# Patient Record
Sex: MALE | Race: WHITE | NOT HISPANIC OR LATINO | URBAN - METROPOLITAN AREA
[De-identification: names, ages, dates, MRNs, and addresses within clinical notes are randomized per-mention and may not be internally consistent; named-entity substitution may affect disease eponyms.]

---

## 2017-05-16 ENCOUNTER — INPATIENT (INPATIENT)
Facility: HOSPITAL | Age: 62
LOS: 2 days | Discharge: HOME | End: 2017-05-19
Attending: INTERNAL MEDICINE | Admitting: INTERNAL MEDICINE
Payer: COMMERCIAL

## 2017-05-16 VITALS
TEMPERATURE: 98 F | HEART RATE: 73 BPM | RESPIRATION RATE: 18 BRPM | OXYGEN SATURATION: 100 % | SYSTOLIC BLOOD PRESSURE: 165 MMHG | DIASTOLIC BLOOD PRESSURE: 87 MMHG

## 2017-05-16 DIAGNOSIS — I10 ESSENTIAL (PRIMARY) HYPERTENSION: ICD-10-CM

## 2017-05-16 DIAGNOSIS — S42.309A UNSPECIFIED FRACTURE OF SHAFT OF HUMERUS, UNSPECIFIED ARM, INITIAL ENCOUNTER FOR CLOSED FRACTURE: Chronic | ICD-10-CM

## 2017-05-16 DIAGNOSIS — S82.899A OTHER FRACTURE OF UNSPECIFIED LOWER LEG, INITIAL ENCOUNTER FOR CLOSED FRACTURE: Chronic | ICD-10-CM

## 2017-05-16 DIAGNOSIS — L98.499 NON-PRESSURE CHRONIC ULCER OF SKIN OF OTHER SITES WITH UNSPECIFIED SEVERITY: ICD-10-CM

## 2017-05-16 DIAGNOSIS — E11.59 TYPE 2 DIABETES MELLITUS WITH OTHER CIRCULATORY COMPLICATIONS: ICD-10-CM

## 2017-05-16 LAB
ALBUMIN SERPL ELPH-MCNC: 3.3 G/DL — SIGNIFICANT CHANGE UP (ref 3.3–5)
ALP SERPL-CCNC: 72 U/L — SIGNIFICANT CHANGE UP (ref 40–120)
ALT FLD-CCNC: 12 U/L — SIGNIFICANT CHANGE UP (ref 4–41)
AST SERPL-CCNC: 15 U/L — SIGNIFICANT CHANGE UP (ref 4–40)
BASE EXCESS BLDV CALC-SCNC: -2.8 MMOL/L — SIGNIFICANT CHANGE UP
BASOPHILS # BLD AUTO: 0.04 K/UL — SIGNIFICANT CHANGE UP (ref 0–0.2)
BASOPHILS NFR BLD AUTO: 0.4 % — SIGNIFICANT CHANGE UP (ref 0–2)
BILIRUB SERPL-MCNC: 0.2 MG/DL — SIGNIFICANT CHANGE UP (ref 0.2–1.2)
BLOOD GAS VENOUS - CREATININE: 2.38 MG/DL — HIGH (ref 0.5–1.3)
BUN SERPL-MCNC: 47 MG/DL — HIGH (ref 7–23)
CALCIUM SERPL-MCNC: 9 MG/DL — SIGNIFICANT CHANGE UP (ref 8.4–10.5)
CHLORIDE BLDV-SCNC: 111 MMOL/L — HIGH (ref 96–108)
CHLORIDE SERPL-SCNC: 105 MMOL/L — SIGNIFICANT CHANGE UP (ref 98–107)
CO2 SERPL-SCNC: 21 MMOL/L — LOW (ref 22–31)
CREAT SERPL-MCNC: 2.33 MG/DL — HIGH (ref 0.5–1.3)
CRP SERPL-MCNC: 2 MG/L — SIGNIFICANT CHANGE UP (ref 0.3–5)
EOSINOPHIL # BLD AUTO: 0.19 K/UL — SIGNIFICANT CHANGE UP (ref 0–0.5)
EOSINOPHIL NFR BLD AUTO: 2.1 % — SIGNIFICANT CHANGE UP (ref 0–6)
ERYTHROCYTE [SEDIMENTATION RATE] IN BLOOD: 82 MM/HR — HIGH (ref 1–15)
GAS PNL BLDV: 138 MMOL/L — SIGNIFICANT CHANGE UP (ref 136–146)
GLUCOSE BLDV-MCNC: 133 — HIGH (ref 70–99)
GLUCOSE SERPL-MCNC: 133 MG/DL — HIGH (ref 70–99)
HCO3 BLDV-SCNC: 21 MMOL/L — SIGNIFICANT CHANGE UP (ref 20–27)
HCT VFR BLD CALC: 35.4 % — LOW (ref 39–50)
HCT VFR BLDV CALC: 34.9 % — LOW (ref 39–51)
HGB BLD-MCNC: 11.1 G/DL — LOW (ref 13–17)
HGB BLDV-MCNC: 11.3 G/DL — LOW (ref 13–17)
IMM GRANULOCYTES NFR BLD AUTO: 0.1 % — SIGNIFICANT CHANGE UP (ref 0–1.5)
LACTATE BLDV-MCNC: 1.3 MMOL/L — SIGNIFICANT CHANGE UP (ref 0.5–2)
LACTATE SERPL-SCNC: 0.8 MMOL/L — SIGNIFICANT CHANGE UP (ref 0.5–2)
LYMPHOCYTES # BLD AUTO: 2.14 K/UL — SIGNIFICANT CHANGE UP (ref 1–3.3)
LYMPHOCYTES # BLD AUTO: 23.6 % — SIGNIFICANT CHANGE UP (ref 13–44)
MCHC RBC-ENTMCNC: 28.8 PG — SIGNIFICANT CHANGE UP (ref 27–34)
MCHC RBC-ENTMCNC: 31.4 % — LOW (ref 32–36)
MCV RBC AUTO: 91.9 FL — SIGNIFICANT CHANGE UP (ref 80–100)
MONOCYTES # BLD AUTO: 0.53 K/UL — SIGNIFICANT CHANGE UP (ref 0–0.9)
MONOCYTES NFR BLD AUTO: 5.8 % — SIGNIFICANT CHANGE UP (ref 2–14)
NEUTROPHILS # BLD AUTO: 6.16 K/UL — SIGNIFICANT CHANGE UP (ref 1.8–7.4)
NEUTROPHILS NFR BLD AUTO: 68 % — SIGNIFICANT CHANGE UP (ref 43–77)
PCO2 BLDV: 51 MMHG — SIGNIFICANT CHANGE UP (ref 41–51)
PH BLDV: 7.28 PH — LOW (ref 7.32–7.43)
PLATELET # BLD AUTO: 244 K/UL — SIGNIFICANT CHANGE UP (ref 150–400)
PMV BLD: 11.5 FL — SIGNIFICANT CHANGE UP (ref 7–13)
PO2 BLDV: 46 MMHG — HIGH (ref 35–40)
POTASSIUM BLDV-SCNC: 4.8 MMOL/L — HIGH (ref 3.4–4.5)
POTASSIUM SERPL-MCNC: 4.9 MMOL/L — SIGNIFICANT CHANGE UP (ref 3.5–5.3)
POTASSIUM SERPL-SCNC: 4.9 MMOL/L — SIGNIFICANT CHANGE UP (ref 3.5–5.3)
PROT SERPL-MCNC: 7 G/DL — SIGNIFICANT CHANGE UP (ref 6–8.3)
RBC # BLD: 3.85 M/UL — LOW (ref 4.2–5.8)
RBC # FLD: 14 % — SIGNIFICANT CHANGE UP (ref 10.3–14.5)
SAO2 % BLDV: 76.2 % — SIGNIFICANT CHANGE UP (ref 60–85)
SODIUM SERPL-SCNC: 140 MMOL/L — SIGNIFICANT CHANGE UP (ref 135–145)
WBC # BLD: 9.07 K/UL — SIGNIFICANT CHANGE UP (ref 3.8–10.5)
WBC # FLD AUTO: 9.07 K/UL — SIGNIFICANT CHANGE UP (ref 3.8–10.5)

## 2017-05-16 PROCEDURE — 73630 X-RAY EXAM OF FOOT: CPT | Mod: 26,RT

## 2017-05-16 PROCEDURE — 71010: CPT | Mod: 26

## 2017-05-16 RX ORDER — ASPIRIN/CALCIUM CARB/MAGNESIUM 324 MG
81 TABLET ORAL DAILY
Qty: 0 | Refills: 0 | Status: DISCONTINUED | OUTPATIENT
Start: 2017-05-16 | End: 2017-05-19

## 2017-05-16 RX ORDER — DEXTROSE 50 % IN WATER 50 %
1 SYRINGE (ML) INTRAVENOUS ONCE
Qty: 0 | Refills: 0 | Status: DISCONTINUED | OUTPATIENT
Start: 2017-05-16 | End: 2017-05-19

## 2017-05-16 RX ORDER — ENOXAPARIN SODIUM 100 MG/ML
30 INJECTION SUBCUTANEOUS EVERY 24 HOURS
Qty: 0 | Refills: 0 | Status: DISCONTINUED | OUTPATIENT
Start: 2017-05-16 | End: 2017-05-19

## 2017-05-16 RX ORDER — VANCOMYCIN HCL 1 G
1000 VIAL (EA) INTRAVENOUS ONCE
Qty: 0 | Refills: 0 | Status: COMPLETED | OUTPATIENT
Start: 2017-05-16 | End: 2017-05-16

## 2017-05-16 RX ORDER — LACTOBACILLUS ACIDOPHILUS 100MM CELL
1 CAPSULE ORAL
Qty: 0 | Refills: 0 | Status: DISCONTINUED | OUTPATIENT
Start: 2017-05-16 | End: 2017-05-19

## 2017-05-16 RX ORDER — DEXTROSE 50 % IN WATER 50 %
12.5 SYRINGE (ML) INTRAVENOUS ONCE
Qty: 0 | Refills: 0 | Status: DISCONTINUED | OUTPATIENT
Start: 2017-05-16 | End: 2017-05-19

## 2017-05-16 RX ORDER — PIPERACILLIN AND TAZOBACTAM 4; .5 G/20ML; G/20ML
3.38 INJECTION, POWDER, LYOPHILIZED, FOR SOLUTION INTRAVENOUS EVERY 12 HOURS
Qty: 0 | Refills: 0 | Status: DISCONTINUED | OUTPATIENT
Start: 2017-05-16 | End: 2017-05-19

## 2017-05-16 RX ORDER — GLUCAGON INJECTION, SOLUTION 0.5 MG/.1ML
1 INJECTION, SOLUTION SUBCUTANEOUS ONCE
Qty: 0 | Refills: 0 | Status: DISCONTINUED | OUTPATIENT
Start: 2017-05-16 | End: 2017-05-19

## 2017-05-16 RX ORDER — MUPIROCIN 20 MG/G
1 OINTMENT TOPICAL DAILY
Qty: 0 | Refills: 0 | Status: DISCONTINUED | OUTPATIENT
Start: 2017-05-16 | End: 2017-05-19

## 2017-05-16 RX ORDER — ONDANSETRON 8 MG/1
4 TABLET, FILM COATED ORAL EVERY 6 HOURS
Qty: 0 | Refills: 0 | Status: DISCONTINUED | OUTPATIENT
Start: 2017-05-16 | End: 2017-05-19

## 2017-05-16 RX ORDER — DOCUSATE SODIUM 100 MG
100 CAPSULE ORAL THREE TIMES A DAY
Qty: 0 | Refills: 0 | Status: DISCONTINUED | OUTPATIENT
Start: 2017-05-16 | End: 2017-05-19

## 2017-05-16 RX ORDER — SODIUM CHLORIDE 9 MG/ML
1000 INJECTION INTRAMUSCULAR; INTRAVENOUS; SUBCUTANEOUS ONCE
Qty: 0 | Refills: 0 | Status: COMPLETED | OUTPATIENT
Start: 2017-05-16 | End: 2017-05-16

## 2017-05-16 RX ORDER — SODIUM CHLORIDE 9 MG/ML
1000 INJECTION, SOLUTION INTRAVENOUS
Qty: 0 | Refills: 0 | Status: DISCONTINUED | OUTPATIENT
Start: 2017-05-16 | End: 2017-05-19

## 2017-05-16 RX ORDER — HYDRALAZINE HCL 50 MG
25 TABLET ORAL THREE TIMES A DAY
Qty: 0 | Refills: 0 | Status: DISCONTINUED | OUTPATIENT
Start: 2017-05-16 | End: 2017-05-18

## 2017-05-16 RX ORDER — INSULIN LISPRO 100/ML
VIAL (ML) SUBCUTANEOUS
Qty: 0 | Refills: 0 | Status: DISCONTINUED | OUTPATIENT
Start: 2017-05-16 | End: 2017-05-19

## 2017-05-16 RX ORDER — PANTOPRAZOLE SODIUM 20 MG/1
40 TABLET, DELAYED RELEASE ORAL
Qty: 0 | Refills: 0 | Status: DISCONTINUED | OUTPATIENT
Start: 2017-05-16 | End: 2017-05-19

## 2017-05-16 RX ORDER — AMLODIPINE BESYLATE 2.5 MG/1
10 TABLET ORAL DAILY
Qty: 0 | Refills: 0 | Status: DISCONTINUED | OUTPATIENT
Start: 2017-05-16 | End: 2017-05-19

## 2017-05-16 RX ORDER — METOPROLOL TARTRATE 50 MG
50 TABLET ORAL
Qty: 0 | Refills: 0 | Status: DISCONTINUED | OUTPATIENT
Start: 2017-05-16 | End: 2017-05-19

## 2017-05-16 RX ORDER — ATORVASTATIN CALCIUM 80 MG/1
40 TABLET, FILM COATED ORAL AT BEDTIME
Qty: 0 | Refills: 0 | Status: DISCONTINUED | OUTPATIENT
Start: 2017-05-16 | End: 2017-05-19

## 2017-05-16 RX ORDER — ACETAMINOPHEN 500 MG
650 TABLET ORAL EVERY 6 HOURS
Qty: 0 | Refills: 0 | Status: DISCONTINUED | OUTPATIENT
Start: 2017-05-16 | End: 2017-05-19

## 2017-05-16 RX ORDER — PIPERACILLIN AND TAZOBACTAM 4; .5 G/20ML; G/20ML
3.38 INJECTION, POWDER, LYOPHILIZED, FOR SOLUTION INTRAVENOUS ONCE
Qty: 0 | Refills: 0 | Status: COMPLETED | OUTPATIENT
Start: 2017-05-16 | End: 2017-05-16

## 2017-05-16 RX ORDER — SODIUM CHLORIDE 9 MG/ML
1000 INJECTION INTRAMUSCULAR; INTRAVENOUS; SUBCUTANEOUS
Qty: 0 | Refills: 0 | Status: DISCONTINUED | OUTPATIENT
Start: 2017-05-16 | End: 2017-05-17

## 2017-05-16 RX ADMIN — SODIUM CHLORIDE 75 MILLILITER(S): 9 INJECTION INTRAMUSCULAR; INTRAVENOUS; SUBCUTANEOUS at 20:17

## 2017-05-16 RX ADMIN — Medication 100 MILLIGRAM(S): at 22:00

## 2017-05-16 RX ADMIN — SODIUM CHLORIDE 1000 MILLILITER(S): 9 INJECTION INTRAMUSCULAR; INTRAVENOUS; SUBCUTANEOUS at 15:43

## 2017-05-16 RX ADMIN — Medication 25 MILLIGRAM(S): at 22:00

## 2017-05-16 RX ADMIN — PIPERACILLIN AND TAZOBACTAM 200 GRAM(S): 4; .5 INJECTION, POWDER, LYOPHILIZED, FOR SOLUTION INTRAVENOUS at 15:43

## 2017-05-16 RX ADMIN — ATORVASTATIN CALCIUM 40 MILLIGRAM(S): 80 TABLET, FILM COATED ORAL at 22:00

## 2017-05-16 RX ADMIN — Medication 250 MILLIGRAM(S): at 15:43

## 2017-05-16 NOTE — H&P ADULT. - HISTORY OF PRESENT ILLNESS
60 y/o M with PHM of DM, HTN, HLD,CKD stage II-III, GERD, Mild anemia of chronic disease, poor compliance with follow up care prsented to ED with Cc of right foot ulcer with infection.  Pt reports increasing swelling of right foot ulcer, with occasional blackish drainage for past few weeks duration. Pt denies fevers, chills, dizziness, nausea, vomiting. Pt admitted for similar last year. Patient denies any chest pain, shortness of breath, palpitations, dizziness or syncope, he denies any fever, chills, sore throat, cough or other constitutional symptoms He stats that he had injury to his right foot followed by open wound for past few weeks duration and has been doing dressing at home on his own but foot wound continued to get worse hence PCP advised to come to ED for possible r/o Osteomyelitis. He denies any abdominal pain, nasuea, vomiting, diarrhea or blood per rectum. He denies any depression, suicidal or homicidal ideations. He denies any headache, focal limb weakness or seizure like activity. He denies any recent travel or sick contact. He denies any vision or ear complaints at present.

## 2017-05-16 NOTE — ED PROVIDER NOTE - ATTENDING CONTRIBUTION TO CARE
OSKAR LEBLANC, ATTENDING NOTE:  62 yo M with past medical history of DM, Hypertension presents with RIGHT foot ulcer. Pt sent by PMD, Dr. Laci Gama, for infected foot ulcer. Pt reports swelling of right foot ulcer, with intermittent blackish drainage. Pt denies F/C, N/V.   OSKAR LEBLANC, ATTENDING NOTE:  Patient is awake and alert and in no acute distress.  Normocephalic/atraumatic.  Auricles are normal.  Neck supple.  Lungs CTAB, no wheeze, no rhonchi,  no rales.  Heart is regular rate and rhythm.  Abdomen is soft, not distended +BS.  Back is nontender, no CVAT.  Moving all 4 extremities.  RIGHT anteromedial planter surface ulcer with minimal d/c.  Neurologically grossly intact.  Affect is appropriate.  DR. LEBLANC, ATTENDING MD-  I performed a face to face bedside interview with patient regarding history of present illness, review of symptoms and past medical history. I completed an independent physical exam.  I have discussed patient's plan of care with Dr. Joyce.   I agree with note as stated above, having amended the EMR as needed to reflect my findings.  I have discussed the assessment and plan of care.  This includes during the time I functioned as the attending physician for this patient.

## 2017-05-16 NOTE — H&P ADULT. - ATTENDING COMMENTS
61 year male    Right foot diabetic infected ulcer R/o Osteomyelitis  HTn  DM type II with Diabetic neuropathy  DM type II with diabetic Nephropathy CKD stage III  HLD  GERD  Anemia of chronic disease  Poor compliance with medications and follow up.    Patient seen and examined at bedside. History, physical exam, Laboratory data and imaging studies reviewed with patient and medical team.      ROS: Right foot ulcer with purulent discharge, Foot pain,   IZ=840/80mmhg Pulse=80/min RR=16/min Temp: 98.4  RS: Clear, No rales, No crepitaions, no wheezing  CVS: S1 S2 Nl, KENNA II/VI at LPSB  CNS: AAOX2-3, no FND. Motor 5/5 all 4 extremities. Sensory intact. Cranial nerves II to XII grossly intact. Able to comprehend and answers all questions appropriately  PA: Soft, NT, BS+nt, No organomegaly, No palpable or pulsatile mass, no CVA angle tenderness  Ext: PP+nt, Bilateral pedal edema++nt, RIght foot dorsal aspect foot ulcer with foul smelling and purulent discharge at base with 2x4 cm in size open wound with mucopurulent discharge. Bilateral diminished foot sensation++nt, No cyanosis, No clubbing  HEENT: SPIKE, EOMI, Neck supple, no JVP, No LN, No carotid Bruits,     Labs: Reviewed  Imaging studies: Reviewed  EKG: Reviewed    Plan of care:  Admit to medicine  Xray foot  ESR, CRP  Possible MRI of right foot aftter podiatry consult  IV antibiotics with Zosyn and vancomycin pending cultures and ID evaluation  ID consult with Dr. Spivey  Podiatry consult  Wound care consult  Optimal BP/DM control with insulin  Hold all oral antidiabetic agent  Insulin sliding scale for now  Avoid nephrotoxic agent  Nephrology evaluation with Dr. Pham  Optimal medical treatment  DVT/GI prophylaxis  Avoid excess weight bearing on foot.     Laci Gama MD  5/16/17 7 PM

## 2017-05-16 NOTE — ED PROVIDER NOTE - MEDICAL DECISION MAKING DETAILS
infected ulcer, iv abx, podiatry c/s, labs, cultures, ivf, xray, admit to medicine for further w/u and management

## 2017-05-16 NOTE — ED ADULT NURSE NOTE - OBJECTIVE STATEMENT
1500--- Report received from VINOD Polo. Patient is alert and oriented x 4, denies pain at this time . Medications administered as ordered.  1630-- Podiatrist at bedside, dressing change done by podiatrist. Denies pain at this time. To be transferred to [facility] 3.

## 2017-05-16 NOTE — ED PROVIDER NOTE - OBJECTIVE STATEMENT
60 y/o M with PHM of DM, HTN p/w right foot ulcer. Pt sent by PMD, Dr. Laci Gama, for infected foot ulcer. Pt reports increasing swelling of right foot ulcer, with occasional blackish drainage. Pt denies fevers, chills, dizziness, nausea, vomiting. 62 y/o M with PHM of DM, HTN p/w right foot ulcer. Pt sent by PMD, Dr. Laci Gama, for infected foot ulcer. Pt reports increasing swelling of right foot ulcer, with occasional blackish drainage. Pt denies fevers, chills, dizziness, nausea, vomiting. Pt admitted for similar last year.

## 2017-05-17 DIAGNOSIS — M86.9 OSTEOMYELITIS, UNSPECIFIED: ICD-10-CM

## 2017-05-17 LAB
ALBUMIN SERPL ELPH-MCNC: 2.9 G/DL — LOW (ref 3.3–5)
ALP SERPL-CCNC: 69 U/L — SIGNIFICANT CHANGE UP (ref 40–120)
ALT FLD-CCNC: 8 U/L — SIGNIFICANT CHANGE UP (ref 4–41)
APPEARANCE UR: CLEAR — SIGNIFICANT CHANGE UP
APTT BLD: 33.3 SEC — SIGNIFICANT CHANGE UP (ref 27.5–37.4)
AST SERPL-CCNC: 12 U/L — SIGNIFICANT CHANGE UP (ref 4–40)
BASOPHILS # BLD AUTO: 0.03 K/UL — SIGNIFICANT CHANGE UP (ref 0–0.2)
BASOPHILS NFR BLD AUTO: 0.4 % — SIGNIFICANT CHANGE UP (ref 0–2)
BILIRUB SERPL-MCNC: 0.2 MG/DL — SIGNIFICANT CHANGE UP (ref 0.2–1.2)
BILIRUB UR-MCNC: NEGATIVE — SIGNIFICANT CHANGE UP
BLOOD UR QL VISUAL: NEGATIVE — SIGNIFICANT CHANGE UP
BUN SERPL-MCNC: 37 MG/DL — HIGH (ref 7–23)
CALCIUM SERPL-MCNC: 8.9 MG/DL — SIGNIFICANT CHANGE UP (ref 8.4–10.5)
CHLORIDE SERPL-SCNC: 115 MMOL/L — HIGH (ref 98–107)
CHOLEST SERPL-MCNC: 141 MG/DL — SIGNIFICANT CHANGE UP (ref 120–199)
CO2 SERPL-SCNC: 18 MMOL/L — LOW (ref 22–31)
COLOR SPEC: SIGNIFICANT CHANGE UP
CREAT ?TM UR-MCNC: 52.6 MG/DL — SIGNIFICANT CHANGE UP
CREAT SERPL-MCNC: 2.08 MG/DL — HIGH (ref 0.5–1.3)
CRP SERPL-MCNC: 2.1 MG/L — SIGNIFICANT CHANGE UP (ref 0.3–5)
EOSINOPHIL # BLD AUTO: 0.2 K/UL — SIGNIFICANT CHANGE UP (ref 0–0.5)
EOSINOPHIL NFR BLD AUTO: 2.6 % — SIGNIFICANT CHANGE UP (ref 0–6)
EOSINOPHIL NFR URNS MANUAL: SIGNIFICANT CHANGE UP (ref 0–0)
GLUCOSE SERPL-MCNC: 89 MG/DL — SIGNIFICANT CHANGE UP (ref 70–99)
GLUCOSE UR-MCNC: 100 — SIGNIFICANT CHANGE UP
HBA1C BLD-MCNC: 5.8 % — HIGH (ref 4–5.6)
HCT VFR BLD CALC: 34.3 % — LOW (ref 39–50)
HDLC SERPL-MCNC: 44 MG/DL — SIGNIFICANT CHANGE UP (ref 35–55)
HGB BLD-MCNC: 10.7 G/DL — LOW (ref 13–17)
IMM GRANULOCYTES NFR BLD AUTO: 0.1 % — SIGNIFICANT CHANGE UP (ref 0–1.5)
INR BLD: 0.97 — SIGNIFICANT CHANGE UP (ref 0.88–1.17)
KETONES UR-MCNC: NEGATIVE — SIGNIFICANT CHANGE UP
LEUKOCYTE ESTERASE UR-ACNC: NEGATIVE — SIGNIFICANT CHANGE UP
LIPID PNL WITH DIRECT LDL SERPL: 76 MG/DL — SIGNIFICANT CHANGE UP
LYMPHOCYTES # BLD AUTO: 2.82 K/UL — SIGNIFICANT CHANGE UP (ref 1–3.3)
LYMPHOCYTES # BLD AUTO: 37.2 % — SIGNIFICANT CHANGE UP (ref 13–44)
MCHC RBC-ENTMCNC: 28.2 PG — SIGNIFICANT CHANGE UP (ref 27–34)
MCHC RBC-ENTMCNC: 31.2 % — LOW (ref 32–36)
MCV RBC AUTO: 90.5 FL — SIGNIFICANT CHANGE UP (ref 80–100)
MONOCYTES # BLD AUTO: 0.49 K/UL — SIGNIFICANT CHANGE UP (ref 0–0.9)
MONOCYTES NFR BLD AUTO: 6.5 % — SIGNIFICANT CHANGE UP (ref 2–14)
MUCOUS THREADS # UR AUTO: SIGNIFICANT CHANGE UP
NEUTROPHILS # BLD AUTO: 4.04 K/UL — SIGNIFICANT CHANGE UP (ref 1.8–7.4)
NEUTROPHILS NFR BLD AUTO: 53.2 % — SIGNIFICANT CHANGE UP (ref 43–77)
NITRITE UR-MCNC: NEGATIVE — SIGNIFICANT CHANGE UP
PH UR: 6.5 — SIGNIFICANT CHANGE UP (ref 4.6–8)
PLATELET # BLD AUTO: 251 K/UL — SIGNIFICANT CHANGE UP (ref 150–400)
PMV BLD: 11.1 FL — SIGNIFICANT CHANGE UP (ref 7–13)
POTASSIUM SERPL-MCNC: 4.7 MMOL/L — SIGNIFICANT CHANGE UP (ref 3.5–5.3)
POTASSIUM SERPL-SCNC: 4.7 MMOL/L — SIGNIFICANT CHANGE UP (ref 3.5–5.3)
PROT SERPL-MCNC: 6.4 G/DL — SIGNIFICANT CHANGE UP (ref 6–8.3)
PROT UR-MCNC: 431 MG/DL — SIGNIFICANT CHANGE UP
PROT UR-MCNC: 500 — HIGH
PROTHROM AB SERPL-ACNC: 10.9 SEC — SIGNIFICANT CHANGE UP (ref 9.8–13.1)
RBC # BLD: 3.79 M/UL — LOW (ref 4.2–5.8)
RBC # FLD: 13.9 % — SIGNIFICANT CHANGE UP (ref 10.3–14.5)
RBC CASTS # UR COMP ASSIST: SIGNIFICANT CHANGE UP (ref 0–?)
SODIUM SERPL-SCNC: 148 MMOL/L — HIGH (ref 135–145)
SODIUM UR-SCNC: 99 MEQ/L — SIGNIFICANT CHANGE UP
SP GR SPEC: 1.01 — SIGNIFICANT CHANGE UP (ref 1–1.03)
SPECIMEN SOURCE: SIGNIFICANT CHANGE UP
SPECIMEN SOURCE: SIGNIFICANT CHANGE UP
SQUAMOUS # UR AUTO: SIGNIFICANT CHANGE UP
TRIGL SERPL-MCNC: 138 MG/DL — SIGNIFICANT CHANGE UP (ref 10–149)
UROBILINOGEN FLD QL: NORMAL E.U. — SIGNIFICANT CHANGE UP (ref 0.1–0.2)
WBC # BLD: 7.59 K/UL — SIGNIFICANT CHANGE UP (ref 3.8–10.5)
WBC # FLD AUTO: 7.59 K/UL — SIGNIFICANT CHANGE UP (ref 3.8–10.5)

## 2017-05-17 PROCEDURE — 76775 US EXAM ABDO BACK WALL LIM: CPT | Mod: 26

## 2017-05-17 PROCEDURE — 93970 EXTREMITY STUDY: CPT | Mod: 26

## 2017-05-17 RX ORDER — SODIUM BICARBONATE 1 MEQ/ML
650 SYRINGE (ML) INTRAVENOUS THREE TIMES A DAY
Qty: 0 | Refills: 0 | Status: COMPLETED | OUTPATIENT
Start: 2017-05-17 | End: 2017-05-19

## 2017-05-17 RX ADMIN — Medication 81 MILLIGRAM(S): at 14:52

## 2017-05-17 RX ADMIN — Medication 50 MILLIGRAM(S): at 17:40

## 2017-05-17 RX ADMIN — Medication 650 MILLIGRAM(S): at 21:57

## 2017-05-17 RX ADMIN — AMLODIPINE BESYLATE 10 MILLIGRAM(S): 2.5 TABLET ORAL at 07:25

## 2017-05-17 RX ADMIN — PIPERACILLIN AND TAZOBACTAM 25 GRAM(S): 4; .5 INJECTION, POWDER, LYOPHILIZED, FOR SOLUTION INTRAVENOUS at 17:40

## 2017-05-17 RX ADMIN — Medication 25 MILLIGRAM(S): at 14:51

## 2017-05-17 RX ADMIN — Medication 1 TABLET(S): at 09:00

## 2017-05-17 RX ADMIN — PIPERACILLIN AND TAZOBACTAM 25 GRAM(S): 4; .5 INJECTION, POWDER, LYOPHILIZED, FOR SOLUTION INTRAVENOUS at 06:01

## 2017-05-17 RX ADMIN — Medication 100 MILLIGRAM(S): at 21:57

## 2017-05-17 RX ADMIN — Medication 25 MILLIGRAM(S): at 21:57

## 2017-05-17 RX ADMIN — SODIUM CHLORIDE 75 MILLILITER(S): 9 INJECTION INTRAMUSCULAR; INTRAVENOUS; SUBCUTANEOUS at 17:39

## 2017-05-17 RX ADMIN — Medication 50 MILLIGRAM(S): at 06:00

## 2017-05-17 RX ADMIN — MUPIROCIN 1 APPLICATION(S): 20 OINTMENT TOPICAL at 13:58

## 2017-05-17 RX ADMIN — Medication 100 MILLIGRAM(S): at 14:52

## 2017-05-17 RX ADMIN — ENOXAPARIN SODIUM 30 MILLIGRAM(S): 100 INJECTION SUBCUTANEOUS at 17:39

## 2017-05-17 RX ADMIN — Medication 1 TABLET(S): at 17:40

## 2017-05-17 RX ADMIN — PANTOPRAZOLE SODIUM 40 MILLIGRAM(S): 20 TABLET, DELAYED RELEASE ORAL at 06:00

## 2017-05-17 RX ADMIN — Medication 100 MILLIGRAM(S): at 07:25

## 2017-05-17 RX ADMIN — Medication 25 MILLIGRAM(S): at 07:25

## 2017-05-17 RX ADMIN — ATORVASTATIN CALCIUM 40 MILLIGRAM(S): 80 TABLET, FILM COATED ORAL at 21:57

## 2017-05-17 NOTE — DIETITIAN INITIAL EVALUATION ADULT. - OTHER INFO
Nutrition consult ordered for > Aic . 62 y/o male admitted with the DX of skin ulcer, DM and HTN, Pt reports that he has been managing his own diet and was able to loose 35 #  last year, gained 12 # back this year due to limited physical activity related with foot ulcer. LABS reviewed, Nutrition education provided in detail about [facility]. Current diet remain appropriate, RD remain available.

## 2017-05-17 NOTE — CONSULT NOTE ADULT - PROBLEM SELECTOR RECOMMENDATION 2
1. Local wound care and podiatry f/u.  Podiatry consult reviewed.  No bone exposure on physical exam.

## 2017-05-17 NOTE — PROGRESS NOTE ADULT - SUBJECTIVE AND OBJECTIVE BOX
Patient is a 61y old  Male who presents with a chief complaint of Right foot ulcer (16 May 2017 23:24)       INTERVAL HPI/OVERNIGHT EVENTS:  Patient seen and evaluated at bedside.  Pt is resting comfortable in NAD. Denies N/V/F/C.      Allergies    No Known Allergies    Intolerances        Vital Signs Last 24 Hrs  T(C): 36.7, Max: 37 (05-16 @ 21:26)  T(F): 98, Max: 98.6 (05-16 @ 21:26)  HR: 78 (65 - 78)  BP: 152/70 (141/67 - 157/82)  BP(mean): --  RR: 17 (16 - 18)  SpO2: 99% (95% - 100%)    LABS:                        10.7   7.59  )-----------( 251      ( 17 May 2017 05:15 )             34.3     05-17    148<H>  |  115<H>  |  37<H>  ----------------------------<  89  4.7   |  18<L>  |  2.08<H>    Ca    8.9      17 May 2017 05:15    TPro  6.4  /  Alb  2.9<L>  /  TBili  0.2  /  DBili  x   /  AST  12  /  ALT  8   /  AlkPhos  69  05-17    PT/INR - ( 17 May 2017 05:15 )   PT: 10.9 SEC;   INR: 0.97          PTT - ( 17 May 2017 05:15 )  PTT:33.3 SEC    CAPILLARY BLOOD GLUCOSE  140 (17 May 2017 12:00)  103 (17 May 2017 07:43)      Lower Extremity Physical Exam:  Vascular: DP/PT 2/4 B/L, CFT <3sec x 10, Temp gradient normal B/L  Neurology: Epicritic sensation absent B/L  Musculoskeletal/Ortho: right foto rockerbottom charcot deformity w/ ulceration  Wound #1:  Location: right foot plantar medial wound  Size: 2.0x2.0 cm  Etiology: diabetic ulceration  Depth: 0.1  Wound bed: granular   Drainage: scant  Odor: none  Periwound: callused    RADIOLOGY & ADDITIONAL TESTS:  EXAM:  RAD FOOT MIN 3 VIEWS RIGHT        PROCEDURE DATE:  May 16 2017         INTERPRETATION:  CLINICAL INDICATION: right foot ulceration    EXAM:  Frontal, oblique, and lateral right foot from 5/16/2017 at 1426. Compared   to prior study from 8/13/2016.    IMPRESSION:  Intact partially visualized distal fibular fracture fixation hardware   with underlying anatomic alignment maintained. Chronic posttraumatic   bridging heterotopic ossification across distal tibiofibular syndesmotic   space again noted.     Stable chronic midfoot deformity/Lisfranc derangement likely related to   Charcot/neuropathic arthropathy. Normally aligned hindfoot and forefoot   osseous structures and articulations. No acute appearing fractures.    Generalized soft tissue swelling. No tracking gas collections.     Proximal plantar aspect of the cuboid bone appears focally osteopenic   with thinned and indistinct cortices on the lateral view raising   suspicion for osteomyelitis in this region in the proper clinical   context. No additional suspected areas of osteomyelitis.    Plantar calcaneal enthesophyte again noted.    No additional focal osseous lesions.                  PERLA MUÑOZ M.D., ATTENDING RADIOLOGIST  This document has been electronically signed. May 16 2017  3:13PM

## 2017-05-17 NOTE — CONSULT NOTE ADULT - ASSESSMENT
Acute on CKD stage 3 baseline 1.3-1.4  ABEL: prerenal vs ATN, Renal US neg for obstruction, improving  Hypernatremia  Acidosis: sec to hyperchloremia   ? osteomyelitis: pending MRI  DM  HTN: acceptable     Plan  check urine cr, eos, na, UA  Check PTH, phos  Monitor BMP  Sodium bicarb 650 tid x 2 days  Encourage Increase PO free water  F/u ID/Pod Acute on CKD stage 3 baseline 1.3-1.4  ABEL: prerenal vs ATN, Renal US neg for obstruction, improving  Hypernatremia  Acidosis: sec to hyperchloremia   ? osteomyelitis: pending MRI  DM  HTN: acceptable     Plan  check urine cr, eos, na, UA  Check PTH, phos  Monitor BMP  Sodium bicarb 650 tid x 2 days  D/C IVF for now  Encourage Increase PO free water  F/u ID/Pod Acute on CKD stage 3 baseline 1.3-1.4  ABEL: prerenal vs ATN, Renal US neg for obstruction, improving  Hypernatremia: likely sec to dehydration  Acidosis: sec to hyperchloremia   ? osteomyelitis: pending MRI  DM  HTN: acceptable     Plan  check urine cr, eos, na, UA  Check PTH, phos  Monitor BMP  Sodium bicarb 650 tid x 2 days  D/C IVF for now  Encourage Increase PO free water  F/u ID/Pod

## 2017-05-17 NOTE — PROGRESS NOTE ADULT - ASSESSMENT
61 year male    Right foot open wound diabetic foot ulcer with possible osteomyelitis  HTn  Dm type II wtih CKD stage II-III  HLd  GERD  Anemia of chronic disease  Abnormal SPEP    Plan of care:  Continue IV fluids  IV antibiotics with Zosyn, vancomycin pending cultures and ID evalaution  ID consult Dr. Spivey  Podiatry consult  Monitor BUN/Creatinine level  Avoid nephrotoxic agents  Anemia work up  Immunofixation  Wound care consult  Possibel MRI of foot.  Optimal medical treatement  DVT/GI prophylaxis    Laci Gama MD  5/17/17 1:45 PM

## 2017-05-17 NOTE — PHYSICAL THERAPY INITIAL EVALUATION ADULT - DIAGNOSIS, PT EVAL
Infected Diabetic (R) Foot Ulcer; distal fibular fracture fixation hardware with underlying anatomic alignment maintained. Chronic posttraumatic bridging heterotopic ossification across distal tibiofibular syndesmotic space; Stable chronic midfoot deformity/Lisfranc derangement likely related to Charcot/neuropathic arthropathy. Generalized soft tissue swelling. Proximal plantar aspect of the cuboid bone appears focally osteopenic with thinned and indistinct cortices on the lateral view raising suspicion for osteomyelitis in this region in the proper clinical context. Plantar calcaneal enthesophyte

## 2017-05-17 NOTE — DIETITIAN INITIAL EVALUATION ADULT. - NS AS NUTRI INTERV ED CONTENT
Purpose of the nutrition education/Nutrition relationship to health/disease/Survival information/Priority modifications/Recommended modifications

## 2017-05-17 NOTE — CONSULT NOTE ADULT - ASSESSMENT
61 YOM h/o DM, HTN, h/o rt ankle fracture (h/o hardware?), and chronic rt foot plantar ulcer with increased discharge, at times purulent.  Possible OM on Ft xray.

## 2017-05-17 NOTE — CONSULT NOTE ADULT - PROBLEM SELECTOR RECOMMENDATION 9
1. Foot xray shows possible OM.  Recommend MRI vs. indium scan for further evaluation  2. Currently on broad spectrum abx.  If negative for OM, limit abx to short course of 7 days.  3. Cover for Staph, strep, and gram negative organisms including pseudomonas given h/o DM  4. Monitor WBC and temp cure.  If febrile >100.4, check bcx x 2.  5. Trend inflammatory markers.

## 2017-05-17 NOTE — CONSULT NOTE ADULT - SUBJECTIVE AND OBJECTIVE BOX
Patient is a 61y old  Male who presents with a chief complaint of Right foot ulcer (16 May 2017 23:24)      HPI:  62 y/o M with PHM of DM, HTN, HLD,CKD stage II-III, GERD, Mild anemia of chronic disease, poor compliance with follow up care prsented to ED with Cc of right foot ulcer with infection.  Pt reports increasing swelling of right foot ulcer, with occasional blackish drainage for past few weeks duration. Pt denies fevers, chills, dizziness, nausea, vomiting. Pt has h/o ankle fracture with metal hardware since last year, and chronic ulcer since then. Patient denies any chest pain, shortness of breath, palpitations, dizziness or syncope, he denies any fever, chills, sore throat, cough or other constitutional symptoms He stats that he had injury to his right foot followed by open wound for past few weeks duration and has been doing dressing at home on his own but foot wound continued to get worse hence PCP advised to come to ED for possible r/o Osteomyelitis. He denies any abdominal pain, nasuea, vomiting, diarrhea or blood per rectum. He denies any depression, suicidal or homicidal ideations. He denies any headache, focal limb weakness or seizure like activity. He denies any recent travel or sick contact. He denies any vision or ear complaints at present. No recent antibiotics or recent trauma to foot. (16 May 2017 18:47)      REVIEW OF SYSTEMS:    CONSTITUTIONAL: No fever, weight loss, or fatigue  EYES: No eye pain, visual disturbances, or discharge  ENMT:  No difficulty hearing, tinnitus, vertigo; No sinus or throat pain  NECK: No pain or stiffness  BREASTS: No pain, masses, or nipple discharge  RESPIRATORY: No cough, wheezing, chills or hemoptysis; No shortness of breath  CARDIOVASCULAR: No chest pain, palpitations, dizziness, or leg swelling  GASTROINTESTINAL: No abdominal or epigastric pain. No nausea, vomiting, or hematemesis; No diarrhea or constipation. No melena or hematochezia.  GENITOURINARY: No dysuria, frequency, hematuria, or incontinence  NEUROLOGICAL: No headaches, memory loss, loss of strength, numbness, or tremors  SKIN: No itching, burning, rashes, or lesions.     LYMPH NODES: No enlarged glands  ENDOCRINE: No heat or cold intolerance; No hair loss  MUSCULOSKELETAL: Right ankle and foot chronic edema; No muscle, back, or extremity pain  PSYCHIATRIC: No depression, anxiety, mood swings, or difficulty sleeping  HEME/LYMPH: No easy bruising, or bleeding gums  ALLERY AND IMMUNOLOGIC: No hives or eczema      PAST MEDICAL & SURGICAL HISTORY:  HTN (hypertension)  DM (diabetes mellitus)  Arm fracture  Ankle fracture: ORIF        Allergies    No Known Allergies        FAMILY HISTORY:  No pertinent family history in first degree relatives      SOCIAL HISTORY:    HIV status: unknown   PPD:  n/a  STD:  Denies  Travel hx: Denies  Occupation:  Works as [employer]  Pets: None    MEDICATIONS  (STANDING):  mupirocin 2% Ointment 1Application(s) Topical daily  enoxaparin Injectable 30milliGRAM(s) SubCutaneous every 24 hours  sodium chloride 0.9%. 1000milliLiter(s) IV Continuous <Continuous>  docusate sodium 100milliGRAM(s) Oral three times a day  insulin lispro (HumaLOG) corrective regimen sliding scale  SubCutaneous three times a day before meals  dextrose 5%. 1000milliLiter(s) IV Continuous <Continuous>  dextrose 50% Injectable 12.5Gram(s) IV Push once  piperacillin/tazobactam IVPB. 3.375Gram(s) IV Intermittent every 12 hours  aspirin enteric coated 81milliGRAM(s) Oral daily  atorvastatin 40milliGRAM(s) Oral at bedtime  metoprolol 50milliGRAM(s) Oral two times a day  amLODIPine   Tablet 10milliGRAM(s) Oral daily  pantoprazole    Tablet 40milliGRAM(s) Oral before breakfast  lactobacillus acidophilus 1Tablet(s) Oral two times a day with meals  hydrALAZINE 25milliGRAM(s) Oral three times a day    MEDICATIONS  (PRN):  acetaminophen   Tablet 650milliGRAM(s) Oral every 6 hours PRN For Temp greater than 38 C (100.4 F)  acetaminophen   Tablet. 650milliGRAM(s) Oral every 6 hours PRN Mild Pain (1 - 3)  ondansetron Injectable 4milliGRAM(s) IV Push every 6 hours PRN Nausea  dextrose Gel 1Dose(s) Oral once PRN Blood Glucose LESS THAN 70 milliGRAM(s)/deciliter  glucagon  Injectable 1milliGRAM(s) IntraMuscular once PRN Glucose LESS THAN 70 milligrams/deciliter      Vital Signs Last 24 Hrs  T(C): 36.7, Max: 37 (05-16 @ 21:26)  T(F): 98.1, Max: 98.6 (05-16 @ 21:26)  HR: 77 (65 - 77)  BP: 141/67 (141/67 - 157/82)  BP(mean): --  RR: 16 (16 - 18)  SpO2: 95% (95% - 100%)    PHYSICAL EXAM:    GENERAL: NAD, well-groomed, well-developed  HEAD:  Atraumatic, Normocephalic  EYES: EOMI, PERRLA, conjunctiva and sclera clear  ENMT: No tonsillar erythema, exudates, or enlargement; Moist mucous membranes, Good dentition, No lesions  NECK: Supple, No JVD, Normal thyroid  NERVOUS SYSTEM:  Alert & Oriented X3, Good concentration; Motor Strength 5/5 B/L upper and lower extremities; DTRs 2+ intact and symmetric  CHEST/LUNG: Clear to percussion bilaterally; No rales, rhonchi, wheezing, or rubs  HEART: Regular rate and rhythm; No murmurs, rubs, or gallops  ABDOMEN: Soft, Nontender, Nondistended; Bowel sounds present  EXTREMITIES:  2+ Peripheral Pulses, No clubbing, cyanosis.  Rt ankle and foot edema.  No TTP  LYMPH: No lymphadenopathy noted  SKIN: Rt foot plantar ulcer with surrounding callous.  Serosanguinous discharge.  No devitalized tissue or tenderness to palpation out of proportion to exam    LABS:  CBC Full  -  ( 17 May 2017 05:15 )  WBC Count : 7.59 K/uL  Hemoglobin : 10.7 g/dL  Hematocrit : 34.3 %  Platelet Count - Automated : 251 K/uL  Mean Cell Volume : 90.5 fL  Mean Cell Hemoglobin : 28.2 pg  Mean Cell Hemoglobin Concentration : 31.2 %  Auto Neutrophil # : 4.04 K/uL  Auto Lymphocyte # : 2.82 K/uL  Auto Monocyte # : 0.49 K/uL  Auto Eosinophil # : 0.20 K/uL  Auto Basophil # : 0.03 K/uL  Auto Neutrophil % : 53.2 %  Auto Lymphocyte % : 37.2 %  Auto Monocyte % : 6.5 %  Auto Eosinophil % : 2.6 %  Auto Basophil % : 0.4 %      05-17    148<H>  |  115<H>  |  37<H>  ----------------------------<  89  4.7   |  18<L>  |  2.08<H>    Ca    8.9      17 May 2017 05:15    TPro  6.4  /  Alb  2.9<L>  /  TBili  0.2  /  DBili  x   /  AST  12  /  ALT  8   /  AlkPhos  69  05-17      LIVER FUNCTIONS - ( 17 May 2017 05:15 )  Alb: 2.9 g/dL / Pro: 6.4 g/dL / ALK PHOS: 69 u/L / ALT: 8 u/L / AST: 12 u/L / GGT: x           Sedimentation Rate, Erythrocyte (05.16.17 @ 13:34)    Sedimentation Rate, Erythrocyte: 82 mm/hr      MICROBIOLOGY:    Blood Cultures:  u/a      Urine Culture:  u/a    RADIOLOGY:    5/16/17 Rt foot xray  Generalized soft tissue swelling. No tracking gas collections.     Proximal plantar aspect of the cuboid bone appears focally osteopenic   with thinned and indistinct cortices on the lateral view raising   suspicion for osteomyelitis in this region in the proper clinical   context. No additional suspected areas of osteomyelitis.    5/16/17 Chest xray     Clear lungs.    5/17 B/L LE US  No evidence of bilateral lower extremity deep venous thrombosis.

## 2017-05-17 NOTE — PROGRESS NOTE ADULT - SUBJECTIVE AND OBJECTIVE BOX
Chief Complaint:  Patient feels better at present.Complaints of right leg pain 3-4/10    MEDICATIONS  (STANDING):  mupirocin 2% Ointment 1Application(s) Topical daily  enoxaparin Injectable 30milliGRAM(s) SubCutaneous every 24 hours  sodium chloride 0.9%. 1000milliLiter(s) IV Continuous <Continuous>  docusate sodium 100milliGRAM(s) Oral three times a day  insulin lispro (HumaLOG) corrective regimen sliding scale  SubCutaneous three times a day before meals  dextrose 5%. 1000milliLiter(s) IV Continuous <Continuous>  dextrose 50% Injectable 12.5Gram(s) IV Push once  piperacillin/tazobactam IVPB. 3.375Gram(s) IV Intermittent every 12 hours  aspirin enteric coated 81milliGRAM(s) Oral daily  atorvastatin 40milliGRAM(s) Oral at bedtime  metoprolol 50milliGRAM(s) Oral two times a day  amLODIPine   Tablet 10milliGRAM(s) Oral daily  pantoprazole    Tablet 40milliGRAM(s) Oral before breakfast  lactobacillus acidophilus 1Tablet(s) Oral two times a day with meals  hydrALAZINE 25milliGRAM(s) Oral three times a day    MEDICATIONS  (PRN):  acetaminophen   Tablet 650milliGRAM(s) Oral every 6 hours PRN For Temp greater than 38 C (100.4 F)  acetaminophen   Tablet. 650milliGRAM(s) Oral every 6 hours PRN Mild Pain (1 - 3)  ondansetron Injectable 4milliGRAM(s) IV Push every 6 hours PRN Nausea  dextrose Gel 1Dose(s) Oral once PRN Blood Glucose LESS THAN 70 milliGRAM(s)/deciliter  glucagon  Injectable 1milliGRAM(s) IntraMuscular once PRN Glucose LESS THAN 70 milligrams/deciliter      Vital Signs Last 24 Hrs  T(C): 36.7, Max: 37 (05-16 @ 21:26)  HR: 77 (65 - 77)  BP: 141/67 (141/67 - 157/82)  RR: 16 (16 - 18)  SpO2: 95% (95% - 100%)  Wt(kg): --    CAPILLARY BLOOD GLUCOSE  140 (17 May 2017 12:00)  103 (17 May 2017 07:43)      I&O's Summary    I & Os for current day (as of 17 May 2017 13:40)  =============================================  IN: 0 ml / OUT: 850 ml / NET: -850 ml      PHYSICAL EXAM:  GENERAL: NAD, well-developed, well nourished, alert, awake, no acute distress at present  HEAD:  Atraumatic, Normocephalic,   EYES: EOMI, PERRLA, conjunctiva and sclera clear  NECK: Supple, No JVD, no palpable thyromegaly, no lymph adenopahy  CHEST/LUNG: Clear to auscultation bilaterally; No wheeze, no rales, no crepitations  HEART: Regular rate and rhythm;  Cathy II/VI at LPSB, No rubs, or gallops  ABDOMEN: Soft, Nontender, Nondistended; Bowel sounds present, no guarding, no rigidity, no rebound tenderness  EXTREMITIES:  Right foot dorsal aspect open wound ulcer 2x4 cm noted with mild purulent discharge. Bilaeral pedal edema+nt, Bilateral peripheral pulses+nt, No cyanosis, no clubbing.  Neurology: AAOx3, no focal neurological deficit. Motor 5/5 all 4 extremities. sensory intact. cranial nerves II to XII grossly intact. Able to comprehend and answers all questions appropriately.  SKIN: No rashes or lesions    LABS:                                                   05-17-17 @ 05:15    148<H>  |  115<H>  |  37<H>  ----------------------------<  89  4.7   |  18<L>  |  2.08<H>                                                 05-16-17 @ 13:34    140  |  105  |  47<H>  ----------------------------<  133<H>  4.9   |  21<L>  |  2.33<H>    Ca    8.9      17 May 2017 05:15  Ca    9.0      16 May 2017 13:34    TPro  6.4  /  Alb  2.9<L>  /  TBili  0.2  /  DBili  x   /  AST  12  /  ALT  8   /  AlkPhos  69  05-17  TPro  7.0  /  Alb  3.3  /  TBili  0.2  /  DBili  x   /  AST  15  /  ALT  12  /  AlkPhos  72  05-16      CBC Full  -  ( 17 May 2017 05:15 )  WBC Count : 7.59 K/uL  Hemoglobin : 10.7 g/dL  Hematocrit : 34.3 %  Platelet Count - Automated : 251 K/uL  Mean Cell Volume : 90.5 fL      CBC Full  -  ( 16 May 2017 13:34 )  WBC Count : 9.07 K/uL  Hemoglobin : 11.1 g/dL  Hematocrit : 35.4 %  Platelet Count - Automated : 244 K/uL  Mean Cell Volume : 91.9 fL        PT/INR - ( 17 May 2017 05:15 )   PT: 10.9 SEC;   INR: 0.97          PTT - ( 17 May 2017 05:15 )  PTT:33.3 SEC          RADIOLOGY & ADDITIONAL TESTS:  EXAM:  US KIDNEY(S)        PROCEDURE DATE:  May 17 2017         INTERPRETATION:  CLINICAL INFORMATION: Hypertension, type 2 diabetes,   hyperlipidemia and stage III chronic renal disease.    COMPARISON: None available.    TECHNIQUE: Sonography of the kidneys and bladder.     FINDINGS:    Right kidney:  13.5 cm. No renal mass, hydronephrosis or shadowing   calculus. 13 mm simple cyst is noted laterally.    Left kidney:  14.0 cm. No renal mass, hydronephrosis or shadowing   calculus. 2.3 cm lowerpole simple cyst is noted.    Urinary bladder: Within normal limits.    IMPRESSION:     Normal renal ultrasound.                      CORINA CHE M.D., ATTENDING RADIOLOGIST  This document has been electronically signed. May 17 2017 10:33AM        EXAM:  US DPLX LWR EXT VEINS COMPL BI        PROCEDURE DATE:  May 17 2017         INTERPRETATION:  CLINICAL INFORMATION: Infected right foot ulcer. Left   lower extremity pain. Assess for DVT.    COMPARISON: Bilateral lower extremity Doppler 8/15/2016.    TECHNIQUE: Duplex sonography of the bilateral lower extremities with   color and spectral Doppler, with and without compression.      FINDINGS:    There is normal compressibility of the bilateral common femoral, femoral   and popliteal veins. No calf vein thrombosis is detected.    Doppler examination shows normal spontaneous and phasic flow.    IMPRESSION:     No evidence of bilateral lower extremity deep venous thrombosis.                  CORINA CHE M.D., ATTENDING RADIOLOGIST  This document has been electronically signed. May 17 2017 10:32AM

## 2017-05-17 NOTE — CONSULT NOTE ADULT - SUBJECTIVE AND OBJECTIVE BOX
HPI:  60 y/o M with PHM of DM, HTN, HLD,CKD stage II-III, GERD, Mild anemia of chronic disease, poor compliance with follow up care prsented to ED with Cc of right foot ulcer with infection.  Pt reports increasing swelling of right foot ulcer, with occasional blackish drainage for past few weeks duration. Pt denies fevers, chills, dizziness, nausea, vomiting.He stats that he had injury to his right foot followed by open wound for past few weeks duration and has been doing dressing at home on his own but foot wound continued to get worse hence PCP advised to come to ED for possible r/o Osteomyelitis. patient denied taking any antibiotic/NSAIDs at home.     Allergies:  No Known Allergies    PAST MEDICAL & SURGICAL HISTORY:  HTN (hypertension)  DM (diabetes mellitus)  No pertinent past medical history  Arm fracture  Ankle fracture: ORIF  No significant past surgical history      Home Medications Reviewed    Hospital Medications:   MEDICATIONS  (STANDING):  mupirocin 2% Ointment 1Application(s) Topical daily  enoxaparin Injectable 30milliGRAM(s) SubCutaneous every 24 hours  sodium chloride 0.9%. 1000milliLiter(s) IV Continuous <Continuous>  docusate sodium 100milliGRAM(s) Oral three times a day  insulin lispro (HumaLOG) corrective regimen sliding scale  SubCutaneous three times a day before meals  dextrose 5%. 1000milliLiter(s) IV Continuous <Continuous>  dextrose 50% Injectable 12.5Gram(s) IV Push once  piperacillin/tazobactam IVPB. 3.375Gram(s) IV Intermittent every 12 hours  aspirin enteric coated 81milliGRAM(s) Oral daily  atorvastatin 40milliGRAM(s) Oral at bedtime  metoprolol 50milliGRAM(s) Oral two times a day  amLODIPine   Tablet 10milliGRAM(s) Oral daily  pantoprazole    Tablet 40milliGRAM(s) Oral before breakfast  lactobacillus acidophilus 1Tablet(s) Oral two times a day with meals  hydrALAZINE 25milliGRAM(s) Oral three times a day      SOCIAL HISTORY:  Denies ETOh, Smoking,     FAMILY HISTORY:  No pertinent family history in first degree relatives      REVIEW OF SYSTEMS:  CONSTITUTIONAL: No weakness, fevers or chills  EYES/ENT: No visual changes;  No vertigo or throat pain   NECK: No pain or stiffness  RESPIRATORY: No cough, wheezing, hemoptysis; No shortness of breath  CARDIOVASCULAR: No chest pain or palpitations.  GASTROINTESTINAL: No abdominal or epigastric pain. No nausea, vomiting, or hematemesis; No diarrhea or constipation. No melena or hematochezia.  GENITOURINARY: No dysuria, frequency, foamy urine, urinary urgency, incontinence or hematuria  NEUROLOGICAL: No numbness or weakness  SKIN: No itching, burning, rashes, or lesions   VASCULAR: No bilateral lower extremity edema.   All other review of systems is negative unless indicated above.    VITALS:  T(F): 98, Max: 98.6 (05-16 @ 21:26)  HR: 78  BP: 152/70  RR: 17  SpO2: 99%  Wt(kg): --  I & Os for 24h ending 05-17 @ 07:00  =============================================  IN: 0 ml / OUT: 850 ml / NET: -850 ml    I & Os for current day (as of 05-17 @ 17:25)  =============================================  IN: 525 ml / OUT: 900 ml / NET: -375 ml        PHYSICAL EXAM:  Constitutional: NAD  HEENT: anicteric sclera, oropharynx clear, MMM  Neck: No JVD  Respiratory: CTAB, no wheezes, rales or rhonchi  Cardiovascular: S1, S2, RRR  Gastrointestinal: BS+, soft, NT/ND  Extremities: 1+ nonpitting edema, right foot dressing D/C/I  Neurological: A/O x 3, no focal deficits  Psychiatric: Normal mood, normal affect  : No CVA tenderness. No sanchez.   Skin: No rashes      LABS:  05-17    148<H>  |  115<H>  |  37<H>  ----------------------------<  89  4.7   |  18<L>  |  2.08<H>    Ca    8.9      17 May 2017 05:15    TPro  6.4  /  Alb  2.9<L>  /  TBili  0.2  /  DBili      /  AST  12  /  ALT  8   /  AlkPhos  69  05-17    Creatinine Trend: 2.08 <--, 2.33 <--                        10.7   7.59  )-----------( 251      ( 17 May 2017 05:15 )             34.3     Urine Studies:        RADIOLOGY & ADDITIONAL STUDIES:  Normal renal ultrasound. HPI:  60 y/o M with PHM of DM, HTN, HLD,CKD stage II-III, GERD, Mild anemia of chronic disease, poor compliance with follow up care prsented to ED with Cc of right foot ulcer with infection.  Pt reports increasing swelling of right foot ulcer, with occasional blackish drainage for past few weeks duration. Pt denies fevers, chills, dizziness, nausea, vomiting.He stats that he had injury to his right foot followed by open wound for past few weeks duration and has been doing dressing at home on his own but foot wound continued to get worse hence PCP advised to come to ED for possible r/o Osteomyelitis. patient denied taking any antibiotic/NSAIDs at home.     Allergies:  No Known Allergies    PAST MEDICAL & SURGICAL HISTORY:  HTN (hypertension)  DM (diabetes mellitus)  No pertinent past medical history  Arm fracture  Ankle fracture: ORIF  No significant past surgical history      Home Medications Reviewed    Hospital Medications:   MEDICATIONS  (STANDING):  mupirocin 2% Ointment 1Application(s) Topical daily  enoxaparin Injectable 30milliGRAM(s) SubCutaneous every 24 hours  sodium chloride 0.9%. 1000milliLiter(s) IV Continuous <Continuous>  docusate sodium 100milliGRAM(s) Oral three times a day  insulin lispro (HumaLOG) corrective regimen sliding scale  SubCutaneous three times a day before meals  dextrose 5%. 1000milliLiter(s) IV Continuous <Continuous>  dextrose 50% Injectable 12.5Gram(s) IV Push once  piperacillin/tazobactam IVPB. 3.375Gram(s) IV Intermittent every 12 hours  aspirin enteric coated 81milliGRAM(s) Oral daily  atorvastatin 40milliGRAM(s) Oral at bedtime  metoprolol 50milliGRAM(s) Oral two times a day  amLODIPine   Tablet 10milliGRAM(s) Oral daily  pantoprazole    Tablet 40milliGRAM(s) Oral before breakfast  lactobacillus acidophilus 1Tablet(s) Oral two times a day with meals  hydrALAZINE 25milliGRAM(s) Oral three times a day      SOCIAL HISTORY:  Denies ETOh, Smoking,     FAMILY HISTORY:  No pertinent family history in first degree relatives      REVIEW OF SYSTEMS:  CONSTITUTIONAL: No weakness, fevers or chills  EYES/ENT: No visual changes;  No vertigo or throat pain   NECK: No pain or stiffness  RESPIRATORY: No cough, wheezing, hemoptysis; No shortness of breath  CARDIOVASCULAR: No chest pain or palpitations.  GASTROINTESTINAL: No abdominal or epigastric pain. No nausea, vomiting, or hematemesis; No diarrhea or constipation. No melena or hematochezia.  GENITOURINARY: No dysuria, frequency, foamy urine, urinary urgency, incontinence or hematuria  NEUROLOGICAL: No numbness or weakness  SKIN: No itching, burning, rashes, or lesions   VASCULAR: No claudication  All other review of systems is negative unless indicated above.    VITALS:  T(F): 98, Max: 98.6 (05-16 @ 21:26)  HR: 78  BP: 152/70  RR: 17  SpO2: 99%  Wt(kg): --  I & Os for 24h ending 05-17 @ 07:00  =============================================  IN: 0 ml / OUT: 850 ml / NET: -850 ml    I & Os for current day (as of 05-17 @ 17:25)  =============================================  IN: 525 ml / OUT: 900 ml / NET: -375 ml        PHYSICAL EXAM:  Constitutional: NAD  HEENT: anicteric sclera, oropharynx clear, MMM  Neck: No JVD  Respiratory: CTAB, no wheezes, rales or rhonchi  Cardiovascular: S1, S2, RRR  Gastrointestinal: BS+, soft, NT/ND  Extremities: 1+ nonpitting edema, right foot dressing D/C/I  Neurological: A/O x 3, no focal deficits  Psychiatric: Normal mood, normal affect  : No CVA tenderness. No sanchez.   Skin: No rashes      LABS:  05-17    148<H>  |  115<H>  |  37<H>  ----------------------------<  89  4.7   |  18<L>  |  2.08<H>    Ca    8.9      17 May 2017 05:15    TPro  6.4  /  Alb  2.9<L>  /  TBili  0.2  /  DBili      /  AST  12  /  ALT  8   /  AlkPhos  69  05-17    Creatinine Trend: 2.08 <--, 2.33 <--                        10.7   7.59  )-----------( 251      ( 17 May 2017 05:15 )             34.3     Urine Studies:        RADIOLOGY & ADDITIONAL STUDIES:  Normal renal ultrasound.

## 2017-05-17 NOTE — PROGRESS NOTE ADULT - PROBLEM SELECTOR PLAN 1
- pt seen and evaluated  - rec collagenase dressing changes daily  - no acute signs of infection  - no communication w/ bone  - no pod surgical intervention at this time  - on discharge rec total contact cast  - cont local wound care  - pod stable for discharge

## 2017-05-18 LAB
BUN SERPL-MCNC: 30 MG/DL — HIGH (ref 7–23)
CALCIUM SERPL-MCNC: 9 MG/DL — SIGNIFICANT CHANGE UP (ref 8.4–10.5)
CHLORIDE SERPL-SCNC: 109 MMOL/L — HIGH (ref 98–107)
CO2 SERPL-SCNC: 20 MMOL/L — LOW (ref 22–31)
CREAT SERPL-MCNC: 2 MG/DL — HIGH (ref 0.5–1.3)
GLUCOSE SERPL-MCNC: 108 MG/DL — HIGH (ref 70–99)
HCT VFR BLD CALC: 32.9 % — LOW (ref 39–50)
HGB BLD-MCNC: 10.4 G/DL — LOW (ref 13–17)
MAGNESIUM SERPL-MCNC: 1.7 MG/DL — SIGNIFICANT CHANGE UP (ref 1.6–2.6)
MCHC RBC-ENTMCNC: 28.4 PG — SIGNIFICANT CHANGE UP (ref 27–34)
MCHC RBC-ENTMCNC: 31.6 % — LOW (ref 32–36)
MCV RBC AUTO: 89.9 FL — SIGNIFICANT CHANGE UP (ref 80–100)
PHOSPHATE SERPL-MCNC: 3.1 MG/DL — SIGNIFICANT CHANGE UP (ref 2.5–4.5)
PLATELET # BLD AUTO: 247 K/UL — SIGNIFICANT CHANGE UP (ref 150–400)
PMV BLD: 11 FL — SIGNIFICANT CHANGE UP (ref 7–13)
POTASSIUM SERPL-MCNC: 4 MMOL/L — SIGNIFICANT CHANGE UP (ref 3.5–5.3)
POTASSIUM SERPL-SCNC: 4 MMOL/L — SIGNIFICANT CHANGE UP (ref 3.5–5.3)
PTH-INTACT SERPL-MCNC: 89.86 PG/ML — HIGH (ref 15–65)
RBC # BLD: 3.66 M/UL — LOW (ref 4.2–5.8)
RBC # FLD: 13.8 % — SIGNIFICANT CHANGE UP (ref 10.3–14.5)
SODIUM SERPL-SCNC: 141 MMOL/L — SIGNIFICANT CHANGE UP (ref 135–145)
SPECIMEN SOURCE: SIGNIFICANT CHANGE UP
VANCOMYCIN FLD-MCNC: 5.1 UG/ML — SIGNIFICANT CHANGE UP
WBC # BLD: 7.33 K/UL — SIGNIFICANT CHANGE UP (ref 3.8–10.5)
WBC # FLD AUTO: 7.33 K/UL — SIGNIFICANT CHANGE UP (ref 3.8–10.5)

## 2017-05-18 PROCEDURE — 86334 IMMUNOFIX E-PHORESIS SERUM: CPT | Mod: 26

## 2017-05-18 RX ORDER — HYDRALAZINE HCL 50 MG
50 TABLET ORAL
Qty: 0 | Refills: 0 | Status: DISCONTINUED | OUTPATIENT
Start: 2017-05-18 | End: 2017-05-19

## 2017-05-18 RX ORDER — VANCOMYCIN HCL 1 G
1000 VIAL (EA) INTRAVENOUS EVERY 24 HOURS
Qty: 0 | Refills: 0 | Status: DISCONTINUED | OUTPATIENT
Start: 2017-05-18 | End: 2017-05-19

## 2017-05-18 RX ORDER — CALCITRIOL 0.5 UG/1
0.25 CAPSULE ORAL DAILY
Qty: 0 | Refills: 0 | Status: DISCONTINUED | OUTPATIENT
Start: 2017-05-18 | End: 2017-05-19

## 2017-05-18 RX ADMIN — Medication 650 MILLIGRAM(S): at 12:50

## 2017-05-18 RX ADMIN — Medication 25 MILLIGRAM(S): at 05:19

## 2017-05-18 RX ADMIN — Medication 50 MILLIGRAM(S): at 18:36

## 2017-05-18 RX ADMIN — PANTOPRAZOLE SODIUM 40 MILLIGRAM(S): 20 TABLET, DELAYED RELEASE ORAL at 05:19

## 2017-05-18 RX ADMIN — Medication 1 TABLET(S): at 08:19

## 2017-05-18 RX ADMIN — PIPERACILLIN AND TAZOBACTAM 25 GRAM(S): 4; .5 INJECTION, POWDER, LYOPHILIZED, FOR SOLUTION INTRAVENOUS at 05:19

## 2017-05-18 RX ADMIN — PIPERACILLIN AND TAZOBACTAM 25 GRAM(S): 4; .5 INJECTION, POWDER, LYOPHILIZED, FOR SOLUTION INTRAVENOUS at 18:36

## 2017-05-18 RX ADMIN — Medication 650 MILLIGRAM(S): at 21:14

## 2017-05-18 RX ADMIN — Medication 250 MILLIGRAM(S): at 13:41

## 2017-05-18 RX ADMIN — Medication 1 TABLET(S): at 18:37

## 2017-05-18 RX ADMIN — ATORVASTATIN CALCIUM 40 MILLIGRAM(S): 80 TABLET, FILM COATED ORAL at 21:14

## 2017-05-18 RX ADMIN — Medication 1: at 12:49

## 2017-05-18 RX ADMIN — Medication 100 MILLIGRAM(S): at 21:14

## 2017-05-18 RX ADMIN — AMLODIPINE BESYLATE 10 MILLIGRAM(S): 2.5 TABLET ORAL at 05:19

## 2017-05-18 RX ADMIN — MUPIROCIN 1 APPLICATION(S): 20 OINTMENT TOPICAL at 12:50

## 2017-05-18 RX ADMIN — ENOXAPARIN SODIUM 30 MILLIGRAM(S): 100 INJECTION SUBCUTANEOUS at 18:36

## 2017-05-18 RX ADMIN — Medication 50 MILLIGRAM(S): at 18:37

## 2017-05-18 RX ADMIN — Medication 81 MILLIGRAM(S): at 12:49

## 2017-05-18 RX ADMIN — Medication 50 MILLIGRAM(S): at 05:19

## 2017-05-18 RX ADMIN — Medication 650 MILLIGRAM(S): at 05:19

## 2017-05-18 RX ADMIN — Medication 100 MILLIGRAM(S): at 12:50

## 2017-05-18 RX ADMIN — Medication 100 MILLIGRAM(S): at 05:19

## 2017-05-18 NOTE — PROGRESS NOTE ADULT - SUBJECTIVE AND OBJECTIVE BOX
Chief Complaint:  Patient feels better at present. No new complaints at present overnight.    MEDICATIONS  (STANDING):  mupirocin 2% Ointment 1Application(s) Topical daily  enoxaparin Injectable 30milliGRAM(s) SubCutaneous every 24 hours  docusate sodium 100milliGRAM(s) Oral three times a day  insulin lispro (HumaLOG) corrective regimen sliding scale  SubCutaneous three times a day before meals  dextrose 5%. 1000milliLiter(s) IV Continuous <Continuous>  dextrose 50% Injectable 12.5Gram(s) IV Push once  piperacillin/tazobactam IVPB. 3.375Gram(s) IV Intermittent every 12 hours  aspirin enteric coated 81milliGRAM(s) Oral daily  atorvastatin 40milliGRAM(s) Oral at bedtime  metoprolol 50milliGRAM(s) Oral two times a day  amLODIPine   Tablet 10milliGRAM(s) Oral daily  pantoprazole    Tablet 40milliGRAM(s) Oral before breakfast  lactobacillus acidophilus 1Tablet(s) Oral two times a day with meals  sodium bicarbonate 650milliGRAM(s) Oral three times a day  hydrALAZINE 50milliGRAM(s) Oral two times a day    MEDICATIONS  (PRN):  acetaminophen   Tablet 650milliGRAM(s) Oral every 6 hours PRN For Temp greater than 38 C (100.4 F)  acetaminophen   Tablet. 650milliGRAM(s) Oral every 6 hours PRN Mild Pain (1 - 3)  ondansetron Injectable 4milliGRAM(s) IV Push every 6 hours PRN Nausea  dextrose Gel 1Dose(s) Oral once PRN Blood Glucose LESS THAN 70 milliGRAM(s)/deciliter  glucagon  Injectable 1milliGRAM(s) IntraMuscular once PRN Glucose LESS THAN 70 milligrams/deciliter      Vital Signs Last 24 Hrs  T(C): 36.7, Max: 37.3 (-17 @ 21:03)  HR: 71 (71 - 85)  BP: 141/61 (141/61 - 158/71)  RR: 16 (16 - 17)  SpO2: 97% (97% - 100%)  Wt(kg): --    CAPILLARY BLOOD GLUCOSE  108 (18 May 2017 07:13)  102 (17 May 2017 21:57)  122 (17 May 2017 17:06)  140 (17 May 2017 12:00)      I&O's Summary    I & Os for current day (as of 18 May 2017 11:00)  =============================================  IN: 1625 ml / OUT: 1400 ml / NET: 225 ml      PHYSICAL EXAM:  GENERAL: NAD, well-developed, well nourished, alert, awake, no acute distress at present  HEAD:  Atraumatic, Normocephalic,   EYES: EOMI, PERRLA, conjunctiva and sclera clear  NECK: Supple, No JVD, no palpable thyromegaly, no lymph adenopahy  CHEST/LUNG: Clear to auscultation bilaterally; No wheeze, no rales, no crepitations  HEART: Regular rate and rhythm; No murmurs, rubs, or gallops  ABDOMEN: Soft, Nontender, Nondistended; Bowel sounds present, no guarding, no rigidity, no rebound tenderness  EXTREMITIES:  2+ Peripheral Pulses, No clubbing, cyanosis, or edema, no calf tenderness  Neurology: AAOx3, no focal neurological deficit. Motor 5/5 all 4 extremities. sensory intact. cranial nerves II to XII grossly intact. Able to comprehend and answers all questions appropriately.  SKIN: No rashes or lesions    LABS:                                                   17 @ 05:50    141  |  109<H>  |  30<H>  ----------------------------<  108<H>  4.0   |  20<L>  |  2.00<H>                                                 17 @ 05:15    148<H>  |  115<H>  |  37<H>  ----------------------------<  89  4.7   |  18<L>  |  2.08<H>                                                 17 @ 13:34    140  |  105  |  47<H>  ----------------------------<  133<H>  4.9   |  21<L>  |  2.33<H>    Ca    9.0      18 May 2017 05:50  Ca    8.9      17 May 2017 05:15  Ca    9.0      16 May 2017 13:34  Phos  3.1       Mg     1.7         TPro  6.4  /  Alb  2.9<L>  /  TBili  0.2  /  DBili  x   /  AST  12  /  ALT  8   /  AlkPhos  69    TPro  7.0  /  Alb  3.3  /  TBili  0.2  /  DBili  x   /  AST  15  /  ALT  12  /  AlkPhos  72        CBC Full  -  ( 18 May 2017 05:50 )  WBC Count : 7.33 K/uL  Hemoglobin : 10.4 g/dL  Hematocrit : 32.9 %  Platelet Count - Automated : 247 K/uL  Mean Cell Volume : 89.9 fL      CBC Full  -  ( 17 May 2017 05:15 )  WBC Count : 7.59 K/uL  Hemoglobin : 10.7 g/dL  Hematocrit : 34.3 %  Platelet Count - Automated : 251 K/uL  Mean Cell Volume : 90.5 fL      CBC Full  -  ( 16 May 2017 13:34 )  WBC Count : 9.07 K/uL  Hemoglobin : 11.1 g/dL  Hematocrit : 35.4 %  Platelet Count - Automated : 244 K/uL  Mean Cell Volume : 91.9 fL        PT/INR - ( 17 May 2017 05:15 )   PT: 10.9 SEC;   INR: 0.97          PTT - ( 17 May 2017 05:15 )  PTT:33.3 SEC      Urinalysis Basic - ( 17 May 2017 20:53 )    Color: PLYEL / Appearance: CLEAR / S.012 / pH: 6.5  Gluc: 100 / Ketone: NEGATIVE  / Bili: NEGATIVE / Urobili: NORMAL E.U.   Blood: NEGATIVE / Protein: 500 / Nitrite: NEGATIVE   Leuk Esterase: NEGATIVE / RBC: 0-2 / WBC x   Sq Epi: OCC / Non Sq Epi: x / Bacteria: x        RADIOLOGY & ADDITIONAL TESTS: Chief Complaint:  Patient feels better at present. No new complaints at present overnight. No pain at present. Able to ambulate to bathroom    MEDICATIONS  (STANDING):  mupirocin 2% Ointment 1Application(s) Topical daily  enoxaparin Injectable 30milliGRAM(s) SubCutaneous every 24 hours  docusate sodium 100milliGRAM(s) Oral three times a day  insulin lispro (HumaLOG) corrective regimen sliding scale  SubCutaneous three times a day before meals  dextrose 5%. 1000milliLiter(s) IV Continuous <Continuous>  dextrose 50% Injectable 12.5Gram(s) IV Push once  piperacillin/tazobactam IVPB. 3.375Gram(s) IV Intermittent every 12 hours  aspirin enteric coated 81milliGRAM(s) Oral daily  atorvastatin 40milliGRAM(s) Oral at bedtime  metoprolol 50milliGRAM(s) Oral two times a day  amLODIPine   Tablet 10milliGRAM(s) Oral daily  pantoprazole    Tablet 40milliGRAM(s) Oral before breakfast  lactobacillus acidophilus 1Tablet(s) Oral two times a day with meals  sodium bicarbonate 650milliGRAM(s) Oral three times a day  hydrALAZINE 50milliGRAM(s) Oral two times a day    MEDICATIONS  (PRN):  acetaminophen   Tablet 650milliGRAM(s) Oral every 6 hours PRN For Temp greater than 38 C (100.4 F)  acetaminophen   Tablet. 650milliGRAM(s) Oral every 6 hours PRN Mild Pain (1 - 3)  ondansetron Injectable 4milliGRAM(s) IV Push every 6 hours PRN Nausea  dextrose Gel 1Dose(s) Oral once PRN Blood Glucose LESS THAN 70 milliGRAM(s)/deciliter  glucagon  Injectable 1milliGRAM(s) IntraMuscular once PRN Glucose LESS THAN 70 milligrams/deciliter      Vital Signs Last 24 Hrs  T(C): 36.7, Max: 37.3 (-17 @ 21:03)  HR: 71 (71 - 85)  BP: 141/61 (141/61 - 158/71)  RR: 16 (16 - 17)  SpO2: 97% (97% - 100%)  Wt(kg): --    CAPILLARY BLOOD GLUCOSE  108 (18 May 2017 07:13)  102 (17 May 2017 21:57)  122 (17 May 2017 17:06)  140 (17 May 2017 12:00)      I&O's Summary    I & Os for current day (as of 18 May 2017 11:00)  =============================================  IN: 1625 ml / OUT: 1400 ml / NET: 225 ml      PHYSICAL EXAM:  GENERAL: NAD, well-developed, well nourished, alert, awake, no acute distress at present  HEAD:  Atraumatic, Normocephalic,   EYES: EOMI, PERRLA, conjunctiva and sclera clear, Mild conjuctival pallor+nt  NECK: Supple, No JVD, no palpable thyromegaly, no lymph adenopahy  CHEST/LUNG: Clear to auscultation bilaterally; No wheeze, no rales, no crepitations  HEART: Regular rate and rhythm; KENNA II/VI at LPSB, no rubs, or gallops  ABDOMEN: Soft, Nontender, Nondistended; Bowel sounds present, no guarding, no rigidity, no rebound tenderness  EXTREMITIES:  Right foot ulcer with dressing present. Bilateral peripheral pulses+nt, Bilateral leg edema with varicosities noted  Neurology: AAOx 3, no focal neurological deficit. Motor 5/5 all 4 extremities. sensory intact. cranial nerves II to XII grossly intact. Able to comprehend and answers all questions appropriately.  SKIN: No rashes or lesions    LABS:                                                   17 @ 05:50    141  |  109<H>  |  30<H>  ----------------------------<  108<H>  4.0   |  20<L>  |  2.00<H>                                                 17 @ 05:15    148<H>  |  115<H>  |  37<H>  ----------------------------<  89  4.7   |  18<L>  |  2.08<H>                                                 17 @ 13:34    140  |  105  |  47<H>  ----------------------------<  133<H>  4.9   |  21<L>  |  2.33<H>    Ca    9.0      18 May 2017 05:50  Ca    8.9      17 May 2017 05:15  Ca    9.0      16 May 2017 13:34  Phos  3.1       Mg     1.7         TPro  6.4  /  Alb  2.9<L>  /  TBili  0.2  /  DBili  x   /  AST  12  /  ALT  8   /  AlkPhos  69    TPro  7.0  /  Alb  3.3  /  TBili  0.2  /  DBili  x   /  AST  15  /  ALT  12  /  AlkPhos  72        CBC Full  -  ( 18 May 2017 05:50 )  WBC Count : 7.33 K/uL  Hemoglobin : 10.4 g/dL  Hematocrit : 32.9 %  Platelet Count - Automated : 247 K/uL  Mean Cell Volume : 89.9 fL      CBC Full  -  ( 17 May 2017 05:15 )  WBC Count : 7.59 K/uL  Hemoglobin : 10.7 g/dL  Hematocrit : 34.3 %  Platelet Count - Automated : 251 K/uL  Mean Cell Volume : 90.5 fL      CBC Full  -  ( 16 May 2017 13:34 )  WBC Count : 9.07 K/uL  Hemoglobin : 11.1 g/dL  Hematocrit : 35.4 %  Platelet Count - Automated : 244 K/uL  Mean Cell Volume : 91.9 fL        PT/INR - ( 17 May 2017 05:15 )   PT: 10.9 SEC;   INR: 0.97          PTT - ( 17 May 2017 05:15 )  PTT:33.3 SEC      Urinalysis Basic - ( 17 May 2017 20:53 )    Color: PLYEL / Appearance: CLEAR / S.012 / pH: 6.5  Gluc: 100 / Ketone: NEGATIVE  / Bili: NEGATIVE / Urobili: NORMAL E.U.   Blood: NEGATIVE / Protein: 500 / Nitrite: NEGATIVE   Leuk Esterase: NEGATIVE / RBC: 0-2 / WBC x   Sq Epi: OCC / Non Sq Epi: x / Bacteria: x        RADIOLOGY & ADDITIONAL TESTS:  Reviewed

## 2017-05-18 NOTE — PROGRESS NOTE ADULT - SUBJECTIVE AND OBJECTIVE BOX
Patient seen and examined at bedside. No chest pain/sob    VITALS:  T(F): 98, Max: 99.2 ( @ 21:03)  HR: 71  BP: 141/61  RR: 16  SpO2: 97%  Wt(kg): --    I & Os for current day (as of  @ 12:01)  =============================================  IN: 1625 ml / OUT: 1400 ml / NET: 225 ml        PHYSICAL EXAM:  Constitutional: NAD  Neck: No JVD  Respiratory: CTAB, no wheezes, rales or rhonchi  Cardiovascular: S1, S2, RRR  Gastrointestinal: BS+, soft, NT/ND  Extremities: No peripheral edema, Left foot dressing D/C/I    Hospital Medications:   MEDICATIONS  (STANDING):  mupirocin 2% Ointment 1Application(s) Topical daily  enoxaparin Injectable 30milliGRAM(s) SubCutaneous every 24 hours  docusate sodium 100milliGRAM(s) Oral three times a day  insulin lispro (HumaLOG) corrective regimen sliding scale  SubCutaneous three times a day before meals  dextrose 5%. 1000milliLiter(s) IV Continuous <Continuous>  dextrose 50% Injectable 12.5Gram(s) IV Push once  piperacillin/tazobactam IVPB. 3.375Gram(s) IV Intermittent every 12 hours  aspirin enteric coated 81milliGRAM(s) Oral daily  atorvastatin 40milliGRAM(s) Oral at bedtime  metoprolol 50milliGRAM(s) Oral two times a day  amLODIPine   Tablet 10milliGRAM(s) Oral daily  pantoprazole    Tablet 40milliGRAM(s) Oral before breakfast  lactobacillus acidophilus 1Tablet(s) Oral two times a day with meals  sodium bicarbonate 650milliGRAM(s) Oral three times a day  hydrALAZINE 50milliGRAM(s) Oral two times a day  vancomycin  IVPB 1000milliGRAM(s) IV Intermittent every 24 hours      LABS:      141  |  109<H>  |  30<H>  ----------------------------<  108<H>  4.0   |  20<L>  |  2.00<H>    Ca    9.0      18 May 2017 05:50  Phos  3.1       Mg     1.7         TPro  6.4  /  Alb  2.9<L>  /  TBili  0.2  /  DBili      /  AST  12  /  ALT  8   /  AlkPhos  69      Creatinine Trend: 2.00 <--, 2.08 <--, 2.33 <--  Phosphorus Level, Serum: 3.1 mg/dL ( @ 05:50)    Patient seen and examined at bedside. No chest pain/sob    VITALS:  T(F): 98, Max: 99.2 ( @ 21:03)  HR: 71  BP: 141/61  RR: 16  SpO2: 97%  Wt(kg): --    I & Os for current day (as of  @ 12:01)  =============================================  IN: 1625 ml / OUT: 1400 ml / NET: 225 ml        PHYSICAL EXAM:  Constitutional: NAD  Neck: No JVD  Respiratory: CTAB, no wheezes, rales or rhonchi  Cardiovascular: S1, S2, RRR  Gastrointestinal: BS+, soft, NT/ND  Extremities: No peripheral edema    Hospital Medications:   MEDICATIONS  (STANDING):  mupirocin 2% Ointment 1Application(s) Topical daily  enoxaparin Injectable 30milliGRAM(s) SubCutaneous every 24 hours  docusate sodium 100milliGRAM(s) Oral three times a day  insulin lispro (HumaLOG) corrective regimen sliding scale  SubCutaneous three times a day before meals  dextrose 5%. 1000milliLiter(s) IV Continuous <Continuous>  dextrose 50% Injectable 12.5Gram(s) IV Push once  piperacillin/tazobactam IVPB. 3.375Gram(s) IV Intermittent every 12 hours  aspirin enteric coated 81milliGRAM(s) Oral daily  atorvastatin 40milliGRAM(s) Oral at bedtime  metoprolol 50milliGRAM(s) Oral two times a day  amLODIPine   Tablet 10milliGRAM(s) Oral daily  pantoprazole    Tablet 40milliGRAM(s) Oral before breakfast  lactobacillus acidophilus 1Tablet(s) Oral two times a day with meals  sodium bicarbonate 650milliGRAM(s) Oral three times a day  hydrALAZINE 50milliGRAM(s) Oral two times a day  vancomycin  IVPB 1000milliGRAM(s) IV Intermittent every 24 hours      LABS:      141  |  109<H>  |  30<H>  ----------------------------<  108<H>  4.0   |  20<L>  |  2.00<H>    Ca    9.0      18 May 2017 05:50  Phos  3.1       Mg     1.7         TPro  6.4  /  Alb  2.9<L>  /  TBili  0.2  /  DBili      /  AST  12  /  ALT  8   /  AlkPhos  69      Creatinine Trend: 2.00 <--, 2.08 <--, 2.33 <--  Phosphorus Level, Serum: 3.1 mg/dL ( @ 05:50)                          10.4   7.33  )-----------( 247      ( 18 May 2017 05:50 )             32.9     Urine Studies:  Urinalysis Basic - ( 17 May 2017 20:53 )    Color: PLYEL / Appearance: CLEAR / S.012 / pH: 6.5  Gluc: 100 / Ketone: NEGATIVE  / Bili: NEGATIVE / Urobili: NORMAL E.U.   Blood: NEGATIVE / Protein: 500 / Nitrite: NEGATIVE   Leuk Esterase: NEGATIVE / RBC: 0-2 / WBC    Sq Epi: OCC / Non Sq Epi:  / Bacteria:       Creatinine, Random Urine: 52.60 mg/dL ( @ 20:53)  Sodium, Random Urine: 99 meq/L ( @ 20:53)  Protein, Random Urine: 431.0 mg/dL ( @ 20:53)      RADIOLOGY & ADDITIONAL STUDIES:                            10.4   7.33  )-----------( 247      ( 18 May 2017 05:50 )             32.9     Urine Studies:  Urinalysis Basic - ( 17 May 2017 20:53 )    Color: PLYEL / Appearance: CLEAR / S.012 / pH: 6.5  Gluc: 100 / Ketone: NEGATIVE  / Bili: NEGATIVE / Urobili: NORMAL E.U.   Blood: NEGATIVE / Protein: 500 / Nitrite: NEGATIVE   Leuk Esterase: NEGATIVE / RBC: 0-2 / WBC    Sq Epi: OCC / Non Sq Epi:  / Bacteria:       Creatinine, Random Urine: 52.60 mg/dL ( @ 20:53)  Sodium, Random Urine: 99 meq/L ( @ 20:53)  Protein, Random Urine: 431.0 mg/dL ( @ 20:53)      RADIOLOGY & ADDITIONAL STUDIES: Patient seen and examined at bedside. No chest pain/sob    VITALS:  T(F): 98, Max: 99.2 (05-17 @ 21:03)  HR: 71  BP: 141/61  RR: 16  SpO2: 97%  Wt(kg): --    I & Os for current day (as of 05-18 @ 12:01)  =============================================  IN: 1625 ml / OUT: 1400 ml / NET: 225 ml        PHYSICAL EXAM:  Constitutional: NAD  Neck: No JVD  Respiratory: CTAB, no wheezes, rales or rhonchi  Cardiovascular: S1, S2, RRR  Gastrointestinal: BS+, soft, NT/ND  Extremities: No peripheral edema, Left foot dressing D/C/I    Hospital Medications:   MEDICATIONS  (STANDING):  mupirocin 2% Ointment 1Application(s) Topical daily  enoxaparin Injectable 30milliGRAM(s) SubCutaneous every 24 hours  docusate sodium 100milliGRAM(s) Oral three times a day  insulin lispro (HumaLOG) corrective regimen sliding scale  SubCutaneous three times a day before meals  dextrose 5%. 1000milliLiter(s) IV Continuous <Continuous>  dextrose 50% Injectable 12.5Gram(s) IV Push once  piperacillin/tazobactam IVPB. 3.375Gram(s) IV Intermittent every 12 hours  aspirin enteric coated 81milliGRAM(s) Oral daily  atorvastatin 40milliGRAM(s) Oral at bedtime  metoprolol 50milliGRAM(s) Oral two times a day  amLODIPine   Tablet 10milliGRAM(s) Oral daily  pantoprazole    Tablet 40milliGRAM(s) Oral before breakfast  lactobacillus acidophilus 1Tablet(s) Oral two times a day with meals  sodium bicarbonate 650milliGRAM(s) Oral three times a day  hydrALAZINE 50milliGRAM(s) Oral two times a day  vancomycin  IVPB 1000milliGRAM(s) IV Intermittent every 24 hours      LABS:  05-18    141  |  109<H>  |  30<H>  ----------------------------<  108<H>  4.0   |  20<L>  |  2.00<H>    Ca    9.0      18 May 2017 05:50  Phos  3.1     05-18  Mg     1.7     05-18    TPro  6.4  /  Alb  2.9<L>  /  TBili  0.2  /  DBili      /  AST  12  /  ALT  8   /  AlkPhos  69  05-17    Creatinine Trend: 2.00 <--, 2.08 <--, 2.33 <--  Phosphorus Level, Serum: 3.1 mg/dL (05-18 @ 05:50)

## 2017-05-18 NOTE — PROGRESS NOTE ADULT - PROBLEM SELECTOR PLAN 1
- pt seen and evaluated  - rec collagenase dressing changes daily  - no acute signs of infection  - no communication w/ bone  - no pod surgical intervention at this time  - will contact orthotist to provide CAM boot w/ insert for offloading.  - cont local wound care  - f/u indium scan

## 2017-05-18 NOTE — PROGRESS NOTE ADULT - SUBJECTIVE AND OBJECTIVE BOX
Patient is a 61y old  Male who presents with a chief complaint of Right foot ulcer (16 May 2017 23:24)      HPI:  62 y/o M with PHM of DM, HTN, HLD,CKD stage II-III, GERD, Mild anemia of chronic disease, poor compliance with follow up care prsented to ED with Cc of right foot ulcer with infection.  Pt reports increasing swelling of right foot ulcer, with occasional blackish drainage for past few weeks duration. Pt denies fevers, chills, dizziness, nausea, vomiting. Pt has h/o ankle fracture with metal hardware since last year, and chronic ulcer since then. Patient denies any chest pain, shortness of breath, palpitations, dizziness or syncope, he denies any fever, chills, sore throat, cough or other constitutional symptoms He stats that he had injury to his right foot followed by open wound for past few weeks duration and has been doing dressing at home on his own but foot wound continued to get worse hence PCP advised to come to ED for possible r/o Osteomyelitis. He denies any abdominal pain, nasuea, vomiting, diarrhea or blood per rectum. He denies any depression, suicidal or homicidal ideations. He denies any headache, focal limb weakness or seizure like activity. He denies any recent travel or sick contact. He denies any vision or ear complaints at present. No recent antibiotics or recent trauma to foot. (16 May 2017 18:47)      REVIEW OF SYSTEMS:    CONSTITUTIONAL: No fever, weight loss, or fatigue  EYES: No eye pain, visual disturbances, or discharge  ENMT:  No difficulty hearing, tinnitus, vertigo; No sinus or throat pain  NECK: No pain or stiffness  BREASTS: No pain, masses, or nipple discharge  RESPIRATORY: No cough, wheezing, chills or hemoptysis; No shortness of breath  CARDIOVASCULAR: No chest pain, palpitations, dizziness, or leg swelling  GASTROINTESTINAL: No abdominal or epigastric pain. No nausea, vomiting, or hematemesis; No diarrhea or constipation. No melena or hematochezia.  GENITOURINARY: No dysuria, frequency, hematuria, or incontinence  NEUROLOGICAL: No headaches, memory loss, loss of strength, numbness, or tremors  SKIN: No itching, burning, rashes, or lesions.     LYMPH NODES: No enlarged glands  ENDOCRINE: No heat or cold intolerance; No hair loss  MUSCULOSKELETAL: Right ankle and foot chronic edema; No muscle, back, or extremity pain  PSYCHIATRIC: No depression, anxiety, mood swings, or difficulty sleeping  HEME/LYMPH: No easy bruising, or bleeding gums  ALLERY AND IMMUNOLOGIC: No hives or eczema      PAST MEDICAL & SURGICAL HISTORY:  HTN (hypertension)  DM (diabetes mellitus)  Arm fracture  Ankle fracture: ORIF        Allergies    No Known Allergies        FAMILY HISTORY:  No pertinent family history in first degree relatives      SOCIAL HISTORY:    HIV status: unknown   PPD:  n/a  STD:  Denies  Travel hx: Denies  Occupation:  Works as [employer]  Pets: None    MEDICATIONS  (STANDING):  mupirocin 2% Ointment 1Application(s) Topical daily  enoxaparin Injectable 30milliGRAM(s) SubCutaneous every 24 hours  sodium chloride 0.9%. 1000milliLiter(s) IV Continuous <Continuous>  docusate sodium 100milliGRAM(s) Oral three times a day  insulin lispro (HumaLOG) corrective regimen sliding scale  SubCutaneous three times a day before meals  dextrose 5%. 1000milliLiter(s) IV Continuous <Continuous>  dextrose 50% Injectable 12.5Gram(s) IV Push once  piperacillin/tazobactam IVPB. 3.375Gram(s) IV Intermittent every 12 hours  aspirin enteric coated 81milliGRAM(s) Oral daily  atorvastatin 40milliGRAM(s) Oral at bedtime  metoprolol 50milliGRAM(s) Oral two times a day  amLODIPine   Tablet 10milliGRAM(s) Oral daily  pantoprazole    Tablet 40milliGRAM(s) Oral before breakfast  lactobacillus acidophilus 1Tablet(s) Oral two times a day with meals  hydrALAZINE 25milliGRAM(s) Oral three times a day    MEDICATIONS  (PRN):  acetaminophen   Tablet 650milliGRAM(s) Oral every 6 hours PRN For Temp greater than 38 C (100.4 F)  acetaminophen   Tablet. 650milliGRAM(s) Oral every 6 hours PRN Mild Pain (1 - 3)  ondansetron Injectable 4milliGRAM(s) IV Push every 6 hours PRN Nausea  dextrose Gel 1Dose(s) Oral once PRN Blood Glucose LESS THAN 70 milliGRAM(s)/deciliter  glucagon  Injectable 1milliGRAM(s) IntraMuscular once PRN Glucose LESS THAN 70 milligrams/deciliter      Vital Signs Last 24 Hrs  T(C): 36.7, Max: 37 (05-16 @ 21:26)  T(F): 98.1, Max: 98.6 (05-16 @ 21:26)  HR: 77 (65 - 77)  BP: 141/67 (141/67 - 157/82)  BP(mean): --  RR: 16 (16 - 18)  SpO2: 95% (95% - 100%)    PHYSICAL EXAM:    GENERAL: NAD, well-groomed, well-developed  HEAD:  Atraumatic, Normocephalic  EYES: EOMI, PERRLA, conjunctiva and sclera clear  ENMT: No tonsillar erythema, exudates, or enlargement; Moist mucous membranes, Good dentition, No lesions  NECK: Supple, No JVD, Normal thyroid  NERVOUS SYSTEM:  Alert & Oriented X3, Good concentration; Motor Strength 5/5 B/L upper and lower extremities; DTRs 2+ intact and symmetric  CHEST/LUNG: Clear to percussion bilaterally; No rales, rhonchi, wheezing, or rubs  HEART: Regular rate and rhythm; No murmurs, rubs, or gallops  ABDOMEN: Soft, Nontender, Nondistended; Bowel sounds present  EXTREMITIES:  2+ Peripheral Pulses, No clubbing, cyanosis.  Rt ankle and foot edema.  No TTP  LYMPH: No lymphadenopathy noted  SKIN: Rt foot plantar ulcer with surrounding callous.  Serosanguinous discharge.  No devitalized tissue or tenderness to palpation out of proportion to exam    LABS:  CBC Full  -  ( 17 May 2017 05:15 )  WBC Count : 7.59 K/uL  Hemoglobin : 10.7 g/dL  Hematocrit : 34.3 %  Platelet Count - Automated : 251 K/uL  Mean Cell Volume : 90.5 fL  Mean Cell Hemoglobin : 28.2 pg  Mean Cell Hemoglobin Concentration : 31.2 %  Auto Neutrophil # : 4.04 K/uL  Auto Lymphocyte # : 2.82 K/uL  Auto Monocyte # : 0.49 K/uL  Auto Eosinophil # : 0.20 K/uL  Auto Basophil # : 0.03 K/uL  Auto Neutrophil % : 53.2 %  Auto Lymphocyte % : 37.2 %  Auto Monocyte % : 6.5 %  Auto Eosinophil % : 2.6 %  Auto Basophil % : 0.4 %      05-17    148<H>  |  115<H>  |  37<H>  ----------------------------<  89  4.7   |  18<L>  |  2.08<H>    Ca    8.9      17 May 2017 05:15    TPro  6.4  /  Alb  2.9<L>  /  TBili  0.2  /  DBili  x   /  AST  12  /  ALT  8   /  AlkPhos  69  05-17      LIVER FUNCTIONS - ( 17 May 2017 05:15 )  Alb: 2.9 g/dL / Pro: 6.4 g/dL / ALK PHOS: 69 u/L / ALT: 8 u/L / AST: 12 u/L / GGT: x           Sedimentation Rate, Erythrocyte (05.16.17 @ 13:34)    Sedimentation Rate, Erythrocyte: 82 mm/hr      MICROBIOLOGY:    Blood Cultures:  u/a      Urine Culture:  u/a    RADIOLOGY:    5/16/17 Rt foot xray  Generalized soft tissue swelling. No tracking gas collections.     Proximal plantar aspect of the cuboid bone appears focally osteopenic   with thinned and indistinct cortices on the lateral view raising   suspicion for osteomyelitis in this region in the proper clinical   context. No additional suspected areas of osteomyelitis.    5/16/17 Chest xray     Clear lungs.    5/17 B/L LE US  No evidence of bilateral lower extremity deep venous thrombosis. S:  Denies Fever, chills, rigors, foot pain, abd pain, diarhea, dysuria.  No acute o/n events.      REVIEW OF SYSTEMS:    CONSTITUTIONAL: No fever, weight loss, or fatigue  EYES: No eye pain, visual disturbances, or discharge  ENMT:  No difficulty hearing, tinnitus, vertigo; No sinus or throat pain  NECK: No pain or stiffness  BREASTS: No pain, masses, or nipple discharge  RESPIRATORY: No cough, wheezing, chills or hemoptysis; No shortness of breath  CARDIOVASCULAR: No chest pain, palpitations, dizziness, or leg swelling  GASTROINTESTINAL: No abdominal or epigastric pain. No nausea, vomiting, or hematemesis; No diarrhea or constipation. No melena or hematochezia.  GENITOURINARY: No dysuria, frequency, hematuria, or incontinence  NEUROLOGICAL: No headaches, memory loss, loss of strength, numbness, or tremors  SKIN: No itching, burning, rashes, or lesions.     LYMPH NODES: No enlarged glands  ENDOCRINE: No heat or cold intolerance; No hair loss  MUSCULOSKELETAL: Right ankle and foot chronic edema; No muscle, back, or extremity pain  PSYCHIATRIC: No depression, anxiety, mood swings, or difficulty sleeping  HEME/LYMPH: No easy bruising, or bleeding gums  ALLERY AND IMMUNOLOGIC: No hives or eczema      PAST MEDICAL & SURGICAL HISTORY:  HTN (hypertension)  DM (diabetes mellitus)  Arm fracture  Ankle fracture: ORIF        Allergies    No Known Allergies        FAMILY HISTORY:  No pertinent family history in first degree relatives      SOCIAL HISTORY:    no smoking, etoh, ivdu    HIV status: unknown   PPD:  n/a  STD:  Denies  Travel hx: Denies  Occupation:  Works as   Pets: None    MEDICATIONS  (STANDING):  mupirocin 2% Ointment 1Application(s) Topical daily  enoxaparin Injectable 30milliGRAM(s) SubCutaneous every 24 hours  sodium chloride 0.9%. 1000milliLiter(s) IV Continuous <Continuous>  docusate sodium 100milliGRAM(s) Oral three times a day  insulin lispro (HumaLOG) corrective regimen sliding scale  SubCutaneous three times a day before meals  dextrose 5%. 1000milliLiter(s) IV Continuous <Continuous>  dextrose 50% Injectable 12.5Gram(s) IV Push once  piperacillin/tazobactam IVPB. 3.375Gram(s) IV Intermittent every 12 hours  aspirin enteric coated 81milliGRAM(s) Oral daily  atorvastatin 40milliGRAM(s) Oral at bedtime  metoprolol 50milliGRAM(s) Oral two times a day  amLODIPine   Tablet 10milliGRAM(s) Oral daily  pantoprazole    Tablet 40milliGRAM(s) Oral before breakfast  lactobacillus acidophilus 1Tablet(s) Oral two times a day with meals  hydrALAZINE 25milliGRAM(s) Oral three times a day    MEDICATIONS  (PRN):  acetaminophen   Tablet 650milliGRAM(s) Oral every 6 hours PRN For Temp greater than 38 C (100.4 F)  acetaminophen   Tablet. 650milliGRAM(s) Oral every 6 hours PRN Mild Pain (1 - 3)  ondansetron Injectable 4milliGRAM(s) IV Push every 6 hours PRN Nausea  dextrose Gel 1Dose(s) Oral once PRN Blood Glucose LESS THAN 70 milliGRAM(s)/deciliter  glucagon  Injectable 1milliGRAM(s) IntraMuscular once PRN Glucose LESS THAN 70 milligrams/deciliter      Vital Signs Last 24 Hrs  T(C): 36.7, Max: 37 (05-16 @ 21:26)  T(F): 98.1, Max: 98.6 (05-16 @ 21:26)  HR: 77 (65 - 77)  BP: 141/67 (141/67 - 157/82)  BP(mean): --  RR: 16 (16 - 18)  SpO2: 95% (95% - 100%)    PHYSICAL EXAM:    GENERAL: NAD, well-groomed, well-developed  HEAD:  Atraumatic, Normocephalic  EYES: EOMI, PERRLA, conjunctiva and sclera clear  ENMT: No tonsillar erythema, exudates, or enlargement; Moist mucous membranes, Good dentition, No lesions  NECK: Supple, No JVD, Normal thyroid  NERVOUS SYSTEM:  Alert & Oriented X3, Good concentration; Motor Strength 5/5 B/L upper and lower extremities; DTRs 2+ intact and symmetric  CHEST/LUNG: Clear to percussion bilaterally; No rales, rhonchi, wheezing, or rubs  HEART: Regular rate and rhythm; No murmurs, rubs, or gallops  ABDOMEN: Soft, Nontender, Nondistended; Bowel sounds present  EXTREMITIES:  2+ Peripheral Pulses, No clubbing, cyanosis.  Rt ankle and foot edema.  No TTP  LYMPH: No lymphadenopathy noted  SKIN: Rt foot plantar ulcer with surrounding callous.  Decreased discharge.  No devitalized tissue or tenderness to palpation out of proportion to exam    LABS:  CBC Full  -  ( 17 May 2017 05:15 )  WBC Count : 7.59 K/uL  Hemoglobin : 10.7 g/dL  Hematocrit : 34.3 %  Platelet Count - Automated : 251 K/uL  Mean Cell Volume : 90.5 fL  Mean Cell Hemoglobin : 28.2 pg  Mean Cell Hemoglobin Concentration : 31.2 %  Auto Neutrophil # : 4.04 K/uL  Auto Lymphocyte # : 2.82 K/uL  Auto Monocyte # : 0.49 K/uL  Auto Eosinophil # : 0.20 K/uL  Auto Basophil # : 0.03 K/uL  Auto Neutrophil % : 53.2 %  Auto Lymphocyte % : 37.2 %  Auto Monocyte % : 6.5 %  Auto Eosinophil % : 2.6 %  Auto Basophil % : 0.4 %      05-17    148<H>  |  115<H>  |  37<H>  ----------------------------<  89  4.7   |  18<L>  |  2.08<H>    Ca    8.9      17 May 2017 05:15    TPro  6.4  /  Alb  2.9<L>  /  TBili  0.2  /  DBili  x   /  AST  12  /  ALT  8   /  AlkPhos  69  05-17      LIVER FUNCTIONS - ( 17 May 2017 05:15 )  Alb: 2.9 g/dL / Pro: 6.4 g/dL / ALK PHOS: 69 u/L / ALT: 8 u/L / AST: 12 u/L / GGT: x           Sedimentation Rate, Erythrocyte (05.16.17 @ 13:34)    Sedimentation Rate, Erythrocyte: 82 mm/hr      MICROBIOLOGY:      Culture - Wound with Gram Stain (05.16.17 @ 18:04)    Culture - Wound with Gram Stain:   MODERATE  GNRID^Gram Neg Lenyn To Be Identified  DENICE^Corynebacterium species    Specimen Source: OTHER    Culture - Blood (05.16.17 @ 14:30)    Culture - Blood:   NO ORGANISMS ISOLATED  NO ORGANISMS ISOLATED AT 48 HRS.    Specimen Source: BLOOD VENOUS      Culture - Blood (08.13.16 @ 23:32)    Culture - Blood:   NO ORGANISMS ISOLATED    Specimen Source: BLOOD PERIPHERAL        RADIOLOGY:    5/16/17 Rt foot xray  Generalized soft tissue swelling. No tracking gas collections.     Proximal plantar aspect of the cuboid bone appears focally osteopenic   with thinned and indistinct cortices on the lateral view raising   suspicion for osteomyelitis in this region in the proper clinical   context. No additional suspected areas of osteomyelitis.    5/16/17 Chest xray     Clear lungs.    5/17 B/L LE US  No evidence of bilateral lower extremity deep venous thrombosis.    Culture - Blood (08.13.16 @ 23:32)    Culture - Blood:   NO ORGANISMS ISOLATED    Specimen Source: BLOOD PERIPHERAL

## 2017-05-18 NOTE — PROGRESS NOTE ADULT - SUBJECTIVE AND OBJECTIVE BOX
Patient is a 61y old  Male who presents with a chief complaint of Right foot ulcer (16 May 2017 23:24)       INTERVAL HPI/OVERNIGHT EVENTS:  Patient seen and evaluated at bedside.  Pt is resting comfortable in NAD. Denies N/V/F/C.  Pain rated at X/10    Allergies    No Known Allergies    Intolerances        Vital Signs Last 24 Hrs  T(C): 36.5, Max: 37.3 ( @ 21:03)  T(F): 97.7, Max: 99.2 ( @ 21:03)  HR: 61 (61 - 85)  BP: 142/69 (141/61 - 158/71)  BP(mean): --  RR: 17 (16 - 17)  SpO2: 98% (97% - 100%)    LABS:                        10.4   7.33  )-----------( 247      ( 18 May 2017 05:50 )             32.9     -18    141  |  109<H>  |  30<H>  ----------------------------<  108<H>  4.0   |  20<L>  |  2.00<H>    Ca    9.0      18 May 2017 05:50  Phos  3.1       Mg     1.7         TPro  6.4  /  Alb  2.9<L>  /  TBili  0.2  /  DBili  x   /  AST  12  /  ALT  8   /  AlkPhos  69  -17    PT/INR - ( 17 May 2017 05:15 )   PT: 10.9 SEC;   INR: 0.97          PTT - ( 17 May 2017 05:15 )  PTT:33.3 SEC  Urinalysis Basic - ( 17 May 2017 20:53 )    Color: PLYEL / Appearance: CLEAR / S.012 / pH: 6.5  Gluc: 100 / Ketone: NEGATIVE  / Bili: NEGATIVE / Urobili: NORMAL E.U.   Blood: NEGATIVE / Protein: 500 / Nitrite: NEGATIVE   Leuk Esterase: NEGATIVE / RBC: 0-2 / WBC x   Sq Epi: OCC / Non Sq Epi: x / Bacteria: x      CAPILLARY BLOOD GLUCOSE  168 (18 May 2017 11:53)  108 (18 May 2017 07:13)  102 (17 May 2017 21:57)  122 (17 May 2017 17:06)      Lower Extremity Physical Exam:  Vascular: DP/PT 2/4 B/L, CFT <3sec x 10, Temp gradient normal B/L  Neurology: Epicritic sensation absent B/L  Musculoskeletal/Ortho: right foto rockerbottom charcot deformity w/ ulceration  Wound #1:  Location: right foot plantar medial wound  Size: 2.0x2.0 cm  Etiology: diabetic ulceration  Depth: 0.1  Wound bed: granular   Drainage: scant  Odor: none  Periwound: callused    RADIOLOGY & ADDITIONAL TESTS:  Indium scan pending

## 2017-05-18 NOTE — PROGRESS NOTE ADULT - ASSESSMENT
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  Monitor BUN/Creatinine  Renal/ID follow up  Immunofixation serum and Urine.  Wound care consult  Avoid excess weight bearing.  Continue current treatment.  Podiatry follow up.

## 2017-05-18 NOTE — PROGRESS NOTE ADULT - ATTENDING COMMENTS
Pt. seen with the residents. Charcot foot is non acute. Wagners grade 2 ulcer no exposed bone or tendon. Indium scan pending. Gm stain gm - rods and corneybacterium pending. Off load and antibiotics currently. No foot sx planned at this time. agree with resident findings and treatment plan reviewed.

## 2017-05-18 NOTE — PROGRESS NOTE ADULT - ASSESSMENT
Acute on CKD stage 3 baseline 1.3-1.4  ABEL: FEna >1% ATN,  improving  Hypernatremia: improving  Proteinuria: 8.19g/day, pending vasculitis w/u   Acidosis: sec to hyperchloremia: improving  ? osteomyelitis: pending MRI  DM  HTN: acceptable   sec HPT: likely sec CKD    Plan  Monitor BMP  Encourage Increase PO free water  Start calcitrol 0.25mcg QD  Vasculitis w/u for proteinuria ( check hep b, hep c, hiv, rpr, c3, c4, bhumika, anca, spep, sif, immuno light chain)  F/u ID/Pod Acute on CKD stage 3 baseline 1.3-1.4  ABEL: FEna >1% likely sec to ATN,  improving  Hypernatremia: improving  Proteinuria: 8.19g/day, etiology? possible sec to diabetes, pending vasculitis w/u   Acidosis: sec to hyperchloremia: improving  ? osteomyelitis: pending MRI  DM  HTN: acceptable   sec HPT: likely sec CKD    Plan  Monitor BMP  Encourage Increase PO free water  Start calcitrol 0.25mcg QD  Vasculitis w/u for proteinuria ( check hep b, hep c, hiv, rpr, c3, c4, bhumika, anca, spep, sif, immuno light chain)  F/u ID/Pod

## 2017-05-18 NOTE — PROGRESS NOTE ADULT - PROBLEM SELECTOR PLAN 1
1. Follow up MRI/indium scan to evaluate for underlying OM  2. Continue vanco/zosyn.  Start standin dose vanco 1gm iv q24 1. Follow up MRI/indium scan to evaluate for underlying OM  2. Continue vanco/zosyn.  Start standing dose vanco 1gm iv q24.  F/u vanco trough prior to 3rd dose.  Monitor renal function/Creatinine (pt with h/o of CKD)

## 2017-05-19 ENCOUNTER — TRANSCRIPTION ENCOUNTER (OUTPATIENT)
Age: 62
End: 2017-05-19

## 2017-05-19 VITALS
HEART RATE: 67 BPM | SYSTOLIC BLOOD PRESSURE: 142 MMHG | RESPIRATION RATE: 17 BRPM | OXYGEN SATURATION: 98 % | TEMPERATURE: 98 F | DIASTOLIC BLOOD PRESSURE: 74 MMHG

## 2017-05-19 DIAGNOSIS — T36.95XA ADVERSE EFFECT OF UNSPECIFIED SYSTEMIC ANTIBIOTIC, INITIAL ENCOUNTER: ICD-10-CM

## 2017-05-19 LAB
-  AMIKACIN: SIGNIFICANT CHANGE UP
-  AMPICILLIN/SULBACTAM: SIGNIFICANT CHANGE UP
-  AMPICILLIN: SIGNIFICANT CHANGE UP
-  AZTREONAM: SIGNIFICANT CHANGE UP
-  CEFAZOLIN: SIGNIFICANT CHANGE UP
-  CEFEPIME: SIGNIFICANT CHANGE UP
-  CEFOXITIN: SIGNIFICANT CHANGE UP
-  CEFTAZIDIME: SIGNIFICANT CHANGE UP
-  CEFTRIAXONE: SIGNIFICANT CHANGE UP
-  CIPROFLOXACIN: SIGNIFICANT CHANGE UP
-  ERTAPENEM: SIGNIFICANT CHANGE UP
-  GENTAMICIN: SIGNIFICANT CHANGE UP
-  IMIPENEM: SIGNIFICANT CHANGE UP
-  LEVOFLOXACIN: SIGNIFICANT CHANGE UP
-  MEROPENEM: SIGNIFICANT CHANGE UP
-  PIPERACILLIN/TAZOBACTAM: SIGNIFICANT CHANGE UP
-  TIGECYCLINE: SIGNIFICANT CHANGE UP
-  TOBRAMYCIN: SIGNIFICANT CHANGE UP
-  TRIMETHOPRIM/SULFAMETHOXAZOLE: SIGNIFICANT CHANGE UP
ALBUMIN SERPL ELPH-MCNC: 2.8 G/DL — LOW (ref 3.3–5)
ALP SERPL-CCNC: 58 U/L — SIGNIFICANT CHANGE UP (ref 40–120)
ALT FLD-CCNC: 9 U/L — SIGNIFICANT CHANGE UP (ref 4–41)
AST SERPL-CCNC: 10 U/L — SIGNIFICANT CHANGE UP (ref 4–40)
BACTERIA WND CULT: SIGNIFICANT CHANGE UP
BASOPHILS # BLD AUTO: 0.03 K/UL — SIGNIFICANT CHANGE UP (ref 0–0.2)
BASOPHILS NFR BLD AUTO: 0.4 % — SIGNIFICANT CHANGE UP (ref 0–2)
BILIRUB SERPL-MCNC: 0.3 MG/DL — SIGNIFICANT CHANGE UP (ref 0.2–1.2)
BUN SERPL-MCNC: 24 MG/DL — HIGH (ref 7–23)
C3 SERPL-MCNC: 110.3 MG/DL — SIGNIFICANT CHANGE UP (ref 90–180)
C4 SERPL-MCNC: 31.6 MG/DL — SIGNIFICANT CHANGE UP (ref 10–40)
CALCIUM SERPL-MCNC: 8.6 MG/DL — SIGNIFICANT CHANGE UP (ref 8.4–10.5)
CHLORIDE SERPL-SCNC: 107 MMOL/L — SIGNIFICANT CHANGE UP (ref 98–107)
CO2 SERPL-SCNC: 23 MMOL/L — SIGNIFICANT CHANGE UP (ref 22–31)
CREAT SERPL-MCNC: 1.96 MG/DL — HIGH (ref 0.5–1.3)
EOSINOPHIL # BLD AUTO: 0.23 K/UL — SIGNIFICANT CHANGE UP (ref 0–0.5)
EOSINOPHIL NFR BLD AUTO: 3.1 % — SIGNIFICANT CHANGE UP (ref 0–6)
FERRITIN SERPL-MCNC: 95.37 NG/ML — SIGNIFICANT CHANGE UP (ref 30–400)
GLUCOSE SERPL-MCNC: 87 MG/DL — SIGNIFICANT CHANGE UP (ref 70–99)
HBV SURFACE AG SER-ACNC: NEGATIVE — SIGNIFICANT CHANGE UP
HCT VFR BLD CALC: 31.1 % — LOW (ref 39–50)
HCV AB S/CO SERPL IA: 0.1 S/CO — SIGNIFICANT CHANGE UP
HCV AB SERPL-IMP: SIGNIFICANT CHANGE UP
HGB BLD-MCNC: 10.1 G/DL — LOW (ref 13–17)
HIV1 AG SER QL: SIGNIFICANT CHANGE UP
HIV1+2 AB SPEC QL: SIGNIFICANT CHANGE UP
IMM GRANULOCYTES NFR BLD AUTO: 0.1 % — SIGNIFICANT CHANGE UP (ref 0–1.5)
IRON SATN MFR SERPL: 218 UG/DL — SIGNIFICANT CHANGE UP (ref 155–535)
IRON SATN MFR SERPL: 37 UG/DL — LOW (ref 45–165)
KAPPA FREE LIGHT CHAINS, SERUM: 6.17 MG/DL — HIGH (ref 0.33–1.94)
LAMBDA FREE LIGHT CHAINS, SERUM: 3.28 MG/DL — HIGH (ref 0.57–2.63)
LYMPHOCYTES # BLD AUTO: 2.72 K/UL — SIGNIFICANT CHANGE UP (ref 1–3.3)
LYMPHOCYTES # BLD AUTO: 36.2 % — SIGNIFICANT CHANGE UP (ref 13–44)
MCHC RBC-ENTMCNC: 29.1 PG — SIGNIFICANT CHANGE UP (ref 27–34)
MCHC RBC-ENTMCNC: 32.5 % — SIGNIFICANT CHANGE UP (ref 32–36)
MCV RBC AUTO: 89.6 FL — SIGNIFICANT CHANGE UP (ref 80–100)
METHOD TYPE: SIGNIFICANT CHANGE UP
MONOCYTES # BLD AUTO: 0.52 K/UL — SIGNIFICANT CHANGE UP (ref 0–0.9)
MONOCYTES NFR BLD AUTO: 6.9 % — SIGNIFICANT CHANGE UP (ref 2–14)
NEUTROPHILS # BLD AUTO: 4.01 K/UL — SIGNIFICANT CHANGE UP (ref 1.8–7.4)
NEUTROPHILS NFR BLD AUTO: 53.3 % — SIGNIFICANT CHANGE UP (ref 43–77)
ORGANISM # SPEC MICROSCOPIC CNT: SIGNIFICANT CHANGE UP
PLATELET # BLD AUTO: 217 K/UL — SIGNIFICANT CHANGE UP (ref 150–400)
PMV BLD: 11.1 FL — SIGNIFICANT CHANGE UP (ref 7–13)
POTASSIUM SERPL-MCNC: 3.9 MMOL/L — SIGNIFICANT CHANGE UP (ref 3.5–5.3)
POTASSIUM SERPL-SCNC: 3.9 MMOL/L — SIGNIFICANT CHANGE UP (ref 3.5–5.3)
PROT SERPL-MCNC: 5.8 G/DL — LOW (ref 6–8.3)
PROT SERPL-MCNC: 6.3 G/DL — SIGNIFICANT CHANGE UP (ref 6–8.3)
RBC # BLD: 3.47 M/UL — LOW (ref 4.2–5.8)
RBC # FLD: 13.7 % — SIGNIFICANT CHANGE UP (ref 10.3–14.5)
SODIUM SERPL-SCNC: 141 MMOL/L — SIGNIFICANT CHANGE UP (ref 135–145)
T PALLIDUM AB TITR SER: NEGATIVE — SIGNIFICANT CHANGE UP
UIBC SERPL-MCNC: 181 UG/DL — SIGNIFICANT CHANGE UP (ref 110–370)
WBC # BLD: 7.52 K/UL — SIGNIFICANT CHANGE UP (ref 3.8–10.5)
WBC # FLD AUTO: 7.52 K/UL — SIGNIFICANT CHANGE UP (ref 3.8–10.5)

## 2017-05-19 PROCEDURE — 78806: CPT | Mod: 26

## 2017-05-19 PROCEDURE — 84165 PROTEIN E-PHORESIS SERUM: CPT | Mod: 26

## 2017-05-19 RX ORDER — HYDRALAZINE HCL 50 MG
1 TABLET ORAL
Qty: 60 | Refills: 0 | OUTPATIENT
Start: 2017-05-19 | End: 2017-06-18

## 2017-05-19 RX ORDER — MOXIFLOXACIN HYDROCHLORIDE TABLETS, 400 MG 400 MG/1
1 TABLET, FILM COATED ORAL
Qty: 14 | Refills: 0 | OUTPATIENT
Start: 2017-05-19 | End: 2017-05-26

## 2017-05-19 RX ORDER — METFORMIN HYDROCHLORIDE 850 MG/1
1 TABLET ORAL
Qty: 60 | Refills: 0 | OUTPATIENT
Start: 2017-05-19 | End: 2017-06-18

## 2017-05-19 RX ORDER — CALCIUM CARBONATE 500(1250)
1 TABLET ORAL ONCE
Qty: 0 | Refills: 0 | Status: COMPLETED | OUTPATIENT
Start: 2017-05-19 | End: 2017-05-19

## 2017-05-19 RX ORDER — ACETAMINOPHEN 500 MG
2 TABLET ORAL
Qty: 0 | Refills: 0 | COMMUNITY
Start: 2017-05-19

## 2017-05-19 RX ORDER — CALCITRIOL 0.5 UG/1
1 CAPSULE ORAL
Qty: 30 | Refills: 0 | OUTPATIENT
Start: 2017-05-19 | End: 2017-06-18

## 2017-05-19 RX ADMIN — Medication 50 MILLIGRAM(S): at 05:32

## 2017-05-19 RX ADMIN — AMLODIPINE BESYLATE 10 MILLIGRAM(S): 2.5 TABLET ORAL at 05:30

## 2017-05-19 RX ADMIN — Medication 250 MILLIGRAM(S): at 14:07

## 2017-05-19 RX ADMIN — Medication 1 TABLET(S): at 17:46

## 2017-05-19 RX ADMIN — PANTOPRAZOLE SODIUM 40 MILLIGRAM(S): 20 TABLET, DELAYED RELEASE ORAL at 05:31

## 2017-05-19 RX ADMIN — PIPERACILLIN AND TAZOBACTAM 25 GRAM(S): 4; .5 INJECTION, POWDER, LYOPHILIZED, FOR SOLUTION INTRAVENOUS at 05:31

## 2017-05-19 RX ADMIN — Medication 100 MILLIGRAM(S): at 05:31

## 2017-05-19 RX ADMIN — Medication 650 MILLIGRAM(S): at 05:31

## 2017-05-19 RX ADMIN — Medication 1 TABLET(S): at 02:00

## 2017-05-19 RX ADMIN — MUPIROCIN 1 APPLICATION(S): 20 OINTMENT TOPICAL at 17:47

## 2017-05-19 RX ADMIN — Medication 650 MILLIGRAM(S): at 03:00

## 2017-05-19 RX ADMIN — Medication 1 TABLET(S): at 08:27

## 2017-05-19 RX ADMIN — Medication 50 MILLIGRAM(S): at 17:46

## 2017-05-19 RX ADMIN — Medication 650 MILLIGRAM(S): at 02:00

## 2017-05-19 RX ADMIN — Medication 650 MILLIGRAM(S): at 17:46

## 2017-05-19 RX ADMIN — Medication 50 MILLIGRAM(S): at 05:30

## 2017-05-19 RX ADMIN — CALCITRIOL 0.25 MICROGRAM(S): 0.5 CAPSULE ORAL at 17:46

## 2017-05-19 NOTE — DISCHARGE NOTE ADULT - HOSPITAL COURSE
60 y/o M with PHM of DM, HTN, HLD,CKD stage II-III, GERD, Mild anemia of chronic disease, poor compliance with follow up care prsented to ED with Cc of right foot ulcer with infection.  Pt reports increasing swelling of right foot ulcer, with occasional blackish drainage for past few weeks duration.  PCP advised to come to ED for possible r/o Osteomyelitis    Rt Foot Ulcer.   - Pod consulted- no Sx interventions, no acute signs of infection  -  collagenase dressing changes daily  - no communication w/ bone  - on discharge rec total contact cast  - cont local wound care  - ID Dr Spivey consulted -started Zosyn BID  - Mupirocin oint  - BL LE US- No DVT  - Indium Scan - NM scan  -Bcx negative    ABEL  - Renal Dr Quach consulted  - recs yash monitor off IVF  - Renal US - Normal  Hold ARBS    Essential hypertension.   - Amlodipine, Hydralazine  - Lipitor    Type 2 diabetes mellitus with other circulatory complication  - HgA1C levell -5.8%

## 2017-05-19 NOTE — PROGRESS NOTE ADULT - ASSESSMENT
61 YOM h/o DM, HTN, h/o rt ankle fracture s/p repair and hardware placement, chronic rt foot plantar ulcer with increased discharge, at times purulent.  Possible OM on Ft xray.

## 2017-05-19 NOTE — DISCHARGE NOTE ADULT - PROVIDER TOKENS
TOKEN:'[id number]',FREE:[LAST:[sharif],FIRST:[Chucho],PHONE:[phone]],FAX:[(   )    -],ADDRESS:[Podiatry]]

## 2017-05-19 NOTE — DISCHARGE NOTE ADULT - CARE PROVIDER_API CALL
Fabien Quach (JEFFRY), Internal Medicine; Nephrology  [address] [place] [address]  Phone: [phone]  Fax: [phone]    Chucho olguin  Podiatry  Phone: [phone]  Fax: (       -

## 2017-05-19 NOTE — DISCHARGE NOTE ADULT - CARE PLAN
Principal Discharge DX:	Ulcer  Goal:	No signs/symptoms of infection  Instructions for follow-up, activity and diet:	Continue with antibiotics as prescribed for 7 days.  Follow up with your PMD within one week of discharge.  Follow up with podiatry within one week of discharge.  Please call to make your appointment.  Secondary Diagnosis:	Essential hypertension  Instructions for follow-up, activity and diet:	Please stop taking losartan at this time due to your kidney function tests.  Continue to take your other BP meds as well as the new medication, hydralazine. Follow up with your PMD within one week of discharge for BP check and for further management.  Secondary Diagnosis:	Type 2 diabetes mellitus with other circulatory complication, without long-term current use of insulin  Instructions for follow-up, activity and diet:	Follow up with your PMD within one week of discharge.  Secondary Diagnosis:	ABEL (acute kidney injury)  Instructions for follow-up, activity and diet:	Follow up with Renal.  Please call to make your appointment.

## 2017-05-19 NOTE — PROGRESS NOTE ADULT - ASSESSMENT
Acute on CKD stage 3 baseline 1.3-1.4  ABEL: FEna >1% likely sec to ATN,  improving  Hypernatremia: improved  Proteinuria: 8.19g/day, etiology? possible sec to diabetes, pending vasculitis w/u   Acidosis: sec to hyperchloremia: improving  ? osteomyelitis: f/u ID  DM  HTN: acceptable   sec HPT: likely sec CKD: on calcitrol     Plan  Monitor BMP  Pending Vasculitis w/u for proteinuria ( hep c, rpr, bhumika, anca, spep, sif, immuno light chain)  F/u ID/Pod

## 2017-05-19 NOTE — DISCHARGE NOTE ADULT - ADDITIONAL INSTRUCTIONS
Podiatry discharge instructions:  Follow up: Please follow up with Dr. Plunkett/ Danny within 1 week of discharge. Please call [phone] to make an appointment at the wound care center.   Wound: Please apply bactroban to right plantar foot wound and cover with 4x4 gauze, abd pad and kerlix. To be performed daily  Weight bear: Please weight bear as tolerated in surgical shoe to right foot.  Antibiotics: Please take full course of antibiotics as prescribed. Follow up with your PMD within one week of discharge.    Please stop taking losartan and begin hydralazine along with your other medications.  Follow up with Nephrology for kidney monitoring.  Please call to make your appointment.  Podiatry discharge instructions:  Follow up: Please follow up with Dr. Plunkett/ Danny within 1 week of discharge. Please call [phone] to make an appointment at the wound care center.   Wound: Please apply bactroban to right plantar foot wound and cover with 4x4 gauze, abd pad and kerlix. To be performed daily  Weight bear: Please weight bear as tolerated in surgical shoe to right foot.  Antibiotics: Please take full course of antibiotics as prescribed.

## 2017-05-19 NOTE — PROGRESS NOTE ADULT - SUBJECTIVE AND OBJECTIVE BOX
Patient seen and examined at bedside. No chest pain/sob    VITALS:  T(F): 98.5, Max: 98.5 (05-19 @ 05:27)  HR: 68  BP: 142/57  RR: 16  SpO2: 95%  Wt(kg): --        PHYSICAL EXAM:  Constitutional: NAD  Neck: No JVD  Respiratory: CTAB, no wheezes, rales or rhonchi  Cardiovascular: S1, S2, RRR  Gastrointestinal: BS+, soft, NT/ND  Extremities: 1+ non pitting peripheral edema    Hospital Medications:   MEDICATIONS  (STANDING):  mupirocin 2% Ointment 1Application(s) Topical daily  enoxaparin Injectable 30milliGRAM(s) SubCutaneous every 24 hours  docusate sodium 100milliGRAM(s) Oral three times a day  insulin lispro (HumaLOG) corrective regimen sliding scale  SubCutaneous three times a day before meals  dextrose 5%. 1000milliLiter(s) IV Continuous <Continuous>  dextrose 50% Injectable 12.5Gram(s) IV Push once  piperacillin/tazobactam IVPB. 3.375Gram(s) IV Intermittent every 12 hours  aspirin enteric coated 81milliGRAM(s) Oral daily  atorvastatin 40milliGRAM(s) Oral at bedtime  metoprolol 50milliGRAM(s) Oral two times a day  amLODIPine   Tablet 10milliGRAM(s) Oral daily  pantoprazole    Tablet 40milliGRAM(s) Oral before breakfast  lactobacillus acidophilus 1Tablet(s) Oral two times a day with meals  sodium bicarbonate 650milliGRAM(s) Oral three times a day  hydrALAZINE 50milliGRAM(s) Oral two times a day  vancomycin  IVPB 1000milliGRAM(s) IV Intermittent every 24 hours  calcitriol   Capsule 0.25MICROGram(s) Oral daily      LABS:  05-19    141  |  107  |  24<H>  ----------------------------<  87  3.9   |  23  |  1.96<H>    Ca    8.6      19 May 2017 06:00  Phos  3.1     05-18  Mg     1.7     05-18    TPro  5.8<L>  /  Alb  2.8<L>  /  TBili  0.3  /  DBili      /  AST  10  /  ALT  9   /  AlkPhos  58  05-19    Creatinine Trend: 1.96 <--, 2.00 <--, 2.08 <--, 2.33 <--  Albumin, Serum: 2.8 g/dL (05-19 @ 06:00)  Iron Total, Serum: 37 ug/dL (05-19 @ 06:00)  Ferritin, Serum: 95.37 ng/mL (05-19 @ 06:00)                            10.1   7.52  )-----------( 217      ( 19 May 2017 06:00 )             31.1     Urine Studies:  Creatinine, Random Urine: 52.60 mg/dL (05-17 @ 20:53)  Sodium, Random Urine: 99 meq/L (05-17 @ 20:53)  Protein, Random Urine: 431.0 mg/dL (05-17 @ 20:53)      Urinalysis - [05-17-17 @ 20:53]      Color PLYEL / Appearance CLEAR / SG 1.012 / pH 6.5      Gluc 100 / Ketone NEGATIVE  / Bili NEGATIVE / Urobili NORMAL       Blood NEGATIVE / Protein 500 / Leuk Est NEGATIVE / Nitrite NEGATIVE      RBC 0-2 / WBC  / Hyaline  / Gran  / Sq Epi OCC / Non Sq Epi  / Bacteria       Iron 37, TIBC 218, %sat --      [05-19-17 @ 06:00]  Ferritin 95.37      [05-19-17 @ 06:00]  PTH 89.86 (Ca --)      [05-18-17 @ 05:50]   --  HbA1c 5.8      [05-17-17 @ 05:15]  Lipid: chol 141, , HDL 44, LDL 76      [05-17-17 @ 05:15]    HBsAg NEGATIVE      [05-19-17 @ 06:00]  HIV neg    C3 Complement 110.3      [05-19-17 @ 06:00]  C4 Complement 31.6      [05-19-17 @ 06:00]    RADIOLOGY & ADDITIONAL STUDIES:

## 2017-05-19 NOTE — DISCHARGE NOTE ADULT - PATIENT PORTAL LINK FT
“You can access the Follow[employer] Patient Portal, offered by [facility], by registering with the following website: [email or web address]”

## 2017-05-19 NOTE — PROGRESS NOTE ADULT - PROBLEM SELECTOR PLAN 1
1. Indium scan completed, report reviewed.  No evidence for OM or hardware infection  2. No role for long term IV antibiotics.  Wound healing well.  Will downgrade to oral cipro 500 mg PO bid x 7 days to cover enterobacter and Strep.  Corynebacterium likely a skin commensal.

## 2017-05-19 NOTE — DISCHARGE NOTE ADULT - MEDICATION SUMMARY - MEDICATIONS TO STOP TAKING
I will STOP taking the medications listed below when I get home from the hospital:    losartan 25 mg oral tablet  -- 1 tab(s) by mouth once a day    amoxicillin-clavulanate 875 mg-125 mg oral tablet  -- 1 tab(s) by mouth 2 times a day    doxycycline monohydrate 100 mg oral capsule  -- 1 cap(s) by mouth every 12 hours

## 2017-05-19 NOTE — PROGRESS NOTE ADULT - PROBLEM SELECTOR PLAN 3
1.  Patient educated regarded adverse effects involving ciprofloxacin including, but not limited to tendinitis, tendon rupture, abdominal aortic aneurysm, cardiac arrythmias.  2.  Discussed risk of antibiotic associated diarhea with virtually all antibiotics and possible complication of clostridium dificile colitis.  Encouraged probiotics.

## 2017-05-19 NOTE — DISCHARGE NOTE ADULT - SECONDARY DIAGNOSIS.
Essential hypertension Type 2 diabetes mellitus with other circulatory complication, without long-term current use of insulin ABEL (acute kidney injury)

## 2017-05-19 NOTE — DISCHARGE NOTE ADULT - PLAN OF CARE
No signs/symptoms of infection Continue with antibiotics as prescribed for 7 days.  Follow up with your PMD within one week of discharge.  Follow up with podiatry within one week of discharge.  Please call to make your appointment. Please stop taking losartan at this time due to your kidney function tests.  Continue to take your other BP meds as well as the new medication, hydralazine. Follow up with your PMD within one week of discharge for BP check and for further management. Follow up with your PMD within one week of discharge. Follow up with Renal.  Please call to make your appointment.

## 2017-05-19 NOTE — PROGRESS NOTE ADULT - SUBJECTIVE AND OBJECTIVE BOX
Infectious Diseases progress note:  DARIEL MI is a 61yMale patient with history of:    Non-pressure chronic ulcer of skin of other sites    Osteomyelitis of ankle or foot  Type 2 diabetes mellitus with other circulatory complication, without long-term current use of insulin  Essential hypertension  Ulcer      ROS:  CONSTITUTIONAL:  Negative fever or chills, feels well, good appetite  EYES:  Negative  blurry vision or double vision  CARDIOVASCULAR:  Negative for chest pain or palpitations  RESPIRATORY:  Negative for cough, wheezing, or SOB   GASTROINTESTINAL:  Negative for nausea, vomiting, diarrhea, constipation, or abdominal pain  EXTREMITIES:  No cyanosis/clubbing/edema  GENITOURINARY:  Negative frequency, urgency or dysuria  NEUROLOGIC:  No headache, confusion, dizziness, lightheadedness    Allergies    No Known Allergies    Intolerances        ANTIBIOTICS/RELEVANT:  antimicrobials  piperacillin/tazobactam IVPB. 3.375Gram(s) IV Intermittent every 12 hours  vancomycin  IVPB 1000milliGRAM(s) IV Intermittent every 24 hours    immunologic:    OTHER:  mupirocin 2% Ointment 1Application(s) Topical daily  enoxaparin Injectable 30milliGRAM(s) SubCutaneous every 24 hours  acetaminophen   Tablet 650milliGRAM(s) Oral every 6 hours PRN  acetaminophen   Tablet. 650milliGRAM(s) Oral every 6 hours PRN  ondansetron Injectable 4milliGRAM(s) IV Push every 6 hours PRN  docusate sodium 100milliGRAM(s) Oral three times a day  insulin lispro (HumaLOG) corrective regimen sliding scale  SubCutaneous three times a day before meals  dextrose 5%. 1000milliLiter(s) IV Continuous <Continuous>  dextrose Gel 1Dose(s) Oral once PRN  dextrose 50% Injectable 12.5Gram(s) IV Push once  glucagon  Injectable 1milliGRAM(s) IntraMuscular once PRN  aspirin enteric coated 81milliGRAM(s) Oral daily  atorvastatin 40milliGRAM(s) Oral at bedtime  metoprolol 50milliGRAM(s) Oral two times a day  amLODIPine   Tablet 10milliGRAM(s) Oral daily  pantoprazole    Tablet 40milliGRAM(s) Oral before breakfast  lactobacillus acidophilus 1Tablet(s) Oral two times a day with meals  sodium bicarbonate 650milliGRAM(s) Oral three times a day  hydrALAZINE 50milliGRAM(s) Oral two times a day  calcitriol   Capsule 0.25MICROGram(s) Oral daily      Objective:  Vital Signs Last 24 Hrs  T(C): 36.9, Max: 36.9 ( @ 05:27)  T(F): 98.5, Max: 98.5 ( @ 05:27)  HR: 68 (61 - 68)  BP: 142/57 (138/55 - 143/68)  BP(mean): --  RR: 16 (16 - 18)  SpO2: 95% (95% - 98%)    PHYSICAL EXAM:  Constitutional:Well-developed, well nourishe  Eyes:SPIKE, EOMI  Ear/Nose/Throat: no oral lesion, no sinus tenderness on percussion	  Neck:no JVD, no lymphadenopathy, supple  Respiratory: CTA lobito  Cardiovascular: S1S2 RRR, no murmurs  Gastrointestinal:soft, (+) BS, no HSM  Extremities:no e/e/c        LABS:                        10.1   7.52  )-----------( 217      ( 19 May 2017 06:00 )             31.1     -    141  |  107  |  24<H>  ----------------------------<  87  3.9   |  23  |  1.96<H>    Ca    8.6      19 May 2017 06:00  Phos  3.1     05-18  Mg     1.7     -18    TPro  5.8<L>  /  Alb  2.8<L>  /  TBili  0.3  /  DBili  x   /  AST  10  /  ALT  9   /  AlkPhos  58  -19      Urinalysis Basic - ( 17 May 2017 20:53 )    Color: PLYEL / Appearance: CLEAR / S.012 / pH: 6.5  Gluc: 100 / Ketone: NEGATIVE  / Bili: NEGATIVE / Urobili: NORMAL E.U.   Blood: NEGATIVE / Protein: 500 / Nitrite: NEGATIVE   Leuk Esterase: NEGATIVE / RBC: 0-2 / WBC x   Sq Epi: OCC / Non Sq Epi: x / Bacteria: x          MICROBIOLOGY:        RADIOLOGY & ADDITIONAL STUDIES:    ASSESSMENT:    PLAN: Infectious Diseases progress note:    S:  Feels well, no new complaints.  Foot ulcer without drainage, pain or swelling.  S/p indium scan.      ROS:  CONSTITUTIONAL:  Negative fever or chills, feels well, good appetite  EYES:  Negative  blurry vision or double vision  CARDIOVASCULAR:  Negative for chest pain or palpitations  RESPIRATORY:  Negative for cough, wheezing, or SOB   GASTROINTESTINAL:  Negative for nausea, vomiting, diarrhea, constipation, or abdominal pain  EXTREMITIES:  No cyanosis/clubbing/edema  GENITOURINARY:  Negative frequency, urgency or dysuria  NEUROLOGIC:  No headache, confusion, dizziness, lightheadedness      Allergies    No Known Allergies    Intolerances        ANTIBIOTICS/RELEVANT:  antimicrobials  piperacillin/tazobactam IVPB. 3.375Gram(s) IV Intermittent every 12 hours  vancomycin  IVPB 1000milliGRAM(s) IV Intermittent every 24 hours    immunologic:    OTHER:  mupirocin 2% Ointment 1Application(s) Topical daily  enoxaparin Injectable 30milliGRAM(s) SubCutaneous every 24 hours  acetaminophen   Tablet 650milliGRAM(s) Oral every 6 hours PRN  ondansetron Injectable 4milliGRAM(s) IV Push every 6 hours PRN  docusate sodium 100milliGRAM(s) Oral three times a day  insulin lispro (HumaLOG) corrective regimen sliding scale  SubCutaneous three times a day before meals  dextrose 5%. 1000milliLiter(s) IV Continuous <Continuous>  dextrose Gel 1Dose(s) Oral once PRN  dextrose 50% Injectable 12.5Gram(s) IV Push once  glucagon  Injectable 1milliGRAM(s) IntraMuscular once PRN  aspirin enteric coated 81milliGRAM(s) Oral daily  atorvastatin 40milliGRAM(s) Oral at bedtime  metoprolol 50milliGRAM(s) Oral two times a day  amLODIPine   Tablet 10milliGRAM(s) Oral daily  pantoprazole    Tablet 40milliGRAM(s) Oral before breakfast  lactobacillus acidophilus 1Tablet(s) Oral two times a day with meals  sodium bicarbonate 650milliGRAM(s) Oral three times a day  hydrALAZINE 50milliGRAM(s) Oral two times a day  calcitriol   Capsule 0.25MICROGram(s) Oral daily      Objective:  Vital Signs Last 24 Hrs  T(C): 36.9, Max: 36.9 (0519 @ 05:27)  T(F): 98.5, Max: 98.5 (-19 @ 05:27)  HR: 68 (61 - 68)  BP: 142/57 (138/55 - 143/68)  BP(mean): --  RR: 16 (16 - 18)  SpO2: 95% (95% - 98%)    PHYSICAL EXAM:  Constitutional:Well-developed, well nourished  Eyes:SPIKE, EOMI  Ear/Nose/Throat: no oral lesion, no sinus tenderness on percussion	  Neck:no JVD, no lymphadenopathy, supple  Respiratory: CTA lobito  Cardiovascular: S1S2 RRR, no murmurs  Gastrointestinal:soft, (+) BS, no HSM  Extremities:no e/e/c  SKIN:  Rt foot plantar charcot joint and callous with central ulcer, dry, superficial, no sinus tracts      LABS:                        10.1   7.52  )-----------( 217      ( 19 May 2017 06:00 )             31.1     -    141  |  107  |  24<H>  ----------------------------<  87  3.9   |  23  |  1.96<H>    Ca    8.6      19 May 2017 06:00  Phos  3.1     05-18  Mg     1.7     -18    TPro  5.8<L>  /  Alb  2.8<L>  /  TBili  0.3  /  DBili  x   /  AST  10  /  ALT  9   /  AlkPhos  58  -19      Urinalysis Basic - ( 17 May 2017 20:53 )    Color: PLYEL / Appearance: CLEAR / S.012 / pH: 6.5  Gluc: 100 / Ketone: NEGATIVE  / Bili: NEGATIVE / Urobili: NORMAL E.U.   Blood: NEGATIVE / Protein: 500 / Nitrite: NEGATIVE   Leuk Esterase: NEGATIVE / RBC: 0-2 / WBC x   Sq Epi: OCC / Non Sq Epi: x / Bacteria: x          MICROBIOLOGY:    Culture - Wound with Gram Stain (17 @ 18:04)    -  Amikacin: S <=16 ULISSES    -  Tobramycin: S <=4 ULISSES    -  Aztreonam: S <=4 ULISSES    -  Cefoxitin: R >16 ULISSES    -  Meropenem: S <=1 ULISSES    -  Piperacillin/Tazobactam: S <=16 ULISSES    -  Cefazolin: R >16 ULISSES    -  Ceftazidime: S <=1 ULISSES    -  Ciprofloxacin: S <=1 ULISSES    -  Ertapenem: S <=1 ULISSES    -  Gentamicin: S <=4 ULISSES    -  Imipenem: S <=1 ULISSES    -  Levofloxacin: S <=2 ULISSES    Culture - Wound with Gram Stain:       MODERATE  Routine susceptibility testing not performed as  Streptococcus Grp A/B is susceptible to drug(s) of choice -  Penicillin and Ampicillin    -  Ampicillin: R    -  Ampicillin/Sulbactam: R <=8/4 ULISSES    -  Cefepime: S <=4 ULISSES    -  Ceftriaxone: S <=1 ULISSES    -  Tigecycline: S <=2 ULISSES    -  Trimethoprim/Sulfamethoxazole: S <=2/38 ULISSES    Specimen Source: OTHER    Organism Identification: Enterobacter cloacae  Corynebacterium species  Streptococcus agalactiae Gp B    Organism: Streptococcus agalactiae Gp B    Organism: Corynebacterium species    Organism: Enterobacter cloacae    Method Type: MICROSCAN NEG URINE COMBO 61            RADIOLOGY & ADDITIONAL STUDIES:    EXAM:  NM INFLAMMATORY LOC WBC WB      EXAM:  NM MULTI DAY PROCEDURE        PROCEDURE DATE:  May 18 2017       INTERPRETATION:  RADIOPHARMACEUTICAL: 12.6 millicuries Tc-99m labeled   autologous leukocytes, IV.    CLINICAL INFORMATION: 61 year old male with right foot ulcer and distal   fibular orthopedic hardware; referred to evaluate for osteomyelitis.    TECHNIQUE: Radiolabeled autologous leukocytes were injected on 2017.   Approximately 20 hours later on 2017 anterior and posteriorwhole   body images and static images of the distal lower extremities and feet   were obtained.    COMPARISON: No previous labeled leukocyte studies were available for   comparison.    FINDINGS: There is physiologic distribution of labeled leukocytes  throughout the imaged structures. There are no foci of abnormal labeled   leukocyte accumulation to suggest the presence of osteomyelitis involving   the right foot or infected orthopedic hardware.    IMPRESSION: NormalTc-99m labeled leukocyte scan. No radionuclide evidence   of osteomyelitis or infected orthopedic hardware.

## 2017-05-19 NOTE — PROGRESS NOTE ADULT - ASSESSMENT
61 year male    Right foot diabetic ulcer with infection r/o Osteomyelitis  HTn  Dm type II with CKD stage III  HLD  Anemia of chronic disease    Plan of care:  Continue IV antibiotics with Zosyn and vancomycin for now  Monitor Vanco level  Indium scan today to assess for Osteomyelitis  Monitor BUN/Creatinine  Anemia work up  Renal follow up.  Avoid excessive weight bearing on right foot  Wound care follow up  Podiatry/ID follow up.

## 2017-05-19 NOTE — DISCHARGE NOTE ADULT - MEDICATION SUMMARY - MEDICATIONS TO TAKE
I will START or STAY ON the medications listed below when I get home from the hospital:    aspirin 81 mg oral delayed release tablet  -- 1 tab(s) by mouth once a day  -- Indication: For Prophylaxis    acetaminophen 325 mg oral tablet  -- 2 tab(s) by mouth every 6 hours, As needed, Pain  -- Indication: For Pain    metFORMIN 1000 mg oral tablet  -- 1 tab(s) by mouth 2 times a day  -- Check with your doctor before becoming pregnant.  Do not drink alcoholic beverages when taking this medication.  It is very important that you take or use this exactly as directed.  Do not skip doses or discontinue unless directed by your doctor.  Obtain medical advice before taking any non-prescription drugs as some may affect the action of this medication.  Take with food or milk.    -- Indication: For DM (diabetes mellitus)    metFORMIN 500 mg oral tablet  -- 1 tab(s) by mouth 2 times a day   -- Check with your doctor before becoming pregnant.  Do not drink alcoholic beverages when taking this medication.  It is very important that you take or use this exactly as directed.  Do not skip doses or discontinue unless directed by your doctor.  Obtain medical advice before taking any non-prescription drugs as some may affect the action of this medication.  Take with food or milk.    -- Indication: For DM (diabetes mellitus)    atorvastatin 40 mg oral tablet  -- 1 tab(s) by mouth once a day (at bedtime)  -- Indication: For Cholesterol    metoprolol tartrate 50 mg oral tablet  -- 1 tab(s) by mouth 2 times a day  -- Indication: For Blood Pressure    amLODIPine 10 mg oral tablet  -- 1 tab(s) by mouth once a day  -- Indication: For Blood Pressure    mupirocin 2% topical ointment  -- 1 application on skin once a day  -- Indication: For Skin    Acidophilus Probiotic Blend oral capsule  -- 1 cap(s) by mouth once a day  -- Indication: For Prophylaxis    pantoprazole 40 mg oral delayed release tablet  -- 1 tab(s) by mouth once a day (before a meal)  -- Indication: For Gerd    Cipro 500 mg oral tablet  -- 1 tab(s) by mouth every 12 hours  -- Avoid prolonged or excessive exposure to direct and/or artificial sunlight while taking this medication.  Check with your doctor before becoming pregnant.  Do not take dairy products, antacids, or iron preparations within one hour of this medication.  Finish all this medication unless otherwise directed by prescriber.  Medication should be taken with plenty of water.    -- Indication: For Foot Ulcer    hydrALAZINE 50 mg oral tablet  -- 1 tab(s) by mouth 2 times a day  -- Indication: For Blood Pressure    calcitriol 0.25 mcg oral capsule  -- 1 cap(s) by mouth once a day  -- Indication: For Calcium disorder

## 2017-05-19 NOTE — PROGRESS NOTE ADULT - SUBJECTIVE AND OBJECTIVE BOX
Chief Complaint:  Patient feels better at present. No new complaints at present overnight.    MEDICATIONS  (STANDING):  mupirocin 2% Ointment 1Application(s) Topical daily  enoxaparin Injectable 30milliGRAM(s) SubCutaneous every 24 hours  docusate sodium 100milliGRAM(s) Oral three times a day  insulin lispro (HumaLOG) corrective regimen sliding scale  SubCutaneous three times a day before meals  dextrose 5%. 1000milliLiter(s) IV Continuous <Continuous>  dextrose 50% Injectable 12.5Gram(s) IV Push once  piperacillin/tazobactam IVPB. 3.375Gram(s) IV Intermittent every 12 hours  aspirin enteric coated 81milliGRAM(s) Oral daily  atorvastatin 40milliGRAM(s) Oral at bedtime  metoprolol 50milliGRAM(s) Oral two times a day  amLODIPine   Tablet 10milliGRAM(s) Oral daily  pantoprazole    Tablet 40milliGRAM(s) Oral before breakfast  lactobacillus acidophilus 1Tablet(s) Oral two times a day with meals  sodium bicarbonate 650milliGRAM(s) Oral three times a day  hydrALAZINE 50milliGRAM(s) Oral two times a day  vancomycin  IVPB 1000milliGRAM(s) IV Intermittent every 24 hours  calcitriol   Capsule 0.25MICROGram(s) Oral daily    MEDICATIONS  (PRN):  acetaminophen   Tablet 650milliGRAM(s) Oral every 6 hours PRN For Temp greater than 38 C (100.4 F)  acetaminophen   Tablet. 650milliGRAM(s) Oral every 6 hours PRN Mild Pain (1 - 3)  ondansetron Injectable 4milliGRAM(s) IV Push every 6 hours PRN Nausea  dextrose Gel 1Dose(s) Oral once PRN Blood Glucose LESS THAN 70 milliGRAM(s)/deciliter  glucagon  Injectable 1milliGRAM(s) IntraMuscular once PRN Glucose LESS THAN 70 milligrams/deciliter      Vital Signs Last 24 Hrs  T(C): 36.9, Max: 36.9 (05-19 @ 05:27)  HR: 68 (61 - 68)  BP: 142/57 (138/55 - 143/68)  RR: 16 (16 - 18)  SpO2: 95% (95% - 98%)  Wt(kg): --    CAPILLARY BLOOD GLUCOSE  114 (19 May 2017 08:00)  149 (18 May 2017 21:35)  95 (18 May 2017 16:51)  168 (18 May 2017 11:53)      I&O's Summary      PHYSICAL EXAM:  GENERAL: NAD, well-developed, well nourished, alert, awake, no acute distress at present  HEAD:  Atraumatic, Normocephalic,   EYES: EOMI, PERRLA, conjunctiva and sclera clear  NECK: Supple, No JVD, no palpable thyromegaly, no lymph adenopahy  CHEST/LUNG: Clear to auscultation bilaterally; No wheeze, no rales, no crepitations  HEART: Regular rate and rhythm; No murmurs, rubs, or gallops  ABDOMEN: Soft, Nontender, Nondistended; Bowel sounds present, no guarding, no rigidity, no rebound tenderness  EXTREMITIES:  2+ Peripheral Pulses, No clubbing, cyanosis, or edema, no calf tenderness  Neurology: AAOx3, no focal neurological deficit. Motor 5/5 all 4 extremities. sensory intact. cranial nerves II to XII grossly intact. Able to comprehend and answers all questions appropriately.  SKIN: No rashes or lesions    LABS:                                                   17 @ 06:00    141  |  107  |  24<H>  ----------------------------<  87  3.9   |  23  |  1.96<H>                                                 17 @ 05:50    141  |  109<H>  |  30<H>  ----------------------------<  108<H>  4.0   |  20<L>  |  2.00<H>                                                 17 @ 05:15    148<H>  |  115<H>  |  37<H>  ----------------------------<  89  4.7   |  18<L>  |  2.08<H>                                                 17 @ 13:34    140  |  105  |  47<H>  ----------------------------<  133<H>  4.9   |  21<L>  |  2.33<H>    Ca    8.6      19 May 2017 06:00  Ca    9.0      18 May 2017 05:50  Ca    8.9      17 May 2017 05:15  Ca    9.0      16 May 2017 13:34  Phos  3.1       Mg     1.7         TPro  5.8<L>  /  Alb  2.8<L>  /  TBili  0.3  /  DBili  x   /  AST  10  /  ALT  9   /  AlkPhos  58    TPro  6.4  /  Alb  2.9<L>  /  TBili  0.2  /  DBili  x   /  AST  12  /  ALT  8   /  AlkPhos  69    TPro  7.0  /  Alb  3.3  /  TBili  0.2  /  DBili  x   /  AST  15  /  ALT  12  /  AlkPhos  72  16      CBC Full  -  ( 19 May 2017 06:00 )  WBC Count : 7.52 K/uL  Hemoglobin : 10.1 g/dL  Hematocrit : 31.1 %  Platelet Count - Automated : 217 K/uL  Mean Cell Volume : 89.6 fL      CBC Full  -  ( 18 May 2017 05:50 )  WBC Count : 7.33 K/uL  Hemoglobin : 10.4 g/dL  Hematocrit : 32.9 %  Platelet Count - Automated : 247 K/uL  Mean Cell Volume : 89.9 fL      CBC Full  -  ( 17 May 2017 05:15 )  WBC Count : 7.59 K/uL  Hemoglobin : 10.7 g/dL  Hematocrit : 34.3 %  Platelet Count - Automated : 251 K/uL  Mean Cell Volume : 90.5 fL      CBC Full  -  ( 16 May 2017 13:34 )  WBC Count : 9.07 K/uL  Hemoglobin : 11.1 g/dL  Hematocrit : 35.4 %  Platelet Count - Automated : 244 K/uL  Mean Cell Volume : 91.9 fL              Urinalysis Basic - ( 17 May 2017 20:53 )    Color: PLYEL / Appearance: CLEAR / S.012 / pH: 6.5  Gluc: 100 / Ketone: NEGATIVE  / Bili: NEGATIVE / Urobili: NORMAL E.U.   Blood: NEGATIVE / Protein: 500 / Nitrite: NEGATIVE   Leuk Esterase: NEGATIVE / RBC: 0-2 / WBC x   Sq Epi: OCC / Non Sq Epi: x / Bacteria: x        RADIOLOGY & ADDITIONAL TESTS: Chief Complaint:  Patient feels better at present.No pain at present. Able to ambulate without assistance to bathroom.    MEDICATIONS  (STANDING):  mupirocin 2% Ointment 1Application(s) Topical daily  enoxaparin Injectable 30milliGRAM(s) SubCutaneous every 24 hours  docusate sodium 100milliGRAM(s) Oral three times a day  insulin lispro (HumaLOG) corrective regimen sliding scale  SubCutaneous three times a day before meals  dextrose 5%. 1000milliLiter(s) IV Continuous <Continuous>  dextrose 50% Injectable 12.5Gram(s) IV Push once  piperacillin/tazobactam IVPB. 3.375Gram(s) IV Intermittent every 12 hours  aspirin enteric coated 81milliGRAM(s) Oral daily  atorvastatin 40milliGRAM(s) Oral at bedtime  metoprolol 50milliGRAM(s) Oral two times a day  amLODIPine   Tablet 10milliGRAM(s) Oral daily  pantoprazole    Tablet 40milliGRAM(s) Oral before breakfast  lactobacillus acidophilus 1Tablet(s) Oral two times a day with meals  sodium bicarbonate 650milliGRAM(s) Oral three times a day  hydrALAZINE 50milliGRAM(s) Oral two times a day  vancomycin  IVPB 1000milliGRAM(s) IV Intermittent every 24 hours  calcitriol   Capsule 0.25MICROGram(s) Oral daily    MEDICATIONS  (PRN):  acetaminophen   Tablet 650milliGRAM(s) Oral every 6 hours PRN For Temp greater than 38 C (100.4 F)  acetaminophen   Tablet. 650milliGRAM(s) Oral every 6 hours PRN Mild Pain (1 - 3)  ondansetron Injectable 4milliGRAM(s) IV Push every 6 hours PRN Nausea  dextrose Gel 1Dose(s) Oral once PRN Blood Glucose LESS THAN 70 milliGRAM(s)/deciliter  glucagon  Injectable 1milliGRAM(s) IntraMuscular once PRN Glucose LESS THAN 70 milligrams/deciliter      Vital Signs Last 24 Hrs  T(C): 36.9, Max: 36.9 (05-19 @ 05:27)  HR: 68 (61 - 68)  BP: 142/57 (138/55 - 143/68)  RR: 16 (16 - 18)  SpO2: 95% (95% - 98%)  Wt(kg): --    CAPILLARY BLOOD GLUCOSE  114 (19 May 2017 08:00)  149 (18 May 2017 21:35)  95 (18 May 2017 16:51)  168 (18 May 2017 11:53)      I&O's Summary      PHYSICAL EXAM:  GENERAL: NAD, well-developed, well nourished, alert, awake, no acute distress at present  HEAD:  Atraumatic, Normocephalic,   EYES: EOMI, PERRLA, conjunctiva and sclera clear  NECK: Supple, No JVD, no palpable thyromegaly, no lymph adenopahy  CHEST/LUNG: Clear to auscultation bilaterally; No wheeze, no rales, no crepitations  HEART: Regular rate and rhythm; KENNA II/VI at LPSB, no rubs, or gallops  ABDOMEN: Soft, Nontender, Nondistended; Bowel sounds present, no guarding, no rigidity, no rebound tenderness  EXTREMITIES:  Right foot ulcer ++nt, No active discharge or bleeding. Bilateral pedal edema+nt, No cyanosis, No clubbing. Bilateral peripheral pulses+nt.  Neurology: AAOx3, no focal neurological deficit. Motor 5/5 all 4 extremities. sensory intact. cranial nerves II to XII grossly intact. Able to comprehend and answers all questions appropriately.  SKIN: No rashes or lesions    LABS:                                                   17 @ 06:00    141  |  107  |  24<H>  ----------------------------<  87  3.9   |  23  |  1.96<H>                                                 17 @ 05:50    141  |  109<H>  |  30<H>  ----------------------------<  108<H>  4.0   |  20<L>  |  2.00<H>                                                 17 @ 05:15    148<H>  |  115<H>  |  37<H>  ----------------------------<  89  4.7   |  18<L>  |  2.08<H>                                                 17 @ 13:34    140  |  105  |  47<H>  ----------------------------<  133<H>  4.9   |  21<L>  |  2.33<H>    Ca    8.6      19 May 2017 06:00  Ca    9.0      18 May 2017 05:50  Ca    8.9      17 May 2017 05:15  Ca    9.0      16 May 2017 13:34  Phos  3.1       Mg     1.7         TPro  5.8<L>  /  Alb  2.8<L>  /  TBili  0.3  /  DBili  x   /  AST  10  /  ALT  9   /  AlkPhos  58    TPro  6.4  /  Alb  2.9<L>  /  TBili  0.2  /  DBili  x   /  AST  12  /  ALT  8   /  AlkPhos  69  -17  TPro  7.0  /  Alb  3.3  /  TBili  0.2  /  DBili  x   /  AST  15  /  ALT  12  /  AlkPhos  72  -16      CBC Full  -  ( 19 May 2017 06:00 )  WBC Count : 7.52 K/uL  Hemoglobin : 10.1 g/dL  Hematocrit : 31.1 %  Platelet Count - Automated : 217 K/uL  Mean Cell Volume : 89.6 fL      CBC Full  -  ( 18 May 2017 05:50 )  WBC Count : 7.33 K/uL  Hemoglobin : 10.4 g/dL  Hematocrit : 32.9 %  Platelet Count - Automated : 247 K/uL  Mean Cell Volume : 89.9 fL      CBC Full  -  ( 17 May 2017 05:15 )  WBC Count : 7.59 K/uL  Hemoglobin : 10.7 g/dL  Hematocrit : 34.3 %  Platelet Count - Automated : 251 K/uL  Mean Cell Volume : 90.5 fL      CBC Full  -  ( 16 May 2017 13:34 )  WBC Count : 9.07 K/uL  Hemoglobin : 11.1 g/dL  Hematocrit : 35.4 %  Platelet Count - Automated : 244 K/uL  Mean Cell Volume : 91.9 fL              Urinalysis Basic - ( 17 May 2017 20:53 )    Color: PLYEL / Appearance: CLEAR / S.012 / pH: 6.5  Gluc: 100 / Ketone: NEGATIVE  / Bili: NEGATIVE / Urobili: NORMAL E.U.   Blood: NEGATIVE / Protein: 500 / Nitrite: NEGATIVE   Leuk Esterase: NEGATIVE / RBC: 0-2 / WBC x   Sq Epi: OCC / Non Sq Epi: x / Bacteria: x        RADIOLOGY & ADDITIONAL TESTS:

## 2017-05-21 LAB
ANA TITR SER: NEGATIVE — SIGNIFICANT CHANGE UP
BACTERIA BLD CULT: SIGNIFICANT CHANGE UP
BACTERIA BLD CULT: SIGNIFICANT CHANGE UP
C-ANCA SER-ACNC: NEGATIVE — SIGNIFICANT CHANGE UP
P-ANCA SER-ACNC: NEGATIVE — SIGNIFICANT CHANGE UP

## 2017-05-22 LAB
GAS PNL BLDMV: SIGNIFICANT CHANGE UP
KAPPA/LAMBDA FREE LIGHT CHAIN RATIO, SERUM: SIGNIFICANT CHANGE UP

## 2017-05-23 LAB — GAS PNL BLDMV: SIGNIFICANT CHANGE UP

## 2018-09-07 ENCOUNTER — INPATIENT (INPATIENT)
Facility: HOSPITAL | Age: 63
LOS: 10 days | Discharge: HOME HEALTH | End: 2018-09-18
Attending: INTERNAL MEDICINE | Admitting: INTERNAL MEDICINE
Payer: COMMERCIAL

## 2018-09-07 VITALS
DIASTOLIC BLOOD PRESSURE: 65 MMHG | OXYGEN SATURATION: 97 % | SYSTOLIC BLOOD PRESSURE: 142 MMHG | RESPIRATION RATE: 16 BRPM | TEMPERATURE: 99 F | HEART RATE: 80 BPM

## 2018-09-07 DIAGNOSIS — M86.9 OSTEOMYELITIS, UNSPECIFIED: ICD-10-CM

## 2018-09-07 DIAGNOSIS — S82.899A OTHER FRACTURE OF UNSPECIFIED LOWER LEG, INITIAL ENCOUNTER FOR CLOSED FRACTURE: Chronic | ICD-10-CM

## 2018-09-07 DIAGNOSIS — S42.309A UNSPECIFIED FRACTURE OF SHAFT OF HUMERUS, UNSPECIFIED ARM, INITIAL ENCOUNTER FOR CLOSED FRACTURE: Chronic | ICD-10-CM

## 2018-09-07 PROBLEM — I10 ESSENTIAL (PRIMARY) HYPERTENSION: Chronic | Status: ACTIVE | Noted: 2017-05-16

## 2018-09-07 PROBLEM — E11.9 TYPE 2 DIABETES MELLITUS WITHOUT COMPLICATIONS: Chronic | Status: ACTIVE | Noted: 2017-05-16

## 2018-09-07 LAB
ALBUMIN SERPL ELPH-MCNC: 2.3 G/DL — LOW (ref 3.3–5)
ALP SERPL-CCNC: 91 U/L — SIGNIFICANT CHANGE UP (ref 40–120)
ALT FLD-CCNC: 14 U/L — SIGNIFICANT CHANGE UP (ref 4–41)
AST SERPL-CCNC: 6 U/L — SIGNIFICANT CHANGE UP (ref 4–40)
BASE EXCESS BLDV CALC-SCNC: -2.5 MMOL/L — SIGNIFICANT CHANGE UP
BASOPHILS # BLD AUTO: 0.03 K/UL — SIGNIFICANT CHANGE UP (ref 0–0.2)
BASOPHILS NFR BLD AUTO: 0.3 % — SIGNIFICANT CHANGE UP (ref 0–2)
BILIRUB SERPL-MCNC: 0.2 MG/DL — SIGNIFICANT CHANGE UP (ref 0.2–1.2)
BLOOD GAS VENOUS - CREATININE: SIGNIFICANT CHANGE UP MG/DL (ref 0.5–1.3)
BUN SERPL-MCNC: 57 MG/DL — HIGH (ref 7–23)
CALCIUM SERPL-MCNC: 10.1 MG/DL — SIGNIFICANT CHANGE UP (ref 8.4–10.5)
CHLORIDE BLDV-SCNC: 105 MMOL/L — SIGNIFICANT CHANGE UP (ref 96–108)
CHLORIDE SERPL-SCNC: 100 MMOL/L — SIGNIFICANT CHANGE UP (ref 98–107)
CO2 SERPL-SCNC: 17 MMOL/L — LOW (ref 22–31)
CREAT SERPL-MCNC: 2.89 MG/DL — HIGH (ref 0.5–1.3)
EOSINOPHIL # BLD AUTO: 0.08 K/UL — SIGNIFICANT CHANGE UP (ref 0–0.5)
EOSINOPHIL NFR BLD AUTO: 0.7 % — SIGNIFICANT CHANGE UP (ref 0–6)
ERYTHROCYTE [SEDIMENTATION RATE] IN BLOOD: 116 MM/HR — HIGH (ref 1–15)
GAS PNL BLDV: 137 MMOL/L — SIGNIFICANT CHANGE UP (ref 136–146)
GLUCOSE BLDV-MCNC: 255 — HIGH (ref 70–99)
GLUCOSE SERPL-MCNC: 245 MG/DL — HIGH (ref 70–99)
HCO3 BLDV-SCNC: 22 MMOL/L — SIGNIFICANT CHANGE UP (ref 20–27)
HCT VFR BLD CALC: 29.4 % — LOW (ref 39–50)
HCT VFR BLDV CALC: 34.1 % — LOW (ref 39–51)
HGB BLD-MCNC: 9.3 G/DL — LOW (ref 13–17)
HGB BLDV-MCNC: 11 G/DL — LOW (ref 13–17)
IMM GRANULOCYTES # BLD AUTO: 0.06 # — SIGNIFICANT CHANGE UP
IMM GRANULOCYTES NFR BLD AUTO: 0.5 % — SIGNIFICANT CHANGE UP (ref 0–1.5)
LACTATE BLDV-MCNC: 1.1 MMOL/L — SIGNIFICANT CHANGE UP (ref 0.5–2)
LYMPHOCYTES # BLD AUTO: 1.93 K/UL — SIGNIFICANT CHANGE UP (ref 1–3.3)
LYMPHOCYTES # BLD AUTO: 16.9 % — SIGNIFICANT CHANGE UP (ref 13–44)
MCHC RBC-ENTMCNC: 28.6 PG — SIGNIFICANT CHANGE UP (ref 27–34)
MCHC RBC-ENTMCNC: 31.6 % — LOW (ref 32–36)
MCV RBC AUTO: 90.5 FL — SIGNIFICANT CHANGE UP (ref 80–100)
MONOCYTES # BLD AUTO: 0.92 K/UL — HIGH (ref 0–0.9)
MONOCYTES NFR BLD AUTO: 8.1 % — SIGNIFICANT CHANGE UP (ref 2–14)
NEUTROPHILS # BLD AUTO: 8.4 K/UL — HIGH (ref 1.8–7.4)
NEUTROPHILS NFR BLD AUTO: 73.5 % — SIGNIFICANT CHANGE UP (ref 43–77)
NRBC # FLD: 0 — SIGNIFICANT CHANGE UP
NT-PROBNP SERPL-SCNC: 2166 PG/ML — SIGNIFICANT CHANGE UP
PCO2 BLDV: 39 MMHG — LOW (ref 41–51)
PH BLDV: 7.37 PH — SIGNIFICANT CHANGE UP (ref 7.32–7.43)
PLATELET # BLD AUTO: 473 K/UL — HIGH (ref 150–400)
PMV BLD: 10.2 FL — SIGNIFICANT CHANGE UP (ref 7–13)
PO2 BLDV: 50 MMHG — HIGH (ref 35–40)
POTASSIUM BLDV-SCNC: 4.3 MMOL/L — SIGNIFICANT CHANGE UP (ref 3.4–4.5)
POTASSIUM SERPL-MCNC: 4.4 MMOL/L — SIGNIFICANT CHANGE UP (ref 3.5–5.3)
POTASSIUM SERPL-SCNC: 4.4 MMOL/L — SIGNIFICANT CHANGE UP (ref 3.5–5.3)
PROT SERPL-MCNC: 7.2 G/DL — SIGNIFICANT CHANGE UP (ref 6–8.3)
RBC # BLD: 3.25 M/UL — LOW (ref 4.2–5.8)
RBC # FLD: 12.8 % — SIGNIFICANT CHANGE UP (ref 10.3–14.5)
SAO2 % BLDV: 87.5 % — HIGH (ref 60–85)
SODIUM SERPL-SCNC: 133 MMOL/L — LOW (ref 135–145)
TROPONIN T, HIGH SENSITIVITY: 27 NG/L — SIGNIFICANT CHANGE UP (ref ?–14)
WBC # BLD: 11.42 K/UL — HIGH (ref 3.8–10.5)
WBC # FLD AUTO: 11.42 K/UL — HIGH (ref 3.8–10.5)

## 2018-09-07 PROCEDURE — 99223 1ST HOSP IP/OBS HIGH 75: CPT

## 2018-09-07 PROCEDURE — 71045 X-RAY EXAM CHEST 1 VIEW: CPT | Mod: 26

## 2018-09-07 PROCEDURE — 93971 EXTREMITY STUDY: CPT | Mod: 26,LT

## 2018-09-07 PROCEDURE — 73630 X-RAY EXAM OF FOOT: CPT | Mod: 26,RT

## 2018-09-07 RX ORDER — INFLUENZA VIRUS VACCINE 15; 15; 15; 15 UG/.5ML; UG/.5ML; UG/.5ML; UG/.5ML
0.5 SUSPENSION INTRAMUSCULAR ONCE
Qty: 0 | Refills: 0 | Status: COMPLETED | OUTPATIENT
Start: 2018-09-07 | End: 2018-09-07

## 2018-09-07 RX ORDER — SODIUM CHLORIDE 9 MG/ML
1000 INJECTION INTRAMUSCULAR; INTRAVENOUS; SUBCUTANEOUS ONCE
Qty: 0 | Refills: 0 | Status: COMPLETED | OUTPATIENT
Start: 2018-09-07 | End: 2018-09-07

## 2018-09-07 RX ORDER — HEPARIN SODIUM 5000 [USP'U]/ML
5000 INJECTION INTRAVENOUS; SUBCUTANEOUS EVERY 8 HOURS
Qty: 0 | Refills: 0 | Status: DISCONTINUED | OUTPATIENT
Start: 2018-09-07 | End: 2018-09-18

## 2018-09-07 RX ORDER — PIPERACILLIN AND TAZOBACTAM 4; .5 G/20ML; G/20ML
3.38 INJECTION, POWDER, LYOPHILIZED, FOR SOLUTION INTRAVENOUS EVERY 8 HOURS
Qty: 0 | Refills: 0 | Status: DISCONTINUED | OUTPATIENT
Start: 2018-09-07 | End: 2018-09-10

## 2018-09-07 RX ORDER — INSULIN LISPRO 100/ML
VIAL (ML) SUBCUTANEOUS AT BEDTIME
Qty: 0 | Refills: 0 | Status: DISCONTINUED | OUTPATIENT
Start: 2018-09-07 | End: 2018-09-18

## 2018-09-07 RX ORDER — PIPERACILLIN AND TAZOBACTAM 4; .5 G/20ML; G/20ML
3.38 INJECTION, POWDER, LYOPHILIZED, FOR SOLUTION INTRAVENOUS ONCE
Qty: 0 | Refills: 0 | Status: DISCONTINUED | OUTPATIENT
Start: 2018-09-07 | End: 2018-09-07

## 2018-09-07 RX ORDER — INSULIN LISPRO 100/ML
VIAL (ML) SUBCUTANEOUS
Qty: 0 | Refills: 0 | Status: DISCONTINUED | OUTPATIENT
Start: 2018-09-07 | End: 2018-09-18

## 2018-09-07 RX ORDER — PIPERACILLIN AND TAZOBACTAM 4; .5 G/20ML; G/20ML
2.25 INJECTION, POWDER, LYOPHILIZED, FOR SOLUTION INTRAVENOUS ONCE
Qty: 0 | Refills: 0 | Status: DISCONTINUED | OUTPATIENT
Start: 2018-09-07 | End: 2018-09-07

## 2018-09-07 RX ORDER — PIPERACILLIN AND TAZOBACTAM 4; .5 G/20ML; G/20ML
3.38 INJECTION, POWDER, LYOPHILIZED, FOR SOLUTION INTRAVENOUS ONCE
Qty: 0 | Refills: 0 | Status: COMPLETED | OUTPATIENT
Start: 2018-09-07 | End: 2018-09-07

## 2018-09-07 RX ORDER — SODIUM HYPOCHLORITE 0.125 %
1 SOLUTION, NON-ORAL MISCELLANEOUS ONCE
Qty: 0 | Refills: 0 | Status: DISCONTINUED | OUTPATIENT
Start: 2018-09-07 | End: 2018-09-18

## 2018-09-07 RX ORDER — VANCOMYCIN HCL 1 G
1000 VIAL (EA) INTRAVENOUS ONCE
Qty: 0 | Refills: 0 | Status: COMPLETED | OUTPATIENT
Start: 2018-09-07 | End: 2018-09-07

## 2018-09-07 RX ADMIN — SODIUM CHLORIDE 3000 MILLILITER(S): 9 INJECTION INTRAMUSCULAR; INTRAVENOUS; SUBCUTANEOUS at 17:50

## 2018-09-07 RX ADMIN — Medication 250 MILLIGRAM(S): at 19:57

## 2018-09-07 RX ADMIN — PIPERACILLIN AND TAZOBACTAM 200 GRAM(S): 4; .5 INJECTION, POWDER, LYOPHILIZED, FOR SOLUTION INTRAVENOUS at 19:32

## 2018-09-07 RX ADMIN — PIPERACILLIN AND TAZOBACTAM 3.38 GRAM(S): 4; .5 INJECTION, POWDER, LYOPHILIZED, FOR SOLUTION INTRAVENOUS at 19:57

## 2018-09-07 NOTE — ED ADULT TRIAGE NOTE - CHIEF COMPLAINT QUOTE
Pt. c/o right LE edema and pain x few weeks. States he has diabetic ulcers draining on right foot. Also c/o intermittent chest discomfort and sob recently. Denies injury/recent travel

## 2018-09-07 NOTE — CONSULT NOTE ADULT - SUBJECTIVE AND OBJECTIVE BOX
Podiatry pager #: [phone]    Patient is a 63y old  Male who presents with a chief complaint of RIGHT FOOT WOUND    HPI:     64 y/o M with hx of HTN, DM, diabetic foot ulcer of R foot for "years" presents with worsening pain to R foot ulcer. He states that pain is worse when he walks and he also noticed some foul smelling drainage from the wound. He states that he has a podiatrist, Dr Laci Gama, who he used to see for his ulcer but hasn't seen him in over a year.  He Also reports that RLE swelling for the one year which he believes he had an ultrasound for that was WNL.    	Contrary to triage, he denies chest pain and shortness of breath.  he states that he has had intermittent L shoulder pain with movement of the arm that lasts a few seconds since 1 year ago since he fell onto his shoulder.  He denies hx of DVT , PE, recent travel, and hormone use      62yo male presents with ulcer on the bottom of his right foot. Pt has a podiatrist but has not seen him in over a year (Dr. Delgado).  no fever/chills.  has drainage from the wound.  also with increasing LE edema    PAST MEDICAL & SURGICAL HISTORY:  HTN (hypertension)  DM (diabetes mellitus)  Arm fracture  Ankle fracture: ORIF      MEDICATIONS  (STANDING):  piperacillin/tazobactam IVPB. 2.25 Gram(s) IV Intermittent once  vancomycin  IVPB 1000 milliGRAM(s) IV Intermittent once    MEDICATIONS  (PRN):      Allergies    No Known Allergies    Intolerances        VITALS:    Vital Signs Last 24 Hrs  T(C): 36.7 (07 Sep 2018 16:33), Max: 37.2 (07 Sep 2018 14:34)  T(F): 98.1 (07 Sep 2018 16:33), Max: 98.9 (07 Sep 2018 14:34)  HR: 74 (07 Sep 2018 16:33) (74 - 80)  BP: 161/81 (07 Sep 2018 16:33) (142/65 - 161/81)  BP(mean): --  RR: 16 (07 Sep 2018 16:33) (16 - 16)  SpO2: 99% (07 Sep 2018 16:33) (97% - 99%)    LABS:                          9.3    11.42 )-----------( 473      ( 07 Sep 2018 16:35 )             29.4       09-07    133<L>  |  100  |  57<H>  ----------------------------<  245<H>  4.4   |  17<L>  |  2.89<H>    Ca    10.1      07 Sep 2018 16:48    TPro  7.2  /  Alb  2.3<L>  /  TBili  0.2  /  DBili  x   /  AST  6   /  ALT  14  /  AlkPhos  91  09-07      CAPILLARY BLOOD GLUCOSE      POCT Blood Glucose.: 224 mg/dL (07 Sep 2018 14:41)          LOWER EXTREMITY PHYSICAL EXAM:    Vasular: DP/PT 0/4, B/L, CFT < 5 seconds B/L, Temperature gradient WARMER ON THE RIGHT.   Neuro: Epicritic sensation diminisehd_ to the level of _digits, B/L.  Musculoskeletal/Ortho: charcot foot to the right   Skin: edema to the right LE, 3+  Wound #1: right foot planar medial wound   Size: 3x2.8cm  Depth: to bone  Wound bed: fibrogranular  Drainage: serous   Odor: mal odor   Periwound: hyperkaratotic  Etiology: Charcot     RADIOLOGY & ADDITIONAL STUDIES:  < from: Xray Foot AP + Lateral + Oblique, Right (09.07.18 @ 17:17) >  ******PRELIMINARY REPORT******    ******PRELIMINARY REPORT******            EXAM:  RAD FOOT MIN 3 VIEWS RIGHT        PROCEDURE DATE:  Sep  7 2018     ******PRELIMINARY REPORT******    ******PRELIMINARY REPORT******            INTERPRETATION:  soft tissue defect in the plantar aspect of the right   foot consistent with history of ulcer.  no fistula tract or subcutaneous   gas.  heterotropic ossification in the tibiofibular syndesmotic space   limits evaluation for osteomyelitis in this area.  further evalution can   be obtained with MRI if clinically warranted            ******PRELIMINARY REPORT******    ******PRELIMINARY REPORT******          WAYNE REHMAN M.D., RADIOLOGY RESIDENT    < end of copied text >

## 2018-09-07 NOTE — ED PROVIDER NOTE - MEDICAL DECISION MAKING DETAILS
1.  foot ulcer.  has some necrotic tissue, but seems like healthy tissue at base.  will consult podiatry for bedside debridement and further recommendations.  Will get xray to evaluate for osteomyelitis.  will get us to eval for DVT.  2. CP/SOB.  pt has been having this for months, since january.  will get CXR, EKG, troponin.

## 2018-09-07 NOTE — ED PROVIDER NOTE - OBJECTIVE STATEMENT
64 y/o M with hx of HTN, DM, diabetic foot ulcer of R foot for "years" presents with worsening pain to R foot ulcer. He states that pain is worse when he walks and he also noticed some foul smelling drainage from the wound. He states that he has a podiatrist, Dr Laci Gama, who he used to see for his ulcer but hasn't seen him in over a year.  He Also reports that RLE swelling for the one year which he believes he had an ultrasound for that was WNL.      Contrary to triage, he denies chest pain and shortness of breath.  he states that he has had intermittent L shoulder pain with movement of the arm that lasts a few seconds since 1 year ago since he fell onto his shoulder.  He denies hx of DVT , PE, recent travel, and hormone use 64 y/o M with hx of HTN, DM, diabetic foot ulcer of R foot for "years" presents with worsening pain to R foot ulcer. He states that pain is worse when he walks and he also noticed some foul smelling drainage from the wound. He states that he has a podiatrist, Dr Laci Gama, who he used to see for his ulcer but hasn't seen him in over a year.  He Also reports that RLE swelling for the one year which he believes he had an ultrasound for that was WNL.    )  Contrary to triage, he denies chest pain and shortness of breath.  he states that he has had intermittent L shoulder pain with movement of the arm that lasts a few seconds since 1 year ago since he fell onto his shoulder.  He denies hx of DVT , PE, recent travel, and hormone use    Attendinyo male presents with ulcer on the bottom of his right foot. Pt has a podiatrist but has not seen him in over a year (Dr. Delgado).  no fever/chills.  has drainage from the wound.  also with increasing LE edema

## 2018-09-07 NOTE — H&P ADULT - PROBLEM SELECTOR PLAN 4
Patient w/ elevated Cr, unknown recent baseline, may reflect worsening of his renal function vs acute rise 2/2 infection, s/p 1 L IVNS in the ED, will repeat BMP in AM, f/u I/Os, bladder scan, outpt records re: recent Cr

## 2018-09-07 NOTE — H&P ADULT - NSHPLABSRESULTS_GEN_ALL_CORE
09-07    133<L>  |  100  |  57<H>  ----------------------------<  245<H>  4.4   |  17<L>  |  2.89<H>    Ca    10.1      07 Sep 2018 16:48    TPro  7.2  /  Alb  2.3<L>  /  TBili  0.2  /  DBili  x   /  AST  6   /  ALT  14  /  AlkPhos  91  09-07                            9.3    11.42 )-----------( 473      ( 07 Sep 2018 16:35 )             29.4             LIVER FUNCTIONS - ( 07 Sep 2018 16:48 )  Alb: 2.3 g/dL / Pro: 7.2 g/dL / ALK PHOS: 91 u/L / ALT: 14 u/L / AST: 6 u/L / GGT: x                     16:30 - VBG - pH: 7.37  | pCO2: 39    | pO2: 50    | Lactate: 1.1

## 2018-09-07 NOTE — ED PROVIDER NOTE - PROGRESS NOTE DETAILS
podiatry resident at bedside debriding wound . States that bound tracks to bone so he needs further imaging.  due to prior place in ankle, he is not a candidate for MRI and therefore will need a bone scan.  vanco/zosyn ordered Case discussed with Dr Laci Gama who would like patient admitted to Dr JESUS Duvall. Call placed to office of Dr Duvall

## 2018-09-07 NOTE — ED PROVIDER NOTE - SKIN, MLM
Skin normal color for race. 3.5 cm ulcer to plantar aspect of R foot which is foul smelling with + purulent drainage. + granulation tissue in center  with ? small area of echcar. sensation intact

## 2018-09-07 NOTE — H&P ADULT - NSHPPHYSICALEXAM_GEN_ALL_CORE
T(C): 36.7 (09-07-18 @ 22:20), Max: 37.2 (09-07-18 @ 14:34)  HR: 77 (09-07-18 @ 22:20) (73 - 80)  BP: 167/78 (09-07-18 @ 22:20) (142/65 - 167/78)  RR: 18 (09-07-18 @ 22:20) (16 - 18)  SpO2: 100% (09-07-18 @ 22:20) (97% - 100%)    Constitutional: NAD, well-developed, well-nourished  Ears, Nose, Mouth, and Throat: normal external ears and nose, normal hearing, moist oral mucosa  Eyes: normal conjunctiva, EOMI, PERRL  Neck: supple, no JVD  Respiratory: Clear to auscultation bilaterally. No wheezes, rales or rhonchi. Normal respiratory effort  Cardiovascular: RRR, no M/R/G, no edema, 2+ Peripheral Pulses  Gastrointestinal: soft, nontender, nondistended, +BS, no hernia  Skin: warm, dry, no rash  Neurologic: sensation grossly intact, CN grossly intact, non-focal exam  Musculoskeletal: +clubbing, no cyanosis, no joint swelling, R foot covered in gauze  Psychiatric: AOX3, normal mood, affect

## 2018-09-07 NOTE — ED ADULT NURSE NOTE - CHPI ED NUR SYMPTOMS NEG
no nausea/no decreased eating/drinking/no vomiting/no tingling/no chills/no dizziness/no pain/no weakness

## 2018-09-07 NOTE — H&P ADULT - NSHPREVIEWOFSYSTEMS_GEN_ALL_CORE
Constitutional Symptoms: No weakness, fevers, chills, weight loss  Eyes: No visual changes, headache, eye pain, double vision  Ears, Nose, Mouth, Throat: No runny nose, sinus pain, ear pain, tinnitus, sore throat, dysphagia, odynophagia  Cardiovascular: No chest pain, palpitations, edema  Respiratory: No cough, wheezing, hemoptysis, shortness of breath  Gastrointestinal: No abdominal pain, dysphagia, anorexia, nausea/vomiting, diarrhea/constipation, hematemesis, BRBPR, melaena  Genitourinary: No dysuria, frequency, hematuria  Musculoskeletal: No joint pain, joint swelling, decreased ROM  Skin: +R foot ulcer w/ drainage, no pruritus  Neurologic:  No seizures, headache, paraesthesiae, numbness, limb weakness  Psychiatric: No depression, anxiety, difficulty concentrating, anhedonia, lack of energy    Positives and pertinent negatives noted and all other systems negative.

## 2018-09-07 NOTE — H&P ADULT - PROBLEM SELECTOR PLAN 1
C/w IV Vanco/Zosyn, f/u WBC Bone scan to r/o OM (intolerant to MRI due to ankle plate), f/u cultures. f/u ANDRÉS/PVR to assess circulation

## 2018-09-07 NOTE — ED ADULT NURSE NOTE - OBJECTIVE STATEMENT
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 Pt brought to rm 2 with history of CKD (cyst on one kidney), HTN, DM, on diuretics. Pt states he has leg swelling and right foot ulcer for 1-2 years, and used to receive home health care. Pt states home health care ended about 6 months ago, however pt never followed up with primary regarding ulcers. Pt has podiatrist but states he has not seen podiatrist for over 1 year. Pt states he only address foot swelling with primary last week, and states he was prescribed water pill. Pt states he started feeling weak, frail, felt faint, had decreased bowel movements and urination and -(last BM was today), and wanted to come to ED to "make sure he did not have infection from foot ulcer". Pt also reports having intermittent chest pain that radiates to his left shoulder for about a year.   Pt currently denies chills, SOB, difficulty breathing, chest pain, N&V, chills, diaphoresis, cough, urinary frequency or urgency, numbness and tingling, or pain at this time.    Pt is rectally afebrile at this time.Pt has foul smelling diabetic ulcer on right foot ~3x3cm, and 0.5 cm deep. Ulcer has clear yelllow drainage. Pt states he does not remember when he last changed the dressing on his wound. Pt has bilateral lower leg +3 pitting edema. Right leg swelling > Left leg. Pulses are present in all extremities. Respirations are even and unlabored. Lung sounds are clear bilaterally.     22 G IV placed in R forearm. labs drawn and sent as ordered.

## 2018-09-07 NOTE — CONSULT NOTE ADULT - ASSESSMENT
70 M with right foot non-healing ulcer  -Pt was seen and evaluated  -wound is malodorous, probes to bone   -HBP, afebrile, WBC 11, , CRP 11  -high suspicion for OM clinically   -rec. admission for IV abx   -ordered WBC Bone scan to r/o OM (intolerant to MRI due to ankle plate)  -wound was debrided and cleaned with saline  -wound culture taken   -will dress the wound with dakin and DSD  -d/w attending 70 M with right foot non-healing ulcer  -Pt was seen and evaluated  -wound is malodorous, probes to bone   -HBP, afebrile, WBC 11, , CRP 11  -high suspicion for OM clinically   -rec. admission for IV abx   -ordered WBC Bone scan to r/o OM (intolerant to MRI due to ankle plate)  -wound was debrided and cleaned with saline  -wound culture taken   -ordered ANDRÉS/PVR to assess circulation   -will dress the wound with dakin and DSD  -d/w attending

## 2018-09-07 NOTE — ED ADULT NURSE NOTE - NSIMPLEMENTINTERV_GEN_ALL_ED
Implemented All Fall Risk Interventions:  [place] to call system. Call bell, personal items and telephone within reach. Instruct patient to call for assistance. Room bathroom lighting operational. Non-slip footwear when patient is off stretcher. Physically safe environment: no spills, clutter or unnecessary equipment. Stretcher in lowest position, wheels locked, appropriate side rails in place. Provide visual cue, wrist band, yellow gown, etc. Monitor gait and stability. Monitor for mental status changes and reorient to person, place, and time. Review medications for side effects contributing to fall risk. Reinforce activity limits and safety measures with patient and family.

## 2018-09-07 NOTE — H&P ADULT - HISTORY OF PRESENT ILLNESS
Patient is a 62 y/o M PMH HTN, HLD, CKD3, DM2 w/ R foot ulcer >3 years p/w worsening pain to R foot ulcer. Patient last hospitalized about >1 year ago for R foot ulcer. Reports that he has not had any follow up with podiatry since that time. Initially had wound services coming to his house, however, has not had anything done in the past year. Reports washing his foot with saline or water then covering with a gauze. Is not able to see his foot well because it's difficult for him to bend down. Came in today because pain became too severe. Reports pain is worse when he walks and has also noticed some foul smelling drainage from the wound.    Patient seen in the ED by podiatry. Reported that wound was malodorous, probed to bone w/ a high suspicion for OM clinically. S/p debridement by podiatry in the ED and wound culture was taken.      No family history pertinent to patient’s current condition/encounter

## 2018-09-08 DIAGNOSIS — E11.621 TYPE 2 DIABETES MELLITUS WITH FOOT ULCER: ICD-10-CM

## 2018-09-08 DIAGNOSIS — I10 ESSENTIAL (PRIMARY) HYPERTENSION: ICD-10-CM

## 2018-09-08 DIAGNOSIS — E11.9 TYPE 2 DIABETES MELLITUS WITHOUT COMPLICATIONS: ICD-10-CM

## 2018-09-08 DIAGNOSIS — N18.3 CHRONIC KIDNEY DISEASE, STAGE 3 (MODERATE): ICD-10-CM

## 2018-09-08 LAB
ALBUMIN SERPL ELPH-MCNC: 2 G/DL — LOW (ref 3.3–5)
ALP SERPL-CCNC: 70 U/L — SIGNIFICANT CHANGE UP (ref 40–120)
ALT FLD-CCNC: 14 U/L — SIGNIFICANT CHANGE UP (ref 4–41)
AST SERPL-CCNC: 12 U/L — SIGNIFICANT CHANGE UP (ref 4–40)
BASOPHILS # BLD AUTO: 0.03 K/UL — SIGNIFICANT CHANGE UP (ref 0–0.2)
BASOPHILS NFR BLD AUTO: 0.3 % — SIGNIFICANT CHANGE UP (ref 0–2)
BILIRUB SERPL-MCNC: < 0.2 MG/DL — LOW (ref 0.2–1.2)
BUN SERPL-MCNC: 48 MG/DL — HIGH (ref 7–23)
CALCIUM SERPL-MCNC: 9.4 MG/DL — SIGNIFICANT CHANGE UP (ref 8.4–10.5)
CHLORIDE SERPL-SCNC: 101 MMOL/L — SIGNIFICANT CHANGE UP (ref 98–107)
CO2 SERPL-SCNC: 20 MMOL/L — LOW (ref 22–31)
CREAT SERPL-MCNC: 2.86 MG/DL — HIGH (ref 0.5–1.3)
EOSINOPHIL # BLD AUTO: 0.17 K/UL — SIGNIFICANT CHANGE UP (ref 0–0.5)
EOSINOPHIL NFR BLD AUTO: 1.7 % — SIGNIFICANT CHANGE UP (ref 0–6)
GLUCOSE SERPL-MCNC: 143 MG/DL — HIGH (ref 70–99)
GRAM STN SPEC: SIGNIFICANT CHANGE UP
HBA1C BLD-MCNC: 6.5 % — HIGH (ref 4–5.6)
HCT VFR BLD CALC: 26.2 % — LOW (ref 39–50)
HGB BLD-MCNC: 8.3 G/DL — LOW (ref 13–17)
IMM GRANULOCYTES # BLD AUTO: 0.07 # — SIGNIFICANT CHANGE UP
IMM GRANULOCYTES NFR BLD AUTO: 0.7 % — SIGNIFICANT CHANGE UP (ref 0–1.5)
LYMPHOCYTES # BLD AUTO: 1.62 K/UL — SIGNIFICANT CHANGE UP (ref 1–3.3)
LYMPHOCYTES # BLD AUTO: 16.6 % — SIGNIFICANT CHANGE UP (ref 13–44)
MAGNESIUM SERPL-MCNC: 1.6 MG/DL — SIGNIFICANT CHANGE UP (ref 1.6–2.6)
MCHC RBC-ENTMCNC: 28.7 PG — SIGNIFICANT CHANGE UP (ref 27–34)
MCHC RBC-ENTMCNC: 31.7 % — LOW (ref 32–36)
MCV RBC AUTO: 90.7 FL — SIGNIFICANT CHANGE UP (ref 80–100)
MONOCYTES # BLD AUTO: 0.8 K/UL — SIGNIFICANT CHANGE UP (ref 0–0.9)
MONOCYTES NFR BLD AUTO: 8.2 % — SIGNIFICANT CHANGE UP (ref 2–14)
NEUTROPHILS # BLD AUTO: 7.07 K/UL — SIGNIFICANT CHANGE UP (ref 1.8–7.4)
NEUTROPHILS NFR BLD AUTO: 72.5 % — SIGNIFICANT CHANGE UP (ref 43–77)
NRBC # FLD: 0 — SIGNIFICANT CHANGE UP
PHOSPHATE SERPL-MCNC: 3.8 MG/DL — SIGNIFICANT CHANGE UP (ref 2.5–4.5)
PLATELET # BLD AUTO: 490 K/UL — HIGH (ref 150–400)
PMV BLD: 10.6 FL — SIGNIFICANT CHANGE UP (ref 7–13)
POTASSIUM SERPL-MCNC: 3.6 MMOL/L — SIGNIFICANT CHANGE UP (ref 3.5–5.3)
POTASSIUM SERPL-SCNC: 3.6 MMOL/L — SIGNIFICANT CHANGE UP (ref 3.5–5.3)
PROT SERPL-MCNC: 6.4 G/DL — SIGNIFICANT CHANGE UP (ref 6–8.3)
RBC # BLD: 2.89 M/UL — LOW (ref 4.2–5.8)
RBC # FLD: 13 % — SIGNIFICANT CHANGE UP (ref 10.3–14.5)
SODIUM SERPL-SCNC: 138 MMOL/L — SIGNIFICANT CHANGE UP (ref 135–145)
SPECIMEN SOURCE: SIGNIFICANT CHANGE UP
VANCOMYCIN FLD-MCNC: 9.7 UG/ML — SIGNIFICANT CHANGE UP
WBC # BLD: 9.76 K/UL — SIGNIFICANT CHANGE UP (ref 3.8–10.5)
WBC # FLD AUTO: 9.76 K/UL — SIGNIFICANT CHANGE UP (ref 3.8–10.5)

## 2018-09-08 RX ORDER — ASPIRIN/CALCIUM CARB/MAGNESIUM 324 MG
81 TABLET ORAL DAILY
Qty: 0 | Refills: 0 | Status: DISCONTINUED | OUTPATIENT
Start: 2018-09-08 | End: 2018-09-18

## 2018-09-08 RX ORDER — TRAMADOL HYDROCHLORIDE 50 MG/1
25 TABLET ORAL ONCE
Qty: 0 | Refills: 0 | Status: DISCONTINUED | OUTPATIENT
Start: 2018-09-08 | End: 2018-09-08

## 2018-09-08 RX ORDER — SODIUM BICARBONATE 1 MEQ/ML
650 SYRINGE (ML) INTRAVENOUS THREE TIMES A DAY
Qty: 0 | Refills: 0 | Status: DISCONTINUED | OUTPATIENT
Start: 2018-09-08 | End: 2018-09-18

## 2018-09-08 RX ORDER — CALCITRIOL 0.5 UG/1
0.25 CAPSULE ORAL DAILY
Qty: 0 | Refills: 0 | Status: DISCONTINUED | OUTPATIENT
Start: 2018-09-08 | End: 2018-09-12

## 2018-09-08 RX ORDER — HYDRALAZINE HCL 50 MG
100 TABLET ORAL THREE TIMES A DAY
Qty: 0 | Refills: 0 | Status: DISCONTINUED | OUTPATIENT
Start: 2018-09-08 | End: 2018-09-18

## 2018-09-08 RX ORDER — VANCOMYCIN HCL 1 G
1000 VIAL (EA) INTRAVENOUS ONCE
Qty: 0 | Refills: 0 | Status: COMPLETED | OUTPATIENT
Start: 2018-09-08 | End: 2018-09-08

## 2018-09-08 RX ORDER — AMLODIPINE BESYLATE 2.5 MG/1
10 TABLET ORAL DAILY
Qty: 0 | Refills: 0 | Status: DISCONTINUED | OUTPATIENT
Start: 2018-09-08 | End: 2018-09-18

## 2018-09-08 RX ORDER — ATORVASTATIN CALCIUM 80 MG/1
40 TABLET, FILM COATED ORAL AT BEDTIME
Qty: 0 | Refills: 0 | Status: DISCONTINUED | OUTPATIENT
Start: 2018-09-08 | End: 2018-09-18

## 2018-09-08 RX ORDER — METOPROLOL TARTRATE 50 MG
100 TABLET ORAL DAILY
Qty: 0 | Refills: 0 | Status: DISCONTINUED | OUTPATIENT
Start: 2018-09-08 | End: 2018-09-18

## 2018-09-08 RX ORDER — ACETAMINOPHEN 500 MG
650 TABLET ORAL EVERY 6 HOURS
Qty: 0 | Refills: 0 | Status: DISCONTINUED | OUTPATIENT
Start: 2018-09-08 | End: 2018-09-18

## 2018-09-08 RX ORDER — FOLIC ACID 0.8 MG
1 TABLET ORAL DAILY
Qty: 0 | Refills: 0 | Status: DISCONTINUED | OUTPATIENT
Start: 2018-09-08 | End: 2018-09-18

## 2018-09-08 RX ADMIN — Medication 100 MILLIGRAM(S): at 21:51

## 2018-09-08 RX ADMIN — HEPARIN SODIUM 5000 UNIT(S): 5000 INJECTION INTRAVENOUS; SUBCUTANEOUS at 06:33

## 2018-09-08 RX ADMIN — Medication 2: at 13:31

## 2018-09-08 RX ADMIN — PIPERACILLIN AND TAZOBACTAM 25 GRAM(S): 4; .5 INJECTION, POWDER, LYOPHILIZED, FOR SOLUTION INTRAVENOUS at 13:20

## 2018-09-08 RX ADMIN — HEPARIN SODIUM 5000 UNIT(S): 5000 INJECTION INTRAVENOUS; SUBCUTANEOUS at 13:20

## 2018-09-08 RX ADMIN — AMLODIPINE BESYLATE 10 MILLIGRAM(S): 2.5 TABLET ORAL at 06:26

## 2018-09-08 RX ADMIN — Medication 100 MILLIGRAM(S): at 06:26

## 2018-09-08 RX ADMIN — Medication 650 MILLIGRAM(S): at 14:06

## 2018-09-08 RX ADMIN — Medication 650 MILLIGRAM(S): at 03:21

## 2018-09-08 RX ADMIN — PIPERACILLIN AND TAZOBACTAM 25 GRAM(S): 4; .5 INJECTION, POWDER, LYOPHILIZED, FOR SOLUTION INTRAVENOUS at 03:10

## 2018-09-08 RX ADMIN — ATORVASTATIN CALCIUM 40 MILLIGRAM(S): 80 TABLET, FILM COATED ORAL at 21:51

## 2018-09-08 RX ADMIN — CALCITRIOL 0.25 MICROGRAM(S): 0.5 CAPSULE ORAL at 14:06

## 2018-09-08 RX ADMIN — Medication 100 MILLIGRAM(S): at 13:20

## 2018-09-08 RX ADMIN — Medication 250 MILLIGRAM(S): at 19:05

## 2018-09-08 RX ADMIN — TRAMADOL HYDROCHLORIDE 25 MILLIGRAM(S): 50 TABLET ORAL at 06:42

## 2018-09-08 RX ADMIN — Medication 81 MILLIGRAM(S): at 13:20

## 2018-09-08 RX ADMIN — HEPARIN SODIUM 5000 UNIT(S): 5000 INJECTION INTRAVENOUS; SUBCUTANEOUS at 21:51

## 2018-09-08 RX ADMIN — Medication 650 MILLIGRAM(S): at 04:20

## 2018-09-08 RX ADMIN — Medication 650 MILLIGRAM(S): at 21:51

## 2018-09-08 RX ADMIN — PIPERACILLIN AND TAZOBACTAM 25 GRAM(S): 4; .5 INJECTION, POWDER, LYOPHILIZED, FOR SOLUTION INTRAVENOUS at 21:40

## 2018-09-08 RX ADMIN — Medication 650 MILLIGRAM(S): at 06:26

## 2018-09-08 RX ADMIN — Medication 1 MILLIGRAM(S): at 13:20

## 2018-09-08 RX ADMIN — TRAMADOL HYDROCHLORIDE 25 MILLIGRAM(S): 50 TABLET ORAL at 07:42

## 2018-09-08 NOTE — CONSULT NOTE ADULT - ASSESSMENT
EKG - NSR     A/P     1) LE swelling - could be venous stasis likely, will get 2d echo to access LV     2) Right foot wound - podiatry and ID on case cont zosyn and vanco f/u nuclear scan     3) HTN - cont hydralazine toprol and amlodipine

## 2018-09-08 NOTE — CONSULT NOTE ADULT - SUBJECTIVE AND OBJECTIVE BOX
Keaton Duvall MD  Interventional Cardiology / Advance Heart Failure and Cardiac Transplant Specialist  [place] Office : [address] [place] [address] [place] [address]  Tel:   [place] Office : [address] [facility] [place] [address]  Tel: [phone]  Cell : [phone]    HISTORY OF PRESENT ILLNESS:  Patient is a 64 y/o M PMH HTN, HLD, CKD3, DM2 w/ R foot ulcer >3 years p/w worsening pain to R foot ulcer. Patient last hospitalized about >1 year ago for R foot ulcer. Reports that he has not had any follow up with podiatry since that time. Initially had wound services coming to his house, however, has not had anything done in the past year. Reports washing his foot with saline or water then covering with a gauze. Is not able to see his foot well because it's difficult for him to bend down. Came in today because pain became too severe. Reports pain is worse when he walks and has also noticed some foul smelling drainage from the wound.    Patient seen in the ED by podiatry. Reported that wound was malodorous, probed to bone w/ a high suspicion for OM clinically. S/p debridement by podiatry in the ED and wound culture was taken.    Pt denies any cardiac history in the past no cp or SOB on exertion, is non compliant with meds and follow up as per out pt PMD Dr Gama    PAST MEDICAL & SURGICAL HISTORY:  HTN (hypertension)  DM (diabetes mellitus)  Ankle fracture: ORIF    	    MEDICATIONS:  amLODIPine   Tablet 10 milliGRAM(s) Oral daily  aspirin enteric coated 81 milliGRAM(s) Oral daily  heparin  Injectable 5000 Unit(s) SubCutaneous every 8 hours  hydrALAZINE 100 milliGRAM(s) Oral three times a day  metoprolol succinate  milliGRAM(s) Oral daily    piperacillin/tazobactam IVPB. 3.375 Gram(s) IV Intermittent every 8 hours  vancomycin  IVPB 1000 milliGRAM(s) IV Intermittent once      acetaminophen   Tablet .. 650 milliGRAM(s) Oral every 6 hours PRN      atorvastatin 40 milliGRAM(s) Oral at bedtime  insulin lispro (HumaLOG) corrective regimen sliding scale   SubCutaneous three times a day before meals  insulin lispro (HumaLOG) corrective regimen sliding scale   SubCutaneous at bedtime    calcitriol   Capsule 0.25 MICROGram(s) Oral daily  Dakins Solution - 1/4 Strength 1 Application(s) Topical Once  folic acid 1 milliGRAM(s) Oral daily  influenza   Vaccine 0.5 milliLiter(s) IntraMuscular once  sodium bicarbonate 650 milliGRAM(s) Oral three times a day      FAMILY HISTORY:  No pertinent family history in first degree relatives        Allergies    No Known Allergies    Intolerances    	      PHYSICAL EXAM:  T(C): 36.6 (09-08-18 @ 13:18), Max: 37 (09-07-18 @ 21:35)  HR: 75 (09-08-18 @ 13:18) (73 - 78)  BP: 140/75 (09-08-18 @ 13:18) (140/75 - 167/78)  RR: 16 (09-08-18 @ 13:18) (16 - 18)  SpO2: 100% (09-08-18 @ 13:18) (98% - 100%)  Wt(kg): --  I&O's Summary      Appearance: Normal	  HEENT:   Normal oral mucosa, PERRL, EOMI	  Cardiovascular: Normal S1 S2, No JVD, No murmurs, No edema  Respiratory: Lungs clear to auscultation	  Gastrointestinal:  Soft, Non-tender, + BS	  Extremities: right leg 2+ edema, right foot wound covered, left leg varicose veins    LABS:	 	    CARDIAC MARKERS:                                  8.3    9.76  )-----------( 490      ( 08 Sep 2018 05:20 )             26.2     09-08    138  |  101  |  48<H>  ----------------------------<  143<H>  3.6   |  20<L>  |  2.86<H>    Ca    9.4      08 Sep 2018 05:20  Phos  3.8     09-08  Mg     1.6     09-08    TPro  6.4  /  Alb  2.0<L>  /  TBili  < 0.2<L>  /  DBili  x   /  AST  12  /  ALT  14  /  AlkPhos  70  09-08    proBNP: Serum Pro-Brain Natriuretic Peptide: 2166 pg/mL (09-07 @ 16:48)    Lipid Profile:   HgA1c: Hemoglobin A1C, Whole Blood: 6.5 % (09-08 @ 05:20)    TSH:     ASSESSMENT/PLAN:

## 2018-09-08 NOTE — CONSULT NOTE ADULT - ASSESSMENT
Patient is a 62 y/o M PMH HTN, HLD, CKD3, DM2 w/ R foot ulcer >3 years p/w worsening pain to R foot ulcer.  Wound was malodorous, probed to bone w/ a high suspicion for OM clinically. S/p debridement by podiatry in the ED and wound culture was taken.    Problem/Plan - 1:    ·	Rt foot plantar OM    - (+) probe to bone with malodorous drainage.  Pt with clinical OM and soft tissue infection    - Agree with broad spectrum abx with vanco (dosed by level) and zosyn.  Renally dose abx    - f/u wound cultures, although do not always correlate well with bone cultures (unless MSSA or MRSA).  Recommend bone cultures as well.      - f/u indium scan    - Podiatry f/u appreciated.    - Inflammatory markers elevated.  Cont to trend    - Blood cultures    Will follow,    Salima Spivey  [phone]

## 2018-09-08 NOTE — PROGRESS NOTE ADULT - SUBJECTIVE AND OBJECTIVE BOX
Podiatry pager #: [phone]    Patient is a 63y old  Male who presents with a chief complaint of R foot wound (07 Sep 2018 22:33)       INTERVAL HPI/OVERNIGHT EVENTS:  Patient seen and evaluated at bedside.  Pt is resting comfortable in NAD. Denies N/V/F/C.  Pt complains of right foot pain.     Allergies    No Known Allergies    Intolerances        Vital Signs Last 24 Hrs  T(C): 36.8 (08 Sep 2018 05:20), Max: 37.2 (07 Sep 2018 14:34)  T(F): 98.3 (08 Sep 2018 05:20), Max: 98.9 (07 Sep 2018 14:34)  HR: 78 (08 Sep 2018 05:20) (73 - 80)  BP: 145/97 (08 Sep 2018 05:20) (142/65 - 167/78)  BP(mean): --  RR: 18 (08 Sep 2018 05:20) (16 - 18)  SpO2: 98% (08 Sep 2018 05:20) (97% - 100%)    LABS:                        9.3    11.42 )-----------( 473      ( 07 Sep 2018 16:35 )             29.4     09-08    138  |  101  |  48<H>  ----------------------------<  143<H>  3.6   |  20<L>  |  2.86<H>    Ca    9.4      08 Sep 2018 05:20  Phos  3.8     09-08  Mg     1.6     09-08    TPro  6.4  /  Alb  2.0<L>  /  TBili  < 0.2<L>  /  DBili  x   /  AST  12  /  ALT  14  /  AlkPhos  70  09-08        CAPILLARY BLOOD GLUCOSE      POCT Blood Glucose.: 136 mg/dL (08 Sep 2018 09:08)  POCT Blood Glucose.: 224 mg/dL (07 Sep 2018 14:41)      Lower Extremity Physical Exam:  ?Vasular: DP/PT 0/4, B/L, CFT < 5 seconds B/L, Temperature gradient WARMER ON THE RIGHT.   Neuro: Epicritic sensation diminisehd_ to the level of _digits, B/L.  Musculoskeletal/Ortho: charcot foot to the right   Skin: edema to the right LE, 3+  Wound #1: right foot planar medial wound   Size: 3x2.8cm  Depth: to bone  Wound bed: fibrogranular  Drainage: serous   Odor: mal odor   Periwound: hyperkaratotic  Etiology: Charcot     RADIOLOGY & ADDITIONAL TESTS:  < from: Xray Foot AP + Lateral + Oblique, Right (09.07.18 @ 17:17) >    EXAM:  RAD FOOT MIN 3 VIEWS RIGHT        PROCEDURE DATE:  Sep  7 2018         INTERPRETATION:      CLINICAL INDICATION: Ulceration in the plantar surface of the right foot.    TECHNIQUE: 3 views of the right foot.    COMPARISON: Right foot x-ray 5/16/2017.    IMPRESSION:    Soft tissue defect in the plantar aspect of the right foot consistent   with history of ulcer. No fistula tract or subcutaneous gas.     Heterotopic ossification in the tibial fibular syndesmotic space limits   evaluation for osteomyelitis in this area.     Old fracture of the second metatarsal is unchanged. No acute fracture. No   dislocation.  Soft tissue swelling. Plantar calcaneal enthesophyte.   Partial visualization of orthopedic hardware at the distal fibula.    If continued suspicion for osteomyelitis, bone scan or MRI is needed.               GASTON DOWNEY M.D., RADIOLOGY RESIDENT  This document has been electronically signed.  MANNY ANDERSON M.D., ATTENDING RADIOLOGIST  This document has been electronically signed. Sep  8 2018  9:27AM              < end of copied text >

## 2018-09-08 NOTE — CONSULT NOTE ADULT - SUBJECTIVE AND OBJECTIVE BOX
Patient is a 63y old  Male who presents with a chief complaint of R foot wound (08 Sep 2018 10:21)      HPI:  Patient is a 64 y/o M PMH HTN, HLD, CKD3, DM2 w/ R foot ulcer >3 years p/w worsening pain to R foot ulcer. Patient last hospitalized about >1 year ago for R foot ulcer. Reports that he has not had any follow up with podiatry since that time. Initially had wound services coming to his house, however, has not had anything done in the past year. Reports washing his foot with saline or water then covering with a gauze. Is not able to see his foot well because it's difficult for him to bend down. Came in today because pain became too severe. Reports pain is worse when he walks and has also noticed some foul smelling drainage from the wound.    Patient seen in the ED by podiatry. Reported that wound was malodorous, probed to bone w/ a high suspicion for OM clinically. S/p debridement by podiatry in the ED and wound culture was taken.      No family history pertinent to patient’s current condition/encounter (07 Sep 2018 22:33)      REVIEW OF SYSTEMS:    CONSTITUTIONAL: No fever, weight loss, or fatigue  EYES: No eye pain, visual disturbances, or discharge  ENMT:  No sore throat  NECK: No pain or stiffness  RESPIRATORY: No cough, wheezing, chills or hemoptysis; No shortness of breath  CARDIOVASCULAR: No chest pain, palpitations, dizziness, or leg swelling  GASTROINTESTINAL: No abdominal or epigastric pain. No nausea, vomiting, or hematemesis; No diarrhea or constipation. No melena or hematochezia.  GENITOURINARY: No dysuria, frequency, hematuria, or incontinence  NEUROLOGICAL: No headaches, memory loss, loss of strength, numbness, or tremors  SKIN: No itching, burning, rashes, or lesions   LYMPH NODES: No enlarged glands  MUSCULOSKELETAL: No joint pain or swelling; No muscle, back, or extremity pain      PAST MEDICAL & SURGICAL HISTORY:  HTN (hypertension)  DM (diabetes mellitus)  Ankle fracture: ORIF      Allergies    No Known Allergies    Intolerances        FAMILY HISTORY:  No pertinent family history in first degree relatives      SOCIAL HISTORY:        MEDICATIONS  (STANDING):  amLODIPine   Tablet 10 milliGRAM(s) Oral daily  aspirin enteric coated 81 milliGRAM(s) Oral daily  atorvastatin 40 milliGRAM(s) Oral at bedtime  calcitriol   Capsule 0.25 MICROGram(s) Oral daily  Dakins Solution - 1/4 Strength 1 Application(s) Topical Once  folic acid 1 milliGRAM(s) Oral daily  heparin  Injectable 5000 Unit(s) SubCutaneous every 8 hours  hydrALAZINE 100 milliGRAM(s) Oral three times a day  influenza   Vaccine 0.5 milliLiter(s) IntraMuscular once  insulin lispro (HumaLOG) corrective regimen sliding scale   SubCutaneous three times a day before meals  insulin lispro (HumaLOG) corrective regimen sliding scale   SubCutaneous at bedtime  metoprolol succinate  milliGRAM(s) Oral daily  piperacillin/tazobactam IVPB. 3.375 Gram(s) IV Intermittent every 8 hours  sodium bicarbonate 650 milliGRAM(s) Oral three times a day    MEDICATIONS  (PRN):  acetaminophen   Tablet .. 650 milliGRAM(s) Oral every 6 hours PRN Moderate Pain (4 - 6), Severe Pain (7 - 10)      Vital Signs Last 24 Hrs  T(C): 36.6 (08 Sep 2018 13:18), Max: 37 (07 Sep 2018 21:35)  T(F): 97.8 (08 Sep 2018 13:18), Max: 98.6 (07 Sep 2018 21:35)  HR: 75 (08 Sep 2018 13:18) (73 - 78)  BP: 140/75 (08 Sep 2018 13:18) (140/75 - 167/78)  BP(mean): --  RR: 16 (08 Sep 2018 13:18) (16 - 18)  SpO2: 100% (08 Sep 2018 13:18) (98% - 100%)    PHYSICAL EXAM:    GENERAL: NAD, well-groomed  HEAD:  Atraumatic, Normocephalic  EYES: EOMI, PERRLA, conjunctiva and sclera clear  ENMT: No tonsillar erythema, exudates, or enlargement; Moist mucous membranes  NECK: Supple, No JVD  CHEST/LUNG: Clear to percussion bilaterally; No rales, rhonchi, wheezing, or rubs  HEART: Regular rate and rhythm; No murmurs, rubs, or gallops  ABDOMEN: Soft, Nontender, Nondistended; Bowel sounds present  EXTREMITIES:  2+ Peripheral Pulses, No clubbing, cyanosis, or edema  LYMPH: No lymphadenopathy noted  SKIN: No rashes or lesions    LABS:  CBC Full  -  ( 08 Sep 2018 05:20 )  WBC Count : 9.76 K/uL  Hemoglobin : 8.3 g/dL  Hematocrit : 26.2 %  Platelet Count - Automated : 490 K/uL  Mean Cell Volume : 90.7 fL  Mean Cell Hemoglobin : 28.7 pg  Mean Cell Hemoglobin Concentration : 31.7 %  Auto Neutrophil # : 7.07 K/uL  Auto Lymphocyte # : 1.62 K/uL  Auto Monocyte # : 0.80 K/uL  Auto Eosinophil # : 0.17 K/uL  Auto Basophil # : 0.03 K/uL  Auto Neutrophil % : 72.5 %  Auto Lymphocyte % : 16.6 %  Auto Monocyte % : 8.2 %  Auto Eosinophil % : 1.7 %  Auto Basophil % : 0.3 %      09-08    138  |  101  |  48<H>  ----------------------------<  143<H>  3.6   |  20<L>  |  2.86<H>    Ca    9.4      08 Sep 2018 05:20  Phos  3.8     09-08  Mg     1.6     09-08    TPro  6.4  /  Alb  2.0<L>  /  TBili  < 0.2<L>  /  DBili  x   /  AST  12  /  ALT  14  /  AlkPhos  70  09-08      LIVER FUNCTIONS - ( 08 Sep 2018 05:20 )  Alb: 2.0 g/dL / Pro: 6.4 g/dL / ALK PHOS: 70 u/L / ALT: 14 u/L / AST: 12 u/L / GGT: x                               MICROBIOLOGY:      Culture - Abscess with Gram Stain (09.07.18 @ 19:53)    Specimen Source: OTHER    Gram Stain Result:   GPCPR^Gram Pos Cocci in Pairs  QUANTITY OF BACTERIA SEEN: RARE (1+)  GNR^Gram Neg Rods  QUANTITY OF BACTERIA SEEN: RARE (1+)  WBC^White Blood Cells  QNTY CELLS IN GRAM STAIN: NO CELLS SEEN    Culture Results:   CULTURE IN PROGRESS, FURTHER REPORT TO FOLLOW.                  RADIOLOGY:      < from: US Duplex Venous Lower Ext Ltd, Right (09.07.18 @ 17:51) >  FINDINGS:    There is normal compressibility of the right common femoral, femoral and   popliteal veins. Right peroneal veins were not visualized. Posterior   tibial veins are patent.  Subcutaneous edema in the right lower extremity.  The contralateral common femoral vein is patent.    Doppler examination shows normal spontaneous and phasic flow.    IMPRESSION:     No evidence of deep venous thrombosis in the visualized right lower   extremity.    < end of copied text >          < from: Xray Chest 1 View AP/PA (09.07.18 @ 17:17) >    FINDINGS:     The lungs are clear.  Heart size cannot be accurately assessed on this projection.  Visualized osseous structures demonstrate no acute pathology.    IMPRESSION:     Clear lungs.    < end of copied text >          < from: Xray Foot AP + Lateral + Oblique, Right (09.07.18 @ 17:17) >    IMPRESSION:    Soft tissue defect in the plantar aspect of the right foot consistent   with history of ulcer. No fistula tract or subcutaneous gas.     Heterotopic ossification in the tibial fibular syndesmotic space limits   evaluation for osteomyelitis in this area.     Old fracture of the second metatarsal is unchanged. No acute fracture. No   dislocation.  Soft tissue swelling. Plantar calcaneal enthesophyte.   Partial visualization of orthopedic hardware at the distal fibula.    If continued suspicion for osteomyelitis, bone scan or MRI is needed.     < end of copied text > Patient is a 63y old  Male who presents with a chief complaint of R foot wound (08 Sep 2018 10:21)      HPI:  Patient is a 64 y/o M PMH HTN, HLD, CKD3, DM2 w/ R foot ulcer >3 years p/w worsening pain to R foot ulcer. Patient last hospitalized about >1 year ago for R foot ulcer. Reports that he has not had any follow up with podiatry since that time. Initially had wound services coming to his house, however, has not had anything done in the past year. Reports washing his foot with saline or water then covering with a gauze. Is not able to see his foot well because it's difficult for him to bend down. Came in today because pain became too severe. Reports pain is worse when he walks and has also noticed some foul smelling drainage from the wound.    Patient seen in the ED by podiatry. Reported that wound was malodorous, probed to bone w/ a high suspicion for OM clinically. S/p debridement by podiatry in the ED and wound culture was taken.      No family history pertinent to patient’s current condition/encounter (07 Sep 2018 22:33)      REVIEW OF SYSTEMS:    CONSTITUTIONAL: No fever, weight loss, or fatigue  EYES: No eye pain, visual disturbances, or discharge  ENMT:  No sore throat  NECK: No pain or stiffness  RESPIRATORY: No cough, wheezing, chills or hemoptysis; No shortness of breath  CARDIOVASCULAR: No chest pain, palpitations, dizziness, or leg swelling  GASTROINTESTINAL: No abdominal or epigastric pain. No nausea, vomiting, or hematemesis; No diarrhea or constipation. No melena or hematochezia.  GENITOURINARY: No dysuria, frequency, hematuria, or incontinence  NEUROLOGICAL: No headaches, memory loss, loss of strength, numbness, or tremors  SKIN: No itching, burning, rashes, or lesions   LYMPH NODES: No enlarged glands  MUSCULOSKELETAL: No joint pain or swelling; No muscle, back, or extremity pain      PAST MEDICAL & SURGICAL HISTORY:  HTN (hypertension)  DM (diabetes mellitus)  Ankle fracture: ORIF      Allergies    No Known Allergies    Intolerances        FAMILY HISTORY:  No pertinent family history in first degree relatives      SOCIAL HISTORY:    Former smoker, quit >20 years ago. No drugs/ETOH  Works at [employer] as     MEDICATIONS  (STANDING):  amLODIPine   Tablet 10 milliGRAM(s) Oral daily  aspirin enteric coated 81 milliGRAM(s) Oral daily  atorvastatin 40 milliGRAM(s) Oral at bedtime  calcitriol   Capsule 0.25 MICROGram(s) Oral daily  Dakins Solution - 1/4 Strength 1 Application(s) Topical Once  folic acid 1 milliGRAM(s) Oral daily  heparin  Injectable 5000 Unit(s) SubCutaneous every 8 hours  hydrALAZINE 100 milliGRAM(s) Oral three times a day  influenza   Vaccine 0.5 milliLiter(s) IntraMuscular once  insulin lispro (HumaLOG) corrective regimen sliding scale   SubCutaneous three times a day before meals  insulin lispro (HumaLOG) corrective regimen sliding scale   SubCutaneous at bedtime  metoprolol succinate  milliGRAM(s) Oral daily  piperacillin/tazobactam IVPB. 3.375 Gram(s) IV Intermittent every 8 hours  sodium bicarbonate 650 milliGRAM(s) Oral three times a day    MEDICATIONS  (PRN):  acetaminophen   Tablet .. 650 milliGRAM(s) Oral every 6 hours PRN Moderate Pain (4 - 6), Severe Pain (7 - 10)      Vital Signs Last 24 Hrs  T(C): 36.6 (08 Sep 2018 13:18), Max: 37 (07 Sep 2018 21:35)  T(F): 97.8 (08 Sep 2018 13:18), Max: 98.6 (07 Sep 2018 21:35)  HR: 75 (08 Sep 2018 13:18) (73 - 78)  BP: 140/75 (08 Sep 2018 13:18) (140/75 - 167/78)  BP(mean): --  RR: 16 (08 Sep 2018 13:18) (16 - 18)  SpO2: 100% (08 Sep 2018 13:18) (98% - 100%)    PHYSICAL EXAM:    GENERAL: NAD, well-groomed  HEAD:  Atraumatic, Normocephalic  EYES: EOMI, PERRLA, conjunctiva and sclera clear  ENMT: No tonsillar erythema, exudates, or enlargement; Moist mucous membranes  NECK: Supple, No JVD  CHEST/LUNG: Clear to percussion bilaterally; No rales, rhonchi, wheezing, or rubs  HEART: Regular rate and rhythm; No murmurs, rubs, or gallops  ABDOMEN: Soft, Nontender, Nondistended; Bowel sounds present  EXTREMITIES:  2+ Peripheral Pulses, No clubbing, cyanosis, or edema  LYMPH: No lymphadenopathy noted  SKIN: No rashes or lesions    LABS:  CBC Full  -  ( 08 Sep 2018 05:20 )  WBC Count : 9.76 K/uL  Hemoglobin : 8.3 g/dL  Hematocrit : 26.2 %  Platelet Count - Automated : 490 K/uL  Mean Cell Volume : 90.7 fL  Mean Cell Hemoglobin : 28.7 pg  Mean Cell Hemoglobin Concentration : 31.7 %  Auto Neutrophil # : 7.07 K/uL  Auto Lymphocyte # : 1.62 K/uL  Auto Monocyte # : 0.80 K/uL  Auto Eosinophil # : 0.17 K/uL  Auto Basophil # : 0.03 K/uL  Auto Neutrophil % : 72.5 %  Auto Lymphocyte % : 16.6 %  Auto Monocyte % : 8.2 %  Auto Eosinophil % : 1.7 %  Auto Basophil % : 0.3 %      09-08    138  |  101  |  48<H>  ----------------------------<  143<H>  3.6   |  20<L>  |  2.86<H>    Ca    9.4      08 Sep 2018 05:20  Phos  3.8     09-08  Mg     1.6     09-08    TPro  6.4  /  Alb  2.0<L>  /  TBili  < 0.2<L>  /  DBili  x   /  AST  12  /  ALT  14  /  AlkPhos  70  09-08      LIVER FUNCTIONS - ( 08 Sep 2018 05:20 )  Alb: 2.0 g/dL / Pro: 6.4 g/dL / ALK PHOS: 70 u/L / ALT: 14 u/L / AST: 12 u/L / GGT: x                               MICROBIOLOGY:      Culture - Abscess with Gram Stain (09.07.18 @ 19:53)    Specimen Source: OTHER    Gram Stain Result:   GPCPR^Gram Pos Cocci in Pairs  QUANTITY OF BACTERIA SEEN: RARE (1+)  GNR^Gram Neg Rods  QUANTITY OF BACTERIA SEEN: RARE (1+)  WBC^White Blood Cells  QNTY CELLS IN GRAM STAIN: NO CELLS SEEN    Culture Results:   CULTURE IN PROGRESS, FURTHER REPORT TO FOLLOW.                  RADIOLOGY:      < from: US Duplex Venous Lower Ext Ltd, Right (09.07.18 @ 17:51) >  FINDINGS:    There is normal compressibility of the right common femoral, femoral and   popliteal veins. Right peroneal veins were not visualized. Posterior   tibial veins are patent.  Subcutaneous edema in the right lower extremity.  The contralateral common femoral vein is patent.    Doppler examination shows normal spontaneous and phasic flow.    IMPRESSION:     No evidence of deep venous thrombosis in the visualized right lower   extremity.    < end of copied text >          < from: Xray Chest 1 View AP/PA (09.07.18 @ 17:17) >    FINDINGS:     The lungs are clear.  Heart size cannot be accurately assessed on this projection.  Visualized osseous structures demonstrate no acute pathology.    IMPRESSION:     Clear lungs.    < end of copied text >          < from: Xray Foot AP + Lateral + Oblique, Right (09.07.18 @ 17:17) >    IMPRESSION:    Soft tissue defect in the plantar aspect of the right foot consistent   with history of ulcer. No fistula tract or subcutaneous gas.     Heterotopic ossification in the tibial fibular syndesmotic space limits   evaluation for osteomyelitis in this area.     Old fracture of the second metatarsal is unchanged. No acute fracture. No   dislocation.  Soft tissue swelling. Plantar calcaneal enthesophyte.   Partial visualization of orthopedic hardware at the distal fibula.    If continued suspicion for osteomyelitis, bone scan or MRI is needed.     < end of copied text >

## 2018-09-08 NOTE — PROGRESS NOTE ADULT - SUBJECTIVE AND OBJECTIVE BOX
Patient is a 63y old  Male who presents with a chief complaint of R foot wound (08 Sep 2018 16:30)      INTERVAL HPI/OVERNIGHT EVENTS:  T(C): 36.6 (09-08-18 @ 13:18), Max: 37 (09-07-18 @ 21:35)  HR: 75 (09-08-18 @ 13:18) (73 - 78)  BP: 140/75 (09-08-18 @ 13:18) (140/75 - 167/78)  RR: 16 (09-08-18 @ 13:18) (16 - 18)  SpO2: 100% (09-08-18 @ 13:18) (98% - 100%)  Wt(kg): --  I&O's Summary      LABS:                        8.3    9.76  )-----------( 490      ( 08 Sep 2018 05:20 )             26.2     09-08    138  |  101  |  48<H>  ----------------------------<  143<H>  3.6   |  20<L>  |  2.86<H>    Ca    9.4      08 Sep 2018 05:20  Phos  3.8     09-08  Mg     1.6     09-08    TPro  6.4  /  Alb  2.0<L>  /  TBili  < 0.2<L>  /  DBili  x   /  AST  12  /  ALT  14  /  AlkPhos  70  09-08        CAPILLARY BLOOD GLUCOSE      POCT Blood Glucose.: 142 mg/dL (08 Sep 2018 18:10)  POCT Blood Glucose.: 155 mg/dL (08 Sep 2018 13:27)  POCT Blood Glucose.: 136 mg/dL (08 Sep 2018 09:08)            MEDICATIONS  (STANDING):  amLODIPine   Tablet 10 milliGRAM(s) Oral daily  aspirin enteric coated 81 milliGRAM(s) Oral daily  atorvastatin 40 milliGRAM(s) Oral at bedtime  calcitriol   Capsule 0.25 MICROGram(s) Oral daily  Dakins Solution - 1/4 Strength 1 Application(s) Topical Once  folic acid 1 milliGRAM(s) Oral daily  heparin  Injectable 5000 Unit(s) SubCutaneous every 8 hours  hydrALAZINE 100 milliGRAM(s) Oral three times a day  influenza   Vaccine 0.5 milliLiter(s) IntraMuscular once  insulin lispro (HumaLOG) corrective regimen sliding scale   SubCutaneous three times a day before meals  insulin lispro (HumaLOG) corrective regimen sliding scale   SubCutaneous at bedtime  metoprolol succinate  milliGRAM(s) Oral daily  piperacillin/tazobactam IVPB. 3.375 Gram(s) IV Intermittent every 8 hours  sodium bicarbonate 650 milliGRAM(s) Oral three times a day  vancomycin  IVPB 1000 milliGRAM(s) IV Intermittent once    MEDICATIONS  (PRN):  acetaminophen   Tablet .. 650 milliGRAM(s) Oral every 6 hours PRN Moderate Pain (4 - 6), Severe Pain (7 - 10)          PHYSICAL EXAM:  GENERAL: NAD, well-groomed, well-developed  HEAD:  Atraumatic, Normocephalic  CHEST/LUNG: Clear to percussion bilaterally; No rales, rhonchi, wheezing, or rubs  HEART: Regular rate and rhythm; No murmurs, rubs, or gallops  ABDOMEN: Soft, Nontender, Nondistended; Bowel sounds present  EXTREMITIES: EDEMA+  LYMPH: No lymphadenopathy noted  SKIN: No rashes or lesions    Care Discussed with Consultants/Other Providers [ ] YES  [ ] NO

## 2018-09-08 NOTE — PROGRESS NOTE ADULT - ASSESSMENT
64 y/o M PMH HTN, HLD, CKD3, DM2 w/ R foot ulcer >3 years p/w diabetic foot ulcer      Problem/Plan - 1:  ·  Problem: Diabetic foot ulcer with osteomyelitis.  Plan: C/w IV Vanco/Zosyn, f/u WBC Bone scan to r/o OM (intolerant to MRI due to ankle plate), f/u cultures. f/u ANDRÉS/PVR to assess circulation.     Problem/Plan - 2:  ·  Problem: Essential hypertension.  Plan: c/w home meds, hold losartan and lasix (elevated Cr).     Problem/Plan - 3:  ·  Problem: Diabetes mellitus type 2, noninsulin dependent.  Plan: FS AC/QHS w/ sliding scale, HgA1C, DM diet.     Problem/Plan - 4:  ·  Problem: CKD (chronic kidney disease), stage III.  Plan: Patient w/ elevated Cr, unknown recent baseline, may reflect worsening of his renal function vs acute rise 2/2 infection, s/p 1 L IVNS in the ED, will repeat BMP in AM, f/u I/Os, bladder scan, outpt records re: recent Cr.

## 2018-09-08 NOTE — PROGRESS NOTE ADULT - ASSESSMENT
70 M with right foot non-healing ulcer  -Pt was seen and evaluated  -wound is malodorous, probes to bone   - high suspicion for OM clinically PTB, and elevated inflammatory markers.   - Cont IV abx   -ordered WBC Bone scan to r/o OM (intolerant to MRI due to ankle plate)  - awaiting wound culture results and ANDRÉS/PVR  - podiatry will continue to follow   - seen w/ attending

## 2018-09-09 LAB
BASOPHILS # BLD AUTO: 0.04 K/UL — SIGNIFICANT CHANGE UP (ref 0–0.2)
BASOPHILS NFR BLD AUTO: 0.5 % — SIGNIFICANT CHANGE UP (ref 0–2)
BUN SERPL-MCNC: 48 MG/DL — HIGH (ref 7–23)
CALCIUM SERPL-MCNC: 9.2 MG/DL — SIGNIFICANT CHANGE UP (ref 8.4–10.5)
CHLORIDE SERPL-SCNC: 102 MMOL/L — SIGNIFICANT CHANGE UP (ref 98–107)
CO2 SERPL-SCNC: 21 MMOL/L — LOW (ref 22–31)
CREAT SERPL-MCNC: 2.9 MG/DL — HIGH (ref 0.5–1.3)
EOSINOPHIL # BLD AUTO: 0.25 K/UL — SIGNIFICANT CHANGE UP (ref 0–0.5)
EOSINOPHIL NFR BLD AUTO: 2.8 % — SIGNIFICANT CHANGE UP (ref 0–6)
GLUCOSE SERPL-MCNC: 120 MG/DL — HIGH (ref 70–99)
HCT VFR BLD CALC: 26.4 % — LOW (ref 39–50)
HGB BLD-MCNC: 8.6 G/DL — LOW (ref 13–17)
IMM GRANULOCYTES # BLD AUTO: 0.05 # — SIGNIFICANT CHANGE UP
IMM GRANULOCYTES NFR BLD AUTO: 0.6 % — SIGNIFICANT CHANGE UP (ref 0–1.5)
LYMPHOCYTES # BLD AUTO: 2.33 K/UL — SIGNIFICANT CHANGE UP (ref 1–3.3)
LYMPHOCYTES # BLD AUTO: 26.3 % — SIGNIFICANT CHANGE UP (ref 13–44)
MAGNESIUM SERPL-MCNC: 1.6 MG/DL — SIGNIFICANT CHANGE UP (ref 1.6–2.6)
MCHC RBC-ENTMCNC: 29.1 PG — SIGNIFICANT CHANGE UP (ref 27–34)
MCHC RBC-ENTMCNC: 32.6 % — SIGNIFICANT CHANGE UP (ref 32–36)
MCV RBC AUTO: 89.2 FL — SIGNIFICANT CHANGE UP (ref 80–100)
MONOCYTES # BLD AUTO: 0.62 K/UL — SIGNIFICANT CHANGE UP (ref 0–0.9)
MONOCYTES NFR BLD AUTO: 7 % — SIGNIFICANT CHANGE UP (ref 2–14)
NEUTROPHILS # BLD AUTO: 5.56 K/UL — SIGNIFICANT CHANGE UP (ref 1.8–7.4)
NEUTROPHILS NFR BLD AUTO: 62.8 % — SIGNIFICANT CHANGE UP (ref 43–77)
NRBC # FLD: 0 — SIGNIFICANT CHANGE UP
PHOSPHATE SERPL-MCNC: 5 MG/DL — HIGH (ref 2.5–4.5)
PLATELET # BLD AUTO: 503 K/UL — HIGH (ref 150–400)
PMV BLD: 10.2 FL — SIGNIFICANT CHANGE UP (ref 7–13)
POTASSIUM SERPL-MCNC: 3.5 MMOL/L — SIGNIFICANT CHANGE UP (ref 3.5–5.3)
POTASSIUM SERPL-SCNC: 3.5 MMOL/L — SIGNIFICANT CHANGE UP (ref 3.5–5.3)
RBC # BLD: 2.96 M/UL — LOW (ref 4.2–5.8)
RBC # FLD: 12.9 % — SIGNIFICANT CHANGE UP (ref 10.3–14.5)
SODIUM SERPL-SCNC: 139 MMOL/L — SIGNIFICANT CHANGE UP (ref 135–145)
VANCOMYCIN FLD-MCNC: 15 UG/ML — SIGNIFICANT CHANGE UP
WBC # BLD: 8.85 K/UL — SIGNIFICANT CHANGE UP (ref 3.8–10.5)
WBC # FLD AUTO: 8.85 K/UL — SIGNIFICANT CHANGE UP (ref 3.8–10.5)

## 2018-09-09 PROCEDURE — 78806: CPT | Mod: 26

## 2018-09-09 RX ORDER — SODIUM HYPOCHLORITE 0.125 %
1 SOLUTION, NON-ORAL MISCELLANEOUS DAILY
Qty: 0 | Refills: 0 | Status: DISCONTINUED | OUTPATIENT
Start: 2018-09-09 | End: 2018-09-18

## 2018-09-09 RX ORDER — VANCOMYCIN HCL 1 G
1000 VIAL (EA) INTRAVENOUS EVERY 24 HOURS
Qty: 0 | Refills: 0 | Status: DISCONTINUED | OUTPATIENT
Start: 2018-09-09 | End: 2018-09-10

## 2018-09-09 RX ADMIN — Medication 100 MILLIGRAM(S): at 05:28

## 2018-09-09 RX ADMIN — HEPARIN SODIUM 5000 UNIT(S): 5000 INJECTION INTRAVENOUS; SUBCUTANEOUS at 13:51

## 2018-09-09 RX ADMIN — Medication 2: at 18:15

## 2018-09-09 RX ADMIN — AMLODIPINE BESYLATE 10 MILLIGRAM(S): 2.5 TABLET ORAL at 05:28

## 2018-09-09 RX ADMIN — Medication 100 MILLIGRAM(S): at 13:50

## 2018-09-09 RX ADMIN — Medication 650 MILLIGRAM(S): at 21:24

## 2018-09-09 RX ADMIN — PIPERACILLIN AND TAZOBACTAM 25 GRAM(S): 4; .5 INJECTION, POWDER, LYOPHILIZED, FOR SOLUTION INTRAVENOUS at 13:49

## 2018-09-09 RX ADMIN — Medication 650 MILLIGRAM(S): at 13:51

## 2018-09-09 RX ADMIN — HEPARIN SODIUM 5000 UNIT(S): 5000 INJECTION INTRAVENOUS; SUBCUTANEOUS at 21:25

## 2018-09-09 RX ADMIN — ATORVASTATIN CALCIUM 40 MILLIGRAM(S): 80 TABLET, FILM COATED ORAL at 21:25

## 2018-09-09 RX ADMIN — Medication 1 APPLICATION(S): at 13:50

## 2018-09-09 RX ADMIN — Medication 100 MILLIGRAM(S): at 21:24

## 2018-09-09 RX ADMIN — CALCITRIOL 0.25 MICROGRAM(S): 0.5 CAPSULE ORAL at 13:50

## 2018-09-09 RX ADMIN — Medication 650 MILLIGRAM(S): at 05:28

## 2018-09-09 RX ADMIN — PIPERACILLIN AND TAZOBACTAM 25 GRAM(S): 4; .5 INJECTION, POWDER, LYOPHILIZED, FOR SOLUTION INTRAVENOUS at 05:27

## 2018-09-09 RX ADMIN — Medication 1 MILLIGRAM(S): at 13:50

## 2018-09-09 RX ADMIN — HEPARIN SODIUM 5000 UNIT(S): 5000 INJECTION INTRAVENOUS; SUBCUTANEOUS at 05:27

## 2018-09-09 RX ADMIN — Medication 250 MILLIGRAM(S): at 19:16

## 2018-09-09 RX ADMIN — PIPERACILLIN AND TAZOBACTAM 25 GRAM(S): 4; .5 INJECTION, POWDER, LYOPHILIZED, FOR SOLUTION INTRAVENOUS at 21:26

## 2018-09-09 RX ADMIN — Medication 81 MILLIGRAM(S): at 13:50

## 2018-09-09 RX ADMIN — Medication 100 MILLIGRAM(S): at 05:27

## 2018-09-09 NOTE — PROGRESS NOTE ADULT - ASSESSMENT
62 y/o M PMH HTN, HLD, CKD3, DM2 w/ R foot ulcer >3 years p/w diabetic foot ulcer      Problem/Plan - 1:  ·  Problem: Diabetic foot ulcer with osteomyelitis.  Plan: C/w IV Vanco/Zosyn,   id fu  check bone scan    Problem/Plan - 2:  ·  Problem: Essential hypertension.  Plan: c/w home meds     Problem/Plan - 3:  ·  Problem: Diabetes mellitus type 2, noninsulin dependent.  Plan: monitor fs  iss

## 2018-09-09 NOTE — PROGRESS NOTE ADULT - SUBJECTIVE AND OBJECTIVE BOX
Podiatry pager #: [phone]    Patient is a 63y old  Male who presents with a chief complaint of R foot wound (08 Sep 2018 18:34)       INTERVAL HPI/OVERNIGHT EVENTS:  Patient seen and evaluated at bedside.  Pt is resting comfortable in NAD. Denies N/V/F/C.     Allergies    No Known Allergies    Intolerances        Vital Signs Last 24 Hrs  T(C): 37.1 (09 Sep 2018 05:25), Max: 37.1 (09 Sep 2018 05:25)  T(F): 98.7 (09 Sep 2018 05:25), Max: 98.7 (09 Sep 2018 05:25)  HR: 72 (09 Sep 2018 05:25) (68 - 75)  BP: 140/70 (09 Sep 2018 05:25) (136/74 - 140/75)  BP(mean): --  RR: 18 (09 Sep 2018 05:25) (16 - 18)  SpO2: 97% (09 Sep 2018 05:25) (97% - 100%)    LABS:                        8.6    8.85  )-----------( 503      ( 09 Sep 2018 05:45 )             26.4     09-09    139  |  102  |  48<H>  ----------------------------<  120<H>  3.5   |  21<L>  |  2.90<H>    Ca    9.2      09 Sep 2018 05:45  Phos  5.0     09-09  Mg     1.6     09-09    TPro  6.4  /  Alb  2.0<L>  /  TBili  < 0.2<L>  /  DBili  x   /  AST  12  /  ALT  14  /  AlkPhos  70  09-08        CAPILLARY BLOOD GLUCOSE      POCT Blood Glucose.: 170 mg/dL (08 Sep 2018 22:26)  POCT Blood Glucose.: 142 mg/dL (08 Sep 2018 18:10)  POCT Blood Glucose.: 155 mg/dL (08 Sep 2018 13:27)      Lower Extremity Physical Exam:  Vasular: DP/PT 0/4, B/L, CFT < 5 seconds B/L, Temperature gradient WARMER ON THE RIGHT.   Neuro: Epicritic sensation diminisehd_ to the level of _digits, B/L.  Musculoskeletal/Ortho: charcot foot to the right   Skin: edema to the right LE, 3+  Wound #1: right foot planar medial wound   Size: 3x2.8cm  Depth: to bone  Wound bed: fibrogranular  Drainage: serous   Odor: mal odor   Periwound: hyperkaratotic  Etiology: Charcot

## 2018-09-09 NOTE — PROGRESS NOTE ADULT - SUBJECTIVE AND OBJECTIVE BOX
Keaton Duvall MD  Interventional Cardiology / Advance Heart Failure and Cardiac Transplant Specialist  [place] Office : [address] [place] [address]  Tel:   [place] Office : [address] [facility] [place] [address]  Tel: [phone]  Cell : [phone]    Subjective : Pt lying in bed comfortable, not in distress, denies any chest pain or SOB  	  MEDICATIONS:  amLODIPine   Tablet 10 milliGRAM(s) Oral daily  aspirin enteric coated 81 milliGRAM(s) Oral daily  heparin  Injectable 5000 Unit(s) SubCutaneous every 8 hours  hydrALAZINE 100 milliGRAM(s) Oral three times a day  metoprolol succinate  milliGRAM(s) Oral daily    piperacillin/tazobactam IVPB. 3.375 Gram(s) IV Intermittent every 8 hours  vancomycin  IVPB 1000 milliGRAM(s) IV Intermittent every 24 hours      acetaminophen   Tablet .. 650 milliGRAM(s) Oral every 6 hours PRN      atorvastatin 40 milliGRAM(s) Oral at bedtime  insulin lispro (HumaLOG) corrective regimen sliding scale   SubCutaneous three times a day before meals  insulin lispro (HumaLOG) corrective regimen sliding scale   SubCutaneous at bedtime    calcitriol   Capsule 0.25 MICROGram(s) Oral daily  Dakins Solution - 1/4 Strength 1 Application(s) Topical daily  Dakins Solution - 1/4 Strength 1 Application(s) Topical Once  folic acid 1 milliGRAM(s) Oral daily  influenza   Vaccine 0.5 milliLiter(s) IntraMuscular once  sodium bicarbonate 650 milliGRAM(s) Oral three times a day      PHYSICAL EXAM:  T(C): 37.1 (09-09-18 @ 05:25), Max: 37.1 (09-09-18 @ 05:25)  HR: 72 (09-09-18 @ 05:25) (68 - 75)  BP: 140/70 (09-09-18 @ 05:25) (136/74 - 140/75)  RR: 18 (09-09-18 @ 05:25) (16 - 18)  SpO2: 97% (09-09-18 @ 05:25) (97% - 100%)  Wt(kg): --  I&O's Summary        Appearance: Normal	  HEENT:   Normal oral mucosa, PERRL, EOMI	  Cardiovascular: Normal S1 S2, No JVD, No murmurs, No edema  Respiratory: Lungs clear to auscultation	  Gastrointestinal:  Soft, Non-tender, + BS	  Extremities: right foot wound is covered                                    8.6    8.85  )-----------( 503      ( 09 Sep 2018 05:45 )             26.4     09-09    139  |  102  |  48<H>  ----------------------------<  120<H>  3.5   |  21<L>  |  2.90<H>    Ca    9.2      09 Sep 2018 05:45  Phos  5.0     09-09  Mg     1.6     09-09    TPro  6.4  /  Alb  2.0<L>  /  TBili  < 0.2<L>  /  DBili  x   /  AST  12  /  ALT  14  /  AlkPhos  70  09-08    proBNP:   Lipid Profile:   HgA1c:   TSH:

## 2018-09-09 NOTE — PROGRESS NOTE ADULT - ASSESSMENT
· Assessment		  70 M with right foot non-healing ulcer to bone  - Pt was seen and evaluated  - wound is malodorous, probes to bone   - high suspicion for OM clinically PTB, and elevated inflammatory markers.   - Cont IV abx   - awaiting WBC bone scan  - awaiting final wound culture results and ANDRÉS/PVR  - podiatry will continue to follow   - seen w/ attending

## 2018-09-09 NOTE — PROGRESS NOTE ADULT - SUBJECTIVE AND OBJECTIVE BOX
Patient is a 63y old  Male who presents with a chief complaint of R foot wound (09 Sep 2018 11:50)      INTERVAL HPI/OVERNIGHT EVENTS:  T(C): 37.1 (09-09-18 @ 05:25), Max: 37.1 (09-09-18 @ 05:25)  HR: 72 (09-09-18 @ 05:25) (68 - 75)  BP: 140/70 (09-09-18 @ 05:25) (136/74 - 140/75)  RR: 18 (09-09-18 @ 05:25) (16 - 18)  SpO2: 97% (09-09-18 @ 05:25) (97% - 100%)  Wt(kg): --  I&O's Summary      LABS:                        8.6    8.85  )-----------( 503      ( 09 Sep 2018 05:45 )             26.4     09-09    139  |  102  |  48<H>  ----------------------------<  120<H>  3.5   |  21<L>  |  2.90<H>    Ca    9.2      09 Sep 2018 05:45  Phos  5.0     09-09  Mg     1.6     09-09    TPro  6.4  /  Alb  2.0<L>  /  TBili  < 0.2<L>  /  DBili  x   /  AST  12  /  ALT  14  /  AlkPhos  70  09-08        CAPILLARY BLOOD GLUCOSE      POCT Blood Glucose.: 149 mg/dL (09 Sep 2018 11:55)  POCT Blood Glucose.: 170 mg/dL (08 Sep 2018 22:26)  POCT Blood Glucose.: 142 mg/dL (08 Sep 2018 18:10)  POCT Blood Glucose.: 155 mg/dL (08 Sep 2018 13:27)            MEDICATIONS  (STANDING):  amLODIPine   Tablet 10 milliGRAM(s) Oral daily  aspirin enteric coated 81 milliGRAM(s) Oral daily  atorvastatin 40 milliGRAM(s) Oral at bedtime  calcitriol   Capsule 0.25 MICROGram(s) Oral daily  Dakins Solution - 1/4 Strength 1 Application(s) Topical daily  Dakins Solution - 1/4 Strength 1 Application(s) Topical Once  folic acid 1 milliGRAM(s) Oral daily  heparin  Injectable 5000 Unit(s) SubCutaneous every 8 hours  hydrALAZINE 100 milliGRAM(s) Oral three times a day  influenza   Vaccine 0.5 milliLiter(s) IntraMuscular once  insulin lispro (HumaLOG) corrective regimen sliding scale   SubCutaneous three times a day before meals  insulin lispro (HumaLOG) corrective regimen sliding scale   SubCutaneous at bedtime  metoprolol succinate  milliGRAM(s) Oral daily  piperacillin/tazobactam IVPB. 3.375 Gram(s) IV Intermittent every 8 hours  sodium bicarbonate 650 milliGRAM(s) Oral three times a day  vancomycin  IVPB 1000 milliGRAM(s) IV Intermittent every 24 hours    MEDICATIONS  (PRN):  acetaminophen   Tablet .. 650 milliGRAM(s) Oral every 6 hours PRN Moderate Pain (4 - 6), Severe Pain (7 - 10)          PHYSICAL EXAM:  GENERAL: NAD, well-groomed, well-developed  HEAD:  Atraumatic, Normocephalic  CHEST/LUNG: Clear to percussion bilaterally; No rales, rhonchi, wheezing, or rubs  HEART: Regular rate and rhythm; No murmurs, rubs, or gallops  ABDOMEN: Soft, Nontender, Nondistended; Bowel sounds present  EXTREMITIES:  2+ Peripheral Pulses, No clubbing, cyanosis, or edema  LYMPH: No lymphadenopathy noted  SKIN: No rashes or lesions    Care Discussed with Consultants/Other Providers [ ] YES  [ ] NO

## 2018-09-10 DIAGNOSIS — D64.9 ANEMIA, UNSPECIFIED: ICD-10-CM

## 2018-09-10 DIAGNOSIS — R80.9 PROTEINURIA, UNSPECIFIED: ICD-10-CM

## 2018-09-10 DIAGNOSIS — N17.9 ACUTE KIDNEY FAILURE, UNSPECIFIED: ICD-10-CM

## 2018-09-10 DIAGNOSIS — M86.9 OSTEOMYELITIS, UNSPECIFIED: ICD-10-CM

## 2018-09-10 DIAGNOSIS — N18.9 CHRONIC KIDNEY DISEASE, UNSPECIFIED: ICD-10-CM

## 2018-09-10 LAB
BASOPHILS # BLD AUTO: 0.04 K/UL — SIGNIFICANT CHANGE UP (ref 0–0.2)
BASOPHILS NFR BLD AUTO: 0.4 % — SIGNIFICANT CHANGE UP (ref 0–2)
BUN SERPL-MCNC: 45 MG/DL — HIGH (ref 7–23)
CALCIUM SERPL-MCNC: 9 MG/DL — SIGNIFICANT CHANGE UP (ref 8.4–10.5)
CHLORIDE SERPL-SCNC: 103 MMOL/L — SIGNIFICANT CHANGE UP (ref 98–107)
CO2 SERPL-SCNC: 22 MMOL/L — SIGNIFICANT CHANGE UP (ref 22–31)
CREAT SERPL-MCNC: 3.04 MG/DL — HIGH (ref 0.5–1.3)
EOSINOPHIL # BLD AUTO: 0.22 K/UL — SIGNIFICANT CHANGE UP (ref 0–0.5)
EOSINOPHIL NFR BLD AUTO: 2.2 % — SIGNIFICANT CHANGE UP (ref 0–6)
GLUCOSE SERPL-MCNC: 135 MG/DL — HIGH (ref 70–99)
HCT VFR BLD CALC: 28.7 % — LOW (ref 39–50)
HGB BLD-MCNC: 9.3 G/DL — LOW (ref 13–17)
IMM GRANULOCYTES # BLD AUTO: 0.05 # — SIGNIFICANT CHANGE UP
IMM GRANULOCYTES NFR BLD AUTO: 0.5 % — SIGNIFICANT CHANGE UP (ref 0–1.5)
LYMPHOCYTES # BLD AUTO: 2.35 K/UL — SIGNIFICANT CHANGE UP (ref 1–3.3)
LYMPHOCYTES # BLD AUTO: 23.3 % — SIGNIFICANT CHANGE UP (ref 13–44)
MAGNESIUM SERPL-MCNC: 1.7 MG/DL — SIGNIFICANT CHANGE UP (ref 1.6–2.6)
MCHC RBC-ENTMCNC: 29.2 PG — SIGNIFICANT CHANGE UP (ref 27–34)
MCHC RBC-ENTMCNC: 32.4 % — SIGNIFICANT CHANGE UP (ref 32–36)
MCV RBC AUTO: 90.3 FL — SIGNIFICANT CHANGE UP (ref 80–100)
MONOCYTES # BLD AUTO: 0.6 K/UL — SIGNIFICANT CHANGE UP (ref 0–0.9)
MONOCYTES NFR BLD AUTO: 6 % — SIGNIFICANT CHANGE UP (ref 2–14)
NEUTROPHILS # BLD AUTO: 6.81 K/UL — SIGNIFICANT CHANGE UP (ref 1.8–7.4)
NEUTROPHILS NFR BLD AUTO: 67.6 % — SIGNIFICANT CHANGE UP (ref 43–77)
NRBC # FLD: 0 — SIGNIFICANT CHANGE UP
PHOSPHATE SERPL-MCNC: 4.3 MG/DL — SIGNIFICANT CHANGE UP (ref 2.5–4.5)
PLATELET # BLD AUTO: 573 K/UL — HIGH (ref 150–400)
PMV BLD: 10.3 FL — SIGNIFICANT CHANGE UP (ref 7–13)
POTASSIUM SERPL-MCNC: 3.8 MMOL/L — SIGNIFICANT CHANGE UP (ref 3.5–5.3)
POTASSIUM SERPL-SCNC: 3.8 MMOL/L — SIGNIFICANT CHANGE UP (ref 3.5–5.3)
RBC # BLD: 3.18 M/UL — LOW (ref 4.2–5.8)
RBC # FLD: 12.8 % — SIGNIFICANT CHANGE UP (ref 10.3–14.5)
SODIUM SERPL-SCNC: 139 MMOL/L — SIGNIFICANT CHANGE UP (ref 135–145)
WBC # BLD: 10.07 K/UL — SIGNIFICANT CHANGE UP (ref 3.8–10.5)
WBC # FLD AUTO: 10.07 K/UL — SIGNIFICANT CHANGE UP (ref 3.8–10.5)

## 2018-09-10 PROCEDURE — 78102 BONE MARROW IMAGING LTD: CPT | Mod: 26

## 2018-09-10 RX ORDER — LIDOCAINE HCL 20 MG/ML
30 VIAL (ML) INJECTION ONCE
Qty: 0 | Refills: 0 | Status: DISCONTINUED | OUTPATIENT
Start: 2018-09-10 | End: 2018-09-18

## 2018-09-10 RX ADMIN — Medication 100 MILLIGRAM(S): at 13:59

## 2018-09-10 RX ADMIN — Medication 1 MILLIGRAM(S): at 13:57

## 2018-09-10 RX ADMIN — Medication 1 APPLICATION(S): at 14:00

## 2018-09-10 RX ADMIN — Medication 100 MILLIGRAM(S): at 05:10

## 2018-09-10 RX ADMIN — HEPARIN SODIUM 5000 UNIT(S): 5000 INJECTION INTRAVENOUS; SUBCUTANEOUS at 05:10

## 2018-09-10 RX ADMIN — HEPARIN SODIUM 5000 UNIT(S): 5000 INJECTION INTRAVENOUS; SUBCUTANEOUS at 21:38

## 2018-09-10 RX ADMIN — Medication 250 MILLIGRAM(S): at 18:33

## 2018-09-10 RX ADMIN — Medication 650 MILLIGRAM(S): at 21:38

## 2018-09-10 RX ADMIN — Medication 81 MILLIGRAM(S): at 13:57

## 2018-09-10 RX ADMIN — CALCITRIOL 0.25 MICROGRAM(S): 0.5 CAPSULE ORAL at 13:57

## 2018-09-10 RX ADMIN — AMLODIPINE BESYLATE 10 MILLIGRAM(S): 2.5 TABLET ORAL at 05:10

## 2018-09-10 RX ADMIN — PIPERACILLIN AND TAZOBACTAM 25 GRAM(S): 4; .5 INJECTION, POWDER, LYOPHILIZED, FOR SOLUTION INTRAVENOUS at 14:00

## 2018-09-10 RX ADMIN — Medication 2: at 13:57

## 2018-09-10 RX ADMIN — Medication 100 MILLIGRAM(S): at 21:38

## 2018-09-10 RX ADMIN — HEPARIN SODIUM 5000 UNIT(S): 5000 INJECTION INTRAVENOUS; SUBCUTANEOUS at 13:57

## 2018-09-10 RX ADMIN — ATORVASTATIN CALCIUM 40 MILLIGRAM(S): 80 TABLET, FILM COATED ORAL at 21:38

## 2018-09-10 RX ADMIN — Medication 650 MILLIGRAM(S): at 13:57

## 2018-09-10 RX ADMIN — PIPERACILLIN AND TAZOBACTAM 25 GRAM(S): 4; .5 INJECTION, POWDER, LYOPHILIZED, FOR SOLUTION INTRAVENOUS at 05:11

## 2018-09-10 RX ADMIN — Medication 650 MILLIGRAM(S): at 05:10

## 2018-09-10 NOTE — PROGRESS NOTE ADULT - ASSESSMENT
64 y/o M PMH HTN, HLD, CKD3, DM2 w/ R foot ulcer >3 years p/w diabetic foot ulcer      Problem/Plan - 1:  ·  Problem: Diabetic foot ulcer with osteomyelitis.  Plan: C/w IV Vanco/Zosyn,   id fu  check bone scan    Problem/Plan - 2:  ·  Problem: Essential hypertension.  Plan: c/w home meds     Problem/Plan - 3:  ·  Problem: Diabetes mellitus type 2, noninsulin dependent.  Plan: monitor fs  iss

## 2018-09-10 NOTE — PROGRESS NOTE ADULT - SUBJECTIVE AND OBJECTIVE BOX
Infectious Diseases progress note:    Subjective:  NAD, afebrile.  No foot pain.      ROS:  CONSTITUTIONAL:  No fever, chills, rigors  CARDIOVASCULAR:  No chest pain or palpitations  RESPIRATORY:   No SOB, cough, dyspnea on exertion.  No wheezing  GASTROINTESTINAL:  No abd pain, N/V, diarrhea/constipation  EXTREMITIES:  No swelling or joint pain  GENITOURINARY:  No burning on urination, increased frequency or urgency.  No flank pain  NEUROLOGIC:  No HA, visual disturbances  SKIN: No rashes    Allergies    No Known Allergies    Intolerances        ANTIBIOTICS/RELEVANT:  antimicrobials  piperacillin/tazobactam IVPB. 3.375 Gram(s) IV Intermittent every 8 hours  vancomycin  IVPB 1000 milliGRAM(s) IV Intermittent every 24 hours    immunologic:  influenza   Vaccine 0.5 milliLiter(s) IntraMuscular once    OTHER:  acetaminophen   Tablet .. 650 milliGRAM(s) Oral every 6 hours PRN  amLODIPine   Tablet 10 milliGRAM(s) Oral daily  aspirin enteric coated 81 milliGRAM(s) Oral daily  atorvastatin 40 milliGRAM(s) Oral at bedtime  calcitriol   Capsule 0.25 MICROGram(s) Oral daily  Dakins Solution - 1/4 Strength 1 Application(s) Topical daily  Dakins Solution - 1/4 Strength 1 Application(s) Topical Once  folic acid 1 milliGRAM(s) Oral daily  heparin  Injectable 5000 Unit(s) SubCutaneous every 8 hours  hydrALAZINE 100 milliGRAM(s) Oral three times a day  insulin lispro (HumaLOG) corrective regimen sliding scale   SubCutaneous three times a day before meals  insulin lispro (HumaLOG) corrective regimen sliding scale   SubCutaneous at bedtime  metoprolol succinate  milliGRAM(s) Oral daily  sodium bicarbonate 650 milliGRAM(s) Oral three times a day      Objective:  Vital Signs Last 24 Hrs  T(C): 36.7 (10 Sep 2018 13:08), Max: 36.8 (09 Sep 2018 21:43)  T(F): 98 (10 Sep 2018 13:08), Max: 98.2 (09 Sep 2018 21:43)  HR: 73 (10 Sep 2018 13:08) (66 - 73)  BP: 147/77 (10 Sep 2018 13:08) (147/77 - 153/78)  BP(mean): --  RR: 18 (10 Sep 2018 13:08) (18 - 18)  SpO2: 98% (10 Sep 2018 13:08) (98% - 98%)    PHYSICAL EXAM:  Constitutional:NAD  Eyes:SPIKE, EOMI  Ear/Nose/Throat: no thrush, mucositis.  Moist mucous membranes	  Neck:no JVD, no lymphadenopathy, supple  Respiratory: CTA lobito  Cardiovascular: S1S2 RRR, no murmurs  Gastrointestinal:soft, nontender,  nondistended (+) BS  Extremities:no e/e/c  Skin:  rt foot plantar ulcer stable, serosanguinous discharge.  No purulence or surrounding erythema        LABS:                        9.3    10.07 )-----------( 573      ( 10 Sep 2018 05:14 )             28.7     09-10    139  |  103  |  45<H>  ----------------------------<  135<H>  3.8   |  22  |  3.04<H>    Ca    9.0      10 Sep 2018 05:14  Phos  4.3     09-10  Mg     1.7     09-10              Vancomycin Level, Random:  ug/mL (09-09 @ 05:45)  Vancomycin Level, Random:  ug/mL (09-08 @ 05:20)                  MICROBIOLOGY:    Culture - Abscess with Gram Stain (09.07.18 @ 19:53)    Specimen Source: OTHER    Gram Stain Result:   GPCPR^Gram Pos Cocci in Pairs  QUANTITY OF BACTERIA SEEN: RARE (1+)  GNR^Gram Neg Rods  QUANTITY OF BACTERIA SEEN: RARE (1+)  WBC^White Blood Cells  QNTY CELLS IN GRAM STAIN: NO CELLS SEEN    Culture Results:   MANY  Routine susceptibility testing not performed as  Streptococcus Grp A/B is susceptible to drug(s) of choice -  Penicillin and Ampicillin  STRAG^Streptococcus agalactiae Gp B  DENICE^Corynebacterium species    Culture - Blood (09.07.18 @ 19:16)    Culture - Blood:   NO ORGANISMS ISOLATED  NO ORGANISMS ISOLATED AT 48 HRS.    Specimen Source: BLOOD VENOUS    Culture - Blood (09.07.18 @ 19:16)    Culture - Blood:   NO ORGANISMS ISOLATED  NO ORGANISMS ISOLATED AT 48 HRS.    Specimen Source: BLOOD PERIPHERAL          RADIOLOGY & ADDITIONAL STUDIES:    < from: NM Bone Marrow Imaging Limited (09.10.18 @ 10:09) >  FINDINGS:  There is diffusely increased Tc-99m sulfur colloid in the   right midfoot, which is congruent in distribution of labeled leukocytes.    IMPRESSION:  Tc-sulfur colloid marrow scan confirms finding on labeled   leukocyte scan 9/8/2018, right foot Charcot joint,    which is new compared to Tc-99m labeled leukocyte scan from 5/19/2017.    < end of copied text >        < from: NM Inflammatory Loc Wholebody, WBC (09.09.18 @ 11:19) >  IMPRESSION: Abnormal Indium-111 labeled leukocyte scan.    Findings suggestive of Charcot joint, right foot. Pattern is not typical   of osteomyelitis. Sulfur colloid marrow scan, to be performed on   9/10/2018, is recommended for confirmation.    < end of copied text >

## 2018-09-10 NOTE — PROGRESS NOTE ADULT - SUBJECTIVE AND OBJECTIVE BOX
Keaton Duvall MD  Interventional Cardiology  [place] Office : [address] [place] [address]  Tel:   [place] Office : [address] [facility] [place] [address]  Tel: [phone]  Cell : [phone]    Subjective : Pt lying in bed comfortable, not in distress, denies any chest pain or SOB  	  MEDICATIONS:  amLODIPine   Tablet 10 milliGRAM(s) Oral daily  aspirin enteric coated 81 milliGRAM(s) Oral daily  heparin  Injectable 5000 Unit(s) SubCutaneous every 8 hours  hydrALAZINE 100 milliGRAM(s) Oral three times a day  metoprolol succinate  milliGRAM(s) Oral daily        acetaminophen   Tablet .. 650 milliGRAM(s) Oral every 6 hours PRN      atorvastatin 40 milliGRAM(s) Oral at bedtime  insulin lispro (HumaLOG) corrective regimen sliding scale   SubCutaneous three times a day before meals  insulin lispro (HumaLOG) corrective regimen sliding scale   SubCutaneous at bedtime    calcitriol   Capsule 0.25 MICROGram(s) Oral daily  Dakins Solution - 1/4 Strength 1 Application(s) Topical daily  Dakins Solution - 1/4 Strength 1 Application(s) Topical Once  folic acid 1 milliGRAM(s) Oral daily  influenza   Vaccine 0.5 milliLiter(s) IntraMuscular once  sodium bicarbonate 650 milliGRAM(s) Oral three times a day      PHYSICAL EXAM:  T(C): 36.7 (09-10-18 @ 21:09), Max: 36.8 (09-10-18 @ 05:08)  HR: 77 (09-10-18 @ 21:09) (73 - 77)  BP: 152/79 (09-10-18 @ 21:09) (147/77 - 153/70)  RR: 18 (09-10-18 @ 21:09) (18 - 18)  SpO2: 97% (09-10-18 @ 21:09) (97% - 98%)  Wt(kg): --  I&O's Summary        Appearance: Normal	  HEENT:   Normal oral mucosa, PERRL, EOMI	  Cardiovascular: Normal S1 S2, No JVD, No murmurs, No edema  Respiratory: Lungs clear to auscultation	  Gastrointestinal:  Soft, Non-tender, + BS	  Extremities: right foot wound is covered                                9.3    10.07 )-----------( 573      ( 10 Sep 2018 05:14 )             28.7     09-10    139  |  103  |  45<H>  ----------------------------<  135<H>  3.8   |  22  |  3.04<H>    Ca    9.0      10 Sep 2018 05:14  Phos  4.3     09-10  Mg     1.7     09-10      proBNP:   Lipid Profile:   HgA1c:   TSH:

## 2018-09-10 NOTE — PROGRESS NOTE ADULT - SUBJECTIVE AND OBJECTIVE BOX
Patient is a 63y old  Male who presents with a chief complaint of R foot wound (10 Sep 2018 19:38)       INTERVAL HPI/OVERNIGHT EVENTS:  Patient seen and evaluated at bedside.  Pt is resting comfortable in NAD. Denies N/V/F/C.      Allergies    No Known Allergies    Intolerances        Vital Signs Last 24 Hrs  T(C): 36.7 (10 Sep 2018 13:08), Max: 36.8 (09 Sep 2018 21:43)  T(F): 98 (10 Sep 2018 13:08), Max: 98.2 (09 Sep 2018 21:43)  HR: 73 (10 Sep 2018 13:08) (66 - 73)  BP: 147/77 (10 Sep 2018 13:08) (147/77 - 153/78)  BP(mean): --  RR: 18 (10 Sep 2018 13:08) (18 - 18)  SpO2: 98% (10 Sep 2018 13:08) (98% - 98%)    LABS:                        9.3    10.07 )-----------( 573      ( 10 Sep 2018 05:14 )             28.7     09-10    139  |  103  |  45<H>  ----------------------------<  135<H>  3.8   |  22  |  3.04<H>    Ca    9.0      10 Sep 2018 05:14  Phos  4.3     09-10  Mg     1.7     09-10          CAPILLARY BLOOD GLUCOSE      POCT Blood Glucose.: 123 mg/dL (10 Sep 2018 18:01)  POCT Blood Glucose.: 160 mg/dL (10 Sep 2018 12:42)  POCT Blood Glucose.: 166 mg/dL (10 Sep 2018 08:35)  POCT Blood Glucose.: 186 mg/dL (09 Sep 2018 22:25)      Lower Extremity Physical Exam:  Vasular: DP/PT 0/4, B/L, CFT < 5 seconds B/L, Temperature gradient slightly warmer on right  Neuro: Epicritic sensation diminisehd_ to the level of _digits, B/L.  Musculoskeletal/Ortho: charcot foot to the right   Skin: edema to the right LE, 3+  Wound #1: right foot planar medial wound   Size: 3x2.8cm  Depth: to bone  Wound bed: fibrogranular  Drainage: serous   Odor: mal odor   Periwound: hyperkaratotic  Etiology: Balajit

## 2018-09-10 NOTE — CONSULT NOTE ADULT - SUBJECTIVE AND OBJECTIVE BOX
[facility]   MD DOMO WALLACE MD ANGELA WONG, PA    TEL:  OFFICE: [phone]  DR QUACH CELL: [phone]  DR. GRULLON CELL: [phone]  MELANIA CORONEL CELL: [phone]      HPI:  Patient is a 64 y/o M PMH HTN, HLD, CKD3, DM2 w/ R foot ulcer >3 years p/w worsening pain to R foot ulcer. Patient last hospitalized about >1 year ago for R foot ulcer. Reports that he has not had any follow up with podiatry since that time. Patient present with worsen pain of the right foot and + malodorous s/p debridement by podiatry in the ED. Patient follow up with Dr. Quach for CKD, last visit in april with scr 2.15 likely baseline. now with ABEL      Allergies:  No Known Allergies      PAST MEDICAL & SURGICAL HISTORY:  HTN (hypertension)  DM (diabetes mellitus)  Ankle fracture: ORIF      Home Medications Reviewed    Hospital Medications:   MEDICATIONS  (STANDING):  amLODIPine   Tablet 10 milliGRAM(s) Oral daily  aspirin enteric coated 81 milliGRAM(s) Oral daily  atorvastatin 40 milliGRAM(s) Oral at bedtime  calcitriol   Capsule 0.25 MICROGram(s) Oral daily  Dakins Solution - 1/4 Strength 1 Application(s) Topical daily  Dakins Solution - 1/4 Strength 1 Application(s) Topical Once  folic acid 1 milliGRAM(s) Oral daily  heparin  Injectable 5000 Unit(s) SubCutaneous every 8 hours  hydrALAZINE 100 milliGRAM(s) Oral three times a day  influenza   Vaccine 0.5 milliLiter(s) IntraMuscular once  insulin lispro (HumaLOG) corrective regimen sliding scale   SubCutaneous three times a day before meals  insulin lispro (HumaLOG) corrective regimen sliding scale   SubCutaneous at bedtime  metoprolol succinate  milliGRAM(s) Oral daily  piperacillin/tazobactam IVPB. 3.375 Gram(s) IV Intermittent every 8 hours  sodium bicarbonate 650 milliGRAM(s) Oral three times a day  vancomycin  IVPB 1000 milliGRAM(s) IV Intermittent every 24 hours      SOCIAL HISTORY:  Denies ETOh, Smoking,     FAMILY HISTORY:  No pertinent family history in first degree relatives      REVIEW OF SYSTEMS:  CONSTITUTIONAL: No weakness, fevers or chills  EYES/ENT: No visual changes;  No vertigo or throat pain   NECK: No pain or stiffness  RESPIRATORY: No cough, wheezing, hemoptysis; No shortness of breath  CARDIOVASCULAR: No chest pain or palpitations.  GASTROINTESTINAL: No abdominal or epigastric pain. No nausea, vomiting, or hematemesis; No diarrhea or constipation. No melena or hematochezia.  GENITOURINARY: No dysuria, frequency, foamy urine, urinary urgency, incontinence or hematuria  NEUROLOGICAL: No numbness or weakness  SKIN: right foot ulcer   VASCULAR: + bilateral lower extremity edema.   All other review of systems is negative unless indicated above.    VITALS:  T(F): 98 (09-10-18 @ 13:08), Max: 98.2 (09-09-18 @ 21:43)  HR: 73 (09-10-18 @ 13:08)  BP: 147/77 (09-10-18 @ 13:08)  RR: 18 (09-10-18 @ 13:08)  SpO2: 98% (09-10-18 @ 13:08)  Wt(kg): --        PHYSICAL EXAM:  Constitutional: NAD  HEENT: anicteric sclera, oropharynx clear, MMM  Neck: No JVD  Respiratory: CTAB, no wheezes, rales or rhonchi  Cardiovascular: S1, S2, RRR  Gastrointestinal: BS+, soft, NT/ND  Extremities: 2+ nonpitting peripheral edema right LE, + dressing d/c/i  Neurological: A/O x 3, no focal deficits  Psychiatric: Normal mood, normal affect  : No CVA tenderness. No sanchez.   Skin: No rashes      LABS:  09-10    139  |  103  |  45<H>  ----------------------------<  135<H>  3.8   |  22  |  3.04<H>    Ca    9.0      10 Sep 2018 05:14  Phos  4.3     09-10  Mg     1.7     09-10      Creatinine Trend: 3.04 <--, 2.90 <--, 2.86 <--, 2.89 <--                        9.3    10.07 )-----------( 573      ( 10 Sep 2018 05:14 )             28.7     Urine Studies:        RADIOLOGY & ADDITIONAL STUDIES:

## 2018-09-10 NOTE — PROGRESS NOTE ADULT - ASSESSMENT
Patient is a 64 y/o M PMH HTN, HLD, CKD3, DM2 w/ R foot ulcer >3 years p/w worsening pain to R foot ulcer.  Wound was malodorous, probed to bone w/ a high suspicion for OM clinically. S/p debridement by podiatry in the ED and wound culture was taken.    Problem/Plan - 1:    ·	Rt foot plantar OM    - (+) probe to bone with malodorous drainage.  Pt with clinical OM and soft tissue infection    - Agree with broad spectrum abx with vanco (dosed by level) and zosyn.  Renally dose abx    - f/u wound cultures, although do not always correlate well with bone cultures (unless MSSA or MRSA).  Recommend bone cultures as well if feasible.      - Indium scans s/o Charcot joint.  Pt also has OM by clinical criteria ( (+) PTB test)    - f/u Podiatry plan.      - Inflammatory markers elevated.  Cont to trend    - Blood cultures NGTD.      Will follow,    Salima Spivey  [phone]

## 2018-09-10 NOTE — CONSULT NOTE ADULT - PROBLEM SELECTOR RECOMMENDATION 6
has know proteinuria, check urine p/c  likely sec to DM/HTN  however will need to r/o vasculitis. Vasculitis w/u for proteinuria ( check hep b, hep c, hiv, rpr, c3, c4, bhumika, anca, spep, sif, immuno light chain) has know proteinuria, check urine p/c  likely sec to DM/HTN  vasculitis w/u done in may 2017 is neg

## 2018-09-10 NOTE — CONSULT NOTE ADULT - PROBLEM SELECTOR RECOMMENDATION 9
likely sec to ATN, renal function still worsening  currently on IV zoysn and vanco. monitor vanco level  check urine cr, na, UA, kidney/bladder US  monitor bmp  avoid nephrotoxic agents

## 2018-09-10 NOTE — PROGRESS NOTE ADULT - ASSESSMENT
70 M with right foot non-healing ulcer to bone  - Pt was seen and evaluated  - wound  probes to bone   - high suspicion for OM clinically PTB, and elevated inflammatory markers.   - Cont IV abx   - awaiting WBC bone scan results   - awaiting final wound culture results and ANDRÉS/PVR  - plan for bedside bone biopsy tomorrow  - please hold all antibiotics for the bone biopsy  - d/w attending 70 M with right foot non-healing ulcer to bone  - Pt was seen and evaluated  - wound  probes to bone   - high suspicion for OM clinically PTB, and elevated inflammatory markers.   - plan for bedside bone biopsy tomorrow  - please hold all antibiotics for the bone biopsy  - d/w attending

## 2018-09-10 NOTE — PROGRESS NOTE ADULT - SUBJECTIVE AND OBJECTIVE BOX
Patient is a 63y old  Male who presents with a chief complaint of R foot wound (10 Sep 2018 16:11)      INTERVAL HPI/OVERNIGHT EVENTS:  T(C): 36.7 (09-10-18 @ 13:08), Max: 36.8 (09-09-18 @ 21:43)  HR: 73 (09-10-18 @ 13:08) (66 - 73)  BP: 147/77 (09-10-18 @ 13:08) (147/77 - 153/78)  RR: 18 (09-10-18 @ 13:08) (18 - 18)  SpO2: 98% (09-10-18 @ 13:08) (98% - 98%)  Wt(kg): --  I&O's Summary      LABS:                        9.3    10.07 )-----------( 573      ( 10 Sep 2018 05:14 )             28.7     09-10    139  |  103  |  45<H>  ----------------------------<  135<H>  3.8   |  22  |  3.04<H>    Ca    9.0      10 Sep 2018 05:14  Phos  4.3     09-10  Mg     1.7     09-10          CAPILLARY BLOOD GLUCOSE      POCT Blood Glucose.: 123 mg/dL (10 Sep 2018 18:01)  POCT Blood Glucose.: 160 mg/dL (10 Sep 2018 12:42)  POCT Blood Glucose.: 166 mg/dL (10 Sep 2018 08:35)  POCT Blood Glucose.: 186 mg/dL (09 Sep 2018 22:25)            MEDICATIONS  (STANDING):  amLODIPine   Tablet 10 milliGRAM(s) Oral daily  aspirin enteric coated 81 milliGRAM(s) Oral daily  atorvastatin 40 milliGRAM(s) Oral at bedtime  calcitriol   Capsule 0.25 MICROGram(s) Oral daily  Dakins Solution - 1/4 Strength 1 Application(s) Topical daily  Dakins Solution - 1/4 Strength 1 Application(s) Topical Once  folic acid 1 milliGRAM(s) Oral daily  heparin  Injectable 5000 Unit(s) SubCutaneous every 8 hours  hydrALAZINE 100 milliGRAM(s) Oral three times a day  influenza   Vaccine 0.5 milliLiter(s) IntraMuscular once  insulin lispro (HumaLOG) corrective regimen sliding scale   SubCutaneous three times a day before meals  insulin lispro (HumaLOG) corrective regimen sliding scale   SubCutaneous at bedtime  metoprolol succinate  milliGRAM(s) Oral daily  piperacillin/tazobactam IVPB. 3.375 Gram(s) IV Intermittent every 8 hours  sodium bicarbonate 650 milliGRAM(s) Oral three times a day  vancomycin  IVPB 1000 milliGRAM(s) IV Intermittent every 24 hours    MEDICATIONS  (PRN):  acetaminophen   Tablet .. 650 milliGRAM(s) Oral every 6 hours PRN Moderate Pain (4 - 6), Severe Pain (7 - 10)          PHYSICAL EXAM:  GENERAL: NAD, well-groomed, well-developed  HEAD:  Atraumatic, Normocephalic  CHEST/LUNG: Clear to percussion bilaterally; No rales, rhonchi, wheezing, or rubs  HEART: Regular rate and rhythm; No murmurs, rubs, or gallops  ABDOMEN: Soft, Nontender, Nondistended; Bowel sounds present  EXTREMITIES:  2+ Peripheral Pulses, No clubbing, cyanosis, or edema  LYMPH: No lymphadenopathy noted  SKIN: No rashes or lesions    Care Discussed with Consultants/Other Providers [ ] YES  [ ] NO

## 2018-09-10 NOTE — PROGRESS NOTE ADULT - ASSESSMENT
EKG - NSR     A/P     1) LE wound and swelling - cPodiatry and ID onboard, Pending LE arterial duplex to r/o PAD,   will get 2d echo to access LV     2) Right foot wound - podiatry and ID on case cont zosyn and vanco f/u nuclear scan     3) HTN - cont hydralazine toprol and amlodipine

## 2018-09-10 NOTE — CONSULT NOTE ADULT - ASSESSMENT
62 y/o M PMH HTN, HLD, CKD3, DM2 w/ R foot ulcer >3 years p/w worsening pain to R foot ulcer. last visit in april with scr 2.15 likely baseline. now with ABEL

## 2018-09-11 ENCOUNTER — RESULT REVIEW (OUTPATIENT)
Age: 63
End: 2018-09-11

## 2018-09-11 DIAGNOSIS — N28.1 CYST OF KIDNEY, ACQUIRED: ICD-10-CM

## 2018-09-11 LAB
24R-OH-CALCIDIOL SERPL-MCNC: 11.8 NG/ML — LOW (ref 30–80)
APPEARANCE UR: CLEAR — SIGNIFICANT CHANGE UP
BACTERIA # UR AUTO: NEGATIVE — SIGNIFICANT CHANGE UP
BILIRUB UR-MCNC: NEGATIVE — SIGNIFICANT CHANGE UP
BLOOD UR QL VISUAL: NEGATIVE — SIGNIFICANT CHANGE UP
BUN SERPL-MCNC: 42 MG/DL — HIGH (ref 7–23)
CALCIUM SERPL-MCNC: 8.7 MG/DL — SIGNIFICANT CHANGE UP (ref 8.4–10.5)
CHLORIDE SERPL-SCNC: 104 MMOL/L — SIGNIFICANT CHANGE UP (ref 98–107)
CO2 SERPL-SCNC: 23 MMOL/L — SIGNIFICANT CHANGE UP (ref 22–31)
COLOR SPEC: SIGNIFICANT CHANGE UP
CREAT ?TM UR-MCNC: 73.8 MG/DL — SIGNIFICANT CHANGE UP
CREAT ?TM UR-MCNC: 75.1 MG/DL — SIGNIFICANT CHANGE UP
CREAT SERPL-MCNC: 2.95 MG/DL — HIGH (ref 0.5–1.3)
FERRITIN SERPL-MCNC: 355.3 NG/ML — SIGNIFICANT CHANGE UP (ref 30–400)
GLUCOSE SERPL-MCNC: 123 MG/DL — HIGH (ref 70–99)
GLUCOSE UR-MCNC: 100 — SIGNIFICANT CHANGE UP
GRAM STN SPEC: SIGNIFICANT CHANGE UP
HCT VFR BLD CALC: 26.5 % — LOW (ref 39–50)
HGB BLD-MCNC: 8.1 G/DL — LOW (ref 13–17)
HYALINE CASTS # UR AUTO: NEGATIVE — SIGNIFICANT CHANGE UP
IRON SATN MFR SERPL: 155 UG/DL — SIGNIFICANT CHANGE UP (ref 155–535)
IRON SATN MFR SERPL: 34 UG/DL — LOW (ref 45–165)
KETONES UR-MCNC: NEGATIVE — SIGNIFICANT CHANGE UP
LEUKOCYTE ESTERASE UR-ACNC: NEGATIVE — SIGNIFICANT CHANGE UP
MCHC RBC-ENTMCNC: 27.5 PG — SIGNIFICANT CHANGE UP (ref 27–34)
MCHC RBC-ENTMCNC: 30.6 % — LOW (ref 32–36)
MCV RBC AUTO: 89.8 FL — SIGNIFICANT CHANGE UP (ref 80–100)
NITRITE UR-MCNC: NEGATIVE — SIGNIFICANT CHANGE UP
NRBC # FLD: 0 — SIGNIFICANT CHANGE UP
PH UR: 6 — SIGNIFICANT CHANGE UP (ref 5–8)
PHOSPHATE SERPL-MCNC: 4.1 MG/DL — SIGNIFICANT CHANGE UP (ref 2.5–4.5)
PLATELET # BLD AUTO: 506 K/UL — HIGH (ref 150–400)
PMV BLD: 10.1 FL — SIGNIFICANT CHANGE UP (ref 7–13)
POTASSIUM SERPL-MCNC: 4.2 MMOL/L — SIGNIFICANT CHANGE UP (ref 3.5–5.3)
POTASSIUM SERPL-SCNC: 4.2 MMOL/L — SIGNIFICANT CHANGE UP (ref 3.5–5.3)
PROT UR-MCNC: 200 — HIGH
PROT UR-MCNC: 229.4 MG/DL — SIGNIFICANT CHANGE UP
PROT UR-MCNC: 229.7 MG/DL — SIGNIFICANT CHANGE UP
PTH-INTACT SERPL-MCNC: 42.84 PG/ML — SIGNIFICANT CHANGE UP (ref 15–65)
RBC # BLD: 2.95 M/UL — LOW (ref 4.2–5.8)
RBC # FLD: 13 % — SIGNIFICANT CHANGE UP (ref 10.3–14.5)
RBC CASTS # UR COMP ASSIST: SIGNIFICANT CHANGE UP (ref 0–?)
SODIUM SERPL-SCNC: 143 MMOL/L — SIGNIFICANT CHANGE UP (ref 135–145)
SODIUM UR-SCNC: 51 MMOL/L — SIGNIFICANT CHANGE UP
SODIUM UR-SCNC: 51 MMOL/L — SIGNIFICANT CHANGE UP
SP GR SPEC: 1.01 — SIGNIFICANT CHANGE UP (ref 1–1.04)
SPECIMEN SOURCE: SIGNIFICANT CHANGE UP
SQUAMOUS # UR AUTO: SIGNIFICANT CHANGE UP
UIBC SERPL-MCNC: 121.2 UG/DL — SIGNIFICANT CHANGE UP (ref 110–370)
UROBILINOGEN FLD QL: NORMAL — SIGNIFICANT CHANGE UP
VANCOMYCIN FLD-MCNC: 22.3 UG/ML — SIGNIFICANT CHANGE UP
WBC # BLD: 9.45 K/UL — SIGNIFICANT CHANGE UP (ref 3.8–10.5)
WBC # FLD AUTO: 9.45 K/UL — SIGNIFICANT CHANGE UP (ref 3.8–10.5)
WBC UR QL: SIGNIFICANT CHANGE UP (ref 0–?)

## 2018-09-11 PROCEDURE — 93923 UPR/LXTR ART STDY 3+ LVLS: CPT | Mod: 26

## 2018-09-11 PROCEDURE — 88311 DECALCIFY TISSUE: CPT | Mod: 26

## 2018-09-11 PROCEDURE — 88305 TISSUE EXAM BY PATHOLOGIST: CPT | Mod: 26

## 2018-09-11 PROCEDURE — 76770 US EXAM ABDO BACK WALL COMP: CPT | Mod: 26

## 2018-09-11 RX ORDER — VANCOMYCIN HCL 1 G
1000 VIAL (EA) INTRAVENOUS EVERY 24 HOURS
Qty: 0 | Refills: 0 | Status: DISCONTINUED | OUTPATIENT
Start: 2018-09-11 | End: 2018-09-15

## 2018-09-11 RX ORDER — ERGOCALCIFEROL 1.25 MG/1
50000 CAPSULE ORAL
Qty: 0 | Refills: 0 | Status: DISCONTINUED | OUTPATIENT
Start: 2018-09-11 | End: 2018-09-18

## 2018-09-11 RX ORDER — PIPERACILLIN AND TAZOBACTAM 4; .5 G/20ML; G/20ML
3.38 INJECTION, POWDER, LYOPHILIZED, FOR SOLUTION INTRAVENOUS EVERY 8 HOURS
Qty: 0 | Refills: 0 | Status: DISCONTINUED | OUTPATIENT
Start: 2018-09-11 | End: 2018-09-18

## 2018-09-11 RX ADMIN — HEPARIN SODIUM 5000 UNIT(S): 5000 INJECTION INTRAVENOUS; SUBCUTANEOUS at 13:30

## 2018-09-11 RX ADMIN — Medication 100 MILLIGRAM(S): at 05:43

## 2018-09-11 RX ADMIN — Medication 650 MILLIGRAM(S): at 13:30

## 2018-09-11 RX ADMIN — Medication 650 MILLIGRAM(S): at 05:43

## 2018-09-11 RX ADMIN — Medication 250 MILLIGRAM(S): at 19:37

## 2018-09-11 RX ADMIN — CALCITRIOL 0.25 MICROGRAM(S): 0.5 CAPSULE ORAL at 14:36

## 2018-09-11 RX ADMIN — Medication 1 APPLICATION(S): at 12:52

## 2018-09-11 RX ADMIN — ATORVASTATIN CALCIUM 40 MILLIGRAM(S): 80 TABLET, FILM COATED ORAL at 22:22

## 2018-09-11 RX ADMIN — AMLODIPINE BESYLATE 10 MILLIGRAM(S): 2.5 TABLET ORAL at 05:43

## 2018-09-11 RX ADMIN — Medication 650 MILLIGRAM(S): at 22:12

## 2018-09-11 RX ADMIN — Medication 1 MILLIGRAM(S): at 13:30

## 2018-09-11 RX ADMIN — HEPARIN SODIUM 5000 UNIT(S): 5000 INJECTION INTRAVENOUS; SUBCUTANEOUS at 05:43

## 2018-09-11 RX ADMIN — Medication 100 MILLIGRAM(S): at 13:30

## 2018-09-11 RX ADMIN — Medication 81 MILLIGRAM(S): at 13:30

## 2018-09-11 RX ADMIN — PIPERACILLIN AND TAZOBACTAM 25 GRAM(S): 4; .5 INJECTION, POWDER, LYOPHILIZED, FOR SOLUTION INTRAVENOUS at 22:12

## 2018-09-11 RX ADMIN — Medication 100 MILLIGRAM(S): at 22:12

## 2018-09-11 RX ADMIN — Medication 2: at 12:51

## 2018-09-11 NOTE — PROGRESS NOTE ADULT - ASSESSMENT
62 y/o M PMH HTN, HLD, CKD3, DM2 w/ R foot ulcer >3 years p/w diabetic foot ulcer      Problem/Plan - 1:  ·  Problem: Diabetic foot ulcer with osteomyelitis.  Plan: C/w IV Vanco/Zosyn,   id fu  podiatry fu    Problem/Plan - 2:  ·  Problem: Essential hypertension.  Plan: c/w home meds     Problem/Plan - 3:  ·  Problem: Diabetes mellitus type 2, noninsulin dependent.  Plan: monitor fs  iss

## 2018-09-11 NOTE — PROGRESS NOTE ADULT - PROBLEM SELECTOR PLAN 2
baseline likely 2-2.2  CKD likely sec to DM/HTN  monitor bmp. baseline likely 2-2.2  CKD likely sec to DM/HTN  monitor bmp.  avoid nephrotoxic agents.

## 2018-09-11 NOTE — PROGRESS NOTE ADULT - PROBLEM SELECTOR PLAN 1
likely sec to ATN, renal function slightly improving today  currently on IV zoysn and vanco.  vanco level 22 today  check urine cr, na, UA, kidney/bladder US done showed b/l cyst no obstruction   monitor bmp  avoid nephrotoxic agents. Pt with ABEL likely sec to ATN,   renal function relatively stable since admission   currently on IV zoysn and vanco.  vanco level 22 today  check urine cr, na, UA,   Kidney/bladder US done showed b/l cyst no obstruction, mildly enlarged kidney size likely sec longstanding DM   monitor bmp, strict I/O  avoid nephrotoxic agents.

## 2018-09-11 NOTE — PROGRESS NOTE ADULT - ASSESSMENT
70 M with right foot non-healing ulcer to bone  - Pt was seen and evaluated  - wound  probes to bone   - high suspicion for OM clinically PTB, and elevated inflammatory markers.   - alcohol applied, 5cc lidocaine 1% plain local infiltrate injection, consent obtained for bedside right foot bone biopsy & specimen sent to both pathology & culture following sterile technique #15 blade percutaneous incision insertion jamshidi needle, flushed with saline & 3-0 nylon primary repair of incision site, dsd applied  - May restart antibiotics, will follow up on biopsy data & discuss w/ ID  - seen w/ attending

## 2018-09-11 NOTE — PROGRESS NOTE ADULT - SUBJECTIVE AND OBJECTIVE BOX
Patient is a 63y old  Male who presents with a chief complaint of R foot wound (11 Sep 2018 13:11)      INTERVAL HPI/OVERNIGHT EVENTS:  T(C): 36.8 (09-11-18 @ 13:25), Max: 36.8 (09-11-18 @ 05:55)  HR: 78 (09-11-18 @ 13:25) (75 - 78)  BP: 156/77 (09-11-18 @ 13:25) (149/72 - 156/77)  RR: 18 (09-11-18 @ 05:55) (18 - 18)  SpO2: 97% (09-11-18 @ 13:25) (95% - 97%)  Wt(kg): --  I&O's Summary      LABS:                        8.1    9.45  )-----------( 506      ( 11 Sep 2018 05:45 )             26.5     09-11    143  |  104  |  42<H>  ----------------------------<  123<H>  4.2   |  23  |  2.95<H>    Ca    8.7      11 Sep 2018 05:45  Phos  4.1     09-11  Mg     1.7     09-10          CAPILLARY BLOOD GLUCOSE      POCT Blood Glucose.: 152 mg/dL (11 Sep 2018 12:42)  POCT Blood Glucose.: 139 mg/dL (11 Sep 2018 08:34)  POCT Blood Glucose.: 201 mg/dL (10 Sep 2018 22:06)  POCT Blood Glucose.: 123 mg/dL (10 Sep 2018 18:01)            MEDICATIONS  (STANDING):  amLODIPine   Tablet 10 milliGRAM(s) Oral daily  aspirin enteric coated 81 milliGRAM(s) Oral daily  atorvastatin 40 milliGRAM(s) Oral at bedtime  calcitriol   Capsule 0.25 MICROGram(s) Oral daily  Dakins Solution - 1/4 Strength 1 Application(s) Topical daily  Dakins Solution - 1/4 Strength 1 Application(s) Topical Once  ergocalciferol 95606 Unit(s) Oral every week  folic acid 1 milliGRAM(s) Oral daily  heparin  Injectable 5000 Unit(s) SubCutaneous every 8 hours  hydrALAZINE 100 milliGRAM(s) Oral three times a day  influenza   Vaccine 0.5 milliLiter(s) IntraMuscular once  insulin lispro (HumaLOG) corrective regimen sliding scale   SubCutaneous three times a day before meals  insulin lispro (HumaLOG) corrective regimen sliding scale   SubCutaneous at bedtime  lidocaine 1% Injectable 30 milliLiter(s) Local Injection once  metoprolol succinate  milliGRAM(s) Oral daily  piperacillin/tazobactam IVPB. 3.375 Gram(s) IV Intermittent every 8 hours  sodium bicarbonate 650 milliGRAM(s) Oral three times a day  vancomycin  IVPB 1000 milliGRAM(s) IV Intermittent every 24 hours    MEDICATIONS  (PRN):  acetaminophen   Tablet .. 650 milliGRAM(s) Oral every 6 hours PRN Moderate Pain (4 - 6), Severe Pain (7 - 10)          PHYSICAL EXAM:  GENERAL: NAD, well-groomed, well-developed  HEAD:  Atraumatic, Normocephalic  CHEST/LUNG: Clear to percussion bilaterally; No rales, rhonchi, wheezing, or rubs  HEART: Regular rate and rhythm; No murmurs, rubs, or gallops  ABDOMEN: Soft, Nontender, Nondistended; Bowel sounds present  EXTREMITIES:  R foot dressing  LYMPH: No lymphadenopathy noted  SKIN: No rashes or lesions    Care Discussed with Consultants/Other Providers [ ] YES  [ ] NO

## 2018-09-11 NOTE — PROGRESS NOTE ADULT - ASSESSMENT
Patient is a 62 y/o M PMH HTN, HLD, CKD3, DM2 w/ R foot ulcer >3 years p/w worsening pain to R foot ulcer.  Wound was malodorous, probed to bone w/ a high suspicion for OM clinically. S/p debridement by podiatry in the ED and wound culture was taken.    Problem/Plan - 1:    ·	Rt foot plantar OM    - (+) probe to bone with malodorous drainage.  Pt with clinical OM and soft tissue infection    - Cont broad spectrum abx with vanco (dosed by level) and zosyn.  Renally dose abx.  No need to redose vanco today, will f/u random level in AM    - f/u wound cultures, although do not always correlate well with bone cultures (unless MSSA or MRSA).      - Podiatry f/u appreciated. s/p bedside bone biopsy.  f/u culture data and path    - Indium scans s/o Charcot joint.  Pt also has OM by clinical criteria ( (+) PTB test)    - Inflammatory markers elevated.  Cont to trend    - Blood cultures NGTD.      Will follow,    Salima Spivey  [phone]

## 2018-09-11 NOTE — PROGRESS NOTE ADULT - PROBLEM SELECTOR PLAN 8
b/l cyst likely simple, increased in size, can be managed outpatient pending PTH, vit D 25  phos level optimal   on calcitriol 0.25mcg daily  monitor ca, phos.

## 2018-09-11 NOTE — PROGRESS NOTE ADULT - SUBJECTIVE AND OBJECTIVE BOX
Patient is a 63y old  Male who presents with a chief complaint of R foot wound (11 Sep 2018 12:16)       INTERVAL HPI/OVERNIGHT EVENTS:  Patient seen and evaluated at bedside.  Pt is resting comfortable in NAD. Denies N/V/F/C.  Pain rated at X/10    Allergies    No Known Allergies    Intolerances        Vital Signs Last 24 Hrs  T(C): 36.8 (11 Sep 2018 05:55), Max: 36.8 (11 Sep 2018 05:55)  T(F): 98.3 (11 Sep 2018 05:55), Max: 98.3 (11 Sep 2018 05:55)  HR: 75 (11 Sep 2018 05:55) (73 - 77)  BP: 149/72 (11 Sep 2018 05:55) (147/77 - 152/79)  BP(mean): --  RR: 18 (11 Sep 2018 05:55) (18 - 18)  SpO2: 95% (11 Sep 2018 05:55) (95% - 98%)    LABS:                        8.1    9.45  )-----------( 506      ( 11 Sep 2018 05:45 )             26.5     09-11    143  |  104  |  42<H>  ----------------------------<  123<H>  4.2   |  23  |  2.95<H>    Ca    8.7      11 Sep 2018 05:45  Phos  4.1     09-11  Mg     1.7     09-10          CAPILLARY BLOOD GLUCOSE      POCT Blood Glucose.: 152 mg/dL (11 Sep 2018 12:42)  POCT Blood Glucose.: 139 mg/dL (11 Sep 2018 08:34)  POCT Blood Glucose.: 201 mg/dL (10 Sep 2018 22:06)  POCT Blood Glucose.: 123 mg/dL (10 Sep 2018 18:01)      Lower Extremity Physical Exam:  Vasular: DP/PT 0/4, B/L, CFT < 5 seconds B/L, Temperature gradient slightly warmer on right  Neuro: Epicritic sensation diminisehd_ to the level of _digits, B/L.  Musculoskeletal/Ortho: charcot foot to the right   Skin: edema to the right LE, 3+  Wound #1: right foot planar medial wound   Size: 3x2.8cm  Depth: to bone  Wound bed: fibrogranular  Drainage: serous   Odor: mal odor   Periwound: hyperkaratotic  Etiology: Charcot     RADIOLOGY & ADDITIONAL TESTS:

## 2018-09-11 NOTE — PROGRESS NOTE ADULT - PROBLEM SELECTOR PLAN 5
iron sat 22%, will start epogen 75617 tiw  monitor Hb. acceptable.  Monitor BP  Continue current anti-hypertensives

## 2018-09-11 NOTE — PROGRESS NOTE ADULT - ASSESSMENT
EKG - NSR     A/P     1) LE wound and swelling - Podiatry and ID onboard, Pending LE arterial duplex to r/o PAD,   will get 2d echo to access LV     2) Right foot wound - podiatry and ID on case cont zosyn and vanco f/u nuclear scan     3) HTN - cont hydralazine toprol and amlodipine    4) ABEL on CKD: Appreciate renal recs

## 2018-09-11 NOTE — PROGRESS NOTE ADULT - SUBJECTIVE AND OBJECTIVE BOX
[facility]   MD DOMO WALLACE MD ANGELA WONG, PA    TEL:  OFFICE: [phone]  DR LEE CELL: [phone]  DR. GRULLON CELL: [phone]  MELANIA CORONEL CELL: [phone]        Patient is a 63y old  Male who presents with a chief complaint of R foot wound (10 Sep 2018 19:38)      Patient seen and examined at bedside. No chest pain/sob    VITALS:  T(F): 98.3 (09-11-18 @ 05:55), Max: 98.3 (09-11-18 @ 05:55)  HR: 75 (09-11-18 @ 05:55)  BP: 149/72 (09-11-18 @ 05:55)  RR: 18 (09-11-18 @ 05:55)  SpO2: 95% (09-11-18 @ 05:55)  Wt(kg): --        PHYSICAL EXAM:  Constitutional: NAD  Neck: No JVD  Respiratory: CTAB, no wheezes, rales or rhonchi  Cardiovascular: S1, S2, RRR  Gastrointestinal: BS+, soft, NT/ND  Extremities: 2+ peripheral edema R>L non pitting    Hospital Medications:   MEDICATIONS  (STANDING):  amLODIPine   Tablet 10 milliGRAM(s) Oral daily  aspirin enteric coated 81 milliGRAM(s) Oral daily  atorvastatin 40 milliGRAM(s) Oral at bedtime  calcitriol   Capsule 0.25 MICROGram(s) Oral daily  Dakins Solution - 1/4 Strength 1 Application(s) Topical daily  Dakins Solution - 1/4 Strength 1 Application(s) Topical Once  folic acid 1 milliGRAM(s) Oral daily  heparin  Injectable 5000 Unit(s) SubCutaneous every 8 hours  hydrALAZINE 100 milliGRAM(s) Oral three times a day  influenza   Vaccine 0.5 milliLiter(s) IntraMuscular once  insulin lispro (HumaLOG) corrective regimen sliding scale   SubCutaneous three times a day before meals  insulin lispro (HumaLOG) corrective regimen sliding scale   SubCutaneous at bedtime  lidocaine 1% Injectable 30 milliLiter(s) Local Injection once  metoprolol succinate  milliGRAM(s) Oral daily  sodium bicarbonate 650 milliGRAM(s) Oral three times a day      LABS:  09-11    143  |  104  |  42<H>  ----------------------------<  123<H>  4.2   |  23  |  2.95<H>    Ca    8.7      11 Sep 2018 05:45  Phos  4.1     09-11  Mg     1.7     09-10      Creatinine Trend: 2.95 <--, 3.04 <--, 2.90 <--, 2.86 <--, 2.89 <--    Iron Total, Serum: 34 ug/dL (09-11 @ 05:45)  Ferritin, Serum: 355.3 ng/mL (09-11 @ 05:45)  Phosphorus Level, Serum: 4.1 mg/dL (09-11 @ 05:45)    calcium--  intact pth--  parathyroid hormone intact, serum42.84                            8.1    9.45  )-----------( 506      ( 11 Sep 2018 05:45 )             26.5     Urine Studies:      Iron 34, TIBC 155, %sat --      [09-11-18 @ 05:45]  Ferritin 355.3      [09-11-18 @ 05:45]  PTH 42.84 (Ca --)      [09-11-18 @ 05:45]   --  HbA1c 6.5      [09-08-18 @ 05:20]        RADIOLOGY & ADDITIONAL STUDIES: [facility]   MD DOMO WALLACE MD ANGELA WONG, PA    TEL:  OFFICE: [phone]  DR LEE CELL: [phone]  DR. GRULLON CELL: [phone]  MELANIA CORONEL CELL: [phone]        Patient is a 63y old  Male who presents with a chief complaint of R foot wound       Patient seen and examined at bedside. No chest pain/sob    VITALS:  T(F): 98.3 (09-11-18 @ 05:55), Max: 98.3 (09-11-18 @ 05:55)  HR: 75 (09-11-18 @ 05:55)  BP: 149/72 (09-11-18 @ 05:55)  RR: 18 (09-11-18 @ 05:55)  SpO2: 95% (09-11-18 @ 05:55)  Wt(kg): --        PHYSICAL EXAM:  Constitutional: NAD  Neck: No JVD  Respiratory: CTAB, no wheezes, rales or rhonchi  Cardiovascular: S1, S2, RRR  Gastrointestinal: BS+, soft, NT/ND  Extremities: 2+ peripheral edema R>L non pitting    Hospital Medications:   MEDICATIONS  (STANDING):  amLODIPine   Tablet 10 milliGRAM(s) Oral daily  aspirin enteric coated 81 milliGRAM(s) Oral daily  atorvastatin 40 milliGRAM(s) Oral at bedtime  calcitriol   Capsule 0.25 MICROGram(s) Oral daily  Dakins Solution - 1/4 Strength 1 Application(s) Topical daily  Dakins Solution - 1/4 Strength 1 Application(s) Topical Once  folic acid 1 milliGRAM(s) Oral daily  heparin  Injectable 5000 Unit(s) SubCutaneous every 8 hours  hydrALAZINE 100 milliGRAM(s) Oral three times a day  influenza   Vaccine 0.5 milliLiter(s) IntraMuscular once  insulin lispro (HumaLOG) corrective regimen sliding scale   SubCutaneous three times a day before meals  insulin lispro (HumaLOG) corrective regimen sliding scale   SubCutaneous at bedtime  lidocaine 1% Injectable 30 milliLiter(s) Local Injection once  metoprolol succinate  milliGRAM(s) Oral daily  sodium bicarbonate 650 milliGRAM(s) Oral three times a day      LABS:  09-11    143  |  104  |  42<H>  ----------------------------<  123<H>  4.2   |  23  |  2.95<H>    Ca    8.7      11 Sep 2018 05:45  Phos  4.1     09-11  Mg     1.7     09-10      Creatinine Trend: 2.95 <--, 3.04 <--, 2.90 <--, 2.86 <--, 2.89 <--    Iron Total, Serum: 34 ug/dL (09-11 @ 05:45)  Ferritin, Serum: 355.3 ng/mL (09-11 @ 05:45)  Phosphorus Level, Serum: 4.1 mg/dL (09-11 @ 05:45)    calcium--  intact pth--  parathyroid hormone intact, serum42.84                            8.1    9.45  )-----------( 506      ( 11 Sep 2018 05:45 )             26.5     Urine Studies:      Iron 34, TIBC 155, %sat --      [09-11-18 @ 05:45]  Ferritin 355.3      [09-11-18 @ 05:45]  PTH 42.84 (Ca --)      [09-11-18 @ 05:45]   --  HbA1c 6.5      [09-08-18 @ 05:20]        RADIOLOGY & ADDITIONAL STUDIES:

## 2018-09-11 NOTE — PROGRESS NOTE ADULT - SUBJECTIVE AND OBJECTIVE BOX
Keaton Duvall MD  Interventional Cardiology  [place] Office : [address] [place] [id number]  Tel:   [place] Office : [address] [facility] [place] [address]  Tel: [phone]  Cell : [phone]    Subjective : Pt lying in bed comfortable, not in distress, denies any chest pain or SOB  	  MEDICATIONS:  amLODIPine   Tablet 10 milliGRAM(s) Oral daily  aspirin enteric coated 81 milliGRAM(s) Oral daily  heparin  Injectable 5000 Unit(s) SubCutaneous every 8 hours  hydrALAZINE 100 milliGRAM(s) Oral three times a day  metoprolol succinate  milliGRAM(s) Oral daily        acetaminophen   Tablet .. 650 milliGRAM(s) Oral every 6 hours PRN      atorvastatin 40 milliGRAM(s) Oral at bedtime  insulin lispro (HumaLOG) corrective regimen sliding scale   SubCutaneous three times a day before meals  insulin lispro (HumaLOG) corrective regimen sliding scale   SubCutaneous at bedtime    calcitriol   Capsule 0.25 MICROGram(s) Oral daily  Dakins Solution - 1/4 Strength 1 Application(s) Topical daily  Dakins Solution - 1/4 Strength 1 Application(s) Topical Once  folic acid 1 milliGRAM(s) Oral daily  influenza   Vaccine 0.5 milliLiter(s) IntraMuscular once  sodium bicarbonate 650 milliGRAM(s) Oral three times a day      PHYSICAL EXAM:  T(C): 36.8 (09-11-18 @ 05:55), Max: 36.8 (09-11-18 @ 05:55)  HR: 75 (09-11-18 @ 05:55) (75 - 77)  BP: 149/72 (09-11-18 @ 05:55) (149/72 - 152/79)  RR: 18 (09-11-18 @ 05:55) (18 - 18)  SpO2: 95% (09-11-18 @ 05:55) (95% - 97%)  Wt(kg): --  I&O's Summary        Appearance: Normal	  HEENT:   Normal oral mucosa, PERRL, EOMI	  Cardiovascular: Normal S1 S2, No JVD, No murmurs, No edema  Respiratory: Lungs clear to auscultation	  Gastrointestinal:  Soft, Non-tender, + BS	  Extremities: RLE dressing intact                                     8.1    9.45  )-----------( 506      ( 11 Sep 2018 05:45 )             26.5     09-11    143  |  104  |  42<H>  ----------------------------<  123<H>  4.2   |  23  |  2.95<H>    Ca    8.7      11 Sep 2018 05:45  Phos  4.1     09-11  Mg     1.7     09-10      proBNP:   Lipid Profile:   HgA1c:   TSH:

## 2018-09-11 NOTE — PROGRESS NOTE ADULT - PROBLEM SELECTOR PLAN 6
check urine p/c  likely sec to DM/HTN  vasculitis w/u done in may 2017 is neg. iron sat 22%,   will start epogen 30911 tiw  monitor Hb.

## 2018-09-11 NOTE — PROGRESS NOTE ADULT - SUBJECTIVE AND OBJECTIVE BOX
Infectious Diseases progress note:    Subjective: s/p bone biopsy earlier today.  No new complaints.  Afebrile    ROS:  CONSTITUTIONAL:  No fever, chills, rigors  CARDIOVASCULAR:  No chest pain or palpitations  RESPIRATORY:   No SOB, cough, dyspnea on exertion.  No wheezing  GASTROINTESTINAL:  No abd pain, N/V, diarrhea/constipation  EXTREMITIES:  No swelling or joint pain  GENITOURINARY:  No burning on urination, increased frequency or urgency.  No flank pain  NEUROLOGIC:  No HA, visual disturbances  SKIN: No rashes    Allergies    No Known Allergies    Intolerances        ANTIBIOTICS/RELEVANT:  antimicrobials  piperacillin/tazobactam IVPB. 3.375 Gram(s) IV Intermittent every 8 hours  vancomycin  IVPB 1000 milliGRAM(s) IV Intermittent every 24 hours    immunologic:  influenza   Vaccine 0.5 milliLiter(s) IntraMuscular once    OTHER:  acetaminophen   Tablet .. 650 milliGRAM(s) Oral every 6 hours PRN  amLODIPine   Tablet 10 milliGRAM(s) Oral daily  aspirin enteric coated 81 milliGRAM(s) Oral daily  atorvastatin 40 milliGRAM(s) Oral at bedtime  calcitriol   Capsule 0.25 MICROGram(s) Oral daily  Dakins Solution - 1/4 Strength 1 Application(s) Topical daily  Dakins Solution - 1/4 Strength 1 Application(s) Topical Once  ergocalciferol 45070 Unit(s) Oral every week  folic acid 1 milliGRAM(s) Oral daily  heparin  Injectable 5000 Unit(s) SubCutaneous every 8 hours  hydrALAZINE 100 milliGRAM(s) Oral three times a day  insulin lispro (HumaLOG) corrective regimen sliding scale   SubCutaneous three times a day before meals  insulin lispro (HumaLOG) corrective regimen sliding scale   SubCutaneous at bedtime  lidocaine 1% Injectable 30 milliLiter(s) Local Injection once  metoprolol succinate  milliGRAM(s) Oral daily  sodium bicarbonate 650 milliGRAM(s) Oral three times a day      Objective:  Vital Signs Last 24 Hrs  T(C): 36.8 (11 Sep 2018 13:25), Max: 36.8 (11 Sep 2018 05:55)  T(F): 98.3 (11 Sep 2018 13:25), Max: 98.3 (11 Sep 2018 05:55)  HR: 78 (11 Sep 2018 13:25) (75 - 78)  BP: 156/77 (11 Sep 2018 13:25) (149/72 - 156/77)  BP(mean): --  RR: 18 (11 Sep 2018 05:55) (18 - 18)  SpO2: 97% (11 Sep 2018 13:25) (95% - 97%)    PHYSICAL EXAM:  Constitutional:NAD  Eyes:SPIKE, EOMI  Ear/Nose/Throat: no thrush, mucositis.  Moist mucous membranes	  Neck:no JVD, no lymphadenopathy, supple  Respiratory: CTA lobito  Cardiovascular: S1S2 RRR, no murmurs  Gastrointestinal:soft, nontender,  nondistended (+) BS  Extremities:no e/e/c  Skin:  rt ft dsg c/d/i        LABS:                        8.1    9.45  )-----------( 506      ( 11 Sep 2018 05:45 )             26.5     09-11    143  |  104  |  42<H>  ----------------------------<  123<H>  4.2   |  23  |  2.95<H>    Ca    8.7      11 Sep 2018 05:45  Phos  4.1     09-11  Mg     1.7     09-10              Vancomycin Level, Random:  ug/mL (09-11 @ 05:45)  Vancomycin Level, Random:  ug/mL (09-09 @ 05:45)  Vancomycin Level, Random:  ug/mL (09-08 @ 05:20)                  MICROBIOLOGY:    Culture - Abscess with Gram Stain (09.07.18 @ 19:53)    Specimen Source: OTHER    Gram Stain Result:   GPCPR^Gram Pos Cocci in Pairs  QUANTITY OF BACTERIA SEEN: RARE (1+)  GNR^Gram Neg Rods  QUANTITY OF BACTERIA SEEN: RARE (1+)  WBC^White Blood Cells  QNTY CELLS IN GRAM STAIN: NO CELLS SEEN    Culture Results:   MANY  Routine susceptibility testing not performed as  Streptococcus Grp A/B is susceptible to drug(s) of choice -  Penicillin and Ampicillin  STRAG^Streptococcus agalactiae Gp B  DENICE^Corynebacterium species    Culture - Blood (09.07.18 @ 19:16)    Culture - Blood:   NO ORGANISMS ISOLATED  NO ORGANISMS ISOLATED AT 72 HRS.    Specimen Source: BLOOD VENOUS    Culture - Blood (09.07.18 @ 19:16)    Culture - Blood:   NO ORGANISMS ISOLATED  NO ORGANISMS ISOLATED AT 72 HRS.    Specimen Source: BLOOD PERIPHERAL          RADIOLOGY & ADDITIONAL STUDIES:    < from: US Kidney and Bladder (09.11.18 @ 08:55) >  FINDINGS:    Right kidney:  12.7 cm. No renal mass, hydronephrosis or shadowing   calculi. Redemonstration of a right lateral cyst measuring up to 1.3 cm,   previously 0.9 cm.    Left kidney:  13.8 cm. No renal mass, hydronephrosis or shadowing   calculi. Redemonstration of lower pole renal cyst measuring up to 2.2 cm,   slightly increased from the prior exam.    Urinary bladder: Underdistended, limiting evaluation.    IMPRESSION:     No hydronephrosis.    < end of copied text >

## 2018-09-11 NOTE — PROGRESS NOTE ADULT - PROBLEM SELECTOR PLAN 7
pending PTH, vit D 25  phos level optimal   on calcitriol 0.25mcg daily  monitor ca, phos. b/l cyst likely simple, increased in size, can be managed outpatient

## 2018-09-12 LAB
ALBUMIN SERPL ELPH-MCNC: 2.5 G/DL — LOW (ref 3.3–5)
ALP SERPL-CCNC: 64 U/L — SIGNIFICANT CHANGE UP (ref 40–120)
ALT FLD-CCNC: 11 U/L — SIGNIFICANT CHANGE UP (ref 4–41)
AST SERPL-CCNC: 10 U/L — SIGNIFICANT CHANGE UP (ref 4–40)
BACTERIA BLD CULT: SIGNIFICANT CHANGE UP
BACTERIA BLD CULT: SIGNIFICANT CHANGE UP
BILIRUB SERPL-MCNC: < 0.2 MG/DL — LOW (ref 0.2–1.2)
BUN SERPL-MCNC: 36 MG/DL — HIGH (ref 7–23)
CALCIUM SERPL-MCNC: 8.9 MG/DL — SIGNIFICANT CHANGE UP (ref 8.4–10.5)
CHLORIDE SERPL-SCNC: 103 MMOL/L — SIGNIFICANT CHANGE UP (ref 98–107)
CO2 SERPL-SCNC: 22 MMOL/L — SIGNIFICANT CHANGE UP (ref 22–31)
CREAT SERPL-MCNC: 2.73 MG/DL — HIGH (ref 0.5–1.3)
CULTURE RESULTS: SIGNIFICANT CHANGE UP
GLUCOSE SERPL-MCNC: 128 MG/DL — HIGH (ref 70–99)
HCT VFR BLD CALC: 26.7 % — LOW (ref 39–50)
HGB BLD-MCNC: 8.4 G/DL — LOW (ref 13–17)
MCHC RBC-ENTMCNC: 27.9 PG — SIGNIFICANT CHANGE UP (ref 27–34)
MCHC RBC-ENTMCNC: 31.5 % — LOW (ref 32–36)
MCV RBC AUTO: 88.7 FL — SIGNIFICANT CHANGE UP (ref 80–100)
NRBC # FLD: 0 — SIGNIFICANT CHANGE UP
PLATELET # BLD AUTO: 508 K/UL — HIGH (ref 150–400)
PMV BLD: 10 FL — SIGNIFICANT CHANGE UP (ref 7–13)
POTASSIUM SERPL-MCNC: 3.6 MMOL/L — SIGNIFICANT CHANGE UP (ref 3.5–5.3)
POTASSIUM SERPL-SCNC: 3.6 MMOL/L — SIGNIFICANT CHANGE UP (ref 3.5–5.3)
PROT SERPL-MCNC: 6.5 G/DL — SIGNIFICANT CHANGE UP (ref 6–8.3)
RBC # BLD: 3.01 M/UL — LOW (ref 4.2–5.8)
RBC # FLD: 13.1 % — SIGNIFICANT CHANGE UP (ref 10.3–14.5)
SODIUM SERPL-SCNC: 140 MMOL/L — SIGNIFICANT CHANGE UP (ref 135–145)
VANCOMYCIN FLD-MCNC: 22.9 UG/ML — SIGNIFICANT CHANGE UP
WBC # BLD: 9.65 K/UL — SIGNIFICANT CHANGE UP (ref 3.8–10.5)
WBC # FLD AUTO: 9.65 K/UL — SIGNIFICANT CHANGE UP (ref 3.8–10.5)

## 2018-09-12 RX ORDER — CALCIUM CARBONATE 500(1250)
2 TABLET ORAL ONCE
Qty: 0 | Refills: 0 | Status: COMPLETED | OUTPATIENT
Start: 2018-09-12 | End: 2018-09-12

## 2018-09-12 RX ADMIN — Medication 2 TABLET(S): at 23:54

## 2018-09-12 RX ADMIN — Medication 100 MILLIGRAM(S): at 15:54

## 2018-09-12 RX ADMIN — ATORVASTATIN CALCIUM 40 MILLIGRAM(S): 80 TABLET, FILM COATED ORAL at 23:15

## 2018-09-12 RX ADMIN — ERGOCALCIFEROL 50000 UNIT(S): 1.25 CAPSULE ORAL at 13:37

## 2018-09-12 RX ADMIN — Medication 2: at 18:44

## 2018-09-12 RX ADMIN — Medication 650 MILLIGRAM(S): at 13:38

## 2018-09-12 RX ADMIN — Medication 650 MILLIGRAM(S): at 06:27

## 2018-09-12 RX ADMIN — PIPERACILLIN AND TAZOBACTAM 25 GRAM(S): 4; .5 INJECTION, POWDER, LYOPHILIZED, FOR SOLUTION INTRAVENOUS at 15:54

## 2018-09-12 RX ADMIN — Medication 100 MILLIGRAM(S): at 06:27

## 2018-09-12 RX ADMIN — HEPARIN SODIUM 5000 UNIT(S): 5000 INJECTION INTRAVENOUS; SUBCUTANEOUS at 23:15

## 2018-09-12 RX ADMIN — Medication 650 MILLIGRAM(S): at 23:15

## 2018-09-12 RX ADMIN — HEPARIN SODIUM 5000 UNIT(S): 5000 INJECTION INTRAVENOUS; SUBCUTANEOUS at 06:27

## 2018-09-12 RX ADMIN — Medication 250 MILLIGRAM(S): at 18:55

## 2018-09-12 RX ADMIN — AMLODIPINE BESYLATE 10 MILLIGRAM(S): 2.5 TABLET ORAL at 06:27

## 2018-09-12 RX ADMIN — Medication 81 MILLIGRAM(S): at 13:36

## 2018-09-12 RX ADMIN — Medication 1 APPLICATION(S): at 13:35

## 2018-09-12 RX ADMIN — HEPARIN SODIUM 5000 UNIT(S): 5000 INJECTION INTRAVENOUS; SUBCUTANEOUS at 15:40

## 2018-09-12 RX ADMIN — PIPERACILLIN AND TAZOBACTAM 25 GRAM(S): 4; .5 INJECTION, POWDER, LYOPHILIZED, FOR SOLUTION INTRAVENOUS at 23:15

## 2018-09-12 RX ADMIN — PIPERACILLIN AND TAZOBACTAM 25 GRAM(S): 4; .5 INJECTION, POWDER, LYOPHILIZED, FOR SOLUTION INTRAVENOUS at 06:26

## 2018-09-12 RX ADMIN — Medication 1 MILLIGRAM(S): at 13:35

## 2018-09-12 RX ADMIN — Medication 100 MILLIGRAM(S): at 23:15

## 2018-09-12 NOTE — PROGRESS NOTE ADULT - ASSESSMENT
Patient is a 64 y/o M PMH HTN, HLD, CKD3, DM2 w/ R foot ulcer >3 years p/w worsening pain to R foot ulcer.  Wound was malodorous, probed to bone w/ a high suspicion for OM clinically. S/p debridement by podiatry in the ED and wound culture was taken.    Problem/Plan - 1:    ·	Rt foot plantar OM    - (+) probe to bone with malodorous drainage.  Pt with clinical OM and soft tissue infection    - Cont broad spectrum abx with vanco (dosed by level) and zosyn.  Renally dose abx.  Vanco level high.  No need to redose today.  Repeat random in AM.    - f/u wound cultures, although do not always correlate well with bone cultures (unless MSSA or MRSA).   Thus far growing Arthrobacter, Actinomyces, Coryne, and Strep    - Podiatry f/u appreciated. s/p bedside bone biopsy.  f/u culture data and path    - Indium scans s/o Charcot joint.  Pt also has OM by clinical criteria ( (+) PTB test)    - Inflammatory markers elevated.  Cont to trend    - Blood cultures NGTD.  Will plan for PICC line.  d/w pt at bedside.    Will follow,    Salima Spivey  [phone]

## 2018-09-12 NOTE — PROGRESS NOTE ADULT - PROBLEM SELECTOR PLAN 3
check urine p/c  likely sec to DM/HTN  vasculitis w/u done in may 2017 is neg. urine p/c 3.1g/day  likely sec to DM/HTN  vasculitis w/u done in may 2017 is neg.

## 2018-09-12 NOTE — PROGRESS NOTE ADULT - SUBJECTIVE AND OBJECTIVE BOX
[facility]   MD DOMO WALLACE MD ANGELA WONG, PA    TEL:  OFFICE: [phone]  DR LEE CELL: [phone]  DR. GRULLON CELL: [phone]  MELANIA CORONEL CELL: [phone]        Patient is a 63y old  Male who presents with a chief complaint of R foot wound (12 Sep 2018 10:33)      Patient seen and examined at bedside. No chest pain/sob    VITALS:  T(F): 98.9 (09-12-18 @ 06:21), Max: 98.9 (09-12-18 @ 06:21)  HR: 78 (09-12-18 @ 06:21)  BP: 154/67 (09-12-18 @ 06:21)  RR: 18 (09-12-18 @ 06:21)  SpO2: 94% (09-12-18 @ 06:21)  Wt(kg): --        PHYSICAL EXAM:  Constitutional: NAD  Neck: No JVD  Respiratory: CTAB, no wheezes, rales or rhonchi  Cardiovascular: S1, S2, RRR  Gastrointestinal: BS+, soft, NT/ND  Extremities: 2+ peripheral edema R>L    Hospital Medications:   MEDICATIONS  (STANDING):  amLODIPine   Tablet 10 milliGRAM(s) Oral daily  aspirin enteric coated 81 milliGRAM(s) Oral daily  atorvastatin 40 milliGRAM(s) Oral at bedtime  calcitriol   Capsule 0.25 MICROGram(s) Oral daily  Dakins Solution - 1/4 Strength 1 Application(s) Topical daily  Dakins Solution - 1/4 Strength 1 Application(s) Topical Once  ergocalciferol 72799 Unit(s) Oral every week  folic acid 1 milliGRAM(s) Oral daily  heparin  Injectable 5000 Unit(s) SubCutaneous every 8 hours  hydrALAZINE 100 milliGRAM(s) Oral three times a day  influenza   Vaccine 0.5 milliLiter(s) IntraMuscular once  insulin lispro (HumaLOG) corrective regimen sliding scale   SubCutaneous three times a day before meals  insulin lispro (HumaLOG) corrective regimen sliding scale   SubCutaneous at bedtime  lidocaine 1% Injectable 30 milliLiter(s) Local Injection once  metoprolol succinate  milliGRAM(s) Oral daily  piperacillin/tazobactam IVPB. 3.375 Gram(s) IV Intermittent every 8 hours  sodium bicarbonate 650 milliGRAM(s) Oral three times a day  vancomycin  IVPB 1000 milliGRAM(s) IV Intermittent every 24 hours      LABS:  09-12    140  |  103  |  36<H>  ----------------------------<  128<H>  3.6   |  22  |  2.73<H>    Ca    8.9      12 Sep 2018 06:00  Phos  4.1     09-11    TPro  6.5  /  Alb  2.5<L>  /  TBili  < 0.2<L>  /  DBili      /  AST  10  /  ALT  11  /  AlkPhos  64  09-12    Creatinine Trend: 2.73 <--, 2.95 <--, 3.04 <--, 2.90 <--, 2.86 <--, 2.89 <--    Albumin, Serum: 2.5 g/dL (09-12 @ 06:00)                              8.4    9.65  )-----------( 508      ( 12 Sep 2018 06:00 )             26.7     Urine Studies:  Urinalysis - [09-11-18 @ 18:40]      Color LIGHT YELLOW / Appearance CLEAR / SG 1.011 / pH 6.0      Gluc 100 / Ketone NEGATIVE  / Bili NEGATIVE / Urobili NORMAL       Blood NEGATIVE / Protein 200 / Leuk Est NEGATIVE / Nitrite NEGATIVE      RBC 3-5 / WBC 0-2 / Hyaline NEGATIVE / Gran  / Sq Epi OCC / Non Sq Epi  / Bacteria NEGATIVE    Urine Creatinine 73.80      [09-11-18 @ 18:40]  Urine Protein 229.7      [09-11-18 @ 18:40]  Urine Sodium 51      [09-11-18 @ 18:40]    Iron 34, TIBC 155, %sat --      [09-11-18 @ 05:45]  Ferritin 355.3      [09-11-18 @ 05:45]  PTH 42.84 (Ca --)      [09-11-18 @ 05:45]   --  Vitamin D (25OH) 11.8      [09-11-18 @ 05:45]  HbA1c 6.5      [09-08-18 @ 05:20]        RADIOLOGY & ADDITIONAL STUDIES:

## 2018-09-12 NOTE — PROGRESS NOTE ADULT - SUBJECTIVE AND OBJECTIVE BOX
Keaton Duvall MD  Interventional Cardiology  [place] Office : [address] [place] [address] [facility] [address]  Tel:   [place] Office : [address] [facility] [place] [address]  Tel: [phone]  Cell : [phone]    Subjective : Pt lying in bed comfortable, not in distress, denies any chest pain or SOB  	  MEDICATIONS:  amLODIPine   Tablet 10 milliGRAM(s) Oral daily  aspirin enteric coated 81 milliGRAM(s) Oral daily  heparin  Injectable 5000 Unit(s) SubCutaneous every 8 hours  hydrALAZINE 100 milliGRAM(s) Oral three times a day  metoprolol succinate  milliGRAM(s) Oral daily    piperacillin/tazobactam IVPB. 3.375 Gram(s) IV Intermittent every 8 hours  Appearance: Normal	  HEENT:   Normal oral mucosa, PERRL, EOMI	  Cardiovascular: Normal S1 S2, No JVD, No murmurs, No edema  Respiratory: Lungs clear to auscultation	  Gastrointestinal:  Soft, Non-tender, + BS	  Extremities: RLE dressing intact vancomycin  IVPB 1000 milliGRAM(s) IV Intermittent every 24 hours      acetaminophen   Tablet .. 650 milliGRAM(s) Oral every 6 hours PRN      atorvastatin 40 milliGRAM(s) Oral at bedtime  insulin lispro (HumaLOG) corrective regimen sliding scale   SubCutaneous three times a day before meals  insulin lispro (HumaLOG) corrective regimen sliding scale   SubCutaneous at bedtime    Dakins Solution - 1/4 Strength 1 Application(s) Topical daily  Dakins Solution - 1/4 Strength 1 Application(s) Topical Once  ergocalciferol 57319 Unit(s) Oral every week  folic acid 1 milliGRAM(s) Oral daily  influenza   Vaccine 0.5 milliLiter(s) IntraMuscular once  sodium bicarbonate 650 milliGRAM(s) Oral three times a day      PHYSICAL EXAM:  T(C): 37.1 (09-12-18 @ 13:11), Max: 37.2 (09-12-18 @ 06:21)  HR: 77 (09-12-18 @ 13:11) (72 - 78)  BP: 154/74 (09-12-18 @ 13:11) (148/73 - 154/74)  RR: 18 (09-12-18 @ 06:21) (18 - 18)  SpO2: 97% (09-12-18 @ 13:11) (94% - 97%)  Wt(kg): --  I&O's Summary                                                  8.4    9.65  )-----------( 508      ( 12 Sep 2018 06:00 )             26.7     09-12    140  |  103  |  36<H>  ----------------------------<  128<H>  3.6   |  22  |  2.73<H>    Ca    8.9      12 Sep 2018 06:00  Phos  4.1     09-11    TPro  6.5  /  Alb  2.5<L>  /  TBili  < 0.2<L>  /  DBili  x   /  AST  10  /  ALT  11  /  AlkPhos  64  09-12    proBNP:   Lipid Profile:   HgA1c:   TSH:                                             8.4    9.65  )-----------( 508      ( 12 Sep 2018 06:00 )             26.7     09-12    140  |  103  |  36<H>  ----------------------------<  128<H>  3.6   |  22  |  2.73<H>    Ca    8.9      12 Sep 2018 06:00  Phos  4.1     09-11    TPro  6.5  /  Alb  2.5<L>  /  TBili  < 0.2<L>  /  DBili  x   /  AST  10  /  ALT  11  /  AlkPhos  64  09-12    proBNP:   Lipid Profile:   HgA1c:   TSH: Keaton Duvall MD  Interventional Cardiology  [place] Office : [address] [place] [address] [facility] [address]  Tel:   [place] Office : [address] [facility] [place] [address]  Tel: [phone]  Cell : [phone]    Subjective : Pt lying in bed comfortable, not in distress, denies any chest pain or SOB  	  MEDICATIONS:  amLODIPine   Tablet 10 milliGRAM(s) Oral daily  aspirin enteric coated 81 milliGRAM(s) Oral daily  heparin  Injectable 5000 Unit(s) SubCutaneous every 8 hours  hydrALAZINE 100 milliGRAM(s) Oral three times a day  metoprolol succinate  milliGRAM(s) Oral daily    piperacillin/tazobactam IVPB. 3.375 Gram(s) IV Intermittent every 8 hours  Appearance: Normal	  HEENT:   Normal oral mucosa, PERRL, EOMI	  Cardiovascular: Normal S1 S2, No JVD, No murmurs, No edema  Respiratory: Lungs clear to auscultation	  Gastrointestinal:  Soft, Non-tender, + BS	  Extremities: RLE dressing intact vancomycin  IVPB 1000 milliGRAM(s) IV Intermittent every 24 hours      acetaminophen   Tablet .. 650 milliGRAM(s) Oral every 6 hours PRN      atorvastatin 40 milliGRAM(s) Oral at bedtime  insulin lispro (HumaLOG) corrective regimen sliding scale   SubCutaneous three times a day before meals  insulin lispro (HumaLOG) corrective regimen sliding scale   SubCutaneous at bedtime    Dakins Solution - 1/4 Strength 1 Application(s) Topical daily  Dakins Solution - 1/4 Strength 1 Application(s) Topical Once  ergocalciferol 90129 Unit(s) Oral every week  folic acid 1 milliGRAM(s) Oral daily  influenza   Vaccine 0.5 milliLiter(s) IntraMuscular once  sodium bicarbonate 650 milliGRAM(s) Oral three times a day      PHYSICAL EXAM:  T(C): 37.1 (09-12-18 @ 13:11), Max: 37.2 (09-12-18 @ 06:21)  HR: 77 (09-12-18 @ 13:11) (72 - 78)  BP: 154/74 (09-12-18 @ 13:11) (148/73 - 154/74)  RR: 18 (09-12-18 @ 06:21) (18 - 18)  SpO2: 97% (09-12-18 @ 13:11) (94% - 97%)  Wt(kg): --  I&O's Summary            Appearance: Normal	  HEENT:   Normal oral mucosa, PERRL, EOMI	  Cardiovascular: Normal S1 S2, No JVD, No murmurs, No edema  Respiratory: Lungs clear to auscultation	  Gastrointestinal:  Soft, Non-tender, + BS	  Extremities: RLE dressing intact                                           8.4    9.65  )-----------( 508      ( 12 Sep 2018 06:00 )             26.7     09-12    140  |  103  |  36<H>  ----------------------------<  128<H>  3.6   |  22  |  2.73<H>    Ca    8.9      12 Sep 2018 06:00  Phos  4.1     09-11    TPro  6.5  /  Alb  2.5<L>  /  TBili  < 0.2<L>  /  DBili  x   /  AST  10  /  ALT  11  /  AlkPhos  64  09-12    proBNP:   Lipid Profile:   HgA1c:   TSH:                                             8.4    9.65  )-----------( 508      ( 12 Sep 2018 06:00 )             26.7     09-12    140  |  103  |  36<H>  ----------------------------<  128<H>  3.6   |  22  |  2.73<H>    Ca    8.9      12 Sep 2018 06:00  Phos  4.1     09-11    TPro  6.5  /  Alb  2.5<L>  /  TBili  < 0.2<L>  /  DBili  x   /  AST  10  /  ALT  11  /  AlkPhos  64  09-12    proBNP:   Lipid Profile:   HgA1c:   TSH:

## 2018-09-12 NOTE — PROGRESS NOTE ADULT - PROBLEM SELECTOR PLAN 7
b/l cyst likely simple, increased in size, can be managed outpatient b/l cyst likely simple, increased in size,   can be managed outpatient- urology

## 2018-09-12 NOTE — PROGRESS NOTE ADULT - PROBLEM SELECTOR PLAN 8
PTH optimal  vit D low  phos level optimal   will d/c calcitriol and on vit d 50092 weekly   monitor ca, phos.

## 2018-09-12 NOTE — PROGRESS NOTE ADULT - ASSESSMENT
EKG - NSR     A/P     1) LE wound and swelling - Podiatry and ID onboard, No evidence of Obstructive PAD , pending 2d echo to access LV     2) Right foot wound - podiatry and ID on case cont zosyn and vanco     3) HTN - cont hydralazine toprol and amlodipine    4) ABEL on CKD: Appreciate renal recs, Renal function improving

## 2018-09-12 NOTE — PROGRESS NOTE ADULT - PROBLEM SELECTOR PLAN 1
Pt with ABEL likely sec to ATN,   renal function improving   currently on IV zoysn and vanco.  vanco level 22 today  Kidney/bladder US done showed b/l cyst no obstruction, mildly enlarged kidney size likely sec longstanding DM   monitor bmp, strict I/O  avoid nephrotoxic agents. Pt with ABEL likely sec to ATN, FEna>1%  renal function improving   currently on IV zoysn and vanco.  vanco level 22 today  Kidney/bladder US done showed b/l cyst no obstruction, mildly enlarged kidney size likely sec longstanding DM   monitor bmp, strict I/O  avoid nephrotoxic agents.

## 2018-09-12 NOTE — PROGRESS NOTE ADULT - SUBJECTIVE AND OBJECTIVE BOX
Patient is a 63y old  Male who presents with a chief complaint of R foot wound (12 Sep 2018 14:31)      INTERVAL HPI/OVERNIGHT EVENTS:  T(C): 37.1 (18 @ 13:11), Max: 37.2 (18 @ 06:21)  HR: 77 (18 @ 13:11) (72 - 78)  BP: 154/74 (18 @ 13:11) (148/73 - 154/74)  RR: 18 (18 @ 06:21) (18 - 18)  SpO2: 97% (18 @ 13:11) (94% - 97%)  Wt(kg): --  I&O's Summary      LABS:                        8.4    9.65  )-----------( 508      ( 12 Sep 2018 06:00 )             26.7         140  |  103  |  36<H>  ----------------------------<  128<H>  3.6   |  22  |  2.73<H>    Ca    8.9      12 Sep 2018 06:00  Phos  4.1         TPro  6.5  /  Alb  2.5<L>  /  TBili  < 0.2<L>  /  DBili  x   /  AST  10  /  ALT  11  /  AlkPhos  64        Urinalysis Basic - ( 11 Sep 2018 18:40 )    Color: LIGHT YELLOW / Appearance: CLEAR / S.011 / pH: 6.0  Gluc: 100 / Ketone: NEGATIVE  / Bili: NEGATIVE / Urobili: NORMAL   Blood: NEGATIVE / Protein: 200 / Nitrite: NEGATIVE   Leuk Esterase: NEGATIVE / RBC: 3-5 / WBC 0-2   Sq Epi: OCC / Non Sq Epi: x / Bacteria: NEGATIVE      CAPILLARY BLOOD GLUCOSE      POCT Blood Glucose.: 143 mg/dL (12 Sep 2018 12:52)  POCT Blood Glucose.: 136 mg/dL (12 Sep 2018 08:39)  POCT Blood Glucose.: 148 mg/dL (11 Sep 2018 22:23)  POCT Blood Glucose.: 130 mg/dL (11 Sep 2018 17:50)        Urinalysis Basic - ( 11 Sep 2018 18:40 )    Color: LIGHT YELLOW / Appearance: CLEAR / S.011 / pH: 6.0  Gluc: 100 / Ketone: NEGATIVE  / Bili: NEGATIVE / Urobili: NORMAL   Blood: NEGATIVE / Protein: 200 / Nitrite: NEGATIVE   Leuk Esterase: NEGATIVE / RBC: 3-5 / WBC 0-2   Sq Epi: OCC / Non Sq Epi: x / Bacteria: NEGATIVE        MEDICATIONS  (STANDING):  amLODIPine   Tablet 10 milliGRAM(s) Oral daily  aspirin enteric coated 81 milliGRAM(s) Oral daily  atorvastatin 40 milliGRAM(s) Oral at bedtime  Dakins Solution - 1/4 Strength 1 Application(s) Topical daily  Dakins Solution - 1/4 Strength 1 Application(s) Topical Once  ergocalciferol 62253 Unit(s) Oral every week  folic acid 1 milliGRAM(s) Oral daily  heparin  Injectable 5000 Unit(s) SubCutaneous every 8 hours  hydrALAZINE 100 milliGRAM(s) Oral three times a day  influenza   Vaccine 0.5 milliLiter(s) IntraMuscular once  insulin lispro (HumaLOG) corrective regimen sliding scale   SubCutaneous three times a day before meals  insulin lispro (HumaLOG) corrective regimen sliding scale   SubCutaneous at bedtime  lidocaine 1% Injectable 30 milliLiter(s) Local Injection once  metoprolol succinate  milliGRAM(s) Oral daily  piperacillin/tazobactam IVPB. 3.375 Gram(s) IV Intermittent every 8 hours  sodium bicarbonate 650 milliGRAM(s) Oral three times a day  vancomycin  IVPB 1000 milliGRAM(s) IV Intermittent every 24 hours    MEDICATIONS  (PRN):  acetaminophen   Tablet .. 650 milliGRAM(s) Oral every 6 hours PRN Moderate Pain (4 - 6), Severe Pain (7 - 10)          PHYSICAL EXAM:  GENERAL: NAD, well-groomed, well-developed  HEAD:  Atraumatic, Normocephalic  CHEST/LUNG: Clear to percussion bilaterally; No rales, rhonchi, wheezing, or rubs  HEART: Regular rate and rhythm; No murmurs, rubs, or gallops  ABDOMEN: Soft, Nontender, Nondistended; Bowel sounds present  EXTREMITIES: Right foot dressing  LYMPH: No lymphadenopathy noted  SKIN: No rashes or lesions    Care Discussed with Consultants/Other Providers [ ] YES  [ ] NO

## 2018-09-12 NOTE — CHART NOTE - NSCHARTNOTEFT_GEN_A_CORE
PRE-INTERVENTIONAL RADIOLOGY PROCEDURE NOTE    Patient Age:  63  Patient Gender: M  Procedure (including site / side if known): PICC  Diagnosis / Indication: osteomyelitis  Interventional Radiology Attending Physician:    Ordering Attending Physician:  Patric / Eloina  Pertinent medical history: HTN, HLD,CKD3, DM2  Pertinent labs:                         8.4    9.65  )-----------( 508      ( 12 Sep 2018 06:00 )             26.7     09-12    140  |  103  |  36<H>  ----------------------------<  128<H>  3.6   |  22  |  2.73<H>    Ca    8.9      12 Sep 2018 06:00  Phos  4.1     09-11    TPro  6.5  /  Alb  2.5<L>  /  TBili  < 0.2<L>  /  DBili  x   /  AST  10  /  ALT  11  /  AlkPhos  64  09-12    Patient  aware? Yes         Print Sign Skye Ashby NP  Contact #: [phone]

## 2018-09-12 NOTE — PROGRESS NOTE ADULT - SUBJECTIVE AND OBJECTIVE BOX
Infectious Diseases progress note:    Subjective: NAD, no fever.  No foot pain    ROS:  CONSTITUTIONAL:  No fever, chills, rigors  CARDIOVASCULAR:  No chest pain or palpitations  RESPIRATORY:   No SOB, cough, dyspnea on exertion.  No wheezing  GASTROINTESTINAL:  No abd pain, N/V, diarrhea/constipation  EXTREMITIES:  No swelling or joint pain  GENITOURINARY:  No burning on urination, increased frequency or urgency.  No flank pain  NEUROLOGIC:  No HA, visual disturbances  SKIN: No rashes    Allergies    No Known Allergies    Intolerances        ANTIBIOTICS/RELEVANT:  antimicrobials  piperacillin/tazobactam IVPB. 3.375 Gram(s) IV Intermittent every 8 hours  vancomycin  IVPB 1000 milliGRAM(s) IV Intermittent every 24 hours    immunologic:  influenza   Vaccine 0.5 milliLiter(s) IntraMuscular once    OTHER:  acetaminophen   Tablet .. 650 milliGRAM(s) Oral every 6 hours PRN  amLODIPine   Tablet 10 milliGRAM(s) Oral daily  aspirin enteric coated 81 milliGRAM(s) Oral daily  atorvastatin 40 milliGRAM(s) Oral at bedtime  Dakins Solution - 1/4 Strength 1 Application(s) Topical daily  Dakins Solution - 1/4 Strength 1 Application(s) Topical Once  ergocalciferol 32706 Unit(s) Oral every week  folic acid 1 milliGRAM(s) Oral daily  heparin  Injectable 5000 Unit(s) SubCutaneous every 8 hours  hydrALAZINE 100 milliGRAM(s) Oral three times a day  insulin lispro (HumaLOG) corrective regimen sliding scale   SubCutaneous three times a day before meals  insulin lispro (HumaLOG) corrective regimen sliding scale   SubCutaneous at bedtime  lidocaine 1% Injectable 30 milliLiter(s) Local Injection once  metoprolol succinate  milliGRAM(s) Oral daily  sodium bicarbonate 650 milliGRAM(s) Oral three times a day      Objective:  Vital Signs Last 24 Hrs  T(C): 37.1 (12 Sep 2018 13:11), Max: 37.2 (12 Sep 2018 06:21)  T(F): 98.7 (12 Sep 2018 13:11), Max: 98.9 (12 Sep 2018 06:21)  HR: 77 (12 Sep 2018 13:11) (72 - 78)  BP: 154/74 (12 Sep 2018 13:11) (148/73 - 154/74)  BP(mean): --  RR: 18 (12 Sep 2018 06:21) (18 - 18)  SpO2: 97% (12 Sep 2018 13:11) (94% - 97%)    PHYSICAL EXAM:  Constitutional:NAD  Eyes:SPIKE, EOMI  Ear/Nose/Throat: no thrush, mucositis.  Moist mucous membranes	  Neck:no JVD, no lymphadenopathy, supple  Respiratory: CTA lobito  Cardiovascular: S1S2 RRR, no murmurs  Gastrointestinal:soft, nontender,  nondistended (+) BS  Extremities:no e/e/c  Skin:  rt foot wound dsg c/d/i        LABS:                        8.4    9.65  )-----------( 508      ( 12 Sep 2018 06:00 )             26.7         140  |  103  |  36<H>  ----------------------------<  128<H>  3.6   |  22  |  2.73<H>    Ca    8.9      12 Sep 2018 06:00  Phos  4.1         TPro  6.5  /  Alb  2.5<L>  /  TBili  < 0.2<L>  /  DBili  x   /  AST  10  /  ALT  11  /  AlkPhos  64        Urinalysis Basic - ( 11 Sep 2018 18:40 )    Color: LIGHT YELLOW / Appearance: CLEAR / S.011 / pH: 6.0  Gluc: 100 / Ketone: NEGATIVE  / Bili: NEGATIVE / Urobili: NORMAL   Blood: NEGATIVE / Protein: 200 / Nitrite: NEGATIVE   Leuk Esterase: NEGATIVE / RBC: 3-5 / WBC 0-2   Sq Epi: OCC / Non Sq Epi: x / Bacteria: NEGATIVE          Vancomycin Level, Random:  ug/mL ( @ 06:00)  Vancomycin Level, Random:  ug/mL ( @ 05:45)  Vancomycin Level, Random:  ug/mL ( @ 05:45)  Vancomycin Level, Random:  ug/mL ( @ 05:20)                  MICROBIOLOGY:    Culture - Abscess with Gram Stain (18 @ 13:53)    Specimen Source: OTHER    Gram Stain Result:   NOS^No Organisms Seen  WBC^White Blood Cells  QNTY CELLS IN GRAM STAIN: NO CELLS SEEN    Culture Results:   NO GROWTH - PRELIMINARY RESULTS    Culture - Abscess with Gram Stain (18 @ 19:53)    Specimen Source: OTHER    Gram Stain Result:   GPCPR^Gram Pos Cocci in Pairs  QUANTITY OF BACTERIA SEEN: RARE (1+)  GNR^Gram Neg Rods  QUANTITY OF BACTERIA SEEN: RARE (1+)  WBC^White Blood Cells  QNTY CELLS IN GRAM STAIN: NO CELLS SEEN    Culture Results:   MANY  Routine susceptibility testing not performed as  Streptococcus Grp A/B is susceptible to drug(s) of choice -  Penicillin and Ampicillin  STRAG^Streptococcus agalactiae Gp B  DENICE^Corynebacterium species  ACMSP^Actinomyces species  ARTSP^Arthrobacter species    Culture - Blood (18 @ 19:16)    Culture - Blood:   NO ORGANISMS ISOLATED  NO ORGANISMS ISOLATED AT 96 HOURS    Specimen Source: BLOOD VENOUS    Culture - Blood (18 @ 19:16)    Culture - Blood:   NO ORGANISMS ISOLATED  NO ORGANISMS ISOLATED AT 96 HOURS    Specimen Source: BLOOD PERIPHERAL          RADIOLOGY & ADDITIONAL STUDIES:    < from:  Kidney and Bladder (18 @ 08:55) >  FINDINGS:    Right kidney:  12.7 cm. No renal mass, hydronephrosis or shadowing   calculi. Redemonstration of a right lateral cyst measuring up to 1.3 cm,   previously 0.9 cm.    Left kidney:  13.8 cm. No renal mass, hydronephrosis or shadowing   calculi. Redemonstration of lower pole renal cyst measuring up to 2.2 cm,   slightly increased from the prior exam.    Urinary bladder: Underdistended, limiting evaluation.    IMPRESSION:     No hydronephrosis.    < end of copied text >

## 2018-09-12 NOTE — PROGRESS NOTE ADULT - ASSESSMENT
70 M with right foot non-healing ulcer to bone  - Pt was seen and evaluated  - wound  probes to bone   - high suspicion for OM clinically PTB, and elevated inflammatory markers  - f/u bone biopsy culture, prelim NOS  - f/u bone path   - Continue IV abx per ID  - seen w/ attending

## 2018-09-12 NOTE — PROGRESS NOTE ADULT - SUBJECTIVE AND OBJECTIVE BOX
Patient is a 63y old  Male who presents with a chief complaint of R foot wound (11 Sep 2018 16:06)       INTERVAL HPI/OVERNIGHT EVENTS:  Patient seen and evaluated at bedside.  Pt is resting comfortable in NAD. Denies N/V/F/C.  Pain rated at X/10    Allergies    No Known Allergies    Intolerances        Vital Signs Last 24 Hrs  T(C): 37.2 (12 Sep 2018 06:21), Max: 37.2 (12 Sep 2018 06:21)  T(F): 98.9 (12 Sep 2018 06:21), Max: 98.9 (12 Sep 2018 06:21)  HR: 78 (12 Sep 2018 06:21) (72 - 78)  BP: 154/67 (12 Sep 2018 06:21) (148/73 - 156/77)  BP(mean): --  RR: 18 (12 Sep 2018 06:21) (18 - 18)  SpO2: 94% (12 Sep 2018 06:21) (94% - 97%)    LABS:                        8.4    9.65  )-----------( 508      ( 12 Sep 2018 06:00 )             26.7     0912    140  |  103  |  36<H>  ----------------------------<  128<H>  3.6   |  22  |  2.73<H>    Ca    8.9      12 Sep 2018 06:00  Phos  4.1     -    TPro  6.5  /  Alb  2.5<L>  /  TBili  < 0.2<L>  /  DBili  x   /  AST  10  /  ALT  11  /  AlkPhos  64  -12      Urinalysis Basic - ( 11 Sep 2018 18:40 )    Color: LIGHT YELLOW / Appearance: CLEAR / S.011 / pH: 6.0  Gluc: 100 / Ketone: NEGATIVE  / Bili: NEGATIVE / Urobili: NORMAL   Blood: NEGATIVE / Protein: 200 / Nitrite: NEGATIVE   Leuk Esterase: NEGATIVE / RBC: 3-5 / WBC 0-2   Sq Epi: OCC / Non Sq Epi: x / Bacteria: NEGATIVE      CAPILLARY BLOOD GLUCOSE      POCT Blood Glucose.: 136 mg/dL (12 Sep 2018 08:39)  POCT Blood Glucose.: 148 mg/dL (11 Sep 2018 22:23)  POCT Blood Glucose.: 130 mg/dL (11 Sep 2018 17:50)  POCT Blood Glucose.: 152 mg/dL (11 Sep 2018 12:42)      Lower Extremity Physical Exam:  Vasular: DP/PT 0/4, B/L, CFT < 5 seconds B/L, Temperature gradient slightly warmer on right  Neuro: Epicritic sensation diminisehd_ to the level of _digits, B/L.  Musculoskeletal/Ortho: charcot foot to the right   Skin: edema to the right LE, 3+  Wound #1: right foot planar medial wound   Size: 3x2.8cm  Depth: to bone  Wound bed: fibrogranular  Drainage: serous   Odor: mal odor   Periwound: hyperkaratotic  Etiology: Charcot     RADIOLOGY & ADDITIONAL TESTS:  Culture - Abscess with Gram Stain (18 @ 13:53)    Specimen Source: OTHER    Gram Stain Result:   NOS^No Organisms Seen  WBC^White Blood Cells  QNTY CELLS IN GRAM STAIN: NO CELLS SEEN    Culture Results:   NO GROWTH - PRELIMINARY RESULTS

## 2018-09-13 LAB
BUN SERPL-MCNC: 39 MG/DL — HIGH (ref 7–23)
CALCIUM SERPL-MCNC: 9 MG/DL — SIGNIFICANT CHANGE UP (ref 8.4–10.5)
CHLORIDE SERPL-SCNC: 104 MMOL/L — SIGNIFICANT CHANGE UP (ref 98–107)
CO2 SERPL-SCNC: 23 MMOL/L — SIGNIFICANT CHANGE UP (ref 22–31)
CREAT SERPL-MCNC: 2.66 MG/DL — HIGH (ref 0.5–1.3)
GLUCOSE SERPL-MCNC: 127 MG/DL — HIGH (ref 70–99)
HCT VFR BLD CALC: 27.1 % — LOW (ref 39–50)
HGB BLD-MCNC: 8.5 G/DL — LOW (ref 13–17)
MCHC RBC-ENTMCNC: 28.7 PG — SIGNIFICANT CHANGE UP (ref 27–34)
MCHC RBC-ENTMCNC: 31.4 % — LOW (ref 32–36)
MCV RBC AUTO: 91.6 FL — SIGNIFICANT CHANGE UP (ref 80–100)
NRBC # FLD: 0 — SIGNIFICANT CHANGE UP
PLATELET # BLD AUTO: 417 K/UL — HIGH (ref 150–400)
PMV BLD: 11 FL — SIGNIFICANT CHANGE UP (ref 7–13)
POTASSIUM SERPL-MCNC: 3.7 MMOL/L — SIGNIFICANT CHANGE UP (ref 3.5–5.3)
POTASSIUM SERPL-SCNC: 3.7 MMOL/L — SIGNIFICANT CHANGE UP (ref 3.5–5.3)
RBC # BLD: 2.96 M/UL — LOW (ref 4.2–5.8)
RBC # FLD: 13.2 % — SIGNIFICANT CHANGE UP (ref 10.3–14.5)
SODIUM SERPL-SCNC: 139 MMOL/L — SIGNIFICANT CHANGE UP (ref 135–145)
VANCOMYCIN FLD-MCNC: 22.4 UG/ML — SIGNIFICANT CHANGE UP
WBC # BLD: 10.88 K/UL — HIGH (ref 3.8–10.5)
WBC # FLD AUTO: 10.88 K/UL — HIGH (ref 3.8–10.5)

## 2018-09-13 PROCEDURE — 77001 FLUOROGUIDE FOR VEIN DEVICE: CPT | Mod: 26,GC

## 2018-09-13 PROCEDURE — 76937 US GUIDE VASCULAR ACCESS: CPT | Mod: 26

## 2018-09-13 PROCEDURE — 36569 INSJ PICC 5 YR+ W/O IMAGING: CPT

## 2018-09-13 RX ADMIN — HEPARIN SODIUM 5000 UNIT(S): 5000 INJECTION INTRAVENOUS; SUBCUTANEOUS at 13:42

## 2018-09-13 RX ADMIN — ATORVASTATIN CALCIUM 40 MILLIGRAM(S): 80 TABLET, FILM COATED ORAL at 22:41

## 2018-09-13 RX ADMIN — HEPARIN SODIUM 5000 UNIT(S): 5000 INJECTION INTRAVENOUS; SUBCUTANEOUS at 06:50

## 2018-09-13 RX ADMIN — Medication 650 MILLIGRAM(S): at 13:42

## 2018-09-13 RX ADMIN — Medication 2: at 13:42

## 2018-09-13 RX ADMIN — Medication 100 MILLIGRAM(S): at 06:50

## 2018-09-13 RX ADMIN — Medication 1 APPLICATION(S): at 13:42

## 2018-09-13 RX ADMIN — Medication 650 MILLIGRAM(S): at 22:41

## 2018-09-13 RX ADMIN — Medication 100 MILLIGRAM(S): at 13:42

## 2018-09-13 RX ADMIN — Medication 650 MILLIGRAM(S): at 06:50

## 2018-09-13 RX ADMIN — PIPERACILLIN AND TAZOBACTAM 25 GRAM(S): 4; .5 INJECTION, POWDER, LYOPHILIZED, FOR SOLUTION INTRAVENOUS at 06:50

## 2018-09-13 RX ADMIN — Medication 81 MILLIGRAM(S): at 13:42

## 2018-09-13 RX ADMIN — PIPERACILLIN AND TAZOBACTAM 25 GRAM(S): 4; .5 INJECTION, POWDER, LYOPHILIZED, FOR SOLUTION INTRAVENOUS at 22:41

## 2018-09-13 RX ADMIN — AMLODIPINE BESYLATE 10 MILLIGRAM(S): 2.5 TABLET ORAL at 06:49

## 2018-09-13 RX ADMIN — PIPERACILLIN AND TAZOBACTAM 25 GRAM(S): 4; .5 INJECTION, POWDER, LYOPHILIZED, FOR SOLUTION INTRAVENOUS at 13:50

## 2018-09-13 RX ADMIN — Medication 1 MILLIGRAM(S): at 13:42

## 2018-09-13 RX ADMIN — Medication 100 MILLIGRAM(S): at 22:41

## 2018-09-13 RX ADMIN — HEPARIN SODIUM 5000 UNIT(S): 5000 INJECTION INTRAVENOUS; SUBCUTANEOUS at 22:41

## 2018-09-13 NOTE — PROGRESS NOTE ADULT - ASSESSMENT
EKG - NSR     A/P     1) LE wound and swelling - Podiatry and ID onboard, No evidence of Obstructive PAD , pending 2d echo to access LV     2) Right foot wound/OM - podiatry and ID on case cont zosyn and vanco     3) HTN - cont hydralazine toprol and amlodipine    4) ABEL on CKD: Appreciate renal recs, Renal function improving

## 2018-09-13 NOTE — PROGRESS NOTE ADULT - SUBJECTIVE AND OBJECTIVE BOX
Patient is a 63y old  Male who presents with a chief complaint of R foot wound (13 Sep 2018 17:07)      INTERVAL HPI/OVERNIGHT EVENTS:  T(C): 36.9 (09-13-18 @ 13:00), Max: 37.2 (09-12-18 @ 20:54)  HR: 75 (09-13-18 @ 13:00) (75 - 81)  BP: 142/60 (09-13-18 @ 13:00) (142/60 - 153/75)  RR: 18 (09-13-18 @ 13:00) (18 - 18)  SpO2: 97% (09-13-18 @ 13:00) (93% - 97%)  Wt(kg): --  I&O's Summary      LABS:                        8.5    10.88 )-----------( 417      ( 13 Sep 2018 06:52 )             27.1     09-13    139  |  104  |  39<H>  ----------------------------<  127<H>  3.7   |  23  |  2.66<H>    Ca    9.0      13 Sep 2018 06:52    TPro  6.5  /  Alb  2.5<L>  /  TBili  < 0.2<L>  /  DBili  x   /  AST  10  /  ALT  11  /  AlkPhos  64  09-12        CAPILLARY BLOOD GLUCOSE      POCT Blood Glucose.: 139 mg/dL (13 Sep 2018 17:49)  POCT Blood Glucose.: 174 mg/dL (13 Sep 2018 12:48)  POCT Blood Glucose.: 144 mg/dL (13 Sep 2018 08:45)  POCT Blood Glucose.: 180 mg/dL (12 Sep 2018 22:27)            MEDICATIONS  (STANDING):  amLODIPine   Tablet 10 milliGRAM(s) Oral daily  aspirin enteric coated 81 milliGRAM(s) Oral daily  atorvastatin 40 milliGRAM(s) Oral at bedtime  Dakins Solution - 1/4 Strength 1 Application(s) Topical daily  Dakins Solution - 1/4 Strength 1 Application(s) Topical Once  ergocalciferol 85370 Unit(s) Oral every week  folic acid 1 milliGRAM(s) Oral daily  heparin  Injectable 5000 Unit(s) SubCutaneous every 8 hours  hydrALAZINE 100 milliGRAM(s) Oral three times a day  influenza   Vaccine 0.5 milliLiter(s) IntraMuscular once  insulin lispro (HumaLOG) corrective regimen sliding scale   SubCutaneous three times a day before meals  insulin lispro (HumaLOG) corrective regimen sliding scale   SubCutaneous at bedtime  lidocaine 1% Injectable 30 milliLiter(s) Local Injection once  metoprolol succinate  milliGRAM(s) Oral daily  piperacillin/tazobactam IVPB. 3.375 Gram(s) IV Intermittent every 8 hours  sodium bicarbonate 650 milliGRAM(s) Oral three times a day  vancomycin  IVPB 1000 milliGRAM(s) IV Intermittent every 24 hours    MEDICATIONS  (PRN):  acetaminophen   Tablet .. 650 milliGRAM(s) Oral every 6 hours PRN Moderate Pain (4 - 6), Severe Pain (7 - 10)          PHYSICAL EXAM:  GENERAL: NAD, well-groomed, well-developed  HEAD:  Atraumatic, Normocephalic  CHEST/LUNG: Clear to percussion bilaterally; No rales, rhonchi, wheezing, or rubs  HEART: Regular rate and rhythm; No murmurs, rubs, or gallops  ABDOMEN: Soft, Nontender, Nondistended; Bowel sounds present  EXTREMITIES:  2+ Peripheral Pulses, No clubbing, cyanosis, or edema  LYMPH: No lymphadenopathy noted  SKIN: No rashes or lesions    Care Discussed with Consultants/Other Providers [ ] YES  [ ] NO

## 2018-09-13 NOTE — PROGRESS NOTE ADULT - ASSESSMENT
64 y/o M PMH HTN, HLD, CKD3, DM2 admitted for right foot non-healing ulcer to bone 2/2 Charcot and ABEL

## 2018-09-13 NOTE — PROGRESS NOTE ADULT - SUBJECTIVE AND OBJECTIVE BOX
Infectious Diseases progress note:    Subjective: NAD, afebrile.  No acute o/n events    ROS:  CONSTITUTIONAL:  No fever, chills, rigors  CARDIOVASCULAR:  No chest pain or palpitations  RESPIRATORY:   No SOB, cough, dyspnea on exertion.  No wheezing  GASTROINTESTINAL:  No abd pain, N/V, diarrhea/constipation  EXTREMITIES:  No swelling or joint pain  GENITOURINARY:  No burning on urination, increased frequency or urgency.  No flank pain  NEUROLOGIC:  No HA, visual disturbances  SKIN: No rashes    Allergies    No Known Allergies    Intolerances        ANTIBIOTICS/RELEVANT:  antimicrobials  piperacillin/tazobactam IVPB. 3.375 Gram(s) IV Intermittent every 8 hours  vancomycin  IVPB 1000 milliGRAM(s) IV Intermittent every 24 hours    immunologic:  influenza   Vaccine 0.5 milliLiter(s) IntraMuscular once    OTHER:  acetaminophen   Tablet .. 650 milliGRAM(s) Oral every 6 hours PRN  amLODIPine   Tablet 10 milliGRAM(s) Oral daily  aspirin enteric coated 81 milliGRAM(s) Oral daily  atorvastatin 40 milliGRAM(s) Oral at bedtime  Dakins Solution - 1/4 Strength 1 Application(s) Topical daily  Dakins Solution - 1/4 Strength 1 Application(s) Topical Once  ergocalciferol 33027 Unit(s) Oral every week  folic acid 1 milliGRAM(s) Oral daily  heparin  Injectable 5000 Unit(s) SubCutaneous every 8 hours  hydrALAZINE 100 milliGRAM(s) Oral three times a day  insulin lispro (HumaLOG) corrective regimen sliding scale   SubCutaneous three times a day before meals  insulin lispro (HumaLOG) corrective regimen sliding scale   SubCutaneous at bedtime  lidocaine 1% Injectable 30 milliLiter(s) Local Injection once  metoprolol succinate  milliGRAM(s) Oral daily  sodium bicarbonate 650 milliGRAM(s) Oral three times a day      Objective:  Vital Signs Last 24 Hrs  T(C): 36.9 (13 Sep 2018 13:00), Max: 37.2 (12 Sep 2018 20:54)  T(F): 98.5 (13 Sep 2018 13:00), Max: 98.9 (12 Sep 2018 20:54)  HR: 75 (13 Sep 2018 13:00) (75 - 81)  BP: 142/60 (13 Sep 2018 13:00) (142/60 - 153/75)  BP(mean): --  RR: 18 (13 Sep 2018 13:00) (18 - 18)  SpO2: 97% (13 Sep 2018 13:00) (93% - 97%)    PHYSICAL EXAM:  Constitutional:NAD  Eyes:SPIKE, EOMI  Ear/Nose/Throat: no thrush, mucositis.  Moist mucous membranes	  Neck:no JVD, no lymphadenopathy, supple  Respiratory: CTA lobito  Cardiovascular: S1S2 RRR, no murmurs  Gastrointestinal:soft, nontender,  nondistended (+) BS  Extremities:no e/e/c  Skin:  rt plantar foot ulcer stable, dsg c/d/i        LABS:                        8.5    10.88 )-----------( 417      ( 13 Sep 2018 06:52 )             27.1         139  |  104  |  39<H>  ----------------------------<  127<H>  3.7   |  23  |  2.66<H>    Ca    9.0      13 Sep 2018 06:52    TPro  6.5  /  Alb  2.5<L>  /  TBili  < 0.2<L>  /  DBili  x   /  AST  10  /  ALT  11  /  AlkPhos  64        Urinalysis Basic - ( 11 Sep 2018 18:40 )    Color: LIGHT YELLOW / Appearance: CLEAR / S.011 / pH: 6.0  Gluc: 100 / Ketone: NEGATIVE  / Bili: NEGATIVE / Urobili: NORMAL   Blood: NEGATIVE / Protein: 200 / Nitrite: NEGATIVE   Leuk Esterase: NEGATIVE / RBC: 3-5 / WBC 0-2   Sq Epi: OCC / Non Sq Epi: x / Bacteria: NEGATIVE          Vancomycin Level, Random:  ug/mL ( @ 06:52)  Vancomycin Level, Random:  ug/mL ( @ 06:00)  Vancomycin Level, Random:  ug/mL ( @ 05:45)  Vancomycin Level, Random:  ug/mL ( @ 05:45)                  MICROBIOLOGY:    Culture - Abscess with Gram Stain (18 @ 13:53)    Specimen Source: OTHER    Gram Stain Result:   NOS^No Organisms Seen  WBC^White Blood Cells  QNTY CELLS IN GRAM STAIN: NO CELLS SEEN    Culture Results:   NO GROWTH - PRELIMINARY RESULTS  NO ORGANISMS ISOLATED AT 24 HOURS    Culture - Abscess with Gram Stain (18 @ 19:53)    Specimen Source: OTHER    Gram Stain Result:   GPCPR^Gram Pos Cocci in Pairs  QUANTITY OF BACTERIA SEEN: RARE (1+)  GNR^Gram Neg Rods  QUANTITY OF BACTERIA SEEN: RARE (1+)  WBC^White Blood Cells  QNTY CELLS IN GRAM STAIN: NO CELLS SEEN    Culture Results:   MANY  Routine susceptibility testing not performed as  Streptococcus Grp A/B is susceptible to drug(s) of choice -  Penicillin and Ampicillin  STRAG^Streptococcus agalactiae Gp B  DENICE^Corynebacterium species  ACMSP^Actinomyces species  ARTSP^Arthrobacter species          RADIOLOGY & ADDITIONAL STUDIES:

## 2018-09-13 NOTE — PROGRESS NOTE ADULT - ASSESSMENT
Patient is a 62 y/o M PMH HTN, HLD, CKD3, DM2 w/ R foot ulcer >3 years p/w worsening pain to R foot ulcer.  Wound was malodorous, probed to bone w/ a high suspicion for OM clinically. S/p debridement by podiatry in the ED and wound culture was taken.    Problem/Plan - 1:    ·	Rt foot plantar OM    - (+) probe to bone with malodorous drainage.  Pt with clinical OM and soft tissue infection    - Cont broad spectrum abx with vanco (dosed by level) and zosyn.  Renally dose abx.  Cont to hold vanco due to high level.  repeat random in am    - f/u wound cultures, although do not always correlate well with bone cultures (unless MSSA or MRSA).   Thus far growing Arthrobacter, Actinomyces, Coryne, and Strep    - Podiatry f/u appreciated. s/p bedside bone biopsy.  f/u culture data and path    - Indium scans s/o Charcot joint.  Pt also has OM by clinical criteria ( (+) PTB test)    - Inflammatory markers elevated.  Cont to trend    - Blood cultures NGTD.  Will plan for PICC line.  Final abx regimen to be determined pending all culture data    Will follow,    Salima Spivey  [phone]

## 2018-09-13 NOTE — PROGRESS NOTE ADULT - ASSESSMENT
64 y/o M PMH HTN, HLD, CKD3, DM2 w/ R foot ulcer >3 years p/w diabetic foot ulcer      Problem/Plan - 1:  ·  Problem: Diabetic foot ulcer with osteomyelitis.  Plan: C/w IV Vanco/Zosyn,   id fu  podiatry fu    Problem/Plan - 2:  ·  Problem: Essential hypertension.  Plan: c/w home meds     Problem/Plan - 3:  ·  Problem: Diabetes mellitus type 2, noninsulin dependent.  Plan: monitor fs  iss

## 2018-09-13 NOTE — PROGRESS NOTE ADULT - PROBLEM SELECTOR PLAN 1
Pt with ABEL likely ATN secondary to infection  FEna>1%  Renal function improving   Currently on IV zoysn and vanco.    Monitor Vanco levels   SCr in office in April was 2.15   Kidney/bladder US done showed b/l cyst no obstruction, mildly enlarged kidney size likely sec longstanding DM   Monitor bmp, strict I/O  Avoid nephrotoxic agents

## 2018-09-13 NOTE — PROGRESS NOTE ADULT - SUBJECTIVE AND OBJECTIVE BOX
Keaton Duvall MD  Interventional Cardiology  [place] Office : [phone] [place] [id number]  Tel:   [place] Office : [phone] [facility] [place] [address]  Tel: [phone]  Cell : [phone]    Subjective : Pt lying in bed comfortable, not in distress, denies any chest pain or SOB  	  MEDICATIONS:  amLODIPine   Tablet 10 milliGRAM(s) Oral daily  aspirin enteric coated 81 milliGRAM(s) Oral daily  heparin  Injectable 5000 Unit(s) SubCutaneous every 8 hours  hydrALAZINE 100 milliGRAM(s) Oral three times a day  metoprolol succinate  milliGRAM(s) Oral daily    piperacillin/tazobactam IVPB. 3.375 Gram(s) IV Intermittent every 8 hours  vancomycin  IVPB 1000 milliGRAM(s) IV Intermittent every 24 hours      acetaminophen   Tablet .. 650 milliGRAM(s) Oral every 6 hours PRN      atorvastatin 40 milliGRAM(s) Oral at bedtime  insulin lispro (HumaLOG) corrective regimen sliding scale   SubCutaneous three times a day before meals  insulin lispro (HumaLOG) corrective regimen sliding scale   SubCutaneous at bedtime    Dakins Solution - 1/4 Strength 1 Application(s) Topical daily  Dakins Solution - 1/4 Strength 1 Application(s) Topical Once  ergocalciferol 06344 Unit(s) Oral every week  folic acid 1 milliGRAM(s) Oral daily  influenza   Vaccine 0.5 milliLiter(s) IntraMuscular once  sodium bicarbonate 650 milliGRAM(s) Oral three times a day      PHYSICAL EXAM:  T(C): 36.6 (09-13-18 @ 06:48), Max: 37.2 (09-12-18 @ 20:54)  HR: 81 (09-13-18 @ 06:48) (77 - 81)  BP: 153/75 (09-13-18 @ 06:48) (151/83 - 154/74)  RR: 18 (09-13-18 @ 06:48) (18 - 18)  SpO2: 97% (09-13-18 @ 08:33) (93% - 97%)  Wt(kg): --  I&O's Summary                Appearance: Normal	  HEENT:   Normal oral mucosa, PERRL, EOMI	  Cardiovascular: Normal S1 S2, No JVD, No murmurs, No edema  Respiratory: Lungs clear to auscultation	  Gastrointestinal:  Soft, Non-tender, + BS	  Extremities: RLE dressing intact                               8.5    10.88 )-----------( 417      ( 13 Sep 2018 06:52 )             27.1     09-13    139  |  104  |  39<H>  ----------------------------<  127<H>  3.7   |  23  |  2.66<H>    Ca    9.0      13 Sep 2018 06:52    TPro  6.5  /  Alb  2.5<L>  /  TBili  < 0.2<L>  /  DBili  x   /  AST  10  /  ALT  11  /  AlkPhos  64  09-12    proBNP:   Lipid Profile:   HgA1c:   TSH:

## 2018-09-13 NOTE — PROGRESS NOTE ADULT - SUBJECTIVE AND OBJECTIVE BOX
Podiatry pager #: [phone]/ [phone]    Patient is a 63y old  Male who presents with a chief complaint of R foot wound (13 Sep 2018 11:38)       INTERVAL HPI/OVERNIGHT EVENTS:  Patient seen and evaluated at bedside.  Pt is resting comfortable in NAD. Denies N/V/F/C.      Allergies    No Known Allergies    Intolerances        Vital Signs Last 24 Hrs  T(C): 36.9 (13 Sep 2018 13:00), Max: 37.2 (12 Sep 2018 20:54)  T(F): 98.5 (13 Sep 2018 13:00), Max: 98.9 (12 Sep 2018 20:54)  HR: 75 (13 Sep 2018 13:00) (75 - 81)  BP: 142/60 (13 Sep 2018 13:00) (142/60 - 154/74)  BP(mean): --  RR: 18 (13 Sep 2018 06:48) (18 - 18)  SpO2: 97% (13 Sep 2018 13:00) (93% - 97%)    LABS:                        8.5    10.88 )-----------( 417      ( 13 Sep 2018 06:52 )             27.1         139  |  104  |  39<H>  ----------------------------<  127<H>  3.7   |  23  |  2.66<H>    Ca    9.0      13 Sep 2018 06:52    TPro  6.5  /  Alb  2.5<L>  /  TBili  < 0.2<L>  /  DBili  x   /  AST  10  /  ALT  11  /  AlkPhos  64  -12      Urinalysis Basic - ( 11 Sep 2018 18:40 )    Color: LIGHT YELLOW / Appearance: CLEAR / S.011 / pH: 6.0  Gluc: 100 / Ketone: NEGATIVE  / Bili: NEGATIVE / Urobili: NORMAL   Blood: NEGATIVE / Protein: 200 / Nitrite: NEGATIVE   Leuk Esterase: NEGATIVE / RBC: 3-5 / WBC 0-2   Sq Epi: OCC / Non Sq Epi: x / Bacteria: NEGATIVE      CAPILLARY BLOOD GLUCOSE      POCT Blood Glucose.: 174 mg/dL (13 Sep 2018 12:48)  POCT Blood Glucose.: 144 mg/dL (13 Sep 2018 08:45)  POCT Blood Glucose.: 180 mg/dL (12 Sep 2018 22:27)  POCT Blood Glucose.: 159 mg/dL (12 Sep 2018 17:45)      Lower Extremity Physical Exam:  Vasular: DP/PT 0/4, B/L, CFT < 5 seconds B/L, Temperature gradient slightly warmer on right  Neuro: Epicritic sensation diminisehd_ to the level of _digits, B/L.  Musculoskeletal/Ortho: charcot foot to the right   Skin: edema to the right LE, 3+  Wound #1: right foot planar medial wound   Size: 3x2.8cm  Depth: to subQ  Wound bed: fibrogranular  Drainage: serous   Odor: mal odor   Periwound: hyperkaratotic  Etiology: Charcot

## 2018-09-13 NOTE — PROGRESS NOTE ADULT - ASSESSMENT
· Assessment		  70 M with right foot non-healing ulcer to bone 2/2 Charcot   - Pt was seen and evaluated  - wound  probes to subQ today  - high suspicion for OM clinically PTB when first came in, and elevated inflammatory markers  - f/u bone biopsy culture  - f/u bone path   - PICC'd, Continue IV abx per ID  - seen w/ attending · Assessment		  70 M with right foot non-healing ulcer to bone 2/2 Charcot   - Pt was seen and evaluated  - wound  probes to subQ today  - high suspicion for OM clinically PTB when first came in, and elevated inflammatory markers  - f/u bone biopsy culture  - f/u bone path   - PICC'd, Continue IV abx per ID  - ordered CAM walker   - seen w/ attending

## 2018-09-13 NOTE — PROGRESS NOTE ADULT - PROBLEM SELECTOR PLAN 3
Pt with proteinuria,   Urine p/c 3.1g/day  likely sec to DM/HTN  Vasculitis w/u done in may 2017 is neg.

## 2018-09-14 ENCOUNTER — TRANSCRIPTION ENCOUNTER (OUTPATIENT)
Age: 63
End: 2018-09-14

## 2018-09-14 LAB
BUN SERPL-MCNC: 37 MG/DL — HIGH (ref 7–23)
CALCIUM SERPL-MCNC: 8.8 MG/DL — SIGNIFICANT CHANGE UP (ref 8.4–10.5)
CHLORIDE SERPL-SCNC: 104 MMOL/L — SIGNIFICANT CHANGE UP (ref 98–107)
CO2 SERPL-SCNC: 22 MMOL/L — SIGNIFICANT CHANGE UP (ref 22–31)
CREAT SERPL-MCNC: 2.81 MG/DL — HIGH (ref 0.5–1.3)
GLUCOSE SERPL-MCNC: 133 MG/DL — HIGH (ref 70–99)
HCT VFR BLD CALC: 27.1 % — LOW (ref 39–50)
HGB BLD-MCNC: 8.6 G/DL — LOW (ref 13–17)
MCHC RBC-ENTMCNC: 28.7 PG — SIGNIFICANT CHANGE UP (ref 27–34)
MCHC RBC-ENTMCNC: 31.7 % — LOW (ref 32–36)
MCV RBC AUTO: 90.3 FL — SIGNIFICANT CHANGE UP (ref 80–100)
NRBC # FLD: 0 — SIGNIFICANT CHANGE UP
PLATELET # BLD AUTO: 443 K/UL — HIGH (ref 150–400)
PMV BLD: 10.2 FL — SIGNIFICANT CHANGE UP (ref 7–13)
POTASSIUM SERPL-MCNC: 3.8 MMOL/L — SIGNIFICANT CHANGE UP (ref 3.5–5.3)
POTASSIUM SERPL-SCNC: 3.8 MMOL/L — SIGNIFICANT CHANGE UP (ref 3.5–5.3)
RBC # BLD: 3 M/UL — LOW (ref 4.2–5.8)
RBC # FLD: 13.2 % — SIGNIFICANT CHANGE UP (ref 10.3–14.5)
SODIUM SERPL-SCNC: 140 MMOL/L — SIGNIFICANT CHANGE UP (ref 135–145)
SURGICAL PATHOLOGY STUDY: SIGNIFICANT CHANGE UP
VANCOMYCIN FLD-MCNC: 14.4 UG/ML — SIGNIFICANT CHANGE UP
WBC # BLD: 11.52 K/UL — HIGH (ref 3.8–10.5)
WBC # FLD AUTO: 11.52 K/UL — HIGH (ref 3.8–10.5)

## 2018-09-14 PROCEDURE — 93306 TTE W/DOPPLER COMPLETE: CPT | Mod: 26

## 2018-09-14 RX ORDER — COLLAGENASE CLOSTRIDIUM HIST. 250 UNIT/G
1 OINTMENT (GRAM) TOPICAL DAILY
Qty: 0 | Refills: 0 | Status: DISCONTINUED | OUTPATIENT
Start: 2018-09-14 | End: 2018-09-18

## 2018-09-14 RX ADMIN — Medication 650 MILLIGRAM(S): at 07:01

## 2018-09-14 RX ADMIN — Medication 81 MILLIGRAM(S): at 12:22

## 2018-09-14 RX ADMIN — Medication 250 MILLIGRAM(S): at 19:51

## 2018-09-14 RX ADMIN — Medication 100 MILLIGRAM(S): at 07:01

## 2018-09-14 RX ADMIN — Medication 2: at 18:33

## 2018-09-14 RX ADMIN — HEPARIN SODIUM 5000 UNIT(S): 5000 INJECTION INTRAVENOUS; SUBCUTANEOUS at 14:31

## 2018-09-14 RX ADMIN — ATORVASTATIN CALCIUM 40 MILLIGRAM(S): 80 TABLET, FILM COATED ORAL at 22:02

## 2018-09-14 RX ADMIN — HEPARIN SODIUM 5000 UNIT(S): 5000 INJECTION INTRAVENOUS; SUBCUTANEOUS at 22:03

## 2018-09-14 RX ADMIN — Medication 2: at 09:16

## 2018-09-14 RX ADMIN — HEPARIN SODIUM 5000 UNIT(S): 5000 INJECTION INTRAVENOUS; SUBCUTANEOUS at 07:01

## 2018-09-14 RX ADMIN — PIPERACILLIN AND TAZOBACTAM 25 GRAM(S): 4; .5 INJECTION, POWDER, LYOPHILIZED, FOR SOLUTION INTRAVENOUS at 07:00

## 2018-09-14 RX ADMIN — PIPERACILLIN AND TAZOBACTAM 25 GRAM(S): 4; .5 INJECTION, POWDER, LYOPHILIZED, FOR SOLUTION INTRAVENOUS at 22:03

## 2018-09-14 RX ADMIN — PIPERACILLIN AND TAZOBACTAM 25 GRAM(S): 4; .5 INJECTION, POWDER, LYOPHILIZED, FOR SOLUTION INTRAVENOUS at 14:32

## 2018-09-14 RX ADMIN — Medication 650 MILLIGRAM(S): at 22:02

## 2018-09-14 RX ADMIN — Medication 650 MILLIGRAM(S): at 14:33

## 2018-09-14 RX ADMIN — Medication 1 APPLICATION(S): at 13:40

## 2018-09-14 RX ADMIN — Medication 2: at 13:38

## 2018-09-14 RX ADMIN — Medication 100 MILLIGRAM(S): at 14:31

## 2018-09-14 RX ADMIN — AMLODIPINE BESYLATE 10 MILLIGRAM(S): 2.5 TABLET ORAL at 07:01

## 2018-09-14 RX ADMIN — Medication 100 MILLIGRAM(S): at 22:03

## 2018-09-14 RX ADMIN — Medication 1 MILLIGRAM(S): at 12:22

## 2018-09-14 NOTE — PROGRESS NOTE ADULT - SUBJECTIVE AND OBJECTIVE BOX
Patient is a 63y old  Male who presents with a chief complaint of R foot wound (14 Sep 2018 14:43)      INTERVAL HPI/OVERNIGHT EVENTS:  T(C): 36.7 (09-14-18 @ 15:54), Max: 36.7 (09-14-18 @ 05:47)  HR: 81 (09-14-18 @ 15:54) (78 - 81)  BP: 151/88 (09-14-18 @ 15:54) (149/65 - 152/74)  RR: 18 (09-14-18 @ 15:54) (18 - 18)  SpO2: 98% (09-14-18 @ 15:54) (97% - 98%)  Wt(kg): --  I&O's Summary      LABS:                        8.6    11.52 )-----------( 443      ( 14 Sep 2018 07:00 )             27.1     09-14    140  |  104  |  37<H>  ----------------------------<  133<H>  3.8   |  22  |  2.81<H>    Ca    8.8      14 Sep 2018 07:00          CAPILLARY BLOOD GLUCOSE      POCT Blood Glucose.: 171 mg/dL (14 Sep 2018 13:37)  POCT Blood Glucose.: 154 mg/dL (14 Sep 2018 12:21)  POCT Blood Glucose.: 153 mg/dL (14 Sep 2018 08:33)  POCT Blood Glucose.: 155 mg/dL (13 Sep 2018 21:57)  POCT Blood Glucose.: 139 mg/dL (13 Sep 2018 17:49)            MEDICATIONS  (STANDING):  amLODIPine   Tablet 10 milliGRAM(s) Oral daily  aspirin enteric coated 81 milliGRAM(s) Oral daily  atorvastatin 40 milliGRAM(s) Oral at bedtime  collagenase Ointment 1 Application(s) Topical daily  Dakins Solution - 1/4 Strength 1 Application(s) Topical daily  Dakins Solution - 1/4 Strength 1 Application(s) Topical Once  ergocalciferol 52929 Unit(s) Oral every week  folic acid 1 milliGRAM(s) Oral daily  heparin  Injectable 5000 Unit(s) SubCutaneous every 8 hours  hydrALAZINE 100 milliGRAM(s) Oral three times a day  influenza   Vaccine 0.5 milliLiter(s) IntraMuscular once  insulin lispro (HumaLOG) corrective regimen sliding scale   SubCutaneous three times a day before meals  insulin lispro (HumaLOG) corrective regimen sliding scale   SubCutaneous at bedtime  lidocaine 1% Injectable 30 milliLiter(s) Local Injection once  metoprolol succinate  milliGRAM(s) Oral daily  piperacillin/tazobactam IVPB. 3.375 Gram(s) IV Intermittent every 8 hours  sodium bicarbonate 650 milliGRAM(s) Oral three times a day  vancomycin  IVPB 1000 milliGRAM(s) IV Intermittent every 24 hours    MEDICATIONS  (PRN):  acetaminophen   Tablet .. 650 milliGRAM(s) Oral every 6 hours PRN Moderate Pain (4 - 6), Severe Pain (7 - 10)          PHYSICAL EXAM:  GENERAL: NAD, well-groomed, well-developed  HEAD:  Atraumatic, Normocephalic  CHEST/LUNG: Clear to percussion bilaterally; No rales, rhonchi, wheezing, or rubs  HEART: Regular rate and rhythm; No murmurs, rubs, or gallops  ABDOMEN: Soft, Nontender, Nondistended; Bowel sounds present  EXTREMITIES:  2+ Peripheral Pulses, No clubbing, cyanosis, or edema  LYMPH: No lymphadenopathy noted  SKIN: No rashes or lesions    Care Discussed with Consultants/Other Providers [ ] YES  [ ] NO

## 2018-09-14 NOTE — PROGRESS NOTE ADULT - PROBLEM SELECTOR PLAN 1
Pt with ABEL likely ATN secondary to infection  FEna>1%  Renal function was improving, but worsened today. Etiology ?. No contrast or new meds. Vanco level is acceptable.  Will monitor at present, if worsens further will then investigate  Currently on IV zoysn and vanco    Monitor Vanco levels   SCr in office in April was 2.15   Kidney/bladder US done showed b/l cyst no obstruction, mildly enlarged kidney size likely sec longstanding DM   Monitor bmp, strict I/O  Avoid nephrotoxic agents

## 2018-09-14 NOTE — PROGRESS NOTE ADULT - ASSESSMENT
70 M with right foot non-healing ulcer to bone 2/2 Charcot   - Pt was seen and evaluated  - wound  probes to subQ, stable  - Dressed with dry sterile dressing   - santyl ordered   - bone biopsy: charcot, no osteomyelitis   - CAM walker received   - Stable for discharge from podiatry standpoint   - Rec Augmentin 875 bid x 7 days   - seen w/ attending

## 2018-09-14 NOTE — DISCHARGE NOTE ADULT - SECONDARY DIAGNOSIS.
HTN (hypertension) Diabetes mellitus type 2, noninsulin dependent Acute kidney injury superimposed on chronic kidney disease Vitamin D deficiency

## 2018-09-14 NOTE — PROGRESS NOTE ADULT - SUBJECTIVE AND OBJECTIVE BOX
Patient is a 63y old  Male who presents with a chief complaint of R foot wound (14 Sep 2018 12:06)       INTERVAL HPI/OVERNIGHT EVENTS:  Patient seen and evaluated at bedside.  Pt is resting comfortable in NAD. Denies N/V/F/C.      Allergies    No Known Allergies    Intolerances        Vital Signs Last 24 Hrs  T(C): 36.7 (14 Sep 2018 05:47), Max: 36.7 (14 Sep 2018 05:47)  T(F): 98 (14 Sep 2018 05:47), Max: 98 (14 Sep 2018 05:47)  HR: 78 (14 Sep 2018 05:47) (78 - 79)  BP: 152/74 (14 Sep 2018 05:47) (149/65 - 152/74)  BP(mean): --  RR: 18 (14 Sep 2018 05:47) (18 - 18)  SpO2: 98% (14 Sep 2018 05:47) (97% - 98%)    LABS:                        8.6    11.52 )-----------( 443      ( 14 Sep 2018 07:00 )             27.1     09-14    140  |  104  |  37<H>  ----------------------------<  133<H>  3.8   |  22  |  2.81<H>    Ca    8.8      14 Sep 2018 07:00          CAPILLARY BLOOD GLUCOSE      POCT Blood Glucose.: 171 mg/dL (14 Sep 2018 13:37)  POCT Blood Glucose.: 154 mg/dL (14 Sep 2018 12:21)  POCT Blood Glucose.: 153 mg/dL (14 Sep 2018 08:33)  POCT Blood Glucose.: 155 mg/dL (13 Sep 2018 21:57)  POCT Blood Glucose.: 139 mg/dL (13 Sep 2018 17:49)      Lower Extremity Physical Exam:  Vasular: DP/PT 0/4, B/L, CFT < 5 seconds B/L, Temperature gradient slightly warmer on right  Neuro: Epicritic sensation diminisehd_ to the level of _digits, B/L.  Musculoskeletal/Ortho: charcot foot to the right   Skin: edema to the right LE, 3+  Wound #1: right foot planar medial wound   Size: 3x2.8cm  Depth: to subQ  Wound bed: fibrogranular  Drainage: serosanguinous    Odor: none  Periwound: hyperkaratotic  Etiology: Charcot     RADIOLOGY & ADDITIONAL TESTS:

## 2018-09-14 NOTE — DISCHARGE NOTE ADULT - PLAN OF CARE
Take antibiotics as prescribed C/w with Zosyn for 6 weeks Your blood pressure will be controlled. Low salt diet  Activity as tolerated.  Take all medication as prescribed.  Follow up with your medical doctor for routine blood pressure monitoring at your next visit.  Notify your doctor if you have any of the following symptoms:   Dizziness, Lightheadedness, Blurry vision, Headache, Chest pain, Shortness of breath Goal for -180 C/w medications Held Lasix and losartan  Follow up with PCP when to restart medications C/w Vitamin D C/w with Zosyn for 6 weeks  You will need to follow up with Dr. Spivey (infectious disease) in 2-4 weeks. Call for an appointment Low salt diet  Activity as tolerated.  Take all medication as prescribed.  Follow up with your medical doctor for routine blood pressure monitoring at your next visit.  Notify your doctor if you have any of the following symptoms:   Dizziness, Lightheadedness, Blurry vision, Headache, Chest pain, Shortness of breath    Follow up with your PCP in 1 week. Some of your meds were discontinued. You will need to follow up to see when medications can be resumed. Held Lasix and losartan  Follow up with PCP when to restart medications  Follow up with Dr. Quach  in 2 weeks. Call for an appointment

## 2018-09-14 NOTE — PROGRESS NOTE ADULT - SUBJECTIVE AND OBJECTIVE BOX
Order received to provide CAM boot for R foot charcot with ulceration.  Boot fitted and adjusted to patient.  Plastazote padding added to assist with offloading  Pt instructed to don and doff  Follow up if needed    Reji castro CO  [facility] [phone]

## 2018-09-14 NOTE — DISCHARGE NOTE ADULT - MEDICATION SUMMARY - MEDICATIONS TO TAKE
I will START or STAY ON the medications listed below when I get home from the hospital:    aspirin 81 mg oral delayed release tablet  -- 1 tab(s) by mouth once a day  -- Indication: For need for PPX measures     sodium bicarbonate 650 mg oral tablet  -- 1 tab(s) by mouth 3 times a day  -- Indication: For CKD (chronic kidney disease), stage III    atorvastatin 40 mg oral tablet  -- 1 tab(s) by mouth once a day (at bedtime)  -- Indication: For HLD     metoprolol succinate 100 mg oral tablet, extended release  -- 1 tab(s) by mouth once a day  -- Indication: For HTN (hypertension)    amLODIPine 10 mg oral tablet  -- 1 tab(s) by mouth once a day  -- Indication: For HTN (hypertension)    collagenase 250 units/g topical ointment  -- 1 application on skin once a day  -- Indication: For Wound care    piperacillin-tazobactam 2 g-0.25 g/50 mL intravenous solution  -- 2.25 gram(s) intravenous every 8 hours  Stop after October 23 rd dose  -- Indication: For OM     hydrALAZINE 100 mg oral tablet  -- 1 tab(s) by mouth 3 times a day  -- Indication: For HTN (hypertension)    folic acid 1 mg oral tablet  -- 1 tab(s) by mouth once a day  -- Indication: For Supplement     Vitamin D2 50,000 intl units (1.25 mg) oral capsule  -- 1 cap(s) by mouth once a week  -- Indication: For Vitamin D deficiency

## 2018-09-14 NOTE — PROGRESS NOTE ADULT - ASSESSMENT
64 y/o M PMH HTN, HLD, CKD3, DM2 w/ R foot ulcer >3 years p/w diabetic foot ulcer      Problem/Plan - 1:  ·  Problem: Diabetic foot ulcer with osteomyelitis.  Plan: C/w antibiotics as per ID  id fu  podiatry fu    Problem/Plan - 2:  ·  Problem: Essential hypertension.  Plan: c/w home meds     Problem/Plan - 3:  ·  Problem: Diabetes mellitus type 2, noninsulin dependent.  Plan: monitor fs  iss

## 2018-09-14 NOTE — DISCHARGE NOTE ADULT - PATIENT PORTAL LINK FT
You can access the [facility] Patient Portal, offered by [facility], by registering with the following website: [email or web address]

## 2018-09-14 NOTE — PROGRESS NOTE ADULT - SUBJECTIVE AND OBJECTIVE BOX
Keaton Duvall MD  Interventional Cardiology  [place] Office : [address] [place] [address]  Tel:   [place] Office : [address] [facility] [place] [address]  Tel: [phone]  Cell : [phone]    Subjective : Pt lying in bed comfortable, not in distress, denies any chest pain or SOB  	  MEDICATIONS:  amLODIPine   Tablet 10 milliGRAM(s) Oral daily  aspirin enteric coated 81 milliGRAM(s) Oral daily  heparin  Injectable 5000 Unit(s) SubCutaneous every 8 hours  hydrALAZINE 100 milliGRAM(s) Oral three times a day  metoprolol succinate  milliGRAM(s) Oral daily    piperacillin/tazobactam IVPB. 3.375 Gram(s) IV Intermittent every 8 hours  vancomycin  IVPB 1000 milliGRAM(s) IV Intermittent every 24 hours      acetaminophen   Tablet .. 650 milliGRAM(s) Oral every 6 hours PRN      atorvastatin 40 milliGRAM(s) Oral at bedtime  insulin lispro (HumaLOG) corrective regimen sliding scale   SubCutaneous three times a day before meals  insulin lispro (HumaLOG) corrective regimen sliding scale   SubCutaneous at bedtime    Dakins Solution - 1/4 Strength 1 Application(s) Topical daily  Dakins Solution - 1/4 Strength 1 Application(s) Topical Once  ergocalciferol 90831 Unit(s) Oral every week  folic acid 1 milliGRAM(s) Oral daily  influenza   Vaccine 0.5 milliLiter(s) IntraMuscular once  sodium bicarbonate 650 milliGRAM(s) Oral three times a day      PHYSICAL EXAM:  T(C): 36.7 (09-14-18 @ 05:47), Max: 36.9 (09-13-18 @ 13:00)  HR: 78 (09-14-18 @ 05:47) (75 - 79)  BP: 152/74 (09-14-18 @ 05:47) (142/60 - 152/74)  RR: 18 (09-14-18 @ 05:47) (18 - 18)  SpO2: 98% (09-14-18 @ 05:47) (97% - 98%)  Wt(kg): --  I&O's Summary        Appearance: Normal	  HEENT:   Normal oral mucosa, PERRL, EOMI	  Cardiovascular: Normal S1 S2, No JVD, No murmurs, No edema  Respiratory: Lungs clear to auscultation	  Gastrointestinal:  Soft, Non-tender, + BS	  Extremities: RLE dressing intact                                     8.6    11.52 )-----------( 443      ( 14 Sep 2018 07:00 )             27.1     09-14    140  |  104  |  37<H>  ----------------------------<  133<H>  3.8   |  22  |  2.81<H>    Ca    8.8      14 Sep 2018 07:00      proBNP:   Lipid Profile:   HgA1c:   TSH:

## 2018-09-14 NOTE — DISCHARGE NOTE ADULT - CARE PLAN
Principal Discharge DX:	Diabetic foot ulcer with osteomyelitis  Goal:	Take antibiotics as prescribed  Assessment and plan of treatment:	C/w with Zosyn for 6 weeks  Secondary Diagnosis:	HTN (hypertension)  Goal:	Your blood pressure will be controlled.  Assessment and plan of treatment:	Low salt diet  Activity as tolerated.  Take all medication as prescribed.  Follow up with your medical doctor for routine blood pressure monitoring at your next visit.  Notify your doctor if you have any of the following symptoms:   Dizziness, Lightheadedness, Blurry vision, Headache, Chest pain, Shortness of breath  Secondary Diagnosis:	Diabetes mellitus type 2, noninsulin dependent  Goal:	Goal for -180  Assessment and plan of treatment:	C/w medications  Secondary Diagnosis:	Acute kidney injury superimposed on chronic kidney disease  Assessment and plan of treatment:	Held Lasix and losartan  Follow up with PCP when to restart medications  Secondary Diagnosis:	Vitamin D deficiency  Assessment and plan of treatment:	C/w Vitamin D Principal Discharge DX:	Diabetic foot ulcer with osteomyelitis  Goal:	Take antibiotics as prescribed  Assessment and plan of treatment:	C/w with Zosyn for 6 weeks  You will need to follow up with Dr. Spivey (infectious disease) in 2-4 weeks. Call for an appointment  Secondary Diagnosis:	HTN (hypertension)  Goal:	Your blood pressure will be controlled.  Assessment and plan of treatment:	Low salt diet  Activity as tolerated.  Take all medication as prescribed.  Follow up with your medical doctor for routine blood pressure monitoring at your next visit.  Notify your doctor if you have any of the following symptoms:   Dizziness, Lightheadedness, Blurry vision, Headache, Chest pain, Shortness of breath    Follow up with your PCP in 1 week. Some of your meds were discontinued. You will need to follow up to see when medications can be resumed.  Secondary Diagnosis:	Diabetes mellitus type 2, noninsulin dependent  Goal:	Goal for -180  Assessment and plan of treatment:	C/w medications  Secondary Diagnosis:	Acute kidney injury superimposed on chronic kidney disease  Assessment and plan of treatment:	Held Lasix and losartan  Follow up with PCP when to restart medications  Secondary Diagnosis:	Vitamin D deficiency  Assessment and plan of treatment:	C/w Vitamin D Principal Discharge DX:	Diabetic foot ulcer with osteomyelitis  Goal:	Take antibiotics as prescribed  Assessment and plan of treatment:	C/w with Zosyn for 6 weeks  You will need to follow up with Dr. Spivey (infectious disease) in 2-4 weeks. Call for an appointment  Secondary Diagnosis:	HTN (hypertension)  Goal:	Your blood pressure will be controlled.  Assessment and plan of treatment:	Low salt diet  Activity as tolerated.  Take all medication as prescribed.  Follow up with your medical doctor for routine blood pressure monitoring at your next visit.  Notify your doctor if you have any of the following symptoms:   Dizziness, Lightheadedness, Blurry vision, Headache, Chest pain, Shortness of breath    Follow up with your PCP in 1 week. Some of your meds were discontinued. You will need to follow up to see when medications can be resumed.  Secondary Diagnosis:	Diabetes mellitus type 2, noninsulin dependent  Goal:	Goal for -180  Assessment and plan of treatment:	C/w medications  Secondary Diagnosis:	Acute kidney injury superimposed on chronic kidney disease  Assessment and plan of treatment:	Held Lasix and losartan  Follow up with PCP when to restart medications  Follow up with Dr. Quach  in 2 weeks. Call for an appointment  Secondary Diagnosis:	Vitamin D deficiency  Assessment and plan of treatment:	C/w Vitamin D

## 2018-09-14 NOTE — DISCHARGE NOTE ADULT - HOSPITAL COURSE
64 y/o M PMH HTN, HLD, CKD3, DM2 w/ R foot ulcer >3 years p/w diabetic foot ulcer     Hospital Course     Diabetic foot ulcer with osteomyelitis.    -C/w IV Vanco/Zosyn---> C/w zosyn until Oct 23 rd  -Podiaty consulted  -WBC Bone scan to r/o OM (intolerant to MRI due to ankle plate)-no OM, Charcot foot  ANDRÉS/PVR--No significant Arterial disease  Bcx-neg  -Wound cx-Corynebacterium species, Strep.  Agalactiae group B  -9/11-s/p bedside right foot bone bx by podiatry, culture neg, Pathology--->     Essential hypertension.    c/w home meds, hold losartan and lasix (elevated Cr).     Diabetes mellitus type 2, noninsulin dependent.    FS AC/QHS w/ sliding scale  -HgA1C 6.5  -DM diet.     ABEL on CKD (chronic kidney disease), stage III.   Renal consulted-- likely ATN   Trended Cr.  -Renal US-neg    Vitamin D Insufficiency  -Vitamin D 50,000unit q weekly x 6 weeks        Dispo: Home

## 2018-09-14 NOTE — PROGRESS NOTE ADULT - SUBJECTIVE AND OBJECTIVE BOX
Dr. Quach  Office [phone]  Cell [phone]  Aide PELAEZ  Cell [phone]      Patient is a 63y old  Male who presents with a chief complaint of R foot wound (14 Sep 2018 15:59)      Patient seen and examined at bedside. No chest pain/sob    VITALS:  T(F): 98 (09-14-18 @ 15:54), Max: 98 (09-14-18 @ 05:47)  HR: 81 (09-14-18 @ 15:54)  BP: 151/88 (09-14-18 @ 15:54)  RR: 18 (09-14-18 @ 15:54)  SpO2: 98% (09-14-18 @ 15:54)  Wt(kg): --        PHYSICAL EXAM:  Constitutional: NAD  Neck: No JVD  Respiratory: CTAB, no wheezes, rales or rhonchi  Cardiovascular: S1, S2, RRR  Gastrointestinal: BS+, soft, NT/ND  Extremities: right LE peripheral edema, wound in the right foot +    Hospital Medications:   MEDICATIONS  (STANDING):  amLODIPine   Tablet 10 milliGRAM(s) Oral daily  aspirin enteric coated 81 milliGRAM(s) Oral daily  atorvastatin 40 milliGRAM(s) Oral at bedtime  collagenase Ointment 1 Application(s) Topical daily  Dakins Solution - 1/4 Strength 1 Application(s) Topical daily  Dakins Solution - 1/4 Strength 1 Application(s) Topical Once  ergocalciferol 70134 Unit(s) Oral every week  folic acid 1 milliGRAM(s) Oral daily  heparin  Injectable 5000 Unit(s) SubCutaneous every 8 hours  hydrALAZINE 100 milliGRAM(s) Oral three times a day  influenza   Vaccine 0.5 milliLiter(s) IntraMuscular once  insulin lispro (HumaLOG) corrective regimen sliding scale   SubCutaneous three times a day before meals  insulin lispro (HumaLOG) corrective regimen sliding scale   SubCutaneous at bedtime  lidocaine 1% Injectable 30 milliLiter(s) Local Injection once  metoprolol succinate  milliGRAM(s) Oral daily  piperacillin/tazobactam IVPB. 3.375 Gram(s) IV Intermittent every 8 hours  sodium bicarbonate 650 milliGRAM(s) Oral three times a day  vancomycin  IVPB 1000 milliGRAM(s) IV Intermittent every 24 hours      LABS:  09-14    140  |  104  |  37<H>  ----------------------------<  133<H>  3.8   |  22  |  2.81<H>    Ca    8.8      14 Sep 2018 07:00      Creatinine Trend: 2.81 <--, 2.66 <--, 2.73 <--, 2.95 <--, 3.04 <--, 2.90 <--, 2.86 <--                                8.6    11.52 )-----------( 443      ( 14 Sep 2018 07:00 )             27.1     Urine Studies:  Urinalysis - [09-11-18 @ 18:40]      Color LIGHT YELLOW / Appearance CLEAR / SG 1.011 / pH 6.0      Gluc 100 / Ketone NEGATIVE  / Bili NEGATIVE / Urobili NORMAL       Blood NEGATIVE / Protein 200 / Leuk Est NEGATIVE / Nitrite NEGATIVE      RBC 3-5 / WBC 0-2 / Hyaline NEGATIVE / Gran  / Sq Epi OCC / Non Sq Epi  / Bacteria NEGATIVE    Urine Creatinine 73.80      [09-11-18 @ 18:40]  Urine Protein 229.7      [09-11-18 @ 18:40]  Urine Sodium 51      [09-11-18 @ 18:40]    Iron 34, TIBC 155, %sat --      [09-11-18 @ 05:45]  Ferritin 355.3      [09-11-18 @ 05:45]  PTH 42.84 (Ca --)      [09-11-18 @ 05:45]   --  Vitamin D (25OH) 11.8      [09-11-18 @ 05:45]  HbA1c 6.5      [09-08-18 @ 05:20]        RADIOLOGY & ADDITIONAL STUDIES:

## 2018-09-14 NOTE — DISCHARGE NOTE ADULT - CARE PROVIDER_API CALL
Salima Spivey), Infectious Disease; Internal Medicine  [address] [place] [address]  Phone: [phone]  Fax: [phone] Salima Spivey), Infectious Disease; Internal Medicine  [address] [place] [address]  Phone: [phone]  Fax: [phone]    Fabien Quach), Internal Medicine; Nephrology  [address]  2nd [place] [address]  Phone: [phone]  Fax: [phone]    Laci Gama), Internal Medicine  [address] [place] [address]  Phone: [phone]  Fax: [phone]

## 2018-09-14 NOTE — DISCHARGE NOTE ADULT - MEDICATION SUMMARY - MEDICATIONS TO STOP TAKING
I will STOP taking the medications listed below when I get home from the hospital:    losartan 25 mg oral tablet  -- 1 tab(s) by mouth once a day    Lasix 40 mg oral tablet  -- 1 tab(s) by mouth once a day

## 2018-09-14 NOTE — DISCHARGE NOTE ADULT - ADDITIONAL INSTRUCTIONS
WOUND CARE: apply santyl to wound. Dress with 4x4 gauze and kerlix. Change dressing daily.   WEIGHT BEAR: weight bear as tolerated in CAM boot   ANTIBIOTICS: augmentin 875 mg every 12 hours x 7 days   FOLLOW UP: within 1 week of discharge with Dr[facility] [address] [place] [address]. Call  to schedule appointment WOUND CARE: apply santyl to wound. Dress with 4x4 gauze and kerlix. Change dressing daily.   WEIGHT BEAR: weight bear as tolerated in CAM boot     FOLLOW UP: within 1 week of discharge with Dr. Pl[facility]ett at [facility]. [address] [place] [address]. Call  to schedule appointment WOUND CARE: apply santyl to wound. Dress with 4x4 gauze and kerlix. Change dressing daily.   WEIGHT BEAR: weight bear as tolerated in CAM boot     You will need weekly blood draw CBC, CMP, ESR. CRP   FOLLOW UP: within 1 week of discharge with Dr. Plunkett at [facility]. [address] [place] [address]. Call  to schedule appointment

## 2018-09-15 LAB
BUN SERPL-MCNC: 34 MG/DL — HIGH (ref 7–23)
CALCIUM SERPL-MCNC: 8.6 MG/DL — SIGNIFICANT CHANGE UP (ref 8.4–10.5)
CHLORIDE SERPL-SCNC: 102 MMOL/L — SIGNIFICANT CHANGE UP (ref 98–107)
CO2 SERPL-SCNC: 21 MMOL/L — LOW (ref 22–31)
CREAT SERPL-MCNC: 2.95 MG/DL — HIGH (ref 0.5–1.3)
GLUCOSE SERPL-MCNC: 156 MG/DL — HIGH (ref 70–99)
MAGNESIUM SERPL-MCNC: 1.7 MG/DL — SIGNIFICANT CHANGE UP (ref 1.6–2.6)
PHOSPHATE SERPL-MCNC: 3.5 MG/DL — SIGNIFICANT CHANGE UP (ref 2.5–4.5)
POTASSIUM SERPL-MCNC: 3.8 MMOL/L — SIGNIFICANT CHANGE UP (ref 3.5–5.3)
POTASSIUM SERPL-SCNC: 3.8 MMOL/L — SIGNIFICANT CHANGE UP (ref 3.5–5.3)
SODIUM SERPL-SCNC: 141 MMOL/L — SIGNIFICANT CHANGE UP (ref 135–145)

## 2018-09-15 RX ADMIN — Medication 100 MILLIGRAM(S): at 21:31

## 2018-09-15 RX ADMIN — Medication 650 MILLIGRAM(S): at 05:48

## 2018-09-15 RX ADMIN — ATORVASTATIN CALCIUM 40 MILLIGRAM(S): 80 TABLET, FILM COATED ORAL at 21:31

## 2018-09-15 RX ADMIN — HEPARIN SODIUM 5000 UNIT(S): 5000 INJECTION INTRAVENOUS; SUBCUTANEOUS at 21:31

## 2018-09-15 RX ADMIN — Medication 1 MILLIGRAM(S): at 12:07

## 2018-09-15 RX ADMIN — Medication 100 MILLIGRAM(S): at 05:48

## 2018-09-15 RX ADMIN — PIPERACILLIN AND TAZOBACTAM 25 GRAM(S): 4; .5 INJECTION, POWDER, LYOPHILIZED, FOR SOLUTION INTRAVENOUS at 21:31

## 2018-09-15 RX ADMIN — Medication 1 APPLICATION(S): at 12:09

## 2018-09-15 RX ADMIN — PIPERACILLIN AND TAZOBACTAM 25 GRAM(S): 4; .5 INJECTION, POWDER, LYOPHILIZED, FOR SOLUTION INTRAVENOUS at 05:48

## 2018-09-15 RX ADMIN — Medication 100 MILLIGRAM(S): at 13:03

## 2018-09-15 RX ADMIN — Medication 2: at 13:02

## 2018-09-15 RX ADMIN — Medication 81 MILLIGRAM(S): at 12:07

## 2018-09-15 RX ADMIN — HEPARIN SODIUM 5000 UNIT(S): 5000 INJECTION INTRAVENOUS; SUBCUTANEOUS at 13:02

## 2018-09-15 RX ADMIN — PIPERACILLIN AND TAZOBACTAM 25 GRAM(S): 4; .5 INJECTION, POWDER, LYOPHILIZED, FOR SOLUTION INTRAVENOUS at 14:51

## 2018-09-15 RX ADMIN — HEPARIN SODIUM 5000 UNIT(S): 5000 INJECTION INTRAVENOUS; SUBCUTANEOUS at 05:48

## 2018-09-15 RX ADMIN — Medication 650 MILLIGRAM(S): at 13:03

## 2018-09-15 RX ADMIN — Medication 650 MILLIGRAM(S): at 21:31

## 2018-09-15 RX ADMIN — Medication 1 APPLICATION(S): at 13:03

## 2018-09-15 RX ADMIN — AMLODIPINE BESYLATE 10 MILLIGRAM(S): 2.5 TABLET ORAL at 05:48

## 2018-09-15 NOTE — PROGRESS NOTE ADULT - ASSESSMENT
EKG - NSR     A/P     1) LE wound and swelling - Podiatry and ID onboard, No evidence of Obstructive PAD , 2d echo with normal BiV function     2) Right foot wound/OM - podiatry and ID on case     3) HTN - cont hydralazine toprol and amlodipine    4) ABEL on CKD:  renal onboard, now worsening

## 2018-09-15 NOTE — PROGRESS NOTE ADULT - ASSESSMENT
62 y/o M PMH HTN, HLD, CKD3, DM2 w/ R foot ulcer >3 years p/w diabetic foot ulcer      Problem/Plan - 1:  ·  Problem: Diabetic foot ulcer with osteomyelitis.  Plan: C/w antibiotics as per ID  id fu  podiatry fu    Problem/Plan - 2:  ·  Problem: Essential hypertension.  Plan: c/w home meds     Problem/Plan - 3:  ·  Problem: Diabetes mellitus type 2, noninsulin dependent.  Plan: monitor fs  iss

## 2018-09-15 NOTE — PROGRESS NOTE ADULT - SUBJECTIVE AND OBJECTIVE BOX
Keaton Duvall MD  Interventional Cardiology  [place] Office : [phone] [place] [id number]  Tel:   [place] Office : [address] [facility] [place] [address]  Tel: [phone]  Cell : [phone]    Subjective : Pt lying in bed comfortable, not in distress, denies any chest pain or SOB  	  MEDICATIONS:  amLODIPine   Tablet 10 milliGRAM(s) Oral daily  aspirin enteric coated 81 milliGRAM(s) Oral daily  heparin  Injectable 5000 Unit(s) SubCutaneous every 8 hours  hydrALAZINE 100 milliGRAM(s) Oral three times a day  metoprolol succinate  milliGRAM(s) Oral daily    piperacillin/tazobactam IVPB. 3.375 Gram(s) IV Intermittent every 8 hours  vancomycin  IVPB 1000 milliGRAM(s) IV Intermittent every 24 hours      acetaminophen   Tablet .. 650 milliGRAM(s) Oral every 6 hours PRN      atorvastatin 40 milliGRAM(s) Oral at bedtime  insulin lispro (HumaLOG) corrective regimen sliding scale   SubCutaneous three times a day before meals  insulin lispro (HumaLOG) corrective regimen sliding scale   SubCutaneous at bedtime    collagenase Ointment 1 Application(s) Topical daily  Dakins Solution - 1/4 Strength 1 Application(s) Topical daily  Dakins Solution - 1/4 Strength 1 Application(s) Topical Once  ergocalciferol 00403 Unit(s) Oral every week  folic acid 1 milliGRAM(s) Oral daily  influenza   Vaccine 0.5 milliLiter(s) IntraMuscular once  sodium bicarbonate 650 milliGRAM(s) Oral three times a day      PHYSICAL EXAM:  T(C): 37.1 (09-15-18 @ 13:00), Max: 37.1 (09-15-18 @ 13:00)  HR: 71 (09-15-18 @ 13:00) (71 - 81)  BP: 146/62 (09-15-18 @ 13:00) (135/59 - 151/88)  RR: 18 (09-15-18 @ 05:36) (18 - 18)  SpO2: 99% (09-15-18 @ 05:36) (96% - 99%)  Wt(kg): --  I&O's Summary        Appearance: Normal	  HEENT:   Normal oral mucosa, PERRL, EOMI	  Cardiovascular: Normal S1 S2, No JVD, No murmurs, No edema  Respiratory: Lungs clear to auscultation	  Gastrointestinal:  Soft, Non-tender, + BS	  Extremities: Normal range of motion, No clubbing, cyanosis or edema                                    8.6    11.52 )-----------( 443      ( 14 Sep 2018 07:00 )             27.1     09-15    141  |  102  |  34<H>  ----------------------------<  156<H>  3.8   |  21<L>  |  2.95<H>    Ca    8.6      15 Sep 2018 10:08  Phos  3.5     09-15  Mg     1.7     09-15      proBNP:   Lipid Profile:   HgA1c:   TSH:

## 2018-09-15 NOTE — PROGRESS NOTE ADULT - ASSESSMENT
1.	ABEL, improved. This occured in the setting of sepsis.  2.	CKD due to Diabetic Nephropathy.  3.	Foot infection.  4.	Anemia, multifactorial from CKD and sepsis.    PLAN:  1.	Continue HCO3.  2.	Follow up BMP.  3.	Fe supplementation.  4.	No RAAS blockade for now as he has ABEL.

## 2018-09-15 NOTE — PROGRESS NOTE ADULT - SUBJECTIVE AND OBJECTIVE BOX
Patient is a 63y old  Male who presents with a chief complaint of R foot wound (15 Sep 2018 14:05)      INTERVAL HPI/OVERNIGHT EVENTS:  T(C): 37.1 (09-15-18 @ 13:00), Max: 37.1 (09-15-18 @ 13:00)  HR: 71 (09-15-18 @ 13:00) (71 - 81)  BP: 146/62 (09-15-18 @ 13:00) (135/59 - 151/88)  RR: 18 (09-15-18 @ 05:36) (18 - 18)  SpO2: 99% (09-15-18 @ 05:36) (96% - 99%)  Wt(kg): --  I&O's Summary      LABS:                        8.6    11.52 )-----------( 443      ( 14 Sep 2018 07:00 )             27.1     09-15    141  |  102  |  34<H>  ----------------------------<  156<H>  3.8   |  21<L>  |  2.95<H>    Ca    8.6      15 Sep 2018 10:08  Phos  3.5     09-15  Mg     1.7     09-15          CAPILLARY BLOOD GLUCOSE      POCT Blood Glucose.: 169 mg/dL (15 Sep 2018 12:29)  POCT Blood Glucose.: 143 mg/dL (15 Sep 2018 08:36)  POCT Blood Glucose.: 142 mg/dL (14 Sep 2018 22:07)  POCT Blood Glucose.: 163 mg/dL (14 Sep 2018 18:02)            MEDICATIONS  (STANDING):  amLODIPine   Tablet 10 milliGRAM(s) Oral daily  aspirin enteric coated 81 milliGRAM(s) Oral daily  atorvastatin 40 milliGRAM(s) Oral at bedtime  collagenase Ointment 1 Application(s) Topical daily  Dakins Solution - 1/4 Strength 1 Application(s) Topical daily  Dakins Solution - 1/4 Strength 1 Application(s) Topical Once  ergocalciferol 70909 Unit(s) Oral every week  folic acid 1 milliGRAM(s) Oral daily  heparin  Injectable 5000 Unit(s) SubCutaneous every 8 hours  hydrALAZINE 100 milliGRAM(s) Oral three times a day  influenza   Vaccine 0.5 milliLiter(s) IntraMuscular once  insulin lispro (HumaLOG) corrective regimen sliding scale   SubCutaneous three times a day before meals  insulin lispro (HumaLOG) corrective regimen sliding scale   SubCutaneous at bedtime  lidocaine 1% Injectable 30 milliLiter(s) Local Injection once  metoprolol succinate  milliGRAM(s) Oral daily  piperacillin/tazobactam IVPB. 3.375 Gram(s) IV Intermittent every 8 hours  sodium bicarbonate 650 milliGRAM(s) Oral three times a day  vancomycin  IVPB 1000 milliGRAM(s) IV Intermittent every 24 hours    MEDICATIONS  (PRN):  acetaminophen   Tablet .. 650 milliGRAM(s) Oral every 6 hours PRN Moderate Pain (4 - 6), Severe Pain (7 - 10)          PHYSICAL EXAM:  GENERAL: NAD, well-groomed, well-developed  HEAD:  Atraumatic, Normocephalic  CHEST/LUNG: Clear to percussion bilaterally; No rales, rhonchi, wheezing, or rubs  HEART: Regular rate and rhythm; No murmurs, rubs, or gallops  ABDOMEN: Soft, Nontender, Nondistended; Bowel sounds present  EXTREMITIES:  R foot dressing  LYMPH: No lymphadenopathy noted  SKIN: No rashes or lesions    Care Discussed with Consultants/Other Providers [+ ] YES  [ ] NO

## 2018-09-15 NOTE — PROGRESS NOTE ADULT - SUBJECTIVE AND OBJECTIVE BOX
DARIEL MI  HPI:  This is a patient who has chacot's foot, followed for ABEL on CKD. The CKD is likely due to Diabetic Nephropathy.    HEALTH ISSUES - PROBLEM Dx:  Osteomyelitis: Osteomyelitis  ABEL (acute kidney injury): ABLE (acute kidney injury)  CKD (chronic kidney disease), stage III: CKD (chronic kidney disease), stage III  Diabetes mellitus type 2, noninsulin dependent: Diabetes mellitus type 2, noninsulin dependent  Essential hypertension: Essential hypertension  Diabetic foot ulcer with osteomyelitis: Diabetic foot ulcer with osteomyelitis        MEDICATIONS  (STANDING):  amLODIPine   Tablet 10 milliGRAM(s) Oral daily  aspirin enteric coated 81 milliGRAM(s) Oral daily  atorvastatin 40 milliGRAM(s) Oral at bedtime  collagenase Ointment 1 Application(s) Topical daily  Dakins Solution - 1/4 Strength 1 Application(s) Topical daily  Dakins Solution - 1/4 Strength 1 Application(s) Topical Once  ergocalciferol 51950 Unit(s) Oral every week  folic acid 1 milliGRAM(s) Oral daily  heparin  Injectable 5000 Unit(s) SubCutaneous every 8 hours  hydrALAZINE 100 milliGRAM(s) Oral three times a day  influenza   Vaccine 0.5 milliLiter(s) IntraMuscular once  insulin lispro (HumaLOG) corrective regimen sliding scale   SubCutaneous three times a day before meals  insulin lispro (HumaLOG) corrective regimen sliding scale   SubCutaneous at bedtime  lidocaine 1% Injectable 30 milliLiter(s) Local Injection once  metoprolol succinate  milliGRAM(s) Oral daily  piperacillin/tazobactam IVPB. 3.375 Gram(s) IV Intermittent every 8 hours  sodium bicarbonate 650 milliGRAM(s) Oral three times a day  vancomycin  IVPB 1000 milliGRAM(s) IV Intermittent every 24 hours    MEDICATIONS  (PRN):  acetaminophen   Tablet .. 650 milliGRAM(s) Oral every 6 hours PRN Moderate Pain (4 - 6), Severe Pain (7 - 10)    Vital Signs Last 24 Hrs  T(C): 37.1 (15 Sep 2018 13:00), Max: 37.1 (15 Sep 2018 13:00)  T(F): 98.7 (15 Sep 2018 13:00), Max: 98.7 (15 Sep 2018 13:00)  HR: 71 (15 Sep 2018 13:00) (71 - 81)  BP: 146/62 (15 Sep 2018 13:00) (135/59 - 151/88)  BP(mean): --  RR: 18 (15 Sep 2018 05:36) (18 - 18)  SpO2: 99% (15 Sep 2018 05:36) (96% - 99%)  Daily     Daily     PHYSICAL EXAM:  Constitutional:  He appears comfortable and not distressed. Not diaphoretic.    Neck:  The thyroid is normal. Trachea is midline.     Respiratory: The lungs are clear to auscultation. No dullness and expansion is normal.    Cardiovascular: S1 and S2 are normal. No mummurs, rubs or gallops are present.    Gastrointestinal: The abdomen is soft. No tenderness is present. No masses are present. Bowel sounds are normal.    Genitourinary: The bladder is not distended. No CVA tenderness is present.    Extremities: No edema is noted.     Neurological: Cognition is normal. Reduced sensation to feet.                            8.6    11.52 )-----------( 443      ( 14 Sep 2018 07:00 )             27.1     09-15    141  |  102  |  34<H>  ----------------------------<  156<H>  3.8   |  21<L>  |  2.95<H>    Ca    8.6      15 Sep 2018 10:08  Phos  3.5     09-15  Mg     1.7     09-15

## 2018-09-15 NOTE — PROGRESS NOTE ADULT - ASSESSMENT
62 y/o M PMH HTN, HLD, CKD3, DM2 w/ R foot ulcer >3 years p/w worsening pain to R planter  foot ulcer. Patient last hospitalized about >1 year ago for R foot ulcer. Reports that he has not had any follow up with podiatry since that time. Initially had wound services coming to his house, however, has not had anything done in the past year. Reports washing his foot with saline or water then covering with a gauze. Is not able to see his foot well because it's difficult for him to bend down. Came in today because pain became too severe. Reports pain is worse when he walks and has also noticed some foul smelling drainage from the wound.Patient seen in the ED by podiatry. Reported that wound was malodorous, probed to bone w/ a high suspicion for OM and deep soft tissue infectiojn clinically. S/p debridement by podiatry in the ED .  Initial Cultures  on adm send after debridement  grew polymicrobial cristy but not MRSA , deep cultures with biopsy done thereafter with  bone cultures and  pathology with no growth although pt was already on anbx and   as with elevated inflammatory markers and the indium scan along with the high clinical suspicion of osteo in the presence of long standing charcoat foot  where osteomylitic path can be missed on the pathology , decision for being treated with 4 to 6 weeks of iv anbx , currently on zosyn , toleraating well with no untoward side effects, s/p picc line placement LUE.    Diabetic Foot Ulcer With Osteo   suggest to  provide supportive care  ensure glycemic control  monitor weekly ESR/CRP  levels  cont zosyn for now in  renal dose,no further  vanco with ckd  monitor closely for   any untoward side  effects of zosyn , hiram  rash , diarhoea ,worsening  renal failure etc  arrangements in progress for  the cont of iv zosyn for  add 5 weeks  local wound care with pod  follow up      Rocío Paredes MD  [phone] 64 y/o M PMH HTN, HLD, CKD3, DM2 w/ R foot ulcer >3 years p/w worsening pain to R planter  foot ulcer. Patient last hospitalized about >1 year ago for R foot ulcer. Reports that he has not had any follow up with podiatry since that time. Initially had wound services coming to his house, however, has not had anything done in the past year. Reports washing his foot with saline or water then covering with a gauze. Is not able to see his foot well because it's difficult for him to bend down. Came in today because pain became too severe. Reports pain is worse when he walks and has also noticed some foul smelling drainage from the wound.Patient seen in the ED by podiatry. Reported that wound was malodorous, probed to bone w/ a high suspicion for OM and deep soft tissue infectiojn clinically. S/p debridement by podiatry in the ED .  Initial Cultures  on adm send after debridement  grew polymicrobial cristy but not MRSA , deep cultures with biopsy done thereafter with  bone cultures and  pathology with no growth although pt was already on anbx and   as with elevated inflammatory markers and the indium scan along with the high clinical suspicion of osteo in the presence of long standing charcoat foot  where osteomylitic path can be missed on the pathology , decision for being treated with 4 to 6 weeks of iv anbx , currently on zosyn , toleraating well with no untoward side effects, s/p picc line placement LUE.   Problem # 1  Diabetic Foot Ulcer With Osteo   suggest to  provide supportive care  ensure glycemic control  monitor weekly ESR/CRP  levels  cont zosyn in the current   doses , doubt the need for  vanco and can be dc in  the face of prettey sig ckd   monitor closely for   any untoward side  effects of zosyn , hiram  rash , diarhoea ,worsening  renal failure etc  arrangements in progress for  the cont of iv zosyn for  add 5 weeks  local wound care with pod  follow up      Rocío Paredes MD  [phone]

## 2018-09-16 LAB
BUN SERPL-MCNC: 30 MG/DL — HIGH (ref 7–23)
CALCIUM SERPL-MCNC: 8.3 MG/DL — LOW (ref 8.4–10.5)
CHLORIDE SERPL-SCNC: 101 MMOL/L — SIGNIFICANT CHANGE UP (ref 98–107)
CO2 SERPL-SCNC: 24 MMOL/L — SIGNIFICANT CHANGE UP (ref 22–31)
CREAT SERPL-MCNC: 2.87 MG/DL — HIGH (ref 0.5–1.3)
CULTURE RESULTS: SIGNIFICANT CHANGE UP
GLUCOSE SERPL-MCNC: 139 MG/DL — HIGH (ref 70–99)
HCT VFR BLD CALC: 27.5 % — LOW (ref 39–50)
HGB BLD-MCNC: 8.5 G/DL — LOW (ref 13–17)
MCHC RBC-ENTMCNC: 27.6 PG — SIGNIFICANT CHANGE UP (ref 27–34)
MCHC RBC-ENTMCNC: 30.9 % — LOW (ref 32–36)
MCV RBC AUTO: 89.3 FL — SIGNIFICANT CHANGE UP (ref 80–100)
NRBC # FLD: 0 — SIGNIFICANT CHANGE UP
PLATELET # BLD AUTO: 347 K/UL — SIGNIFICANT CHANGE UP (ref 150–400)
PMV BLD: 10.2 FL — SIGNIFICANT CHANGE UP (ref 7–13)
POTASSIUM SERPL-MCNC: 3.7 MMOL/L — SIGNIFICANT CHANGE UP (ref 3.5–5.3)
POTASSIUM SERPL-SCNC: 3.7 MMOL/L — SIGNIFICANT CHANGE UP (ref 3.5–5.3)
RBC # BLD: 3.08 M/UL — LOW (ref 4.2–5.8)
RBC # FLD: 13.2 % — SIGNIFICANT CHANGE UP (ref 10.3–14.5)
SODIUM SERPL-SCNC: 140 MMOL/L — SIGNIFICANT CHANGE UP (ref 135–145)
WBC # BLD: 9.43 K/UL — SIGNIFICANT CHANGE UP (ref 3.8–10.5)
WBC # FLD AUTO: 9.43 K/UL — SIGNIFICANT CHANGE UP (ref 3.8–10.5)

## 2018-09-16 RX ADMIN — PIPERACILLIN AND TAZOBACTAM 25 GRAM(S): 4; .5 INJECTION, POWDER, LYOPHILIZED, FOR SOLUTION INTRAVENOUS at 21:46

## 2018-09-16 RX ADMIN — HEPARIN SODIUM 5000 UNIT(S): 5000 INJECTION INTRAVENOUS; SUBCUTANEOUS at 05:47

## 2018-09-16 RX ADMIN — Medication 100 MILLIGRAM(S): at 14:20

## 2018-09-16 RX ADMIN — HEPARIN SODIUM 5000 UNIT(S): 5000 INJECTION INTRAVENOUS; SUBCUTANEOUS at 21:46

## 2018-09-16 RX ADMIN — Medication 81 MILLIGRAM(S): at 14:20

## 2018-09-16 RX ADMIN — Medication 100 MILLIGRAM(S): at 05:48

## 2018-09-16 RX ADMIN — Medication 1 APPLICATION(S): at 14:20

## 2018-09-16 RX ADMIN — Medication 1 MILLIGRAM(S): at 14:20

## 2018-09-16 RX ADMIN — Medication 650 MILLIGRAM(S): at 05:47

## 2018-09-16 RX ADMIN — PIPERACILLIN AND TAZOBACTAM 25 GRAM(S): 4; .5 INJECTION, POWDER, LYOPHILIZED, FOR SOLUTION INTRAVENOUS at 14:20

## 2018-09-16 RX ADMIN — AMLODIPINE BESYLATE 10 MILLIGRAM(S): 2.5 TABLET ORAL at 05:47

## 2018-09-16 RX ADMIN — HEPARIN SODIUM 5000 UNIT(S): 5000 INJECTION INTRAVENOUS; SUBCUTANEOUS at 14:20

## 2018-09-16 RX ADMIN — Medication 100 MILLIGRAM(S): at 05:47

## 2018-09-16 RX ADMIN — Medication 1 DROP(S): at 01:51

## 2018-09-16 RX ADMIN — PIPERACILLIN AND TAZOBACTAM 25 GRAM(S): 4; .5 INJECTION, POWDER, LYOPHILIZED, FOR SOLUTION INTRAVENOUS at 05:47

## 2018-09-16 RX ADMIN — Medication 650 MILLIGRAM(S): at 14:20

## 2018-09-16 RX ADMIN — ATORVASTATIN CALCIUM 40 MILLIGRAM(S): 80 TABLET, FILM COATED ORAL at 21:46

## 2018-09-16 RX ADMIN — Medication 650 MILLIGRAM(S): at 21:46

## 2018-09-16 RX ADMIN — Medication 100 MILLIGRAM(S): at 21:46

## 2018-09-16 NOTE — PROGRESS NOTE ADULT - SUBJECTIVE AND OBJECTIVE BOX
DARIEL MI  HPI:  This is a patient who is treated for Osteomyelitis. He has ABEL on CKD. No neqw complaints.    HEALTH ISSUES - PROBLEM Dx:  Osteomyelitis: Osteomyelitis  ABEL (acute kidney injury): ABEL (acute kidney injury)  CKD (chronic kidney disease), stage III: CKD (chronic kidney disease), stage III  Diabetes mellitus type 2, noninsulin dependent: Diabetes mellitus type 2, noninsulin dependent  Essential hypertension: Essential hypertension  Diabetic foot ulcer with osteomyelitis: Diabetic foot ulcer with osteomyelitis        MEDICATIONS  (STANDING):  amLODIPine   Tablet 10 milliGRAM(s) Oral daily  aspirin enteric coated 81 milliGRAM(s) Oral daily  atorvastatin 40 milliGRAM(s) Oral at bedtime  collagenase Ointment 1 Application(s) Topical daily  Dakins Solution - 1/4 Strength 1 Application(s) Topical daily  Dakins Solution - 1/4 Strength 1 Application(s) Topical Once  ergocalciferol 27284 Unit(s) Oral every week  folic acid 1 milliGRAM(s) Oral daily  heparin  Injectable 5000 Unit(s) SubCutaneous every 8 hours  hydrALAZINE 100 milliGRAM(s) Oral three times a day  influenza   Vaccine 0.5 milliLiter(s) IntraMuscular once  insulin lispro (HumaLOG) corrective regimen sliding scale   SubCutaneous three times a day before meals  insulin lispro (HumaLOG) corrective regimen sliding scale   SubCutaneous at bedtime  lidocaine 1% Injectable 30 milliLiter(s) Local Injection once  metoprolol succinate  milliGRAM(s) Oral daily  piperacillin/tazobactam IVPB. 3.375 Gram(s) IV Intermittent every 8 hours  sodium bicarbonate 650 milliGRAM(s) Oral three times a day    MEDICATIONS  (PRN):  acetaminophen   Tablet .. 650 milliGRAM(s) Oral every 6 hours PRN Moderate Pain (4 - 6), Severe Pain (7 - 10)  artificial tears (preservative free) Ophthalmic Solution 1 Drop(s) Both EYES every 6 hours PRN Dry Eyes    Vital Signs Last 24 Hrs  T(C): 37.1 (16 Sep 2018 06:10), Max: 37.1 (15 Sep 2018 13:00)  T(F): 98.8 (16 Sep 2018 06:10), Max: 98.8 (16 Sep 2018 06:10)  HR: 77 (16 Sep 2018 06:10) (71 - 77)  BP: 138/66 (16 Sep 2018 06:10) (135/59 - 146/62)  BP(mean): --  RR: 18 (16 Sep 2018 06:10) (18 - 18)  SpO2: 99% (16 Sep 2018 06:10) (97% - 99%)  Daily     Daily     PHYSICAL EXAM:  Constitutional: He  appears comfortable and not distressed. Not diaphoretic.    Neck:  The thyroid is normal. Trachea is midline.     Respiratory: The lungs are clear to auscultation. No dullness and expansion is normal.    Cardiovascular: S1 and S2 are normal. No mummurs, rubs or gallops are present.    Gastrointestinal: The abdomen is soft. No tenderness is present. No masses are present. Bowel sounds are normal.    Genitourinary: The bladder is not distended. No CVA tenderness is present.    Extremities: No edema is noted. No deformities are present.    Neurological: Cognition is normal.     Psychiatric: Mood is appropriate. No hallucinations or flight of ideas are noted.          09-15    141  |  102  |  34<H>  ----------------------------<  156<H>  3.8   |  21<L>  |  2.95<H>    Ca    8.6      15 Sep 2018 10:08  Phos  3.5     09-15  Mg     1.7     09-15

## 2018-09-16 NOTE — PROGRESS NOTE ADULT - SUBJECTIVE AND OBJECTIVE BOX
Infectious Diseases progress note:    Subjective:  Coverage appreciated.  No acute o/n events.      ROS:  CONSTITUTIONAL:  No fever, chills, rigors  CARDIOVASCULAR:  No chest pain or palpitations  RESPIRATORY:   No SOB, cough, dyspnea on exertion.  No wheezing  GASTROINTESTINAL:  No abd pain, N/V, diarrhea/constipation  EXTREMITIES:  No swelling or joint pain  GENITOURINARY:  No burning on urination, increased frequency or urgency.  No flank pain  NEUROLOGIC:  No HA, visual disturbances  SKIN: No rashes    Allergies    No Known Allergies    Intolerances        ANTIBIOTICS/RELEVANT:  antimicrobials  piperacillin/tazobactam IVPB. 3.375 Gram(s) IV Intermittent every 8 hours    immunologic:  influenza   Vaccine 0.5 milliLiter(s) IntraMuscular once    OTHER:  acetaminophen   Tablet .. 650 milliGRAM(s) Oral every 6 hours PRN  amLODIPine   Tablet 10 milliGRAM(s) Oral daily  artificial tears (preservative free) Ophthalmic Solution 1 Drop(s) Both EYES every 6 hours PRN  aspirin enteric coated 81 milliGRAM(s) Oral daily  atorvastatin 40 milliGRAM(s) Oral at bedtime  collagenase Ointment 1 Application(s) Topical daily  Dakins Solution - 1/4 Strength 1 Application(s) Topical daily  Dakins Solution - 1/4 Strength 1 Application(s) Topical Once  ergocalciferol 10213 Unit(s) Oral every week  folic acid 1 milliGRAM(s) Oral daily  heparin  Injectable 5000 Unit(s) SubCutaneous every 8 hours  hydrALAZINE 100 milliGRAM(s) Oral three times a day  insulin lispro (HumaLOG) corrective regimen sliding scale   SubCutaneous three times a day before meals  insulin lispro (HumaLOG) corrective regimen sliding scale   SubCutaneous at bedtime  lidocaine 1% Injectable 30 milliLiter(s) Local Injection once  metoprolol succinate  milliGRAM(s) Oral daily  sodium bicarbonate 650 milliGRAM(s) Oral three times a day      Objective:  Vital Signs Last 24 Hrs  T(C): 36.9 (16 Sep 2018 13:23), Max: 37.1 (16 Sep 2018 06:10)  T(F): 98.4 (16 Sep 2018 13:23), Max: 98.8 (16 Sep 2018 06:10)  HR: 84 (16 Sep 2018 13:23) (74 - 84)  BP: 149/83 (16 Sep 2018 13:23) (135/59 - 149/83)  BP(mean): --  RR: 18 (16 Sep 2018 06:10) (18 - 18)  SpO2: 98% (16 Sep 2018 13:23) (97% - 99%)    PHYSICAL EXAM:  Constitutional:NAD  Eyes:SPIKE, EOMI  Ear/Nose/Throat: no thrush, mucositis.  Moist mucous membranes	  Neck:no JVD, no lymphadenopathy, supple  Respiratory: CTA lobito  Cardiovascular: S1S2 RRR, no murmurs  Gastrointestinal:soft, nontender,  nondistended (+) BS  Extremities:no e/e/c picc line  Skin:  rt ft plantar ulcer stable.  Dsg c/d/i        LABS:                        8.5    9.43  )-----------( 347      ( 16 Sep 2018 11:25 )             27.5     09-16    140  |  101  |  30<H>  ----------------------------<  139<H>  3.7   |  24  |  2.87<H>    Ca    8.3<L>      16 Sep 2018 11:25  Phos  3.5     09-15  Mg     1.7     09-15              Vancomycin Level, Random:  ug/mL (09-14 @ 07:00)  Vancomycin Level, Random:  ug/mL (09-13 @ 06:52)  Vancomycin Level, Random:  ug/mL (09-12 @ 06:00)                  MICROBIOLOGY:    Culture - Abscess with Gram Stain (09.11.18 @ 13:53)    Specimen Source: OTHER    Gram Stain Result:   NOS^No Organisms Seen  WBC^White Blood Cells  QNTY CELLS IN GRAM STAIN: NO CELLS SEEN    Culture Results:   NO GROWTH - PRELIMINARY RESULTS  NO ORGANISMS ISOLATED AT 24 HOURS  NO ORGANISMS ISOLATED AT 48 HRS.  NO ORGANISMS ISOLATED AT 72 HRS.  NO GROWTH AFTER 5 DAYS INCUBATION    Culture - Abscess with Gram Stain (09.07.18 @ 19:53)    Specimen Source: OTHER    Gram Stain Result:   GPCPR^Gram Pos Cocci in Pairs  QUANTITY OF BACTERIA SEEN: RARE (1+)  GNR^Gram Neg Rods  QUANTITY OF BACTERIA SEEN: RARE (1+)  WBC^White Blood Cells  QNTY CELLS IN GRAM STAIN: NO CELLS SEEN    Culture Results:   MANY  Routine susceptibility testing not performed as  Streptococcus Grp A/B is susceptible to drug(s) of choice -  Penicillin and Ampicillin  STRAG^Streptococcus agalactiae Gp B  DENICE^Corynebacterium species  ACMSP^Actinomyces species  ARTSP^Arthrobacter species          RADIOLOGY & ADDITIONAL STUDIES:

## 2018-09-16 NOTE — PROGRESS NOTE ADULT - SUBJECTIVE AND OBJECTIVE BOX
Patient is a 63y old  Male who presents with a chief complaint of R foot wound (16 Sep 2018 10:11)      INTERVAL HPI/OVERNIGHT EVENTS:  T(C): 37.1 (09-16-18 @ 06:10), Max: 37.1 (09-15-18 @ 13:00)  HR: 77 (09-16-18 @ 06:10) (71 - 77)  BP: 138/66 (09-16-18 @ 06:10) (135/59 - 146/62)  RR: 18 (09-16-18 @ 06:10) (18 - 18)  SpO2: 99% (09-16-18 @ 06:10) (97% - 99%)  Wt(kg): --  I&O's Summary      LABS:                        8.5    9.43  )-----------( 347      ( 16 Sep 2018 11:25 )             27.5     09-16    140  |  101  |  30<H>  ----------------------------<  139<H>  3.7   |  24  |  2.87<H>    Ca    8.3<L>      16 Sep 2018 11:25  Phos  3.5     09-15  Mg     1.7     09-15          CAPILLARY BLOOD GLUCOSE      POCT Blood Glucose.: 123 mg/dL (16 Sep 2018 08:31)  POCT Blood Glucose.: 141 mg/dL (15 Sep 2018 22:21)  POCT Blood Glucose.: 131 mg/dL (15 Sep 2018 17:32)            MEDICATIONS  (STANDING):  amLODIPine   Tablet 10 milliGRAM(s) Oral daily  aspirin enteric coated 81 milliGRAM(s) Oral daily  atorvastatin 40 milliGRAM(s) Oral at bedtime  collagenase Ointment 1 Application(s) Topical daily  Dakins Solution - 1/4 Strength 1 Application(s) Topical daily  Dakins Solution - 1/4 Strength 1 Application(s) Topical Once  ergocalciferol 55396 Unit(s) Oral every week  folic acid 1 milliGRAM(s) Oral daily  heparin  Injectable 5000 Unit(s) SubCutaneous every 8 hours  hydrALAZINE 100 milliGRAM(s) Oral three times a day  influenza   Vaccine 0.5 milliLiter(s) IntraMuscular once  insulin lispro (HumaLOG) corrective regimen sliding scale   SubCutaneous three times a day before meals  insulin lispro (HumaLOG) corrective regimen sliding scale   SubCutaneous at bedtime  lidocaine 1% Injectable 30 milliLiter(s) Local Injection once  metoprolol succinate  milliGRAM(s) Oral daily  piperacillin/tazobactam IVPB. 3.375 Gram(s) IV Intermittent every 8 hours  sodium bicarbonate 650 milliGRAM(s) Oral three times a day    MEDICATIONS  (PRN):  acetaminophen   Tablet .. 650 milliGRAM(s) Oral every 6 hours PRN Moderate Pain (4 - 6), Severe Pain (7 - 10)  artificial tears (preservative free) Ophthalmic Solution 1 Drop(s) Both EYES every 6 hours PRN Dry Eyes          PHYSICAL EXAM:  GENERAL: NAD, well-groomed, well-developed  HEAD:  Atraumatic, Normocephalic  CHEST/LUNG: Clear to percussion bilaterally; No rales, rhonchi, wheezing, or rubs  HEART: Regular rate and rhythm; No murmurs, rubs, or gallops  ABDOMEN: Soft, Nontender, Nondistended; Bowel sounds present  EXTREMITIES:  foot dressing present  LYMPH: No lymphadenopathy noted  SKIN: No rashes or lesions    Care Discussed with Consultants/Other Providers [* ] YES  [ ] NO

## 2018-09-16 NOTE — PROGRESS NOTE ADULT - SUBJECTIVE AND OBJECTIVE BOX
Keaton Duvall MD  Interventional Cardiology  [place] Office : [address] [place] [address]  Tel:   [place] Office : [address] [facility] [place] [address]  Tel: [phone]  Cell : [phone]    Subjective : Pt lying in bed comfortable, not in distress, denies any chest pain or SOB  	  MEDICATIONS:  amLODIPine   Tablet 10 milliGRAM(s) Oral daily  aspirin enteric coated 81 milliGRAM(s) Oral daily  heparin  Injectable 5000 Unit(s) SubCutaneous every 8 hours  hydrALAZINE 100 milliGRAM(s) Oral three times a day  metoprolol succinate  milliGRAM(s) Oral daily    piperacillin/tazobactam IVPB. 3.375 Gram(s) IV Intermittent every 8 hours      acetaminophen   Tablet .. 650 milliGRAM(s) Oral every 6 hours PRN      atorvastatin 40 milliGRAM(s) Oral at bedtime  insulin lispro (HumaLOG) corrective regimen sliding scale   SubCutaneous three times a day before meals  insulin lispro (HumaLOG) corrective regimen sliding scale   SubCutaneous at bedtime    artificial tears (preservative free) Ophthalmic Solution 1 Drop(s) Both EYES every 6 hours PRN  collagenase Ointment 1 Application(s) Topical daily  Dakins Solution - 1/4 Strength 1 Application(s) Topical daily  Dakins Solution - 1/4 Strength 1 Application(s) Topical Once  ergocalciferol 08798 Unit(s) Oral every week  folic acid 1 milliGRAM(s) Oral daily  influenza   Vaccine 0.5 milliLiter(s) IntraMuscular once  sodium bicarbonate 650 milliGRAM(s) Oral three times a day      PHYSICAL EXAM:  T(C): 36.9 (09-16-18 @ 13:23), Max: 37.1 (09-16-18 @ 06:10)  HR: 84 (09-16-18 @ 13:23) (77 - 84)  BP: 149/83 (09-16-18 @ 13:23) (138/66 - 149/83)  RR: 18 (09-16-18 @ 06:10) (18 - 18)  SpO2: 98% (09-16-18 @ 13:23) (98% - 99%)  Wt(kg): --  I&O's Summary        Appearance: Normal	  HEENT:   Normal oral mucosa, PERRL, EOMI	  Cardiovascular: Normal S1 S2, No JVD, No murmurs, No edema  Respiratory: Lungs clear to auscultation	  Gastrointestinal:  Soft, Non-tender, + BS	  Extremities: No clubbing, cyanosis  edema+ right                                   8.5    9.43  )-----------( 347      ( 16 Sep 2018 11:25 )             27.5     09-16    140  |  101  |  30<H>  ----------------------------<  139<H>  3.7   |  24  |  2.87<H>    Ca    8.3<L>      16 Sep 2018 11:25  Phos  3.5     09-15  Mg     1.7     09-15      proBNP:   Lipid Profile:   HgA1c:   TSH:

## 2018-09-16 NOTE — PROGRESS NOTE ADULT - ASSESSMENT
Patient is a 64 y/o M PMH HTN, HLD, CKD3, DM2 w/ R foot ulcer >3 years p/w worsening pain to R foot ulcer.  Wound was malodorous, probed to bone w/ a high suspicion for OM clinically. S/p debridement by podiatry in the ED and wound culture was taken.    Problem/Plan - 1:    ·	Rt foot plantar OM    - (+) probe to bone with malodorous drainage.  Pt with clinical OM and soft tissue infection    - Cont zosyn, vanco d/c'd on 9/15.  Bone cultures without MRSA.  Renally dose abx.     - f/u wound cultures, although do not always correlate well with bone cultures (unless MSSA or MRSA).   Thus far growing Arthrobacter, Actinomyces, Coryne, and Strep    - s/p PICC line for treatment of OM.  Recommend 6 week course with zosyn    Will follow,    Salima Spivey  [phone]

## 2018-09-16 NOTE — PROGRESS NOTE ADULT - ASSESSMENT
1) LE wound and swelling - Podiatry and ID onboard, No evidence of Obstructive PAD , 2d echo with normal BiV function     2) Right foot wound/OM - podiatry and ID on case     3) HTN - cont hydralazine toprol and amlodipine    4) ABEL on CKD:  renal onboard, now worsening

## 2018-09-16 NOTE — PROGRESS NOTE ADULT - ASSESSMENT
1.	ABEL, improved. He remains Oliguric.  2.	CKD due toDiabetes.  3.	OM of foot, on Antibiotics.  4.	CAD.    PLAN:  1.	Continue Oral HCO3.  2.	No RAAS blockkade for now.  3.	Follow up BMP.  4.	Continue Vanco at current dose adjusting to levels.

## 2018-09-17 DIAGNOSIS — D64.9 ANEMIA, UNSPECIFIED: ICD-10-CM

## 2018-09-17 DIAGNOSIS — R80.9 PROTEINURIA, UNSPECIFIED: ICD-10-CM

## 2018-09-17 DIAGNOSIS — N18.9 CHRONIC KIDNEY DISEASE, UNSPECIFIED: ICD-10-CM

## 2018-09-17 DIAGNOSIS — N28.1 CYST OF KIDNEY, ACQUIRED: ICD-10-CM

## 2018-09-17 DIAGNOSIS — E87.2 ACIDOSIS: ICD-10-CM

## 2018-09-17 LAB
BUN SERPL-MCNC: 31 MG/DL — HIGH (ref 7–23)
CALCIUM SERPL-MCNC: 8.2 MG/DL — LOW (ref 8.4–10.5)
CHLORIDE SERPL-SCNC: 105 MMOL/L — SIGNIFICANT CHANGE UP (ref 98–107)
CO2 SERPL-SCNC: 23 MMOL/L — SIGNIFICANT CHANGE UP (ref 22–31)
CREAT SERPL-MCNC: 2.91 MG/DL — HIGH (ref 0.5–1.3)
GLUCOSE SERPL-MCNC: 106 MG/DL — HIGH (ref 70–99)
HCT VFR BLD CALC: 32.4 % — LOW (ref 39–50)
HGB BLD-MCNC: 10.2 G/DL — LOW (ref 13–17)
MCHC RBC-ENTMCNC: 27.7 PG — SIGNIFICANT CHANGE UP (ref 27–34)
MCHC RBC-ENTMCNC: 31.5 % — LOW (ref 32–36)
MCV RBC AUTO: 88 FL — SIGNIFICANT CHANGE UP (ref 80–100)
NRBC # FLD: 0 — SIGNIFICANT CHANGE UP
PLATELET # BLD AUTO: 260 K/UL — SIGNIFICANT CHANGE UP (ref 150–400)
PMV BLD: 10.3 FL — SIGNIFICANT CHANGE UP (ref 7–13)
POTASSIUM SERPL-MCNC: 3.7 MMOL/L — SIGNIFICANT CHANGE UP (ref 3.5–5.3)
POTASSIUM SERPL-SCNC: 3.7 MMOL/L — SIGNIFICANT CHANGE UP (ref 3.5–5.3)
RBC # BLD: 3.68 M/UL — LOW (ref 4.2–5.8)
RBC # FLD: 13.1 % — SIGNIFICANT CHANGE UP (ref 10.3–14.5)
SODIUM SERPL-SCNC: 141 MMOL/L — SIGNIFICANT CHANGE UP (ref 135–145)
WBC # BLD: 6.28 K/UL — SIGNIFICANT CHANGE UP (ref 3.8–10.5)
WBC # FLD AUTO: 6.28 K/UL — SIGNIFICANT CHANGE UP (ref 3.8–10.5)

## 2018-09-17 RX ADMIN — Medication 81 MILLIGRAM(S): at 13:05

## 2018-09-17 RX ADMIN — PIPERACILLIN AND TAZOBACTAM 25 GRAM(S): 4; .5 INJECTION, POWDER, LYOPHILIZED, FOR SOLUTION INTRAVENOUS at 13:06

## 2018-09-17 RX ADMIN — Medication 100 MILLIGRAM(S): at 21:56

## 2018-09-17 RX ADMIN — Medication 1 APPLICATION(S): at 13:07

## 2018-09-17 RX ADMIN — Medication 650 MILLIGRAM(S): at 06:12

## 2018-09-17 RX ADMIN — Medication 100 MILLIGRAM(S): at 14:00

## 2018-09-17 RX ADMIN — PIPERACILLIN AND TAZOBACTAM 25 GRAM(S): 4; .5 INJECTION, POWDER, LYOPHILIZED, FOR SOLUTION INTRAVENOUS at 06:12

## 2018-09-17 RX ADMIN — Medication 100 MILLIGRAM(S): at 06:11

## 2018-09-17 RX ADMIN — ATORVASTATIN CALCIUM 40 MILLIGRAM(S): 80 TABLET, FILM COATED ORAL at 21:56

## 2018-09-17 RX ADMIN — HEPARIN SODIUM 5000 UNIT(S): 5000 INJECTION INTRAVENOUS; SUBCUTANEOUS at 06:12

## 2018-09-17 RX ADMIN — AMLODIPINE BESYLATE 10 MILLIGRAM(S): 2.5 TABLET ORAL at 06:11

## 2018-09-17 RX ADMIN — Medication 650 MILLIGRAM(S): at 21:56

## 2018-09-17 RX ADMIN — PIPERACILLIN AND TAZOBACTAM 25 GRAM(S): 4; .5 INJECTION, POWDER, LYOPHILIZED, FOR SOLUTION INTRAVENOUS at 21:56

## 2018-09-17 RX ADMIN — HEPARIN SODIUM 5000 UNIT(S): 5000 INJECTION INTRAVENOUS; SUBCUTANEOUS at 13:05

## 2018-09-17 RX ADMIN — Medication 100 MILLIGRAM(S): at 06:12

## 2018-09-17 RX ADMIN — Medication 650 MILLIGRAM(S): at 13:05

## 2018-09-17 RX ADMIN — Medication 1 MILLIGRAM(S): at 13:05

## 2018-09-17 RX ADMIN — HEPARIN SODIUM 5000 UNIT(S): 5000 INJECTION INTRAVENOUS; SUBCUTANEOUS at 21:56

## 2018-09-17 NOTE — PROGRESS NOTE ADULT - ASSESSMENT
62 y/o M PMH HTN, HLD, CKD3, DM2 admitted for right foot non-healing ulcer to bone 2/2 Charcot and ABEL

## 2018-09-17 NOTE — PROGRESS NOTE ADULT - PROBLEM SELECTOR PLAN 1
Possible Acute on CKD vs progression of CKD  Renal function has been stable since admission likely new baseline.   Kidney/bladder US done showed b/l cyst no obstruction, mildly enlarged kidney size likely sec longstanding DM   Monitor bmp, strict I/O  Avoid nephrotoxic agents

## 2018-09-17 NOTE — PROGRESS NOTE ADULT - ASSESSMENT
64 y/o M PMH HTN, HLD, CKD3, DM2 w/ R foot ulcer >3 years p/w diabetic foot ulcer      Problem/Plan - 1:  ·  Problem: Diabetic foot ulcer with osteomyelitis.  Plan: C/w antibiotics as per ID  id fu  podiatry fu  6 weeks of antibiotics as per ID    Problem/Plan - 2:  ·  Problem: Essential hypertension.  Plan: c/w home meds     Problem/Plan - 3:  ·  Problem: Diabetes mellitus type 2, noninsulin dependent.  Plan: monitor fs  iss    DC planning

## 2018-09-17 NOTE — DIETITIAN INITIAL EVALUATION ADULT. - NS AS NUTRI INTERV MEALS SNACK
Fat - modified diet/Continue current diet order, which remains appropriate at this time./Carbohydrate - modified diet/Composition of meals/snacks/Mineral - modified diet

## 2018-09-17 NOTE — PROGRESS NOTE ADULT - ASSESSMENT
· Assessment		  1) LE wound and swelling - Podiatry and ID onboard, No evidence of Obstructive PAD , 2d echo with normal BiV function     2) Right foot wound/OM - podiatry and ID on case     3) HTN - cont hydralazine toprol and amlodipine    4) ABEL on CKD:  renal onboard,

## 2018-09-17 NOTE — DIETITIAN INITIAL EVALUATION ADULT. - FACTORS AFF FOOD INTAKE
with full dentures only tolerates fruit cup, reports unable to eat fresh fruits./Sabianism/ethnic/cultural/personal food preferences

## 2018-09-17 NOTE — PROGRESS NOTE ADULT - SUBJECTIVE AND OBJECTIVE BOX
[facility]   MD DOMO WALLACE MD ANGELA WONG, [place]    TEL:  OFFICE: [phone]  DR LEE CELL: [phone]  DR. GRULLON CELL: [phone]  MELANIA CORONEL CELL: [phone]        Patient is a 63y old  Male who presents with a chief complaint of R foot wound (16 Sep 2018 20:27)      Patient seen and examined at bedside. No chest pain/sob    VITALS:  T(F): 98.3 (09-17-18 @ 05:12), Max: 98.8 (09-16-18 @ 21:19)  HR: 82 (09-17-18 @ 05:12)  BP: 155/77 (09-17-18 @ 05:12)  RR: 18 (09-17-18 @ 05:12)  SpO2: 97% (09-17-18 @ 05:12)  Wt(kg): --        PHYSICAL EXAM:  Constitutional: NAD  Neck: No JVD  Respiratory: CTAB, no wheezes, rales or rhonchi  Cardiovascular: S1, S2, RRR  Gastrointestinal: BS+, soft, NT/ND  Extremities: B/L LE+ peripheral edema R>L     Hospital Medications:   MEDICATIONS  (STANDING):  amLODIPine   Tablet 10 milliGRAM(s) Oral daily  aspirin enteric coated 81 milliGRAM(s) Oral daily  atorvastatin 40 milliGRAM(s) Oral at bedtime  collagenase Ointment 1 Application(s) Topical daily  Dakins Solution - 1/4 Strength 1 Application(s) Topical daily  Dakins Solution - 1/4 Strength 1 Application(s) Topical Once  ergocalciferol 68570 Unit(s) Oral every week  folic acid 1 milliGRAM(s) Oral daily  heparin  Injectable 5000 Unit(s) SubCutaneous every 8 hours  hydrALAZINE 100 milliGRAM(s) Oral three times a day  influenza   Vaccine 0.5 milliLiter(s) IntraMuscular once  insulin lispro (HumaLOG) corrective regimen sliding scale   SubCutaneous three times a day before meals  insulin lispro (HumaLOG) corrective regimen sliding scale   SubCutaneous at bedtime  lidocaine 1% Injectable 30 milliLiter(s) Local Injection once  metoprolol succinate  milliGRAM(s) Oral daily  piperacillin/tazobactam IVPB. 3.375 Gram(s) IV Intermittent every 8 hours  sodium bicarbonate 650 milliGRAM(s) Oral three times a day      LABS:  09-17    141  |  105  |  31<H>  ----------------------------<  106<H>  3.7   |  23  |  2.91<H>    Ca    8.2<L>      17 Sep 2018 06:23      Creatinine Trend: 2.91 <--, 2.87 <--, 2.95 <--, 2.81 <--, 2.66 <--, 2.73 <--, 2.95 <--                                10.2   6.28  )-----------( 260      ( 17 Sep 2018 06:23 )             32.4     Urine Studies:  Urinalysis - [09-11-18 @ 18:40]      Color LIGHT YELLOW / Appearance CLEAR / SG 1.011 / pH 6.0      Gluc 100 / Ketone NEGATIVE  / Bili NEGATIVE / Urobili NORMAL       Blood NEGATIVE / Protein 200 / Leuk Est NEGATIVE / Nitrite NEGATIVE      RBC 3-5 / WBC 0-2 / Hyaline NEGATIVE / Gran  / Sq Epi OCC / Non Sq Epi  / Bacteria NEGATIVE    Urine Creatinine 73.80      [09-11-18 @ 18:40]  Urine Protein 229.7      [09-11-18 @ 18:40]  Urine Sodium 51      [09-11-18 @ 18:40]    Iron 34, TIBC 155, %sat --      [09-11-18 @ 05:45]  Ferritin 355.3      [09-11-18 @ 05:45]  PTH 42.84 (Ca --)      [09-11-18 @ 05:45]   --  Vitamin D (25OH) 11.8      [09-11-18 @ 05:45]  HbA1c 6.5      [09-08-18 @ 05:20]        RADIOLOGY & ADDITIONAL STUDIES:

## 2018-09-17 NOTE — PROGRESS NOTE ADULT - ASSESSMENT
Patient is a 62 y/o M PMH HTN, HLD, CKD3, DM2 w/ R foot ulcer >3 years p/w worsening pain to R foot ulcer.  Wound was malodorous, probed to bone w/ a high suspicion for OM clinically. S/p debridement by podiatry in the ED and wound culture was taken.    Problem/Plan - 1:    ·	Rt foot plantar OM    - (+) probe to bone with malodorous drainage.  Pt with clinical OM and soft tissue infection    - Cont zosyn.   Renally dose abx     - f/u wound cultures, although do not always correlate well with bone cultures (unless MSSA or MRSA).   Thus far growing Arthrobacter, Actinomyces, Coryne, and Strep    - s/p PICC line for treatment of OM.  Recommend 6 week course with zosyn, and transition to PO abx thereafter for continued treatment of actinomyces.  ID f/u in 2 - 4 weeks.    Will follow,    Salima Spivey  [phone]

## 2018-09-17 NOTE — DIETITIAN INITIAL EVALUATION ADULT. - PERTINENT LABORATORY DATA
09-17 Na141 mmol/L Glu 106 mg/dL<H> K+ 3.7 mmol/L Cr  2.91 mg/dL<H> BUN 31 mg/dL<H> 09-15 Phos 3.5 mg/dL 09-12 Alb 2.5 g/dL<L> 09-08 [id number] 6.5 %<H>

## 2018-09-17 NOTE — DIETITIAN INITIAL EVALUATION ADULT. - ORAL INTAKE PTA
fair/Reports skipping meals at time, doesn't monitor blood glucose at home. Reports eating primarily food purchased.

## 2018-09-17 NOTE — DIETITIAN INITIAL EVALUATION ADULT. - NS AS NUTRI DX KNOWLEDGE BELIEFS
Disordered eating pattern/Limited adherence to nutrition - related recommendations/Undesirable food choices

## 2018-09-17 NOTE — PROGRESS NOTE ADULT - ASSESSMENT
70 M with right foot non-healing ulcer to bone 2/2 Charcot   - Pt was seen and evaluated  - wound  probes to subQ, stable  - Dressed with santyl and saline guaze   - bone biopsy: charcot, no osteomyelitis   - Cont ambulation in CAM walker  - Stable for discharge from podiatry standpoint   - Rec Augmentin 875 bid x 7 days, no signs of osteomyelitis or deeper infection

## 2018-09-17 NOTE — PROGRESS NOTE ADULT - SUBJECTIVE AND OBJECTIVE BOX
Patient is a 63y old  Male who presents with a chief complaint of R foot wound (17 Sep 2018 13:36)      INTERVAL HPI/OVERNIGHT EVENTS:  T(C): 36.9 (09-17-18 @ 13:44), Max: 37.1 (09-16-18 @ 21:19)  HR: 80 (09-17-18 @ 13:44) (73 - 82)  BP: 142/77 (09-17-18 @ 13:44) (134/60 - 155/77)  RR: 18 (09-17-18 @ 13:44) (18 - 18)  SpO2: 96% (09-17-18 @ 13:44) (96% - 100%)  Wt(kg): --  I&O's Summary      LABS:                        10.2   6.28  )-----------( 260      ( 17 Sep 2018 06:23 )             32.4     09-17    141  |  105  |  31<H>  ----------------------------<  106<H>  3.7   |  23  |  2.91<H>    Ca    8.2<L>      17 Sep 2018 06:23          CAPILLARY BLOOD GLUCOSE      POCT Blood Glucose.: 129 mg/dL (17 Sep 2018 12:40)  POCT Blood Glucose.: 131 mg/dL (17 Sep 2018 08:46)  POCT Blood Glucose.: 116 mg/dL (16 Sep 2018 22:50)  POCT Blood Glucose.: 141 mg/dL (16 Sep 2018 17:57)            MEDICATIONS  (STANDING):  amLODIPine   Tablet 10 milliGRAM(s) Oral daily  aspirin enteric coated 81 milliGRAM(s) Oral daily  atorvastatin 40 milliGRAM(s) Oral at bedtime  collagenase Ointment 1 Application(s) Topical daily  Dakins Solution - 1/4 Strength 1 Application(s) Topical daily  Dakins Solution - 1/4 Strength 1 Application(s) Topical Once  ergocalciferol 96532 Unit(s) Oral every week  folic acid 1 milliGRAM(s) Oral daily  heparin  Injectable 5000 Unit(s) SubCutaneous every 8 hours  hydrALAZINE 100 milliGRAM(s) Oral three times a day  influenza   Vaccine 0.5 milliLiter(s) IntraMuscular once  insulin lispro (HumaLOG) corrective regimen sliding scale   SubCutaneous three times a day before meals  insulin lispro (HumaLOG) corrective regimen sliding scale   SubCutaneous at bedtime  lidocaine 1% Injectable 30 milliLiter(s) Local Injection once  metoprolol succinate  milliGRAM(s) Oral daily  piperacillin/tazobactam IVPB. 3.375 Gram(s) IV Intermittent every 8 hours  sodium bicarbonate 650 milliGRAM(s) Oral three times a day    MEDICATIONS  (PRN):  acetaminophen   Tablet .. 650 milliGRAM(s) Oral every 6 hours PRN Moderate Pain (4 - 6), Severe Pain (7 - 10)  artificial tears (preservative free) Ophthalmic Solution 1 Drop(s) Both EYES every 6 hours PRN Dry Eyes          PHYSICAL EXAM:  GENERAL: NAD, well-groomed, well-developed  HEAD:  Atraumatic, Normocephalic  CHEST/LUNG: Clear to percussion bilaterally; No rales, rhonchi, wheezing, or rubs  HEART: Regular rate and rhythm; No murmurs, rubs, or gallops  ABDOMEN: Soft, Nontender, Nondistended; Bowel sounds present  EXTREMITIES:  R foot wound  LYMPH: No lymphadenopathy noted  SKIN: No rashes or lesions    Care Discussed with Consultants/Other Providers [ ] YES  [ ] NO

## 2018-09-17 NOTE — PROGRESS NOTE ADULT - SUBJECTIVE AND OBJECTIVE BOX
Infectious Diseases progress note:    Subjective: No new complaints.  Afebrile.  No acute o/n events.    ROS:  CONSTITUTIONAL:  No fever, chills, rigors  CARDIOVASCULAR:  No chest pain or palpitations  RESPIRATORY:   No SOB, cough, dyspnea on exertion.  No wheezing  GASTROINTESTINAL:  No abd pain, N/V, diarrhea/constipation  EXTREMITIES:  No swelling or joint pain  GENITOURINARY:  No burning on urination, increased frequency or urgency.  No flank pain  NEUROLOGIC:  No HA, visual disturbances  SKIN: No rashes    Allergies    No Known Allergies    Intolerances        ANTIBIOTICS/RELEVANT:  antimicrobials  piperacillin/tazobactam IVPB. 3.375 Gram(s) IV Intermittent every 8 hours    immunologic:  influenza   Vaccine 0.5 milliLiter(s) IntraMuscular once    OTHER:  acetaminophen   Tablet .. 650 milliGRAM(s) Oral every 6 hours PRN  amLODIPine   Tablet 10 milliGRAM(s) Oral daily  artificial tears (preservative free) Ophthalmic Solution 1 Drop(s) Both EYES every 6 hours PRN  aspirin enteric coated 81 milliGRAM(s) Oral daily  atorvastatin 40 milliGRAM(s) Oral at bedtime  collagenase Ointment 1 Application(s) Topical daily  Dakins Solution - 1/4 Strength 1 Application(s) Topical daily  Dakins Solution - 1/4 Strength 1 Application(s) Topical Once  ergocalciferol 42352 Unit(s) Oral every week  folic acid 1 milliGRAM(s) Oral daily  heparin  Injectable 5000 Unit(s) SubCutaneous every 8 hours  hydrALAZINE 100 milliGRAM(s) Oral three times a day  insulin lispro (HumaLOG) corrective regimen sliding scale   SubCutaneous three times a day before meals  insulin lispro (HumaLOG) corrective regimen sliding scale   SubCutaneous at bedtime  lidocaine 1% Injectable 30 milliLiter(s) Local Injection once  metoprolol succinate  milliGRAM(s) Oral daily  sodium bicarbonate 650 milliGRAM(s) Oral three times a day      Objective:  Vital Signs Last 24 Hrs  T(C): 36.9 (17 Sep 2018 13:44), Max: 37.1 (16 Sep 2018 21:19)  T(F): 98.4 (17 Sep 2018 13:44), Max: 98.8 (16 Sep 2018 21:19)  HR: 80 (17 Sep 2018 13:44) (73 - 82)  BP: 142/77 (17 Sep 2018 13:44) (134/60 - 155/77)  BP(mean): --  RR: 18 (17 Sep 2018 13:44) (18 - 18)  SpO2: 96% (17 Sep 2018 13:44) (96% - 100%)    PHYSICAL EXAM:  Constitutional:NAD  Eyes:SPIKE, EOMI  Ear/Nose/Throat: no thrush, mucositis.  Moist mucous membranes	  Neck:no JVD, no lymphadenopathy, supple  Respiratory: CTA lobito  Cardiovascular: S1S2 RRR, no murmurs  Gastrointestinal:soft, nontender,  nondistended (+) BS  Extremities:no e/e/c  Skin:  rt ft dsg c/d/i        LABS:                        10.2   6.28  )-----------( 260      ( 17 Sep 2018 06:23 )             32.4     09-17    141  |  105  |  31<H>  ----------------------------<  106<H>  3.7   |  23  |  2.91<H>    Ca    8.2<L>      17 Sep 2018 06:23              Vancomycin Level, Random:  ug/mL (09-14 @ 07:00)  Vancomycin Level, Random:  ug/mL (09-13 @ 06:52)                  MICROBIOLOGY:      Culture - Abscess with Gram Stain (09.11.18 @ 13:53)    Specimen Source: OTHER    Gram Stain Result:   NOS^No Organisms Seen  WBC^White Blood Cells  QNTY CELLS IN GRAM STAIN: NO CELLS SEEN    Culture Results:   NO GROWTH - PRELIMINARY RESULTS  NO ORGANISMS ISOLATED AT 24 HOURS  NO ORGANISMS ISOLATED AT 48 HRS.  NO ORGANISMS ISOLATED AT 72 HRS.  NO GROWTH AFTER 5 DAYS INCUBATION        RADIOLOGY & ADDITIONAL STUDIES:

## 2018-09-17 NOTE — PROGRESS NOTE ADULT - PROBLEM SELECTOR PLAN 2
baseline likely 2-2.2, Scr been stable 2.8-2.9 since admission likely new baseline  CKD likely sec to DM/HTN  monitor bmp.  avoid nephrotoxic agents.

## 2018-09-17 NOTE — DIETITIAN INITIAL EVALUATION ADULT. - OTHER INFO
Initial Dietitian Evaluation 2/2 to extended length of stay. 64 y/o Male with medical history inclusive of HTN, HLD, CKD3, T2DM with R foot ulcer >3 years. Admitted w/ diabetic foot ulcer. Patient reports good appetite and po intake at this time. Patient denies any nausea/vomiting/diarrhea/constipation or difficulty chewing and swallowing. NKFA. RD provided the patient with extensive verbal and written DM diet education; including, carb counting, label reading, meal planning, pre-prandial and post-prandial finger stick goals, and HbA1c goal. Patient was also made aware of the physiological implications of poor glycemic control. Also, Heart Healthy diet education provided, patient receptive to information. Written materials provided. Patient weighed 182.9lbs (5/17/17) per previous RDN note. He currently weighs 225lbs (9/7/18) with +2 hand, +3 right foot, leg edema noted.

## 2018-09-17 NOTE — PROGRESS NOTE ADULT - SUBJECTIVE AND OBJECTIVE BOX
Keaton Duvall MD  Interventional Cardiology  [place] Office : [address] [place] [address] [place]. [address]  Tel:   [place] Office : [address] [facility] [place] [address]  Tel: [phone]  Cell : [phone]    Subjective : Pt lying in bed comfortable, not in distress, denies any chest pain or SOB  	  MEDICATIONS:  amLODIPine   Tablet 10 milliGRAM(s) Oral daily  aspirin enteric coated 81 milliGRAM(s) Oral daily  heparin  Injectable 5000 Unit(s) SubCutaneous every 8 hours  hydrALAZINE 100 milliGRAM(s) Oral three times a day  metoprolol succinate  milliGRAM(s) Oral daily    piperacillin/tazobactam IVPB. 3.375 Gram(s) IV Intermittent every 8 hours      acetaminophen   Tablet .. 650 milliGRAM(s) Oral every 6 hours PRN      atorvastatin 40 milliGRAM(s) Oral at bedtime  insulin lispro (HumaLOG) corrective regimen sliding scale   SubCutaneous three times a day before meals  insulin lispro (HumaLOG) corrective regimen sliding scale   SubCutaneous at bedtime    artificial tears (preservative free) Ophthalmic Solution 1 Drop(s) Both EYES every 6 hours PRN  collagenase Ointment 1 Application(s) Topical daily  Dakins Solution - 1/4 Strength 1 Application(s) Topical daily  Dakins Solution - 1/4 Strength 1 Application(s) Topical Once  ergocalciferol 06847 Unit(s) Oral every week  folic acid 1 milliGRAM(s) Oral daily  influenza   Vaccine 0.5 milliLiter(s) IntraMuscular once  sodium bicarbonate 650 milliGRAM(s) Oral three times a day      PHYSICAL EXAM:  T(C): 36.9 (09-17-18 @ 13:44), Max: 37.1 (09-16-18 @ 21:19)  HR: 80 (09-17-18 @ 13:44) (73 - 82)  BP: 142/77 (09-17-18 @ 13:44) (134/60 - 155/77)  RR: 18 (09-17-18 @ 13:44) (18 - 18)  SpO2: 96% (09-17-18 @ 13:44) (96% - 100%)  Wt(kg): --  I&O's Summary        Appearance: Normal	  HEENT:   Normal oral mucosa, PERRL, EOMI	  Cardiovascular: Normal S1 S2, No JVD, No murmurs, No edema  Respiratory: Lungs clear to auscultation	  Gastrointestinal:  Soft, Non-tender, + BS	  Extremities: No clubbing, cyanosis  edema+ right                                       10.2   6.28  )-----------( 260      ( 17 Sep 2018 06:23 )             32.4     09-17    141  |  105  |  31<H>  ----------------------------<  106<H>  3.7   |  23  |  2.91<H>    Ca    8.2<L>      17 Sep 2018 06:23      proBNP:   Lipid Profile:   HgA1c:   TSH:

## 2018-09-17 NOTE — DIETITIAN INITIAL EVALUATION ADULT. - ENERGY NEEDS
Current weight 225lbs HT 5'10" BMI = 32.3kg/m2  IBW: 160lbs (+/-10%) %IBW: 141%  +2 right hand, +3 right foot, leg edema noted. Wound diabetic ulcer noted.

## 2018-09-17 NOTE — PROGRESS NOTE ADULT - SUBJECTIVE AND OBJECTIVE BOX
Patient is a 63y old  Male who presents with a chief complaint of R foot wound (17 Sep 2018 20:55)       INTERVAL HPI/OVERNIGHT EVENTS:  Patient seen and evaluated at bedside.  Pt is resting comfortable in NAD. Denies N/V/F/C.  Pain rated at 0/10    Allergies    No Known Allergies    Intolerances        Vital Signs Last 24 Hrs  T(C): 36.9 (17 Sep 2018 21:24), Max: 36.9 (17 Sep 2018 13:44)  T(F): 98.5 (17 Sep 2018 21:24), Max: 98.5 (17 Sep 2018 21:24)  HR: 70 (17 Sep 2018 21:24) (70 - 82)  BP: 125/61 (17 Sep 2018 21:24) (125/61 - 155/77)  BP(mean): --  RR: 18 (17 Sep 2018 21:24) (18 - 18)  SpO2: 97% (17 Sep 2018 21:24) (96% - 97%)    LABS:                        10.2   6.28  )-----------( 260      ( 17 Sep 2018 06:23 )             32.4     09-17    141  |  105  |  31<H>  ----------------------------<  106<H>  3.7   |  23  |  2.91<H>    Ca    8.2<L>      17 Sep 2018 06:23          CAPILLARY BLOOD GLUCOSE      POCT Blood Glucose.: 150 mg/dL (17 Sep 2018 22:23)  POCT Blood Glucose.: 132 mg/dL (17 Sep 2018 17:53)  POCT Blood Glucose.: 129 mg/dL (17 Sep 2018 12:40)  POCT Blood Glucose.: 131 mg/dL (17 Sep 2018 08:46)      Lower Extremity Physical Exam:  Vascular: DP/PT 0/4, B/L, CFT < 5 seconds B/L, Temperature gradient slightly warmer on right  Neuro: Epicritic sensation diminished to the level of digits, B/L.  Musculoskeletal/Ortho: charcot deformity right foot   Skin: edema to the right LE, 3+  Wound #1: right foot planar medial wound   Size: 3x2.8cm  Depth: to subQ  Wound bed: fibrogranular  Drainage: serosanguinous    Odor: none  Periwound: hyperkaratotic  Etiology: Charcot

## 2018-09-18 VITALS
SYSTOLIC BLOOD PRESSURE: 146 MMHG | RESPIRATION RATE: 17 BRPM | TEMPERATURE: 98 F | HEART RATE: 70 BPM | OXYGEN SATURATION: 96 % | DIASTOLIC BLOOD PRESSURE: 76 MMHG

## 2018-09-18 RX ORDER — ATORVASTATIN CALCIUM 80 MG/1
1 TABLET, FILM COATED ORAL
Qty: 0 | Refills: 0 | COMMUNITY
Start: 2018-09-18

## 2018-09-18 RX ORDER — COLLAGENASE CLOSTRIDIUM HIST. 250 UNIT/G
1 OINTMENT (GRAM) TOPICAL
Qty: 1 | Refills: 1 | OUTPATIENT
Start: 2018-09-18 | End: 2018-11-16

## 2018-09-18 RX ORDER — METOPROLOL TARTRATE 50 MG
1 TABLET ORAL
Qty: 0 | Refills: 0 | COMMUNITY
Start: 2018-09-18

## 2018-09-18 RX ORDER — ASPIRIN/CALCIUM CARB/MAGNESIUM 324 MG
1 TABLET ORAL
Qty: 0 | Refills: 0 | COMMUNITY

## 2018-09-18 RX ORDER — AMLODIPINE BESYLATE 2.5 MG/1
1 TABLET ORAL
Qty: 0 | Refills: 0 | COMMUNITY

## 2018-09-18 RX ORDER — HYDRALAZINE HCL 50 MG
1 TABLET ORAL
Qty: 0 | Refills: 0 | COMMUNITY
Start: 2018-09-18

## 2018-09-18 RX ORDER — SODIUM BICARBONATE 1 MEQ/ML
2 SYRINGE (ML) INTRAVENOUS
Qty: 0 | Refills: 0 | COMMUNITY
Start: 2018-09-18 | End: 2018-10-17

## 2018-09-18 RX ORDER — HYDRALAZINE HCL 50 MG
1 TABLET ORAL
Qty: 0 | Refills: 0 | COMMUNITY

## 2018-09-18 RX ORDER — FUROSEMIDE 40 MG
1 TABLET ORAL
Qty: 0 | Refills: 0 | COMMUNITY

## 2018-09-18 RX ORDER — CALCITRIOL 0.5 UG/1
1 CAPSULE ORAL
Qty: 0 | Refills: 0 | COMMUNITY

## 2018-09-18 RX ORDER — METOPROLOL TARTRATE 50 MG
1 TABLET ORAL
Qty: 0 | Refills: 0 | COMMUNITY

## 2018-09-18 RX ORDER — FOLIC ACID 0.8 MG
1 TABLET ORAL
Qty: 0 | Refills: 0 | COMMUNITY

## 2018-09-18 RX ORDER — ASPIRIN/CALCIUM CARB/MAGNESIUM 324 MG
1 TABLET ORAL
Qty: 0 | Refills: 0 | COMMUNITY
Start: 2018-09-18

## 2018-09-18 RX ORDER — ATORVASTATIN CALCIUM 80 MG/1
1 TABLET, FILM COATED ORAL
Qty: 0 | Refills: 0 | COMMUNITY

## 2018-09-18 RX ORDER — AMLODIPINE BESYLATE 2.5 MG/1
1 TABLET ORAL
Qty: 0 | Refills: 0 | COMMUNITY
Start: 2018-09-18

## 2018-09-18 RX ORDER — LOSARTAN POTASSIUM 100 MG/1
1 TABLET, FILM COATED ORAL
Qty: 0 | Refills: 0 | COMMUNITY

## 2018-09-18 RX ORDER — GLYBURIDE 5 MG
1 TABLET ORAL
Qty: 0 | Refills: 0 | COMMUNITY

## 2018-09-18 RX ORDER — FOLIC ACID 0.8 MG
1 TABLET ORAL
Qty: 0 | Refills: 0 | DISCHARGE
Start: 2018-09-18

## 2018-09-18 RX ORDER — COLLAGENASE CLOSTRIDIUM HIST. 250 UNIT/G
1 OINTMENT (GRAM) TOPICAL
Qty: 1 | Refills: 0 | OUTPATIENT
Start: 2018-09-18 | End: 2018-10-17

## 2018-09-18 RX ORDER — SODIUM BICARBONATE 1 MEQ/ML
1 SYRINGE (ML) INTRAVENOUS
Qty: 90 | Refills: 0 | OUTPATIENT
Start: 2018-09-18 | End: 2018-10-17

## 2018-09-18 RX ADMIN — Medication 1 APPLICATION(S): at 11:00

## 2018-09-18 RX ADMIN — Medication 100 MILLIGRAM(S): at 06:20

## 2018-09-18 RX ADMIN — HEPARIN SODIUM 5000 UNIT(S): 5000 INJECTION INTRAVENOUS; SUBCUTANEOUS at 06:19

## 2018-09-18 RX ADMIN — Medication 1 MILLIGRAM(S): at 11:00

## 2018-09-18 RX ADMIN — PIPERACILLIN AND TAZOBACTAM 25 GRAM(S): 4; .5 INJECTION, POWDER, LYOPHILIZED, FOR SOLUTION INTRAVENOUS at 06:19

## 2018-09-18 RX ADMIN — Medication 650 MILLIGRAM(S): at 06:20

## 2018-09-18 RX ADMIN — AMLODIPINE BESYLATE 10 MILLIGRAM(S): 2.5 TABLET ORAL at 06:21

## 2018-09-18 RX ADMIN — Medication 81 MILLIGRAM(S): at 11:00

## 2018-09-18 RX ADMIN — Medication 100 MILLIGRAM(S): at 06:21

## 2018-09-18 NOTE — PROGRESS NOTE ADULT - PROVIDER SPECIALTY LIST ADULT
Cardiology
Hospitalist
Infectious Disease
Nephrology
Podiatry
Infectious Disease
Podiatry
Infectious Disease
Cardiology

## 2018-09-18 NOTE — PROGRESS NOTE ADULT - REASON FOR ADMISSION
R foot wound

## 2018-09-18 NOTE — PROGRESS NOTE ADULT - PROBLEM SELECTOR PLAN 9
f/u pod   on IV zosyn and vanco
f/u pod   on IV zosyn and vanco
f/u pod   on IV zosyn and vanco  pending cx.
f/u pod s/p PICC line   on IV zosyn and vanco
f/u pod s/p PICC line   on IV zosyn and vanco
f/u pod   on IV zosyn and vanco  pending cx.

## 2018-09-18 NOTE — PROGRESS NOTE ADULT - SUBJECTIVE AND OBJECTIVE BOX
Keaton Duvall MD  Interventional Cardiology / Advance Heart Failure and Cardiac Transplant Specialist  [place] Office : [phone] [place] [address]. [phone]  Tel:   [place] Office : [address] [facility] [place] [address]  Tel: [phone]  Cell : [phone]    Subjective : Pt lying in bed comfortable, not in distress, denies any chest pain or SOB  	  MEDICATIONS:  amLODIPine   Tablet 10 milliGRAM(s) Oral daily  aspirin enteric coated 81 milliGRAM(s) Oral daily  heparin  Injectable 5000 Unit(s) SubCutaneous every 8 hours  hydrALAZINE 100 milliGRAM(s) Oral three times a day  metoprolol succinate  milliGRAM(s) Oral daily    piperacillin/tazobactam IVPB. 3.375 Gram(s) IV Intermittent every 8 hours      acetaminophen   Tablet .. 650 milliGRAM(s) Oral every 6 hours PRN      atorvastatin 40 milliGRAM(s) Oral at bedtime  insulin lispro (HumaLOG) corrective regimen sliding scale   SubCutaneous three times a day before meals  insulin lispro (HumaLOG) corrective regimen sliding scale   SubCutaneous at bedtime    artificial tears (preservative free) Ophthalmic Solution 1 Drop(s) Both EYES every 6 hours PRN  collagenase Ointment 1 Application(s) Topical daily  Dakins Solution - 1/4 Strength 1 Application(s) Topical daily  Dakins Solution - 1/4 Strength 1 Application(s) Topical Once  ergocalciferol 18507 Unit(s) Oral every week  folic acid 1 milliGRAM(s) Oral daily  sodium bicarbonate 650 milliGRAM(s) Oral three times a day      PHYSICAL EXAM:  T(C): 36.9 (09-18-18 @ 05:59), Max: 36.9 (09-18-18 @ 05:59)  HR: 70 (09-18-18 @ 05:59) (70 - 70)  BP: 146/76 (09-18-18 @ 05:59) (146/76 - 146/76)  RR: 17 (09-18-18 @ 05:59) (17 - 17)  SpO2: 96% (09-18-18 @ 05:59) (96% - 96%)  Wt(kg): --  I&O's Summary        Appearance: Normal	  HEENT:   Normal oral mucosa, PERRL, EOMI	  Cardiovascular: Normal S1 S2, No JVD, No murmurs, No edema  Respiratory: Lungs clear to auscultation	  Gastrointestinal:  Soft, Non-tender, + BS	  Extremities: RLE dressed                                    10.2   6.28  )-----------( 260      ( 17 Sep 2018 06:23 )             32.4     09-17    141  |  105  |  31<H>  ----------------------------<  106<H>  3.7   |  23  |  2.91<H>    Ca    8.2<L>      17 Sep 2018 06:23      proBNP:   Lipid Profile:   HgA1c:   TSH:

## 2018-09-18 NOTE — PROGRESS NOTE ADULT - ASSESSMENT
62 y/o M PMH HTN, HLD, CKD3, DM2 w/ R foot ulcer >3 years p/w diabetic foot ulcer      Problem/Plan - 1:  ·  Problem: Diabetic foot ulcer with osteomyelitis.  Plan: c/w antibiotics as per ID  id fu  podiatry fu  6 weeks of antibiotics as per ID    Problem/Plan - 2:  ·  Problem: Essential hypertension.  Plan: c/w home meds     Problem/Plan - 3:  ·  Problem: Diabetes mellitus type 2, noninsulin dependent.  Plan: monitor fs  iss    DC planning

## 2018-09-18 NOTE — PROGRESS NOTE ADULT - SUBJECTIVE AND OBJECTIVE BOX
Patient is a 63y old  Male who presents with a chief complaint of R foot wound (17 Sep 2018 20:55)      INTERVAL HPI/OVERNIGHT EVENTS:  T(C): 36.9 (09-18-18 @ 05:59), Max: 36.9 (09-17-18 @ 13:44)  HR: 70 (09-18-18 @ 05:59) (70 - 80)  BP: 146/76 (09-18-18 @ 05:59) (125/61 - 146/76)  RR: 17 (09-18-18 @ 05:59) (17 - 18)  SpO2: 96% (09-18-18 @ 05:59) (96% - 97%)  Wt(kg): --  I&O's Summary      LABS:                        10.2   6.28  )-----------( 260      ( 17 Sep 2018 06:23 )             32.4     09-17    141  |  105  |  31<H>  ----------------------------<  106<H>  3.7   |  23  |  2.91<H>    Ca    8.2<L>      17 Sep 2018 06:23          CAPILLARY BLOOD GLUCOSE      POCT Blood Glucose.: 139 mg/dL (18 Sep 2018 08:26)  POCT Blood Glucose.: 150 mg/dL (17 Sep 2018 22:23)  POCT Blood Glucose.: 132 mg/dL (17 Sep 2018 17:53)  POCT Blood Glucose.: 129 mg/dL (17 Sep 2018 12:40)            MEDICATIONS  (STANDING):  amLODIPine   Tablet 10 milliGRAM(s) Oral daily  aspirin enteric coated 81 milliGRAM(s) Oral daily  atorvastatin 40 milliGRAM(s) Oral at bedtime  collagenase Ointment 1 Application(s) Topical daily  Dakins Solution - 1/4 Strength 1 Application(s) Topical daily  Dakins Solution - 1/4 Strength 1 Application(s) Topical Once  ergocalciferol 71464 Unit(s) Oral every week  folic acid 1 milliGRAM(s) Oral daily  heparin  Injectable 5000 Unit(s) SubCutaneous every 8 hours  hydrALAZINE 100 milliGRAM(s) Oral three times a day  influenza   Vaccine 0.5 milliLiter(s) IntraMuscular once  insulin lispro (HumaLOG) corrective regimen sliding scale   SubCutaneous three times a day before meals  insulin lispro (HumaLOG) corrective regimen sliding scale   SubCutaneous at bedtime  lidocaine 1% Injectable 30 milliLiter(s) Local Injection once  metoprolol succinate  milliGRAM(s) Oral daily  piperacillin/tazobactam IVPB. 3.375 Gram(s) IV Intermittent every 8 hours  sodium bicarbonate 650 milliGRAM(s) Oral three times a day    MEDICATIONS  (PRN):  acetaminophen   Tablet .. 650 milliGRAM(s) Oral every 6 hours PRN Moderate Pain (4 - 6), Severe Pain (7 - 10)  artificial tears (preservative free) Ophthalmic Solution 1 Drop(s) Both EYES every 6 hours PRN Dry Eyes          PHYSICAL EXAM:  GENERAL: NAD, well-groomed, well-developed  HEAD:  Atraumatic, Normocephalic  CHEST/LUNG: Clear to percussion bilaterally; No rales, rhonchi, wheezing, or rubs  HEART: Regular rate and rhythm; No murmurs, rubs, or gallops  ABDOMEN: Soft, Nontender, Nondistended; Bowel sounds present  EXTREMITIES:  2+ Peripheral Pulses, No clubbing, cyanosis, or edema  LYMPH: No lymphadenopathy noted  SKIN: No rashes or lesions    Care Discussed with Consultants/Other Providers [ ] YES  [ ] NO

## 2018-09-18 NOTE — PROGRESS NOTE ADULT - SUBJECTIVE AND OBJECTIVE BOX
[facility]   MD DOMO WALLACE MD ANGELA WONG, PA    TEL:  OFFICE: [phone]  DR LEE CELL: [phone]  DR. GRULLON CELL: [phone]  MELANIA CORONEL CELL: [phone]        Patient is a 63y old  Male who presents with a chief complaint of R foot wound (18 Sep 2018 11:56)      Patient seen and examined at bedside. No chest pain/sob    VITALS:  T(F): 98.5 (09-18-18 @ 05:59), Max: 98.5 (09-17-18 @ 21:24)  HR: 70 (09-18-18 @ 05:59)  BP: 146/76 (09-18-18 @ 05:59)  RR: 17 (09-18-18 @ 05:59)  SpO2: 96% (09-18-18 @ 05:59)  Wt(kg): --        PHYSICAL EXAM:  Constitutional: NAD  Neck: No JVD  Respiratory: CTAB, no wheezes, rales or rhonchi  Cardiovascular: S1, S2, RRR  Gastrointestinal: BS+, soft, NT/ND  Extremities: + peripheral edema R>L, +right foot dressing d/c/i    Hospital Medications:   MEDICATIONS  (STANDING):  amLODIPine   Tablet 10 milliGRAM(s) Oral daily  aspirin enteric coated 81 milliGRAM(s) Oral daily  atorvastatin 40 milliGRAM(s) Oral at bedtime  collagenase Ointment 1 Application(s) Topical daily  Dakins Solution - 1/4 Strength 1 Application(s) Topical daily  Dakins Solution - 1/4 Strength 1 Application(s) Topical Once  ergocalciferol 27713 Unit(s) Oral every week  folic acid 1 milliGRAM(s) Oral daily  heparin  Injectable 5000 Unit(s) SubCutaneous every 8 hours  hydrALAZINE 100 milliGRAM(s) Oral three times a day  insulin lispro (HumaLOG) corrective regimen sliding scale   SubCutaneous three times a day before meals  insulin lispro (HumaLOG) corrective regimen sliding scale   SubCutaneous at bedtime  lidocaine 1% Injectable 30 milliLiter(s) Local Injection once  metoprolol succinate  milliGRAM(s) Oral daily  piperacillin/tazobactam IVPB. 3.375 Gram(s) IV Intermittent every 8 hours  sodium bicarbonate 650 milliGRAM(s) Oral three times a day      LABS:  09-17    141  |  105  |  31<H>  ----------------------------<  106<H>  3.7   |  23  |  2.91<H>    Ca    8.2<L>      17 Sep 2018 06:23      Creatinine Trend: 2.91 <--, 2.87 <--, 2.95 <--, 2.81 <--, 2.66 <--, 2.73 <--                                10.2   6.28  )-----------( 260      ( 17 Sep 2018 06:23 )             32.4     Urine Studies:  Urinalysis - [09-11-18 @ 18:40]      Color LIGHT YELLOW / Appearance CLEAR / SG 1.011 / pH 6.0      Gluc 100 / Ketone NEGATIVE  / Bili NEGATIVE / Urobili NORMAL       Blood NEGATIVE / Protein 200 / Leuk Est NEGATIVE / Nitrite NEGATIVE      RBC 3-5 / WBC 0-2 / Hyaline NEGATIVE / Gran  / Sq Epi OCC / Non Sq Epi  / Bacteria NEGATIVE    Urine Creatinine 73.80      [09-11-18 @ 18:40]  Urine Protein 229.7      [09-11-18 @ 18:40]  Urine Sodium 51      [09-11-18 @ 18:40]    Iron 34, TIBC 155, %sat --      [09-11-18 @ 05:45]  Ferritin 355.3      [09-11-18 @ 05:45]  PTH 42.84 (Ca --)      [09-11-18 @ 05:45]   --  Vitamin D (25OH) 11.8      [09-11-18 @ 05:45]  HbA1c 6.5      [09-08-18 @ 05:20]        RADIOLOGY & ADDITIONAL STUDIES:

## 2018-09-18 NOTE — PROGRESS NOTE ADULT - ASSESSMENT
1) LE wound and swelling - Podiatry and ID onboard, No evidence of Obstructive PAD , 2d echo with normal BiV function     2) Right foot wound/OM - podiatry and ID on case     3) HTN - cont hydralazine toprol and amlodipine    4) ABEL on CKD:  renal onboard,

## 2018-10-11 ENCOUNTER — APPOINTMENT (OUTPATIENT)
Dept: WOUND CARE | Facility: CLINIC | Age: 63
End: 2018-10-11
Payer: COMMERCIAL

## 2018-10-11 PROCEDURE — 97597 DBRDMT OPN WND 1ST 20 CM/<: CPT

## 2018-10-11 RX ORDER — BECAPLERMIN 100 UG/G
0.01 GEL TOPICAL
Qty: 1 | Refills: 3 | Status: COMPLETED | COMMUNITY
Start: 2018-10-11 | End: 2018-10-11

## 2018-10-25 ENCOUNTER — APPOINTMENT (OUTPATIENT)
Dept: WOUND CARE | Facility: CLINIC | Age: 63
End: 2018-10-25
Payer: COMMERCIAL

## 2018-10-25 PROCEDURE — 11042 DBRDMT SUBQ TIS 1ST 20SQCM/<: CPT

## 2018-11-08 ENCOUNTER — APPOINTMENT (OUTPATIENT)
Dept: WOUND CARE | Facility: CLINIC | Age: 63
End: 2018-11-08
Payer: COMMERCIAL

## 2018-11-08 PROCEDURE — 11042 DBRDMT SUBQ TIS 1ST 20SQCM/<: CPT | Mod: 58

## 2018-11-08 PROCEDURE — 29445 APPL RIGID TOT CNTC LEG CAST: CPT | Mod: 59,RT

## 2018-11-15 ENCOUNTER — APPOINTMENT (OUTPATIENT)
Dept: WOUND CARE | Facility: CLINIC | Age: 63
End: 2018-11-15
Payer: COMMERCIAL

## 2018-11-15 PROCEDURE — 11042 DBRDMT SUBQ TIS 1ST 20SQCM/<: CPT

## 2018-11-26 ENCOUNTER — APPOINTMENT (OUTPATIENT)
Dept: WOUND CARE | Facility: CLINIC | Age: 63
End: 2018-11-26
Payer: COMMERCIAL

## 2018-11-26 PROCEDURE — 11042 DBRDMT SUBQ TIS 1ST 20SQCM/<: CPT

## 2018-12-03 ENCOUNTER — APPOINTMENT (OUTPATIENT)
Dept: WOUND CARE | Facility: CLINIC | Age: 63
End: 2018-12-03
Payer: COMMERCIAL

## 2018-12-03 PROCEDURE — 11042 DBRDMT SUBQ TIS 1ST 20SQCM/<: CPT

## 2018-12-10 ENCOUNTER — APPOINTMENT (OUTPATIENT)
Dept: WOUND CARE | Facility: CLINIC | Age: 63
End: 2018-12-10
Payer: COMMERCIAL

## 2018-12-10 PROCEDURE — 99213 OFFICE O/P EST LOW 20 MIN: CPT | Mod: 25

## 2018-12-10 PROCEDURE — 29445 APPL RIGID TOT CNTC LEG CAST: CPT | Mod: LT

## 2018-12-17 ENCOUNTER — APPOINTMENT (OUTPATIENT)
Dept: WOUND CARE | Facility: CLINIC | Age: 63
End: 2018-12-17
Payer: COMMERCIAL

## 2018-12-17 PROCEDURE — 99213 OFFICE O/P EST LOW 20 MIN: CPT

## 2019-01-07 ENCOUNTER — APPOINTMENT (OUTPATIENT)
Dept: WOUND CARE | Facility: CLINIC | Age: 64
End: 2019-01-07

## 2019-01-14 ENCOUNTER — APPOINTMENT (OUTPATIENT)
Dept: WOUND CARE | Facility: CLINIC | Age: 64
End: 2019-01-14
Payer: COMMERCIAL

## 2019-01-14 ENCOUNTER — INPATIENT (INPATIENT)
Facility: HOSPITAL | Age: 64
LOS: 9 days | Discharge: HOME | End: 2019-01-24
Attending: INTERNAL MEDICINE | Admitting: INTERNAL MEDICINE
Payer: COMMERCIAL

## 2019-01-14 VITALS
DIASTOLIC BLOOD PRESSURE: 66 MMHG | OXYGEN SATURATION: 100 % | RESPIRATION RATE: 18 BRPM | TEMPERATURE: 98 F | SYSTOLIC BLOOD PRESSURE: 147 MMHG | HEART RATE: 87 BPM

## 2019-01-14 VITALS — SYSTOLIC BLOOD PRESSURE: 168 MMHG | DIASTOLIC BLOOD PRESSURE: 74 MMHG | TEMPERATURE: 98.7 F | HEART RATE: 103 BPM

## 2019-01-14 DIAGNOSIS — S82.899A OTHER FRACTURE OF UNSPECIFIED LOWER LEG, INITIAL ENCOUNTER FOR CLOSED FRACTURE: Chronic | ICD-10-CM

## 2019-01-14 DIAGNOSIS — Z95.828 PRESENCE OF OTHER VASCULAR IMPLANTS AND GRAFTS: Chronic | ICD-10-CM

## 2019-01-14 DIAGNOSIS — I10 ESSENTIAL (PRIMARY) HYPERTENSION: ICD-10-CM

## 2019-01-14 DIAGNOSIS — M86.9 OSTEOMYELITIS, UNSPECIFIED: ICD-10-CM

## 2019-01-14 DIAGNOSIS — M86.671 OTHER CHRONIC OSTEOMYELITIS, RIGHT ANKLE AND FOOT: ICD-10-CM

## 2019-01-14 LAB
ANION GAP SERPL CALC-SCNC: 16 MEQ/L — HIGH (ref 7–14)
BUN SERPL-MCNC: 55 MG/DL — HIGH (ref 7–23)
CALCIUM SERPL-MCNC: 9 MG/DL — SIGNIFICANT CHANGE UP (ref 8.4–10.5)
CHLORIDE SERPL-SCNC: 106 MMOL/L — SIGNIFICANT CHANGE UP (ref 98–107)
CO2 SERPL-SCNC: 19 MMOL/L — LOW (ref 22–31)
CREAT SERPL-MCNC: 3.53 MG/DL — HIGH (ref 0.5–1.3)
CRP SERPL-MCNC: 120.4 MG/L — HIGH
ERYTHROCYTE [SEDIMENTATION RATE] IN BLOOD: 111 MM/HR — HIGH (ref 1–15)
GLUCOSE SERPL-MCNC: 75 MG/DL — SIGNIFICANT CHANGE UP (ref 70–99)
GRAM STN SPEC: SIGNIFICANT CHANGE UP
HCT VFR BLD CALC: 32.7 % — LOW (ref 39–50)
HGB BLD-MCNC: 9.9 G/DL — LOW (ref 13–17)
MCHC RBC-ENTMCNC: 27.7 PG — SIGNIFICANT CHANGE UP (ref 27–34)
MCHC RBC-ENTMCNC: 30.3 % — LOW (ref 32–36)
MCV RBC AUTO: 91.3 FL — SIGNIFICANT CHANGE UP (ref 80–100)
NRBC # FLD: 0 K/UL — LOW (ref 25–125)
PLATELET # BLD AUTO: 334 K/UL — SIGNIFICANT CHANGE UP (ref 150–400)
PMV BLD: 10.7 FL — SIGNIFICANT CHANGE UP (ref 7–13)
POTASSIUM SERPL-MCNC: 4.7 MMOL/L — SIGNIFICANT CHANGE UP (ref 3.5–5.3)
POTASSIUM SERPL-SCNC: 4.7 MMOL/L — SIGNIFICANT CHANGE UP (ref 3.5–5.3)
RBC # BLD: 3.58 M/UL — LOW (ref 4.2–5.8)
RBC # FLD: 13.5 % — SIGNIFICANT CHANGE UP (ref 10.3–14.5)
SODIUM SERPL-SCNC: 141 MMOL/L — SIGNIFICANT CHANGE UP (ref 135–145)
SPECIMEN SOURCE: SIGNIFICANT CHANGE UP
WBC # BLD: 8.67 K/UL — SIGNIFICANT CHANGE UP (ref 3.8–10.5)
WBC # FLD AUTO: 8.67 K/UL — SIGNIFICANT CHANGE UP (ref 3.8–10.5)

## 2019-01-14 PROCEDURE — 99213 OFFICE O/P EST LOW 20 MIN: CPT

## 2019-01-14 PROCEDURE — 73630 X-RAY EXAM OF FOOT: CPT | Mod: 26,RT

## 2019-01-14 PROCEDURE — 99223 1ST HOSP IP/OBS HIGH 75: CPT

## 2019-01-14 RX ORDER — SODIUM CHLORIDE 9 MG/ML
1000 INJECTION, SOLUTION INTRAVENOUS
Qty: 0 | Refills: 0 | Status: DISCONTINUED | OUTPATIENT
Start: 2019-01-14 | End: 2019-01-24

## 2019-01-14 RX ORDER — DOCUSATE SODIUM 100 MG
100 CAPSULE ORAL
Qty: 0 | Refills: 0 | Status: DISCONTINUED | OUTPATIENT
Start: 2019-01-14 | End: 2019-01-24

## 2019-01-14 RX ORDER — ACETAMINOPHEN 500 MG
650 TABLET ORAL EVERY 6 HOURS
Qty: 0 | Refills: 0 | Status: DISCONTINUED | OUTPATIENT
Start: 2019-01-14 | End: 2019-01-24

## 2019-01-14 RX ORDER — VANCOMYCIN HCL 1 G
1000 VIAL (EA) INTRAVENOUS ONCE
Qty: 0 | Refills: 0 | Status: COMPLETED | OUTPATIENT
Start: 2019-01-14 | End: 2019-01-14

## 2019-01-14 RX ORDER — ATORVASTATIN CALCIUM 80 MG/1
40 TABLET, FILM COATED ORAL AT BEDTIME
Qty: 0 | Refills: 0 | Status: DISCONTINUED | OUTPATIENT
Start: 2019-01-14 | End: 2019-01-24

## 2019-01-14 RX ORDER — PIPERACILLIN AND TAZOBACTAM 4; .5 G/20ML; G/20ML
2.25 INJECTION, POWDER, LYOPHILIZED, FOR SOLUTION INTRAVENOUS
Qty: 0 | Refills: 0 | COMMUNITY

## 2019-01-14 RX ORDER — LACTOBACILLUS ACIDOPHILUS 100MM CELL
1 CAPSULE ORAL DAILY
Qty: 0 | Refills: 0 | Status: DISCONTINUED | OUTPATIENT
Start: 2019-01-14 | End: 2019-01-24

## 2019-01-14 RX ORDER — OXYCODONE AND ACETAMINOPHEN 5; 325 MG/1; MG/1
1 TABLET ORAL EVERY 6 HOURS
Qty: 0 | Refills: 0 | Status: DISCONTINUED | OUTPATIENT
Start: 2019-01-14 | End: 2019-01-17

## 2019-01-14 RX ORDER — HYDRALAZINE HCL 50 MG
100 TABLET ORAL EVERY 8 HOURS
Qty: 0 | Refills: 0 | Status: DISCONTINUED | OUTPATIENT
Start: 2019-01-14 | End: 2019-01-24

## 2019-01-14 RX ORDER — HEPARIN SODIUM 5000 [USP'U]/ML
5000 INJECTION INTRAVENOUS; SUBCUTANEOUS EVERY 12 HOURS
Qty: 0 | Refills: 0 | Status: DISCONTINUED | OUTPATIENT
Start: 2019-01-14 | End: 2019-01-24

## 2019-01-14 RX ORDER — SODIUM BICARBONATE 1 MEQ/ML
650 SYRINGE (ML) INTRAVENOUS THREE TIMES A DAY
Qty: 0 | Refills: 0 | Status: DISCONTINUED | OUTPATIENT
Start: 2019-01-14 | End: 2019-01-24

## 2019-01-14 RX ORDER — PIPERACILLIN AND TAZOBACTAM 4; .5 G/20ML; G/20ML
3.38 INJECTION, POWDER, LYOPHILIZED, FOR SOLUTION INTRAVENOUS ONCE
Qty: 0 | Refills: 0 | Status: COMPLETED | OUTPATIENT
Start: 2019-01-14 | End: 2019-01-14

## 2019-01-14 RX ADMIN — PIPERACILLIN AND TAZOBACTAM 200 GRAM(S): 4; .5 INJECTION, POWDER, LYOPHILIZED, FOR SOLUTION INTRAVENOUS at 22:14

## 2019-01-14 RX ADMIN — Medication 100 MILLIGRAM(S): at 23:40

## 2019-01-14 RX ADMIN — Medication 250 MILLIGRAM(S): at 23:38

## 2019-01-14 RX ADMIN — ATORVASTATIN CALCIUM 40 MILLIGRAM(S): 80 TABLET, FILM COATED ORAL at 23:40

## 2019-01-14 RX ADMIN — Medication 650 MILLIGRAM(S): at 23:39

## 2019-01-14 RX ADMIN — SODIUM CHLORIDE 100 MILLILITER(S): 9 INJECTION, SOLUTION INTRAVENOUS at 23:51

## 2019-01-14 RX ADMIN — Medication 650 MILLIGRAM(S): at 23:44

## 2019-01-14 NOTE — H&P ADULT - HISTORY OF PRESENT ILLNESS
63 year old male, with past history significant for Type-II DM, HTN, and R-Foot Charcot deformity, presented to the ED, as sent by podiatrist, secondary to concern for osteomyelitis.  Seen and evaluated at bedside;    Vital signs upon ED presentation as follows: BP = 147/66, HR = 87, RR = 18, T = 36.9 C (98.5 F), O2 Sat = 100% on RA.  Diagnosed with Osteomyelitis.  Prescribed zosyn and vancomycin in the ED. 63 year old male, with past history significant for Type-II DM, HTN, and R-Foot Charcot deformity, Osteomyelitis, Vitamin D deficiency, Anemia, CKD-III, HLD and GERD, presented to the ED, as sent by podiatrist, secondary to concern for osteomyelitis.  Seen and evaluated at bedside; NAD.  Patient relates noticing increasing swelling of the right foot for the past ~ one week, to the extent of not being able to fit into the shoe.  Had erythema of the foot and brownish, foul smelling drainage from the ulcer site.  Complained of pain, inadequately relieved with Tylenol.  Reports mild subjective fever, chills and sweating.  No report of nausea, vomiting, headaches, dizziness, chest pain, shortness of breath, abdominal pain, diarrhea or dysuria.  States having mild constipation.    Vital signs upon ED presentation as follows: BP = 147/66, HR = 87, RR = 18, T = 36.9 C (98.5 F), O2 Sat = 100% on RA.  Diagnosed with Osteomyelitis.  Prescribed zosyn and vancomycin in the ED.

## 2019-01-14 NOTE — H&P ADULT - ASSESSMENT
63 year old male, with past history significant for Type-II DM, HTN, and R-Foot Charcot deformity, presented to the ED, as sent by podiatrist, secondary to concern for osteomyelitis.  Vital signs upon ED presentation as follows: BP = 147/66, HR = 87, RR = 18, T = 36.9 C (98.5 F), O2 Sat = 100% on RA.  Diagnosed with Osteomyelitis.  Admitted for further management of:

## 2019-01-14 NOTE — H&P ADULT - PROBLEM SELECTOR PLAN 1
- erythema, swelling, pain, brownish exudate  from plantar ulcer, subjective fevers, chills, sweating of right foot/ankle/leg; recurrent issue  - ESR = 111, CRP = 120.4  - treated w/ IV abx via PICC during the past few months  - wound culture w/ Gram stain = [facility] (f/u for additional results)  - started on vancomycin and zosyn; will continue for now  - ensure optimal hydration  - Tylenol PRN mild pain and percocet PRN moderate pain  - f/u cultures - erythema, swelling, pain, brownish exudate  from plantar ulcer, subjective fevers, chills, sweating of right foot/ankle/leg; recurrent issue  - ESR = 111, CRP = 120.4  - treated w/ IV abx via PICC during the past few months  - wound culture w/ Gram stain = [facility] (f/u for additional results)  - x-ray demonstrative of " osteopenic bones charcot arthropathy," (prelim)  - started on vancomycin and zosyn; will continue for now  - already seen by Podiatry team (appreciated)  - ensure optimal hydration  - Tylenol PRN mild pain and percocet PRN moderate pain  - f/u cultures

## 2019-01-14 NOTE — ASSESSMENT
[FreeTextEntry1] : 63M right plantar midfoot ulcer - healed.\par - Pt seen and evaluated\par - plantar midfoot reopened, down to bone with periwound cellulitis\par - significantly worsened since last visit \par - pt instructed to go to [facility] for admission for OM work up and IV antibiotics\par - will follow up at the hospital

## 2019-01-14 NOTE — ED ADULT TRIAGE NOTE - CHIEF COMPLAINT QUOTE
Pt sent from podiatry for infection to right foot. Pt states he was told the infection is down to the bone. Pt c/o redness, swelling, and pain to right foot, arrives in soft boot. Fs 114

## 2019-01-14 NOTE — H&P ADULT - PROBLEM SELECTOR PLAN 6
- patient only cites some of prior medications as being current, but is unsure  - f/u w/ [facility] pharmacist for med verification (e-mailed)

## 2019-01-14 NOTE — H&P ADULT - PROBLEM SELECTOR PLAN 2
- no issues presently  - patient only cites being on hydralazine, but past history notes metoprolol and amlodipine (will need [facility] pharmacist's assistance)  - prescribed hydralazine 100 mg Q8H  - follow with repeat measurements - moderately dry oral mucosa  - renal function suggests acute on CKD  - IVF LR 1 liter over 10 hours  - f/u for improvement

## 2019-01-14 NOTE — H&P ADULT - EXTREMITIES COMMENTS
Erythema and significant swelling of the right foot/ankle/leg with tenderness especially over the medial aspect.

## 2019-01-14 NOTE — ED PROVIDER NOTE - MEDICAL DECISION MAKING DETAILS
63yM with h/o HTN, diabetes and R foot Charcot deformity presents at the request of Podiatrist, Dr. Plunkett with concern on osteomyelitis. 63yM with h/o HTN, diabetes and R foot Charcot deformity presents at the request of Podiatrist, Dr. Plunkett with concern on osteomyelitis.  Plan: Labs, Xray foot, admit,

## 2019-01-14 NOTE — PHYSICAL EXAM
[0] : left 0 [de-identified] : No gross deformities [de-identified] : Absent epicritic sensation to b/l feet [de-identified] : down to bone with surrounding erythema [FreeTextEntry1] : planter midfoot [FreeTextEntry2] : 1.7 [FreeTextEntry3] : 1 [FreeTextEntry4] : 0.7 [de-identified] : serosanguenous  [de-identified] : KRYSTAL

## 2019-01-14 NOTE — ED ADULT NURSE NOTE - OBJECTIVE STATEMENT
Pt A+OX3, sent by podiatrist for an infection in his right foot that's in his bone.  Right foot with DSD.  Appears comfortable.  MD at bedside

## 2019-01-14 NOTE — ED PROVIDER NOTE - NS ED ROS FT
GENERAL: No fever or chills, EYES: no change in vision, HEENT: no trouble swallowing or speaking, CARDIAC: no chest pain, PULMONARY: no cough or SOB, GI: no abdominal pain, no nausea, no vomiting, no diarrhea or constipation, : No changes in urination, SKIN: no rashes, NEURO: R foot numbness  MSK: No joint pain ~Fito Argueta M.D. Resident

## 2019-01-14 NOTE — H&P ADULT - MUSCULOSKELETAL COMMENTS
pain, swelling of right foot, pain of right medial leg swelling of right foot/ankle/leg with mild warmth, tenderness especially at medial lower R-leg

## 2019-01-14 NOTE — H&P ADULT - PMH
Anemia    Chronic kidney disease, stage III (moderate)    Diabetic foot ulcer    DM (diabetes mellitus)    Dyslipidemia    GERD (gastroesophageal reflux disease)    HTN (hypertension)    Renal cyst Anemia    Chronic kidney disease, stage III (moderate)    Diabetic foot ulcer    DM (diabetes mellitus)    Dyslipidemia    GERD (gastroesophageal reflux disease)    HTN (hypertension)    Renal cyst    Vitamin D deficiency

## 2019-01-14 NOTE — HISTORY OF PRESENT ILLNESS
[FreeTextEntry1] : 63M DM w/ neuropathy right plantar midfoot ulcer to subQ. Pt was admitted in Sept '18 for right foot ulcer. Bone bx was performed and Cx negative and path negative for OM consistent with Charcot. Pt was PICC'd for 6 wks of Zosyn per ID. Pt presents with diabetic shoes. Pt states that the wound opened up since last visit. Denies any f/n/v/c/sob.

## 2019-01-14 NOTE — H&P ADULT - PROBLEM SELECTOR PLAN 5
- patient only cites some of prior medications as being current, but is unsure  - f/u w/ [facility] pharmacist for med verification (e-mailed) - reports inadequate relief; had small BM in the AM  - ensure optimal hydration  - started on colace 100 mg BID  - also added dulcolax 5 mg Q48H PRN  - f/u TSH level in the AM  - f/u AM lab-work

## 2019-01-14 NOTE — H&P ADULT - PROBLEM SELECTOR PLAN 3
- moderately dry oral mucosa  - renal function suggests acute on CKD  - IVF LR 1 liter over 10 hours  - f/u for improvement - BUN/Cr worsened, but likely due to acute on CKD; 55/3.53 (previously 31/2.91 in 09/2018)  - potassium = 4.7  - IVF hydration in progress  - f/u for improvement

## 2019-01-14 NOTE — ED PROVIDER NOTE - OBJECTIVE STATEMENT
63yM with h/o HTN, diabetes and R foot Charcot deformity presents at the request of Podiatrist, Dr. Plunkett with concern on osteomyelitis. Patient endorses worsening swelling, warmth and erythema 63yM with h/o HTN, diabetes and R foot Charcot deformity presents at the request of Podiatrist, Dr. Plunkett with concern on osteomyelitis. Patient endorses numbness, worsening swelling, warmth and erythema of one week duration but denies fever, chest pain, sob, n/v.

## 2019-01-14 NOTE — ED PROVIDER NOTE - ATTENDING CONTRIBUTION TO CARE
jose: PMH as noted including recent rx for infection of rt foot. Pt sent to ED by podiatry for admission/treatment for possible osteomyelitis. One week hx increasing redness/swelling to rt foot/RLE. ulcer as noted. Antibiotics started and pt admitted for further care.

## 2019-01-14 NOTE — H&P ADULT - NSHPLABSRESULTS_GEN_ALL_CORE
CAPILLARY BLOOD GLUCOSE      POCT Blood Glucose.: 114 mg/dL (14 Jan 2019 15:03) 9.9    8.67  )-----------( 334      ( 14 Jan 2019 22:10 )             32.7     14 Jan 2019 22:10    141    |  106    |  55     ----------------------------<  75     4.7     |  19     |  3.53     Ca    9.0        14 Jan 2019 22:10          CAPILLARY BLOOD GLUCOSE      POCT Blood Glucose.: 114 mg/dL (14 Jan 2019 15:03)

## 2019-01-14 NOTE — H&P ADULT - SKIN COMMENTS
erythema of the right foot, ulcer at plantar surface of right foot with packing in place, partially pared off R-great toenail

## 2019-01-14 NOTE — H&P ADULT - PROBLEM SELECTOR PLAN 4
- BUN/Cr worsened, but likely due to acute on CKD; 55/3.53 (previously 31/2.91 in 09/2018)  - potassium = 4.7  - IVF hydration in progress  - f/u for improvement - no issues presently  - patient only cites being on hydralazine, but past history notes metoprolol and amlodipine (will need [facility] pharmacist's assistance)  - prescribed hydralazine 100 mg Q8H  - follow with repeat measurements

## 2019-01-14 NOTE — ED PROVIDER NOTE - PHYSICAL EXAMINATION
Gen: AAOx3, non-toxic  Head: NCAT  HEENT: EOMI, oral mucosa moist, normal conjunctiva  Lung: CTAB, no respiratory distress  CV: RRR, no murmurs, rubs or gallops, pulses intact (radial/dp)  Abd: soft, NTND, no guarding, no CVA tenderness  MSK: RLE edema, warmth   Neuro: No focal sensory or motor deficits  Skin: Warm, well perfused, no rash  Psych: normal affect.   ~Fito Argueta M.D. Resident Gen: AAOx3, non-toxic  Head: NCAT  HEENT: EOMI, oral mucosa moist, normal conjunctiva  Lung: CTAB, no respiratory distress  CV: RRR, no murmurs, rubs or gallops, pulses intact (radial/dp)  Abd: soft, NTND, no guarding, no CVA tenderness  MSK: RLE edema, warmth, erythema of foot with ulcer on plantar surface, -drainage   Psych: normal affect.   ~Fito Argueta M.D. Resident

## 2019-01-14 NOTE — CONSULT NOTE ADULT - SUBJECTIVE AND OBJECTIVE BOX
Patient is a 63y old  Male who presents with a chief complaint of     HPI: Pt sent in 1/14 by Dr. Plunkett for cellulitis associated with likely underlying progressed osteomyelitis. Pt denies any N/V/F/SOB. Pt states he was seen today at the wound care center and told to go right to the hospital.      PAST MEDICAL & SURGICAL HISTORY:  HTN (hypertension)  DM (diabetes mellitus)  Ankle fracture: ORIF      MEDICATIONS  (STANDING):    MEDICATIONS  (PRN):      Allergies    No Known Allergies    Intolerances        VITALS:    Vital Signs Last 24 Hrs  T(C): 36.9 (14 Jan 2019 15:04), Max: 36.9 (14 Jan 2019 15:04)  T(F): 98.5 (14 Jan 2019 15:04), Max: 98.5 (14 Jan 2019 15:04)  HR: 87 (14 Jan 2019 15:04) (87 - 87)  BP: 147/66 (14 Jan 2019 15:04) (147/66 - 147/66)  BP(mean): --  RR: 18 (14 Jan 2019 15:04) (18 - 18)  SpO2: 100% (14 Jan 2019 15:04) (100% - 100%)    LABS:                CAPILLARY BLOOD GLUCOSE      POCT Blood Glucose.: 114 mg/dL (14 Jan 2019 15:03)          LOWER EXTREMITY PHYSICAL EXAM:    Vascular: DP/PT 0/4, B/L, CFT < 5 seconds B/L, Temperature gradient slightly warmer on right  Neuro: Epicritic sensation diminished to the level of digits, B/L.  Musculoskeletal/Ortho: charcot deformity right foot   Skin: edema to the right LE, erythema diffusely to plantar midfoot without extension past ankle joint  Wound #1:   Location: plantar midfoot  Size: 1.1cm x 1.3cm  Depth: 2.3cm  Wound bed: fibrogranular  Drainage: none  Odor: slight malodor  Periwound: macerated hyperkeratotic  Etiology: 2/2 ambulation, charcot deformity    RADIOLOGY & ADDITIONAL STUDIES:

## 2019-01-14 NOTE — H&P ADULT - FAMILY HISTORY
No pertinent family history in first degree relatives Father  Still living? No  Family history of stomach cancer, Age at diagnosis: Age Unknown  Family history of lung disease, Age at diagnosis: Age Unknown

## 2019-01-14 NOTE — H&P ADULT - NSHPSOCIALHISTORY_GEN_ALL_CORE
Single  Lives alone  No history of smoking  No history of alcohol abuse  No history of illegal drug use Single  Lives alone  Works with [employer]  History of smoking; ~ 1.5 packs on weekends from late 1970's to early 1990's  No history of alcohol abuse  No history of illegal drug use

## 2019-01-15 DIAGNOSIS — K59.01 SLOW TRANSIT CONSTIPATION: ICD-10-CM

## 2019-01-15 DIAGNOSIS — M86.9 OSTEOMYELITIS, UNSPECIFIED: ICD-10-CM

## 2019-01-15 DIAGNOSIS — E86.0 DEHYDRATION: ICD-10-CM

## 2019-01-15 DIAGNOSIS — D64.9 ANEMIA, UNSPECIFIED: ICD-10-CM

## 2019-01-15 DIAGNOSIS — Z79.899 OTHER LONG TERM (CURRENT) DRUG THERAPY: ICD-10-CM

## 2019-01-15 DIAGNOSIS — R80.9 PROTEINURIA, UNSPECIFIED: ICD-10-CM

## 2019-01-15 DIAGNOSIS — N18.3 CHRONIC KIDNEY DISEASE, STAGE 3 (MODERATE): ICD-10-CM

## 2019-01-15 DIAGNOSIS — N19 UNSPECIFIED KIDNEY FAILURE: ICD-10-CM

## 2019-01-15 LAB
ANION GAP SERPL CALC-SCNC: 12 MEQ/L — SIGNIFICANT CHANGE UP (ref 7–14)
BASOPHILS # BLD AUTO: 0.04 K/UL — SIGNIFICANT CHANGE UP (ref 0–0.2)
BASOPHILS NFR BLD AUTO: 0.5 % — SIGNIFICANT CHANGE UP (ref 0–2)
BUN SERPL-MCNC: 53 MG/DL — HIGH (ref 7–23)
CALCIUM SERPL-MCNC: 8.6 MG/DL — SIGNIFICANT CHANGE UP (ref 8.4–10.5)
CHLORIDE SERPL-SCNC: 107 MMOL/L — SIGNIFICANT CHANGE UP (ref 98–107)
CO2 SERPL-SCNC: 20 MMOL/L — LOW (ref 22–31)
CREAT SERPL-MCNC: 3.54 MG/DL — HIGH (ref 0.5–1.3)
EOSINOPHIL # BLD AUTO: 0.19 K/UL — SIGNIFICANT CHANGE UP (ref 0–0.5)
EOSINOPHIL NFR BLD AUTO: 2.4 % — SIGNIFICANT CHANGE UP (ref 0–6)
GLUCOSE SERPL-MCNC: 76 MG/DL — SIGNIFICANT CHANGE UP (ref 70–99)
HBA1C BLD-MCNC: 6 % — HIGH (ref 4–5.6)
HCT VFR BLD CALC: 28.8 % — LOW (ref 39–50)
HGB BLD-MCNC: 8.9 G/DL — LOW (ref 13–17)
IMM GRANULOCYTES NFR BLD AUTO: 0.1 % — SIGNIFICANT CHANGE UP (ref 0–1.5)
LYMPHOCYTES # BLD AUTO: 2.16 K/UL — SIGNIFICANT CHANGE UP (ref 1–3.3)
LYMPHOCYTES # BLD AUTO: 27.7 % — SIGNIFICANT CHANGE UP (ref 13–44)
MAGNESIUM SERPL-MCNC: 2 MG/DL — SIGNIFICANT CHANGE UP (ref 1.6–2.6)
MCHC RBC-ENTMCNC: 27.8 PG — SIGNIFICANT CHANGE UP (ref 27–34)
MCHC RBC-ENTMCNC: 30.9 % — LOW (ref 32–36)
MCV RBC AUTO: 90 FL — SIGNIFICANT CHANGE UP (ref 80–100)
MONOCYTES # BLD AUTO: 0.78 K/UL — SIGNIFICANT CHANGE UP (ref 0–0.9)
MONOCYTES NFR BLD AUTO: 10 % — SIGNIFICANT CHANGE UP (ref 2–14)
NEUTROPHILS # BLD AUTO: 4.62 K/UL — SIGNIFICANT CHANGE UP (ref 1.8–7.4)
NEUTROPHILS NFR BLD AUTO: 59.3 % — SIGNIFICANT CHANGE UP (ref 43–77)
NRBC # FLD: 0 K/UL — LOW (ref 25–125)
PHOSPHATE SERPL-MCNC: 3.9 MG/DL — SIGNIFICANT CHANGE UP (ref 2.5–4.5)
PLATELET # BLD AUTO: 303 K/UL — SIGNIFICANT CHANGE UP (ref 150–400)
PMV BLD: 10.9 FL — SIGNIFICANT CHANGE UP (ref 7–13)
POTASSIUM SERPL-MCNC: 4.1 MMOL/L — SIGNIFICANT CHANGE UP (ref 3.5–5.3)
POTASSIUM SERPL-SCNC: 4.1 MMOL/L — SIGNIFICANT CHANGE UP (ref 3.5–5.3)
RBC # BLD: 3.2 M/UL — LOW (ref 4.2–5.8)
RBC # FLD: 13.6 % — SIGNIFICANT CHANGE UP (ref 10.3–14.5)
SODIUM SERPL-SCNC: 139 MMOL/L — SIGNIFICANT CHANGE UP (ref 135–145)
TSH SERPL-MCNC: 1.74 UIU/ML — SIGNIFICANT CHANGE UP (ref 0.27–4.2)
WBC # BLD: 7.8 K/UL — SIGNIFICANT CHANGE UP (ref 3.8–10.5)
WBC # FLD AUTO: 7.8 K/UL — SIGNIFICANT CHANGE UP (ref 3.8–10.5)

## 2019-01-15 PROCEDURE — 93970 EXTREMITY STUDY: CPT | Mod: 26

## 2019-01-15 PROCEDURE — 93923 UPR/LXTR ART STDY 3+ LVLS: CPT | Mod: 26

## 2019-01-15 RX ORDER — VANCOMYCIN HCL 1 G
750 VIAL (EA) INTRAVENOUS EVERY 24 HOURS
Qty: 0 | Refills: 0 | Status: DISCONTINUED | OUTPATIENT
Start: 2019-01-15 | End: 2019-01-17

## 2019-01-15 RX ORDER — PIPERACILLIN AND TAZOBACTAM 4; .5 G/20ML; G/20ML
3.38 INJECTION, POWDER, LYOPHILIZED, FOR SOLUTION INTRAVENOUS EVERY 12 HOURS
Qty: 0 | Refills: 0 | Status: DISCONTINUED | OUTPATIENT
Start: 2019-01-15 | End: 2019-01-15

## 2019-01-15 RX ORDER — PIPERACILLIN AND TAZOBACTAM 4; .5 G/20ML; G/20ML
3.38 INJECTION, POWDER, LYOPHILIZED, FOR SOLUTION INTRAVENOUS EVERY 8 HOURS
Qty: 0 | Refills: 0 | Status: DISCONTINUED | OUTPATIENT
Start: 2019-01-15 | End: 2019-01-24

## 2019-01-15 RX ADMIN — Medication 650 MILLIGRAM(S): at 05:49

## 2019-01-15 RX ADMIN — Medication 1 TABLET(S): at 18:41

## 2019-01-15 RX ADMIN — PIPERACILLIN AND TAZOBACTAM 25 GRAM(S): 4; .5 INJECTION, POWDER, LYOPHILIZED, FOR SOLUTION INTRAVENOUS at 07:05

## 2019-01-15 RX ADMIN — Medication 650 MILLIGRAM(S): at 22:43

## 2019-01-15 RX ADMIN — Medication 100 MILLIGRAM(S): at 05:50

## 2019-01-15 RX ADMIN — HEPARIN SODIUM 5000 UNIT(S): 5000 INJECTION INTRAVENOUS; SUBCUTANEOUS at 05:49

## 2019-01-15 RX ADMIN — PIPERACILLIN AND TAZOBACTAM 25 GRAM(S): 4; .5 INJECTION, POWDER, LYOPHILIZED, FOR SOLUTION INTRAVENOUS at 18:41

## 2019-01-15 RX ADMIN — Medication 650 MILLIGRAM(S): at 18:41

## 2019-01-15 RX ADMIN — Medication 100 MILLIGRAM(S): at 05:49

## 2019-01-15 RX ADMIN — ATORVASTATIN CALCIUM 40 MILLIGRAM(S): 80 TABLET, FILM COATED ORAL at 22:43

## 2019-01-15 RX ADMIN — HEPARIN SODIUM 5000 UNIT(S): 5000 INJECTION INTRAVENOUS; SUBCUTANEOUS at 18:40

## 2019-01-15 RX ADMIN — Medication 100 MILLIGRAM(S): at 18:40

## 2019-01-15 NOTE — CONSULT NOTE ADULT - SUBJECTIVE AND OBJECTIVE BOX
Keaton Duvall MD  Interventional Cardiology / Advance Heart Failure and Cardiac Transplant Specialist  [place] Office : [phone] [place] [address] [place]. [address]  Tel:   [place] Office : [address] [facility] [place] [address]  Tel: [phone]  Cell : [phone]    HISTORY OF PRESENT ILLNESS:  63 year old male, with past history significant for Type-II DM, HTN, and R-Foot Charcot deformity, Osteomyelitis, Vitamin D deficiency, Anemia, CKD-III, HLD and GERD, presented to the ED, as sent by podiatrist, secondary to concern for osteomyelitis.  Seen and evaluated at bedside; NAD.  Patient relates noticing increasing swelling of the right foot for the past ~ one week, to the extent of not being able to fit into the shoe.  Had erythema of the foot and brownish, foul smelling drainage from the ulcer site.  Complained of pain, inadequately relieved with Tylenol.  Reports mild subjective fever, chills and sweating.  No report of nausea, vomiting, headaches, dizziness, chest pain, shortness of breath, abdominal pain, diarrhea or dysuria.  States having mild constipation.    PAST MEDICAL & SURGICAL HISTORY:  Vitamin D deficiency  GERD (gastroesophageal reflux disease)  Dyslipidemia  Diabetic foot ulcer  Chronic kidney disease, stage III (moderate)  Renal cyst  Anemia  HTN (hypertension)  DM (diabetes mellitus)  S/P PICC central line placement: ~ L-Basilic vein (09/13/2018)  Ankle fracture: ORIF    	    MEDICATIONS:  heparin  Injectable 5000 Unit(s) SubCutaneous every 12 hours  hydrALAZINE 100 milliGRAM(s) Oral every 8 hours    piperacillin/tazobactam IVPB. 3.375 Gram(s) IV Intermittent every 8 hours  vancomycin  IVPB 750 milliGRAM(s) IV Intermittent every 24 hours      acetaminophen   Tablet .. 650 milliGRAM(s) Oral every 6 hours PRN  oxyCODONE    5 mG/acetaminophen 325 mG 1 Tablet(s) Oral every 6 hours PRN    bisacodyl 5 milliGRAM(s) Oral every 48 hours PRN  docusate sodium 100 milliGRAM(s) Oral two times a day    atorvastatin 40 milliGRAM(s) Oral at bedtime    lactated ringers. 1000 milliLiter(s) IV Continuous <Continuous>  sodium bicarbonate 650 milliGRAM(s) Oral three times a day      FAMILY HISTORY:  Family history of lung disease (Father): father (asbestosis; worked in shipyard)  Family history of stomach cancer (Father): father        Allergies    No Known Allergies    Intolerances    	      PHYSICAL EXAM:  T(C): 37.1 (01-15-19 @ 10:50), Max: 37.1 (01-15-19 @ 01:15)  HR: 64 (01-15-19 @ 10:50) (60 - 85)  BP: 129/59 (01-15-19 @ 10:50) (125/60 - 139/65)  RR: 18 (01-15-19 @ 10:50) (16 - 18)  SpO2: 99% (01-15-19 @ 10:50) (98% - 100%)  Wt(kg): --  I&O's Summary      	  HEENT:   Normal oral mucosa, PERRL, EOMI	  Cardiovascular: Normal S1 S2, No JVD, No murmurs, No edema  Respiratory: Lungs clear to auscultation	  Gastrointestinal:  Soft, Non-tender, + BS	  Extremities: right foot wound covered    LABS:	 	    CARDIAC MARKERS:                                  8.9    7.80  )-----------( 303      ( 15 Tadeo 2019 05:45 )             28.8     01-15    139  |  107  |  53<H>  ----------------------------<  76  4.1   |  20<L>  |  3.54<H>    Ca    8.6      15 Tadeo 2019 05:45  Phos  3.9     01-15  Mg     2.0     01-15      proBNP:   Lipid Profile:   HgA1c: Hemoglobin A1C, Whole Blood: 6.0 % (01-15 @ 05:45)    TSH: Thyroid Stimulating Hormone, Serum: 1.74 uIU/mL (01-15 @ 05:45)      ASSESSMENT/PLAN:

## 2019-01-15 NOTE — PROGRESS NOTE ADULT - ASSESSMENT
64yo M w/ right foot charcot deformity cellulitis underlying likely OM  ·	Pt seen evaluated  ·	Xrays negative for OM; WBC scan ordered to assess involvement of OM differentiated from Charcot  ·	C/w IV abx rec Cefepime Vanco  ·	Rec ID c/s to follow case for outpt abx plan; awaiting C/S of culture taken   ·	Packed w/ iodoform, dsd applied  ·	Ordered ANDRÉS/PVR as well as VDplx   ·	d/w attending

## 2019-01-15 NOTE — PROGRESS NOTE ADULT - SUBJECTIVE AND OBJECTIVE BOX
Patient is a 63y old  Male who presents with a chief complaint of Osteomyelitis (14 Jan 2019 21:34)     INTERVAL HPI/OVERNIGHT EVENTS:  Patient seen and evaluated at bedside.  Pt is resting comfortable in NAD. Denies N/V/F/C.  Pain rated at 0/10    Allergies    No Known Allergies    Intolerances    Vital Signs Last 24 Hrs  T(C): 37.1 (15 Tadeo 2019 10:50), Max: 37.1 (15 Tadeo 2019 01:15)  T(F): 98.7 (15 Tadeo 2019 10:50), Max: 98.8 (15 Tadeo 2019 01:15)  HR: 64 (15 Tadeo 2019 10:50) (60 - 87)  BP: 129/59 (15 Tadeo 2019 10:50) (125/60 - 147/66)  BP(mean): --  RR: 18 (15 Tadeo 2019 10:50) (16 - 18)  SpO2: 99% (15 Tadeo 2019 10:50) (98% - 100%)    LABS:                        8.9    7.80  )-----------( 303      ( 15 Tadeo 2019 05:45 )             28.8     01-15    139  |  107  |  53<H>  ----------------------------<  76  4.1   |  20<L>  |  3.54<H>    Ca    8.6      15 Tadeo 2019 05:45  Phos  3.9     01-15  Mg     2.0     01-15    CAPILLARY BLOOD GLUCOSE    POCT Blood Glucose.: 87 mg/dL (15 Tadeo 2019 09:15)  POCT Blood Glucose.: 114 mg/dL (14 Jan 2019 15:03)    Lower Extremity Physical Exam:  Vascular: DP/PT 0/4, B/L, CFT < 5 seconds B/L, Temperature gradient slightly warmer on right  Neuro: Epicritic sensation diminished to the level of digits, B/L.  Musculoskeletal/Ortho: charcot deformity right foot   Skin: edema to the right LE, erythema diffusely to plantar midfoot without extension past ankle joint  Wound #1:   Location: plantar midfoot  Size: 1.1cm x 1.3cm  Depth: 2.3cm  Wound bed: fibrogranular  Drainage: none  Odor: slight malodor  Periwound: macerated hyperkeratotic  Etiology: 2/2 ambulation, charcot deformity    RADIOLOGY & ADDITIONAL TESTS:

## 2019-01-15 NOTE — CONSULT NOTE ADULT - SUBJECTIVE AND OBJECTIVE BOX
Reason for Admission: Osteomyelitis	  History of Present Illness: 	  63 year old male, with past history significant for Type-II DM, HTN, and R-Foot Charcot deformity, Osteomyelitis, Vitamin D deficiency, Anemia, CKD-III, HLD and GERD, presented to the ED, as sent by podiatrist, secondary to concern for osteomyelitis.  Seen and evaluated at bedside; NAD.  Patient relates noticing increasing swelling of the right foot for the past ~ one week, to the extent of not being able to fit into the shoe.  Had erythema of the foot and brownish, foul smelling drainage from the ulcer site.  Complained of pain, inadequately relieved with Tylenol.  Reports mild subjective fever, chills and sweating.  No report of nausea, vomiting, headaches, dizziness, chest pain, shortness of breath, abdominal pain, diarrhea or dysuria.  States having mild constipation.    Vital signs upon ED presentation as follows: BP = 147/66, HR = 87, RR = 18, T = 36.9 C (98.5 F), O2 Sat = 100% on RA.  Diagnosed with Osteomyelitis.  Prescribed zosyn and vancomycin in the ED.    Review of Systems:  · Negative General Symptoms	no malaise; no fatigue	  · General Symptoms	fever; chills; sweating	  · General Comments	mild subjective fevers, chills, sweating	  · Skin/Breast Comments	erythema, pain of the right foot with exudate from ulcer site	  · Negative Ophthalmologic Symptoms	no photophobia; no pain L; no pain R	  · Ophthalmologic Comments	wears eyeglasses	  · Negative ENMT Symptoms	no hearing difficulty; no ear pain; no vertigo; no sinus symptoms	  · Negative Respiratory and Thorax Symptoms	no wheezing; no dyspnea; no cough; no pleuritic chest pain	  · Negative Cardiovascular Symptoms	no chest pain; no palpitations; no paroxysmal nocturnal dyspnea	  · Negative Gastrointestinal Symptoms	no nausea; no vomiting; no diarrhea	  · Gastrointestinal Symptoms	constipation	  · Gastrointestinal Comments	mild constipation; last BM in the AM (very small)	  · Negative General Genitourinary Symptoms	no flank pain L; no flank pain R; no dysuria	  · Musculoskeletal Comments	pain, swelling of right foot, pain of right medial leg	  · Negative Neurological Symptoms	no syncope; no vertigo; no headache	  · Psychiatric Symptoms	poor sleep at times due to pain	  · Psychiatric Comments	occasional well managed sadness/depression	  · Lymphatic Symptoms	right foot swelling due to infection	  · Negative Endocrine Symptoms	no cold intolerance; no heat intolerance	  · Negative Allergic Reactions	no respiratory distress	  · Allergy Types	as noted	  · Immunological Symptoms	recurring infections; osteomyelitis of the right foot	      Allergies and Intolerances:        Allergies:  	No Known Allergies:     Home Medications:   * Patient Currently Takes Medications as of 18-Sep-2018 09:10 documented in Structured Notes  · 	collagenase 250 units/g topical ointment: 1 application topically once a day  · 	atorvastatin 40 mg oral tablet: 1 tab(s) orally once a day (at bedtime)  · 	aspirin 81 mg oral delayed release tablet: 1 tab(s) orally once a day  · 	metoprolol succinate 100 mg oral tablet, extended release: 1 tab(s) orally once a day  · 	amLODIPine 10 mg oral tablet: 1 tab(s) orally once a day  · 	sodium bicarbonate 650 mg oral tablet: 1 tab(s) orally 3 times a day  · 	folic acid 1 mg oral tablet: 1 tab(s) orally once a day  · 	hydrALAZINE 100 mg oral tablet: 1 tab(s) orally 3 times a day  · 	piperacillin-tazobactam 2 g-0.25 g/50 mL intravenous solution: 2.25 gram(s) intravenous every 8 hours  	Stop after October 23 rd dose  · 	Vitamin D2 50,000 intl units (1.25 mg) oral capsule: 1 cap(s) orally once a week    Patient History:   Past Medical History:  Anemia    Chronic kidney disease, stage III (moderate)    Diabetic foot ulcer    DM (diabetes mellitus)    Dyslipidemia    GERD (gastroesophageal reflux disease)    HTN (hypertension)    Renal cyst    Vitamin D deficiency.    Past Surgical History:  Ankle fracture  ORIF  S/P PICC central line placement  ~ L-Basilic vein (09/13/2018).    Family History:  Father  Still living? No  Family history of stomach cancer, Age at diagnosis: Age Unknown  Family history of lung disease, Age at diagnosis: Age Unknown.    Social History:  Social History (marital status, living situation, occupation, tobacco use, alcohol and drug use, and sexual history): Single  Lives alone  Works with [employer]  History of smoking; ~ 1.5 packs on weekends from late 1970's to early 1990's  No history of alcohol abuse No history of illegal drug use	    Tobacco Screening:  · Core Measure Site	No	    Risk Assessment:   Present on Admission:  Deep Venous Thrombosis	no	  Pulmonary Embolus	no	    Heart Failure:  Does this patient have a history of or has been diagnosed with heart failure? no.      Physical Exam:  · Constitutional	detailed exam	  · Constitutional Details	well-developed	  · Constitutional Comments	lying propped up in stretcher in NAD	  · Eyes	detailed exam	  · Eyes Details	PERRL; EOMI; conjunctiva clear	  · ENMT	detailed exam	  · ENMT Details	nose; mouth; pharynx	  · Nose	no discharge	  · Mouth	dry	  · Pharynx	no redness; no discharge	  · Tonsils	no redness; no swelling	  · Neck	detailed exam	  · Neck Details	supple; no JVD	  · Breasts	detailed exam	  · Breasts Details	normal shape	  · Back	detailed exam	  · Back Details	normal shape; ROM intact; strength intact; no tenderness appreciated	  · Respiratory	detailed exam	  · Respiratory Details	breath sounds equal; respirations non-labored; no rhonchi; no wheezes	  · Cardiovascular	detailed exam	  · Cardiovascular Details	regular rate and rhythm  no murmur	  · Cardiovascular Details	positive S1; positive S2	  · Gastrointestinal	detailed exam	  · GI Normal	soft; nontender; no distention; bowel sounds normal	  · Genitourinary	not examined	  · Rectal	not examined	  · Extremities	detailed exam	  · Extremities Details	pedal edema	  · Extremities Comments	Erythema and significant swelling of the right foot/ankle/leg with tenderness especially over the medial aspect.	  · Vascular	detailed exam	  · Radial Pulse	right normal; left normal	  · DP Pulse	unable to appreciate via palpation	  · PT Pulse	unable to appreciate via palpation	  · Neurological	detailed exam	  · Neurological Details	alert and oriented x 3	  · Motor	moves all extremities against gravity	  · Sensory	grossly intact to touch (including great toe)	  · Skin	detailed exam	  · Skin Comments	erythema of the right foot, ulcer at plantar surface of right foot with packing in place, partially pared off R-great toenail	  · Musculoskeletal	detailed exam	  · Musculoskeletal Comments	swelling of right foot/ankle/leg with mild warmth, tenderness especially at medial lower R-leg	  · Psychiatric	detailed exam	  · Psychiatric Details	normal affect; normal behavior	      Labs and Results:  Labs, Radiology, Cardiology, and Other Results: 9.9    8.67  )-----------( 334      ( 14 Jan 2019 22:10 )             32.7    14 Jan 2019 22:10   141    |  106    |  55     ----------------------------<  75     4.7     |  19     |  3.53    Ca    9.0        14 Jan 2019 22:10      CAPILLARY BLOOD GLUCOSE    POCT Blood Glucose.: 114 mg/dL (14 Jan 2019 15:03)	    Assessment and Plan:   Assessment:  · Assessment		  63 year old male, with past history significant for Type-II DM, HTN, and R-Foot Charcot deformity, presented to the ED, as sent by podiatrist, secondary to concern for osteomyelitis.  Vital signs upon ED presentation as follows: BP = 147/66, HR = 87, RR = 18, T = 36.9 C (98.5 F), O2 Sat = 100% on RA.  Diagnosed with Osteomyelitis.  Admitted for further management of:    Problem/Plan - 1:  ·  Problem: Other chronic osteomyelitis of right foot.  Plan: - podiatry / ID eval       Problem/Plan - 2:  ·  Problem: Dehydration.  Plan: - gentle hydration     Problem/Plan - 3:  ·  Problem: Chronic kidney disease, stage III (moderate).  Plan: - renal f/u for worsening cr -     Problem/Plan - 4:  ·  Problem: Essential hypertension.  Plan: - cont current htn med regimen     Problem/Plan - 5:  ·  Problem: Slow transit constipation. Plan: - reports inadequate relief; had small BM in the AM  - bowel regimen

## 2019-01-15 NOTE — CONSULT NOTE ADULT - PROBLEM SELECTOR RECOMMENDATION 9
Scr been stable 2.8-2.9 last admission. now scr stable ~3.5 possible progression of disease   CKD likely sec to DM/HTN  monitor bmp.  avoid nephrotoxic agents. Pt with CKD  Scr been stable 2.8-2.9 last admission.   now scr stable ~3.5 possible progression of disease   CKD likely sec to DM/HTN  monitor bmp.  avoid nephrotoxic agents.

## 2019-01-15 NOTE — CONSULT NOTE ADULT - SUBJECTIVE AND OBJECTIVE BOX
[facility]   MD DOMO WALLACE MD ANGELA WONG, PA    TEL:  OFFICE: [phone]  DR CARMICHAEL CELL: [phone]  DR. GRULLON CELL: [phone]  MELANIA CORONEL CELL: [phone]      HPI:  63 year old male, with past history significant for Type-II DM, HTN, and R-Foot Charcot deformity, Osteomyelitis, Vitamin D deficiency, Anemia, CKD-III, HLD and GERD, presented to the ED, as sent by podiatrist, secondary to concern for osteomyelitis. +increasing swelling of the right foot for the past ~ one week, to the extent of not being able to fit into the shoe.  Had erythema of the foot and brownish, foul smelling drainage from the ulcer site.  Complained of pain, inadequately relieved with Tylenol.  Reports mild subjective fever, chills and sweating.  No report of nausea, vomiting, headaches, dizziness, chest pain, shortness of breath, abdominal pain, diarrhea or dysuria.    patient follow up with Dr. carmichael for ckd management. renal function has been progressively worsen. last blood work done 11/17 scr 3.65 at that time.           Allergies:  No Known Allergies      PAST MEDICAL & SURGICAL HISTORY:  Vitamin D deficiency  GERD (gastroesophageal reflux disease)  Dyslipidemia  Diabetic foot ulcer  Chronic kidney disease, stage III (moderate)  Renal cyst  Anemia  HTN (hypertension)  DM (diabetes mellitus)  S/P PICC central line placement: ~ L-Basilic vein (09/13/2018)  Ankle fracture: ORIF      Home Medications Reviewed    Hospital Medications:   MEDICATIONS  (STANDING):  atorvastatin 40 milliGRAM(s) Oral at bedtime  docusate sodium 100 milliGRAM(s) Oral two times a day  heparin  Injectable 5000 Unit(s) SubCutaneous every 12 hours  hydrALAZINE 100 milliGRAM(s) Oral every 8 hours  lactated ringers. 1000 milliLiter(s) (100 mL/Hr) IV Continuous <Continuous>  lactobacillus acidophilus 1 Tablet(s) Oral daily  piperacillin/tazobactam IVPB. 3.375 Gram(s) IV Intermittent every 8 hours  sodium bicarbonate 650 milliGRAM(s) Oral three times a day  vancomycin  IVPB 750 milliGRAM(s) IV Intermittent every 24 hours      SOCIAL HISTORY:  Denies ETOh, Smoking,     FAMILY HISTORY:  Family history of lung disease (Father): father (asbestosis; worked in shipyard)  Family history of stomach cancer (Father): father      REVIEW OF SYSTEMS:  CONSTITUTIONAL: No weakness, fevers or chills  EYES/ENT: No visual changes;  No vertigo or throat pain   NECK: No pain or stiffness  RESPIRATORY: No cough, wheezing, hemoptysis; No shortness of breath  CARDIOVASCULAR: No chest pain or palpitations.  GASTROINTESTINAL: No abdominal or epigastric pain. No nausea, vomiting, or hematemesis; No diarrhea or constipation. No melena or hematochezia.  GENITOURINARY: No dysuria, frequency, foamy urine, urinary urgency, incontinence or hematuria  NEUROLOGICAL: No numbness or weakness  SKIN: see HPI  VASCULAR:  see HPI  All other review of systems is negative unless indicated above.    VITALS:  T(F): 98.7 (01-15-19 @ 10:50), Max: 98.8 (01-15-19 @ 01:15)  HR: 64 (01-15-19 @ 10:50)  BP: 129/59 (01-15-19 @ 10:50)  RR: 18 (01-15-19 @ 10:50)  SpO2: 99% (01-15-19 @ 10:50)  Wt(kg): --        PHYSICAL EXAM:  Constitutional: NAD  HEENT: anicteric sclera, oropharynx clear, MMM  Neck: No JVD  Respiratory: CTAB, no wheezes, rales or rhonchi  Cardiovascular: S1, S2, RRR  Gastrointestinal: BS+, soft, NT/ND  Extremities: 2+ right peripheral edema, right foot dressing d/c/i  Neurological: A/O x 3, no focal deficits  Psychiatric: Normal mood, normal affect  : No CVA tenderness. No sanchez.   Skin: No rashes    LABS:  01-15    139  |  107  |  53<H>  ----------------------------<  76  4.1   |  20<L>  |  3.54<H>    Ca    8.6      15 Tadeo 2019 05:45  Phos  3.9     01-15  Mg     2.0     01-15      Creatinine Trend: 3.54 <--, 3.53 <--                        8.9    7.80  )-----------( 303      ( 15 Tadeo 2019 05:45 )             28.8     Urine Studies:        RADIOLOGY & ADDITIONAL STUDIES: [facility]   MD DOMO WALLACE MD ANGELA WONG, PA    TEL:  OFFICE: [phone]  DR CARMICHAEL CELL: [phone]  DR. GRULLON CELL: [phone]  MELANIA CORONEL CELL: [phone]      HPI:  63 year old male, with past history significant for Type-II DM, HTN, and R-Foot Charcot deformity, Osteomyelitis, Vitamin D deficiency, Anemia, CKD-III, HLD and GERD, presented to the ED, as sent by podiatrist, secondary to concern for osteomyelitis. +increasing swelling of the right foot for the past ~ one week, to the extent of not being able to fit into the shoe.  Had erythema of the foot and brownish, foul smelling drainage from the ulcer site.  Complained of pain, inadequately relieved with Tylenol.  Reports mild subjective fever, chills and sweating.  No report of nausea, vomiting, headaches, dizziness, chest pain, shortness of breath, abdominal pain, diarrhea or dysuria.      patient follow up with Dr. carmichael for ckd management. renal function has been progressively worsen. last blood work done 11/17 scr 3.65 at that time.           Allergies:  No Known Allergies      PAST MEDICAL & SURGICAL HISTORY:  Vitamin D deficiency  GERD (gastroesophageal reflux disease)  Dyslipidemia  Diabetic foot ulcer  Chronic kidney disease, stage III (moderate)  Renal cyst  Anemia  HTN (hypertension)  DM (diabetes mellitus)  S/P PICC central line placement: ~ L-Basilic vein (09/13/2018)  Ankle fracture: ORIF      Home Medications Reviewed    Hospital Medications:   MEDICATIONS  (STANDING):  atorvastatin 40 milliGRAM(s) Oral at bedtime  docusate sodium 100 milliGRAM(s) Oral two times a day  heparin  Injectable 5000 Unit(s) SubCutaneous every 12 hours  hydrALAZINE 100 milliGRAM(s) Oral every 8 hours  lactated ringers. 1000 milliLiter(s) (100 mL/Hr) IV Continuous <Continuous>  lactobacillus acidophilus 1 Tablet(s) Oral daily  piperacillin/tazobactam IVPB. 3.375 Gram(s) IV Intermittent every 8 hours  sodium bicarbonate 650 milliGRAM(s) Oral three times a day  vancomycin  IVPB 750 milliGRAM(s) IV Intermittent every 24 hours      SOCIAL HISTORY:  Denies ETOh, Smoking,     FAMILY HISTORY:  Family history of lung disease (Father): father (asbestosis; worked in shipyard)  Family history of stomach cancer (Father): father      REVIEW OF SYSTEMS:  CONSTITUTIONAL: No weakness, fevers or chills  EYES/ENT: No visual changes;  No vertigo or throat pain   NECK: No pain or stiffness  RESPIRATORY: No cough, wheezing, hemoptysis; No shortness of breath  CARDIOVASCULAR: No chest pain or palpitations.  GASTROINTESTINAL: No abdominal or epigastric pain. No nausea, vomiting, or hematemesis; No diarrhea or constipation. No melena or hematochezia.  GENITOURINARY: No dysuria, frequency, foamy urine, urinary urgency, incontinence or hematuria  NEUROLOGICAL: No numbness or weakness  SKIN: see HPI  VASCULAR:  see HPI  All other review of systems is negative unless indicated above.    VITALS:  T(F): 98.7 (01-15-19 @ 10:50), Max: 98.8 (01-15-19 @ 01:15)  HR: 64 (01-15-19 @ 10:50)  BP: 129/59 (01-15-19 @ 10:50)  RR: 18 (01-15-19 @ 10:50)  SpO2: 99% (01-15-19 @ 10:50)  Wt(kg): --        PHYSICAL EXAM:  Constitutional: NAD  HEENT: anicteric sclera, oropharynx clear, MMM  Neck: No JVD  Respiratory: CTAB, no wheezes, rales or rhonchi  Cardiovascular: S1, S2, RRR  Gastrointestinal: BS+, soft, NT/ND  Extremities: 2+ right peripheral edema, right foot dressing d/c/i  Neurological: A/O x 3, no focal deficits  Psychiatric: Normal mood, normal affect  : No CVA tenderness. No sanchez.   Skin: No rashes    LABS:  01-15    139  |  107  |  53<H>  ----------------------------<  76  4.1   |  20<L>  |  3.54<H>    Ca    8.6      15 Tadeo 2019 05:45  Phos  3.9     01-15  Mg     2.0     01-15      Creatinine Trend: 3.54 <--, 3.53 <--                        8.9    7.80  )-----------( 303      ( 15 Tadeo 2019 05:45 )             28.8     Urine Studies:        RADIOLOGY & ADDITIONAL STUDIES:

## 2019-01-16 DIAGNOSIS — E78.5 HYPERLIPIDEMIA, UNSPECIFIED: ICD-10-CM

## 2019-01-16 LAB
SPECIMEN SOURCE: SIGNIFICANT CHANGE UP
SPECIMEN SOURCE: SIGNIFICANT CHANGE UP
VANCOMYCIN TROUGH SERPL-MCNC: 11 UG/ML — SIGNIFICANT CHANGE UP (ref 10–20)

## 2019-01-16 PROCEDURE — 78103 BONE MARROW IMAGING MULT: CPT | Mod: 26

## 2019-01-16 PROCEDURE — 78805: CPT | Mod: 26

## 2019-01-16 RX ADMIN — Medication 100 MILLIGRAM(S): at 05:37

## 2019-01-16 RX ADMIN — Medication 250 MILLIGRAM(S): at 00:43

## 2019-01-16 RX ADMIN — Medication 650 MILLIGRAM(S): at 05:37

## 2019-01-16 RX ADMIN — HEPARIN SODIUM 5000 UNIT(S): 5000 INJECTION INTRAVENOUS; SUBCUTANEOUS at 17:39

## 2019-01-16 RX ADMIN — HEPARIN SODIUM 5000 UNIT(S): 5000 INJECTION INTRAVENOUS; SUBCUTANEOUS at 05:37

## 2019-01-16 RX ADMIN — Medication 100 MILLIGRAM(S): at 16:24

## 2019-01-16 RX ADMIN — Medication 650 MILLIGRAM(S): at 23:32

## 2019-01-16 RX ADMIN — Medication 100 MILLIGRAM(S): at 02:52

## 2019-01-16 RX ADMIN — PIPERACILLIN AND TAZOBACTAM 25 GRAM(S): 4; .5 INJECTION, POWDER, LYOPHILIZED, FOR SOLUTION INTRAVENOUS at 02:52

## 2019-01-16 RX ADMIN — Medication 100 MILLIGRAM(S): at 17:39

## 2019-01-16 RX ADMIN — PIPERACILLIN AND TAZOBACTAM 25 GRAM(S): 4; .5 INJECTION, POWDER, LYOPHILIZED, FOR SOLUTION INTRAVENOUS at 16:23

## 2019-01-16 RX ADMIN — Medication 650 MILLIGRAM(S): at 21:14

## 2019-01-16 RX ADMIN — Medication 250 MILLIGRAM(S): at 23:32

## 2019-01-16 RX ADMIN — ATORVASTATIN CALCIUM 40 MILLIGRAM(S): 80 TABLET, FILM COATED ORAL at 21:14

## 2019-01-16 NOTE — PROGRESS NOTE ADULT - SUBJECTIVE AND OBJECTIVE BOX
SUBJECTIVE / OVERNIGHT EVENTS: pt denies chest pain, shortness of breath       MEDICATIONS  (STANDING):  atorvastatin 40 milliGRAM(s) Oral at bedtime  docusate sodium 100 milliGRAM(s) Oral two times a day  heparin  Injectable 5000 Unit(s) SubCutaneous every 12 hours  hydrALAZINE 100 milliGRAM(s) Oral every 8 hours  lactated ringers. 1000 milliLiter(s) (100 mL/Hr) IV Continuous <Continuous>  lactobacillus acidophilus 1 Tablet(s) Oral daily  piperacillin/tazobactam IVPB. 3.375 Gram(s) IV Intermittent every 8 hours  sodium bicarbonate 650 milliGRAM(s) Oral three times a day  vancomycin  IVPB 750 milliGRAM(s) IV Intermittent every 24 hours    MEDICATIONS  (PRN):  acetaminophen   Tablet .. 650 milliGRAM(s) Oral every 6 hours PRN Mild Pain (1 - 3)  bisacodyl 5 milliGRAM(s) Oral every 48 hours PRN Constipation  oxyCODONE    5 mG/acetaminophen 325 mG 1 Tablet(s) Oral every 6 hours PRN Moderate Pain (4 - 6)    Vital Signs Last 24 Hrs  T(C): 37.1 (16 Jan 2019 21:24), Max: 37.1 (16 Jan 2019 21:24)  T(F): 98.7 (16 Jan 2019 21:24), Max: 98.7 (16 Jan 2019 21:24)  HR: 86 (16 Jan 2019 21:24) (76 - 88)  BP: 161/82 (16 Jan 2019 21:24) (130/73 - 161/82)  BP(mean): --  RR: 18 (16 Jan 2019 21:24) (18 - 18)  SpO2: 97% (16 Jan 2019 21:24) (97% - 99%)    CAPILLARY BLOOD GLUCOSE        I&O's Summary      Constitutional: No fever, fatigue  Skin: No rash.  Eyes: No recent vision problems or eye pain.  ENT: No congestion, ear pain, or sore throat.  Cardiovascular: No chest pain or palpation.  Respiratory: No cough, shortness of breath, congestion, or wheezing.  Gastrointestinal: No abdominal pain, nausea, vomiting, or diarrhea.  Genitourinary: No dysuria.  Musculoskeletal: No joint swelling.  Neurologic: No headache.    PHYSICAL EXAM:  GENERAL: NAD  EYES: EOMI, PERRLA  NECK: Supple, No JVD  CHEST/LUNG: dec breath sounds at bases   HEART:  S1 , S2 +  ABDOMEN: sof,t bs+  EXTREMITIES:  rt foot wound dressing +  NEUROLOGY:alert awake       LABS:                        8.9    7.80  )-----------( 303      ( 15 Tadeo 2019 05:45 )             28.8     01-15    139  |  107  |  53<H>  ----------------------------<  76  4.1   |  20<L>  |  3.54<H>    Ca    8.6      15 Tadeo 2019 05:45  Phos  3.9     01-15  Mg     2.0     01-15                RADIOLOGY & ADDITIONAL TESTS:    Imaging Personally Reviewed:    Consultant(s) Notes Reviewed:      Care Discussed with Consultants/Other Providers:

## 2019-01-16 NOTE — PROGRESS NOTE ADULT - ASSESSMENT
1) Right LE wound and swelling - Podiatry  onboard, No evidence of Obstructive PAD , 2d echo with normal BiV function  on zosyn and vanco, f/u WBC scan    2) Right foot wound/OM - podiatry  on case f/u WBC scan    3) HTN - cont hydralazine    4) ABEL on CKD:  renal onboard,

## 2019-01-16 NOTE — PROGRESS NOTE ADULT - ASSESSMENT
63 year old male, with past history significant for Type-II DM, HTN, and R-Foot Charcot deformity, Osteomyelitis, Vitamin D deficiency, Anemia, CKD-III, HLD and GERD, presented to the ED, as sent by podiatrist, secondary to concern for osteomyelitis.   On zosyn and VAnco

## 2019-01-16 NOTE — PROGRESS NOTE ADULT - SUBJECTIVE AND OBJECTIVE BOX
Keaton Duvall MD  Interventional Cardiology / Advance Heart Failure and Cardiac Transplant Specialist  [place] Office : [address] [place] [address]  Tel:   [place] Office : [address] [facility] [address] [address]  Tel: [phone]  Cell : [phone]    Subjective : Pt lying in bed comfortable, not in distress, denies any chest pain or SOB  	  MEDICATIONS:  heparin  Injectable 5000 Unit(s) SubCutaneous every 12 hours  hydrALAZINE 100 milliGRAM(s) Oral every 8 hours    piperacillin/tazobactam IVPB. 3.375 Gram(s) IV Intermittent every 8 hours  vancomycin  IVPB 750 milliGRAM(s) IV Intermittent every 24 hours      acetaminophen   Tablet .. 650 milliGRAM(s) Oral every 6 hours PRN  oxyCODONE    5 mG/acetaminophen 325 mG 1 Tablet(s) Oral every 6 hours PRN    bisacodyl 5 milliGRAM(s) Oral every 48 hours PRN  docusate sodium 100 milliGRAM(s) Oral two times a day    atorvastatin 40 milliGRAM(s) Oral at bedtime    lactated ringers. 1000 milliLiter(s) IV Continuous <Continuous>  sodium bicarbonate 650 milliGRAM(s) Oral three times a day      PHYSICAL EXAM:  T(C): 37.1 (01-16-19 @ 21:24), Max: 37.1 (01-16-19 @ 21:24)  HR: 86 (01-16-19 @ 21:24) (76 - 88)  BP: 161/82 (01-16-19 @ 21:24) (130/73 - 161/82)  RR: 18 (01-16-19 @ 21:24) (18 - 18)  SpO2: 97% (01-16-19 @ 21:24) (97% - 99%)  Wt(kg): --  I&O's Summary    Height (cm): 177.8 (01-16 @ 02:23)  Weight (kg): 99.4 (01-16 @ 02:23)  BMI (kg/m2): 31.4 (01-16 @ 02:23)  BSA (m2): 2.17 (01-16 @ 02:23)    	  HEENT:   Normal oral mucosa, PERRL, EOMI	  Cardiovascular: Normal S1 S2, No JVD, No murmurs, No edema  Respiratory: Lungs clear to auscultation	  Gastrointestinal:  Soft, Non-tender, + BS	  Extremities: right foot wound covered                                    8.9    7.80  )-----------( 303      ( 15 Tadeo 2019 05:45 )             28.8     01-15    139  |  107  |  53<H>  ----------------------------<  76  4.1   |  20<L>  |  3.54<H>    Ca    8.6      15 Tadeo 2019 05:45  Phos  3.9     01-15  Mg     2.0     01-15      proBNP:   Lipid Profile:   HgA1c:   TSH:

## 2019-01-16 NOTE — PROGRESS NOTE ADULT - PROBLEM SELECTOR PLAN 1
Pt with CKD  Scr been stable 2.8-2.9 last admission.   now scr stable ~3.5 possible progression of disease   CKD likely sec to DM/HTN  monitor bmp.  avoid nephrotoxic agents.

## 2019-01-16 NOTE — PROGRESS NOTE ADULT - ASSESSMENT
62yo M w/ right foot midfoot plantar ulcer 2/2 charcot with possible OM  ·	Pt seen evaluated  ·	plantar wound to bone; appears stable  ·	ANDRÉS/PVR reviewed  ·	Packed w/ iodoform, dsd applied  ·	pod plan (p) WBC scan   ·	CW IV abx   ·	d/w attending

## 2019-01-16 NOTE — PROGRESS NOTE ADULT - SUBJECTIVE AND OBJECTIVE BOX
Patient is a 63y old  Male who presents with a chief complaint of Osteomyelitis (15 Tadeo 2019 13:43)       INTERVAL HPI/OVERNIGHT EVENTS:  Patient seen and evaluated at bedside.  Pt is resting comfortable in NAD. Denies N/V/F/C.      Allergies    No Known Allergies    Intolerances        Vital Signs Last 24 Hrs  T(C): 36.8 (16 Jan 2019 16:21), Max: 37 (16 Jan 2019 00:55)  T(F): 98.2 (16 Jan 2019 16:21), Max: 98.6 (16 Jan 2019 00:55)  HR: 76 (16 Jan 2019 16:21) (76 - 88)  BP: 144/72 (16 Jan 2019 16:21) (130/73 - 181/84)  BP(mean): --  RR: 18 (16 Jan 2019 16:21) (17 - 18)  SpO2: 98% (16 Jan 2019 16:21) (98% - 100%)    LABS:                        8.9    7.80  )-----------( 303      ( 15 Tadeo 2019 05:45 )             28.8     01-15    139  |  107  |  53<H>  ----------------------------<  76  4.1   |  20<L>  |  3.54<H>    Ca    8.6      15 Tadeo 2019 05:45  Phos  3.9     01-15  Mg     2.0     01-15          CAPILLARY BLOOD GLUCOSE      POCT Blood Glucose.: 134 mg/dL (15 Tadeo 2019 22:04)  POCT Blood Glucose.: 95 mg/dL (15 Tadeo 2019 18:22)      Lower Extremity Physical Exam:  Vascular: DP/PT 0/4, B/L, CFT < 5 seconds B/L, Temperature gradient slightly warmer on right  Neuro: Epicritic sensation diminished to the level of digits, B/L.  Musculoskeletal/Ortho: charcot deformity right foot   Skin: edema to the right LE, erythema resolved  Wound #1:   Location: plantar midfoot  Size: 1.1cm x 1.3cm  Depth: 2.3cm  Wound bed: fibrogranular  Drainage: none  Odor: none  Periwound: hyperkeratotic  Etiology: 2/2 ambulation, charcot deformity    RADIOLOGY & ADDITIONAL TESTS:    < from: VA Duplex Physiol Ext Low 3+ Level, BI. (01.15.19 @ 15:58) >    Patient name: DARIEL MI  Date of test: 1/15/2019  MR#: [id number]  [facility] #: [id number]    Location: [facility] Physician(s): Keaton MD  Interpreted by: Raciel French MD, [facility], [facility], [facility]  Tech: Richard Dill, [facility]  Type of Test: LE Arterial: ANDRÉS/Segm.  ------------------------------------------------------------------------  Procedure: Segmental Pressure/Waveform - complete  noninvasive physiologic studies of the bilateral lower  extremity arteries.  Indications: Atheroembolism of bilateral lower extremities  (I75.023)  ------------------------------------------------------------------------  PRESSURE (mm Hg):  ------------------------------------------------------------------------  RIGHT (BP):  Brachial: 125  High Thigh:  Low Thigh: 221  Calf: 171  Ankle-PT: 130  Ankle-DP: 153  Greate Toe: 133  ANDRÉS: 1.12  ------------------------------------------------------------------------  LEFT (BP):  Brachial: 137  High Thigh:  Low Thigh:  Calf:  Ankle-PT:  Ankle-DP:  Greate Toe: 147  ANDRÉS:  ------------------------------------------------------------------------  WAVEFORM:  ------------------------------------------------------------------------  RIGHT:  CFA:  Popliteal:  Ankle-PT:  Ankle-DP:  ------------------------------------------------------------------------  LEFT:  CFA:  Popliteal:  Ankle-PT:  Ankle-DP:  ------------------------------------------------------------------------  PSA/AVF:  ------------------------------------------------------------------------  RIGHT:  Pseudoaneurysm:  A-V fistula:  TBI: 0.97  ------------------------------------------------------------------------  LEFT:  Pseudoaneurysm:  A-V fistula:  TBI: 1.07  ------------------------------------------------------------------------  Right Findings: The ankle-brachial index (ANDRÉS) on the  right is normal (1.12).  The toe-brachial index (TBI) on the right is normal  (0.97).  The right toe pressure is 133 mmHg.  Pulse volume recording (PVR) waveforms appear triphasic  with normal amplitudes throughout the right lower  extremity.  There is no significant decrease in segmental  pressures between arterial segments.  Left Findings: The ankle-brachial index (ANDRÉS) on the left  was not accurately assessed because of non-compressible  (calcified)vessels.  The toe-brachial index (TBI) on the left is normal (1.07).   The left toe pressure is 147 mmHg.  Pulse volume recording (PVR) waveforms appear triphasic  with normal amplitudes throughout the left lower  extremity.  ------------------------------------------------------------------------  Summary/Impressions:  1.  Right ANDRÉS is normal (1.12).  Right TBI is normal  (0.97). No significant occlusive arterial disease in the  right lower extremity.  2. Left ANDRÉS was not accurately assessed because of  non-compressible (calcified) vessels. Left TBI is normal  (1.07). Waveforms appear normal throughout the left lower  extremity.  No significant occlusive arterial disease in  the left lower extremity.  ------------------------------------------------------------------------  Confirmed on  1/15/2019 - 7:50 PM by Raciel French MD, FAC,  EVE, VI  By signing this report, the attending physician certifies  that he or she has personally supervised and interpreted  the vascular study and has reviewed and or edited and  agrees with the written comments contained within the  report.    < end of copied text >

## 2019-01-16 NOTE — PROGRESS NOTE ADULT - SUBJECTIVE AND OBJECTIVE BOX
[facility]   MD DOMO WALLACE MD RUORU WONG, PA    TEL:  OFFICE: [phone]  DR LEE CELL: [phone]  PATRICIO CORONEL CELL: [phone]  DR. GRULLON CELL: [phone]    RENAL FOLLOW UP NOTE  --------------------------------------------------------------------------------  HPI:      Pt seen and examined at bedside. Sitting up eating   Denies SOB, chest pain     PAST HISTORY  --------------------------------------------------------------------------------  No significant changes to PMH, PSH, FHx, SHx, unless otherwise noted    ALLERGIES & MEDICATIONS  --------------------------------------------------------------------------------  Allergies    No Known Allergies    Intolerances      Standing Inpatient Medications  atorvastatin 40 milliGRAM(s) Oral at bedtime  docusate sodium 100 milliGRAM(s) Oral two times a day  heparin  Injectable 5000 Unit(s) SubCutaneous every 12 hours  hydrALAZINE 100 milliGRAM(s) Oral every 8 hours  lactated ringers. 1000 milliLiter(s) IV Continuous <Continuous>  lactobacillus acidophilus 1 Tablet(s) Oral daily  piperacillin/tazobactam IVPB. 3.375 Gram(s) IV Intermittent every 8 hours  sodium bicarbonate 650 milliGRAM(s) Oral three times a day  vancomycin  IVPB 750 milliGRAM(s) IV Intermittent every 24 hours    PRN Inpatient Medications  acetaminophen   Tablet .. 650 milliGRAM(s) Oral every 6 hours PRN  bisacodyl 5 milliGRAM(s) Oral every 48 hours PRN  oxyCODONE    5 mG/acetaminophen 325 mG 1 Tablet(s) Oral every 6 hours PRN      REVIEW OF SYSTEMS  --------------------------------------------------------------------------------  General: no fever  CVS: no chest pain  RESP: no sob, no cough  ABD: no abdominal pain  : no dysuria,  MSK: no edema     VITALS/PHYSICAL EXAM  --------------------------------------------------------------------------------  T(C): 36.8 (01-16-19 @ 16:21), Max: 37 (01-16-19 @ 00:55)  HR: 76 (01-16-19 @ 16:21) (76 - 88)  BP: 144/72 (01-16-19 @ 16:21) (130/73 - 181/84)  RR: 18 (01-16-19 @ 16:21) (17 - 18)  SpO2: 98% (01-16-19 @ 16:21) (98% - 100%)  Wt(kg): --  Height (cm): 177.8 (01-16-19 @ 02:23)  Weight (kg): 99.4 (01-16-19 @ 02:23)  BMI (kg/m2): 31.4 (01-16-19 @ 02:23)  BSA (m2): 2.17 (01-16-19 @ 02:23)      Physical Exam:  	Gen: NAD  	HEENT: MMM  	Pulm: CTA B/L  	CV: S1S2  	Abd: Soft, +BS  	Ext: No LE edema B/L                      Neuro: Awake   	Skin: Warm and Dry       LABS/STUDIES  --------------------------------------------------------------------------------              8.9    7.80  >-----------<  303      [01-15-19 @ 05:45]              28.8     139  |  107  |  53  ----------------------------<  76      [01-15-19 @ 05:45]  4.1   |  20  |  3.54        Ca     8.6     [01-15-19 @ 05:45]      Mg     2.0     [01-15-19 @ 05:45]      Phos  3.9     [01-15-19 @ 05:45]            Creatinine Trend:  SCr 3.54 [01-15 @ 05:45]  SCr 3.53 [01-14 @ 22:10]        Iron 34, TIBC 155, %sat --      [09-11-18 @ 05:45]  Ferritin 355.3      [09-11-18 @ 05:45]  PTH 42.84 (Ca --)      [09-11-18 @ 05:45]   --  Vitamin D (25OH) 11.8      [09-11-18 @ 05:45]  HbA1c 6.0      [01-15-19 @ 05:45]  TSH 1.74      [01-15-19 @ 05:45]

## 2019-01-17 LAB
-  AMIKACIN: SIGNIFICANT CHANGE UP
-  AZTREONAM: SIGNIFICANT CHANGE UP
-  CEFAZOLIN: SIGNIFICANT CHANGE UP
-  CEFEPIME: SIGNIFICANT CHANGE UP
-  CEFTAZIDIME: SIGNIFICANT CHANGE UP
-  CIPROFLOXACIN: SIGNIFICANT CHANGE UP
-  CIPROFLOXACIN: SIGNIFICANT CHANGE UP
-  CLINDAMYCIN: SIGNIFICANT CHANGE UP
-  ERYTHROMYCIN: SIGNIFICANT CHANGE UP
-  GENTAMICIN: SIGNIFICANT CHANGE UP
-  GENTAMICIN: SIGNIFICANT CHANGE UP
-  IMIPENEM: SIGNIFICANT CHANGE UP
-  LEVOFLOXACIN: SIGNIFICANT CHANGE UP
-  LEVOFLOXACIN: SIGNIFICANT CHANGE UP
-  MEROPENEM: SIGNIFICANT CHANGE UP
-  MOXIFLOXACIN(AEROBIC): SIGNIFICANT CHANGE UP
-  OXACILLIN: SIGNIFICANT CHANGE UP
-  PIPERACILLIN/TAZOBACTAM: SIGNIFICANT CHANGE UP
-  RIFAMPIN.: SIGNIFICANT CHANGE UP
-  TETRACYCLINE: SIGNIFICANT CHANGE UP
-  TOBRAMYCIN: SIGNIFICANT CHANGE UP
-  TRIMETHOPRIM/SULFAMETHOXAZOLE: SIGNIFICANT CHANGE UP
-  VANCOMYCIN: SIGNIFICANT CHANGE UP
ANION GAP SERPL CALC-SCNC: 14 MMO/L — SIGNIFICANT CHANGE UP (ref 7–14)
BUN SERPL-MCNC: 46 MG/DL — HIGH (ref 7–23)
CALCIUM SERPL-MCNC: 8.5 MG/DL — SIGNIFICANT CHANGE UP (ref 8.4–10.5)
CHLORIDE SERPL-SCNC: 106 MMOL/L — SIGNIFICANT CHANGE UP (ref 98–107)
CO2 SERPL-SCNC: 21 MMOL/L — LOW (ref 22–31)
CREAT SERPL-MCNC: 3.5 MG/DL — HIGH (ref 0.5–1.3)
CULTURE RESULTS: SIGNIFICANT CHANGE UP
FERRITIN SERPL-MCNC: 291.5 NG/ML — SIGNIFICANT CHANGE UP (ref 30–400)
FOLATE SERPL-MCNC: 19.3 NG/ML — SIGNIFICANT CHANGE UP (ref 4.7–20)
GLUCOSE SERPL-MCNC: 95 MG/DL — SIGNIFICANT CHANGE UP (ref 70–99)
GRAM STN SPEC: SIGNIFICANT CHANGE UP
HCT VFR BLD CALC: 30.9 % — LOW (ref 39–50)
HGB BLD-MCNC: 9.4 G/DL — LOW (ref 13–17)
IRON SATN MFR SERPL: 145 UG/DL — LOW (ref 155–535)
IRON SATN MFR SERPL: 25 UG/DL — LOW (ref 45–165)
MAGNESIUM SERPL-MCNC: 1.9 MG/DL — SIGNIFICANT CHANGE UP (ref 1.6–2.6)
MCHC RBC-ENTMCNC: 27.6 PG — SIGNIFICANT CHANGE UP (ref 27–34)
MCHC RBC-ENTMCNC: 30.4 % — LOW (ref 32–36)
MCV RBC AUTO: 90.9 FL — SIGNIFICANT CHANGE UP (ref 80–100)
METHOD TYPE: SIGNIFICANT CHANGE UP
METHOD TYPE: SIGNIFICANT CHANGE UP
NRBC # FLD: 0 K/UL — LOW (ref 25–125)
ORGANISM # SPEC MICROSCOPIC CNT: SIGNIFICANT CHANGE UP
PHOSPHATE SERPL-MCNC: 3.7 MG/DL — SIGNIFICANT CHANGE UP (ref 2.5–4.5)
PLATELET # BLD AUTO: 428 K/UL — HIGH (ref 150–400)
PMV BLD: 10.5 FL — SIGNIFICANT CHANGE UP (ref 7–13)
POTASSIUM SERPL-MCNC: 4.6 MMOL/L — SIGNIFICANT CHANGE UP (ref 3.5–5.3)
POTASSIUM SERPL-SCNC: 4.6 MMOL/L — SIGNIFICANT CHANGE UP (ref 3.5–5.3)
RBC # BLD: 3.4 M/UL — LOW (ref 4.2–5.8)
RBC # FLD: 13.5 % — SIGNIFICANT CHANGE UP (ref 10.3–14.5)
SODIUM SERPL-SCNC: 141 MMOL/L — SIGNIFICANT CHANGE UP (ref 135–145)
TRANSFERRIN SERPL-MCNC: 109 MG/DL — LOW (ref 200–360)
UIBC SERPL-MCNC: 119.5 UG/DL — SIGNIFICANT CHANGE UP (ref 110–370)
VIT B12 SERPL-MCNC: 245 PG/ML — SIGNIFICANT CHANGE UP (ref 200–900)
WBC # BLD: 8.05 K/UL — SIGNIFICANT CHANGE UP (ref 3.8–10.5)
WBC # FLD AUTO: 8.05 K/UL — SIGNIFICANT CHANGE UP (ref 3.8–10.5)

## 2019-01-17 RX ORDER — VANCOMYCIN HCL 1 G
1000 VIAL (EA) INTRAVENOUS EVERY 24 HOURS
Qty: 0 | Refills: 0 | Status: DISCONTINUED | OUTPATIENT
Start: 2019-01-17 | End: 2019-01-18

## 2019-01-17 RX ORDER — OXYCODONE AND ACETAMINOPHEN 5; 325 MG/1; MG/1
1 TABLET ORAL EVERY 6 HOURS
Qty: 0 | Refills: 0 | Status: DISCONTINUED | OUTPATIENT
Start: 2019-01-17 | End: 2019-01-20

## 2019-01-17 RX ADMIN — Medication 650 MILLIGRAM(S): at 05:30

## 2019-01-17 RX ADMIN — Medication 650 MILLIGRAM(S): at 00:32

## 2019-01-17 RX ADMIN — ATORVASTATIN CALCIUM 40 MILLIGRAM(S): 80 TABLET, FILM COATED ORAL at 23:08

## 2019-01-17 RX ADMIN — PIPERACILLIN AND TAZOBACTAM 25 GRAM(S): 4; .5 INJECTION, POWDER, LYOPHILIZED, FOR SOLUTION INTRAVENOUS at 17:10

## 2019-01-17 RX ADMIN — Medication 650 MILLIGRAM(S): at 23:08

## 2019-01-17 RX ADMIN — Medication 100 MILLIGRAM(S): at 17:11

## 2019-01-17 RX ADMIN — Medication 100 MILLIGRAM(S): at 09:00

## 2019-01-17 RX ADMIN — HEPARIN SODIUM 5000 UNIT(S): 5000 INJECTION INTRAVENOUS; SUBCUTANEOUS at 05:30

## 2019-01-17 RX ADMIN — Medication 100 MILLIGRAM(S): at 01:10

## 2019-01-17 RX ADMIN — PIPERACILLIN AND TAZOBACTAM 25 GRAM(S): 4; .5 INJECTION, POWDER, LYOPHILIZED, FOR SOLUTION INTRAVENOUS at 01:10

## 2019-01-17 RX ADMIN — Medication 650 MILLIGRAM(S): at 12:55

## 2019-01-17 RX ADMIN — PIPERACILLIN AND TAZOBACTAM 25 GRAM(S): 4; .5 INJECTION, POWDER, LYOPHILIZED, FOR SOLUTION INTRAVENOUS at 09:05

## 2019-01-17 RX ADMIN — Medication 1 TABLET(S): at 12:54

## 2019-01-17 RX ADMIN — Medication 100 MILLIGRAM(S): at 05:30

## 2019-01-17 RX ADMIN — Medication 250 MILLIGRAM(S): at 23:08

## 2019-01-17 RX ADMIN — Medication 650 MILLIGRAM(S): at 20:58

## 2019-01-17 RX ADMIN — Medication 650 MILLIGRAM(S): at 20:28

## 2019-01-17 RX ADMIN — HEPARIN SODIUM 5000 UNIT(S): 5000 INJECTION INTRAVENOUS; SUBCUTANEOUS at 17:11

## 2019-01-17 NOTE — PROGRESS NOTE ADULT - SUBJECTIVE AND OBJECTIVE BOX
Keaton Duvall MD  Interventional Cardiology  [place] Office : [phone] [place] [address]  Tel:   [place] Office : [address] [facility] [place] [address]  Tel: [phone]  Cell : [phone]    Subjective : Pt lying in bed comfortable, not in distress, denies any chest pain or SOB  	  MEDICATIONS:  heparin  Injectable 5000 Unit(s) SubCutaneous every 12 hours  hydrALAZINE 100 milliGRAM(s) Oral every 8 hours  piperacillin/tazobactam IVPB. 3.375 Gram(s) IV Intermittent every 8 hours  vancomycin  IVPB 1000 milliGRAM(s) IV Intermittent every 24 hours  acetaminophen   Tablet .. 650 milliGRAM(s) Oral every 6 hours PRN  oxyCODONE    5 mG/acetaminophen 325 mG 1 Tablet(s) Oral every 6 hours PRN  bisacodyl 5 milliGRAM(s) Oral every 48 hours PRN  docusate sodium 100 milliGRAM(s) Oral two times a day  atorvastatin 40 milliGRAM(s) Oral at bedtime  lactated ringers. 1000 milliLiter(s) IV Continuous <Continuous>  sodium bicarbonate 650 milliGRAM(s) Oral three times a day      PHYSICAL EXAM:  T(C): 36.7 (01-17-19 @ 17:09), Max: 36.7 (01-17-19 @ 17:09)  HR: 86 (01-17-19 @ 17:09) (73 - 86)  BP: 151/71 (01-17-19 @ 17:09) (139/74 - 159/69)  RR: 18 (01-17-19 @ 17:09) (18 - 18)  SpO2: 98% (01-17-19 @ 17:09) (96% - 98%)  Wt(kg): --  I&O's Summary      HEENT:   Normal oral mucosa, PERRL, EOMI	  Cardiovascular: Normal S1 S2, No JVD, No murmurs, No edema  Respiratory: Lungs clear to auscultation	  Gastrointestinal:  Soft, Non-tender, + BS	  Extremities: right foot wound covered                                  9.4    8.05  )-----------( 428      ( 17 Jan 2019 05:49 )             30.9     01-17    141  |  106  |  46<H>  ----------------------------<  95  4.6   |  21<L>  |  3.50<H>    Ca    8.5      17 Jan 2019 05:38  Phos  3.7     01-17  Mg     1.9     01-17      proBNP:   Lipid Profile:   HgA1c:   TSH:

## 2019-01-17 NOTE — PROGRESS NOTE ADULT - ASSESSMENT
64 y/o M w/ right foot midfoot plantar ulcer 2/2 charcot foot  ·	Pt seen and evaluated  ·	Plantar midfoot wound to bone; appears stable  ·	Wound culture growing PSA, STAU, and STRAG	  ·	ANDRÉS/PVR reviewed  ·	Awaiting WBC scan  ·	Plan for OR for tarsal exostectomy next week  ·	Please document medical/cardiac optimization for surgery w/ IV sedation and local anesthesia  ·	Continue IV abx  ·	Seen w/ attending

## 2019-01-17 NOTE — PROGRESS NOTE ADULT - SUBJECTIVE AND OBJECTIVE BOX
SUBJECTIVE / OVERNIGHT EVENTS: pt denies chest pain, shortness of breath     MEDICATIONS  (STANDING):  atorvastatin 40 milliGRAM(s) Oral at bedtime  docusate sodium 100 milliGRAM(s) Oral two times a day  heparin  Injectable 5000 Unit(s) SubCutaneous every 12 hours  hydrALAZINE 100 milliGRAM(s) Oral every 8 hours  lactated ringers. 1000 milliLiter(s) (100 mL/Hr) IV Continuous <Continuous>  lactobacillus acidophilus 1 Tablet(s) Oral daily  piperacillin/tazobactam IVPB. 3.375 Gram(s) IV Intermittent every 8 hours  sodium bicarbonate 650 milliGRAM(s) Oral three times a day  vancomycin  IVPB 1000 milliGRAM(s) IV Intermittent every 24 hours    MEDICATIONS  (PRN):  acetaminophen   Tablet .. 650 milliGRAM(s) Oral every 6 hours PRN Mild Pain (1 - 3)  bisacodyl 5 milliGRAM(s) Oral every 48 hours PRN Constipation  oxyCODONE    5 mG/acetaminophen 325 mG 1 Tablet(s) Oral every 6 hours PRN Moderate Pain (4 - 6)    Vital Signs Last 24 Hrs  T(C): 36.7 (17 Jan 2019 17:09), Max: 36.7 (17 Jan 2019 17:09)  T(F): 98 (17 Jan 2019 17:09), Max: 98 (17 Jan 2019 17:09)  HR: 86 (17 Jan 2019 17:09) (73 - 86)  BP: 151/71 (17 Jan 2019 17:09) (139/74 - 159/69)  BP(mean): --  RR: 18 (17 Jan 2019 17:09) (18 - 18)  SpO2: 98% (17 Jan 2019 17:09) (96% - 98%)    Constitutional: No fever, fatigue  Skin: No rash.  Eyes: No recent vision problems or eye pain.  ENT: No congestion, ear pain, or sore throat.  Cardiovascular: No chest pain or palpation.  Respiratory: No cough, shortness of breath, congestion, or wheezing.  Gastrointestinal: No abdominal pain, nausea, vomiting, or diarrhea.  Genitourinary: No dysuria.  Musculoskeletal: No joint swelling.  Neurologic: No headache.    PHYSICAL EXAM:  GENERAL: NAD  EYES: EOMI, PERRLA  NECK: Supple, No JVD  CHEST/LUNG: dec breath sounds at bases   HEART:  S1 , S2 +  ABDOMEN: sof,t bs+  EXTREMITIES:  rt foot wound dressing +  NEUROLOGY:alert awake       LABS:  01-17    141  |  106  |  46<H>  ----------------------------<  95  4.6   |  21<L>  |  3.50<H>    Ca    8.5      17 Jan 2019 05:38  Phos  3.7     01-17  Mg     1.9     01-17      Creatinine Trend: 3.50 <--, 3.54 <--, 3.53 <--                        9.4    8.05  )-----------( 428      ( 17 Jan 2019 05:49 )             30.9     Urine Studies:                RADIOLOGY & ADDITIONAL TESTS:    Imaging Personally Reviewed:    Consultant(s) Notes Reviewed:      Care Discussed with Consultants/Other Providers:

## 2019-01-17 NOTE — PROGRESS NOTE ADULT - SUBJECTIVE AND OBJECTIVE BOX
Podiatry pager #: [phone] ([place])/ [phone] ([facility])    Patient is a 63y old  Male who presents with a chief complaint of Osteomyelitis (16 Jan 2019 17:35)       INTERVAL HPI/OVERNIGHT EVENTS:  Patient seen and evaluated at bedside.  Pt is resting comfortable in NAD. Denies N/V/F/C.     Allergies    No Known Allergies    Intolerances        Vital Signs Last 24 Hrs  T(C): 36.6 (17 Jan 2019 05:28), Max: 37.1 (16 Jan 2019 21:24)  T(F): 97.8 (17 Jan 2019 05:28), Max: 98.7 (16 Jan 2019 21:24)  HR: 73 (17 Jan 2019 05:28) (73 - 86)  BP: 159/69 (17 Jan 2019 05:28) (139/74 - 161/82)  BP(mean): --  RR: 18 (17 Jan 2019 05:28) (18 - 18)  SpO2: 96% (17 Jan 2019 05:28) (96% - 98%)    LABS:                        9.4    8.05  )-----------( 428      ( 17 Jan 2019 05:49 )             30.9     01-17    141  |  106  |  46<H>  ----------------------------<  95  4.6   |  21<L>  |  3.50<H>    Ca    8.5      17 Jan 2019 05:38  Phos  3.7     01-17  Mg     1.9     01-17          CAPILLARY BLOOD GLUCOSE          Lower Extremity Physical Exam:  Vascular: DP/PT 0/4, B/L, CFT < 5 seconds B/L, Temperature gradient slightly warmer on right  Neuro: Epicritic sensation diminished to the level of digits, B/L.  Musculoskeletal/Ortho: charcot deformity right foot   Skin: edema to the right LE, erythema resolved  Wound #1:   Location: plantar midfoot  Size: 1.1cm x 1.3cm  Depth: 2.3cm  Wound bed: fibrogranular  Drainage: none  Odor: none  Periwound: hyperkeratotic  Etiology: 2/2 ambulation, charcot deformity      RADIOLOGY & ADDITIONAL TESTS:  < from: [facility] Duplex Physiol Ext Low 3+ Level, BI. (01.15.19 @ 15:58) >    Patient name: DARIEL MI  Date of test: 1/15/2019  MR#: [id number]  [id number]    Location: [facility] Physician(s): , Keaton Duvall MD  Interpreted by: Raciel French MD, [facility], [facility], [facility]  Tech: Richard Dill, T  Type of Test: LE Arterial: ANDRÉS/Segm.  ------------------------------------------------------------------------  Procedure: Segmental Pressure/Waveform - complete  noninvasive physiologic studies of the bilateral lower  extremity arteries.  Indications: Atheroembolism of bilateral lower extremities  (I75.023)  ------------------------------------------------------------------------  PRESSURE (mm Hg):  ------------------------------------------------------------------------  RIGHT (BP):  Brachial: 125  High Thigh:  Low Thigh: 221  Calf: 171  Ankle-PT: 130  Ankle-DP: 153  Greate Toe: 133  ANDRÉS: 1.12  ------------------------------------------------------------------------  LEFT (BP):  Brachial: 137  High Thigh:  Low Thigh:  Calf:  Ankle-PT:  Ankle-DP:  Greate Toe: 147  ANDRÉS:  ------------------------------------------------------------------------  WAVEFORM:  ------------------------------------------------------------------------  RIGHT:  CFA:  Popliteal:  Ankle-PT:  Ankle-DP:  ------------------------------------------------------------------------  LEFT:  CFA:  Popliteal:  Ankle-PT:  Ankle-DP:  ------------------------------------------------------------------------  PSA/AVF:  ------------------------------------------------------------------------  RIGHT:  Pseudoaneurysm:  A-V fistula:  TBI: 0.97  ------------------------------------------------------------------------  LEFT:  Pseudoaneurysm:  A-V fistula:  TBI: 1.07  ------------------------------------------------------------------------  Right Findings: The ankle-brachial index (ANDRÉS) on the  right is normal (1.12).  The toe-brachial index (TBI) on the right is normal  (0.97).  The right toe pressure is 133 mmHg.  Pulse volume recording (PVR) waveforms appear triphasic  with normal amplitudes throughout the right lower  extremity.  There is no significant decrease in segmental  pressures between arterial segments.  Left Findings: The ankle-brachial index (ANDRÉS) on the left  was not accurately assessed because of non-compressible  (calcified)vessels.  The toe-brachial index (TBI) on the left is normal (1.07).   The left toe pressure is 147 mmHg.  Pulse volume recording (PVR) waveforms appear triphasic  with normal amplitudes throughout the left lower  extremity.  ------------------------------------------------------------------------  Summary/Impressions:  1.  Right ANDRÉS is normal (1.12).  Right TBI is normal  (0.97). No significant occlusive arterial disease in the  right lower extremity.  2. Left ANDRÉS was not accurately assessed because of  non-compressible (calcified) vessels. Left TBI is normal  (1.07). Waveforms appear normal throughout the left lower  extremity.  No significant occlusive arterial disease in  the left lower extremity.  ------------------------------------------------------------------------  Confirmed on  1/15/2019 - 7:50 PM by Raciel French MD, FACC,  EVE, RPVI  By signing this report, the attending physician certifies  that he or she has personally supervised and interpreted  the vascular study and has reviewed and or edited and  agrees with the written comments contained within the  report.    < end of copied text >

## 2019-01-17 NOTE — PROGRESS NOTE ADULT - PROBLEM SELECTOR PLAN 1
Pt with CKD  Scr been stable 2.8-2.9 last admission.   now scr stable ~3.5 possible progression of disease   CKD likely sec to DM/HTN  monitor bmp.  avoid nephrotoxic agents. Pt with CKD  Scr been stable 2.8-2.9 last admission.   now scr stable ~3.5 likely natural progression of disease   CKD likely sec to DM/HTN  monitor bmp.  avoid nephrotoxic agents.

## 2019-01-17 NOTE — CONSULT NOTE ADULT - SUBJECTIVE AND OBJECTIVE BOX
Patient is a 63y old  Male who presents with a chief complaint of Osteomyelitis (16 Jan 2019 17:35)      HPI:  63 year old male, with past history significant for Type-II DM, HTN, and R-Foot Charcot deformity, Osteomyelitis, Vitamin D deficiency, Anemia, CKD-III, HLD and GERD, presented to the ED, as sent by podiatrist, secondary to concern for osteomyelitis.  Seen and evaluated at bedside; NAD.  Patient relates noticing increasing swelling of the right foot for the past ~ one week, to the extent of not being able to fit into the shoe.  Had erythema of the foot and brownish, foul smelling drainage from the ulcer site.  Complained of pain, inadequately relieved with Tylenol.  Reports mild subjective fever, chills and sweating.  No report of nausea, vomiting, headaches, dizziness, chest pain, shortness of breath, abdominal pain, diarrhea or dysuria.  States having mild constipation.    Vital signs upon ED presentation as follows: BP = 147/66, HR = 87, RR = 18, T = 36.9 C (98.5 F), O2 Sat = 100% on RA.  Diagnosed with Osteomyelitis.  Prescribed zosyn and vancomycin in the ED. (14 Jan 2019 21:34)      REVIEW OF SYSTEMS:    CONSTITUTIONAL: No fever, weight loss, or fatigue  EYES: No eye pain, visual disturbances, or discharge  ENMT:  No sore throat  NECK: No pain or stiffness  RESPIRATORY: No cough, wheezing, chills or hemoptysis; No shortness of breath  CARDIOVASCULAR: No chest pain, palpitations, dizziness, or leg swelling  GASTROINTESTINAL: No abdominal or epigastric pain. No nausea, vomiting, or hematemesis; No diarrhea or constipation. No melena or hematochezia.  GENITOURINARY: No dysuria, frequency, hematuria, or incontinence  NEUROLOGICAL: No headaches, memory loss, loss of strength, numbness, or tremors  SKIN: No itching, burning, rashes, or lesions   LYMPH NODES: No enlarged glands  MUSCULOSKELETAL: No joint pain or swelling; No muscle, back, or extremity pain      PAST MEDICAL & SURGICAL HISTORY:  Vitamin D deficiency  GERD (gastroesophageal reflux disease)  Dyslipidemia  Diabetic foot ulcer  Chronic kidney disease, stage III (moderate)  Renal cyst  Anemia  HTN (hypertension)  DM (diabetes mellitus)  S/P PICC central line placement: ~ L-Basilic vein (09/13/2018)  Ankle fracture: ORIF      Allergies    No Known Allergies    Intolerances        FAMILY HISTORY:  Family history of lung disease (Father): father (asbestosis; worked in [employer])  Family history of stomach cancer (Father): father      SOCIAL HISTORY:        MEDICATIONS  (STANDING):  atorvastatin 40 milliGRAM(s) Oral at bedtime  docusate sodium 100 milliGRAM(s) Oral two times a day  heparin  Injectable 5000 Unit(s) SubCutaneous every 12 hours  hydrALAZINE 100 milliGRAM(s) Oral every 8 hours  lactated ringers. 1000 milliLiter(s) (100 mL/Hr) IV Continuous <Continuous>  lactobacillus acidophilus 1 Tablet(s) Oral daily  piperacillin/tazobactam IVPB. 3.375 Gram(s) IV Intermittent every 8 hours  sodium bicarbonate 650 milliGRAM(s) Oral three times a day  vancomycin  IVPB 1000 milliGRAM(s) IV Intermittent every 24 hours    MEDICATIONS  (PRN):  acetaminophen   Tablet .. 650 milliGRAM(s) Oral every 6 hours PRN Mild Pain (1 - 3)  bisacodyl 5 milliGRAM(s) Oral every 48 hours PRN Constipation  oxyCODONE    5 mG/acetaminophen 325 mG 1 Tablet(s) Oral every 6 hours PRN Moderate Pain (4 - 6)      Vital Signs Last 24 Hrs  T(C): 36.6 (17 Jan 2019 05:28), Max: 37.1 (16 Jan 2019 21:24)  T(F): 97.8 (17 Jan 2019 05:28), Max: 98.7 (16 Jan 2019 21:24)  HR: 73 (17 Jan 2019 05:28) (73 - 86)  BP: 159/69 (17 Jan 2019 05:28) (139/74 - 161/82)  BP(mean): --  RR: 18 (17 Jan 2019 05:28) (18 - 18)  SpO2: 96% (17 Jan 2019 05:28) (96% - 98%)    PHYSICAL EXAM:    GENERAL: NAD, well-groomed  HEAD:  Atraumatic, Normocephalic  EYES: EOMI, PERRLA, conjunctiva and sclera clear  ENMT: No tonsillar erythema, exudates, or enlargement; Moist mucous membranes  NECK: Supple, No JVD  CHEST/LUNG: Clear to percussion bilaterally; No rales, rhonchi, wheezing, or rubs  HEART: Regular rate and rhythm; No murmurs, rubs, or gallops  ABDOMEN: Soft, Nontender, Nondistended; Bowel sounds present  EXTREMITIES:  2+ Peripheral Pulses, No clubbing, cyanosis, or edema  LYMPH: No lymphadenopathy noted  SKIN: No rashes or lesions    LABS:  CBC Full  -  ( 17 Jan 2019 05:49 )  WBC Count : 8.05 K/uL  Hemoglobin : 9.4 g/dL  Hematocrit : 30.9 %  Platelet Count - Automated : 428 K/uL  Mean Cell Volume : 90.9 fL  Mean Cell Hemoglobin : 27.6 pg  Mean Cell Hemoglobin Concentration : 30.4 %  Auto Neutrophil # : x  Auto Lymphocyte # : x  Auto Monocyte # : x  Auto Eosinophil # : x  Auto Basophil # : x  Auto Neutrophil % : x  Auto Lymphocyte % : x  Auto Monocyte % : x  Auto Eosinophil % : x  Auto Basophil % : x      01-17    141  |  106  |  46<H>  ----------------------------<  95  4.6   |  21<L>  |  3.50<H>    Ca    8.5      17 Jan 2019 05:38  Phos  3.7     01-17  Mg     1.9     01-17                MICROBIOLOGY:    Culture - Blood (01.15.19 @ 07:45)    Culture - Blood:   NO ORGANISMS ISOLATED  NO ORGANISMS ISOLATED AT 48 HRS.    Specimen Source: BLOOD VENOUS    Culture - Blood (01.15.19 @ 07:45)    Culture - Blood:   NO ORGANISMS ISOLATED  NO ORGANISMS ISOLATED AT 48 HRS.    Specimen Source: BLOOD PERIPHERAL    Culture - Abscess with Gram Stain (01.14.19 @ 18:54)    Specimen Source: OTHER    Gram Stain Result:   GPCPR^Gram Pos Cocci in Pairs  QUANTITY OF BACTERIA SEEN: MANY (4+)  GPCCH^Gram Pos Cocci in Chains  QUANTITY OF BACTERIA SEEN: MANY (4+)  WBC^White Blood Cells  QNTY CELLS IN GRAM STAIN: MANY (4+)    Culture Results:   Insufficient growth, culture re-incubated.    Culture Results:   PSA^Pseudomonas aeruginosa  STAU^Staphylococcus aureus  STRAG^Streptococcus agalactiae Gp B                    RADIOLOGY:      < from: NM Bone Marrow Imaging Multiple Areas (01.16.19 @ 13:57) >  FINDINGS:  There is congruent uptake of radiolabeled leukocytes and   Tc-99m sulfur colloid in the imaged osseous structures. There are no foci   of abnormal labeled leukocyte accumulation to suggest the presence of   osteomyelitis in either foot..    IMPRESSION:   Normal combined Indium-111 labeled leukocyte study and   marrow scan. No evidence of osteomyelitis. No evidence of infected   hardware distal right fibula.    < end of copied text >      < from: Xray Foot AP + Lateral + Oblique, Right (01.14.19 @ 20:15) >  FINDINGS:     Small focus of air is noted at the plantar aspect of the right foot,   adjacent to the tarsometatarsal joint space correlating with site of   ulceration. No cortical erosion or periosteal reaction. Charcot   arthropathy is redemonstrated, most notable at the first through fifth   metatarsophalangeal joint spaces.     Old fracture of the second mid diaphyseal metatarsal. No acute fracture.   No dislocation. Diffuse soft tissue edema, unchanged. Plantar calcaneal   enthesophyte plate and screws are noted at the distal fibula.    IMPRESSION:       1. Lisfranc joints with diabetic neuroosteoarthropathy.  2. Ulceration of right foot without radiographic evidence of advanced   osteomyelitis.    < end of copied text > Patient is a 63y old  Male who presents with a chief complaint of Osteomyelitis (16 Jan 2019 17:35)      HPI:  63 year old male, with past history significant for Type-II DM, HTN, and R-Foot Charcot deformity, Osteomyelitis, Vitamin D deficiency, Anemia, CKD-III, HLD and GERD, presented to the ED, as sent by podiatrist, secondary to concern for osteomyelitis.  Seen and evaluated at bedside; NAD.  Patient relates noticing increasing swelling of the right foot for the past ~ one week, to the extent of not being able to fit into the shoe.  Had erythema of the foot and brownish, foul smelling drainage from the ulcer site.  Complained of pain, inadequately relieved with Tylenol.  Reports mild subjective fever, chills and sweating.  No report of nausea, vomiting, headaches, dizziness, chest pain, shortness of breath, abdominal pain, diarrhea or dysuria.  States having mild constipation.    Patient was recently admitted in Sep 2018 for clinical OM of rt foot plantar ulcer, with (+) PTB.  Pt grew Arthrobacter, actinomyces, coryne and strep.  Pt completed 6 week course of IV zosyn.  Pt states he wore a boot over right foot, ulcer had temporararily closed up, however he noticed wound starting to open up again recently and noted some discharge and malodor.      Vital signs upon ED presentation as follows: BP = 147/66, HR = 87, RR = 18, T = 36.9 C (98.5 F), O2 Sat = 100% on RA.  Prescribed zosyn and vancomycin in the ED. (14 Jan 2019 21:34).    No leukocytosis.  , .  Wound cx (+) pseudomonas, MSSA, GBS.  Indium scan negative for OM.  Pt seen by Podiatry, and planned for tarsal exostectomy.     ID consult called for further abx managment.        REVIEW OF SYSTEMS:    CONSTITUTIONAL: No fever, weight loss, or fatigue  EYES: No eye pain, visual disturbances, or discharge  ENMT:  No sore throat  NECK: No pain or stiffness  RESPIRATORY: No cough, wheezing, chills or hemoptysis; No shortness of breath  CARDIOVASCULAR: No chest pain, palpitations, dizziness, or leg swelling  GASTROINTESTINAL: No abdominal or epigastric pain. No nausea, vomiting, or hematemesis; No diarrhea or constipation. No melena or hematochezia.  GENITOURINARY: No dysuria, frequency, hematuria, or incontinence  NEUROLOGICAL: No headaches, memory loss, loss of strength, numbness, or tremors  SKIN: No itching, burning, rashes, or lesions   LYMPH NODES: No enlarged glands  MUSCULOSKELETAL: No joint pain or swelling; No muscle, back, or extremity pain      PAST MEDICAL & SURGICAL HISTORY:  Vitamin D deficiency  GERD (gastroesophageal reflux disease)  Dyslipidemia  Diabetic foot ulcer  Chronic kidney disease, stage III (moderate)  Renal cyst  Anemia  HTN (hypertension)  DM (diabetes mellitus)  S/P PICC central line placement: ~ L-Basilic vein (09/13/2018)  Ankle fracture: ORIF      Allergies    No Known Allergies    Intolerances        FAMILY HISTORY:  Family history of lung disease (Father): father (asbestosis; worked in [employer])  Family history of stomach cancer (Father): father      SOCIAL HISTORY:    Single  Lives alone  Works with [employer]  History of smoking; ~ 1.5 packs on weekends from late 1970's to early 1990's  No history of alcohol abuse No history of illegal drug use	      MEDICATIONS  (STANDING):  atorvastatin 40 milliGRAM(s) Oral at bedtime  docusate sodium 100 milliGRAM(s) Oral two times a day  heparin  Injectable 5000 Unit(s) SubCutaneous every 12 hours  hydrALAZINE 100 milliGRAM(s) Oral every 8 hours  lactated ringers. 1000 milliLiter(s) (100 mL/Hr) IV Continuous <Continuous>  lactobacillus acidophilus 1 Tablet(s) Oral daily  piperacillin/tazobactam IVPB. 3.375 Gram(s) IV Intermittent every 8 hours  sodium bicarbonate 650 milliGRAM(s) Oral three times a day  vancomycin  IVPB 1000 milliGRAM(s) IV Intermittent every 24 hours    MEDICATIONS  (PRN):  acetaminophen   Tablet .. 650 milliGRAM(s) Oral every 6 hours PRN Mild Pain (1 - 3)  bisacodyl 5 milliGRAM(s) Oral every 48 hours PRN Constipation  oxyCODONE    5 mG/acetaminophen 325 mG 1 Tablet(s) Oral every 6 hours PRN Moderate Pain (4 - 6)      Vital Signs Last 24 Hrs  T(C): 36.6 (17 Jan 2019 05:28), Max: 37.1 (16 Jan 2019 21:24)  T(F): 97.8 (17 Jan 2019 05:28), Max: 98.7 (16 Jan 2019 21:24)  HR: 73 (17 Jan 2019 05:28) (73 - 86)  BP: 159/69 (17 Jan 2019 05:28) (139/74 - 161/82)  BP(mean): --  RR: 18 (17 Jan 2019 05:28) (18 - 18)  SpO2: 96% (17 Jan 2019 05:28) (96% - 98%)    PHYSICAL EXAM:    GENERAL: NAD, well-groomed  HEAD:  Atraumatic, Normocephalic  EYES: EOMI, PERRLA, conjunctiva and sclera clear  ENMT: No tonsillar erythema, exudates, or enlargement; Moist mucous membranes  NECK: Supple, No JVD  CHEST/LUNG: Clear to percussion bilaterally; No rales, rhonchi, wheezing, or rubs  HEART: Regular rate and rhythm; No murmurs, rubs, or gallops  ABDOMEN: Soft, Nontender, Nondistended; Bowel sounds present  EXTREMITIES:  2+ Peripheral Pulses, No clubbing, cyanosis, or edema  LYMPH: No lymphadenopathy noted  SKIN: Rt foot charcot joint, plantar foot ulcer with packing in place.      LABS:  CBC Full  -  ( 17 Jan 2019 05:49 )  WBC Count : 8.05 K/uL  Hemoglobin : 9.4 g/dL  Hematocrit : 30.9 %  Platelet Count - Automated : 428 K/uL  Mean Cell Volume : 90.9 fL  Mean Cell Hemoglobin : 27.6 pg  Mean Cell Hemoglobin Concentration : 30.4 %  Auto Neutrophil # : x  Auto Lymphocyte # : x  Auto Monocyte # : x  Auto Eosinophil # : x  Auto Basophil # : x  Auto Neutrophil % : x  Auto Lymphocyte % : x  Auto Monocyte % : x  Auto Eosinophil % : x  Auto Basophil % : x      01-17    141  |  106  |  46<H>  ----------------------------<  95  4.6   |  21<L>  |  3.50<H>    Ca    8.5      17 Jan 2019 05:38  Phos  3.7     01-17  Mg     1.9     01-17                MICROBIOLOGY:    Culture - Blood (01.15.19 @ 07:45)    Culture - Blood:   NO ORGANISMS ISOLATED  NO ORGANISMS ISOLATED AT 48 HRS.    Specimen Source: BLOOD VENOUS    Culture - Blood (01.15.19 @ 07:45)    Culture - Blood:   NO ORGANISMS ISOLATED  NO ORGANISMS ISOLATED AT 48 HRS.    Specimen Source: BLOOD PERIPHERAL    Culture - Abscess with Gram Stain (01.14.19 @ 18:54)    Specimen Source: OTHER    Gram Stain Result:   GPCPR^Gram Pos Cocci in Pairs  QUANTITY OF BACTERIA SEEN: MANY (4+)  GPCCH^Gram Pos Cocci in Chains  QUANTITY OF BACTERIA SEEN: MANY (4+)  WBC^White Blood Cells  QNTY CELLS IN GRAM STAIN: MANY (4+)    Culture Results:   Insufficient growth, culture re-incubated.    Culture Results:   PSA^Pseudomonas aeruginosa  STAU^Staphylococcus aureus  STRAG^Streptococcus agalactiae Gp B                    RADIOLOGY:      < from: NM Bone Marrow Imaging Multiple Areas (01.16.19 @ 13:57) >  FINDINGS:  There is congruent uptake of radiolabeled leukocytes and   Tc-99m sulfur colloid in the imaged osseous structures. There are no foci   of abnormal labeled leukocyte accumulation to suggest the presence of   osteomyelitis in either foot..    IMPRESSION:   Normal combined Indium-111 labeled leukocyte study and   marrow scan. No evidence of osteomyelitis. No evidence of infected   hardware distal right fibula.    < end of copied text >      < from: Xray Foot AP + Lateral + Oblique, Right (01.14.19 @ 20:15) >  FINDINGS:     Small focus of air is noted at the plantar aspect of the right foot,   adjacent to the tarsometatarsal joint space correlating with site of   ulceration. No cortical erosion or periosteal reaction. Charcot   arthropathy is redemonstrated, most notable at the first through fifth   metatarsophalangeal joint spaces.     Old fracture of the second mid diaphyseal metatarsal. No acute fracture.   No dislocation. Diffuse soft tissue edema, unchanged. Plantar calcaneal   enthesophyte plate and screws are noted at the distal fibula.    IMPRESSION:       1. Lisfranc joints with diabetic neuroosteoarthropathy.  2. Ulceration of right foot without radiographic evidence of advanced   osteomyelitis.    < end of copied text >

## 2019-01-17 NOTE — CONSULT NOTE ADULT - ASSESSMENT
1) Right LE wound and swelling - Podiatry  onboard, No evidence of Obstructive PAD , 2d echo with normal BiV function started on cefepime and vanco, f/u WBC scan    2) Right foot wound/OM - podiatry  on case f/u WBC scan    3) HTN - cont hydralazine    4) ABEL on CKD:  renal onboard,
62yo M w/ right foot charcot deformity cellulitis underlying likely OM  ·	Pt seen evaluated  ·	RF XR ordered  ·	ESR CRP ordered  ·	WBC scan ordered to assess involvement of OM differentiated from Charcot  ·	Start IV abx rec Cefepime Vanco  ·	Admit to medicine for OM workup & IV abx  ·	Culture taken  ·	Packed w/ iodoform, dsd applied  ·	d/w attending
63 year old male, with past history significant for Type-II DM, HTN, and R-Foot Charcot deformity, Osteomyelitis, Vitamin D deficiency, Anemia, CKD-III, HLD and GERD, presented to the ED, as sent by podiatrist, secondary to concern for osteomyelitis.
63 year old male, with past history significant for Type-II DM, HTN, and R-Foot Charcot deformity, Osteomyelitis, Vitamin D deficiency, Anemia, CKD-III, HLD and GERD, presented to the ED, as sent by podiatrist, secondary to concern for osteomyelitis.  Seen and evaluated at bedside; NAD.  Patient relates noticing increasing swelling of the right foot for the past ~ one week, to the extent of not being able to fit into the shoe.  Had erythema of the foot and brownish, foul smelling drainage from the ulcer site.  Complained of pain, inadequately relieved with Tylenol.  Reports mild subjective fever, chills and sweating.  No report of nausea, vomiting, headaches, dizziness, chest pain, shortness of breath, abdominal pain, diarrhea or dysuria.  States having mild constipation.    Patient was recently admitted in Sep 2018 for clinical OM of rt foot plantar ulcer, with (+) PTB.  Pt grew Arthrobacter, actinomyces, coryne and strep.  Pt completed 6 week course of IV zosyn.  Pt states he wore a boot over right foot, ulcer had temporararily closed up, however he noticed wound starting to open up again recently and noted some discharge and malodor.      Vital signs upon ED presentation as follows: BP = 147/66, HR = 87, RR = 18, T = 36.9 C (98.5 F), O2 Sat = 100% on RA.  Prescribed zosyn and vancomycin in the ED. (14 Jan 2019 21:34).    No leukocytosis.  , .  Wound cx (+) pseudomonas, MSSA, GBS.  Indium scan negative for OM.  Pt seen by Podiatry, and planned for tarsal exostectomy.     ID consult called for further abx managment.        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  Now with recurrent infection.  Planned for OR bone resection.  Cont vanco/zosyn for now until all cultures finalized.    - f/u intra-op cultures and bone pathology    - f/u blood cultures.    Will follow,    Salima Spivey  [phone]

## 2019-01-17 NOTE — PROGRESS NOTE ADULT - ASSESSMENT
1) Perioperative risk assessment: Echo with normal LV function, Mets 4 , Will get stress test.     2) Right foot wound/OM - podiatry  on case f/u WBC scan    3) HTN - cont hydralazine    4) ABEL on CKD:  renal onboard,

## 2019-01-17 NOTE — PROGRESS NOTE ADULT - SUBJECTIVE AND OBJECTIVE BOX
[facility]   MD DOMO WALLACE MD ANGELA WONG, PA    TEL:  OFFICE: [phone]  DR LEE CELL: [phone]  DR. GRULLON CELL: [phone]  MELANIA CORONEL CELL: [phone]        Patient is a 63y old  Male who presents with a chief complaint of Osteomyelitis (17 Jan 2019 11:05)      Patient seen and examined at bedside. No chest pain/sob    VITALS:  T(F): 97.8 (01-17-19 @ 05:28), Max: 98.7 (01-16-19 @ 21:24)  HR: 73 (01-17-19 @ 05:28)  BP: 159/69 (01-17-19 @ 05:28)  RR: 18 (01-17-19 @ 05:28)  SpO2: 96% (01-17-19 @ 05:28)  Wt(kg): --        PHYSICAL EXAM:  Constitutional: NAD  Neck: No JVD  Respiratory: CTAB, no wheezes, rales or rhonchi  Cardiovascular: S1, S2, RRR  Gastrointestinal: BS+, soft, NT/ND  Extremities: right foot + edema, + dressing d/c/i    Hospital Medications:   MEDICATIONS  (STANDING):  atorvastatin 40 milliGRAM(s) Oral at bedtime  docusate sodium 100 milliGRAM(s) Oral two times a day  heparin  Injectable 5000 Unit(s) SubCutaneous every 12 hours  hydrALAZINE 100 milliGRAM(s) Oral every 8 hours  lactated ringers. 1000 milliLiter(s) (100 mL/Hr) IV Continuous <Continuous>  lactobacillus acidophilus 1 Tablet(s) Oral daily  piperacillin/tazobactam IVPB. 3.375 Gram(s) IV Intermittent every 8 hours  sodium bicarbonate 650 milliGRAM(s) Oral three times a day  vancomycin  IVPB 1000 milliGRAM(s) IV Intermittent every 24 hours      LABS:  01-17    141  |  106  |  46<H>  ----------------------------<  95  4.6   |  21<L>  |  3.50<H>    Ca    8.5      17 Jan 2019 05:38  Phos  3.7     01-17  Mg     1.9     01-17      Creatinine Trend: 3.50 <--, 3.54 <--, 3.53 <--    Phosphorus Level, Serum: 3.7 mg/dL (01-17 @ 05:38)  Ferritin, Serum: 291.5 ng/mL (01-17 @ 05:38)  Iron Total, Serum: 25 ug/dL (01-17 @ 05:38)                              9.4    8.05  )-----------( 428      ( 17 Jan 2019 05:49 )             30.9     Urine Studies:      Iron 25, TIBC 145, %sat --      [01-17-19 @ 05:38]  Ferritin 291.5      [01-17-19 @ 05:38]  PTH 42.84 (Ca --)      [09-11-18 @ 05:45]   --  Vitamin D (25OH) 11.8      [09-11-18 @ 05:45]  HbA1c 6.0      [01-15-19 @ 05:45]  TSH 1.74      [01-15-19 @ 05:45]        RADIOLOGY & ADDITIONAL STUDIES:

## 2019-01-18 LAB
ANION GAP SERPL CALC-SCNC: 12 MMO/L — SIGNIFICANT CHANGE UP (ref 7–14)
BUN SERPL-MCNC: 42 MG/DL — HIGH (ref 7–23)
CALCIUM SERPL-MCNC: 8.5 MG/DL — SIGNIFICANT CHANGE UP (ref 8.4–10.5)
CHLORIDE SERPL-SCNC: 106 MMOL/L — SIGNIFICANT CHANGE UP (ref 98–107)
CO2 SERPL-SCNC: 21 MMOL/L — LOW (ref 22–31)
CREAT SERPL-MCNC: 3.19 MG/DL — HIGH (ref 0.5–1.3)
GLUCOSE SERPL-MCNC: 101 MG/DL — HIGH (ref 70–99)
HCT VFR BLD CALC: 29 % — LOW (ref 39–50)
HGB BLD-MCNC: 9 G/DL — LOW (ref 13–17)
MCHC RBC-ENTMCNC: 27.9 PG — SIGNIFICANT CHANGE UP (ref 27–34)
MCHC RBC-ENTMCNC: 31 % — LOW (ref 32–36)
MCV RBC AUTO: 89.8 FL — SIGNIFICANT CHANGE UP (ref 80–100)
NRBC # FLD: 0 K/UL — LOW (ref 25–125)
PLATELET # BLD AUTO: 372 K/UL — SIGNIFICANT CHANGE UP (ref 150–400)
PMV BLD: 10.2 FL — SIGNIFICANT CHANGE UP (ref 7–13)
POTASSIUM SERPL-MCNC: 4 MMOL/L — SIGNIFICANT CHANGE UP (ref 3.5–5.3)
POTASSIUM SERPL-SCNC: 4 MMOL/L — SIGNIFICANT CHANGE UP (ref 3.5–5.3)
RBC # BLD: 3.23 M/UL — LOW (ref 4.2–5.8)
RBC # FLD: 13.5 % — SIGNIFICANT CHANGE UP (ref 10.3–14.5)
SODIUM SERPL-SCNC: 139 MMOL/L — SIGNIFICANT CHANGE UP (ref 135–145)
WBC # BLD: 6.61 K/UL — SIGNIFICANT CHANGE UP (ref 3.8–10.5)
WBC # FLD AUTO: 6.61 K/UL — SIGNIFICANT CHANGE UP (ref 3.8–10.5)

## 2019-01-18 PROCEDURE — 78452 HT MUSCLE IMAGE SPECT MULT: CPT | Mod: 26

## 2019-01-18 PROCEDURE — 93016 CV STRESS TEST SUPVJ ONLY: CPT | Mod: GC

## 2019-01-18 PROCEDURE — 93018 CV STRESS TEST I&R ONLY: CPT | Mod: GC

## 2019-01-18 RX ORDER — FERROUS SULFATE 325(65) MG
325 TABLET ORAL DAILY
Qty: 0 | Refills: 0 | Status: DISCONTINUED | OUTPATIENT
Start: 2019-01-18 | End: 2019-01-24

## 2019-01-18 RX ADMIN — PIPERACILLIN AND TAZOBACTAM 25 GRAM(S): 4; .5 INJECTION, POWDER, LYOPHILIZED, FOR SOLUTION INTRAVENOUS at 01:12

## 2019-01-18 RX ADMIN — ATORVASTATIN CALCIUM 40 MILLIGRAM(S): 80 TABLET, FILM COATED ORAL at 21:10

## 2019-01-18 RX ADMIN — Medication 650 MILLIGRAM(S): at 14:33

## 2019-01-18 RX ADMIN — Medication 100 MILLIGRAM(S): at 01:12

## 2019-01-18 RX ADMIN — PIPERACILLIN AND TAZOBACTAM 25 GRAM(S): 4; .5 INJECTION, POWDER, LYOPHILIZED, FOR SOLUTION INTRAVENOUS at 11:47

## 2019-01-18 RX ADMIN — HEPARIN SODIUM 5000 UNIT(S): 5000 INJECTION INTRAVENOUS; SUBCUTANEOUS at 17:24

## 2019-01-18 RX ADMIN — Medication 650 MILLIGRAM(S): at 22:10

## 2019-01-18 RX ADMIN — Medication 650 MILLIGRAM(S): at 21:14

## 2019-01-18 RX ADMIN — Medication 325 MILLIGRAM(S): at 14:33

## 2019-01-18 RX ADMIN — Medication 100 MILLIGRAM(S): at 11:48

## 2019-01-18 RX ADMIN — Medication 650 MILLIGRAM(S): at 21:10

## 2019-01-18 RX ADMIN — Medication 1 TABLET(S): at 11:48

## 2019-01-18 RX ADMIN — Medication 650 MILLIGRAM(S): at 05:27

## 2019-01-18 RX ADMIN — HEPARIN SODIUM 5000 UNIT(S): 5000 INJECTION INTRAVENOUS; SUBCUTANEOUS at 05:27

## 2019-01-18 RX ADMIN — Medication 100 MILLIGRAM(S): at 19:38

## 2019-01-18 RX ADMIN — Medication 100 MILLIGRAM(S): at 17:24

## 2019-01-18 RX ADMIN — PIPERACILLIN AND TAZOBACTAM 25 GRAM(S): 4; .5 INJECTION, POWDER, LYOPHILIZED, FOR SOLUTION INTRAVENOUS at 19:38

## 2019-01-18 RX ADMIN — Medication 100 MILLIGRAM(S): at 05:27

## 2019-01-18 NOTE — PROGRESS NOTE ADULT - SUBJECTIVE AND OBJECTIVE BOX
[facility]   MD DOMO WALLACE MD ANGELA WONG, PA    TEL:  OFFICE: [phone]  DR LEE CELL: [phone]  DR. GRULLON CELL: [phone]  MELANIA CORONEL CELL: [phone]        Patient is a 63y old  Male who presents with a chief complaint of Osteomyelitis (18 Jan 2019 14:35)      Patient seen and examined at bedside. No chest pain/sob    VITALS:  T(F): 97.8 (01-18-19 @ 11:44), Max: 98.3 (01-18-19 @ 05:24)  HR: 84 (01-18-19 @ 11:44)  BP: 144/73 (01-18-19 @ 11:44)  RR: 18 (01-18-19 @ 11:44)  SpO2: 98% (01-18-19 @ 11:44)  Wt(kg): --        PHYSICAL EXAM:  Constitutional: NAD  Neck: No JVD  Respiratory: CTAB, no wheezes, rales or rhonchi  Cardiovascular: S1, S2, RRR  Gastrointestinal: BS+, soft, NT/ND  Extremities: right foot swelling + dressing d/c/i    Hospital Medications:   MEDICATIONS  (STANDING):  atorvastatin 40 milliGRAM(s) Oral at bedtime  docusate sodium 100 milliGRAM(s) Oral two times a day  ferrous    sulfate 325 milliGRAM(s) Oral daily  heparin  Injectable 5000 Unit(s) SubCutaneous every 12 hours  hydrALAZINE 100 milliGRAM(s) Oral every 8 hours  lactated ringers. 1000 milliLiter(s) (100 mL/Hr) IV Continuous <Continuous>  lactobacillus acidophilus 1 Tablet(s) Oral daily  piperacillin/tazobactam IVPB. 3.375 Gram(s) IV Intermittent every 8 hours  sodium bicarbonate 650 milliGRAM(s) Oral three times a day  vancomycin  IVPB 1000 milliGRAM(s) IV Intermittent every 24 hours      LABS:  01-18    139  |  106  |  42<H>  ----------------------------<  101<H>  4.0   |  21<L>  |  3.19<H>    Ca    8.5      18 Jan 2019 05:23  Phos  3.7     01-17  Mg     1.9     01-17      Creatinine Trend: 3.19 <--, 3.50 <--, 3.54 <--, 3.53 <--                                9.0    6.61  )-----------( 372      ( 18 Jan 2019 05:23 )             29.0     Urine Studies:      Iron 25, TIBC 145, %sat --      [01-17-19 @ 05:38]  Ferritin 291.5      [01-17-19 @ 05:38]  PTH 42.84 (Ca --)      [09-11-18 @ 05:45]   --  Vitamin D (25OH) 11.8      [09-11-18 @ 05:45]  HbA1c 6.0      [01-15-19 @ 05:45]  TSH 1.74      [01-15-19 @ 05:45]        RADIOLOGY & ADDITIONAL STUDIES:

## 2019-01-18 NOTE — PHYSICAL THERAPY INITIAL EVALUATION ADULT - PERTINENT HX OF CURRENT PROBLEM, REHAB EVAL
patient is 63 year old male admitted with history of right foot charcot deformity, presents with osteomyelitis.

## 2019-01-18 NOTE — PROGRESS NOTE ADULT - SUBJECTIVE AND OBJECTIVE BOX
SUBJECTIVE / OVERNIGHT EVENTS: pt denies chest pain, shortness of breath     MEDICATIONS  (STANDING):  atorvastatin 40 milliGRAM(s) Oral at bedtime  docusate sodium 100 milliGRAM(s) Oral two times a day  ferrous    sulfate 325 milliGRAM(s) Oral daily  heparin  Injectable 5000 Unit(s) SubCutaneous every 12 hours  hydrALAZINE 100 milliGRAM(s) Oral every 8 hours  lactated ringers. 1000 milliLiter(s) (100 mL/Hr) IV Continuous <Continuous>  lactobacillus acidophilus 1 Tablet(s) Oral daily  piperacillin/tazobactam IVPB. 3.375 Gram(s) IV Intermittent every 8 hours  sodium bicarbonate 650 milliGRAM(s) Oral three times a day    MEDICATIONS  (PRN):  acetaminophen   Tablet .. 650 milliGRAM(s) Oral every 6 hours PRN Mild Pain (1 - 3)  bisacodyl 5 milliGRAM(s) Oral every 48 hours PRN Constipation  oxyCODONE    5 mG/acetaminophen 325 mG 1 Tablet(s) Oral every 6 hours PRN Moderate Pain (4 - 6)    Vital Signs Last 24 Hrs  T(C): 36.8 (18 Jan 2019 21:40), Max: 36.8 (18 Jan 2019 05:24)  T(F): 98.3 (18 Jan 2019 21:40), Max: 98.3 (18 Jan 2019 05:24)  HR: 82 (18 Jan 2019 21:40) (74 - 84)  BP: 139/69 (18 Jan 2019 21:40) (139/69 - 155/70)  BP(mean): --  RR: 17 (18 Jan 2019 21:40) (17 - 18)  SpO2: 96% (18 Jan 2019 21:40) (96% - 98%)    Constitutional: No fever, fatigue  Skin: No rash.  Eyes: No recent vision problems or eye pain.  ENT: No congestion, ear pain, or sore throat.  Cardiovascular: No chest pain or palpation.  Respiratory: No cough, shortness of breath, congestion, or wheezing.  Gastrointestinal: No abdominal pain, nausea, vomiting, or diarrhea.  Genitourinary: No dysuria.  Musculoskeletal: No joint swelling.  Neurologic: No headache.    PHYSICAL EXAM:  GENERAL: NAD  EYES: EOMI, PERRLA  NECK: Supple, No JVD  CHEST/LUNG: dec breath sounds at bases   HEART:  S1 , S2 +  ABDOMEN: sof,t bs+  EXTREMITIES:  rt foot wound dressing +  NEUROLOGY:alert awake       LABS:  01-18    139  |  106  |  42<H>  ----------------------------<  101<H>  4.0   |  21<L>  |  3.19<H>    Ca    8.5      18 Jan 2019 05:23  Phos  3.7     01-17  Mg     1.9     01-17      Creatinine Trend: 3.19 <--, 3.50 <--, 3.54 <--, 3.53 <--                        9.0    6.61  )-----------( 372      ( 18 Jan 2019 05:23 )             29.0     Urine Studies:                      RADIOLOGY & ADDITIONAL TESTS:    Imaging Personally Reviewed:    Consultant(s) Notes Reviewed:      Care Discussed with Consultants/Other Providers:

## 2019-01-18 NOTE — PROGRESS NOTE ADULT - ASSESSMENT
63 year old male, with past history significant for Type-II DM, HTN, and R-Foot Charcot deformity, Osteomyelitis, Vitamin D deficiency, Anemia, CKD-III, HLD and GERD, presented to the ED, as sent by podiatrist, secondary to concern for osteomyelitis.  Seen and evaluated at bedside; NAD.  Patient relates noticing increasing swelling of the right foot for the past ~ one week, to the extent of not being able to fit into the shoe.  Had erythema of the foot and brownish, foul smelling drainage from the ulcer site.  Complained of pain, inadequately relieved with Tylenol.  Reports mild subjective fever, chills and sweating.  No report of nausea, vomiting, headaches, dizziness, chest pain, shortness of breath, abdominal pain, diarrhea or dysuria.  States having mild constipation.    Patient was recently admitted in Sep 2018 for clinical OM of rt foot plantar ulcer, with (+) PTB.  Pt grew Arthrobacter, actinomyces, coryne and strep.  Pt completed 6 week course of IV zosyn.  Pt states he wore a boot over right foot, ulcer had temporararily closed up, however he noticed wound starting to open up again recently and noted some discharge and malodor.      Vital signs upon ED presentation as follows: BP = 147/66, HR = 87, RR = 18, T = 36.9 C (98.5 F), O2 Sat = 100% on RA.  Prescribed zosyn and vancomycin in the ED. (14 Jan 2019 21:34).    No leukocytosis.  , .  Wound cx (+) pseudomonas, MSSA, GBS.  Indium scan negative for OM.  Pt seen by Podiatry, and planned for tarsal exostectomy.     ID consult called for further abx managment.        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  Now with recurrent infection.  Planned for OR bone resection (tarsal exostectomy).  Cont zosyn - wound cx with GBS, MSSA, pseudomonas.  Will d/c vanco    - f/u intra-op cultures and bone pathology    - f/u blood cultures - NGTD.    Will follow,    Salima Spivey  [phone]        (Dr. Rocío Paredes covering between 1/19- 1/21)

## 2019-01-18 NOTE — PROGRESS NOTE ADULT - SUBJECTIVE AND OBJECTIVE BOX
Keaton Duvall MD  Interventional Cardiology  [place] Office : [phone] [place] [id number]  Tel:   [place] Office : [address] [facility] [place] [address]  Tel: [phone]  Cell : [phone]    Subjective : Pt lying in bed comfortable, not in distress, denies any chest pain or SOB  	  MEDICATIONS:  heparin  Injectable 5000 Unit(s) SubCutaneous every 12 hours  hydrALAZINE 100 milliGRAM(s) Oral every 8 hours    piperacillin/tazobactam IVPB. 3.375 Gram(s) IV Intermittent every 8 hours  vancomycin  IVPB 1000 milliGRAM(s) IV Intermittent every 24 hours      acetaminophen   Tablet .. 650 milliGRAM(s) Oral every 6 hours PRN  oxyCODONE    5 mG/acetaminophen 325 mG 1 Tablet(s) Oral every 6 hours PRN    bisacodyl 5 milliGRAM(s) Oral every 48 hours PRN  docusate sodium 100 milliGRAM(s) Oral two times a day    atorvastatin 40 milliGRAM(s) Oral at bedtime    lactated ringers. 1000 milliLiter(s) IV Continuous <Continuous>  sodium bicarbonate 650 milliGRAM(s) Oral three times a day      PHYSICAL EXAM:  T(C): 36.6 (01-18-19 @ 11:44), Max: 36.8 (01-18-19 @ 05:24)  HR: 84 (01-18-19 @ 11:44) (74 - 86)  BP: 144/73 (01-18-19 @ 11:44) (144/73 - 155/73)  RR: 18 (01-18-19 @ 11:44) (18 - 18)  SpO2: 98% (01-18-19 @ 11:44) (97% - 98%)  Wt(kg): --  I&O's Summary      HEENT:   Normal oral mucosa, PERRL, EOMI	  Cardiovascular: Normal S1 S2, No JVD, No murmurs, No edema  Respiratory: Lungs clear to auscultation	  Gastrointestinal:  Soft, Non-tender, + BS	  Extremities: right foot wound covered                                    9.0    6.61  )-----------( 372      ( 18 Jan 2019 05:23 )             29.0     01-18    139  |  106  |  42<H>  ----------------------------<  101<H>  4.0   |  21<L>  |  3.19<H>    Ca    8.5      18 Jan 2019 05:23  Phos  3.7     01-17  Mg     1.9     01-17      proBNP:   Lipid Profile:   HgA1c:   TSH:

## 2019-01-18 NOTE — PROGRESS NOTE ADULT - ASSESSMENT
64 y/o M w/ right foot midfoot plantar ulcer 2/2 charcot foot  ·	Pt seen and evaluated  ·	Plantar midfoot wound to bone; appears stable	  ·	Plan for OR for tarsal exostectomy on Monday 1/21  ·	Please document medical/cardiac optimization for surgery w/ IV sedation and local anesthesia  ·	Continue IV abx  ·	discussed w/ attending

## 2019-01-18 NOTE — PROGRESS NOTE ADULT - SUBJECTIVE AND OBJECTIVE BOX
Infectious Diseases progress note:    Subjective:  NAD, comfortable.  No new somatic complaints, afebrile.      ROS:  CONSTITUTIONAL:  No fever, chills, rigors  CARDIOVASCULAR:  No chest pain or palpitations  RESPIRATORY:   No SOB, cough, dyspnea on exertion.  No wheezing  GASTROINTESTINAL:  No abd pain, N/V, diarrhea/constipation  EXTREMITIES:  No swelling or joint pain  GENITOURINARY:  No burning on urination, increased frequency or urgency.  No flank pain  NEUROLOGIC:  No HA, visual disturbances  SKIN: No rashes    Allergies    No Known Allergies    Intolerances        ANTIBIOTICS/RELEVANT:  antimicrobials  piperacillin/tazobactam IVPB. 3.375 Gram(s) IV Intermittent every 8 hours  vancomycin  IVPB 1000 milliGRAM(s) IV Intermittent every 24 hours    immunologic:    OTHER:  acetaminophen   Tablet .. 650 milliGRAM(s) Oral every 6 hours PRN  atorvastatin 40 milliGRAM(s) Oral at bedtime  bisacodyl 5 milliGRAM(s) Oral every 48 hours PRN  docusate sodium 100 milliGRAM(s) Oral two times a day  ferrous    sulfate 325 milliGRAM(s) Oral daily  heparin  Injectable 5000 Unit(s) SubCutaneous every 12 hours  hydrALAZINE 100 milliGRAM(s) Oral every 8 hours  lactated ringers. 1000 milliLiter(s) IV Continuous <Continuous>  lactobacillus acidophilus 1 Tablet(s) Oral daily  oxyCODONE    5 mG/acetaminophen 325 mG 1 Tablet(s) Oral every 6 hours PRN  sodium bicarbonate 650 milliGRAM(s) Oral three times a day      Objective:  Vital Signs Last 24 Hrs  T(C): 36.6 (18 Jan 2019 11:44), Max: 36.8 (18 Jan 2019 05:24)  T(F): 97.8 (18 Jan 2019 11:44), Max: 98.3 (18 Jan 2019 05:24)  HR: 84 (18 Jan 2019 11:44) (74 - 84)  BP: 144/73 (18 Jan 2019 11:44) (144/73 - 155/73)  BP(mean): --  RR: 18 (18 Jan 2019 11:44) (18 - 18)  SpO2: 98% (18 Jan 2019 11:44) (97% - 98%)    PHYSICAL EXAM:  Constitutional:NAD  Eyes:SPIKE, EOMI  Ear/Nose/Throat: no thrush, mucositis.  Moist mucous membranes	  Neck:no JVD, no lymphadenopathy, supple  Respiratory: CTA lobito  Cardiovascular: S1S2 RRR, no murmurs  Gastrointestinal:soft, nontender,  nondistended (+) BS  Extremities: rt foot plantar ulceration with packing in place, overlying dsg c/d/i, localized swelling present.   Skin:  no rashes        LABS:                        9.0    6.61  )-----------( 372      ( 18 Jan 2019 05:23 )             29.0     01-18    139  |  106  |  42<H>  ----------------------------<  101<H>  4.0   |  21<L>  |  3.19<H>    Ca    8.5      18 Jan 2019 05:23  Phos  3.7     01-17  Mg     1.9     01-17                  Vancomycin Level, Trough: 11.0 ug/mL (01-16 @ 21:03)              MICROBIOLOGY:    Culture - Blood (01.15.19 @ 07:45)    Culture - Blood:   NO ORGANISMS ISOLATED  NO ORGANISMS ISOLATED AT 72 HRS.    Specimen Source: BLOOD VENOUS    Culture - Blood (01.15.19 @ 07:45)    Culture - Blood:   NO ORGANISMS ISOLATED  NO ORGANISMS ISOLATED AT 72 HRS.    Specimen Source: BLOOD PERIPHERAL    Culture - Abscess with Gram Stain (01.14.19 @ 18:54)    -  Gentamicin: S 4 ULISSES    -  Gentamicin: S <=1 ULISSES    -  Imipenem: S <=1 ULISSES    -  Amikacin: S <=8 ULISSES    -  Aztreonam: S <=4 ULISSES    -  Cefazolin: S <=4 ULISSES    -  Cefepime: S <=2 ULISSES    -  Ceftazidime: S 4 ULISSES    -  Ciprofloxacin: S <=0.5 ULISSES    -  Ciprofloxacin: S <=1 ULISSES    -  Clindamycin: S 0.5 ULISSES    -  Meropenem: S <=1 ULISSES    -  Moxifloxacin(Aerobic): S <=0.5 ULISSES    -  Oxacillin: S <=0.25 ULISSES    -  Piperacillin/Tazobactam: S    -  Rifampin: S <=1 ULISSES    -  Tetra/Doxy: S <=1 ULISSES    -  Levofloxacin: S <=1 ULISSES    -  Levofloxacin: S <=0.5 ULISSES    -  Tobramycin: S <=2 ULISSES    -  Trimethoprim/Sulfamethoxazole: S <=0.5/9.5 ULISSES    -  Vancomycin: S 1 ULISSES    -  Erythromycin: S    Specimen Source: OTHER    Gram Stain Result:   GPCPR Gram Pos Cocci in Pairs  QUANTITY OF BACTERIA SEEN: MANY (4+)  GPCCH^Gram Pos Cocci in Chains  QUANTITY OF BACTERIA SEEN: MANY (4+)  WBC^White Blood Cells  QNTY CELLS IN GRAM STAIN: MANY (4+)    Culture Results:   PSA^Pseudomonas aeruginosa  STAU^Staphylococcus aureus  STRAG^Streptococcus agalactiae Gp B    Culture Results:   Sensitivities not routinely performed.                *******************************                * This is an appended result. *                *******************************  A prior result that was reported as final has been changed.    Organism Identification: Pseudomonas aeruginosa  Staphylococcus aureus  Streptococcus agalactiae Gp B    Organism: Staphylococcus aureus    Organism: Streptococcus agalactiae Gp B    Organism: Pseudomonas aeruginosa    Method Type: NEGATIVE ULISSES 43    Method Type: POSITIVE ULISSES 29          RADIOLOGY & ADDITIONAL STUDIES:

## 2019-01-18 NOTE — PROGRESS NOTE ADULT - ASSESSMENT
1) Perioperative risk assessment: Echo with normal LV function, Mets 4 , stress done , pending results     2) Right foot wound/OM - podiatry  on case f/u WBC scan    3) HTN - cont hydralazine    4) ABEL on CKD:  renal onboard,

## 2019-01-18 NOTE — PROGRESS NOTE ADULT - PROBLEM SELECTOR PLAN 1
Pt with CKD  Scr been stable 2.8-2.9 last admission.   now scr stable ~3.5 likely natural progression of disease   CKD likely sec to DM/HTN  monitor bmp.  avoid nephrotoxic agents.

## 2019-01-19 LAB
ANION GAP SERPL CALC-SCNC: 11 MMO/L — SIGNIFICANT CHANGE UP (ref 7–14)
BUN SERPL-MCNC: 39 MG/DL — HIGH (ref 7–23)
CALCIUM SERPL-MCNC: 8.9 MG/DL — SIGNIFICANT CHANGE UP (ref 8.4–10.5)
CHLORIDE SERPL-SCNC: 108 MMOL/L — HIGH (ref 98–107)
CO2 SERPL-SCNC: 22 MMOL/L — SIGNIFICANT CHANGE UP (ref 22–31)
CREAT SERPL-MCNC: 3.24 MG/DL — HIGH (ref 0.5–1.3)
GLUCOSE SERPL-MCNC: 99 MG/DL — SIGNIFICANT CHANGE UP (ref 70–99)
HCT VFR BLD CALC: 30.7 % — LOW (ref 39–50)
HGB BLD-MCNC: 9.4 G/DL — LOW (ref 13–17)
MCHC RBC-ENTMCNC: 27.4 PG — SIGNIFICANT CHANGE UP (ref 27–34)
MCHC RBC-ENTMCNC: 30.6 % — LOW (ref 32–36)
MCV RBC AUTO: 89.5 FL — SIGNIFICANT CHANGE UP (ref 80–100)
NRBC # FLD: 0 K/UL — LOW (ref 25–125)
PLATELET # BLD AUTO: 400 K/UL — SIGNIFICANT CHANGE UP (ref 150–400)
PMV BLD: 9.8 FL — SIGNIFICANT CHANGE UP (ref 7–13)
POTASSIUM SERPL-MCNC: 4.2 MMOL/L — SIGNIFICANT CHANGE UP (ref 3.5–5.3)
POTASSIUM SERPL-SCNC: 4.2 MMOL/L — SIGNIFICANT CHANGE UP (ref 3.5–5.3)
RBC # BLD: 3.43 M/UL — LOW (ref 4.2–5.8)
RBC # FLD: 13.4 % — SIGNIFICANT CHANGE UP (ref 10.3–14.5)
SODIUM SERPL-SCNC: 141 MMOL/L — SIGNIFICANT CHANGE UP (ref 135–145)
WBC # BLD: 6.69 K/UL — SIGNIFICANT CHANGE UP (ref 3.8–10.5)
WBC # FLD AUTO: 6.69 K/UL — SIGNIFICANT CHANGE UP (ref 3.8–10.5)

## 2019-01-19 RX ORDER — METOPROLOL TARTRATE 50 MG
25 TABLET ORAL DAILY
Qty: 0 | Refills: 0 | Status: DISCONTINUED | OUTPATIENT
Start: 2019-01-19 | End: 2019-01-24

## 2019-01-19 RX ADMIN — Medication 650 MILLIGRAM(S): at 21:01

## 2019-01-19 RX ADMIN — Medication 100 MILLIGRAM(S): at 21:00

## 2019-01-19 RX ADMIN — Medication 100 MILLIGRAM(S): at 05:06

## 2019-01-19 RX ADMIN — Medication 100 MILLIGRAM(S): at 17:09

## 2019-01-19 RX ADMIN — HEPARIN SODIUM 5000 UNIT(S): 5000 INJECTION INTRAVENOUS; SUBCUTANEOUS at 17:13

## 2019-01-19 RX ADMIN — Medication 650 MILLIGRAM(S): at 21:40

## 2019-01-19 RX ADMIN — HEPARIN SODIUM 5000 UNIT(S): 5000 INJECTION INTRAVENOUS; SUBCUTANEOUS at 05:07

## 2019-01-19 RX ADMIN — ATORVASTATIN CALCIUM 40 MILLIGRAM(S): 80 TABLET, FILM COATED ORAL at 21:01

## 2019-01-19 RX ADMIN — Medication 325 MILLIGRAM(S): at 12:34

## 2019-01-19 RX ADMIN — PIPERACILLIN AND TAZOBACTAM 25 GRAM(S): 4; .5 INJECTION, POWDER, LYOPHILIZED, FOR SOLUTION INTRAVENOUS at 12:32

## 2019-01-19 RX ADMIN — Medication 650 MILLIGRAM(S): at 12:35

## 2019-01-19 RX ADMIN — Medication 650 MILLIGRAM(S): at 21:10

## 2019-01-19 RX ADMIN — Medication 100 MILLIGRAM(S): at 12:33

## 2019-01-19 RX ADMIN — Medication 1 TABLET(S): at 12:34

## 2019-01-19 RX ADMIN — PIPERACILLIN AND TAZOBACTAM 25 GRAM(S): 4; .5 INJECTION, POWDER, LYOPHILIZED, FOR SOLUTION INTRAVENOUS at 21:00

## 2019-01-19 RX ADMIN — PIPERACILLIN AND TAZOBACTAM 25 GRAM(S): 4; .5 INJECTION, POWDER, LYOPHILIZED, FOR SOLUTION INTRAVENOUS at 05:05

## 2019-01-19 RX ADMIN — Medication 650 MILLIGRAM(S): at 05:05

## 2019-01-19 RX ADMIN — Medication 25 MILLIGRAM(S): at 17:09

## 2019-01-19 NOTE — PROGRESS NOTE ADULT - ASSESSMENT
63 year old male, with past history significant for Type-II DM, HTN, and R-Foot Charcot deformity, Osteomyelitis, Vitamin D deficiency, Anemia, CKD-III, HLD and GERD, presented to the ED, as sent by podiatrist, secondary to concern for osteomyelitis.  Seen and evaluated at bedside; NAD.  Patient relates noticing increasing swelling of the right foot for the past ~ one week, to the extent of not being able to fit into the shoe.  Had erythema of the foot and brownish, foul smelling drainage from the ulcer site.  Complained of pain, inadequately relieved with Tylenol.  Reports mild subjective fever, chills and sweating.  No report of nausea, vomiting, headaches, dizziness, chest pain, shortness of breath, abdominal pain, diarrhea or dysuria.  States having mild constipation.Vital signs upon ED presentation as follows: BP = 147/66, HR = 87, RR = 18, T = 36.9 C (98.5 F), O2 Sat = 100% on RA.  Diagnosed with Osteomyelitis.  Prescribed zosyn and vancomycin in the ED. (14 Jan 2019 21:34) 63 year old male, with past history significant for Type-II DM, HTN, and R-Foot Charcot deformity, Osteomyelitis, Vitamin D deficiency, Anemia, CKD-III, HLD and GERD, presented to the ED, as sent by podiatrist, secondary to concern for osteomyelitis.  Seen and evaluated at bedside; NAD.  Patient relates noticing increasing swelling of the right foot for the past ~ one week, to the extent of not being able to fit into the shoe.  Had erythema of the foot and brownish, foul smelling drainage from the ulcer site.  Complained of pain, inadequately relieved with Tylenol.  Reports mild subjective fever, chills and sweating.  No report of nausea, vomiting, headaches, dizziness, chest pain, shortness of breath, abdominal pain, diarrhea or dysuria.  States having mild constipation.Vital signs upon ED presentation as follows: BP = 147/66, HR = 87, RR = 18, T = 36.9 C (98.5 F), O2 Sat = 100% on RA.   Prescribed zosyn and vancomycin in the ED. Patient was recently admitted in Sep 2018 for clinical OM of rt foot plantar ulcer, with (+) PTB.  Pt grew Arthrobacter, actinomyces, coryne and strep.  Pt completed 6 week course of IV zosyn.  Ulcer partly closed but  noticed wound starting to open up again recently along with the discharge .Being Afebrile with No leukocytosis.  , .  Wound cx (+) pseudomonas, MSSA, GBS.  Indium scan negative for OM.  Pt seen by Podiatry who  planned to take the pt  for tarsal exostectomy.     suggest to  provide supportive care  ensure glycemic control  monitor renal function  follow up on the OR cultures  and pathology  cont anbx coverage with renal  dose zosyn  mode and duaration of anbx  after the OR per culture and path   resulrs

## 2019-01-19 NOTE — PROGRESS NOTE ADULT - SUBJECTIVE AND OBJECTIVE BOX
Keaton Duvall MD  Interventional Cardiology  [place] Office : [address] [place] [address]  Tel:   [place] Office : [address] [facility] [place] [address]  Tel: [phone]  Cell : [phone]    Subjective : Pt lying in bed comfortable, not in distress, denies any chest pain or SOB  	  MEDICATIONS:  heparin  Injectable 5000 Unit(s) SubCutaneous every 12 hours  hydrALAZINE 100 milliGRAM(s) Oral every 8 hours  metoprolol succinate ER 25 milliGRAM(s) Oral daily    piperacillin/tazobactam IVPB. 3.375 Gram(s) IV Intermittent every 8 hours      acetaminophen   Tablet .. 650 milliGRAM(s) Oral every 6 hours PRN  oxyCODONE    5 mG/acetaminophen 325 mG 1 Tablet(s) Oral every 6 hours PRN    bisacodyl 5 milliGRAM(s) Oral every 48 hours PRN  docusate sodium 100 milliGRAM(s) Oral two times a day    atorvastatin 40 milliGRAM(s) Oral at bedtime    ferrous    sulfate 325 milliGRAM(s) Oral daily  lactated ringers. 1000 milliLiter(s) IV Continuous <Continuous>  sodium bicarbonate 650 milliGRAM(s) Oral three times a day      PHYSICAL EXAM:  T(C): 36.4 (01-19-19 @ 12:31), Max: 36.8 (01-18-19 @ 21:40)  HR: 80 (01-19-19 @ 12:31) (80 - 82)  BP: 158/78 (01-19-19 @ 12:31) (139/69 - 159/86)  RR: 18 (01-19-19 @ 12:31) (17 - 18)  SpO2: 100% (01-19-19 @ 12:31) (96% - 100%)  Wt(kg): --  I&O's Summary            HEENT:   Normal oral mucosa, PERRL, EOMI	  Cardiovascular: Normal S1 S2, No JVD, No murmurs, No edema  Respiratory: Lungs clear to auscultation	  Gastrointestinal:  Soft, Non-tender, + BS	  Extremities: right foot wound covered                                9.4    6.69  )-----------( 400      ( 19 Jan 2019 05:00 )             30.7     01-19    141  |  108<H>  |  39<H>  ----------------------------<  99  4.2   |  22  |  3.24<H>    Ca    8.9      19 Jan 2019 05:00      proBNP:   Lipid Profile:   HgA1c:   TSH:

## 2019-01-19 NOTE — PROGRESS NOTE ADULT - ASSESSMENT
Right DFU with cellulitis, Charcot with tarsal exostosis.  - Wound flushed and packed.  - Pt. scheduled for tarsal exostectomy with wound excision and closure on Monday.  - Continue with antibiotics pending final on the culture. Right DFU with cellulitis, Charcot with tarsal exostosis.  - Wound flushed and packed.  - Pt. scheduled for tarsal exostectomy with wound excision and closure on Monday.  - Please document medical/cardiac optimization for surgery w/ IV sedation and local anesthesia  - Continue with antibiotics pending final on the culture.

## 2019-01-19 NOTE — PROGRESS NOTE ADULT - SUBJECTIVE AND OBJECTIVE BOX
SUBJECTIVE / OVERNIGHT EVENTS: pt denies chest pain, shortness of breath   MEDICATIONS  (STANDING):  atorvastatin 40 milliGRAM(s) Oral at bedtime  docusate sodium 100 milliGRAM(s) Oral two times a day  ferrous    sulfate 325 milliGRAM(s) Oral daily  heparin  Injectable 5000 Unit(s) SubCutaneous every 12 hours  hydrALAZINE 100 milliGRAM(s) Oral every 8 hours  lactated ringers. 1000 milliLiter(s) (100 mL/Hr) IV Continuous <Continuous>  lactobacillus acidophilus 1 Tablet(s) Oral daily  metoprolol succinate ER 25 milliGRAM(s) Oral daily  piperacillin/tazobactam IVPB. 3.375 Gram(s) IV Intermittent every 8 hours  sodium bicarbonate 650 milliGRAM(s) Oral three times a day    MEDICATIONS  (PRN):  acetaminophen   Tablet .. 650 milliGRAM(s) Oral every 6 hours PRN Mild Pain (1 - 3)  bisacodyl 5 milliGRAM(s) Oral every 48 hours PRN Constipation  oxyCODONE    5 mG/acetaminophen 325 mG 1 Tablet(s) Oral every 6 hours PRN Moderate Pain (4 - 6)    Vital Signs Last 24 Hrs  T(C): 36.7 (19 Jan 2019 20:57), Max: 36.7 (19 Jan 2019 20:57)  T(F): 98 (19 Jan 2019 20:57), Max: 98 (19 Jan 2019 20:57)  HR: 71 (19 Jan 2019 20:57) (71 - 81)  BP: 155/60 (19 Jan 2019 20:57) (155/60 - 159/86)  BP(mean): --  RR: 18 (19 Jan 2019 20:57) (17 - 18)  SpO2: 99% (19 Jan 2019 20:57) (98% - 100%)  Constitutional: No fever, fatigue  Skin: No rash.  Eyes: No recent vision problems or eye pain.  ENT: No congestion, ear pain, or sore throat.  Cardiovascular: No chest pain or palpation.  Respiratory: No cough, shortness of breath, congestion, or wheezing.  Gastrointestinal: No abdominal pain, nausea, vomiting, or diarrhea.  Genitourinary: No dysuria.  Musculoskeletal: No joint swelling.  Neurologic: No headache.    PHYSICAL EXAM:  GENERAL: NAD  EYES: EOMI, PERRLA  NECK: Supple, No JVD  CHEST/LUNG: dec breath sounds at bases   HEART:  S1 , S2 +  ABDOMEN: sof,t bs+  EXTREMITIES:  rt foot wound dressing +  NEUROLOGY:alert awake       LABS:  01-19    141  |  108<H>  |  39<H>  ----------------------------<  99  4.2   |  22  |  3.24<H>    Ca    8.9      19 Jan 2019 05:00      Creatinine Trend: 3.24 <--, 3.19 <--, 3.50 <--, 3.54 <--, 3.53 <--                        9.4    6.69  )-----------( 400      ( 19 Jan 2019 05:00 )             30.7     Urine Studies:                                RADIOLOGY & ADDITIONAL TESTS:    Imaging Personally Reviewed:    Consultant(s) Notes Reviewed:      Care Discussed with Consultants/Other Providers:

## 2019-01-19 NOTE — PROGRESS NOTE ADULT - SUBJECTIVE AND OBJECTIVE BOX
Patient is a 63y old  Male who presents with a chief complaint of Osteomyelitis (18 Jan 2019 17:33)       INTERVAL HPI/OVERNIGHT EVENTS:  Patient seen and evaluated at bedside.  Pt is resting comfortable in NAD. Denies N/V/F/C or pain.    Allergies    No Known Allergies    Intolerances        Vital Signs Last 24 Hrs  T(C): 36.5 (19 Jan 2019 05:04), Max: 36.8 (18 Jan 2019 21:40)  T(F): 97.7 (19 Jan 2019 05:04), Max: 98.3 (18 Jan 2019 21:40)  HR: 81 (19 Jan 2019 05:04) (81 - 84)  BP: 159/86 (19 Jan 2019 05:04) (139/69 - 159/86)  BP(mean): --  RR: 17 (19 Jan 2019 09:03) (17 - 18)  SpO2: 98% (19 Jan 2019 05:04) (96% - 98%)    LABS:                        9.4    6.69  )-----------( 400      ( 19 Jan 2019 05:00 )             30.7     01-19    141  |  108<H>  |  39<H>  ----------------------------<  99  4.2   |  22  |  3.24<H>    Ca    8.9      19 Jan 2019 05:00          CAPILLARY BLOOD GLUCOSE          Lower Extremity Physical Exam:  Right foot ulcer stable with no pus noted and no necrosis of the skin edges.  Cellulitis resolving and edema decreased.  Still probes.    RADIOLOGY & ADDITIONAL TESTS:  Culture - Abscess with Gram Stain (01.14.19 @ 18:54)    -  Gentamicin: S 4 ULISSES    -  Gentamicin: S <=1 ULISSES    -  Imipenem: S <=1 ULISSES    -  Levofloxacin: S <=1 ULISSES    -  Levofloxacin: S <=0.5 ULISSES    -  Meropenem: S <=1 ULISSES    -  Moxifloxacin(Aerobic): S <=0.5 ULISSES    -  Oxacillin: S <=0.25 ULISSES    -  Piperacillin/Tazobactam: S    -  Rifampin: S <=1 ULISSES    -  Tetra/Doxy: S <=1 ULISSES    -  Tobramycin: S <=2 ULISSES    -  Trimethoprim/Sulfamethoxazole: S <=0.5/9.5 ULISSES    -  Vancomycin: S 1 ULISSES    -  Amikacin: S <=8 ULISSES    -  Aztreonam: S <=4 ULISSES    -  Cefazolin: S <=4 ULISSES    -  Cefepime: S <=2 ULISSES    -  Ciprofloxacin: S <=0.5 ULISSES    -  Ciprofloxacin: S <=1 ULISSES    -  Clindamycin: S 0.5 ULISSES    -  Ceftazidime: S 4 ULISSES    -  Erythromycin: S    Specimen Source: OTHER    Gram Stain Result:   GPCPR Gram Pos Cocci in Pairs  QUANTITY OF BACTERIA SEEN: MANY (4+)  GPCCH^Gram Pos Cocci in Chains  QUANTITY OF BACTERIA SEEN: MANY (4+)  WBC^White Blood Cells  QNTY CELLS IN GRAM STAIN: MANY (4+)    Culture Results:   PSA^Pseudomonas aeruginosa  STAU^Staphylococcus aureus  STRAG^Streptococcus agalactiae Gp B    Culture Results:   Sensitivities not routinely performed.                *******************************                * This is an appended result. *                *******************************  A prior result that was reported as final has been changed.    Organism Identification: Pseudomonas aeruginosa  Staphylococcus aureus  Streptococcus agalactiae Gp B    Organism: Staphylococcus aureus    Organism: Streptococcus agalactiae Gp B    Organism: Pseudomonas aeruginosa    Method Type: NEGATIVE ULISSES 43    Method Type: POSITIVE ULISSES 29

## 2019-01-19 NOTE — PROGRESS NOTE ADULT - SUBJECTIVE AND OBJECTIVE BOX
Infectious Diseases progress note:    Subjective:  HPI:  63 year old male, with past history significant for Type-II DM, HTN, and R-Foot Charcot deformity, Osteomyelitis, Vitamin D deficiency, Anemia, CKD-III, HLD and GERD, presented to the ED, as sent by podiatrist, secondary to concern for osteomyelitis.  Seen and evaluated at bedside; NAD.  Patient relates noticing increasing swelling of the right foot for the past ~ one week, to the extent of not being able to fit into the shoe.  Had erythema of the foot and brownish, foul smelling drainage from the ulcer site.  Complained of pain, inadequately relieved with Tylenol.  Reports mild subjective fever, chills and sweating.  No report of nausea, vomiting, headaches, dizziness, chest pain, shortness of breath, abdominal pain, diarrhea or dysuria.  States having mild constipation.Vital signs upon ED presentation as follows: BP = 147/66, HR = 87, RR = 18, T = 36.9 C (98.5 F), O2 Sat = 100% on RA.  Diagnosed with Osteomyelitis.  Prescribed zosyn and vancomycin in the ED. (14 Jan 2019 21:34)      ROS:  CONSTITUTIONAL:  No fever, chills, rigors  CARDIOVASCULAR:  No chest pain or palpitations  RESPIRATORY:   No SOB, cough, dyspnea on exertion.  No wheezing  GASTROINTESTINAL:  No abd pain, N/V, diarrhea/constipation  EXTREMITIES:  No swelling or joint pain  GENITOURINARY:  No burning on urination, increased frequency or urgency.  No flank pain  NEUROLOGIC:  No HA, visual disturbances  SKIN: No rashes    Allergies    No Known Allergies    Intolerances        ANTIBIOTICS/RELEVANT:  antimicrobials  piperacillin/tazobactam IVPB. 3.375 Gram(s) IV Intermittent every 8 hours    immunologic:    OTHER:  acetaminophen   Tablet .. 650 milliGRAM(s) Oral every 6 hours PRN  atorvastatin 40 milliGRAM(s) Oral at bedtime  bisacodyl 5 milliGRAM(s) Oral every 48 hours PRN  docusate sodium 100 milliGRAM(s) Oral two times a day  ferrous    sulfate 325 milliGRAM(s) Oral daily  heparin  Injectable 5000 Unit(s) SubCutaneous every 12 hours  hydrALAZINE 100 milliGRAM(s) Oral every 8 hours  lactated ringers. 1000 milliLiter(s) IV Continuous <Continuous>  lactobacillus acidophilus 1 Tablet(s) Oral daily  metoprolol succinate ER 25 milliGRAM(s) Oral daily  oxyCODONE    5 mG/acetaminophen 325 mG 1 Tablet(s) Oral every 6 hours PRN  sodium bicarbonate 650 milliGRAM(s) Oral three times a day      Objective:  Vital Signs Last 24 Hrs  T(C): 36.7 (19 Jan 2019 20:57), Max: 36.7 (19 Jan 2019 20:57)  T(F): 98 (19 Jan 2019 20:57), Max: 98 (19 Jan 2019 20:57)  HR: 71 (19 Jan 2019 20:57) (71 - 81)  BP: 155/60 (19 Jan 2019 20:57) (155/60 - 159/86)  BP(mean): --  RR: 18 (19 Jan 2019 20:57) (17 - 18)  SpO2: 99% (19 Jan 2019 20:57) (98% - 100%)    PHYSICAL EXAM:  Constitutional:NAD  Eyes:SPIKE, EOMI  Ear/Nose/Throat: no thrush, mucositis.  Moist mucous membranes	  Neck:no JVD, no lymphadenopathy, supple  Respiratory: CTA lobito  Cardiovascular: S1S2 RRR, no murmurs  Gastrointestinal:soft, nontender,  nondistended (+) BS  Extremities:no e/e/c  Skin:  no rashes, open wounds or ulcerations        LABS:                        9.4    6.69  )-----------( 400      ( 19 Jan 2019 05:00 )             30.7     01-19    141  |  108<H>  |  39<H>  ----------------------------<  99  4.2   |  22  |  3.24<H>    Ca    8.9      19 Jan 2019 05:00    Vancomycin Level, Trough: 11.0 ug/mL (01-16 @ 21:03)      MICROBIOLOGY:    Culture - Blood:   NO ORGANISMS ISOLATED  NO ORGANISMS ISOLATED AT 96 HOURS (01.15.19 @ 07:45)    Culture - Abscess with Gram Stain (01.14.19 @ 18:54)    -  Gentamicin: S 4 ULISSES    -  Gentamicin: S <=1 ULISSES    -  Imipenem: S <=1 ULISSES    -  Amikacin: S <=8 ULISSES    -  Aztreonam: S <=4 ULISSES    -  Cefazolin: S <=4 ULISSES    -  Cefepime: S <=2 ULISSES    -  Ceftazidime: S 4 ULISSES    -  Ciprofloxacin: S <=0.5 ULISSES    -  Ciprofloxacin: S <=1 ULISSES    -  Clindamycin: S 0.5 ULISSES    -  Meropenem: S <=1 ULISSES    -  Moxifloxacin(Aerobic): S <=0.5 ULISSES    -  Oxacillin: S <=0.25 ULISSES    -  Piperacillin/Tazobactam: S    -  Rifampin: S <=1 ULISSES    -  Tetra/Doxy: S <=1 ULISSES    -  Levofloxacin: S <=1 ULISSES    -  Levofloxacin: S <=0.5 ULISSES    -  Tobramycin: S <=2 ULISSES    -  Trimethoprim/Sulfamethoxazole: S <=0.5/9.5 ULISSES    -  Vancomycin: S 1 ULISSES    -  Erythromycin: S    Specimen Source: OTHER    Gram Stain Result:   GPCPR Gram Pos Cocci in Pairs  QUANTITY OF BACTERIA SEEN: MANY (4+)  [facility]^Gram Pos Cocci in Chains  QUANTITY OF BACTERIA SEEN: MANY (4+)  WBC^White Blood Cells  QNTY CELLS IN GRAM STAIN: MANY (4+)    Culture Results:   PSA^Pseudomonas aeruginosa  STAU^Staphylococcus aureus  STRAG^Streptococcus agalactiae Gp B    Culture Results:   Sensitivities not routinely performed.                *******************************                * This is an appended result. *                *******************************  A prior result that was reported as final has been changed.    Organism Identification: Pseudomonas aeruginosa  Staphylococcus aureus  Streptococcus agalactiae Gp B    Organism: Staphylococcus aureus    Organism: Streptococcus agalactiae Gp B    Organism: Pseudomonas aeruginosa    Method Type: NEGATIVE ULISSES 43    Method Type: POSITIVE ULISSES 29      RADIOLOGY & ADDITIONAL STUDIES:  < from: NM Bone Marrow Imaging Multiple Areas (01.16.19 @ 13:57) >  EXAM:  NM BONE MARROW IMG MULT AREAS        PROCEDURE DATE:  Jan 16 2019       INTERPRETATION:  RADIOPHARMACEUTICAL: 512 microcuries In-111 labeled   autologous leukocytes, I.V.; 10.9 mCi Tc99m sulfur colloid, I.V.,    CLINICAL INFORMATION: 63 year old male with Charcot deformity right foot   presents with plantar ulcer; referred to evaluate for osteomyelitis.    TECHNIQUE:  The patient was injected with the above dose of labeled   autologous leukocytes on 1/15/2019. Approximately 24 hours later, on   1/16/2019, anterior and posterior whole body images and static images of   the feet were obtained. The patient then was injected with Tc-99m sulfur   colloid and the images were repeated in the same projections using dual   isotope acquisition mode.      COMPARISON: Labeled leukocyte imaging performed on 9/9/2018 was reviewed    < from: NM Bone Marrow Imaging Multiple Areas (01.16.19 @ 13:57) >  FINDINGS:  There is congruent uptake of radiolabeled leukocytes and   Tc-99m sulfur colloid in the imaged osseous structures. There are no foci   of abnormal labeled leukocyte accumulation to suggest the presence of   osteomyelitis in either foot..    IMPRESSION:   Normal combined Indium-111 labeled leukocyte study and   marrow scan. No evidence of osteomyelitis. No evidence of infected   hardware distal right fibula. Infectious Diseases progress note:    Subjective:  HPI:  63 year old male, with past history significant for Type-II DM, HTN, and R-Foot Charcot deformity, Osteomyelitis, Vitamin D deficiency, Anemia, CKD-III, HLD and GERD, presented to the ED, as sent by podiatrist, secondary to concern for osteomyelitis.  Seen and evaluated at bedside; NAD.  Patient relates noticing increasing swelling of the right foot for the past ~ one week, to the extent of not being able to fit into the shoe.  Had erythema of the foot and brownish, foul smelling drainage from the ulcer site.  Complained of pain, inadequately relieved with Tylenol.  Reports mild subjective fever, chills and sweating.  No report of nausea, vomiting, headaches, dizziness, chest pain, shortness of breath, abdominal pain, diarrhea or dysuria.  States having mild constipation.Vital signs upon ED presentation as follows: BP = 147/66, HR = 87, RR = 18, T = 36.9 C (98.5 F), O2 Sat = 100% on RA.  Diagnosed with Osteomyelitis.  Prescribed zosyn and vancomycin in the ED. Awaiting to go to OR for debridement of the wound and possibly the bone biopsy.      ROS:  CONSTITUTIONAL:  No fever, chills, rigors  CARDIOVASCULAR:  No chest pain or palpitations  RESPIRATORY:   No SOB, cough, dyspnea on exertion.  No wheezing  GASTROINTESTINAL:  No abd pain, N/V, diarrhea/constipation  EXTREMITIES:  No swelling or joint pain.Planter right foot ulcer with the packing in  GENITOURINARY:  No burning on urination, increased frequency or urgency.  No flank pain  NEUROLOGIC:  No HA, visual disturbances  SKIN: No rashes    Allergies    No Known Allergies    Intolerances        ANTIBIOTICS/RELEVANT:  antimicrobials  piperacillin/tazobactam IVPB. 3.375 Gram(s) IV Intermittent every 8 hours    immunologic:    OTHER:  acetaminophen   Tablet .. 650 milliGRAM(s) Oral every 6 hours PRN  atorvastatin 40 milliGRAM(s) Oral at bedtime  bisacodyl 5 milliGRAM(s) Oral every 48 hours PRN  docusate sodium 100 milliGRAM(s) Oral two times a day  ferrous    sulfate 325 milliGRAM(s) Oral daily  heparin  Injectable 5000 Unit(s) SubCutaneous every 12 hours  hydrALAZINE 100 milliGRAM(s) Oral every 8 hours  lactated ringers. 1000 milliLiter(s) IV Continuous <Continuous>  lactobacillus acidophilus 1 Tablet(s) Oral daily  metoprolol succinate ER 25 milliGRAM(s) Oral daily  oxyCODONE    5 mG/acetaminophen 325 mG 1 Tablet(s) Oral every 6 hours PRN  sodium bicarbonate 650 milliGRAM(s) Oral three times a day      Objective:  Vital Signs Last 24 Hrs  T(C): 36.7 (19 Jan 2019 20:57), Max: 36.7 (19 Jan 2019 20:57)  T(F): 98 (19 Jan 2019 20:57), Max: 98 (19 Jan 2019 20:57)  HR: 71 (19 Jan 2019 20:57) (71 - 81)  BP: 155/60 (19 Jan 2019 20:57) (155/60 - 159/86)  BP(mean): --  RR: 18 (19 Jan 2019 20:57) (17 - 18)  SpO2: 99% (19 Jan 2019 20:57) (98% - 100%)    PHYSICAL EXAM:  Constitutional:NAD  Eyes:SPIKE, EOMI  Ear/Nose/Throat: no thrush, mucositis.  Moist mucous membranes	  Neck:no JVD, no lymphadenopathy, supple  Respiratory: CTA lobito  Cardiovascular: S1S2 RRR, no murmurs  Gastrointestinal:soft, nontender,  nondistended (+) BS  Extremities:no e/e/c  Skin:  no rashes, open wounds or ulcerations        LABS:                        9.4    6.69  )-----------( 400      ( 19 Jan 2019 05:00 )             30.7     01-19    141  |  108<H>  |  39<H>  ----------------------------<  99  4.2   |  22  |  3.24<H>    Ca    8.9      19 Jan 2019 05:00    Vancomycin Level, Trough: 11.0 ug/mL (01-16 @ 21:03)      MICROBIOLOGY:    Culture - Blood:   NO ORGANISMS ISOLATED  NO ORGANISMS ISOLATED AT 96 HOURS (01.15.19 @ 07:45)    Culture - Abscess with Gram Stain (01.14.19 @ 18:54)    -  Gentamicin: S 4 ULISSES    -  Gentamicin: S <=1 ULISSES    -  Imipenem: S <=1 ULISSES    -  Amikacin: S <=8 ULISSES    -  Aztreonam: S <=4 ULISSES    -  Cefazolin: S <=4 ULISSES    -  Cefepime: S <=2 ULISSES    -  Ceftazidime: S 4 ULISSES    -  Ciprofloxacin: S <=0.5 ULISSES    -  Ciprofloxacin: S <=1 ULISSES    -  Clindamycin: S 0.5 ULISSES    -  Meropenem: S <=1 ULISSES    -  Moxifloxacin(Aerobic): S <=0.5 ULISSES    -  Oxacillin: S <=0.25 ULISSES    -  Piperacillin/Tazobactam: S    -  Rifampin: S <=1 ULISESS    -  Tetra/Doxy: S <=1 ULISSES    -  Levofloxacin: S <=1 ULISSES    -  Levofloxacin: S <=0.5 ULISSES    -  Tobramycin: S <=2 ULISSES    -  Trimethoprim/Sulfamethoxazole: S <=0.5/9.5 ULISSES    -  Vancomycin: S 1 ULISSES    -  Erythromycin: S    Specimen Source: OTHER    Gram Stain Result:   GPCPR Gram Pos Cocci in Pairs  QUANTITY OF BACTERIA SEEN: MANY (4+)  [facility]^Gram Pos Cocci in Chains  QUANTITY OF BACTERIA SEEN: MANY (4+)  WBC^White Blood Cells  QNTY CELLS IN GRAM STAIN: MANY (4+)    Culture Results:   PSA^Pseudomonas aeruginosa  STAU^Staphylococcus aureus  STRAG^Streptococcus agalactiae Gp B    Culture Results:   Sensitivities not routinely performed.                *******************************                * This is an appended result. *                *******************************  A prior result that was reported as final has been changed.    Organism Identification: Pseudomonas aeruginosa  Staphylococcus aureus  Streptococcus agalactiae Gp B    Organism: Staphylococcus aureus    Organism: Streptococcus agalactiae Gp B    Organism: Pseudomonas aeruginosa    Method Type: NEGATIVE ULISSES 43    Method Type: POSITIVE ULISSES 29      RADIOLOGY & ADDITIONAL STUDIES:  < from: NM Bone Marrow Imaging Multiple Areas (01.16.19 @ 13:57) >  EXAM:  NM BONE MARROW IMG MULT AREAS        PROCEDURE DATE:  Jan 16 2019       INTERPRETATION:  RADIOPHARMACEUTICAL: 512 microcuries In-111 labeled   autologous leukocytes, I.V.; 10.9 mCi Tc99m sulfur colloid, I.V.,    CLINICAL INFORMATION: 63 year old male with Charcot deformity right foot   presents with plantar ulcer; referred to evaluate for osteomyelitis.    TECHNIQUE:  The patient was injected with the above dose of labeled   autologous leukocytes on 1/15/2019. Approximately 24 hours later, on   1/16/2019, anterior and posterior whole body images and static images of   the feet were obtained. The patient then was injected with Tc-99m sulfur   colloid and the images were repeated in the same projections using dual   isotope acquisition mode.      COMPARISON: Labeled leukocyte imaging performed on 9/9/2018 was reviewed    < from: NM Bone Marrow Imaging Multiple Areas (01.16.19 @ 13:57) >  FINDINGS:  There is congruent uptake of radiolabeled leukocytes and   Tc-99m sulfur colloid in the imaged osseous structures. There are no foci   of abnormal labeled leukocyte accumulation to suggest the presence of   osteomyelitis in either foot..    IMPRESSION:   Normal combined Indium-111 labeled leukocyte study and   marrow scan. No evidence of osteomyelitis. No evidence of infected   hardware distal right fibula.

## 2019-01-20 LAB
ANION GAP SERPL CALC-SCNC: 13 MMO/L — SIGNIFICANT CHANGE UP (ref 7–14)
APTT BLD: 33.8 SEC — SIGNIFICANT CHANGE UP (ref 27.5–36.3)
BACTERIA BLD CULT: SIGNIFICANT CHANGE UP
BACTERIA BLD CULT: SIGNIFICANT CHANGE UP
BLD GP AB SCN SERPL QL: NEGATIVE — SIGNIFICANT CHANGE UP
BUN SERPL-MCNC: 40 MG/DL — HIGH (ref 7–23)
CALCIUM SERPL-MCNC: 8.8 MG/DL — SIGNIFICANT CHANGE UP (ref 8.4–10.5)
CHLORIDE SERPL-SCNC: 106 MMOL/L — SIGNIFICANT CHANGE UP (ref 98–107)
CO2 SERPL-SCNC: 22 MMOL/L — SIGNIFICANT CHANGE UP (ref 22–31)
CREAT SERPL-MCNC: 3.13 MG/DL — HIGH (ref 0.5–1.3)
GLUCOSE SERPL-MCNC: 96 MG/DL — SIGNIFICANT CHANGE UP (ref 70–99)
HCT VFR BLD CALC: 28.6 % — LOW (ref 39–50)
HGB BLD-MCNC: 9 G/DL — LOW (ref 13–17)
INR BLD: 1.06 — SIGNIFICANT CHANGE UP (ref 0.88–1.17)
MCHC RBC-ENTMCNC: 28.1 PG — SIGNIFICANT CHANGE UP (ref 27–34)
MCHC RBC-ENTMCNC: 31.5 % — LOW (ref 32–36)
MCV RBC AUTO: 89.4 FL — SIGNIFICANT CHANGE UP (ref 80–100)
NRBC # FLD: 0 K/UL — LOW (ref 25–125)
PLATELET # BLD AUTO: 383 K/UL — SIGNIFICANT CHANGE UP (ref 150–400)
PMV BLD: 10.2 FL — SIGNIFICANT CHANGE UP (ref 7–13)
POTASSIUM SERPL-MCNC: 3.9 MMOL/L — SIGNIFICANT CHANGE UP (ref 3.5–5.3)
POTASSIUM SERPL-SCNC: 3.9 MMOL/L — SIGNIFICANT CHANGE UP (ref 3.5–5.3)
PROTHROM AB SERPL-ACNC: 11.8 SEC — SIGNIFICANT CHANGE UP (ref 9.8–13.1)
RBC # BLD: 3.2 M/UL — LOW (ref 4.2–5.8)
RBC # FLD: 13.6 % — SIGNIFICANT CHANGE UP (ref 10.3–14.5)
RH IG SCN BLD-IMP: NEGATIVE — SIGNIFICANT CHANGE UP
SODIUM SERPL-SCNC: 141 MMOL/L — SIGNIFICANT CHANGE UP (ref 135–145)
WBC # BLD: 6.39 K/UL — SIGNIFICANT CHANGE UP (ref 3.8–10.5)
WBC # FLD AUTO: 6.39 K/UL — SIGNIFICANT CHANGE UP (ref 3.8–10.5)

## 2019-01-20 RX ORDER — SODIUM CHLORIDE 9 MG/ML
1000 INJECTION INTRAMUSCULAR; INTRAVENOUS; SUBCUTANEOUS
Qty: 0 | Refills: 0 | Status: DISCONTINUED | OUTPATIENT
Start: 2019-01-20 | End: 2019-01-21

## 2019-01-20 RX ADMIN — PIPERACILLIN AND TAZOBACTAM 25 GRAM(S): 4; .5 INJECTION, POWDER, LYOPHILIZED, FOR SOLUTION INTRAVENOUS at 12:09

## 2019-01-20 RX ADMIN — HEPARIN SODIUM 5000 UNIT(S): 5000 INJECTION INTRAVENOUS; SUBCUTANEOUS at 17:23

## 2019-01-20 RX ADMIN — Medication 100 MILLIGRAM(S): at 21:30

## 2019-01-20 RX ADMIN — HEPARIN SODIUM 5000 UNIT(S): 5000 INJECTION INTRAVENOUS; SUBCUTANEOUS at 05:11

## 2019-01-20 RX ADMIN — Medication 650 MILLIGRAM(S): at 12:09

## 2019-01-20 RX ADMIN — PIPERACILLIN AND TAZOBACTAM 25 GRAM(S): 4; .5 INJECTION, POWDER, LYOPHILIZED, FOR SOLUTION INTRAVENOUS at 05:09

## 2019-01-20 RX ADMIN — ATORVASTATIN CALCIUM 40 MILLIGRAM(S): 80 TABLET, FILM COATED ORAL at 21:30

## 2019-01-20 RX ADMIN — Medication 650 MILLIGRAM(S): at 05:11

## 2019-01-20 RX ADMIN — Medication 1 TABLET(S): at 12:09

## 2019-01-20 RX ADMIN — Medication 100 MILLIGRAM(S): at 05:10

## 2019-01-20 RX ADMIN — Medication 100 MILLIGRAM(S): at 12:10

## 2019-01-20 RX ADMIN — Medication 100 MILLIGRAM(S): at 17:24

## 2019-01-20 RX ADMIN — OXYCODONE AND ACETAMINOPHEN 1 TABLET(S): 5; 325 TABLET ORAL at 12:44

## 2019-01-20 RX ADMIN — Medication 100 MILLIGRAM(S): at 05:11

## 2019-01-20 RX ADMIN — PIPERACILLIN AND TAZOBACTAM 25 GRAM(S): 4; .5 INJECTION, POWDER, LYOPHILIZED, FOR SOLUTION INTRAVENOUS at 21:30

## 2019-01-20 RX ADMIN — Medication 25 MILLIGRAM(S): at 05:11

## 2019-01-20 RX ADMIN — Medication 650 MILLIGRAM(S): at 21:30

## 2019-01-20 RX ADMIN — Medication 325 MILLIGRAM(S): at 12:10

## 2019-01-20 RX ADMIN — OXYCODONE AND ACETAMINOPHEN 1 TABLET(S): 5; 325 TABLET ORAL at 12:14

## 2019-01-20 NOTE — PROGRESS NOTE ADULT - ASSESSMENT
1) Perioperative risk assessment: Echo with normal LV function, Mets 4 , stress done , pending results     2) Right foot wound/OM - podiatry  on case, planned for surgery pending stress results     3) HTN - cont hydralazine    4) ABEL on CKD:  renal onboard,

## 2019-01-20 NOTE — PROGRESS NOTE ADULT - ASSESSMENT
63 year old male, with past history significant for Type-II DM, HTN, and R-Foot Charcot deformity, Osteomyelitis, Vitamin D deficiency, Anemia, CKD-III, HLD and GERD, presented to the ED, as sent by podiatrist, secondary to concern for osteomyelitis.  Seen and evaluated at bedside; NAD.  Patient relates noticing increasing swelling of the right foot for the past ~ one week, to the extent of not being able to fit into the shoe.  Had erythema of the foot and brownish, foul smelling drainage from the ulcer site.  Complained of pain, inadequately relieved with Tylenol.  Reports mild subjective fever, chills and sweating.  No report of nausea, vomiting, headaches, dizziness, chest pain, shortness of breath, abdominal pain, diarrhea or dysuria.  States having mild constipation.Vital signs upon ED presentation as follows: BP = 147/66, HR = 87, RR = 18, T = 36.9 C (98.5 F), O2 Sat = 100% on RA.   Prescribed zosyn and vancomycin in the ED. Patient was recently admitted in Sep 2018 for clinical OM of rt foot plantar ulcer, with (+) PTB.  Pt grew Arthrobacter, actinomyces, coryne and strep.  Pt completed 6 week course of IV zosyn.  Ulcer partly closed but  noticed wound starting to open up again recently along with the discharge .Being Afebrile with No leukocytosis.  , .  Wound cx (+) pseudomonas, MSSA, GBS.  Indium scan negative for OM.  Pt seen by Podiatry who  planned to take the pt  for tarsal exostectomy tomorrow pending medical clearance ,stable for the OR from ID wise.    suggest to  provide supportive care  ensure glycemic control  monitor renal function  follow up on the OR cultures  and pathology  cont anbx coverage with renal  dose zosyn  mode and duaration of anbx  after the OR per culture and path   results from the OR spicemens that  is schedule for tomorrow ,stable id   wise for the procedure

## 2019-01-20 NOTE — PROGRESS NOTE ADULT - SUBJECTIVE AND OBJECTIVE BOX
SUBJECTIVE / OVERNIGHT EVENTS: pt denies chest pain, shortness of breath   MEDICATIONS  (STANDING):  atorvastatin 40 milliGRAM(s) Oral at bedtime  docusate sodium 100 milliGRAM(s) Oral two times a day  ferrous    sulfate 325 milliGRAM(s) Oral daily  heparin  Injectable 5000 Unit(s) SubCutaneous every 12 hours  hydrALAZINE 100 milliGRAM(s) Oral every 8 hours  lactated ringers. 1000 milliLiter(s) (100 mL/Hr) IV Continuous <Continuous>  lactobacillus acidophilus 1 Tablet(s) Oral daily  metoprolol succinate ER 25 milliGRAM(s) Oral daily  piperacillin/tazobactam IVPB. 3.375 Gram(s) IV Intermittent every 8 hours  sodium bicarbonate 650 milliGRAM(s) Oral three times a day  sodium chloride 0.9%. 1000 milliLiter(s) (30 mL/Hr) IV Continuous <Continuous>    MEDICATIONS  (PRN):  acetaminophen   Tablet .. 650 milliGRAM(s) Oral every 6 hours PRN Mild Pain (1 - 3)  bisacodyl 5 milliGRAM(s) Oral every 48 hours PRN Constipation  oxyCODONE    5 mG/acetaminophen 325 mG 1 Tablet(s) Oral every 6 hours PRN Moderate Pain (4 - 6)    Vital Signs Last 24 Hrs  T(C): 36.7 (20 Jan 2019 21:29), Max: 36.7 (20 Jan 2019 05:08)  T(F): 98.1 (20 Jan 2019 21:29), Max: 98.1 (20 Jan 2019 21:29)  HR: 72 (20 Jan 2019 21:29) (72 - 78)  BP: 148/60 (20 Jan 2019 21:29) (148/60 - 154/68)  BP(mean): --  RR: 18 (20 Jan 2019 21:29) (18 - 18)  SpO2: 95% (20 Jan 2019 21:29) (95% - 100%)    Constitutional: No fever, fatigue  Skin: No rash.  Eyes: No recent vision problems or eye pain.  ENT: No congestion, ear pain, or sore throat.  Cardiovascular: No chest pain or palpation.  Respiratory: No cough, shortness of breath, congestion, or wheezing.  Gastrointestinal: No abdominal pain, nausea, vomiting, or diarrhea.  Genitourinary: No dysuria.  Musculoskeletal: No joint swelling.  Neurologic: No headache.    PHYSICAL EXAM:  GENERAL: NAD  EYES: EOMI, PERRLA  NECK: Supple, No JVD  CHEST/LUNG: dec breath sounds at bases   HEART:  S1 , S2 +  ABDOMEN: sof,t bs+  EXTREMITIES:  rt foot wound dressing +  NEUROLOGY:alert awake       LABS:  01-20    141  |  106  |  40<H>  ----------------------------<  96  3.9   |  22  |  3.13<H>    Ca    8.8      20 Jan 2019 06:20      Creatinine Trend: 3.13 <--, 3.24 <--, 3.19 <--, 3.50 <--, 3.54 <--, 3.53 <--                        9.0    6.39  )-----------( 383      ( 20 Jan 2019 06:20 )             28.6     Urine Studies:              PT/INR - ( 20 Jan 2019 06:20 )   PT: 11.8 SEC;   INR: 1.06          PTT - ( 20 Jan 2019 06:20 )  PTT:33.8 SEC                        RADIOLOGY & ADDITIONAL TESTS:    Imaging Personally Reviewed:    Consultant(s) Notes Reviewed:      Care Discussed with Consultants/Other Providers:

## 2019-01-20 NOTE — PROGRESS NOTE ADULT - SUBJECTIVE AND OBJECTIVE BOX
DARIEL MI  63y  Patient is a 63y old  Male who presents with a chief complaint of diabetic foot ulcer/Osteomyelitis (20 Jan 2019 20:02)    HPI:  This is a patient with progressive CKD due to Diabetes. Admitted and treated for Osteomyelitis.    HEALTH ISSUES - PROBLEM Dx:  Dyslipidemia: Dyslipidemia  Osteomyelitis: Osteomyelitis  Proteinuria: Proteinuria  Anemia: Anemia  Renal failure: Renal failure  Slow transit constipation: Slow transit constipation  Medication management: Medication management  Chronic kidney disease, stage III (moderate): Chronic kidney disease, stage III (moderate)  Dehydration: Dehydration  Essential hypertension: Essential hypertension  Other chronic osteomyelitis of right foot: Other chronic osteomyelitis of right foot        MEDICATIONS  (STANDING):  atorvastatin 40 milliGRAM(s) Oral at bedtime  docusate sodium 100 milliGRAM(s) Oral two times a day  ferrous    sulfate 325 milliGRAM(s) Oral daily  heparin  Injectable 5000 Unit(s) SubCutaneous every 12 hours  hydrALAZINE 100 milliGRAM(s) Oral every 8 hours  lactated ringers. 1000 milliLiter(s) (100 mL/Hr) IV Continuous <Continuous>  lactobacillus acidophilus 1 Tablet(s) Oral daily  metoprolol succinate ER 25 milliGRAM(s) Oral daily  piperacillin/tazobactam IVPB. 3.375 Gram(s) IV Intermittent every 8 hours  sodium bicarbonate 650 milliGRAM(s) Oral three times a day  sodium chloride 0.9%. 1000 milliLiter(s) (30 mL/Hr) IV Continuous <Continuous>    MEDICATIONS  (PRN):  acetaminophen   Tablet .. 650 milliGRAM(s) Oral every 6 hours PRN Mild Pain (1 - 3)  bisacodyl 5 milliGRAM(s) Oral every 48 hours PRN Constipation  oxyCODONE    5 mG/acetaminophen 325 mG 1 Tablet(s) Oral every 6 hours PRN Moderate Pain (4 - 6)    Vital Signs Last 24 Hrs  T(C): 36.6 (20 Jan 2019 12:18), Max: 36.7 (20 Jan 2019 05:08)  T(F): 97.8 (20 Jan 2019 12:18), Max: 98 (20 Jan 2019 05:08)  HR: 72 (20 Jan 2019 21:29) (72 - 78)  BP: 148/60 (20 Jan 2019 21:29) (148/60 - 154/68)  BP(mean): --  RR: 18 (20 Jan 2019 21:29) (18 - 18)  SpO2: 95% (20 Jan 2019 21:29) (95% - 100%)  Daily     Daily     PHYSICAL EXAM:  Constitutional:  He appears comfortable and not distressed. Not diaphoretic.    Respiratory: The lungs are clear to auscultation. No dullness and expansion is normal.    Cardiovascular: S1 and S2 are normal. No mummurs, rubs or gallops are present.    Gastrointestinal: The abdomen is soft. No tenderness is present. No masses are present. Bowel sounds are normal.    Genitourinary: The bladder is not distended. No CVA tenderness is present.    Extremities: No edema is noted. Dressing to foot.    Neurological: Cognition is normal. Tone, power and sensation are normal.                             9.0    6.39  )-----------( 383      ( 20 Jan 2019 06:20 )             28.6     01-20    141  |  106  |  40<H>  ----------------------------<  96  3.9   |  22  |  3.13<H>    Ca    8.8      20 Jan 2019 06:20    Protein, Random Urine: 229.4: NO REFERENCE RANGES HAVE BEEN ESTABLISHED. mg/dL (09.11.18 @ 18:40)    Creatinine, Random Urine (09.11.18 @ 18:40)    Creatinine, Random Urine: 75.10: * * CHAPARRITA

## 2019-01-20 NOTE — PROGRESS NOTE ADULT - SUBJECTIVE AND OBJECTIVE BOX
Infectious Diseases progress note:      Subjective:  HPI:  63 year old male, with past history significant for Type-II DM, HTN, and R-Foot Charcot deformity, Osteomyelitis, Vitamin D deficiency, Anemia, CKD-III, HLD and GERD, presented to the ED, as sent by podiatrist, secondary to concern for osteomyelitis.  Seen and evaluated at bedside; NAD.  Patient relates noticing increasing swelling of the right foot for the past ~ one week, to the extent of not being able to fit into the shoe.  Had erythema of the foot and brownish, foul smelling drainage from the ulcer site.  Complained of pain, inadequately relieved with Tylenol.  Reports mild subjective fever, chills and sweating.  No report of nausea, vomiting, headaches, dizziness, chest pain, shortness of breath, abdominal pain, diarrhea or dysuria.  States having mild constipation.Vital signs upon ED presentation as follows: BP = 147/66, HR = 87, RR = 18, T = 36.9 C (98.5 F), O2 Sat = 100% on RA.  Diagnosed with Osteomyelitis.  Prescribed zosyn and vancomycin in the ED. Awaiting to go to OR for debridement of the wound and possibly the bone biopsy.Clinically stable , afebrile with no untoward side effect of zosyn , right foot wound being managed by pod.    ROS:  CONSTITUTIONAL:  No fever, chills, rigors  CARDIOVASCULAR:  No chest pain or palpitations  RESPIRATORY:   No SOB, cough, dyspnea on exertion.  No wheezing  GASTROINTESTINAL:  No abd pain, N/V, diarrhea/constipation  EXTREMITIES:  No swelling or joint pain  GENITOURINARY:  No burning on urination, increased frequency or urgency.  No flank pain  NEUROLOGIC:  No HA, visual disturbances  SKIN: No rashes    Allergies    No Known Allergies    Intolerances        ANTIBIOTICS/RELEVANT:  antimicrobials  piperacillin/tazobactam IVPB. 3.375 Gram(s) IV Intermittent every 8 hours    immunologic:    OTHER:  acetaminophen   Tablet .. 650 milliGRAM(s) Oral every 6 hours PRN  atorvastatin 40 milliGRAM(s) Oral at bedtime  bisacodyl 5 milliGRAM(s) Oral every 48 hours PRN  docusate sodium 100 milliGRAM(s) Oral two times a day  ferrous    sulfate 325 milliGRAM(s) Oral daily  heparin  Injectable 5000 Unit(s) SubCutaneous every 12 hours  hydrALAZINE 100 milliGRAM(s) Oral every 8 hours  lactated ringers. 1000 milliLiter(s) IV Continuous <Continuous>  lactobacillus acidophilus 1 Tablet(s) Oral daily  metoprolol succinate ER 25 milliGRAM(s) Oral daily  oxyCODONE    5 mG/acetaminophen 325 mG 1 Tablet(s) Oral every 6 hours PRN  sodium bicarbonate 650 milliGRAM(s) Oral three times a day  sodium chloride 0.9%. 1000 milliLiter(s) IV Continuous <Continuous>      Objective:  Vital Signs Last 24 Hrs  T(C): 36.6 (20 Jan 2019 12:18), Max: 36.7 (19 Jan 2019 20:57)  T(F): 97.8 (20 Jan 2019 12:18), Max: 98 (19 Jan 2019 20:57)  HR: 78 (20 Jan 2019 12:18) (71 - 78)  BP: 154/68 (20 Jan 2019 12:18) (149/56 - 155/60)  BP(mean): --  RR: 18 (20 Jan 2019 12:18) (18 - 18)  SpO2: 100% (20 Jan 2019 12:18) (96% - 100%)    PHYSICAL EXAM:  Constitutional:NAD  Eyes:SPIKE, EOMI  Ear/Nose/Throat: no thrush, mucositis.  Moist mucous membranes	  Neck:no JVD, no lymphadenopathy, supple  Respiratory: CTA lobito  Cardiovascular: S1S2 RRR, no murmurs  Gastrointestinal:soft, nontender,  nondistended (+) BS  Extremities:no e/e/c,Right foot ulcer stable with no pus noted and no necrosis of the skin edges.  Cellulitis resolving and edema decreased.  Still probes.  Skin:  no rashes, open wounds or ulcerations    LABS:                        9.0    6.39  )-----------( 383      ( 20 Jan 2019 06:20 )             28.6     01-20    141  |  106  |  40<H>  ----------------------------<  96  3.9   |  22  |  3.13<H>    Ca    8.8      20 Jan 2019 06:20      PT/INR - ( 20 Jan 2019 06:20 )   PT: 11.8 SEC;   INR: 1.06          PTT - ( 20 Jan 2019 06:20 )  PTT:33.8 SEC    Vancomycin Level, Trough: 11.0 ug/mL (01-16 @ 21:03)    MICROBIOLOGY:  Culture - Blood:   NO ORGANISMS ISOLATED (01.15.19 @ 07:45)    Culture - Abscess with Gram Stain (01.14.19 @ 18:54)    -  Trimethoprim/Sulfamethoxazole: S <=0.5/9.5 ULISSES    -  Vancomycin: S 1 ULISSES    -  Tobramycin: S <=2 ULISSES    -  Piperacillin/Tazobactam: S    -  Rifampin: S <=1 ULISSES    -  Tetra/Doxy: S <=1 ULISSES    -  Meropenem: S <=1 ULISSES    -  Moxifloxacin(Aerobic): S <=0.5 ULISSES    -  Oxacillin: S <=0.25 ULISSES    -  Levofloxacin: S <=1 ULISSES    -  Levofloxacin: S <=0.5 ULISSES    -  Imipenem: S <=1 ULISSES    -  Gentamicin: S 4 ULISSES    -  Gentamicin: S <=1 ULISSES    -  Erythromycin: S    -  Ciprofloxacin: S <=0.5 ULISSES    -  Ciprofloxacin: S <=1 ULISSES    -  Clindamycin: S 0.5 ULISSES    -  Ceftazidime: S 4 ULISSES    -  Cefepime: S <=2 ULISSES    -  Cefazolin: S <=4 ULISSES    -  Amikacin: S <=8 ULISSES    -  Aztreonam: S <=4 ULISSES    Specimen Source: OTHER    Gram Stain Result:   GPCPR Gram Pos Cocci in Pairs  QUANTITY OF BACTERIA SEEN: MANY (4+)  GPCCH^Gram Pos Cocci in Chains  QUANTITY OF BACTERIA SEEN: MANY (4+)  WBC^White Blood Cells  QNTY CELLS IN GRAM STAIN: MANY (4+)    Culture Results:   PSA^Pseudomonas aeruginosa  STAU^Staphylococcus aureus  STRAG^Streptococcus agalactiae Gp B    Culture Results:   Sensitivities not routinely performed.                *******************************                * This is an appended result. *                *******************************  A prior result that was reported as final has been changed.    Organism Identification: Pseudomonas aeruginosa  Staphylococcus aureus  Streptococcus agalactiae Gp B    Organism: Staphylococcus aureus    Organism: Streptococcus agalactiae Gp B    Organism: Pseudomonas aeruginosa    Method Type: NEGATIVE ULISSES 43    Method Type: POSITIVE ULISSES 29      RADIOLOGY & ADDITIONAL STUDIES:

## 2019-01-20 NOTE — PROGRESS NOTE ADULT - SUBJECTIVE AND OBJECTIVE BOX
Keaton Duvall MD  Interventional Cardiology  [place] Office : [address] [place] [address]  Tel:   [place] Office : [address] [facility] [place] [address]  Tel: [phone]  Cell : [phone]    Subjective : Pt lying in bed comfortable, not in distress, denies any chest pain or SOB  	  MEDICATIONS:  heparin  Injectable 5000 Unit(s) SubCutaneous every 12 hours  hydrALAZINE 100 milliGRAM(s) Oral every 8 hours  metoprolol succinate ER 25 milliGRAM(s) Oral daily    piperacillin/tazobactam IVPB. 3.375 Gram(s) IV Intermittent every 8 hours      acetaminophen   Tablet .. 650 milliGRAM(s) Oral every 6 hours PRN  oxyCODONE    5 mG/acetaminophen 325 mG 1 Tablet(s) Oral every 6 hours PRN    bisacodyl 5 milliGRAM(s) Oral every 48 hours PRN  docusate sodium 100 milliGRAM(s) Oral two times a day    atorvastatin 40 milliGRAM(s) Oral at bedtime    ferrous    sulfate 325 milliGRAM(s) Oral daily  lactated ringers. 1000 milliLiter(s) IV Continuous <Continuous>  sodium bicarbonate 650 milliGRAM(s) Oral three times a day  sodium chloride 0.9%. 1000 milliLiter(s) IV Continuous <Continuous>      PHYSICAL EXAM:  T(C): 36.6 (01-20-19 @ 12:18), Max: 36.7 (01-19-19 @ 20:57)  HR: 78 (01-20-19 @ 12:18) (71 - 78)  BP: 154/68 (01-20-19 @ 12:18) (149/56 - 155/60)  RR: 18 (01-20-19 @ 12:18) (18 - 18)  SpO2: 100% (01-20-19 @ 12:18) (96% - 100%)  Wt(kg): --  I&O's Summary          HEENT:   Normal oral mucosa, PERRL, EOMI	  Cardiovascular: Normal S1 S2, No JVD, No murmurs, No edema  Respiratory: Lungs clear to auscultation	  Gastrointestinal:  Soft, Non-tender, + BS	  Extremities: right foot wound covered                                  9.0    6.39  )-----------( 383      ( 20 Jan 2019 06:20 )             28.6     01-20    141  |  106  |  40<H>  ----------------------------<  96  3.9   |  22  |  3.13<H>    Ca    8.8      20 Jan 2019 06:20      proBNP:   Lipid Profile:   HgA1c:   TSH:

## 2019-01-20 NOTE — PROGRESS NOTE ADULT - ASSESSMENT
1.	CKD 4 with typical features of Diabetic Nephropathy, slowly progressive. Unlikely to benefit from RAAS blockade at this point.  2.	Diabetes.  3.	Osteomyelitis, on Antibiotics.  4.	Anemia of CKD.  5.	Metabolic Acidosis.  6.	Nephrotic range Proteinuria.    PLAN:  1.	Continue antibiotics.  2.	Follow up BMP.  3.	Continue HCO3 supplementation.

## 2019-01-20 NOTE — PROGRESS NOTE ADULT - ASSESSMENT
Right DFU with cellulitis, Charcot with tarsal exostosis.  - Wound flushed and packed.  - Pt. scheduled for tarsal exostectomy with wound excision and closure tomorrow Monday  - NPO after midnight  - awaiting cardiac optimization for surgery w/ IV sedation and local anesthesia  - Continue with antibiotics

## 2019-01-21 LAB
ANION GAP SERPL CALC-SCNC: 14 MMO/L — SIGNIFICANT CHANGE UP (ref 7–14)
APTT BLD: 32.9 SEC — SIGNIFICANT CHANGE UP (ref 27.5–36.3)
BLD GP AB SCN SERPL QL: NEGATIVE — SIGNIFICANT CHANGE UP
BUN SERPL-MCNC: 45 MG/DL — HIGH (ref 7–23)
CALCIUM SERPL-MCNC: 8.8 MG/DL — SIGNIFICANT CHANGE UP (ref 8.4–10.5)
CHLORIDE SERPL-SCNC: 106 MMOL/L — SIGNIFICANT CHANGE UP (ref 98–107)
CO2 SERPL-SCNC: 20 MMOL/L — LOW (ref 22–31)
CREAT SERPL-MCNC: 3.5 MG/DL — HIGH (ref 0.5–1.3)
GLUCOSE SERPL-MCNC: 96 MG/DL — SIGNIFICANT CHANGE UP (ref 70–99)
HCT VFR BLD CALC: 29.2 % — LOW (ref 39–50)
HGB BLD-MCNC: 9.1 G/DL — LOW (ref 13–17)
INR BLD: 0.97 — SIGNIFICANT CHANGE UP (ref 0.88–1.17)
MCHC RBC-ENTMCNC: 28.6 PG — SIGNIFICANT CHANGE UP (ref 27–34)
MCHC RBC-ENTMCNC: 31.2 % — LOW (ref 32–36)
MCV RBC AUTO: 91.8 FL — SIGNIFICANT CHANGE UP (ref 80–100)
NRBC # FLD: 0 K/UL — LOW (ref 25–125)
PLATELET # BLD AUTO: 395 K/UL — SIGNIFICANT CHANGE UP (ref 150–400)
PMV BLD: 10.2 FL — SIGNIFICANT CHANGE UP (ref 7–13)
POTASSIUM SERPL-MCNC: 4.3 MMOL/L — SIGNIFICANT CHANGE UP (ref 3.5–5.3)
POTASSIUM SERPL-SCNC: 4.3 MMOL/L — SIGNIFICANT CHANGE UP (ref 3.5–5.3)
PROTHROM AB SERPL-ACNC: 11.1 SEC — SIGNIFICANT CHANGE UP (ref 9.8–13.1)
RBC # BLD: 3.18 M/UL — LOW (ref 4.2–5.8)
RBC # FLD: 13.5 % — SIGNIFICANT CHANGE UP (ref 10.3–14.5)
RH IG SCN BLD-IMP: NEGATIVE — SIGNIFICANT CHANGE UP
SODIUM SERPL-SCNC: 140 MMOL/L — SIGNIFICANT CHANGE UP (ref 135–145)
WBC # BLD: 7.2 K/UL — SIGNIFICANT CHANGE UP (ref 3.8–10.5)
WBC # FLD AUTO: 7.2 K/UL — SIGNIFICANT CHANGE UP (ref 3.8–10.5)

## 2019-01-21 RX ORDER — SODIUM BICARBONATE 1 MEQ/ML
0.3 SYRINGE (ML) INTRAVENOUS
Qty: 150 | Refills: 0 | Status: COMPLETED | OUTPATIENT
Start: 2019-01-23 | End: 2019-01-23

## 2019-01-21 RX ORDER — SODIUM BICARBONATE 1 MEQ/ML
0.11 SYRINGE (ML) INTRAVENOUS
Qty: 150 | Refills: 0 | Status: COMPLETED | OUTPATIENT
Start: 2019-01-23 | End: 2019-01-23

## 2019-01-21 RX ORDER — CALCIUM CARBONATE 500(1250)
1 TABLET ORAL ONCE
Qty: 0 | Refills: 0 | Status: COMPLETED | OUTPATIENT
Start: 2019-01-21 | End: 2019-01-21

## 2019-01-21 RX ORDER — ASPIRIN/CALCIUM CARB/MAGNESIUM 324 MG
81 TABLET ORAL DAILY
Qty: 0 | Refills: 0 | Status: DISCONTINUED | OUTPATIENT
Start: 2019-01-21 | End: 2019-01-24

## 2019-01-21 RX ORDER — ACETYLCYSTEINE 200 MG/ML
1200 VIAL (ML) MISCELLANEOUS
Qty: 0 | Refills: 0 | Status: COMPLETED | OUTPATIENT
Start: 2019-01-22 | End: 2019-01-23

## 2019-01-21 RX ADMIN — Medication 100 MILLIGRAM(S): at 12:28

## 2019-01-21 RX ADMIN — Medication 100 MILLIGRAM(S): at 21:02

## 2019-01-21 RX ADMIN — PIPERACILLIN AND TAZOBACTAM 25 GRAM(S): 4; .5 INJECTION, POWDER, LYOPHILIZED, FOR SOLUTION INTRAVENOUS at 12:28

## 2019-01-21 RX ADMIN — Medication 325 MILLIGRAM(S): at 12:28

## 2019-01-21 RX ADMIN — HEPARIN SODIUM 5000 UNIT(S): 5000 INJECTION INTRAVENOUS; SUBCUTANEOUS at 18:09

## 2019-01-21 RX ADMIN — Medication 100 MILLIGRAM(S): at 05:02

## 2019-01-21 RX ADMIN — Medication 25 MILLIGRAM(S): at 05:02

## 2019-01-21 RX ADMIN — Medication 81 MILLIGRAM(S): at 12:28

## 2019-01-21 RX ADMIN — Medication 650 MILLIGRAM(S): at 05:02

## 2019-01-21 RX ADMIN — PIPERACILLIN AND TAZOBACTAM 25 GRAM(S): 4; .5 INJECTION, POWDER, LYOPHILIZED, FOR SOLUTION INTRAVENOUS at 21:02

## 2019-01-21 RX ADMIN — Medication 650 MILLIGRAM(S): at 21:02

## 2019-01-21 RX ADMIN — Medication 650 MILLIGRAM(S): at 12:27

## 2019-01-21 RX ADMIN — Medication 1 TABLET(S): at 12:28

## 2019-01-21 RX ADMIN — SODIUM CHLORIDE 30 MILLILITER(S): 9 INJECTION INTRAMUSCULAR; INTRAVENOUS; SUBCUTANEOUS at 00:07

## 2019-01-21 RX ADMIN — Medication 100 MILLIGRAM(S): at 18:09

## 2019-01-21 RX ADMIN — ATORVASTATIN CALCIUM 40 MILLIGRAM(S): 80 TABLET, FILM COATED ORAL at 21:02

## 2019-01-21 RX ADMIN — Medication 1 TABLET(S): at 01:40

## 2019-01-21 RX ADMIN — PIPERACILLIN AND TAZOBACTAM 25 GRAM(S): 4; .5 INJECTION, POWDER, LYOPHILIZED, FOR SOLUTION INTRAVENOUS at 05:03

## 2019-01-21 NOTE — PROGRESS NOTE ADULT - SUBJECTIVE AND OBJECTIVE BOX
Patient is a 63y old  Male who presents with a chief complaint of Osteomyelitis (21 Jan 2019 11:41)       INTERVAL HPI/OVERNIGHT EVENTS:  Patient seen and evaluated at bedside.  Pt is resting comfortable in NAD. Denies N/V/F/C ro pain.    Allergies    No Known Allergies    Intolerances        Vital Signs Last 24 Hrs  T(C): 36.1 (21 Jan 2019 12:23), Max: 36.7 (20 Jan 2019 21:29)  T(F): 97 (21 Jan 2019 12:23), Max: 98.1 (20 Jan 2019 21:29)  HR: 64 (21 Jan 2019 12:23) (64 - 94)  BP: 160/68 (21 Jan 2019 12:23) (148/60 - 162/75)  BP(mean): --  RR: 18 (21 Jan 2019 12:23) (18 - 18)  SpO2: 100% (21 Jan 2019 12:23) (95% - 100%)    LABS:                        9.1    7.20  )-----------( 395      ( 21 Jan 2019 05:19 )             29.2     01-21    140  |  106  |  45<H>  ----------------------------<  96  4.3   |  20<L>  |  3.50<H>    Ca    8.8      21 Jan 2019 05:17      PT/INR - ( 21 Jan 2019 05:17 )   PT: 11.1 SEC;   INR: 0.97          PTT - ( 21 Jan 2019 05:17 )  PTT:32.9 SEC    CAPILLARY BLOOD GLUCOSE          Lower Extremity Physical Exam:  Ulceration plantar tot he right LA stable with no necrosis of the skin edges and resolving cellulitis and edema.  There is mild serous drainage and no pus    RADIOLOGY & ADDITIONAL TESTS:

## 2019-01-21 NOTE — PROGRESS NOTE ADULT - SUBJECTIVE AND OBJECTIVE BOX
Keaton Duvall MD  Interventional Cardiology  [place] Office : [address] [place] [address]  Tel:   [place] Office : [address] [facility] [place] [address]  Tel: [phone]  Cell : [phone]    Subjective : Pt lying in bed comfortable, not in distress, denies any chest pain or SOB  	  MEDICATIONS:  aspirin enteric coated 81 milliGRAM(s) Oral daily  heparin  Injectable 5000 Unit(s) SubCutaneous every 12 hours  hydrALAZINE 100 milliGRAM(s) Oral every 8 hours  metoprolol succinate ER 25 milliGRAM(s) Oral daily  piperacillin/tazobactam IVPB. 3.375 Gram(s) IV Intermittent every 8 hours  acetaminophen   Tablet .. 650 milliGRAM(s) Oral every 6 hours PRN  oxyCODONE    5 mG/acetaminophen 325 mG 1 Tablet(s) Oral every 6 hours PRN  bisacodyl 5 milliGRAM(s) Oral every 48 hours PRN  docusate sodium 100 milliGRAM(s) Oral two times a day  atorvastatin 40 milliGRAM(s) Oral at bedtime  ferrous    sulfate 325 milliGRAM(s) Oral daily  lactated ringers. 1000 milliLiter(s) IV Continuous <Continuous>  sodium bicarbonate 650 milliGRAM(s) Oral three times a day      PHYSICAL EXAM:  T(C): 36.1 (01-21-19 @ 12:23), Max: 36.7 (01-20-19 @ 21:29)  HR: 64 (01-21-19 @ 12:23) (64 - 94)  BP: 160/68 (01-21-19 @ 12:23) (148/60 - 162/75)  RR: 18 (01-21-19 @ 12:23) (18 - 18)  SpO2: 100% (01-21-19 @ 12:23) (95% - 100%)  Wt(kg): --  I&O's Summary    Height (cm): 177.8 (01-21 @ 04:30)  Weight (kg): 99.4 (01-21 @ 04:30)  BMI (kg/m2): 31.4 (01-21 @ 04:30)  BSA (m2): 2.17 (01-21 @ 04:30)        HEENT:   Normal oral mucosa, PERRL, EOMI	  Cardiovascular: Normal S1 S2, No JVD, No murmurs, No edema  Respiratory: Lungs clear to auscultation	  Gastrointestinal:  Soft, Non-tender, + BS	  Extremities: right foot wound covered                                  9.1    7.20  )-----------( 395      ( 21 Jan 2019 05:19 )             29.2     01-21    140  |  106  |  45<H>  ----------------------------<  96  4.3   |  20<L>  |  3.50<H>    Ca    8.8      21 Jan 2019 05:17      proBNP:   Lipid Profile:   HgA1c:   TSH:

## 2019-01-21 NOTE — PROGRESS NOTE ADULT - ASSESSMENT
1) Perioperative risk assessment: Echo with normal LV function,  Stress with mild inf reversible ischemia, start asa daily , plan for C Wednesday      2) Right foot wound/OM - podiatry  on case, surgery postponed due to abnormal stress , plan for cath Wednesday     3) HTN - cont hydralazine    4) ABEL on CKD:  renal onboard, planned for cath on wednesday , High risk for TRACEY given baseline renal dysfunction,

## 2019-01-21 NOTE — PROGRESS NOTE ADULT - ASSESSMENT
Plan:   To OR today for RF tarsal exostectomy at 3 pm with Dr. Plunkett  Cardiac clearance pending stress test results.   Consent signed and in chart.  Procedure was explained to patient in detail. All alternatives, risks and complications were discussed. All questions answered.

## 2019-01-21 NOTE — PROGRESS NOTE ADULT - PROBLEM SELECTOR PLAN 1
Pt with CKD  Scr been stable 2.8-2.9 last admission.   now scr stable ~3.5 slightly fluctuating. +NST planning for angiogram on wed   CKD likely sec to DM/HTN  monitor bmp.  avoid nephrotoxic agents.  Patient high risk for contrast induced nephropathy, 26%, risk of dialysis 1.1%.   please give mucomyst 1200 bid for 4 doses start 1 day prior to test, last dose after test, sodium bicarb 150meq/L in sterile water at 200cc/hr x 1 hr 1hr prior to test then 75cc/hr x 6 hr after.

## 2019-01-21 NOTE — PROGRESS NOTE ADULT - SUBJECTIVE AND OBJECTIVE BOX
SUBJECTIVE / OVERNIGHT EVENTS: pt denies chest pain, shortness of breath     MEDICATIONS  (STANDING):  aspirin enteric coated 81 milliGRAM(s) Oral daily  atorvastatin 40 milliGRAM(s) Oral at bedtime  docusate sodium 100 milliGRAM(s) Oral two times a day  ferrous    sulfate 325 milliGRAM(s) Oral daily  heparin  Injectable 5000 Unit(s) SubCutaneous every 12 hours  hydrALAZINE 100 milliGRAM(s) Oral every 8 hours  lactated ringers. 1000 milliLiter(s) (100 mL/Hr) IV Continuous <Continuous>  lactobacillus acidophilus 1 Tablet(s) Oral daily  metoprolol succinate ER 25 milliGRAM(s) Oral daily  piperacillin/tazobactam IVPB. 3.375 Gram(s) IV Intermittent every 8 hours  sodium bicarbonate 650 milliGRAM(s) Oral three times a day    MEDICATIONS  (PRN):  acetaminophen   Tablet .. 650 milliGRAM(s) Oral every 6 hours PRN Mild Pain (1 - 3)  bisacodyl 5 milliGRAM(s) Oral every 48 hours PRN Constipation  oxyCODONE    5 mG/acetaminophen 325 mG 1 Tablet(s) Oral every 6 hours PRN Moderate Pain (4 - 6)    Vital Signs Last 24 Hrs  T(C): 36.1 (21 Jan 2019 12:23), Max: 36.7 (20 Jan 2019 21:29)  T(F): 97 (21 Jan 2019 12:23), Max: 98.1 (20 Jan 2019 21:29)  HR: 73 (21 Jan 2019 18:08) (64 - 94)  BP: 167/70 (21 Jan 2019 18:08) (148/60 - 167/70)  BP(mean): --  RR: 18 (21 Jan 2019 18:08) (18 - 18)  SpO2: 100% (21 Jan 2019 12:23) (95% - 100%)      Constitutional: No fever, fatigue  Skin: No rash.  Eyes: No recent vision problems or eye pain.  ENT: No congestion, ear pain, or sore throat.  Cardiovascular: No chest pain or palpation.  Respiratory: No cough, shortness of breath, congestion, or wheezing.  Gastrointestinal: No abdominal pain, nausea, vomiting, or diarrhea.  Genitourinary: No dysuria.  Musculoskeletal: No joint swelling.  Neurologic: No headache.    PHYSICAL EXAM:  GENERAL: NAD  EYES: EOMI, PERRLA  NECK: Supple, No JVD  CHEST/LUNG: dec breath sounds at bases   HEART:  S1 , S2 +  ABDOMEN: sof,t bs+  EXTREMITIES:  rt foot wound dressing +  NEUROLOGY:alert awake     LABS:  01-21    140  |  106  |  45<H>  ----------------------------<  96  4.3   |  20<L>  |  3.50<H>    Ca    8.8      21 Jan 2019 05:17      Creatinine Trend: 3.50 <--, 3.13 <--, 3.24 <--, 3.19 <--, 3.50 <--, 3.54 <--, 3.53 <--                        9.1    7.20  )-----------( 395      ( 21 Jan 2019 05:19 )             29.2     Urine Studies:              PT/INR - ( 21 Jan 2019 05:17 )   PT: 11.1 SEC;   INR: 0.97          PTT - ( 21 Jan 2019 05:17 )  PTT:32.9 SEC            PT/INR - ( 20 Jan 2019 06:20 )   PT: 11.8 SEC;   INR: 1.06          PTT - ( 20 Jan 2019 06:20 )  PTT:33.8 SEC                        RADIOLOGY & ADDITIONAL TESTS:    Imaging Personally Reviewed:    Consultant(s) Notes Reviewed:      Care Discussed with Consultants/Other Providers:

## 2019-01-21 NOTE — PROGRESS NOTE ADULT - ASSESSMENT
Right Charcot foot with DFU and resolving cellulitis   - continue with the Zosyn and local care with plain packing pending cardiology eval  - Cardiac cath for Wed  - Depending ion results will try to reschedule tarsal exostectomy and primary closure of the ulceratuon

## 2019-01-21 NOTE — PROGRESS NOTE ADULT - SUBJECTIVE AND OBJECTIVE BOX
Podiatry Pager #: [phone]    Patient is a 63y old  Male who presents with a chief complaint of diabetic foot ulcer/Osteomyelitis (20 Jan 2019 20:02)      INTERVAL HPI/OVERNIGHT EVENTS:   Pt is scheduled for RF tarsal exostectomy at 3 pm with Dr. Plunkett. Patient is aware of procedure and is NPO since midnight.    MEDICATIONS  (STANDING):  atorvastatin 40 milliGRAM(s) Oral at bedtime  docusate sodium 100 milliGRAM(s) Oral two times a day  ferrous    sulfate 325 milliGRAM(s) Oral daily  heparin  Injectable 5000 Unit(s) SubCutaneous every 12 hours  hydrALAZINE 100 milliGRAM(s) Oral every 8 hours  lactated ringers. 1000 milliLiter(s) (100 mL/Hr) IV Continuous <Continuous>  lactobacillus acidophilus 1 Tablet(s) Oral daily  metoprolol succinate ER 25 milliGRAM(s) Oral daily  piperacillin/tazobactam IVPB. 3.375 Gram(s) IV Intermittent every 8 hours  sodium bicarbonate 650 milliGRAM(s) Oral three times a day  sodium chloride 0.9%. 1000 milliLiter(s) (30 mL/Hr) IV Continuous <Continuous>    MEDICATIONS  (PRN):  acetaminophen   Tablet .. 650 milliGRAM(s) Oral every 6 hours PRN Mild Pain (1 - 3)  bisacodyl 5 milliGRAM(s) Oral every 48 hours PRN Constipation  oxyCODONE    5 mG/acetaminophen 325 mG 1 Tablet(s) Oral every 6 hours PRN Moderate Pain (4 - 6)      Allergies    No Known Allergies    Intolerances        Vital Signs Last 24 Hrs  T(C): 36.6 (21 Jan 2019 04:54), Max: 36.7 (20 Jan 2019 21:29)  T(F): 97.8 (21 Jan 2019 04:54), Max: 98.1 (20 Jan 2019 21:29)  HR: 66 (21 Jan 2019 06:41) (66 - 94)  BP: 159/70 (21 Jan 2019 06:41) (148/60 - 162/75)  BP(mean): --  RR: 18 (21 Jan 2019 04:54) (18 - 18)  SpO2: 97% (21 Jan 2019 04:54) (95% - 100%)    LABS:                        9.0    6.39  )-----------( 383      ( 20 Jan 2019 06:20 )             28.6     01-20    141  |  106  |  40<H>  ----------------------------<  96  3.9   |  22  |  3.13<H>    Ca    8.8      20 Jan 2019 06:20      PT/INR - ( 20 Jan 2019 06:20 )   PT: 11.8 SEC;   INR: 1.06          PTT - ( 20 Jan 2019 06:20 )  PTT:33.8 SEC    CAPILLARY BLOOD GLUCOSE

## 2019-01-21 NOTE — PROGRESS NOTE ADULT - SUBJECTIVE AND OBJECTIVE BOX
[facility] [place] [facility]   MD DOMO WALLACE MD ANGELA WONG, PA    TEL:  OFFICE: [phone]  DR LEE CELL: [phone]  DR. GRULLON CELL: [phone]  MELANIA CORONEL CELL: [phone]        Patient is a 63y old  Male who presents with a chief complaint of Osteomyelitis (21 Jan 2019 06:58)      Patient seen and examined at bedside. No chest pain/sob    VITALS:  T(F): 97.8 (01-21-19 @ 04:54), Max: 98.1 (01-20-19 @ 21:29)  HR: 66 (01-21-19 @ 06:41)  BP: 159/70 (01-21-19 @ 06:41)  RR: 18 (01-21-19 @ 04:54)  SpO2: 97% (01-21-19 @ 04:54)  Wt(kg): --    Height (cm): 177.8 (01-21 @ 04:30)  Weight (kg): 99.4 (01-21 @ 04:30)  BMI (kg/m2): 31.4 (01-21 @ 04:30)  BSA (m2): 2.17 (01-21 @ 04:30)    PHYSICAL EXAM:  Constitutional: NAD  Neck: No JVD  Respiratory: CTAB, no wheezes, rales or rhonchi  Cardiovascular: S1, S2, RRR  Gastrointestinal: BS+, soft, NT/ND  Extremities: right foot dressing d/c/i, 2+ right LE edema    [facility] Medications:   MEDICATIONS  (STANDING):  aspirin enteric coated 81 milliGRAM(s) Oral daily  atorvastatin 40 milliGRAM(s) Oral at bedtime  docusate sodium 100 milliGRAM(s) Oral two times a day  ferrous    sulfate 325 milliGRAM(s) Oral daily  heparin  Injectable 5000 Unit(s) SubCutaneous every 12 hours  hydrALAZINE 100 milliGRAM(s) Oral every 8 hours  lactated ringers. 1000 milliLiter(s) (100 mL/Hr) IV Continuous <Continuous>  lactobacillus acidophilus 1 Tablet(s) Oral daily  metoprolol succinate ER 25 milliGRAM(s) Oral daily  piperacillin/tazobactam IVPB. 3.375 Gram(s) IV Intermittent every 8 hours  sodium bicarbonate 650 milliGRAM(s) Oral three times a day  sodium chloride 0.9%. 1000 milliLiter(s) (30 mL/Hr) IV Continuous <Continuous>      LABS:  01-21    140  |  106  |  45<H>  ----------------------------<  96  4.3   |  20<L>  |  3.50<H>    Ca    8.8      21 Jan 2019 05:17      Creatinine Trend: 3.50 <--, 3.13 <--, 3.24 <--, 3.19 <--, 3.50 <--, 3.54 <--, 3.53 <--                                9.1    7.20  )-----------( 395      ( 21 Jan 2019 05:19 )             29.2     Urine Studies:      Iron 25, TIBC 145, %sat --      [01-17-19 @ 05:38]  Ferritin 291.5      [01-17-19 @ 05:38]  PTH 42.84 (Ca --)      [09-11-18 @ 05:45]   --  Vitamin D (25OH) 11.8      [09-11-18 @ 05:45]  HbA1c 6.0      [01-15-19 @ 05:45]  TSH 1.74      [01-15-19 @ 05:45]        RADIOLOGY & ADDITIONAL STUDIES:

## 2019-01-22 LAB
ANION GAP SERPL CALC-SCNC: 12 MMO/L — SIGNIFICANT CHANGE UP (ref 7–14)
BUN SERPL-MCNC: 42 MG/DL — HIGH (ref 7–23)
CALCIUM SERPL-MCNC: 8.8 MG/DL — SIGNIFICANT CHANGE UP (ref 8.4–10.5)
CHLORIDE SERPL-SCNC: 107 MMOL/L — SIGNIFICANT CHANGE UP (ref 98–107)
CO2 SERPL-SCNC: 21 MMOL/L — LOW (ref 22–31)
CREAT SERPL-MCNC: 3.26 MG/DL — HIGH (ref 0.5–1.3)
GLUCOSE SERPL-MCNC: 92 MG/DL — SIGNIFICANT CHANGE UP (ref 70–99)
HCT VFR BLD CALC: 29.1 % — LOW (ref 39–50)
HGB BLD-MCNC: 9 G/DL — LOW (ref 13–17)
MCHC RBC-ENTMCNC: 27.9 PG — SIGNIFICANT CHANGE UP (ref 27–34)
MCHC RBC-ENTMCNC: 30.9 % — LOW (ref 32–36)
MCV RBC AUTO: 90.1 FL — SIGNIFICANT CHANGE UP (ref 80–100)
NRBC # FLD: 0 K/UL — LOW (ref 25–125)
PLATELET # BLD AUTO: 401 K/UL — HIGH (ref 150–400)
PMV BLD: 10.3 FL — SIGNIFICANT CHANGE UP (ref 7–13)
POTASSIUM SERPL-MCNC: 4.1 MMOL/L — SIGNIFICANT CHANGE UP (ref 3.5–5.3)
POTASSIUM SERPL-SCNC: 4.1 MMOL/L — SIGNIFICANT CHANGE UP (ref 3.5–5.3)
RBC # BLD: 3.23 M/UL — LOW (ref 4.2–5.8)
RBC # FLD: 13.6 % — SIGNIFICANT CHANGE UP (ref 10.3–14.5)
SODIUM SERPL-SCNC: 140 MMOL/L — SIGNIFICANT CHANGE UP (ref 135–145)
WBC # BLD: 7.33 K/UL — SIGNIFICANT CHANGE UP (ref 3.8–10.5)
WBC # FLD AUTO: 7.33 K/UL — SIGNIFICANT CHANGE UP (ref 3.8–10.5)

## 2019-01-22 RX ADMIN — PIPERACILLIN AND TAZOBACTAM 25 GRAM(S): 4; .5 INJECTION, POWDER, LYOPHILIZED, FOR SOLUTION INTRAVENOUS at 05:10

## 2019-01-22 RX ADMIN — Medication 25 MILLIGRAM(S): at 05:07

## 2019-01-22 RX ADMIN — Medication 100 MILLIGRAM(S): at 17:22

## 2019-01-22 RX ADMIN — Medication 81 MILLIGRAM(S): at 12:33

## 2019-01-22 RX ADMIN — HEPARIN SODIUM 5000 UNIT(S): 5000 INJECTION INTRAVENOUS; SUBCUTANEOUS at 05:08

## 2019-01-22 RX ADMIN — Medication 100 MILLIGRAM(S): at 12:29

## 2019-01-22 RX ADMIN — Medication 650 MILLIGRAM(S): at 21:36

## 2019-01-22 RX ADMIN — Medication 650 MILLIGRAM(S): at 05:07

## 2019-01-22 RX ADMIN — Medication 1200 MILLIGRAM(S): at 18:08

## 2019-01-22 RX ADMIN — Medication 100 MILLIGRAM(S): at 05:07

## 2019-01-22 RX ADMIN — Medication 100 MILLIGRAM(S): at 21:37

## 2019-01-22 RX ADMIN — Medication 325 MILLIGRAM(S): at 12:29

## 2019-01-22 RX ADMIN — PIPERACILLIN AND TAZOBACTAM 25 GRAM(S): 4; .5 INJECTION, POWDER, LYOPHILIZED, FOR SOLUTION INTRAVENOUS at 21:37

## 2019-01-22 RX ADMIN — HEPARIN SODIUM 5000 UNIT(S): 5000 INJECTION INTRAVENOUS; SUBCUTANEOUS at 17:22

## 2019-01-22 RX ADMIN — Medication 1 TABLET(S): at 12:29

## 2019-01-22 RX ADMIN — Medication 1200 MILLIGRAM(S): at 05:08

## 2019-01-22 RX ADMIN — Medication 650 MILLIGRAM(S): at 12:28

## 2019-01-22 RX ADMIN — PIPERACILLIN AND TAZOBACTAM 25 GRAM(S): 4; .5 INJECTION, POWDER, LYOPHILIZED, FOR SOLUTION INTRAVENOUS at 12:28

## 2019-01-22 RX ADMIN — ATORVASTATIN CALCIUM 40 MILLIGRAM(S): 80 TABLET, FILM COATED ORAL at 21:37

## 2019-01-22 NOTE — PROGRESS NOTE ADULT - SUBJECTIVE AND OBJECTIVE BOX
Patient is a 63y old  Male who presents with a chief complaint of Osteomyelitis (22 Jan 2019 14:20)       INTERVAL HPI/OVERNIGHT EVENTS:  Patient seen and evaluated at bedside.  Pt is resting comfortable in NAD. Denies N/V/F/C.     Allergies    No Known Allergies    Intolerances        Vital Signs Last 24 Hrs  T(C): 36.9 (22 Jan 2019 12:25), Max: 36.9 (22 Jan 2019 12:25)  T(F): 98.4 (22 Jan 2019 12:25), Max: 98.4 (22 Jan 2019 12:25)  HR: 66 (22 Jan 2019 12:25) (65 - 73)  BP: 149/72 (22 Jan 2019 12:25) (149/72 - 167/76)  BP(mean): --  RR: 18 (22 Jan 2019 12:25) (18 - 18)  SpO2: 98% (22 Jan 2019 12:25) (98% - 98%)    LABS:                        9.0    7.33  )-----------( 401      ( 22 Jan 2019 05:30 )             29.1     01-22    140  |  107  |  42<H>  ----------------------------<  92  4.1   |  21<L>  |  3.26<H>    Ca    8.8      22 Jan 2019 05:30      PT/INR - ( 21 Jan 2019 05:17 )   PT: 11.1 SEC;   INR: 0.97          PTT - ( 21 Jan 2019 05:17 )  PTT:32.9 SEC    CAPILLARY BLOOD GLUCOSE          Lower Extremity Physical Exam:  Ulceration plantar tot he right LA stable with no necrosis of the skin edges and resolving cellulitis and edema.  There is mild serous drainage and no pus

## 2019-01-22 NOTE — DIETITIAN INITIAL EVALUATION ADULT. - ENERGY NEEDS
Ht: 70 in    Wt: 219.1 pounds BMI: 31.4  IBW: 166 pounds   Skin: 3+ R foot, ankle. Diabetic ulcer on R malleous

## 2019-01-22 NOTE — DIETITIAN INITIAL EVALUATION ADULT. - OTHER INFO
Pt seen for Length Of Stay initial assessment. Pt is a 64 y/o M presenting with osteomyelitis. Plans for cardiac cath tomorrow, and surgery 1/24. PMH includes DM2, CKD Stage III, and HTN. Pt dislikes foods in hospital; however, good PO intake. Receives some foods from family. No GI distress (nausea/vomiting/diarrhea/constipation.) +BM 1/23 loose (on colace and senna). No chewing or swallowing difficulties at this time. Pt with dentures.

## 2019-01-22 NOTE — PROGRESS NOTE ADULT - PROBLEM SELECTOR PLAN 1
Pt with CKD  Scr been stable 2.8-2.9 last admission.   now scr stable ~3.5 slightly fluctuating. +NST planning for angiogram on wed   CKD likely sec to DM/HTN  monitor bmp.  avoid nephrotoxic agents.  Patient high risk for contrast induced nephropathy, 26%, risk of dialysis 1.1%.   Mucomyst and bicarb gtt as ordered

## 2019-01-22 NOTE — PROGRESS NOTE ADULT - SUBJECTIVE AND OBJECTIVE BOX
SUBJECTIVE / OVERNIGHT EVENTS: pt denies chest pain, shortness of breath     MEDICATIONS  (STANDING):  acetylcysteine  Oral Solution 1200 milliGRAM(s) Oral two times a day  aspirin enteric coated 81 milliGRAM(s) Oral daily  atorvastatin 40 milliGRAM(s) Oral at bedtime  docusate sodium 100 milliGRAM(s) Oral two times a day  ferrous    sulfate 325 milliGRAM(s) Oral daily  heparin  Injectable 5000 Unit(s) SubCutaneous every 12 hours  hydrALAZINE 100 milliGRAM(s) Oral every 8 hours  lactated ringers. 1000 milliLiter(s) (100 mL/Hr) IV Continuous <Continuous>  lactobacillus acidophilus 1 Tablet(s) Oral daily  metoprolol succinate ER 25 milliGRAM(s) Oral daily  piperacillin/tazobactam IVPB. 3.375 Gram(s) IV Intermittent every 8 hours  sodium bicarbonate 650 milliGRAM(s) Oral three times a day    MEDICATIONS  (PRN):  acetaminophen   Tablet .. 650 milliGRAM(s) Oral every 6 hours PRN Mild Pain (1 - 3)  bisacodyl 5 milliGRAM(s) Oral every 48 hours PRN Constipation  oxyCODONE    5 mG/acetaminophen 325 mG 1 Tablet(s) Oral every 6 hours PRN Moderate Pain (4 - 6)    Vital Signs Last 24 Hrs  T(C): 37.1 (22 Jan 2019 20:56), Max: 37.1 (22 Jan 2019 20:56)  T(F): 98.7 (22 Jan 2019 20:56), Max: 98.7 (22 Jan 2019 20:56)  HR: 74 (22 Jan 2019 20:56) (65 - 74)  BP: 155/74 (22 Jan 2019 20:56) (149/72 - 167/76)  BP(mean): --  RR: 18 (22 Jan 2019 20:56) (18 - 18)  SpO2: 99% (22 Jan 2019 20:56) (98% - 99%)    Constitutional: No fever, fatigue  Skin: No rash.  Eyes: No recent vision problems or eye pain.  ENT: No congestion, ear pain, or sore throat.  Cardiovascular: No chest pain or palpation.  Respiratory: No cough, shortness of breath, congestion, or wheezing.  Gastrointestinal: No abdominal pain, nausea, vomiting, or diarrhea.  Genitourinary: No dysuria.  Musculoskeletal: No joint swelling.  Neurologic: No headache.    PHYSICAL EXAM:  GENERAL: NAD  EYES: EOMI, PERRLA  NECK: Supple, No JVD  CHEST/LUNG: dec breath sounds at bases   HEART:  S1 , S2 +  ABDOMEN: sof,t bs+  EXTREMITIES:  rt foot wound dressing +  NEUROLOGY:alert awake     LABS:  01-22    140  |  107  |  42<H>  ----------------------------<  92  4.1   |  21<L>  |  3.26<H>    Ca    8.8      22 Jan 2019 05:30      Creatinine Trend: 3.26 <--, 3.50 <--, 3.13 <--, 3.24 <--, 3.19 <--, 3.50 <--                        9.0    7.33  )-----------( 401      ( 22 Jan 2019 05:30 )             29.1     Urine Studies:              PT/INR - ( 21 Jan 2019 05:17 )   PT: 11.1 SEC;   INR: 0.97          PTT - ( 21 Jan 2019 05:17 )  PTT:32.9 SEC          PT/INR - ( 21 Jan 2019 05:17 )   PT: 11.1 SEC;   INR: 0.97          PTT - ( 21 Jan 2019 05:17 )  PTT:32.9 SEC            PT/INR - ( 20 Jan 2019 06:20 )   PT: 11.8 SEC;   INR: 1.06          PTT - ( 20 Jan 2019 06:20 )  PTT:33.8 SEC                        RADIOLOGY & ADDITIONAL TESTS:    Imaging Personally Reviewed:    Consultant(s) Notes Reviewed:      Care Discussed with Consultants/Other Providers:

## 2019-01-22 NOTE — DIETITIAN INITIAL EVALUATION ADULT. - PROBLEM SELECTOR PLAN 5
- reports inadequate relief; had small BM in the AM  - ensure optimal hydration  - started on colace 100 mg BID  - also added dulcolax 5 mg Q48H PRN  - f/u TSH level in the AM  - f/u AM lab-work

## 2019-01-22 NOTE — PROGRESS NOTE ADULT - ASSESSMENT
1) Perioperative risk assessment: Echo with normal LV function,  Stress with mild inf reversible ischemia, start asa daily , plan for LHC tomorrow      2) Right foot wound/OM - podiatry  on case, surgery postponed due to abnormal stress , plan for cath Wednesday     3) HTN - cont hydralazine    4) ABEL on CKD:  renal onboard, planned for cath on wednesday , High risk for TRACEY given baseline renal dysfunction,

## 2019-01-22 NOTE — DIETITIAN INITIAL EVALUATION ADULT. - PROBLEM SELECTOR PLAN 3
- BUN/Cr worsened, but likely due to acute on CKD; 55/3.53 (previously 31/2.91 in 09/2018)  - potassium = 4.7  - IVF hydration in progress  - f/u for improvement

## 2019-01-22 NOTE — PROGRESS NOTE ADULT - SUBJECTIVE AND OBJECTIVE BOX
Infectious Diseases progress note:    Subjective:  Coverage appreciated.  Afebrile, no acute o/n events.  Pt awaiting cardiac cath on Wed, prior to surgery    ROS:  CONSTITUTIONAL:  No fever, chills, rigors  CARDIOVASCULAR:  No chest pain or palpitations  RESPIRATORY:   No SOB, cough, dyspnea on exertion.  No wheezing  GASTROINTESTINAL:  No abd pain, N/V, diarrhea/constipation  EXTREMITIES:  No swelling or joint pain  GENITOURINARY:  No burning on urination, increased frequency or urgency.  No flank pain  NEUROLOGIC:  No HA, visual disturbances  SKIN: No rashes    Allergies    No Known Allergies    Intolerances        ANTIBIOTICS/RELEVANT:  antimicrobials  piperacillin/tazobactam IVPB. 3.375 Gram(s) IV Intermittent every 8 hours    immunologic:    OTHER:  acetaminophen   Tablet .. 650 milliGRAM(s) Oral every 6 hours PRN  acetylcysteine  Oral Solution 1200 milliGRAM(s) Oral two times a day  aspirin enteric coated 81 milliGRAM(s) Oral daily  atorvastatin 40 milliGRAM(s) Oral at bedtime  bisacodyl 5 milliGRAM(s) Oral every 48 hours PRN  docusate sodium 100 milliGRAM(s) Oral two times a day  ferrous    sulfate 325 milliGRAM(s) Oral daily  heparin  Injectable 5000 Unit(s) SubCutaneous every 12 hours  hydrALAZINE 100 milliGRAM(s) Oral every 8 hours  lactated ringers. 1000 milliLiter(s) IV Continuous <Continuous>  lactobacillus acidophilus 1 Tablet(s) Oral daily  metoprolol succinate ER 25 milliGRAM(s) Oral daily  oxyCODONE    5 mG/acetaminophen 325 mG 1 Tablet(s) Oral every 6 hours PRN  sodium bicarbonate 650 milliGRAM(s) Oral three times a day      Objective:  Vital Signs Last 24 Hrs  T(C): 36.9 (22 Jan 2019 12:25), Max: 36.9 (22 Jan 2019 12:25)  T(F): 98.4 (22 Jan 2019 12:25), Max: 98.4 (22 Jan 2019 12:25)  HR: 66 (22 Jan 2019 12:25) (65 - 73)  BP: 149/72 (22 Jan 2019 12:25) (149/72 - 167/76)  BP(mean): --  RR: 18 (22 Jan 2019 12:25) (18 - 18)  SpO2: 98% (22 Jan 2019 12:25) (98% - 98%)    PHYSICAL EXAM:  Constitutional:NAD  Eyes:SPIKE, EOMI  Ear/Nose/Throat: no thrush, mucositis.  Moist mucous membranes	  Neck:no JVD, no lymphadenopathy, supple  Respiratory: CTA lobito  Cardiovascular: S1S2 RRR, no murmurs  Gastrointestinal:soft, nontender,  nondistended (+) BS  Extremities:no e/e/c  Skin:  rt plantar ulcer dsg c/d/i        LABS:                        9.0    7.33  )-----------( 401      ( 22 Jan 2019 05:30 )             29.1     01-22    140  |  107  |  42<H>  ----------------------------<  92  4.1   |  21<L>  |  3.26<H>    Ca    8.8      22 Jan 2019 05:30      PT/INR - ( 21 Jan 2019 05:17 )   PT: 11.1 SEC;   INR: 0.97          PTT - ( 21 Jan 2019 05:17 )  PTT:32.9 SEC              MICROBIOLOGY:    Culture - Blood (01.15.19 @ 07:45)    Culture - Blood:   NO ORGANISMS ISOLATED    Specimen Source: BLOOD VENOUS    Culture - Blood (01.15.19 @ 07:45)    Culture - Blood:   NO ORGANISMS ISOLATED    Specimen Source: BLOOD PERIPHERAL    Culture - Abscess with Gram Stain (01.14.19 @ 18:54)    -  Trimethoprim/Sulfamethoxazole: S <=0.5/9.5 ULISSES    -  Vancomycin: S 1 ULISSES    -  Tobramycin: S <=2 ULISSES    -  Piperacillin/Tazobactam: S    -  Rifampin: S <=1 ULISSES    -  Tetra/Doxy: S <=1 ULISSES    -  Aztreonam: S <=4 ULISSES    -  Ceftazidime: S 4 ULISSES    -  Cefepime: S <=2 ULISSES    -  Cefazolin: S <=4 ULISSES    -  Amikacin: S <=8 ULISSES    -  Meropenem: S <=1 ULISSES    -  Moxifloxacin(Aerobic): S <=0.5 ULISSES    -  Oxacillin: S <=0.25 ULISSES    -  Levofloxacin: S <=1 ULISSES    -  Levofloxacin: S <=0.5 ULISSES    -  Imipenem: S <=1 ULISSES    -  Gentamicin: S 4 ULISSES    -  Gentamicin: S <=1 ULISSES    -  Erythromycin: S    -  Ciprofloxacin: S <=0.5 ULISSES    -  Ciprofloxacin: S <=1 ULISSES    -  Clindamycin: S 0.5 ULISSES    Specimen Source: OTHER    Gram Stain Result:   GPCPR Gram Pos Cocci in Pairs  QUANTITY OF BACTERIA SEEN: MANY (4+)  [facility]^Gram Pos Cocci in Chains  QUANTITY OF BACTERIA SEEN: MANY (4+)  WBC^White Blood Cells  QNTY CELLS IN GRAM STAIN: MANY (4+)    Culture Results:   PSA^Pseudomonas aeruginosa  STAU^Staphylococcus aureus  STRAG^Streptococcus agalactiae Gp B    Culture Results:   Sensitivities not routinely performed.                *******************************                * This is an appended result. *                *******************************  A prior result that was reported as final has been changed.    Organism Identification: Pseudomonas aeruginosa  Staphylococcus aureus  Streptococcus agalactiae Gp B    Organism: Staphylococcus aureus    Organism: Streptococcus agalactiae Gp B    Organism: Pseudomonas aeruginosa    Method Type: NEGATIVE ULISSES 43    Method Type: POSITIVE ULISSES 29        RADIOLOGY & ADDITIONAL STUDIES:    < from: NM Bone Marrow Imaging Multiple Areas (01.16.19 @ 13:57) >  FINDINGS:  There is congruent uptake of radiolabeled leukocytes and   Tc-99m sulfur colloid in the imaged osseous structures. There are no foci   of abnormal labeled leukocyte accumulation to suggest the presence of   osteomyelitis in either foot..    IMPRESSION:   Normal combined Indium-111 labeled leukocyte study and   marrow scan. No evidence of osteomyelitis. No evidence of infected   hardware distal right fibula.    < end of copied text >

## 2019-01-22 NOTE — PROGRESS NOTE ADULT - ASSESSMENT
Right Charcot foot with DFU and resolving cellulitis   - continue with the Zosyn and local care with plain packing  - Cardiac cath for Wed  - tarsal exostectomy and primary closure of the ulceratuon surgery rescheduled for Friday 1/25 at 3:30 pm  - seen w/ attending

## 2019-01-22 NOTE — DIETITIAN INITIAL EVALUATION ADULT. - PROBLEM SELECTOR PLAN 4
- no issues presently  - patient only cites being on hydralazine, but past history notes metoprolol and amlodipine (will need [facility] pharmacist's assistance)  - prescribed hydralazine 100 mg Q8H  - follow with repeat measurements

## 2019-01-22 NOTE — PROGRESS NOTE ADULT - ASSESSMENT
63 year old male, with past history significant for Type-II DM, HTN, and R-Foot Charcot deformity, Osteomyelitis, Vitamin D deficiency, Anemia, CKD-III, HLD and GERD, presented to the ED, as sent by podiatrist, secondary to concern for osteomyelitis.  Seen and evaluated at bedside; NAD.  Patient relates noticing increasing swelling of the right foot for the past ~ one week, to the extent of not being able to fit into the shoe.  Had erythema of the foot and brownish, foul smelling drainage from the ulcer site.  Complained of pain, inadequately relieved with Tylenol.  Reports mild subjective fever, chills and sweating.  No report of nausea, vomiting, headaches, dizziness, chest pain, shortness of breath, abdominal pain, diarrhea or dysuria.  States having mild constipation.    Patient was recently admitted in Sep 2018 for clinical OM of rt foot plantar ulcer, with (+) PTB.  Pt grew Arthrobacter, actinomyces, coryne and strep.  Pt completed 6 week course of IV zosyn.  Pt states he wore a boot over right foot, ulcer had temporararily closed up, however he noticed wound starting to open up again recently and noted some discharge and malodor.      Vital signs upon ED presentation as follows: BP = 147/66, HR = 87, RR = 18, T = 36.9 C (98.5 F), O2 Sat = 100% on RA.  Prescribed zosyn and vancomycin in the ED. (14 Jan 2019 21:34).    No leukocytosis.  , .  Wound cx (+) pseudomonas, MSSA, GBS.  Indium scan negative for OM.  Pt seen by Podiatry, and planned for tarsal exostectomy.     ID consult called for further abx managment.        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  Now with recurrent infection.  Planned for OR bone resection (tarsal exostectomy).  Cont zosyn - wound cx with GBS, MSSA, pseudomonas.      - f/u intra-op cultures and bone pathology    - f/u blood cultures - NGTD.    * Pt awaiting cardiac clearance, pending angiogram on Wednesday prior to surgery    Will follow,    Salima Spivey  [phone]

## 2019-01-22 NOTE — DIETITIAN INITIAL EVALUATION ADULT. - PROBLEM SELECTOR PLAN 2
- moderately dry oral mucosa  - renal function suggests acute on CKD  - IVF LR 1 liter over 10 hours  - f/u for improvement

## 2019-01-22 NOTE — DIETITIAN INITIAL EVALUATION ADULT. - PERTINENT LABORATORY DATA
01-22 Na 140 mmol/L Glu 92 mg/dL K+ 4.1 mmol/L Cr 3.26 mg/dL<H> BUN 42 mg/dL<H>  Hgb 9.0 g/dL<L> Hct 29.1 %<L>   Hemoglobin A1C, Whole Blood: 6.0 % (01-15-19 @ 05:45)

## 2019-01-22 NOTE — DIETITIAN INITIAL EVALUATION ADULT. - NS AS NUTRI INTERV MEALS SNACK
1. Recommend consistent carbohydrate, low sodium diet. 2. Monitor weights, labs, BM's, skin integrity, p.o. intake.

## 2019-01-22 NOTE — DIETITIAN INITIAL EVALUATION ADULT. - ADHERENCE
Good management of BG. HbA1c 6.0. Pt eats mostly at fast food restaurants and diners. Pt says he tries to choose healthier options and cut down on portions. reads nutrition labels for sodium./fair

## 2019-01-22 NOTE — DIETITIAN INITIAL EVALUATION ADULT. - PERTINENT MEDS FT
MEDICATIONS  (STANDING):  acetylcysteine  Oral Solution 1200 milliGRAM(s) Oral two times a day  aspirin enteric coated 81 milliGRAM(s) Oral daily  atorvastatin 40 milliGRAM(s) Oral at bedtime  docusate sodium 100 milliGRAM(s) Oral two times a day  ferrous    sulfate 325 milliGRAM(s) Oral daily  heparin  Injectable 5000 Unit(s) SubCutaneous every 12 hours  hydrALAZINE 100 milliGRAM(s) Oral every 8 hours  lactated ringers. 1000 milliLiter(s) (100 mL/Hr) IV Continuous <Continuous>  lactobacillus acidophilus 1 Tablet(s) Oral daily  metoprolol succinate ER 25 milliGRAM(s) Oral daily  piperacillin/tazobactam IVPB. 3.375 Gram(s) IV Intermittent every 8 hours  sodium bicarbonate 650 milliGRAM(s) Oral three times a day

## 2019-01-22 NOTE — DIETITIAN INITIAL EVALUATION ADULT. - NS AS NUTRI INTERV ED CONTENT
Discussed with pt strategies to limit sodium intake while eating out. Discussed controlling portion sizes, reading nutrition labels for sodium and total carbohydrates, and limiting concentrated sweets and salty snacks.

## 2019-01-22 NOTE — PROGRESS NOTE ADULT - SUBJECTIVE AND OBJECTIVE BOX
[facility] [place]   MD DOMO WALLACE MD ANGELA WONG, PA    TEL:  OFFICE: [phone]  DR LEE CELL: [phone]  DR. GRULLON CELL: [phone]  MELANIA CORONEL CELL: [phone]        Patient is a 63y old  Male who presents with a chief complaint of Osteomyelitis (21 Jan 2019 14:45)      Patient seen and examined at bedside. No chest pain/sob    VITALS:  T(F): 97.5 (01-22-19 @ 05:00), Max: 97.7 (01-21-19 @ 20:58)  HR: 65 (01-22-19 @ 05:00)  BP: 167/76 (01-22-19 @ 05:00)  RR: 18 (01-22-19 @ 05:00)  SpO2: 98% (01-22-19 @ 05:00)  Wt(kg): --        PHYSICAL EXAM:  Constitutional: NAD  Neck: No JVD  Respiratory: CTAB, no wheezes, rales or rhonchi  Cardiovascular: S1, S2, RRR  Gastrointestinal: BS+, soft, NT/ND  Extremities: right foot dressing d/c/i, 1-2+ edema    Hospital Medications:   MEDICATIONS  (STANDING):  acetylcysteine  Oral Solution 1200 milliGRAM(s) Oral two times a day  aspirin enteric coated 81 milliGRAM(s) Oral daily  atorvastatin 40 milliGRAM(s) Oral at bedtime  docusate sodium 100 milliGRAM(s) Oral two times a day  ferrous    sulfate 325 milliGRAM(s) Oral daily  heparin  Injectable 5000 Unit(s) SubCutaneous every 12 hours  hydrALAZINE 100 milliGRAM(s) Oral every 8 hours  lactated ringers. 1000 milliLiter(s) (100 mL/Hr) IV Continuous <Continuous>  lactobacillus acidophilus 1 Tablet(s) Oral daily  metoprolol succinate ER 25 milliGRAM(s) Oral daily  piperacillin/tazobactam IVPB. 3.375 Gram(s) IV Intermittent every 8 hours  sodium bicarbonate 650 milliGRAM(s) Oral three times a day      LABS:  01-22    140  |  107  |  42<H>  ----------------------------<  92  4.1   |  21<L>  |  3.26<H>    Ca    8.8      22 Jan 2019 05:30      Creatinine Trend: 3.26 <--, 3.50 <--, 3.13 <--, 3.24 <--, 3.19 <--, 3.50 <--                                9.0    7.33  )-----------( 401      ( 22 Jan 2019 05:30 )             29.1     Urine Studies:      Iron 25, TIBC 145, %sat --      [01-17-19 @ 05:38]  Ferritin 291.5      [01-17-19 @ 05:38]  PTH 42.84 (Ca --)      [09-11-18 @ 05:45]   --  Vitamin D (25OH) 11.8      [09-11-18 @ 05:45]  HbA1c 6.0      [01-15-19 @ 05:45]  TSH 1.74      [01-15-19 @ 05:45]        RADIOLOGY & ADDITIONAL STUDIES:

## 2019-01-22 NOTE — DIETITIAN INITIAL EVALUATION ADULT. - PROBLEM SELECTOR PLAN 1
- erythema, swelling, pain, brownish exudate  from plantar ulcer, subjective fevers, chills, sweating of right foot/ankle/leg; recurrent issue  - ESR = 111, CRP = 120.4  - treated w/ IV abx via PICC during the past few months  - wound culture w/ Gram stain = [facility] (f/u for additional results)  - x-ray demonstrative of " osteopenic bones charcot arthropathy," (prelim)  - started on vancomycin and zosyn; will continue for now  - already seen by Podiatry team (appreciated)  - ensure optimal hydration  - Tylenol PRN mild pain and percocet PRN moderate pain  - f/u cultures

## 2019-01-22 NOTE — PROGRESS NOTE ADULT - SUBJECTIVE AND OBJECTIVE BOX
Keaton Duvall MD  Interventional Cardiology  [place] Office : [phone] [place] [id number]  Tel:   [place] Office : [address] [facility] [place] [address]  Tel: [phone]  Cell : [phone]    Subjective : Pt lying in bed comfortable, not in distress, denies any chest pain or SOB  	  MEDICATIONS:  aspirin enteric coated 81 milliGRAM(s) Oral daily  heparin  Injectable 5000 Unit(s) SubCutaneous every 12 hours  hydrALAZINE 100 milliGRAM(s) Oral every 8 hours  metoprolol succinate ER 25 milliGRAM(s) Oral daily    piperacillin/tazobactam IVPB. 3.375 Gram(s) IV Intermittent every 8 hours      acetaminophen   Tablet .. 650 milliGRAM(s) Oral every 6 hours PRN  oxyCODONE    5 mG/acetaminophen 325 mG 1 Tablet(s) Oral every 6 hours PRN    bisacodyl 5 milliGRAM(s) Oral every 48 hours PRN  docusate sodium 100 milliGRAM(s) Oral two times a day    atorvastatin 40 milliGRAM(s) Oral at bedtime    ferrous    sulfate 325 milliGRAM(s) Oral daily  lactated ringers. 1000 milliLiter(s) IV Continuous <Continuous>  sodium bicarbonate 650 milliGRAM(s) Oral three times a day  sodium bicarbonate  Infusion 0.302 mEq/kG/Hr IV Continuous <Continuous>  sodium bicarbonate  Infusion 0.113 mEq/kG/Hr IV Continuous <Continuous>      PHYSICAL EXAM:  T(C): 37.1 (01-22-19 @ 20:56), Max: 37.1 (01-22-19 @ 20:56)  HR: 74 (01-22-19 @ 20:56) (65 - 74)  BP: 155/74 (01-22-19 @ 20:56) (149/72 - 167/76)  RR: 18 (01-22-19 @ 20:56) (18 - 18)  SpO2: 99% (01-22-19 @ 20:56) (98% - 99%)  Wt(kg): --  I&O's Summary        HEENT:   Normal oral mucosa, PERRL, EOMI	  Cardiovascular: Normal S1 S2, No JVD, No murmurs, No edema  Respiratory: Lungs clear to auscultation	  Gastrointestinal:  Soft, Non-tender, + BS	  Extremities: right foot wound covered                                  9.0    7.33  )-----------( 401      ( 22 Jan 2019 05:30 )             29.1     01-22    140  |  107  |  42<H>  ----------------------------<  92  4.1   |  21<L>  |  3.26<H>    Ca    8.8      22 Jan 2019 05:30      proBNP:   Lipid Profile:   HgA1c:   TSH:

## 2019-01-23 ENCOUNTER — TRANSCRIPTION ENCOUNTER (OUTPATIENT)
Age: 64
End: 2019-01-23

## 2019-01-23 LAB
ANION GAP SERPL CALC-SCNC: 14 MMO/L — SIGNIFICANT CHANGE UP (ref 7–14)
BUN SERPL-MCNC: 44 MG/DL — HIGH (ref 7–23)
CALCIUM SERPL-MCNC: 8.6 MG/DL — SIGNIFICANT CHANGE UP (ref 8.4–10.5)
CHLORIDE SERPL-SCNC: 104 MMOL/L — SIGNIFICANT CHANGE UP (ref 98–107)
CO2 SERPL-SCNC: 21 MMOL/L — LOW (ref 22–31)
CREAT SERPL-MCNC: 3.3 MG/DL — HIGH (ref 0.5–1.3)
GLUCOSE SERPL-MCNC: 102 MG/DL — HIGH (ref 70–99)
HCT VFR BLD CALC: 30 % — LOW (ref 39–50)
HGB BLD-MCNC: 9.1 G/DL — LOW (ref 13–17)
MAGNESIUM SERPL-MCNC: 1.7 MG/DL — SIGNIFICANT CHANGE UP (ref 1.6–2.6)
MCHC RBC-ENTMCNC: 27.6 PG — SIGNIFICANT CHANGE UP (ref 27–34)
MCHC RBC-ENTMCNC: 30.3 % — LOW (ref 32–36)
MCV RBC AUTO: 90.9 FL — SIGNIFICANT CHANGE UP (ref 80–100)
NRBC # FLD: 0 K/UL — LOW (ref 25–125)
PHOSPHATE SERPL-MCNC: 3.9 MG/DL — SIGNIFICANT CHANGE UP (ref 2.5–4.5)
PLATELET # BLD AUTO: 409 K/UL — HIGH (ref 150–400)
PMV BLD: 10.5 FL — SIGNIFICANT CHANGE UP (ref 7–13)
POTASSIUM SERPL-MCNC: 3.9 MMOL/L — SIGNIFICANT CHANGE UP (ref 3.5–5.3)
POTASSIUM SERPL-SCNC: 3.9 MMOL/L — SIGNIFICANT CHANGE UP (ref 3.5–5.3)
RBC # BLD: 3.3 M/UL — LOW (ref 4.2–5.8)
RBC # FLD: 13.7 % — SIGNIFICANT CHANGE UP (ref 10.3–14.5)
SODIUM SERPL-SCNC: 139 MMOL/L — SIGNIFICANT CHANGE UP (ref 135–145)
WBC # BLD: 7.41 K/UL — SIGNIFICANT CHANGE UP (ref 3.8–10.5)
WBC # FLD AUTO: 7.41 K/UL — SIGNIFICANT CHANGE UP (ref 3.8–10.5)

## 2019-01-23 PROCEDURE — 93010 ELECTROCARDIOGRAM REPORT: CPT

## 2019-01-23 RX ORDER — CLOPIDOGREL BISULFATE 75 MG/1
75 TABLET, FILM COATED ORAL DAILY
Qty: 0 | Refills: 0 | Status: DISCONTINUED | OUTPATIENT
Start: 2019-01-24 | End: 2019-01-24

## 2019-01-23 RX ORDER — ACETYLCYSTEINE 200 MG/ML
1200 VIAL (ML) MISCELLANEOUS ONCE
Qty: 0 | Refills: 0 | Status: COMPLETED | OUTPATIENT
Start: 2019-01-23 | End: 2019-01-23

## 2019-01-23 RX ORDER — HYDRALAZINE HCL 50 MG
10 TABLET ORAL ONCE
Qty: 0 | Refills: 0 | Status: DISCONTINUED | OUTPATIENT
Start: 2019-01-23 | End: 2019-01-24

## 2019-01-23 RX ADMIN — Medication 1 TABLET(S): at 19:28

## 2019-01-23 RX ADMIN — Medication 1200 MILLIGRAM(S): at 21:56

## 2019-01-23 RX ADMIN — PIPERACILLIN AND TAZOBACTAM 25 GRAM(S): 4; .5 INJECTION, POWDER, LYOPHILIZED, FOR SOLUTION INTRAVENOUS at 13:16

## 2019-01-23 RX ADMIN — Medication 100 MILLIGRAM(S): at 05:57

## 2019-01-23 RX ADMIN — Medication 81 MILLIGRAM(S): at 08:21

## 2019-01-23 RX ADMIN — Medication 100 MILLIGRAM(S): at 12:23

## 2019-01-23 RX ADMIN — Medication 650 MILLIGRAM(S): at 19:28

## 2019-01-23 RX ADMIN — ATORVASTATIN CALCIUM 40 MILLIGRAM(S): 80 TABLET, FILM COATED ORAL at 21:03

## 2019-01-23 RX ADMIN — Medication 100 MILLIGRAM(S): at 21:03

## 2019-01-23 RX ADMIN — PIPERACILLIN AND TAZOBACTAM 25 GRAM(S): 4; .5 INJECTION, POWDER, LYOPHILIZED, FOR SOLUTION INTRAVENOUS at 21:04

## 2019-01-23 RX ADMIN — Medication 325 MILLIGRAM(S): at 19:28

## 2019-01-23 RX ADMIN — Medication 100 MILLIGRAM(S): at 19:28

## 2019-01-23 RX ADMIN — Medication 650 MILLIGRAM(S): at 05:57

## 2019-01-23 RX ADMIN — Medication 75 MEQ/KG/HR: at 11:15

## 2019-01-23 RX ADMIN — Medication 25 MILLIGRAM(S): at 05:57

## 2019-01-23 RX ADMIN — Medication 200 MEQ/KG/HR: at 08:08

## 2019-01-23 RX ADMIN — Medication 650 MILLIGRAM(S): at 21:04

## 2019-01-23 RX ADMIN — HEPARIN SODIUM 5000 UNIT(S): 5000 INJECTION INTRAVENOUS; SUBCUTANEOUS at 19:28

## 2019-01-23 RX ADMIN — HEPARIN SODIUM 5000 UNIT(S): 5000 INJECTION INTRAVENOUS; SUBCUTANEOUS at 05:57

## 2019-01-23 RX ADMIN — Medication 1200 MILLIGRAM(S): at 17:30

## 2019-01-23 RX ADMIN — PIPERACILLIN AND TAZOBACTAM 25 GRAM(S): 4; .5 INJECTION, POWDER, LYOPHILIZED, FOR SOLUTION INTRAVENOUS at 05:58

## 2019-01-23 NOTE — DISCHARGE NOTE ADULT - CARE PLAN
Principal Discharge DX:	Osteomyelitis  Assessment and plan of treatment:	Please continue with antibiotics as recommended by ID. You were given IV antibiotics while inpatient, please continue with ciprofloxacin on discharge as prescribed. Please follow up with follow up with Dr. Plunkett at [facility] upon discharge (phone number below). Please follow up with podiatry as outpatient for re-scheduling your surgery.  Secondary Diagnosis:	Abnormal stress test  Assessment and plan of treatment:	You were found to have an abnormal stress test. You underwent a cardiac cath on 1/23. Please continue with aspirin and plavix for one month.  Secondary Diagnosis:	Chronic kidney disease, stage III (moderate)  Assessment and plan of treatment:	In order to prevent further disease progression, continue to follow recommendations made by your primary provider/nephrologist. Continue a diet that is low in sodium and avoid foods that are concentrated in potassium and phosphorus. Continue your medications/supplementations as directed and avoid over-the-counter drugs that are harmful to kidneys, such as, Non-Steroidal Anti-Inflammatory Drugs (NSAIDs). Follow-up as outpatient to monitor your kidney function, as well as, vitamin D, Calcium, potassium, and phosphorus levels.  Secondary Diagnosis:	DM (diabetes mellitus)  Assessment and plan of treatment:	Continue your medication regimen and a consistent carbohydrate diet (Meaning eating the same amount of carbohydrates at the same time each day). Monitor blood glucose levels throughout the day before meals and at bedtime. Record blood sugars and bring to outpatient providers appointment in order to be reviewed by your doctor for management modifications. Monitor for signs/symptoms of low blood glucose, such as, dizziness, altered mental status, or cool/clammy skin. In addition, monitor for signs/symptoms of high blood glucose, such as, feeling hot, dry, fatigued, or with increased thirst/urination. Make regular podiatry appointments in order to have feet checked for wounds and uncontrolled toe nail growth to prevent infections, as well as, appointments with an ophthalmologist to monitor your vision.  Secondary Diagnosis:	Essential hypertension  Assessment and plan of treatment:	Continue blood pressure medication regimen as directed. Monitor for any visual changes, headaches or dizziness.  Monitor blood pressure regularly.  Follow up with your PCP for further management for high blood pressure. Principal Discharge DX:	Osteomyelitis  Goal:	Complete your course of antibiotics.  Assessment and plan of treatment:	Please continue with antibiotics as recommended by ID. You were given IV antibiotics while inpatient, please continue with ciprofloxacin on discharge as prescribed. Please follow up with follow up with Dr. Plunkett at [facility] upon discharge (phone number below). Please follow up with podiatry as outpatient for re-scheduling your surgery.  Secondary Diagnosis:	Chronic kidney disease, stage III (moderate)  Goal:	Monitor kidney function closely. Follow up with your nephrologist.  Assessment and plan of treatment:	In order to prevent further disease progression, continue to follow recommendations made by your primary provider/nephrologist. Continue a diet that is low in sodium and avoid foods that are concentrated in potassium and phosphorus. Continue your medications/supplementations as directed and avoid over-the-counter drugs that are harmful to kidneys, such as, Non-Steroidal Anti-Inflammatory Drugs (NSAIDs). Follow-up as outpatient to monitor your kidney function, as well as, vitamin D, Calcium, potassium, and phosphorus levels.  Secondary Diagnosis:	DM (diabetes mellitus)  Goal:	Maintain adequate glycemic control. Monitor your sugars. Goal HgA1C < 7.0%. Low carb diet.  Assessment and plan of treatment:	Continue your medication regimen and a consistent carbohydrate diet (Meaning eating the same amount of carbohydrates at the same time each day). Monitor blood glucose levels throughout the day before meals and at bedtime. Record blood sugars and bring to outpatient providers appointment in order to be reviewed by your doctor for management modifications. Monitor for signs/symptoms of low blood glucose, such as, dizziness, altered mental status, or cool/clammy skin. In addition, monitor for signs/symptoms of high blood glucose, such as, feeling hot, dry, fatigued, or with increased thirst/urination. Make regular podiatry appointments in order to have feet checked for wounds and uncontrolled toe nail growth to prevent infections, as well as, appointments with an ophthalmologist to monitor your vision.  Secondary Diagnosis:	Essential hypertension  Goal:	Maintain adequate control of your blood pressure. Goal BP < 130/80. Continue low sodium diet.  Assessment and plan of treatment:	Continue blood pressure medication regimen as directed. Monitor for any visual changes, headaches or dizziness.  Monitor blood pressure regularly.  Follow up with your PCP for further management for high blood pressure.  Secondary Diagnosis:	CAD (coronary artery disease)  Goal:	Prevent progression of disease. Ensure compliance with medications.  Assessment and plan of treatment:	Follow up with cardiologist within one week of discharge. Call for appointment. Return to ED for any concerning symptoms. Continue medications as prescribed. Low salt, low fat, low cholesterol diet. No heavy lifting x one week. No strenuous activity x 3 weeks. Monitor site of procedure and notify your doctor for any redness, swelling, discharge. No driving x 24 hours. You may shower but no baths or swimming x one week. Principal Discharge DX:	Osteomyelitis  Goal:	Complete your course of antibiotics. Use the shoe given to you to help with ambulation.  Assessment and plan of treatment:	Please continue with antibiotics as recommended by ID. You were given IV antibiotics while inpatient, please continue with ciprofloxacin on discharge as prescribed. Please follow up with follow up with Dr. Plunkett [facility] upon discharge (phone number below). Please follow up with podiatry as outpatient for re-scheduling your surgery.  Secondary Diagnosis:	Chronic kidney disease, stage III (moderate)  Goal:	Monitor kidney function closely. Follow up with your nephrologist.  Assessment and plan of treatment:	In order to prevent further disease progression, continue to follow recommendations made by your primary provider/nephrologist. Continue a diet that is low in sodium and avoid foods that are concentrated in potassium and phosphorus. Continue your medications/supplementations as directed and avoid over-the-counter drugs that are harmful to kidneys, such as, Non-Steroidal Anti-Inflammatory Drugs (NSAIDs). Follow-up as outpatient to monitor your kidney function, as well as, vitamin D, Calcium, potassium, and phosphorus levels.  Secondary Diagnosis:	DM (diabetes mellitus)  Goal:	Maintain adequate glycemic control. Monitor your sugars. Goal HgA1C < 7.0%. Low carb diet.  Assessment and plan of treatment:	Continue your medication regimen and a consistent carbohydrate diet (Meaning eating the same amount of carbohydrates at the same time each day). Monitor blood glucose levels throughout the day before meals and at bedtime. Record blood sugars and bring to outpatient providers appointment in order to be reviewed by your doctor for management modifications. Monitor for signs/symptoms of low blood glucose, such as, dizziness, altered mental status, or cool/clammy skin. In addition, monitor for signs/symptoms of high blood glucose, such as, feeling hot, dry, fatigued, or with increased thirst/urination. Make regular podiatry appointments in order to have feet checked for wounds and uncontrolled toe nail growth to prevent infections, as well as, appointments with an ophthalmologist to monitor your vision.  Secondary Diagnosis:	Essential hypertension  Goal:	Maintain adequate control of your blood pressure. Goal BP < 130/80. Continue low sodium diet.  Assessment and plan of treatment:	Continue blood pressure medication regimen as directed. Monitor for any visual changes, headaches or dizziness.  Monitor blood pressure regularly.  Follow up with your PCP for further management for high blood pressure.  Secondary Diagnosis:	CAD (coronary artery disease)  Goal:	Prevent progression of disease. Ensure compliance with medications.  Assessment and plan of treatment:	Follow up with cardiologist within one week of discharge. Call for appointment. Return to ED for any concerning symptoms. Continue medications as prescribed. Low salt, low fat, low cholesterol diet. No heavy lifting x one week. No strenuous activity x 3 weeks. Monitor site of procedure and notify your doctor for any redness, swelling, discharge. No driving x 24 hours. You may shower but no baths or swimming x one week.

## 2019-01-23 NOTE — CHART NOTE - NSCHARTNOTEFT_GEN_A_CORE
63y Male s/p cardiac cath x today for right groin check. Pt currently denies any complaints.    Right groin site: Dressing in place c/d/i. No hematoma present. 2+ RLE edema (chronic). No discoloration of coldness of extremity. Pulses and sensation intact.     Will cont to monitor 63y Male s/p cardiac cath x today for right groin check. Pt currently denies any complaints.    Right groin site: Dressing in place c/d/i. No hematoma present. 2+ RLE edema (chronic). No discoloration of coldness of extremity. Pulses difficult to palpate (chronic). Sensation intact.     Will cont to monitor

## 2019-01-23 NOTE — CHART NOTE - NSCHARTNOTEFT_GEN_A_CORE
Spoke with medicine team. Pt had a stent placed and will need to be on ASA and plavix for 1 month.  Cards requesting postponing pod surg for at least 1 month  Surgery cancelled for Friday  Pt will follow up with Dr. Plunkett at [facility] upon discharge  Further recommendations in discharge paperwork

## 2019-01-23 NOTE — DISCHARGE NOTE ADULT - MEDICATION SUMMARY - MEDICATIONS TO TAKE
I will START or STAY ON the medications listed below when I get home from the hospital:    Rolling Walker  -- Indication: For Recommended by physical therapy    acetaminophen 325 mg oral tablet  -- 2 tab(s) by mouth every 6 hours as needed for pain or fever  -- Indication: For Pain    aspirin 81 mg oral delayed release tablet  -- 1 tab(s) by mouth once a day  -- Indication: For CAD (coronary artery disease)    sodium bicarbonate 650 mg oral tablet  -- 1 tab(s) by mouth 3 times a day  -- Indication: For CKD (chronic kidney disease)    glyBURIDE 1.25 mg oral tablet  -- 1 tab(s) by mouth once a day with breakfast   -- Indication: For DM (diabetes mellitus)    atorvastatin 40 mg oral tablet  -- 1 tab(s) by mouth once a day (at bedtime)  -- Indication: For HLD (hyperlipidemia)    clopidogrel 75 mg oral tablet  -- 1 tab(s) by mouth once a day  -- Indication: For CAD (coronary artery disease)    metoprolol succinate 50 mg oral tablet, extended release  -- 1 tab(s) by mouth once a day   -- Indication: For HTN (hypertension)    amLODIPine 10 mg oral tablet  -- 1 tab(s) by mouth once a day  -- Indication: For HTN (hypertension)    Lasix 80 mg oral tablet  -- 1 tab(s) by mouth once a day  -- Indication: For HTN (hypertension)    ferrous sulfate 325 mg (65 mg elemental iron) oral tablet  -- 1 tab(s) by mouth 2 times a day  -- Indication: For Anemia of chronic disease    docusate sodium 100 mg oral capsule  -- 1 cap(s) by mouth 2 times a day as needed for constipation  -- Indication: For Constipation    lactobacillus acidophilus oral capsule  -- 1 tab(s) by mouth once a day, over the counter  -- Indication: For Probiotic    Cipro 250 mg oral tablet  -- 1 tab(s) by mouth once a day   -- Avoid prolonged or excessive exposure to direct and/or artificial sunlight while taking this medication.  Check with your doctor before becoming pregnant.  Do not take dairy products, antacids, or iron preparations within one hour of this medication.  Finish all this medication unless otherwise directed by prescriber.  Medication should be taken with plenty of water.    -- Indication: For Osteomyelitis    Vitamin D2 50,000 intl units (1.25 mg) oral capsule  -- 1 cap(s) by mouth once a week  -- Indication: For Supplementation    folic acid 1 mg oral tablet  -- 1 tab(s) by mouth once a day  -- Indication: For Supplementation

## 2019-01-23 NOTE — DISCHARGE NOTE ADULT - CARE PROVIDER_API CALL
Mamadou Plunkett (DPROSHNI), Podiatric Medicine and Surgery  [address] [place] [address]  Phone: [phone]  Fax: [phone]    Keaton Duvall), Cardiovascular Disease; Internal Medicine; Nuclear Cardiology  [address] [place] [address]  Phone: [phone]  Fax: [phone]

## 2019-01-23 NOTE — PROGRESS NOTE ADULT - ASSESSMENT
NST:  < from: Nuclear Stress Test-Pharmacologic (01.18.19 @ 08:42) >  There is a small, mild defect in inferior wall that is  reversible consistent with ischemia.Review of raw data  shows: The study is of good technical quality.  There is a small, mild defect in inferior wall that is  reversible consistent with ischemia.    < end of copied text >      1) CAD: s/p LHC found to have 50% mid LAD, 50% prox RCA, 90% Distal RCA into PDA , s/p PCI with Bare metal stent, asa and plavix loaded , c/w DAPT x 1 month    2) Right foot wound/OM - podiatry  on case, s/p PCI needs to continue asa and Plavix x 1 month s/p BMS     3) HTN - cont hydralazine    4) ABEL on CKD:  renal onboard,  s/p C , monitor renal function , avoid nephrotoxic meds

## 2019-01-23 NOTE — DISCHARGE NOTE ADULT - CARE PROVIDERS DIRECT ADDRESSES
,[email or web address]

## 2019-01-23 NOTE — DISCHARGE NOTE ADULT - MEDICATION SUMMARY - MEDICATIONS TO STOP TAKING
I will STOP taking the medications listed below when I get home from the hospital:    piperacillin-tazobactam 2 g-0.25 g/50 mL intravenous solution  -- 2.25 gram(s) intravenous every 8 hours  Stop after October 23 rd dose

## 2019-01-23 NOTE — PROGRESS NOTE ADULT - SUBJECTIVE AND OBJECTIVE BOX
Infectious Diseases progress note:    Subjective: No acute o/n events.  Pt s/p cardiac cath and placement of drug eluting stent    ROS:  CONSTITUTIONAL:  No fever, chills, rigors  CARDIOVASCULAR:  No chest pain or palpitations  RESPIRATORY:   No SOB, cough, dyspnea on exertion.  No wheezing  GASTROINTESTINAL:  No abd pain, N/V, diarrhea/constipation  EXTREMITIES:  No swelling or joint pain  GENITOURINARY:  No burning on urination, increased frequency or urgency.  No flank pain  NEUROLOGIC:  No HA, visual disturbances  SKIN: No rashes    Allergies    No Known Allergies    Intolerances        ANTIBIOTICS/RELEVANT:  antimicrobials  piperacillin/tazobactam IVPB. 3.375 Gram(s) IV Intermittent every 8 hours    immunologic:    OTHER:  acetaminophen   Tablet .. 650 milliGRAM(s) Oral every 6 hours PRN  acetylcysteine  Oral Solution 1200 milliGRAM(s) Oral once  acetylcysteine  Oral Solution 1200 milliGRAM(s) Oral two times a day  aspirin enteric coated 81 milliGRAM(s) Oral daily  atorvastatin 40 milliGRAM(s) Oral at bedtime  bisacodyl 5 milliGRAM(s) Oral every 48 hours PRN  docusate sodium 100 milliGRAM(s) Oral two times a day  ferrous    sulfate 325 milliGRAM(s) Oral daily  heparin  Injectable 5000 Unit(s) SubCutaneous every 12 hours  hydrALAZINE 100 milliGRAM(s) Oral every 8 hours  hydrALAZINE Injectable 10 milliGRAM(s) IV Push once  lactated ringers. 1000 milliLiter(s) IV Continuous <Continuous>  lactobacillus acidophilus 1 Tablet(s) Oral daily  metoprolol succinate ER 25 milliGRAM(s) Oral daily  oxyCODONE    5 mG/acetaminophen 325 mG 1 Tablet(s) Oral every 6 hours PRN  sodium bicarbonate 650 milliGRAM(s) Oral three times a day      Objective:  Vital Signs Last 24 Hrs  T(C): 36.9 (23 Jan 2019 05:54), Max: 37.1 (22 Jan 2019 20:56)  T(F): 98.5 (23 Jan 2019 05:54), Max: 98.7 (22 Jan 2019 20:56)  HR: 62 (23 Jan 2019 05:54) (62 - 74)  BP: 152/80 (23 Jan 2019 05:54) (152/80 - 158/74)  BP(mean): --  RR: 18 (23 Jan 2019 05:54) (18 - 18)  SpO2: 97% (23 Jan 2019 05:54) (97% - 99%)    PHYSICAL EXAM:  Constitutional:NAD  Eyes:SPIKE, EOMI  Ear/Nose/Throat: no thrush, mucositis.  Moist mucous membranes	  Neck:no JVD, no lymphadenopathy, supple  Respiratory: CTA lobito  Cardiovascular: S1S2 RRR, no murmurs  Gastrointestinal:soft, nontender,  nondistended (+) BS  Extremities:no e/e/c  Skin:  rt plantar ulcer stable, no surrounding erythema        LABS:                        9.1    7.41  )-----------( 409      ( 23 Jan 2019 05:30 )             30.0     01-23    139  |  104  |  44<H>  ----------------------------<  102<H>  3.9   |  21<L>  |  3.30<H>    Ca    8.6      23 Jan 2019 05:30  Phos  3.9     01-23  Mg     1.7     01-23              MICROBIOLOGY:    Culture - Blood (01.15.19 @ 07:45)    Culture - Blood:   NO ORGANISMS ISOLATED    Specimen Source: BLOOD VENOUS    Culture - Blood (01.15.19 @ 07:45)    Culture - Blood:   NO ORGANISMS ISOLATED    Specimen Source: BLOOD PERIPHERAL    Culture - Abscess with Gram Stain (01.14.19 @ 18:54)    -  Tobramycin: S <=2 ULISSES    -  Trimethoprim/Sulfamethoxazole: S <=0.5/9.5 ULISSES    -  Vancomycin: S 1 ULISSES    -  Gentamicin: S 4 ULISSES    -  Gentamicin: S <=1 ULISSES    -  Imipenem: S <=1 ULISSES    -  Levofloxacin: S <=1 ULISSES    -  Levofloxacin: S <=0.5 ULISSES    -  Meropenem: S <=1 ULISSES    -  Moxifloxacin(Aerobic): S <=0.5 ULISSES    -  Oxacillin: S <=0.25 ULISSES    -  Piperacillin/Tazobactam: S    -  Rifampin: S <=1 ULISSES    -  Tetra/Doxy: S <=1 ULISSES    -  Ciprofloxacin: S <=0.5 ULISSES    -  Ciprofloxacin: S <=1 ULISSES    -  Clindamycin: S 0.5 ULISSES    -  Ceftazidime: S 4 ULISSES    -  Erythromycin: S    -  Amikacin: S <=8 ULISSES    -  Aztreonam: S <=4 ULISSES    -  Cefazolin: S <=4 ULISSES    -  Cefepime: S <=2 ULISSES    Specimen Source: OTHER    Gram Stain Result:   GPCPR Gram Pos Cocci in Pairs  QUANTITY OF BACTERIA SEEN: MANY (4+)  GPCCH^Gram Pos Cocci in Chains  QUANTITY OF BACTERIA SEEN: MANY (4+)  WBC^White Blood Cells  QNTY CELLS IN GRAM STAIN: MANY (4+)    Culture Results:   PSA^Pseudomonas aeruginosa  STAU^Staphylococcus aureus  STRAG^Streptococcus agalactiae Gp B    Culture Results:   Sensitivities not routinely performed.                *******************************                * This is an appended result. *                *******************************  A prior result that was reported as final has been changed.    Organism Identification: Pseudomonas aeruginosa  Staphylococcus aureus  Streptococcus agalactiae Gp B    Organism: Staphylococcus aureus    Organism: Streptococcus agalactiae Gp B    Organism: Pseudomonas aeruginosa    Method Type: NEGATIVE ULISSES 43    Method Type: POSITIVE ULISSES 29          RADIOLOGY & ADDITIONAL STUDIES:

## 2019-01-23 NOTE — PROGRESS NOTE ADULT - ASSESSMENT
63 year old male, with past history significant for Type-II DM, HTN, and R-Foot Charcot deformity, Osteomyelitis, Vitamin D deficiency, Anemia, CKD-III, HLD and GERD, presented to the ED, as sent by podiatrist, secondary to concern for osteomyelitis.  Seen and evaluated at bedside; NAD.  Patient relates noticing increasing swelling of the right foot for the past ~ one week, to the extent of not being able to fit into the shoe.  Had erythema of the foot and brownish, foul smelling drainage from the ulcer site.  Complained of pain, inadequately relieved with Tylenol.  Reports mild subjective fever, chills and sweating.  No report of nausea, vomiting, headaches, dizziness, chest pain, shortness of breath, abdominal pain, diarrhea or dysuria.  States having mild constipation.    Patient was recently admitted in Sep 2018 for clinical OM of rt foot plantar ulcer, with (+) PTB.  Pt grew Arthrobacter, actinomyces, coryne and strep.  Pt completed 6 week course of IV zosyn.  Pt states he wore a boot over right foot, ulcer had temporararily closed up, however he noticed wound starting to open up again recently and noted some discharge and malodor.      Vital signs upon ED presentation as follows: BP = 147/66, HR = 87, RR = 18, T = 36.9 C (98.5 F), O2 Sat = 100% on RA.  Prescribed zosyn and vancomycin in the ED. (14 Jan 2019 21:34).    No leukocytosis.  , .  Wound cx (+) pseudomonas, MSSA, GBS.  Indium scan negative for OM.  Pt seen by Podiatry, and planned for tarsal exostectomy.     ID consult called for further abx managment.        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  Now with recurrent infection.  Pt was planned for OR bone resection (tarsal exostectomy).  Now Pt s/p drug eluting stent.  f/u Podiatry plan, if surgery is delayed, can change to PO cipro 250mg Qdaily x 5 more days upon discharge.      - f/u with Podiatry regarding rescheduling OR date.      - blood cultures negative.  No OM on NM scan.  No need for long term abx.      Will follow,    Salima Spivey  [phone]

## 2019-01-23 NOTE — DISCHARGE NOTE ADULT - MEDICATION SUMMARY - MEDICATIONS TO CHANGE
I will SWITCH the dose or number of times a day I take the medications listed below when I get home from the hospital:    hydrALAZINE 100 mg oral tablet  -- 1 tab(s) by mouth 3 times a day    sodium bicarbonate 650 mg oral tablet  -- 1 tab(s) by mouth 3 times a day    metoprolol succinate 100 mg oral tablet, extended release  -- 1 tab(s) by mouth once a day    sodium bicarbonate 650 mg oral tablet  -- 2 tab(s) by mouth 2 times a day with meals    furosemide 40 mg oral tablet  -- 2 tab(s) (80mg) by mouth once a day in morning and 1 tablet (40mg) in the evening

## 2019-01-23 NOTE — DISCHARGE NOTE ADULT - HOSPITAL COURSE
63 M PMHx DM2, HTN, and RFoot Charcot deformity, presented to the ED, as sent by podiatrist, secondary to concern for osteomyelitis.  Vital signs upon ED presentation as follows: BP = 147/66, HR = 87, RR = 18, T = 36.9 C (98.5 F), O2 Sat = 100% on RA.  Diagnosed with Osteomyelitis.    HOSPITAL COURSE:   Other chronic osteomyelitis of right foot:  - erythema, swelling, pain, brownish exudate  from plantar ulcer, subjective fevers, chills, sweating of right foot/ankle/leg; recurrent issue  - ESR = 111, CRP = 120.4  - treated w/ IV abx via PICC during the past few months  - wound culture = pseudomonas, staph aureus, streph agalactiea   - WBC scan: No evidence of osteomyelitis. No evidence of infected hardware distal right fibula.  - ensure optimal hydration  - BCx neg @ 72h  - VA duplex b/l LE negative  - ID: [email or web address], can transition to PO cipro on DC     Cardiac clearance  -NM Stress test EF 55% There is a small, mild defect in inferior wall that is reversible consistent with ischemia.  - s/p cardiac cath 1/23 - mid/distal LAD 50%, pRCA 50%, RPDA 90% x1 BMS, RFA accessed    Chronic kidney disease, stage III (moderate):  - BUN/Cr worsened, but likely due to acute on CKD; 55/3.53 (previously 31/2.91 in 09/2018)  - contrast protective plan pre [facility] wed: give mucomyst 1200 bid for 4 doses start 1 day prior to test, last dose after test, sodium bicarb 150meq/L in sterile water at 200cc/hr x 1 hr 1hr prior to test then 75cc/hr x 6 hr after.     Essential hypertension  - prescribed hydralazine 100 mg Q8H    Anemia  started iron supplementation    Slow transit constipation   - reports inadequate relief; had small BM in the AM  - started on colace 100 mg BID  - also added dulcolax 5 mg Q48H PRN  - TSH level - normal    PT: rehab 62 y/o male with a PMHx of HTN, DM, CKD and right foot Charcot deformity presented to the ED from podiatrist office secondary to concern for osteomyelitis. Pt was admitted to the medical service. Pt was seen by ID and podiatry and started on IV Vanco and Zosyn. X-ray right foot showed, "Lisfranc joints with diabetic neuroosteoarthropathy. Ulceration of right foot without radiographic evidence of advanced osteomyelitis." Indium scan showed, "Normal combined Indium-111 labeled leukocyte study and marrow scan. No evidence of osteomyelitis. No evidence of infected hardware distal right fibula." Podiatry followed patient and had planned for a tarsal exostectomy. However, pt required cardiac clearance. A nuclear stress test revealed, "There is a small, mild defect in inferior wall that is reversible consistent with ischemia." Pt then underwent cardiac cath and had a stent placed to the RPDA, otherwise non-obstructive disease. Given pt needs to be on ASA and Plavix, tarsal exostectomy can be done outpatient with wound care. Pt to be scheduled on discharge. Pt was followed by renal during admission and renal function stable. Medications reviewed with Dr. Duvall. Renal recommends Lasix 80mg PO daily on discharged. Case discussed with Dr. Duvall on 1/24. Pt medically stable for discharge home. 64 y/o male with a PMHx of HTN, DM, CKD and right foot Charcot deformity presented to the ED from podiatrist office secondary to concern for osteomyelitis. Pt was admitted to the medical service. Pt was seen by ID and podiatry and started on IV Vanco and Zosyn. X-ray right foot showed, "Lisfranc joints with diabetic neuroosteoarthropathy. Ulceration of right foot without radiographic evidence of advanced osteomyelitis." Indium scan showed, "Normal combined Indium-111 labeled leukocyte study and marrow scan. No evidence of osteomyelitis. No evidence of infected hardware distal right fibula." Podiatry followed patient and had planned for a tarsal exostectomy. However, pt required cardiac clearance. A nuclear stress test revealed, "There is a small, mild defect in inferior wall that is reversible consistent with ischemia." Pt then underwent cardiac cath and had a stent placed to the RPDA, otherwise non-obstructive disease. Pt was transferred to telemetry for monitoring and had no events. Given pt needs to be on ASA and Plavix, tarsal exostectomy can be done outpatient with wound care. Pt to be scheduled on discharge. Pt was followed by renal during admission and renal function stable. Medications reviewed with Dr. Duvall. Renal recommends Lasix 80mg PO daily on discharged. Case discussed with Dr. Duvall on 1/24. Pt medically stable for discharge home.

## 2019-01-23 NOTE — DISCHARGE NOTE ADULT - PATIENT PORTAL LINK FT
You can access the [facility], offered by [facility], by registering with the following website: [email or web address]

## 2019-01-23 NOTE — DISCHARGE NOTE ADULT - PLAN OF CARE
Please continue with antibiotics as recommended by ID. You were given IV antibiotics while inpatient, please continue with ciprofloxacin on discharge as prescribed. Please follow up with follow up with [facility] (phone number below). Please follow up with podiatry as outpatient for re-scheduling your surgery. You were found to have an abnormal stress test. You underwent a cardiac cath on 1/23. Please continue with aspirin and plavix for one month. In order to prevent further disease progression, continue to follow recommendations made by your primary provider/nephrologist. Continue a diet that is low in sodium and avoid foods that are concentrated in potassium and phosphorus. Continue your medications/supplementations as directed and avoid over-the-counter drugs that are harmful to kidneys, such as, Non-Steroidal Anti-Inflammatory Drugs (NSAIDs). Follow-up as outpatient to monitor your kidney function, as well as, vitamin D, Calcium, potassium, and phosphorus levels. Continue your medication regimen and a consistent carbohydrate diet (Meaning eating the same amount of carbohydrates at the same time each day). Monitor blood glucose levels throughout the day before meals and at bedtime. Record blood sugars and bring to outpatient providers appointment in order to be reviewed by your doctor for management modifications. Monitor for signs/symptoms of low blood glucose, such as, dizziness, altered mental status, or cool/clammy skin. In addition, monitor for signs/symptoms of high blood glucose, such as, feeling hot, dry, fatigued, or with increased thirst/urination. Make regular podiatry appointments in order to have feet checked for wounds and uncontrolled toe nail growth to prevent infections, as well as, appointments with an ophthalmologist to monitor your vision. Continue blood pressure medication regimen as directed. Monitor for any visual changes, headaches or dizziness.  Monitor blood pressure regularly.  Follow up with your PCP for further management for high blood pressure. Complete your course of antibiotics. Monitor kidney function closely. Follow up with your nephrologist. Maintain adequate glycemic control. Monitor your sugars. Goal HgA1C < 7.0%. Low carb diet. Maintain adequate control of your blood pressure. Goal BP < 130/80. Continue low sodium diet. Prevent progression of disease. Ensure compliance with medications. Follow up with cardiologist within one week of discharge. Call for appointment. Return to ED for any concerning symptoms. Continue medications as prescribed. Low salt, low fat, low cholesterol diet. No heavy lifting x one week. No strenuous activity x 3 weeks. Monitor site of procedure and notify your doctor for any redness, swelling, discharge. No driving x 24 hours. You may shower but no baths or swimming x one week. Complete your course of antibiotics. Use the shoe given to you to help with ambulation.

## 2019-01-23 NOTE — DISCHARGE NOTE ADULT - COMMUNITY RESOURCES
[facility]  to deliver rolling walker to patient's bedside prior to discharge.

## 2019-01-23 NOTE — PROGRESS NOTE ADULT - PROBLEM SELECTOR PLAN 1
Pt with CKD  Scr been stable 2.8-2.9 last admission.   now scr stable ~3.5 slightly fluctuating. +NST s/p angiogram 1/23. mucomyst and bicarb given   CKD likely sec to DM/HTN  monitor bmp.  avoid nephrotoxic agents.

## 2019-01-23 NOTE — DISCHARGE NOTE ADULT - ADDITIONAL INSTRUCTIONS
Podiatry Discharge Instructions:  Follow up: Please follow up with Dr. Plunkett at [facility] ([address], [address]) within 1 week of discharge from the hospital, please call [phone] for appointment and discuss that you recently were seen in the hospital. Will reschedule surgery for a later date.  Wound Care: Please apply aquacel packing to wound on right foot daily and dress with 4x4 gauze, kerlix, and ACE.  Weight bearing: Please weight bear as tolerated in a surgical shoe.  Antibiotics: Please continue as instructed. Recommend oral antibiotics.

## 2019-01-23 NOTE — PROGRESS NOTE ADULT - SUBJECTIVE AND OBJECTIVE BOX
Keaton Duvall MD  Interventional Cardiology  [place] Office : [phone] [place] [id number]  Tel:   [place] Office : [address] [facility] [place] [address]  Tel: [phone]  Cell : [phone]    Subjective : Pt lying in bed comfortable, not in distress, denies any chest pain or SOB  	  MEDICATIONS:  aspirin enteric coated 81 milliGRAM(s) Oral daily  heparin  Injectable 5000 Unit(s) SubCutaneous every 12 hours  hydrALAZINE 100 milliGRAM(s) Oral every 8 hours  hydrALAZINE Injectable 10 milliGRAM(s) IV Push once  metoprolol succinate ER 25 milliGRAM(s) Oral daily    piperacillin/tazobactam IVPB. 3.375 Gram(s) IV Intermittent every 8 hours      acetaminophen   Tablet .. 650 milliGRAM(s) Oral every 6 hours PRN  oxyCODONE    5 mG/acetaminophen 325 mG 1 Tablet(s) Oral every 6 hours PRN    bisacodyl 5 milliGRAM(s) Oral every 48 hours PRN  docusate sodium 100 milliGRAM(s) Oral two times a day    atorvastatin 40 milliGRAM(s) Oral at bedtime    ferrous    sulfate 325 milliGRAM(s) Oral daily  lactated ringers. 1000 milliLiter(s) IV Continuous <Continuous>  sodium bicarbonate 650 milliGRAM(s) Oral three times a day      PHYSICAL EXAM:  T(C): 36.7 (01-23-19 @ 18:55), Max: 37.1 (01-22-19 @ 20:56)  HR: 78 (01-23-19 @ 18:55) (62 - 78)  BP: 161/87 (01-23-19 @ 18:55) (152/80 - 161/87)  RR: 17 (01-23-19 @ 18:55) (17 - 18)  SpO2: 98% (01-23-19 @ 18:55) (97% - 99%)  Wt(kg): --  I&O's Summary      HEENT:   Normal oral mucosa, PERRL, EOMI	  Cardiovascular: Normal S1 S2, No JVD, No murmurs, No edema  Respiratory: Lungs clear to auscultation	  Gastrointestinal:  Soft, Non-tender, + BS	  Extremities: right foot wound covered                                    9.1    7.41  )-----------( 409      ( 23 Jan 2019 05:30 )             30.0     01-23    139  |  104  |  44<H>  ----------------------------<  102<H>  3.9   |  21<L>  |  3.30<H>    Ca    8.6      23 Jan 2019 05:30  Phos  3.9     01-23  Mg     1.7     01-23      proBNP:   Lipid Profile:   HgA1c:   TSH:

## 2019-01-23 NOTE — PROGRESS NOTE ADULT - SUBJECTIVE AND OBJECTIVE BOX
[facility]   MD DOMO WALLACE MD ANGELA WONG, PA    TEL:  OFFICE: [phone]  DR LEE CELL: [phone]  DR. GRULLON CELL: [phone]  MELANIA CORONEL CELL: [phone]        Patient is a 63y old  Male who presents with a chief complaint of Osteomyelitis (23 Jan 2019 15:29)      Patient seen and examined at bedside. No chest pain/sob    VITALS:  T(F): 98.5 (01-23-19 @ 05:54), Max: 98.7 (01-22-19 @ 20:56)  HR: 62 (01-23-19 @ 05:54)  BP: 152/80 (01-23-19 @ 05:54)  RR: 18 (01-23-19 @ 05:54)  SpO2: 97% (01-23-19 @ 05:54)  Wt(kg): --        PHYSICAL EXAM:  Constitutional: NAD  Neck: No JVD  Respiratory: CTAB, no wheezes, rales or rhonchi  Cardiovascular: S1, S2, RRR  Gastrointestinal: BS+, soft, NT/ND  Extremities: 1-2+ peripheral edema R>L    Hospital Medications:   MEDICATIONS  (STANDING):  acetylcysteine  Oral Solution 1200 milliGRAM(s) Oral once  acetylcysteine  Oral Solution 1200 milliGRAM(s) Oral two times a day  aspirin enteric coated 81 milliGRAM(s) Oral daily  atorvastatin 40 milliGRAM(s) Oral at bedtime  docusate sodium 100 milliGRAM(s) Oral two times a day  ferrous    sulfate 325 milliGRAM(s) Oral daily  heparin  Injectable 5000 Unit(s) SubCutaneous every 12 hours  hydrALAZINE 100 milliGRAM(s) Oral every 8 hours  hydrALAZINE Injectable 10 milliGRAM(s) IV Push once  lactated ringers. 1000 milliLiter(s) (100 mL/Hr) IV Continuous <Continuous>  lactobacillus acidophilus 1 Tablet(s) Oral daily  metoprolol succinate ER 25 milliGRAM(s) Oral daily  piperacillin/tazobactam IVPB. 3.375 Gram(s) IV Intermittent every 8 hours  sodium bicarbonate 650 milliGRAM(s) Oral three times a day      LABS:  01-23    139  |  104  |  44<H>  ----------------------------<  102<H>  3.9   |  21<L>  |  3.30<H>    Ca    8.6      23 Jan 2019 05:30  Phos  3.9     01-23  Mg     1.7     01-23      Creatinine Trend: 3.30 <--, 3.26 <--, 3.50 <--, 3.13 <--, 3.24 <--, 3.19 <--, 3.50 <--    Phosphorus Level, Serum: 3.9 mg/dL (01-23 @ 05:30)                              9.1    7.41  )-----------( 409      ( 23 Jan 2019 05:30 )             30.0     Urine Studies:      Iron 25, TIBC 145, %sat --      [01-17-19 @ 05:38]  Ferritin 291.5      [01-17-19 @ 05:38]  PTH 42.84 (Ca --)      [09-11-18 @ 05:45]   --  Vitamin D (25OH) 11.8      [09-11-18 @ 05:45]  HbA1c 6.0      [01-15-19 @ 05:45]  TSH 1.74      [01-15-19 @ 05:45]        RADIOLOGY & ADDITIONAL STUDIES:

## 2019-01-23 NOTE — DISCHARGE NOTE ADULT - NS AS ACTIVITY OBS
Walking-Indoors allowed/No Heavy lifting/straining/Bathing allowed/As tolerated/Walking-Outdoors allowed/Showering allowed/Do not drive or operate machinery

## 2019-01-23 NOTE — PROGRESS NOTE ADULT - PROBLEM SELECTOR PLAN 4
uncontrolled  consider up titrate metorprol   monitor BP uncontrolled  consider up titrate metoprolol   monitor BP

## 2019-01-24 VITALS
TEMPERATURE: 98 F | OXYGEN SATURATION: 98 % | DIASTOLIC BLOOD PRESSURE: 81 MMHG | RESPIRATION RATE: 17 BRPM | SYSTOLIC BLOOD PRESSURE: 124 MMHG | HEART RATE: 71 BPM

## 2019-01-24 LAB
ANION GAP SERPL CALC-SCNC: 13 MMO/L — SIGNIFICANT CHANGE UP (ref 7–14)
BUN SERPL-MCNC: 39 MG/DL — HIGH (ref 7–23)
CALCIUM SERPL-MCNC: 8.5 MG/DL — SIGNIFICANT CHANGE UP (ref 8.4–10.5)
CHLORIDE SERPL-SCNC: 103 MMOL/L — SIGNIFICANT CHANGE UP (ref 98–107)
CO2 SERPL-SCNC: 23 MMOL/L — SIGNIFICANT CHANGE UP (ref 22–31)
CREAT SERPL-MCNC: 3.18 MG/DL — HIGH (ref 0.5–1.3)
GLUCOSE SERPL-MCNC: 91 MG/DL — SIGNIFICANT CHANGE UP (ref 70–99)
HCT VFR BLD CALC: 30.3 % — LOW (ref 39–50)
HGB BLD-MCNC: 9.6 G/DL — LOW (ref 13–17)
MAGNESIUM SERPL-MCNC: 1.7 MG/DL — SIGNIFICANT CHANGE UP (ref 1.6–2.6)
MCHC RBC-ENTMCNC: 28.2 PG — SIGNIFICANT CHANGE UP (ref 27–34)
MCHC RBC-ENTMCNC: 31.7 % — LOW (ref 32–36)
MCV RBC AUTO: 88.9 FL — SIGNIFICANT CHANGE UP (ref 80–100)
NRBC # FLD: 0 K/UL — LOW (ref 25–125)
PHOSPHATE SERPL-MCNC: 3.5 MG/DL — SIGNIFICANT CHANGE UP (ref 2.5–4.5)
PLATELET # BLD AUTO: 397 K/UL — SIGNIFICANT CHANGE UP (ref 150–400)
PMV BLD: 10.6 FL — SIGNIFICANT CHANGE UP (ref 7–13)
POTASSIUM SERPL-MCNC: 3.9 MMOL/L — SIGNIFICANT CHANGE UP (ref 3.5–5.3)
POTASSIUM SERPL-SCNC: 3.9 MMOL/L — SIGNIFICANT CHANGE UP (ref 3.5–5.3)
RBC # BLD: 3.41 M/UL — LOW (ref 4.2–5.8)
RBC # FLD: 13.9 % — SIGNIFICANT CHANGE UP (ref 10.3–14.5)
SODIUM SERPL-SCNC: 139 MMOL/L — SIGNIFICANT CHANGE UP (ref 135–145)
WBC # BLD: 6.95 K/UL — SIGNIFICANT CHANGE UP (ref 3.8–10.5)
WBC # FLD AUTO: 6.95 K/UL — SIGNIFICANT CHANGE UP (ref 3.8–10.5)

## 2019-01-24 RX ORDER — ACETAMINOPHEN 500 MG
2 TABLET ORAL
Qty: 0 | Refills: 0 | COMMUNITY
Start: 2019-01-24

## 2019-01-24 RX ORDER — AMLODIPINE BESYLATE 2.5 MG/1
10 TABLET ORAL DAILY
Qty: 0 | Refills: 0 | Status: DISCONTINUED | OUTPATIENT
Start: 2019-01-25 | End: 2019-01-24

## 2019-01-24 RX ORDER — FUROSEMIDE 40 MG
1 TABLET ORAL
Qty: 30 | Refills: 0 | OUTPATIENT
Start: 2019-01-24 | End: 2019-02-22

## 2019-01-24 RX ORDER — CIPROFLOXACIN LACTATE 400MG/40ML
1 VIAL (ML) INTRAVENOUS
Qty: 5 | Refills: 0 | OUTPATIENT
Start: 2019-01-24 | End: 2019-01-28

## 2019-01-24 RX ORDER — METOPROLOL TARTRATE 50 MG
1 TABLET ORAL
Qty: 30 | Refills: 0 | OUTPATIENT
Start: 2019-01-24 | End: 2019-02-22

## 2019-01-24 RX ORDER — CLOPIDOGREL BISULFATE 75 MG/1
1 TABLET, FILM COATED ORAL
Qty: 90 | Refills: 0 | OUTPATIENT
Start: 2019-01-24 | End: 2019-04-23

## 2019-01-24 RX ORDER — LACTOBACILLUS ACIDOPHILUS 100MM CELL
1 CAPSULE ORAL
Qty: 0 | Refills: 0 | DISCHARGE
Start: 2019-01-24

## 2019-01-24 RX ORDER — SODIUM BICARBONATE 1 MEQ/ML
1 SYRINGE (ML) INTRAVENOUS
Qty: 90 | Refills: 0 | OUTPATIENT
Start: 2019-01-24 | End: 2019-02-22

## 2019-01-24 RX ORDER — FUROSEMIDE 40 MG
1 TABLET ORAL
Qty: 0 | Refills: 0 | COMMUNITY

## 2019-01-24 RX ORDER — DOCUSATE SODIUM 100 MG
1 CAPSULE ORAL
Qty: 0 | Refills: 0 | COMMUNITY
Start: 2019-01-24

## 2019-01-24 RX ORDER — AMLODIPINE BESYLATE 2.5 MG/1
5 TABLET ORAL ONCE
Qty: 0 | Refills: 0 | Status: COMPLETED | OUTPATIENT
Start: 2019-01-24 | End: 2019-01-24

## 2019-01-24 RX ADMIN — Medication 325 MILLIGRAM(S): at 13:27

## 2019-01-24 RX ADMIN — HEPARIN SODIUM 5000 UNIT(S): 5000 INJECTION INTRAVENOUS; SUBCUTANEOUS at 05:51

## 2019-01-24 RX ADMIN — AMLODIPINE BESYLATE 5 MILLIGRAM(S): 2.5 TABLET ORAL at 09:36

## 2019-01-24 RX ADMIN — Medication 25 MILLIGRAM(S): at 05:51

## 2019-01-24 RX ADMIN — PIPERACILLIN AND TAZOBACTAM 25 GRAM(S): 4; .5 INJECTION, POWDER, LYOPHILIZED, FOR SOLUTION INTRAVENOUS at 13:27

## 2019-01-24 RX ADMIN — CLOPIDOGREL BISULFATE 75 MILLIGRAM(S): 75 TABLET, FILM COATED ORAL at 13:27

## 2019-01-24 RX ADMIN — Medication 650 MILLIGRAM(S): at 05:51

## 2019-01-24 RX ADMIN — Medication 100 MILLIGRAM(S): at 05:51

## 2019-01-24 RX ADMIN — Medication 650 MILLIGRAM(S): at 18:03

## 2019-01-24 RX ADMIN — Medication 650 MILLIGRAM(S): at 13:27

## 2019-01-24 RX ADMIN — Medication 650 MILLIGRAM(S): at 18:35

## 2019-01-24 RX ADMIN — PIPERACILLIN AND TAZOBACTAM 25 GRAM(S): 4; .5 INJECTION, POWDER, LYOPHILIZED, FOR SOLUTION INTRAVENOUS at 05:51

## 2019-01-24 RX ADMIN — Medication 100 MILLIGRAM(S): at 18:02

## 2019-01-24 RX ADMIN — Medication 81 MILLIGRAM(S): at 13:27

## 2019-01-24 RX ADMIN — Medication 1 TABLET(S): at 13:26

## 2019-01-24 RX ADMIN — HEPARIN SODIUM 5000 UNIT(S): 5000 INJECTION INTRAVENOUS; SUBCUTANEOUS at 18:02

## 2019-01-24 NOTE — PROGRESS NOTE ADULT - SUBJECTIVE AND OBJECTIVE BOX
Infectious Diseases progress note:    Subjective: No acute o/n events.  Afebrile.  No new somatic complaints.      ROS:  CONSTITUTIONAL:  No fever, chills, rigors  CARDIOVASCULAR:  No chest pain or palpitations  RESPIRATORY:   No SOB, cough, dyspnea on exertion.  No wheezing  GASTROINTESTINAL:  No abd pain, N/V, diarrhea/constipation  EXTREMITIES:  No swelling or joint pain  GENITOURINARY:  No burning on urination, increased frequency or urgency.  No flank pain  NEUROLOGIC:  No HA, visual disturbances  SKIN: No rashes    Allergies    No Known Allergies    Intolerances        ANTIBIOTICS/RELEVANT:  antimicrobials  piperacillin/tazobactam IVPB. 3.375 Gram(s) IV Intermittent every 8 hours    immunologic:    OTHER:  acetaminophen   Tablet .. 650 milliGRAM(s) Oral every 6 hours PRN  aspirin enteric coated 81 milliGRAM(s) Oral daily  atorvastatin 40 milliGRAM(s) Oral at bedtime  bisacodyl 5 milliGRAM(s) Oral every 48 hours PRN  clopidogrel Tablet 75 milliGRAM(s) Oral daily  docusate sodium 100 milliGRAM(s) Oral two times a day  ferrous    sulfate 325 milliGRAM(s) Oral daily  heparin  Injectable 5000 Unit(s) SubCutaneous every 12 hours  hydrALAZINE Injectable 10 milliGRAM(s) IV Push once  lactated ringers. 1000 milliLiter(s) IV Continuous <Continuous>  lactobacillus acidophilus 1 Tablet(s) Oral daily  metoprolol succinate ER 25 milliGRAM(s) Oral daily  oxyCODONE    5 mG/acetaminophen 325 mG 1 Tablet(s) Oral every 6 hours PRN  sodium bicarbonate 650 milliGRAM(s) Oral three times a day      Objective:  Vital Signs Last 24 Hrs  T(C): 36.9 (24 Jan 2019 09:34), Max: 36.9 (24 Jan 2019 05:49)  T(F): 98.4 (24 Jan 2019 09:34), Max: 98.4 (24 Jan 2019 05:49)  HR: 73 (24 Jan 2019 09:34) (73 - 84)  BP: 136/74 (24 Jan 2019 09:34) (136/74 - 165/82)  BP(mean): --  RR: 17 (24 Jan 2019 09:34) (17 - 17)  SpO2: 97% (24 Jan 2019 09:34) (97% - 100%)    PHYSICAL EXAM:  Constitutional:NAD  Eyes:SPIKE, EOMI  Ear/Nose/Throat: no thrush, mucositis.  Moist mucous membranes	  Neck:no JVD, no lymphadenopathy, supple  Respiratory: CTA lobito  Cardiovascular: S1S2 RRR, no murmurs  Gastrointestinal:soft, nontender,  nondistended (+) BS  Extremities: rt ft dsg c/d/i  Skin:  no rashes, open wounds or ulcerations        LABS:                        9.6    6.95  )-----------( 397      ( 24 Jan 2019 06:07 )             30.3     01-24    139  |  103  |  39<H>  ----------------------------<  91  3.9   |  23  |  3.18<H>    Ca    8.5      24 Jan 2019 06:07  Phos  3.5     01-24  Mg     1.7     01-24                              MICROBIOLOGY:      Culture - Blood (01.15.19 @ 07:45)    Culture - Blood:   NO ORGANISMS ISOLATED    Specimen Source: BLOOD VENOUS    Culture - Blood (01.15.19 @ 07:45)    Culture - Blood:   NO ORGANISMS ISOLATED    Specimen Source: BLOOD PERIPHERAL    Culture - Abscess with Gram Stain (01.14.19 @ 18:54)    -  Trimethoprim/Sulfamethoxazole: S <=0.5/9.5 ULISSES    -  Vancomycin: S 1 ULISSES    -  Amikacin: S <=8 ULISSES    -  Aztreonam: S <=4 ULISSES    -  Tobramycin: S <=2 ULISSES    -  Levofloxacin: S <=1 ULISSES    -  Levofloxacin: S <=0.5 ULISSES    -  Piperacillin/Tazobactam: S    -  Rifampin: S <=1 ULISSES    -  Tetra/Doxy: S <=1 ULISSES    -  Meropenem: S <=1 ULISSES    -  Moxifloxacin(Aerobic): S <=0.5 ULISSES    -  Oxacillin: S <=0.25 ULISSES    -  Imipenem: S <=1 ULISSES    -  Gentamicin: S 4 ULISSES    -  Gentamicin: S <=1 ULISSES    -  Erythromycin: S    -  Ceftazidime: S 4 ULISSES    -  Ciprofloxacin: S <=0.5 ULISSES    -  Ciprofloxacin: S <=1 ULISSES    -  Clindamycin: S 0.5 ULISSES    -  Cefepime: S <=2 ULISSES    -  Cefazolin: S <=4 ULISSES    Specimen Source: OTHER    Gram Stain Result:   GPCPR Gram Pos Cocci in Pairs  QUANTITY OF BACTERIA SEEN: MANY (4+)  GPCCH^Gram Pos Cocci in Chains  QUANTITY OF BACTERIA SEEN: MANY (4+)  WBC^White Blood Cells  QNTY CELLS IN GRAM STAIN: MANY (4+)    Culture Results:   PSA^Pseudomonas aeruginosa  STAU^Staphylococcus aureus  STRAG^Streptococcus agalactiae Gp B    Culture Results:   Sensitivities not routinely performed.                *******************************                * This is an appended result. *                *******************************  A prior result that was reported as final has been changed.    Organism Identification: Pseudomonas aeruginosa  Staphylococcus aureus  Streptococcus agalactiae Gp B    Organism: Staphylococcus aureus    Organism: Streptococcus agalactiae Gp B    Organism: Pseudomonas aeruginosa    Method Type: NEGATIVE ULISSES 43    Method Type: POSITIVE ULISSES 29        RADIOLOGY & ADDITIONAL STUDIES:

## 2019-01-24 NOTE — PROGRESS NOTE ADULT - ASSESSMENT
63 year old male, with past history significant for Type-II DM, HTN, and R-Foot Charcot deformity, Osteomyelitis, Vitamin D deficiency, Anemia, CKD-III, HLD and GERD, presented to the ED, as sent by podiatrist, secondary to concern for osteomyelitis.  Seen and evaluated at bedside; NAD.  Patient relates noticing increasing swelling of the right foot for the past ~ one week, to the extent of not being able to fit into the shoe.  Had erythema of the foot and brownish, foul smelling drainage from the ulcer site.  Complained of pain, inadequately relieved with Tylenol.  Reports mild subjective fever, chills and sweating.  No report of nausea, vomiting, headaches, dizziness, chest pain, shortness of breath, abdominal pain, diarrhea or dysuria.  States having mild constipation.    Patient was recently admitted in Sep 2018 for clinical OM of rt foot plantar ulcer, with (+) PTB.  Pt grew Arthrobacter, actinomyces, coryne and strep.  Pt completed 6 week course of IV zosyn.  Pt states he wore a boot over right foot, ulcer had temporararily closed up, however he noticed wound starting to open up again recently and noted some discharge and malodor.      Vital signs upon ED presentation as follows: BP = 147/66, HR = 87, RR = 18, T = 36.9 C (98.5 F), O2 Sat = 100% on RA.  Prescribed zosyn and vancomycin in the ED. (14 Jan 2019 21:34).    No leukocytosis.  , .  Wound cx (+) pseudomonas, MSSA, GBS.  Indium scan negative for OM.  Pt seen by Podiatry, and planned for tarsal exostectomy.     ID consult called for further abx managment.        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  Now with recurrent infection.  Pt was planned for OR bone resection (tarsal exostectomy).  Now Pt s/p drug eluting stent.  Pt requires asa/plavix for 1 month.      - Surgery to be rescheduled for later date.   Can change to PO cipro 250mg Qdaily x 5 more days upon discharge.      - blood cultures negative.  No OM on NM scan.  No need for long term abx.  Cellulitis and edema resolving.     OK to d/c from ID standpoint    Salima Spivey  [phone]

## 2019-01-24 NOTE — PROGRESS NOTE ADULT - PROBLEM SELECTOR PLAN 1
Pt with CKD  Scr been stable 2.8-2.9 last admission.   now scr stable ~3.5 slightly fluctuating. +NST s/p angiogram 1/23. mucomyst and bicarb given   CKD likely sec to DM/HTN  monitor bmp.  avoid nephrotoxic agents.  patient on lasix 80mg am, 40mg pm. planning for discharge today. can resume home lasix 80mg daily. follow up with dr. carmichael in 1-2 wks

## 2019-01-24 NOTE — PROGRESS NOTE ADULT - PROVIDER SPECIALTY LIST ADULT
Cardiology
Infectious Disease
Internal Medicine
Nephrology
Podiatry
Infectious Disease
Internal Medicine

## 2019-01-24 NOTE — PROGRESS NOTE ADULT - PROBLEM SELECTOR PROBLEM 5
Dyslipidemia

## 2019-01-24 NOTE — PROGRESS NOTE ADULT - PROBLEM SELECTOR PROBLEM 3
Anemia
Proteinuria

## 2019-01-24 NOTE — PROGRESS NOTE ADULT - PROBLEM SELECTOR PLAN 3
no signs of active bleeding at present
likely sec to DM/HTN  Vasculitis w/u done in may 2017 is neg.

## 2019-01-24 NOTE — PROGRESS NOTE ADULT - SUBJECTIVE AND OBJECTIVE BOX
[facility]   MD DOMO WALLACE MD ANGELA WONG, PA    TEL:  OFFICE: [phone]  DR LEE CELL: [phone]  DR. GRULLON CELL: [phone]  MELANIA CORONEL CELL: [phone]        Patient is a 63y old  Male who presents with a chief complaint of Osteomyelitis (24 Jan 2019 12:51)      Patient seen and examined at bedside. No chest pain/sob    VITALS:  T(F): 98.4 (01-24-19 @ 09:34), Max: 98.4 (01-24-19 @ 05:49)  HR: 73 (01-24-19 @ 09:34)  BP: 136/74 (01-24-19 @ 09:34)  RR: 17 (01-24-19 @ 09:34)  SpO2: 97% (01-24-19 @ 09:34)  Wt(kg): --        PHYSICAL EXAM:  Constitutional: NAD  Neck: No JVD  Respiratory: CTAB, no wheezes, rales or rhonchi  Cardiovascular: S1, S2, RRR  Gastrointestinal: BS+, soft, NT/ND  Extremities: b/l LE peripheral edema    Hospital Medications:   MEDICATIONS  (STANDING):  aspirin enteric coated 81 milliGRAM(s) Oral daily  atorvastatin 40 milliGRAM(s) Oral at bedtime  clopidogrel Tablet 75 milliGRAM(s) Oral daily  docusate sodium 100 milliGRAM(s) Oral two times a day  ferrous    sulfate 325 milliGRAM(s) Oral daily  heparin  Injectable 5000 Unit(s) SubCutaneous every 12 hours  hydrALAZINE Injectable 10 milliGRAM(s) IV Push once  lactated ringers. 1000 milliLiter(s) (100 mL/Hr) IV Continuous <Continuous>  lactobacillus acidophilus 1 Tablet(s) Oral daily  metoprolol succinate ER 25 milliGRAM(s) Oral daily  piperacillin/tazobactam IVPB. 3.375 Gram(s) IV Intermittent every 8 hours  sodium bicarbonate 650 milliGRAM(s) Oral three times a day      LABS:  01-24    139  |  103  |  39<H>  ----------------------------<  91  3.9   |  23  |  3.18<H>    Ca    8.5      24 Jan 2019 06:07  Phos  3.5     01-24  Mg     1.7     01-24      Creatinine Trend: 3.18 <--, 3.30 <--, 3.26 <--, 3.50 <--, 3.13 <--, 3.24 <--, 3.19 <--    Phosphorus Level, Serum: 3.5 mg/dL (01-24 @ 06:07)                              9.6    6.95  )-----------( 397      ( 24 Jan 2019 06:07 )             30.3     Urine Studies:      Iron 25, TIBC 145, %sat --      [01-17-19 @ 05:38]  Ferritin 291.5      [01-17-19 @ 05:38]  PTH 42.84 (Ca --)      [09-11-18 @ 05:45]   --  Vitamin D (25OH) 11.8      [09-11-18 @ 05:45]  HbA1c 6.0      [01-15-19 @ 05:45]  TSH 1.74      [01-15-19 @ 05:45]        RADIOLOGY & ADDITIONAL STUDIES:

## 2019-01-24 NOTE — PROGRESS NOTE ADULT - REASON FOR ADMISSION
Osteomyelitis
diabetic foot ulcer/Osteomyelitis
Osteomyelitis

## 2019-01-24 NOTE — PROGRESS NOTE ADULT - SUBJECTIVE AND OBJECTIVE BOX
Patient is a 63y old  Male who presents with a chief complaint of Osteomyelitis (23 Jan 2019 17:39)       INTERVAL HPI/OVERNIGHT EVENTS:  Patient seen and evaluated at bedside.  Pt is resting comfortable in NAD. Denies N/V/F/C.  Pain rated at o/10    Allergies    No Known Allergies    Intolerances        Vital Signs Last 24 Hrs  T(C): 36.9 (24 Jan 2019 09:34), Max: 36.9 (24 Jan 2019 05:49)  T(F): 98.4 (24 Jan 2019 09:34), Max: 98.4 (24 Jan 2019 05:49)  HR: 73 (24 Jan 2019 09:34) (73 - 84)  BP: 136/74 (24 Jan 2019 09:34) (136/74 - 165/82)  BP(mean): --  RR: 17 (24 Jan 2019 09:34) (17 - 17)  SpO2: 97% (24 Jan 2019 09:34) (97% - 100%)    LABS:                        9.6    6.95  )-----------( 397      ( 24 Jan 2019 06:07 )             30.3     01-24    139  |  103  |  39<H>  ----------------------------<  91  3.9   |  23  |  3.18<H>    Ca    8.5      24 Jan 2019 06:07  Phos  3.5     01-24  Mg     1.7     01-24          CAPILLARY BLOOD GLUCOSE          Lower Extremity Physical Exam:  Ulceration plantar tot he right LA stable with no necrosis of the skin edges and resolving cellulitis and edema.  There is mild serous drainage and no pus    RADIOLOGY & ADDITIONAL TESTS:

## 2019-01-24 NOTE — PROGRESS NOTE ADULT - PROBLEM SELECTOR PLAN 1
podiatry / ID   wound care   s/p PCI needs to continue asa and Plavix x 1 month s/p BMS so OR was postponed

## 2019-01-24 NOTE — PROGRESS NOTE ADULT - SUBJECTIVE AND OBJECTIVE BOX
SUBJECTIVE / OVERNIGHT EVENTS: pt denies chest pain, shortness of breath     MEDICATIONS  (STANDING):  aspirin enteric coated 81 milliGRAM(s) Oral daily  atorvastatin 40 milliGRAM(s) Oral at bedtime  clopidogrel Tablet 75 milliGRAM(s) Oral daily  docusate sodium 100 milliGRAM(s) Oral two times a day  ferrous    sulfate 325 milliGRAM(s) Oral daily  heparin  Injectable 5000 Unit(s) SubCutaneous every 12 hours  hydrALAZINE Injectable 10 milliGRAM(s) IV Push once  lactated ringers. 1000 milliLiter(s) (100 mL/Hr) IV Continuous <Continuous>  lactobacillus acidophilus 1 Tablet(s) Oral daily  metoprolol succinate ER 25 milliGRAM(s) Oral daily  piperacillin/tazobactam IVPB. 3.375 Gram(s) IV Intermittent every 8 hours  sodium bicarbonate 650 milliGRAM(s) Oral three times a day    MEDICATIONS  (PRN):  acetaminophen   Tablet .. 650 milliGRAM(s) Oral every 6 hours PRN Mild Pain (1 - 3)  bisacodyl 5 milliGRAM(s) Oral every 48 hours PRN Constipation  oxyCODONE    5 mG/acetaminophen 325 mG 1 Tablet(s) Oral every 6 hours PRN Moderate Pain (4 - 6)    Vital Signs Last 24 Hrs  T(C): 36.8 (24 Jan 2019 19:10), Max: 36.9 (24 Jan 2019 05:49)  T(F): 98.2 (24 Jan 2019 19:10), Max: 98.4 (24 Jan 2019 05:49)  HR: 71 (24 Jan 2019 19:10) (69 - 75)  BP: 124/81 (24 Jan 2019 19:10) (120/76 - 142/70)  BP(mean): --  RR: 17 (24 Jan 2019 19:10) (17 - 17)  SpO2: 98% (24 Jan 2019 19:10) (97% - 98%)    Constitutional: No fever, fatigue  Skin: No rash.  Eyes: No recent vision problems or eye pain.  ENT: No congestion, ear pain, or sore throat.  Cardiovascular: No chest pain or palpation.  Respiratory: No cough, shortness of breath, congestion, or wheezing.  Gastrointestinal: No abdominal pain, nausea, vomiting, or diarrhea.  Genitourinary: No dysuria.  Musculoskeletal: No joint swelling.  Neurologic: No headache.    PHYSICAL EXAM:  GENERAL: NAD  EYES: EOMI, PERRLA  NECK: Supple, No JVD  CHEST/LUNG: dec breath sounds at bases   HEART:  S1 , S2 +  ABDOMEN: sof,t bs+  EXTREMITIES:  rt foot wound dressing +  NEUROLOGY:alert awake     LABS:  01-24    139  |  103  |  39<H>  ----------------------------<  91  3.9   |  23  |  3.18<H>    Ca    8.5      24 Jan 2019 06:07  Phos  3.5     01-24  Mg     1.7     01-24      Creatinine Trend: 3.18 <--, 3.30 <--, 3.26 <--, 3.50 <--, 3.13 <--, 3.24 <--, 3.19 <--                        9.6    6.95  )-----------( 397      ( 24 Jan 2019 06:07 )             30.3     Urine Studies:                          PT/INR - ( 21 Jan 2019 05:17 )   PT: 11.1 SEC;   INR: 0.97          PTT - ( 21 Jan 2019 05:17 )  PTT:32.9 SEC          PT/INR - ( 21 Jan 2019 05:17 )   PT: 11.1 SEC;   INR: 0.97          PTT - ( 21 Jan 2019 05:17 )  PTT:32.9 SEC            PT/INR - ( 20 Jan 2019 06:20 )   PT: 11.8 SEC;   INR: 1.06          PTT - ( 20 Jan 2019 06:20 )  PTT:33.8 SEC                        RADIOLOGY & ADDITIONAL TESTS:    Imaging Personally Reviewed:    Consultant(s) Notes Reviewed:      Care Discussed with Consultants/Other Providers:

## 2019-01-24 NOTE — PROGRESS NOTE ADULT - PROBLEM SELECTOR PROBLEM 2
Chronic kidney disease, stage III (moderate)
Anemia

## 2019-01-24 NOTE — PROGRESS NOTE ADULT - ASSESSMENT
Right Charcot foot with DFU and resolving cellulitis   - continue with the Zosyn and local care with plain packing  - Cardiac recommendations apprenticed  - OR intervention will be delayed until cardiac stable  - Case cancelled  - continue local wound care  - no intervention at this time  - seen w/ attending

## 2019-01-24 NOTE — PROGRESS NOTE ADULT - PROBLEM SELECTOR PLAN 2
renal f/u   avoid nephrotoxic agents
check iron panel  monitor Hb.
iron sat 17%  on iron supplement daily  monitor Hb.
iron sat 17%  start iron supplement daily  monitor Hb.
iron sat 17%  start iron supplement daily  monitor Hb.

## 2019-01-28 ENCOUNTER — APPOINTMENT (OUTPATIENT)
Dept: WOUND CARE | Facility: CLINIC | Age: 64
End: 2019-01-28
Payer: COMMERCIAL

## 2019-01-28 PROBLEM — E11.621 TYPE 2 DIABETES MELLITUS WITH FOOT ULCER: Chronic | Status: ACTIVE | Noted: 2019-01-14

## 2019-01-28 PROBLEM — E78.5 HYPERLIPIDEMIA, UNSPECIFIED: Chronic | Status: ACTIVE | Noted: 2019-01-14

## 2019-01-28 PROBLEM — D64.9 ANEMIA, UNSPECIFIED: Chronic | Status: ACTIVE | Noted: 2019-01-14

## 2019-01-28 PROBLEM — K21.9 GASTRO-ESOPHAGEAL REFLUX DISEASE WITHOUT ESOPHAGITIS: Chronic | Status: ACTIVE | Noted: 2019-01-14

## 2019-01-28 PROBLEM — E55.9 VITAMIN D DEFICIENCY, UNSPECIFIED: Chronic | Status: ACTIVE | Noted: 2019-01-14

## 2019-01-28 PROBLEM — N28.1 CYST OF KIDNEY, ACQUIRED: Chronic | Status: ACTIVE | Noted: 2019-01-14

## 2019-01-28 PROCEDURE — 99213 OFFICE O/P EST LOW 20 MIN: CPT

## 2019-01-28 NOTE — HISTORY OF PRESENT ILLNESS
[FreeTextEntry1] : 63M DM w/ neuropathy right plantar midfoot ulcer to subQ. Pt was recently at [facility] got out last thursday.Pt was d/c'd on Cipro.

## 2019-01-28 NOTE — PHYSICAL EXAM
[0] : left 0 [de-identified] : No gross deformities [de-identified] : Absent epicritic sensation to b/l feet [de-identified] : down to bone with surrounding erythema. macerated and liquefactive drainage, possible purulence vs synovial fluid [FreeTextEntry1] : planter midfoot [FreeTextEntry2] : 1.7 [FreeTextEntry3] : 1 [FreeTextEntry4] : 0.7 [de-identified] : serosanguenous  [de-identified] : circumferentially [de-identified] : KRYSTAL, min erythema

## 2019-01-28 NOTE — ASSESSMENT
[FreeTextEntry1] : 64 yo M right plantar midfoot ulcer\par - Pt seen and evaluated\par - Wound flushed w/ copious sterile saline\par - Min periwound cellulitis, no drainage, macerated/liquefactive drainage, possibly purulence\par - Cont PO abx\par - Right foot wound packed circumferentially w/ plain packing\par - Rx diabetic partial relief CAM boot, to be worn at all times\par - After last visit pt was sent to [facility] for OM workup at last visit, was d/c on PO abx\par - Appearance overall improved, still probes to bone\par - Pt has home nurse who performs daily packing dressing changes\par - Cont daily packing\par - F/U in 1 week\par

## 2019-02-04 ENCOUNTER — APPOINTMENT (OUTPATIENT)
Dept: WOUND CARE | Facility: CLINIC | Age: 64
End: 2019-02-04
Payer: COMMERCIAL

## 2019-02-04 ENCOUNTER — INPATIENT (INPATIENT)
Facility: HOSPITAL | Age: 64
LOS: 8 days | Discharge: HOME HEALTH | End: 2019-02-13
Attending: INTERNAL MEDICINE | Admitting: INTERNAL MEDICINE
Payer: COMMERCIAL

## 2019-02-04 VITALS
TEMPERATURE: 98 F | SYSTOLIC BLOOD PRESSURE: 136 MMHG | WEIGHT: 189 LBS | HEIGHT: 67 IN | BODY MASS INDEX: 29.66 KG/M2 | HEART RATE: 78 BPM | DIASTOLIC BLOOD PRESSURE: 78 MMHG

## 2019-02-04 VITALS
TEMPERATURE: 101 F | OXYGEN SATURATION: 99 % | SYSTOLIC BLOOD PRESSURE: 100 MMHG | DIASTOLIC BLOOD PRESSURE: 79 MMHG | RESPIRATION RATE: 16 BRPM | HEART RATE: 84 BPM

## 2019-02-04 DIAGNOSIS — N18.4 CHRONIC KIDNEY DISEASE, STAGE 4 (SEVERE): ICD-10-CM

## 2019-02-04 DIAGNOSIS — E11.628 TYPE 2 DIABETES MELLITUS WITH OTHER SKIN COMPLICATIONS: ICD-10-CM

## 2019-02-04 DIAGNOSIS — S82.899A OTHER FRACTURE OF UNSPECIFIED LOWER LEG, INITIAL ENCOUNTER FOR CLOSED FRACTURE: Chronic | ICD-10-CM

## 2019-02-04 DIAGNOSIS — I10 ESSENTIAL (PRIMARY) HYPERTENSION: ICD-10-CM

## 2019-02-04 DIAGNOSIS — Z95.828 PRESENCE OF OTHER VASCULAR IMPLANTS AND GRAFTS: Chronic | ICD-10-CM

## 2019-02-04 DIAGNOSIS — D64.9 ANEMIA, UNSPECIFIED: ICD-10-CM

## 2019-02-04 LAB
ANION GAP SERPL CALC-SCNC: 14 MMO/L — SIGNIFICANT CHANGE UP (ref 7–14)
BASE EXCESS BLDV CALC-SCNC: -2 MMOL/L — SIGNIFICANT CHANGE UP
BLOOD GAS VENOUS - CREATININE: 3.67 MG/DL — HIGH (ref 0.5–1.3)
BUN SERPL-MCNC: 63 MG/DL — HIGH (ref 7–23)
CALCIUM SERPL-MCNC: 8.7 MG/DL — SIGNIFICANT CHANGE UP (ref 8.4–10.5)
CHLORIDE BLDV-SCNC: 105 MMOL/L — SIGNIFICANT CHANGE UP (ref 96–108)
CHLORIDE SERPL-SCNC: 101 MMOL/L — SIGNIFICANT CHANGE UP (ref 98–107)
CO2 SERPL-SCNC: 20 MMOL/L — LOW (ref 22–31)
CREAT SERPL-MCNC: 3.66 MG/DL — HIGH (ref 0.5–1.3)
ERYTHROCYTE [SEDIMENTATION RATE] IN BLOOD: 109 MM/HR — HIGH (ref 1–15)
GAS PNL BLDV: 131 MMOL/L — LOW (ref 136–146)
GLUCOSE BLDC GLUCOMTR-MCNC: 61 MG/DL — LOW (ref 70–99)
GLUCOSE BLDC GLUCOMTR-MCNC: 92 MG/DL — SIGNIFICANT CHANGE UP (ref 70–99)
GLUCOSE BLDV-MCNC: 102 — HIGH (ref 70–99)
GLUCOSE SERPL-MCNC: 120 MG/DL — HIGH (ref 70–99)
HCO3 BLDV-SCNC: 23 MMOL/L — SIGNIFICANT CHANGE UP (ref 20–27)
HCT VFR BLD CALC: 29.6 % — LOW (ref 39–50)
HCT VFR BLDV CALC: 21.9 % — LOW (ref 39–51)
HGB BLD-MCNC: 9.5 G/DL — LOW (ref 13–17)
HGB BLDV-MCNC: 7 G/DL — CRITICAL LOW (ref 13–17)
LACTATE BLDV-MCNC: 1.8 MMOL/L — SIGNIFICANT CHANGE UP (ref 0.5–2)
MCHC RBC-ENTMCNC: 28.7 PG — SIGNIFICANT CHANGE UP (ref 27–34)
MCHC RBC-ENTMCNC: 32.1 % — SIGNIFICANT CHANGE UP (ref 32–36)
MCV RBC AUTO: 89.4 FL — SIGNIFICANT CHANGE UP (ref 80–100)
NRBC # FLD: 0 K/UL — LOW (ref 25–125)
PCO2 BLDV: 41 MMHG — SIGNIFICANT CHANGE UP (ref 41–51)
PH BLDV: 7.36 PH — SIGNIFICANT CHANGE UP (ref 7.32–7.43)
PLATELET # BLD AUTO: 246 K/UL — SIGNIFICANT CHANGE UP (ref 150–400)
PMV BLD: 11.2 FL — SIGNIFICANT CHANGE UP (ref 7–13)
PO2 BLDV: 49 MMHG — HIGH (ref 35–40)
POTASSIUM BLDV-SCNC: 3.9 MMOL/L — SIGNIFICANT CHANGE UP (ref 3.4–4.5)
POTASSIUM SERPL-MCNC: 4.2 MMOL/L — SIGNIFICANT CHANGE UP (ref 3.5–5.3)
POTASSIUM SERPL-SCNC: 4.2 MMOL/L — SIGNIFICANT CHANGE UP (ref 3.5–5.3)
RBC # BLD: 3.31 M/UL — LOW (ref 4.2–5.8)
RBC # FLD: 14 % — SIGNIFICANT CHANGE UP (ref 10.3–14.5)
SAO2 % BLDV: 82.2 % — SIGNIFICANT CHANGE UP (ref 60–85)
SODIUM SERPL-SCNC: 135 MMOL/L — SIGNIFICANT CHANGE UP (ref 135–145)
WBC # BLD: 13.63 K/UL — HIGH (ref 3.8–10.5)
WBC # FLD AUTO: 13.63 K/UL — HIGH (ref 3.8–10.5)

## 2019-02-04 PROCEDURE — 73630 X-RAY EXAM OF FOOT: CPT | Mod: 26,RT

## 2019-02-04 PROCEDURE — 99213 OFFICE O/P EST LOW 20 MIN: CPT

## 2019-02-04 RX ORDER — PIPERACILLIN AND TAZOBACTAM 4; .5 G/20ML; G/20ML
3.38 INJECTION, POWDER, LYOPHILIZED, FOR SOLUTION INTRAVENOUS ONCE
Qty: 0 | Refills: 0 | Status: COMPLETED | OUTPATIENT
Start: 2019-02-04 | End: 2019-02-04

## 2019-02-04 RX ORDER — ASPIRIN/CALCIUM CARB/MAGNESIUM 324 MG
81 TABLET ORAL DAILY
Qty: 0 | Refills: 0 | Status: DISCONTINUED | OUTPATIENT
Start: 2019-02-04 | End: 2019-02-13

## 2019-02-04 RX ORDER — DEXTROSE 50 % IN WATER 50 %
25 SYRINGE (ML) INTRAVENOUS ONCE
Qty: 0 | Refills: 0 | Status: DISCONTINUED | OUTPATIENT
Start: 2019-02-04 | End: 2019-02-13

## 2019-02-04 RX ORDER — AMLODIPINE BESYLATE 2.5 MG/1
10 TABLET ORAL DAILY
Qty: 0 | Refills: 0 | Status: DISCONTINUED | OUTPATIENT
Start: 2019-02-04 | End: 2019-02-13

## 2019-02-04 RX ORDER — DEXTROSE 50 % IN WATER 50 %
12.5 SYRINGE (ML) INTRAVENOUS ONCE
Qty: 0 | Refills: 0 | Status: DISCONTINUED | OUTPATIENT
Start: 2019-02-04 | End: 2019-02-13

## 2019-02-04 RX ORDER — VANCOMYCIN HCL 1 G
1000 VIAL (EA) INTRAVENOUS ONCE
Qty: 0 | Refills: 0 | Status: COMPLETED | OUTPATIENT
Start: 2019-02-04 | End: 2019-02-04

## 2019-02-04 RX ORDER — INSULIN LISPRO 100/ML
VIAL (ML) SUBCUTANEOUS AT BEDTIME
Qty: 0 | Refills: 0 | Status: DISCONTINUED | OUTPATIENT
Start: 2019-02-04 | End: 2019-02-13

## 2019-02-04 RX ORDER — DEXTROSE 50 % IN WATER 50 %
15 SYRINGE (ML) INTRAVENOUS ONCE
Qty: 0 | Refills: 0 | Status: DISCONTINUED | OUTPATIENT
Start: 2019-02-04 | End: 2019-02-13

## 2019-02-04 RX ORDER — CEFEPIME 1 G/1
1000 INJECTION, POWDER, FOR SOLUTION INTRAMUSCULAR; INTRAVENOUS EVERY 24 HOURS
Qty: 0 | Refills: 0 | Status: DISCONTINUED | OUTPATIENT
Start: 2019-02-04 | End: 2019-02-04

## 2019-02-04 RX ORDER — CLOPIDOGREL BISULFATE 75 MG/1
75 TABLET, FILM COATED ORAL DAILY
Qty: 0 | Refills: 0 | Status: DISCONTINUED | OUTPATIENT
Start: 2019-02-04 | End: 2019-02-13

## 2019-02-04 RX ORDER — CEFEPIME 1 G/1
1000 INJECTION, POWDER, FOR SOLUTION INTRAMUSCULAR; INTRAVENOUS EVERY 12 HOURS
Qty: 0 | Refills: 0 | Status: DISCONTINUED | OUTPATIENT
Start: 2019-02-04 | End: 2019-02-06

## 2019-02-04 RX ORDER — ACETAMINOPHEN 500 MG
650 TABLET ORAL ONCE
Qty: 0 | Refills: 0 | Status: COMPLETED | OUTPATIENT
Start: 2019-02-04 | End: 2019-02-04

## 2019-02-04 RX ORDER — ATORVASTATIN CALCIUM 80 MG/1
40 TABLET, FILM COATED ORAL AT BEDTIME
Qty: 0 | Refills: 0 | Status: DISCONTINUED | OUTPATIENT
Start: 2019-02-04 | End: 2019-02-13

## 2019-02-04 RX ORDER — CEFEPIME 1 G/1
1000 INJECTION, POWDER, FOR SOLUTION INTRAMUSCULAR; INTRAVENOUS EVERY 12 HOURS
Qty: 0 | Refills: 0 | Status: ACTIVE | OUTPATIENT
Start: 2019-02-04 | End: 2020-01-03

## 2019-02-04 RX ORDER — FUROSEMIDE 40 MG
40 TABLET ORAL
Qty: 0 | Refills: 0 | Status: DISCONTINUED | OUTPATIENT
Start: 2019-02-04 | End: 2019-02-13

## 2019-02-04 RX ORDER — GLUCAGON INJECTION, SOLUTION 0.5 MG/.1ML
1 INJECTION, SOLUTION SUBCUTANEOUS ONCE
Qty: 0 | Refills: 0 | Status: DISCONTINUED | OUTPATIENT
Start: 2019-02-04 | End: 2019-02-13

## 2019-02-04 RX ORDER — LACTOBACILLUS ACIDOPHILUS 100MM CELL
1 CAPSULE ORAL DAILY
Qty: 0 | Refills: 0 | Status: DISCONTINUED | OUTPATIENT
Start: 2019-02-04 | End: 2019-02-13

## 2019-02-04 RX ORDER — METOPROLOL TARTRATE 50 MG
50 TABLET ORAL DAILY
Qty: 0 | Refills: 0 | Status: DISCONTINUED | OUTPATIENT
Start: 2019-02-04 | End: 2019-02-13

## 2019-02-04 RX ORDER — FOLIC ACID 0.8 MG
1 TABLET ORAL DAILY
Qty: 0 | Refills: 0 | Status: DISCONTINUED | OUTPATIENT
Start: 2019-02-04 | End: 2019-02-13

## 2019-02-04 RX ORDER — SODIUM CHLORIDE 9 MG/ML
500 INJECTION INTRAMUSCULAR; INTRAVENOUS; SUBCUTANEOUS ONCE
Qty: 0 | Refills: 0 | Status: COMPLETED | OUTPATIENT
Start: 2019-02-04 | End: 2019-02-04

## 2019-02-04 RX ORDER — SODIUM CHLORIDE 9 MG/ML
1000 INJECTION, SOLUTION INTRAVENOUS
Qty: 0 | Refills: 0 | Status: DISCONTINUED | OUTPATIENT
Start: 2019-02-04 | End: 2019-02-13

## 2019-02-04 RX ORDER — DOCUSATE SODIUM 100 MG
100 CAPSULE ORAL
Qty: 0 | Refills: 0 | Status: DISCONTINUED | OUTPATIENT
Start: 2019-02-04 | End: 2019-02-13

## 2019-02-04 RX ORDER — INSULIN LISPRO 100/ML
VIAL (ML) SUBCUTANEOUS
Qty: 0 | Refills: 0 | Status: DISCONTINUED | OUTPATIENT
Start: 2019-02-04 | End: 2019-02-13

## 2019-02-04 RX ADMIN — CEFEPIME 100 MILLIGRAM(S): 1 INJECTION, POWDER, FOR SOLUTION INTRAMUSCULAR; INTRAVENOUS at 18:57

## 2019-02-04 RX ADMIN — Medication 250 MILLIGRAM(S): at 17:53

## 2019-02-04 RX ADMIN — PIPERACILLIN AND TAZOBACTAM 200 GRAM(S): 4; .5 INJECTION, POWDER, LYOPHILIZED, FOR SOLUTION INTRAVENOUS at 15:34

## 2019-02-04 RX ADMIN — Medication 650 MILLIGRAM(S): at 17:56

## 2019-02-04 RX ADMIN — SODIUM CHLORIDE 500 MILLILITER(S): 9 INJECTION INTRAMUSCULAR; INTRAVENOUS; SUBCUTANEOUS at 15:34

## 2019-02-04 NOTE — ASSESSMENT
[FreeTextEntry1] : 62 yo M right plantar midfoot ulcer\par - Pt seen and evaluated\par - about 10 ccs of purulence drained from ulcer; cellulitic\par - wound cultured \par - Wound flushed w/ copious sterile saline\par - Right foot wound packed circumferentially w/ iodoform packing\par - Rec admission to hospital for IV abx \par

## 2019-02-04 NOTE — ED PROVIDER NOTE - PMH
Anemia    Chronic kidney disease, stage III (moderate)    Diabetic foot ulcer    DM (diabetes mellitus)    Dyslipidemia    GERD (gastroesophageal reflux disease)    HTN (hypertension)    Renal cyst    Vitamin D deficiency

## 2019-02-04 NOTE — H&P ADULT - HISTORY OF PRESENT ILLNESS
63 year old male, with past history significant for Type-II DM, HTN, and R-Foot Charcot deformity, Osteomyelitis, Vitamin D deficiency, Anemia, CKD-IV, HLD and GERD, presented to the ED, as sent by podiatrist, secondary +increasing swelling, pain and bleeding. Patient was recently in hopsital planned for foot debridement by pod however had +NST s/p PCI so procedure was delayed , pt admitted today for further rt foot wound management

## 2019-02-04 NOTE — PHYSICAL EXAM
[0] : left 0 [de-identified] : No gross deformities [de-identified] : Absent epicritic sensation to b/l feet [de-identified] : down to bone with 10 ccs of purulence drained; cellulitic  [FreeTextEntry1] : planter midfoot [FreeTextEntry2] : 1.7 [FreeTextEntry3] : 1 [FreeTextEntry4] : 0.7 [de-identified] : purulence [de-identified] : circumferentially [de-identified] : HPK, surrounding erythema

## 2019-02-04 NOTE — H&P ADULT - ASSESSMENT
pt with above history p/w worsening rt foot wound   - abx as per ID, podiatry/ ID f/u  - f/iu wound cx     Cad - cards f/u , pt with above history p/w worsening rt foot wound   - abx as per ID, podiatry/ ID f/u  - f/iu wound cx     Cad - cards f/u , cont aspirin, plavix, statin

## 2019-02-04 NOTE — ED PROVIDER NOTE - SKIN WOUND DESCRIPTION
pt had been seen by Podiatry and foot was wrapped prior to my evaluation, by their report ulceration 1.0cmx1.0cm w/ purulence and erythema that probes to bone

## 2019-02-04 NOTE — CONSULT NOTE ADULT - SUBJECTIVE AND OBJECTIVE BOX
[facility]   MD DOMO WALLACE MD ANGELA WONG, PA    TEL:  OFFICE: [phone]  DR LEE CELL: [phone]  DR. GRULLON CELL: [phone]  MELANIA CORONEL CELL: [phone]      HPI:  63 year old male, with past history significant for Type-II DM, HTN, and R-Foot Charcot deformity, Osteomyelitis, Vitamin D deficiency, Anemia, CKD-IV, HLD and GERD, presented to the ED, as sent by podiatrist, secondary +increasing swelling, pain and bleeding. Patient was recently in hopsital planned for foot debridement by pod however had +NST s/p PCI.    Patient know ckd baseline ~3.2-3.6    Allergies:  No Known Allergies      PAST MEDICAL & SURGICAL HISTORY:  Vitamin D deficiency  GERD (gastroesophageal reflux disease)  Dyslipidemia  Diabetic foot ulcer  Chronic kidney disease, stage III (moderate)  Renal cyst  Anemia  HTN (hypertension)  DM (diabetes mellitus)  S/P PICC central line placement: ~ L-Basilic vein (09/13/2018)  Ankle fracture: ORIF      Home Medications Reviewed    Hospital Medications:   MEDICATIONS  (STANDING):  acetaminophen   Tablet .. 650 milliGRAM(s) Oral once  vancomycin  IVPB 1000 milliGRAM(s) IV Intermittent once      SOCIAL HISTORY:  Denies ETOh, Smoking,     FAMILY HISTORY:  Family history of lung disease: father (asbestosis; worked in [employer])  Family history of stomach cancer: father      REVIEW OF SYSTEMS:  CONSTITUTIONAL: No weakness, fevers or chills  EYES/ENT: No visual changes;  No vertigo or throat pain   NECK: No pain or stiffness  RESPIRATORY: No cough, wheezing, hemoptysis; No shortness of breath  CARDIOVASCULAR: No chest pain or palpitations.  GASTROINTESTINAL: No abdominal or epigastric pain. No nausea, vomiting, or hematemesis; No diarrhea or constipation. No melena or hematochezia.  GENITOURINARY: No dysuria, frequency, foamy urine, urinary urgency, incontinence or hematuria  NEUROLOGICAL: No numbness or weakness  SKIN: No itching, burning, rashes, or lesions   VASCULAR: see HPI  All other review of systems is negative unless indicated above.    VITALS:  T(F): 99.4 (02-04-19 @ 14:55), Max: 101.5 (02-04-19 @ 12:42)  HR: 73 (02-04-19 @ 14:55)  BP: 131/59 (02-04-19 @ 14:55)  RR: 16 (02-04-19 @ 14:55)  SpO2: 98% (02-04-19 @ 14:55)  Wt(kg): --        PHYSICAL EXAM:  Constitutional: NAD  HEENT: anicteric sclera, oropharynx clear, MMM  Neck: No JVD  Respiratory: CTAB, no wheezes, rales or rhonchi  Cardiovascular: S1, S2, RRR  Gastrointestinal: BS+, soft, NT/ND  Extremities: 1-2+peripheral edema, right foot dressing d/c/i  Neurological: A/O x 3, no focal deficits  Psychiatric: Normal mood, normal affect  : No CVA tenderness. No sanchez.   Skin: No rashes      LABS:  02-04    135  |  101  |  63<H>  ----------------------------<  120<H>  4.2   |  20<L>  |  3.66<H>    Ca    8.7      04 Feb 2019 14:50      Creatinine Trend: 3.66 <--                        9.5    13.63 )-----------( 246      ( 04 Feb 2019 14:50 )             29.6     Urine Studies:        RADIOLOGY & ADDITIONAL STUDIES:

## 2019-02-04 NOTE — HISTORY OF PRESENT ILLNESS
[FreeTextEntry1] : 63M DM w/ neuropathy right plantar midfoot ulcer to subQ. Pt was recently hospitalized at [facility] for Right foot infection. Previous cultures grew pseudomonas, MSSA + strep. Pt states that since last night he has been feeling pain in his foot. States he threw up last night. Afebrlile to 98.2 today, not tachy.

## 2019-02-04 NOTE — ED PROVIDER NOTE - OBJECTIVE STATEMENT
64 y/o M w/ PMHx of DM, HTN, Chronic Kidney Dz, CAD s/p stent x2wks ago p/w infected diabetic foot ulcer. Has been following w/ podiatry for known ulcer w/ planned operative debridement. Last night developed nausea, vomiting. Seen in podiatry clinic and referred to ED for IV abx.

## 2019-02-04 NOTE — CONSULT NOTE ADULT - SUBJECTIVE AND OBJECTIVE BOX
Patient is a 63y old  Male who presents with a chief complaint of     HPI: Pt was sent in by Dr. Plunkett from wound care center for right foot infection after being discharged about 10 days ago s/p cardiac stent placement. At the time podiatry was planning on performing a debridement and delayed primary closure, but due to stent placement at the recommendation of cardiology delayed case. Pt has been following outpatient with Dr. Plunkett and presented to clinic with purulence draining from his right foot wound. Pt admits to nausea and vomiting last night.       PAST MEDICAL & SURGICAL HISTORY:  Vitamin D deficiency  GERD (gastroesophageal reflux disease)  Dyslipidemia  Diabetic foot ulcer  Chronic kidney disease, stage III (moderate)  Renal cyst  Anemia  HTN (hypertension)  DM (diabetes mellitus)  S/P PICC central line placement: ~ L-Basilic vein (09/13/2018)  Ankle fracture: ORIF      MEDICATIONS  (STANDING):    MEDICATIONS  (PRN):      Allergies    No Known Allergies    Intolerances        VITALS:    Vital Signs Last 24 Hrs  T(C): --  T(F): --  HR: --  BP: --  BP(mean): --  RR: --  SpO2: --    LABS:                CAPILLARY BLOOD GLUCOSE              LOWER EXTREMITY PHYSICAL EXAM:    Dressing left clean dry and intact from wound care center after being explored  Physical exam listed below as per investigation from wound care center     Vascular: DP/PT 0/4, B/L, CFT < 5 seconds B/L, Temperature gradient slightly warmer on right  Neuro: Epicritic sensation diminished to the level of digits, B/L.  Musculoskeletal/Ortho: charcot deformity right foot   Skin: edema to the right LE, erythema diffusely to plantar midfoot without extension past ankle joint  Wound #1:   Location: plantar midfoot  Size: 1.1cm x 1.3cm  Depth: 2.3cm  Wound bed: fibrogranular  Drainage: purulence  Odor: slight malodor  Periwound: macerated hyperkeratotic  Etiology: 2/2 ambulation, charcot deformity    RADIOLOGY & ADDITIONAL STUDIES: Patient is a 63y old  Male who presents with a chief complaint of right foot infection sent in by podiatry 2/4    HPI: Pt was sent in by Dr. Plunkett today 2/4 from wound care center for right foot infection after being discharged about 10 days ago s/p cardiac stent placement. At the time podiatry was planning on performing a debridement and delayed primary closure, but due to stent placement at the recommendation of cardiology delayed case. Pt has been following outpatient with Dr. Plunkett and presented to clinic with purulence draining from his right foot wound. Pt admits to nausea and vomiting last night.       PAST MEDICAL & SURGICAL HISTORY:  Vitamin D deficiency  GERD (gastroesophageal reflux disease)  Dyslipidemia  Diabetic foot ulcer  Chronic kidney disease, stage III (moderate)  Renal cyst  Anemia  HTN (hypertension)  DM (diabetes mellitus)  S/P PICC central line placement: ~ L-Basilic vein (09/13/2018)  Ankle fracture: ORIF      MEDICATIONS  (STANDING):    MEDICATIONS  (PRN):      Allergies    No Known Allergies    Intolerances        VITALS:    Vital Signs Last 24 Hrs  T(C): --  T(F): --  HR: --  BP: --  BP(mean): --  RR: --  SpO2: --    LABS:                CAPILLARY BLOOD GLUCOSE              LOWER EXTREMITY PHYSICAL EXAM:    Dressing left clean dry and intact from wound care center after being explored  Physical exam listed below as per investigation from wound care center     Vascular: DP/PT 0/4, B/L, CFT < 5 seconds B/L, Temperature gradient slightly warmer on right  Neuro: Epicritic sensation diminished to the level of digits, B/L.  Musculoskeletal/Ortho: charcot deformity right foot   Skin: edema to the right LE, erythema diffusely to plantar midfoot without extension past ankle joint  Wound #1:   Location: plantar midfoot  Size: 1.1cm x 1.3cm  Depth: 2.3cm  Wound bed: fibrogranular  Drainage: purulence  Odor: slight malodor  Periwound: macerated hyperkeratotic  Etiology: 2/2 ambulation, charcot deformity    RADIOLOGY & ADDITIONAL STUDIES:

## 2019-02-04 NOTE — ED PROVIDER NOTE - MEDICAL DECISION MAKING DETAILS
Plan for labs, XR, IV abx, seen by podiatry w/ planned operative repair, after cardiac clearance will admit to hospitalist.

## 2019-02-04 NOTE — ED ADULT NURSE NOTE - NSIMPLEMENTINTERV_GEN_ALL_ED
0=no tremor
Implemented All Universal Safety Interventions:  [place] to call system. Call bell, personal items and telephone within reach. Instruct patient to call for assistance. Room bathroom lighting operational. Non-slip footwear when patient is off stretcher. Physically safe environment: no spills, clutter or unnecessary equipment. Stretcher in lowest position, wheels locked, appropriate side rails in place.

## 2019-02-04 NOTE — ED ADULT TRIAGE NOTE - CHIEF COMPLAINT QUOTE
Pt sent by wound specialist for IV antibiotics, pt states he is currently being treated for a wound on his R foot that is worsening. Pt febrile in triage.

## 2019-02-04 NOTE — CONSULT NOTE ADULT - ASSESSMENT
63 year old male, with past history significant for Type-II DM, HTN, and R-Foot Charcot deformity, Osteomyelitis, Vitamin D deficiency, Anemia, CKD-IV, HLD and GERD, presented to the ED, as sent by podiatrist, secondary +increasing swelling, pain and bleeding.

## 2019-02-04 NOTE — CONSULT NOTE ADULT - PROBLEM SELECTOR RECOMMENDATION 9
baseline ~ 3.2-3.6  stable  monitor  continue lasix 80mg daily  avoid nephrotoxic agents  monitor bmp CKD Stage 4  baseline ~ 3.2-3.6  stable renal function at present   monitor BMP  Avoid further nephrotoxics, NSAIDS RCA  continue lasix 80mg daily  avoid nephrotoxic agents  monitor bmp

## 2019-02-04 NOTE — ED PROVIDER NOTE - NS_ ATTENDINGSCRIBEDETAILS _ED_A_ED_FT
The scribe's documentation has been prepared under my direction and personally reviewed by me in its entirety. I confirm that the note above accurately reflects all work, treatment, procedures, and medical decision making performed by me. LORENA Zavala MD

## 2019-02-04 NOTE — ED ADULT NURSE NOTE - OBJECTIVE STATEMENT
63 year old male presents to the ER with nausea and vomiting Pt is currently being followed by podiatry for a right foot ulcer and has a planned OR for debridement RN did not visualize the wound due to dressing that is in place by podiatry   PIV placed labs drawn and sent as ordered Plan of care discussed with pt with good understanding

## 2019-02-04 NOTE — CONSULT NOTE ADULT - ASSESSMENT
64yo M w/ charcot deformity and right foot ulceration infection with positive probe to bone, no OM based on 1/14 NM scan  ·	Pt seen & evaluated  ·	Culture to be sent  ·	Please start Vanco & Zosyn at this time, will likely require ID consult for potential long term IV abx (Dr. Spivey had been following, will gather data at bedside vs in OR from bone)  ·	XR ordered  ·	Admit to medicine for IV abx, recommend cardiology consult to establish if pt can proceed to OR sooner in setting of recurrence and worsening of infection despite having drug eluting cardiac stent placed about 10 days ago  ·	d/w attending 64yo M w/ charcot deformity and right foot ulceration infection with positive probe to bone, no OM based on 1/14 NM scan  ·	Pt seen & evaluated  ·	Culture to be sent  ·	Please start Vanco & Zosyn at this time, will likely require ID consult for potential long term IV abx (Dr. Spivey had been following, will gather data at bedside vs in OR from bone)  ·	XR & labs ordered  ·	Admit to medicine for IV abx, recommend cardiology consult to establish if pt can proceed to OR sooner in setting of recurrence and worsening of infection despite having drug eluting cardiac stent placed about 10 days ago  ·	d/w attending 62yo M w/ charcot deformity and right foot ulceration infection with positive probe to bone, no OM based on 1/14 NM scan  ·	Pt seen & evaluated  ·	Febrile in intake waiting room, was afebrile in wound care center  ·	Culture to be sent  ·	Please start Vanco & Zosyn at this time, will likely require ID consult for potential long term IV abx (Dr. Spivey had been following, will gather data at bedside vs in OR from bone)  ·	XR & labs ordered  ·	Admit to medicine for IV abx, recommend cardiology consult to establish if pt can proceed to OR sooner in setting of recurrence and worsening of infection despite having drug eluting cardiac stent placed about 10 days ago  ·	d/w attending

## 2019-02-05 ENCOUNTER — TRANSCRIPTION ENCOUNTER (OUTPATIENT)
Age: 64
End: 2019-02-05

## 2019-02-05 DIAGNOSIS — E11.9 TYPE 2 DIABETES MELLITUS WITHOUT COMPLICATIONS: ICD-10-CM

## 2019-02-05 DIAGNOSIS — I25.10 ATHEROSCLEROTIC HEART DISEASE OF NATIVE CORONARY ARTERY WITHOUT ANGINA PECTORIS: ICD-10-CM

## 2019-02-05 LAB
ALBUMIN SERPL ELPH-MCNC: 2.4 G/DL — LOW (ref 3.3–5)
ALP SERPL-CCNC: 65 U/L — SIGNIFICANT CHANGE UP (ref 40–120)
ALT FLD-CCNC: 6 U/L — SIGNIFICANT CHANGE UP (ref 4–41)
ANION GAP SERPL CALC-SCNC: 13 MMO/L — SIGNIFICANT CHANGE UP (ref 7–14)
AST SERPL-CCNC: 9 U/L — SIGNIFICANT CHANGE UP (ref 4–40)
BASOPHILS # BLD AUTO: 0.03 K/UL — SIGNIFICANT CHANGE UP (ref 0–0.2)
BASOPHILS NFR BLD AUTO: 0.2 % — SIGNIFICANT CHANGE UP (ref 0–2)
BILIRUB SERPL-MCNC: 0.3 MG/DL — SIGNIFICANT CHANGE UP (ref 0.2–1.2)
BUN SERPL-MCNC: 60 MG/DL — HIGH (ref 7–23)
CALCIUM SERPL-MCNC: 8.3 MG/DL — LOW (ref 8.4–10.5)
CHLORIDE SERPL-SCNC: 102 MMOL/L — SIGNIFICANT CHANGE UP (ref 98–107)
CO2 SERPL-SCNC: 21 MMOL/L — LOW (ref 22–31)
CREAT SERPL-MCNC: 3.57 MG/DL — HIGH (ref 0.5–1.3)
EOSINOPHIL # BLD AUTO: 0.1 K/UL — SIGNIFICANT CHANGE UP (ref 0–0.5)
EOSINOPHIL NFR BLD AUTO: 0.8 % — SIGNIFICANT CHANGE UP (ref 0–6)
GLUCOSE BLDC GLUCOMTR-MCNC: 113 MG/DL — HIGH (ref 70–99)
GLUCOSE BLDC GLUCOMTR-MCNC: 74 MG/DL — SIGNIFICANT CHANGE UP (ref 70–99)
GLUCOSE BLDC GLUCOMTR-MCNC: 80 MG/DL — SIGNIFICANT CHANGE UP (ref 70–99)
GLUCOSE BLDC GLUCOMTR-MCNC: 87 MG/DL — SIGNIFICANT CHANGE UP (ref 70–99)
GLUCOSE SERPL-MCNC: 66 MG/DL — LOW (ref 70–99)
GRAM STN SPEC: SIGNIFICANT CHANGE UP
GRAM STN SPEC: SIGNIFICANT CHANGE UP
HCT VFR BLD CALC: 28.5 % — LOW (ref 39–50)
HCV AB S/CO SERPL IA: 0.09 S/CO — SIGNIFICANT CHANGE UP
HCV AB SERPL-IMP: SIGNIFICANT CHANGE UP
HGB BLD-MCNC: 9 G/DL — LOW (ref 13–17)
IMM GRANULOCYTES NFR BLD AUTO: 0.5 % — SIGNIFICANT CHANGE UP (ref 0–1.5)
LYMPHOCYTES # BLD AUTO: 1.72 K/UL — SIGNIFICANT CHANGE UP (ref 1–3.3)
LYMPHOCYTES # BLD AUTO: 14 % — SIGNIFICANT CHANGE UP (ref 13–44)
MCHC RBC-ENTMCNC: 28.3 PG — SIGNIFICANT CHANGE UP (ref 27–34)
MCHC RBC-ENTMCNC: 31.6 % — LOW (ref 32–36)
MCV RBC AUTO: 89.6 FL — SIGNIFICANT CHANGE UP (ref 80–100)
METHOD TYPE: SIGNIFICANT CHANGE UP
MONOCYTES # BLD AUTO: 1.21 K/UL — HIGH (ref 0–0.9)
MONOCYTES NFR BLD AUTO: 9.9 % — SIGNIFICANT CHANGE UP (ref 2–14)
NEUTROPHILS # BLD AUTO: 9.13 K/UL — HIGH (ref 1.8–7.4)
NEUTROPHILS NFR BLD AUTO: 74.6 % — SIGNIFICANT CHANGE UP (ref 43–77)
NRBC # FLD: 0 K/UL — LOW (ref 25–125)
ORGANISM # SPEC MICROSCOPIC CNT: SIGNIFICANT CHANGE UP
ORGANISM # SPEC MICROSCOPIC CNT: SIGNIFICANT CHANGE UP
PLATELET # BLD AUTO: 210 K/UL — SIGNIFICANT CHANGE UP (ref 150–400)
PMV BLD: 11.3 FL — SIGNIFICANT CHANGE UP (ref 7–13)
POTASSIUM SERPL-MCNC: 4 MMOL/L — SIGNIFICANT CHANGE UP (ref 3.5–5.3)
POTASSIUM SERPL-SCNC: 4 MMOL/L — SIGNIFICANT CHANGE UP (ref 3.5–5.3)
PROT SERPL-MCNC: 5.8 G/DL — LOW (ref 6–8.3)
RBC # BLD: 3.18 M/UL — LOW (ref 4.2–5.8)
RBC # FLD: 14.1 % — SIGNIFICANT CHANGE UP (ref 10.3–14.5)
SODIUM SERPL-SCNC: 136 MMOL/L — SIGNIFICANT CHANGE UP (ref 135–145)
SPECIMEN SOURCE: SIGNIFICANT CHANGE UP
VANCOMYCIN FLD-MCNC: 10.7 UG/ML — SIGNIFICANT CHANGE UP
WBC # BLD: 12.25 K/UL — HIGH (ref 3.8–10.5)
WBC # FLD AUTO: 12.25 K/UL — HIGH (ref 3.8–10.5)

## 2019-02-05 RX ORDER — ACETAMINOPHEN 500 MG
650 TABLET ORAL ONCE
Qty: 0 | Refills: 0 | Status: COMPLETED | OUTPATIENT
Start: 2019-02-05 | End: 2019-02-05

## 2019-02-05 RX ADMIN — Medication 81 MILLIGRAM(S): at 12:02

## 2019-02-05 RX ADMIN — Medication 40 MILLIGRAM(S): at 05:58

## 2019-02-05 RX ADMIN — Medication 650 MILLIGRAM(S): at 06:03

## 2019-02-05 RX ADMIN — Medication 50 MILLIGRAM(S): at 05:58

## 2019-02-05 RX ADMIN — Medication 1 MILLIGRAM(S): at 12:02

## 2019-02-05 RX ADMIN — ATORVASTATIN CALCIUM 40 MILLIGRAM(S): 80 TABLET, FILM COATED ORAL at 22:51

## 2019-02-05 RX ADMIN — CEFEPIME 100 MILLIGRAM(S): 1 INJECTION, POWDER, FOR SOLUTION INTRAMUSCULAR; INTRAVENOUS at 05:59

## 2019-02-05 RX ADMIN — CEFEPIME 100 MILLIGRAM(S): 1 INJECTION, POWDER, FOR SOLUTION INTRAMUSCULAR; INTRAVENOUS at 18:18

## 2019-02-05 RX ADMIN — Medication 1 TABLET(S): at 12:02

## 2019-02-05 RX ADMIN — CLOPIDOGREL BISULFATE 75 MILLIGRAM(S): 75 TABLET, FILM COATED ORAL at 12:02

## 2019-02-05 RX ADMIN — AMLODIPINE BESYLATE 10 MILLIGRAM(S): 2.5 TABLET ORAL at 05:58

## 2019-02-05 RX ADMIN — Medication 40 MILLIGRAM(S): at 18:18

## 2019-02-05 NOTE — PROGRESS NOTE ADULT - SUBJECTIVE AND OBJECTIVE BOX
SUBJECTIVE / OVERNIGHT EVENTS: pt denies chest pain, shortness of breath, nausea,v       MEDICATIONS  (STANDING):  amLODIPine   Tablet 10 milliGRAM(s) Oral daily  aspirin enteric coated 81 milliGRAM(s) Oral daily  atorvastatin 40 milliGRAM(s) Oral at bedtime  cefepime   IVPB 1000 milliGRAM(s) IV Intermittent every 12 hours  cefepime   IVPB 1000 milliGRAM(s) IV Intermittent every 12 hours  clopidogrel Tablet 75 milliGRAM(s) Oral daily  dextrose 5%. 1000 milliLiter(s) (50 mL/Hr) IV Continuous <Continuous>  dextrose 50% Injectable 12.5 Gram(s) IV Push once  dextrose 50% Injectable 25 Gram(s) IV Push once  dextrose 50% Injectable 25 Gram(s) IV Push once  docusate sodium 100 milliGRAM(s) Oral two times a day  folic acid 1 milliGRAM(s) Oral daily  furosemide    Tablet 40 milliGRAM(s) Oral two times a day  insulin lispro (HumaLOG) corrective regimen sliding scale   SubCutaneous three times a day before meals  insulin lispro (HumaLOG) corrective regimen sliding scale   SubCutaneous at bedtime  lactobacillus acidophilus 1 Tablet(s) Oral daily  metoprolol succinate ER 50 milliGRAM(s) Oral daily    MEDICATIONS  (PRN):  dextrose 40% Gel 15 Gram(s) Oral once PRN Blood Glucose LESS THAN 70 milliGRAM(s)/deciliter  glucagon  Injectable 1 milliGRAM(s) IntraMuscular once PRN Glucose LESS THAN 70 milligrams/deciliter      Vital Signs Last 24 Hrs  T(C): 37.5 (05 Feb 2019 21:46), Max: 37.5 (05 Feb 2019 21:46)  T(F): 99.5 (05 Feb 2019 21:46), Max: 99.5 (05 Feb 2019 21:46)  HR: 74 (05 Feb 2019 21:46) (74 - 79)  BP: 143/65 (05 Feb 2019 21:46) (134/64 - 147/72)  BP(mean): --  RR: 18 (05 Feb 2019 21:46) (17 - 18)  SpO2: 100% (05 Feb 2019 21:46) (100% - 100%)  CAPILLARY BLOOD GLUCOSE      POCT Blood Glucose.: 113 mg/dL (05 Feb 2019 21:44)  POCT Blood Glucose.: 80 mg/dL (05 Feb 2019 17:00)  POCT Blood Glucose.: 87 mg/dL (05 Feb 2019 11:53)  POCT Blood Glucose.: 74 mg/dL (05 Feb 2019 07:52)  POCT Blood Glucose.: 61 mg/dL (04 Feb 2019 22:27)    I&O's Summary    04 Feb 2019 07:01  -  05 Feb 2019 07:00  --------------------------------------------------------  IN: 0 mL / OUT: 500 mL / NET: -500 mL        Constitutional: No fever, fatigue  Skin: No rash.  Eyes: No recent vision problems or eye pain.  ENT: No congestion, ear pain, or sore throat.  Cardiovascular: No chest pain or palpation.  Respiratory: No cough, shortness of breath, congestion, or wheezing.  Gastrointestinal: No abdominal pain, nausea, vomiting, or diarrhea.  Genitourinary: No dysuria.  Musculoskeletal: No joint swelling.  Neurologic: No headache.    PHYSICAL EXAM:  GENERAL: NAD  EYES: EOMI, PERRLA  NECK: Supple, No JVD  CHEST/LUNG: dec breath sounds rt base   HEART:  S1 , S2 +  ABDOMEN: soft, bs+  EXTREMITIES:  rt foot dressing +  NEUROLOGY:alert awake      LABS:                        9.0    12.25 )-----------( 210      ( 05 Feb 2019 05:50 )             28.5     02-05    136  |  102  |  60<H>  ----------------------------<  66<L>  4.0   |  21<L>  |  3.57<H>    Ca    8.3<L>      05 Feb 2019 05:50    TPro  5.8<L>  /  Alb  2.4<L>  /  TBili  0.3  /  DBili  x   /  AST  9   /  ALT  6   /  AlkPhos  65  02-05              RADIOLOGY & ADDITIONAL TESTS:    Imaging Personally Reviewed:    Consultant(s) Notes Reviewed:      Care Discussed with Consultants/Other Providers:

## 2019-02-05 NOTE — PROGRESS NOTE ADULT - ASSESSMENT
N    1) CAD: s/p LHC found to have 50% mid LAD, 50% prox RCA, 90% Distal RCA into PDA ,, DAPT not to be stopped prior to Feb 23rd if procedure possible while on DAPT pt is a Class IV risk RCRI Score III, 15% change of MACE with general anesthesia     2) Right foot wound/OM - podiatry  on case, s/p PCI needs to continue asa and Plavix x 1 month s/p BMS , Bacteremia, repeat cultures ID on board     3) HTN - cont hydralazine    4) CKD:  renal onboard, monitor renal function , avoid nephrotoxic meds

## 2019-02-05 NOTE — CONSULT NOTE ADULT - SUBJECTIVE AND OBJECTIVE BOX
Keaton Duvall MD  Interventional Cardiology   [place] Office : [phone] [place] [address] [place]. [address]  Tel:   [place] Office : [address] [facility] [place] [address]  Tel: [phone]  Cell : [phone]    HISTORY OF PRESENT ILLNESS:  63 year old male, with past history significant for Type-II DM, HTN, CAD s/p RPDA PCI and R-Foot Charcot deformity, Osteomyelitis, Vitamin D deficiency, Anemia, CKD-IV, HLD and GERD, presented to the ED, as sent by podiatrist, secondary +increasing swelling, pain and bleeding. Patient was recently in hopsital planned for foot debridement by pod however had +NST s/p PCI so procedure was delayed , pt admitted today for further rt foot wound management       PAST MEDICAL & SURGICAL HISTORY:  Vitamin D deficiency  GERD (gastroesophageal reflux disease)  Dyslipidemia  Diabetic foot ulcer  Chronic kidney disease, stage III (moderate)  Renal cyst  Anemia  HTN (hypertension)  DM (diabetes mellitus)  S/P PICC central line placement: ~ L-Basilic vein (09/13/2018)  Ankle fracture: ORIF    	    MEDICATIONS:  amLODIPine   Tablet 10 milliGRAM(s) Oral daily  aspirin enteric coated 81 milliGRAM(s) Oral daily  clopidogrel Tablet 75 milliGRAM(s) Oral daily  furosemide    Tablet 40 milliGRAM(s) Oral two times a day  metoprolol succinate ER 50 milliGRAM(s) Oral daily  cefepime   IVPB 1000 milliGRAM(s) IV Intermittent every 12 hours  cefepime   IVPB 1000 milliGRAM(s) IV Intermittent every 12 hours  docusate sodium 100 milliGRAM(s) Oral two times a day  atorvastatin 40 milliGRAM(s) Oral at bedtime  dextrose 40% Gel 15 Gram(s) Oral once PRN  dextrose 50% Injectable 12.5 Gram(s) IV Push once  dextrose 50% Injectable 25 Gram(s) IV Push once  dextrose 50% Injectable 25 Gram(s) IV Push once  glucagon  Injectable 1 milliGRAM(s) IntraMuscular once PRN  insulin lispro (HumaLOG) corrective regimen sliding scale   SubCutaneous three times a day before meals  insulin lispro (HumaLOG) corrective regimen sliding scale   SubCutaneous at bedtime  dextrose 5%. 1000 milliLiter(s) IV Continuous <Continuous>  folic acid 1 milliGRAM(s) Oral daily      FAMILY HISTORY:  Family history of lung disease: father (asbestosis; worked in [employer])  Family history of stomach cancer: father        Allergies    No Known Allergies    Intolerances    	      PHYSICAL EXAM:  T(C): 37.1 (02-05-19 @ 05:52), Max: 37.1 (02-04-19 @ 20:41)  HR: 74 (02-05-19 @ 05:52) (62 - 74)  BP: 134/64 (02-05-19 @ 05:52) (112/60 - 134/64)  RR: 17 (02-05-19 @ 05:52) (17 - 18)  SpO2: 100% (02-05-19 @ 05:52) (100% - 100%)  Wt(kg): --  I&O's Summary    04 Feb 2019 07:01  -  05 Feb 2019 07:00  --------------------------------------------------------  IN: 0 mL / OUT: 500 mL / NET: -500 mL        Appearance: Normal	  HEENT:   Normal oral mucosa, PERRL, EOMI	  Cardiovascular: Normal S1 S2, No JVD, No murmurs, No edema  Respiratory: Lungs clear to auscultation	  Gastrointestinal:  Soft, Non-tender, + BS	  Extremities: dressing intact No clubbing, cyanosis     LABS:	 	    CARDIAC MARKERS:                                  9.0    12.25 )-----------( 210      ( 05 Feb 2019 05:50 )             28.5     02-05    136  |  102  |  60<H>  ----------------------------<  66<L>  4.0   |  21<L>  |  3.57<H>    Ca    8.3<L>      05 Feb 2019 05:50    TPro  5.8<L>  /  Alb  2.4<L>  /  TBili  0.3  /  DBili  x   /  AST  9   /  ALT  6   /  AlkPhos  65  02-05    proBNP:   Lipid Profile:   HgA1c:   TSH:     ASSESSMENT/PLAN:

## 2019-02-05 NOTE — CONSULT NOTE ADULT - ASSESSMENT
NST:  < from: Nuclear Stress Test-Pharmacologic (01.18.19 @ 08:42) >  There is a small, mild defect in inferior wall that is  reversible consistent with ischemia.Review of raw data  shows: The study is of good technical quality.  There is a small, mild defect in inferior wall that is  reversible consistent with ischemia.    < end of copied text >    CAth < from: Cardiac Cath Lab - Adult (01.23.19 @ 10:02) >  The coronary circulation is right dominant.  LM:   --  LM: Normal.  LAD:   --  Mid LAD: There was a discrete 50 % stenosis.  CX:   --  Circumflex: Normal.  RCA:   --  Proximal RCA: There was a tubular 60 % stenosis.  --  RPDA: There was a 90 % stenosis.  COMPLICATIONS: There were no complications.  INTERVENTIONAL RECOMMENDATIONS:Distal RCA into RPDA 90% stenosis s/p PCI  BMS,  Prox RCA with dissuse 60% stenosis medical management    < end of copied text >      1) CAD: s/p LHC found to have 50% mid LAD, 50% prox RCA, 90% Distal RCA into PDA , s/p PCI with Bare metal stent, c/w asa and Plavix , DAPT not to be stopped prior to Feb 23rd if procedure possible while on DAPT pt is a Class IV risk RCRI Score III, 15% change of MACE with general anesthesia     2) Right foot wound/OM - podiatry  on case, s/p PCI needs to continue asa and Plavix x 1 month s/p BMS , Bacteremia, repeat cultures ID on board     3) HTN - cont hydralazine    4) CKD:  renal onboard, monitor renal function , avoid nephrotoxic meds

## 2019-02-05 NOTE — CONSULT NOTE ADULT - SUBJECTIVE AND OBJECTIVE BOX
Patient is a 63y old  Male who presents with a chief complaint of rt foot wound (05 Feb 2019 15:16)      HPI:    63 year old male, with past history significant for Type-II DM, HTN, and R-Foot Charcot deformity, Osteomyelitis, Vitamin D deficiency, Anemia, CKD-IV, HLD and GERD, presented to the ED, as sent by podiatrist, secondary +increasing swelling, pain and bleeding. Patient was recently in hopsital planned for foot debridement by pod however had +NST s/p PCI so procedure was delayed , pt admitted today for further rt foot wound management (04 Feb 2019 23:16)    Pt recently admitted for infected plantar ulcer, no OM on nuclear scan.  Wound cultures grew staph aureus, pseudomonas, and GBS.  Pt completed IV abx and transitioned to PO cipro upon discharge.      ER vitals:  T 101.5, P P 84, /79.  Pt with elevated WBC and inflammatory markers.  Bcx growing staph aureus, PCR +.  Currently on cefepime.  ID consult called for further abx managment.            REVIEW OF SYSTEMS:    CONSTITUTIONAL: No fever, weight loss, or fatigue  EYES: No eye pain, visual disturbances, or discharge  ENMT:  No sore throat  NECK: No pain or stiffness  RESPIRATORY: No cough, wheezing, chills or hemoptysis; No shortness of breath  CARDIOVASCULAR: No chest pain, palpitations, dizziness, or leg swelling  GASTROINTESTINAL: No abdominal or epigastric pain. No nausea, vomiting, or hematemesis; No diarrhea or constipation. No melena or hematochezia.  GENITOURINARY: No dysuria, frequency, hematuria, or incontinence  NEUROLOGICAL: No headaches, memory loss, loss of strength, numbness, or tremors  SKIN: Noted increased drainage of rt plantar ft ulcer.   LYMPH NODES: No enlarged glands  MUSCULOSKELETAL: No joint pain or swelling; No muscle, back, or extremity pain      PAST MEDICAL & SURGICAL HISTORY:  Vitamin D deficiency  GERD (gastroesophageal reflux disease)  Dyslipidemia  Diabetic foot ulcer  Chronic kidney disease, stage III (moderate)  Renal cyst  Anemia  HTN (hypertension)  DM (diabetes mellitus)  S/P PICC central line placement: ~ L-Basilic vein (09/13/2018)  Ankle fracture: ORIF      Allergies    No Known Allergies    Intolerances        FAMILY HISTORY:  Family history of lung disease: father (asbestosis; worked in [employer])  Family history of stomach cancer: father      SOCIAL HISTORY:  No smoking, ivdu, etoh      MEDICATIONS  (STANDING):  amLODIPine   Tablet 10 milliGRAM(s) Oral daily  aspirin enteric coated 81 milliGRAM(s) Oral daily  atorvastatin 40 milliGRAM(s) Oral at bedtime  cefepime   IVPB 1000 milliGRAM(s) IV Intermittent every 12 hours  cefepime   IVPB 1000 milliGRAM(s) IV Intermittent every 12 hours  clopidogrel Tablet 75 milliGRAM(s) Oral daily  dextrose 5%. 1000 milliLiter(s) (50 mL/Hr) IV Continuous <Continuous>  dextrose 50% Injectable 12.5 Gram(s) IV Push once  dextrose 50% Injectable 25 Gram(s) IV Push once  dextrose 50% Injectable 25 Gram(s) IV Push once  docusate sodium 100 milliGRAM(s) Oral two times a day  folic acid 1 milliGRAM(s) Oral daily  furosemide    Tablet 40 milliGRAM(s) Oral two times a day  insulin lispro (HumaLOG) corrective regimen sliding scale   SubCutaneous three times a day before meals  insulin lispro (HumaLOG) corrective regimen sliding scale   SubCutaneous at bedtime  lactobacillus acidophilus 1 Tablet(s) Oral daily  metoprolol succinate ER 50 milliGRAM(s) Oral daily    MEDICATIONS  (PRN):  dextrose 40% Gel 15 Gram(s) Oral once PRN Blood Glucose LESS THAN 70 milliGRAM(s)/deciliter  glucagon  Injectable 1 milliGRAM(s) IntraMuscular once PRN Glucose LESS THAN 70 milligrams/deciliter      Vital Signs Last 24 Hrs  T(C): 37.1 (05 Feb 2019 05:52), Max: 37.1 (04 Feb 2019 20:41)  T(F): 98.8 (05 Feb 2019 05:52), Max: 98.8 (05 Feb 2019 05:52)  HR: 74 (05 Feb 2019 05:52) (62 - 74)  BP: 134/64 (05 Feb 2019 05:52) (112/60 - 134/64)  BP(mean): --  RR: 17 (05 Feb 2019 05:52) (17 - 18)  SpO2: 100% (05 Feb 2019 05:52) (100% - 100%)    PHYSICAL EXAM:    GENERAL: NAD, well-groomed  HEAD:  Atraumatic, Normocephalic  EYES: EOMI, PERRLA, conjunctiva and sclera clear  ENMT: No tonsillar erythema, exudates, or enlargement; Moist mucous membranes  NECK: Supple, No JVD  CHEST/LUNG: Clear to percussion bilaterally; No rales, rhonchi, wheezing, or rubs  HEART: Regular rate and rhythm; No murmurs, rubs, or gallops  ABDOMEN: Soft, Nontender, Nondistended; Bowel sounds present  EXTREMITIES:  2+ Peripheral Pulses, No clubbing, cyanosis, or edema  LYMPH: No lymphadenopathy noted  SKIN: rt ft plantar ulcer with surrounding maceration, (+) malodor and purulence.       LABS:  CBC Full  -  ( 05 Feb 2019 05:50 )  WBC Count : 12.25 K/uL  Hemoglobin : 9.0 g/dL  Hematocrit : 28.5 %  Platelet Count - Automated : 210 K/uL  Mean Cell Volume : 89.6 fL  Mean Cell Hemoglobin : 28.3 pg  Mean Cell Hemoglobin Concentration : 31.6 %  Auto Neutrophil # : 9.13 K/uL  Auto Lymphocyte # : 1.72 K/uL  Auto Monocyte # : 1.21 K/uL  Auto Eosinophil # : 0.10 K/uL  Auto Basophil # : 0.03 K/uL  Auto Neutrophil % : 74.6 %  Auto Lymphocyte % : 14.0 %  Auto Monocyte % : 9.9 %  Auto Eosinophil % : 0.8 %  Auto Basophil % : 0.2 %      02-05    136  |  102  |  60<H>  ----------------------------<  66<L>  4.0   |  21<L>  |  3.57<H>    Ca    8.3<L>      05 Feb 2019 05:50    TPro  5.8<L>  /  Alb  2.4<L>  /  TBili  0.3  /  DBili  x   /  AST  9   /  ALT  6   /  AlkPhos  65  02-05      LIVER FUNCTIONS - ( 05 Feb 2019 05:50 )  Alb: 2.4 g/dL / Pro: 5.8 g/dL / ALK PHOS: 65 u/L / ALT: 6 u/L / AST: 9 u/L / GGT: x                 MICROBIOLOGY:    Culture - Abscess with Gram Stain (02.05.19 @ 00:07)    Specimen Source: OTHER    Gram Stain Result:   Test not performed    Culture - Blood (02.04.19 @ 15:51)    Culture - Blood:   NO ORGANISMS ISOLATED  NO ORGANISMS ISOLATED AT 24 HOURS    Specimen Source: BLOOD PERIPHERAL    Culture - Blood (02.04.19 @ 15:51)    Culture - Blood:   ***Blood Panel PCR results on this specimen are available  approximately 3 hours after the Gram stain result***  Gram stain, PCR, and/or culture results may not always  correspond due to difference in methodologies  ------------------------------------------------------------  This PCR assay was performed using [employer].  The  following targets are tested for:  Enterococcus, vancomycin  resistant enterococci, Listeria monocytogenes,  coagulase  negative staphylococci, S. aureus, methicillin resistant S.  aureus, Streptococcus agalactiae (Group B), S. pneumoniae,  S. pyogenes (Group A), Acinetobacter baumannii, Enterobacter  cloacae, E. coli, Klebsiella oxytoca, K. pneumoniae, Proteus  sp., Serratia marcescens, Haemophilus influenzae, Neisseria  meningitidis, Pseudomonas aeruginosa, Candida albicans, C.  glabrata, C. krusei, C. parapsilosis, C. tropicalis and the  KPC resistance gene.  **NOTE: Due to technical problems, Proteus sp. will NOT be  reported as part of the BCID paneluntil further notice.    -  Staphylococcus aureus: + DETECT ULISSES Any isolate of Staphylococcus aureus from a blood culture is  NOT considered a contaminant.    Specimen Source: BLOOD VENOUS    Organism: BLOOD CULTURE PCR    Gram Stain Blood:   ***** CRITICAL RESULT *****  PERSON CALLED / READ-BACK: JARETT ZAMAN RN/Y  DATE / TIME CALLED: 02/05/19 1050  CALLED BY: TARIK PAZ  GPCCL^Gram Pos Cocci In Clusters  AFTER: 18 HOURS INCUBATION  BOTTLE: AEROBIC BOTTLE    Organism Identification: BLOOD CULTURE PCR    Method Type: PCR                    RADIOLOGY:    < from: Xray Foot AP + Lateral + Oblique, Right (02.04.19 @ 18:13) >  FINDINGS:   Status post distal right fibula plate and screw fixation.    There are foci of air and soft tissue swelling over the medial aspect of   the first tarsometatarsal joint which correlates to the region of   ulceration and soft tissue air seen on the prior exam in the plantar   aspect of the foot. There is ill definition of the cortex of the proximal   first metatarsal metaphysis.    Redemonstration of Charcot arthropathy most notable at the   metatarsophalangeal joint spaces spanning the first through fifth pedal   digits.    Second mid diaphyseal metatarsal fracture, unchanged. No acute fracture   or dislocation. Plantar calcaneal osteophyte formation, unchanged.    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  Right foot ulceration and soft tissues tissue swelling with foci of air   over the medial aspect of the first tarsometatarsal joint suspicious for   osteomyelitis.    < end of copied text >

## 2019-02-05 NOTE — PROGRESS NOTE ADULT - SUBJECTIVE AND OBJECTIVE BOX
[facility]   MD DOMO WALLACE MD ANGELA WONG, PA    TEL:  OFFICE: [phone]  DR LEE CELL: [phone]  DR. GRULLON CELL: [phone]  MELANIA CORONEL CELL: [phone]        Patient is a 63y old  Male who presents with a chief complaint of rt foot wound (05 Feb 2019 11:56)      Patient seen and examined at bedside. No chest pain/sob    VITALS:  T(F): 98.8 (02-05-19 @ 05:52), Max: 98.8 (02-05-19 @ 05:52)  HR: 74 (02-05-19 @ 05:52)  BP: 134/64 (02-05-19 @ 05:52)  RR: 17 (02-05-19 @ 05:52)  SpO2: 100% (02-05-19 @ 05:52)  Wt(kg): --    02-04 @ 07:01  -  02-05 @ 07:00  --------------------------------------------------------  IN: 0 mL / OUT: 500 mL / NET: -500 mL      Height (cm): 180.34 (02-04 @ 20:41)  Weight (kg): 97.5 (02-04 @ 20:41)  BMI (kg/m2): 30 (02-04 @ 20:41)  BSA (m2): 2.17 (02-04 @ 20:41)    PHYSICAL EXAM:  Constitutional: NAD  Neck: No JVD  Respiratory: CTAB, no wheezes, rales or rhonchi  Cardiovascular: S1, S2, RRR  Gastrointestinal: BS+, soft, NT/ND  Extremities: 1+ peripheral edema    Hospital Medications:   MEDICATIONS  (STANDING):  amLODIPine   Tablet 10 milliGRAM(s) Oral daily  aspirin enteric coated 81 milliGRAM(s) Oral daily  atorvastatin 40 milliGRAM(s) Oral at bedtime  cefepime   IVPB 1000 milliGRAM(s) IV Intermittent every 12 hours  cefepime   IVPB 1000 milliGRAM(s) IV Intermittent every 12 hours  clopidogrel Tablet 75 milliGRAM(s) Oral daily  dextrose 5%. 1000 milliLiter(s) (50 mL/Hr) IV Continuous <Continuous>  dextrose 50% Injectable 12.5 Gram(s) IV Push once  dextrose 50% Injectable 25 Gram(s) IV Push once  dextrose 50% Injectable 25 Gram(s) IV Push once  docusate sodium 100 milliGRAM(s) Oral two times a day  folic acid 1 milliGRAM(s) Oral daily  furosemide    Tablet 40 milliGRAM(s) Oral two times a day  insulin lispro (HumaLOG) corrective regimen sliding scale   SubCutaneous three times a day before meals  insulin lispro (HumaLOG) corrective regimen sliding scale   SubCutaneous at bedtime  lactobacillus acidophilus 1 Tablet(s) Oral daily  metoprolol succinate ER 50 milliGRAM(s) Oral daily      LABS:  02-05    136  |  102  |  60<H>  ----------------------------<  66<L>  4.0   |  21<L>  |  3.57<H>    Ca    8.3<L>      05 Feb 2019 05:50    TPro  5.8<L>  /  Alb  2.4<L>  /  TBili  0.3  /  DBili      /  AST  9   /  ALT  6   /  AlkPhos  65  02-05    Creatinine Trend: 3.57 <--, 3.66 <--    Albumin, Serum: 2.4 g/dL (02-05 @ 05:50)                              9.0    12.25 )-----------( 210      ( 05 Feb 2019 05:50 )             28.5     Urine Studies:      Iron 25, TIBC 145, %sat --      [01-17-19 @ 05:38]  Ferritin 291.5      [01-17-19 @ 05:38]  PTH 42.84 (Ca --)      [09-11-18 @ 05:45]   --  Vitamin D (25OH) 11.8      [09-11-18 @ 05:45]  HbA1c 6.0      [01-15-19 @ 05:45]  TSH 1.74      [01-15-19 @ 05:45]    HCV 0.09, Nonreactive Hepatitis C AB  S/CO Ratio                        Interpretation  < 1.0                                     Non-Reactive  1.0 - 4.9                           Weakly-Reactive  > 5.0                                 Reactive  Non-Reactive: [place] with a non-reactive HCV antibody  result is considered uninfected.  No further action is  needed unless recent infection is suspected.  In these  cases, consider repeat testing later to detect  seroconversion..  Weakly-Reactive: HCV antibody test is abnormal, HCV RNA  Qualitative test will follow.  Reactive: HCV antibody test is abnormal, HCV RNA  Qualitative test will follow.  Note: HCV antibody testing is performed on the [employer]   system.      [02-05-19 @ 00:01]      RADIOLOGY & ADDITIONAL STUDIES:

## 2019-02-05 NOTE — PROGRESS NOTE ADULT - SUBJECTIVE AND OBJECTIVE BOX
Podiatry pager #:  [phone]    Patient is a 63y old  Male who presents with a chief complaint of rt foot wound (04 Feb 2019 23:16)       INTERVAL HPI/OVERNIGHT EVENTS:  Patient seen and evaluated at bedside.  Pt is resting comfortable in NAD. Denies N/V/F/C.      Allergies    No Known Allergies    Intolerances        Vital Signs Last 24 Hrs  T(C): 37.1 (05 Feb 2019 05:52), Max: 38.6 (04 Feb 2019 12:42)  T(F): 98.8 (05 Feb 2019 05:52), Max: 101.5 (04 Feb 2019 12:42)  HR: 74 (05 Feb 2019 05:52) (62 - 84)  BP: 134/64 (05 Feb 2019 05:52) (100/79 - 134/64)  BP(mean): --  RR: 17 (05 Feb 2019 05:52) (16 - 18)  SpO2: 100% (05 Feb 2019 05:52) (98% - 100%)    LABS:                        9.0    12.25 )-----------( 210      ( 05 Feb 2019 05:50 )             28.5     02-05    136  |  102  |  60<H>  ----------------------------<  66<L>  4.0   |  21<L>  |  3.57<H>    Ca    8.3<L>      05 Feb 2019 05:50    TPro  5.8<L>  /  Alb  2.4<L>  /  TBili  0.3  /  DBili  x   /  AST  9   /  ALT  6   /  AlkPhos  65  02-05        CAPILLARY BLOOD GLUCOSE      POCT Blood Glucose.: 74 mg/dL (05 Feb 2019 07:52)  POCT Blood Glucose.: 61 mg/dL (04 Feb 2019 22:27)  POCT Blood Glucose.: 92 mg/dL (04 Feb 2019 18:03)      Lower Extremity Physical Exam:  Vascular: DP/PT 0/4, B/L, CFT < 5 seconds B/L, Temperature gradient slightly warmer on right  Neuro: Epicritic sensation diminished to the level of digits, B/L.  Musculoskeletal/Ortho: charcot deformity right foot   Skin: edema to the right LE, erythema diffusely to plantar midfoot without extension past ankle joint  Wound #1:   Location: plantar midfoot  Size: 1.1cm x 1.3cm  Depth: 2.3cm  Wound bed: fibrogranular  Drainage: serosanginous/ purulence drainage expressed.   Odor: slight malodor  Periwound: macerated hyperkeratotic  Etiology: 2/2 ambulation, charcot deformity

## 2019-02-05 NOTE — PROGRESS NOTE ADULT - ASSESSMENT
64yo M w/ charcot deformity and right foot ulceration infection with positive probe to bone, no OM based on 1/14 NM scan  ·	Pt seen & evaluated  ·	Pt is afebrile, leukocytosis to 12.25   ·	Serosanguinous/ purulence drainage expressed   ·	Continue Vanco & Zosyn at this time, will likely require ID consult for potential long term IV abx (Dr. Spivey had been following, will gather data at bedside vs in OR from bone)  ·	XR suspicious for OM TMT.   ·	Recommend cardiology consult to establish if pt can proceed to OR sooner in setting of recurrence and worsening of infection despite having drug eluting cardiac stent placed about 10 days ago  ·	Seen with attending

## 2019-02-05 NOTE — CONSULT NOTE ADULT - ASSESSMENT
63 year old male, with past history significant for Type-II DM, HTN, and R-Foot Charcot deformity, Osteomyelitis, Vitamin D deficiency, Anemia, CKD-IV, HLD and GERD, presented to the ED, as sent by podiatrist, secondary +increasing swelling, pain and bleeding. Patient was recently in hopsital planned for foot debridement by pod however had +NST s/p PCI so procedure was delayed , pt admitted today for further rt foot wound management (04 Feb 2019 23:16)    Pt recently admitted for infected plantar ulcer, no OM on nuclear scan.  Wound cultures grew staph aureus, pseudomonas, and GBS.  Pt completed IV abx and transitioned to PO cipro upon discharge.      ER vitals:  T 101.5, P P 84, /79.  Pt with elevated WBC and inflammatory markers.  Bcx growing staph aureus, PCR +.  Currently on cefepime.  ID consult called for further abx managment.       Recommend:    - Staph aureus bacteremia, likely secondary to infected rt foot plantar.  Now with purulent drainage.  Foot xray with soft tissue swelling and foci of air suspicious for OM.  Pt seen by podiatry, and planned for OR for 1st TMT exostectomy.    - f/u intra-op cultures and bone pathology of clean margins.  Pt will need eventual PICC line for long term abx.    - Echocardiogram    - Check repeat blood cultures until cleared.     - Trend ESR/CRP.    Will follow,    Salima Spivey    [phone] 63 year old male, with past history significant for Type-II DM, HTN, and R-Foot Charcot deformity, Osteomyelitis, Vitamin D deficiency, Anemia, CKD-IV, HLD and GERD, presented to the ED, as sent by podiatrist, secondary +increasing swelling, pain and bleeding. Patient was recently in hopsital planned for foot debridement by pod however had +NST s/p PCI so procedure was delayed , pt admitted today for further rt foot wound management (04 Feb 2019 23:16)    Pt recently admitted for infected plantar ulcer, no OM on nuclear scan.  Wound cultures grew staph aureus, pseudomonas, and GBS.  Pt completed IV abx and transitioned to PO cipro upon discharge.      ER vitals:  T 101.5, P P 84, /79.  Pt with elevated WBC and inflammatory markers.  Bcx growing staph aureus, PCR +.  Currently on cefepime.  ID consult called for further abx managment.       Recommend:    - Staph aureus bacteremia, likely secondary to infected rt foot plantar.  Now with purulent drainage.  Foot xray with soft tissue swelling and foci of air suspicious for OM.  Pt seen by podiatry, and planned for OR for 1st TMT exostectomy.    - f/u intra-op cultures and bone pathology of clean margins.  Pt will need eventual PICC line for long term abx.    - Echocardiogram    - Check repeat blood cultures until cleared.     - Trend ESR/CRP.    - Cont cefepime for now (pt with prior h/o pseudomonas too), pending final culture results.  Will narrow abx coverage accordingly.     Will follow,    Salima Spivey    [phone]

## 2019-02-05 NOTE — PROGRESS NOTE ADULT - SUBJECTIVE AND OBJECTIVE BOX
Patient is a 63y old  Male who presents with a chief complaint of rt foot wound (04 Feb 2019 23:16)       INTERVAL HPI/OVERNIGHT EVENTS:  Patient seen and evaluated at bedside.  Pt is resting comfortable in NAD. Denies N/V/F/C.  Pain less since yesterday.    Allergies    No Known Allergies    Intolerances        Vital Signs Last 24 Hrs  T(C): 37.1 (05 Feb 2019 05:52), Max: 38.6 (04 Feb 2019 12:42)  T(F): 98.8 (05 Feb 2019 05:52), Max: 101.5 (04 Feb 2019 12:42)  HR: 74 (05 Feb 2019 05:52) (62 - 84)  BP: 134/64 (05 Feb 2019 05:52) (100/79 - 134/64)  BP(mean): --  RR: 17 (05 Feb 2019 05:52) (16 - 18)  SpO2: 100% (05 Feb 2019 05:52) (98% - 100%)    LABS:                        9.0    12.25 )-----------( 210      ( 05 Feb 2019 05:50 )             28.5     02-05    136  |  102  |  60<H>  ----------------------------<  66<L>  4.0   |  21<L>  |  3.57<H>    Ca    8.3<L>      05 Feb 2019 05:50    TPro  5.8<L>  /  Alb  2.4<L>  /  TBili  0.3  /  DBili  x   /  AST  9   /  ALT  6   /  AlkPhos  65  02-05        CAPILLARY BLOOD GLUCOSE      POCT Blood Glucose.: 74 mg/dL (05 Feb 2019 07:52)  POCT Blood Glucose.: 61 mg/dL (04 Feb 2019 22:27)  POCT Blood Glucose.: 92 mg/dL (04 Feb 2019 18:03)      Lower Extremity Physical Exam:  Ulcer plantar medial aspect of the right foot stable with no necrosis.  Still producing pus though.  Erythema and edema less.  Mild tenderness on squeeze.    No crepitus.  RADIOLOGY & ADDITIONAL TESTS:

## 2019-02-05 NOTE — PROGRESS NOTE ADULT - ASSESSMENT
Infected DFU right R/O osteomyelitis  - continue with antibiotics pending culture.  Culture taken in outpatient wound center and repeated inhouse.  - tentatively scheduled for 1st TMT exostectomy with attempt at primary closure.  - cardiology clearance pending

## 2019-02-06 ENCOUNTER — RESULT REVIEW (OUTPATIENT)
Age: 64
End: 2019-02-06

## 2019-02-06 LAB
ALBUMIN SERPL ELPH-MCNC: 2.1 G/DL — LOW (ref 3.3–5)
ALP SERPL-CCNC: 64 U/L — SIGNIFICANT CHANGE UP (ref 40–120)
ALT FLD-CCNC: 6 U/L — SIGNIFICANT CHANGE UP (ref 4–41)
ANION GAP SERPL CALC-SCNC: 12 MMO/L — SIGNIFICANT CHANGE UP (ref 7–14)
AST SERPL-CCNC: 9 U/L — SIGNIFICANT CHANGE UP (ref 4–40)
BASOPHILS # BLD AUTO: 0.03 K/UL — SIGNIFICANT CHANGE UP (ref 0–0.2)
BASOPHILS NFR BLD AUTO: 0.4 % — SIGNIFICANT CHANGE UP (ref 0–2)
BILIRUB SERPL-MCNC: 0.2 MG/DL — SIGNIFICANT CHANGE UP (ref 0.2–1.2)
BUN SERPL-MCNC: 57 MG/DL — HIGH (ref 7–23)
CALCIUM SERPL-MCNC: 8.3 MG/DL — LOW (ref 8.4–10.5)
CHLORIDE SERPL-SCNC: 105 MMOL/L — SIGNIFICANT CHANGE UP (ref 98–107)
CO2 SERPL-SCNC: 21 MMOL/L — LOW (ref 22–31)
CREAT SERPL-MCNC: 3.6 MG/DL — HIGH (ref 0.5–1.3)
EOSINOPHIL # BLD AUTO: 0.17 K/UL — SIGNIFICANT CHANGE UP (ref 0–0.5)
EOSINOPHIL NFR BLD AUTO: 2.1 % — SIGNIFICANT CHANGE UP (ref 0–6)
GLUCOSE BLDC GLUCOMTR-MCNC: 111 MG/DL — HIGH (ref 70–99)
GLUCOSE BLDC GLUCOMTR-MCNC: 125 MG/DL — HIGH (ref 70–99)
GLUCOSE BLDC GLUCOMTR-MCNC: 127 MG/DL — HIGH (ref 70–99)
GLUCOSE SERPL-MCNC: 87 MG/DL — SIGNIFICANT CHANGE UP (ref 70–99)
GRAM STN WND: SIGNIFICANT CHANGE UP
GRAM STN WND: SIGNIFICANT CHANGE UP
HCT VFR BLD CALC: 27.1 % — LOW (ref 39–50)
HGB BLD-MCNC: 8.6 G/DL — LOW (ref 13–17)
IMM GRANULOCYTES NFR BLD AUTO: 0.4 % — SIGNIFICANT CHANGE UP (ref 0–1.5)
LYMPHOCYTES # BLD AUTO: 1.75 K/UL — SIGNIFICANT CHANGE UP (ref 1–3.3)
LYMPHOCYTES # BLD AUTO: 21.6 % — SIGNIFICANT CHANGE UP (ref 13–44)
MCHC RBC-ENTMCNC: 28.4 PG — SIGNIFICANT CHANGE UP (ref 27–34)
MCHC RBC-ENTMCNC: 31.7 % — LOW (ref 32–36)
MCV RBC AUTO: 89.4 FL — SIGNIFICANT CHANGE UP (ref 80–100)
MONOCYTES # BLD AUTO: 0.89 K/UL — SIGNIFICANT CHANGE UP (ref 0–0.9)
MONOCYTES NFR BLD AUTO: 11 % — SIGNIFICANT CHANGE UP (ref 2–14)
NEUTROPHILS # BLD AUTO: 5.24 K/UL — SIGNIFICANT CHANGE UP (ref 1.8–7.4)
NEUTROPHILS NFR BLD AUTO: 64.5 % — SIGNIFICANT CHANGE UP (ref 43–77)
NRBC # FLD: 0 K/UL — LOW (ref 25–125)
ORGANISM # SPEC MICROSCOPIC CNT: SIGNIFICANT CHANGE UP
ORGANISM # SPEC MICROSCOPIC CNT: SIGNIFICANT CHANGE UP
PLATELET # BLD AUTO: 192 K/UL — SIGNIFICANT CHANGE UP (ref 150–400)
PMV BLD: 10.9 FL — SIGNIFICANT CHANGE UP (ref 7–13)
POTASSIUM SERPL-MCNC: 3.6 MMOL/L — SIGNIFICANT CHANGE UP (ref 3.5–5.3)
POTASSIUM SERPL-SCNC: 3.6 MMOL/L — SIGNIFICANT CHANGE UP (ref 3.5–5.3)
PROT SERPL-MCNC: 6 G/DL — SIGNIFICANT CHANGE UP (ref 6–8.3)
RBC # BLD: 3.03 M/UL — LOW (ref 4.2–5.8)
RBC # FLD: 14.2 % — SIGNIFICANT CHANGE UP (ref 10.3–14.5)
SODIUM SERPL-SCNC: 138 MMOL/L — SIGNIFICANT CHANGE UP (ref 135–145)
SPECIMEN SOURCE: SIGNIFICANT CHANGE UP
WBC # BLD: 8.11 K/UL — SIGNIFICANT CHANGE UP (ref 3.8–10.5)
WBC # FLD AUTO: 8.11 K/UL — SIGNIFICANT CHANGE UP (ref 3.8–10.5)

## 2019-02-06 PROCEDURE — 88305 TISSUE EXAM BY PATHOLOGIST: CPT | Mod: 26

## 2019-02-06 PROCEDURE — 88304 TISSUE EXAM BY PATHOLOGIST: CPT | Mod: 26

## 2019-02-06 PROCEDURE — 88311 DECALCIFY TISSUE: CPT | Mod: 26

## 2019-02-06 PROCEDURE — 73630 X-RAY EXAM OF FOOT: CPT | Mod: 26,RT

## 2019-02-06 RX ORDER — ACETAMINOPHEN 500 MG
650 TABLET ORAL EVERY 6 HOURS
Qty: 0 | Refills: 0 | Status: DISCONTINUED | OUTPATIENT
Start: 2019-02-06 | End: 2019-02-13

## 2019-02-06 RX ORDER — SODIUM CHLORIDE 9 MG/ML
1000 INJECTION INTRAMUSCULAR; INTRAVENOUS; SUBCUTANEOUS
Qty: 0 | Refills: 0 | Status: DISCONTINUED | OUTPATIENT
Start: 2019-02-06 | End: 2019-02-11

## 2019-02-06 RX ORDER — OXYCODONE AND ACETAMINOPHEN 5; 325 MG/1; MG/1
1 TABLET ORAL EVERY 4 HOURS
Qty: 0 | Refills: 0 | Status: DISCONTINUED | OUTPATIENT
Start: 2019-02-06 | End: 2019-02-07

## 2019-02-06 RX ORDER — MORPHINE SULFATE 50 MG/1
2 CAPSULE, EXTENDED RELEASE ORAL EVERY 4 HOURS
Qty: 0 | Refills: 0 | Status: DISCONTINUED | OUTPATIENT
Start: 2019-02-06 | End: 2019-02-06

## 2019-02-06 RX ORDER — VANCOMYCIN HCL 1 G
750 VIAL (EA) INTRAVENOUS EVERY 24 HOURS
Qty: 0 | Refills: 0 | Status: DISCONTINUED | OUTPATIENT
Start: 2019-02-06 | End: 2019-02-12

## 2019-02-06 RX ADMIN — CEFEPIME 100 MILLIGRAM(S): 1 INJECTION, POWDER, FOR SOLUTION INTRAMUSCULAR; INTRAVENOUS at 18:19

## 2019-02-06 RX ADMIN — Medication 1 TABLET(S): at 18:19

## 2019-02-06 RX ADMIN — OXYCODONE AND ACETAMINOPHEN 1 TABLET(S): 5; 325 TABLET ORAL at 18:23

## 2019-02-06 RX ADMIN — Medication 650 MILLIGRAM(S): at 16:23

## 2019-02-06 RX ADMIN — AMLODIPINE BESYLATE 10 MILLIGRAM(S): 2.5 TABLET ORAL at 06:15

## 2019-02-06 RX ADMIN — Medication 100 MILLIGRAM(S): at 18:19

## 2019-02-06 RX ADMIN — OXYCODONE AND ACETAMINOPHEN 1 TABLET(S): 5; 325 TABLET ORAL at 18:53

## 2019-02-06 RX ADMIN — ATORVASTATIN CALCIUM 40 MILLIGRAM(S): 80 TABLET, FILM COATED ORAL at 22:14

## 2019-02-06 RX ADMIN — Medication 650 MILLIGRAM(S): at 16:35

## 2019-02-06 RX ADMIN — Medication 81 MILLIGRAM(S): at 13:08

## 2019-02-06 RX ADMIN — Medication 40 MILLIGRAM(S): at 06:16

## 2019-02-06 RX ADMIN — Medication 40 MILLIGRAM(S): at 18:19

## 2019-02-06 RX ADMIN — CLOPIDOGREL BISULFATE 75 MILLIGRAM(S): 75 TABLET, FILM COATED ORAL at 13:08

## 2019-02-06 RX ADMIN — Medication 1 MILLIGRAM(S): at 18:19

## 2019-02-06 RX ADMIN — Medication 50 MILLIGRAM(S): at 06:19

## 2019-02-06 RX ADMIN — CEFEPIME 100 MILLIGRAM(S): 1 INJECTION, POWDER, FOR SOLUTION INTRAMUSCULAR; INTRAVENOUS at 06:19

## 2019-02-06 RX ADMIN — Medication 650 MILLIGRAM(S): at 23:56

## 2019-02-06 RX ADMIN — SODIUM CHLORIDE 25 MILLILITER(S): 9 INJECTION INTRAMUSCULAR; INTRAVENOUS; SUBCUTANEOUS at 13:09

## 2019-02-06 RX ADMIN — Medication 650 MILLIGRAM(S): at 22:13

## 2019-02-06 NOTE — DIETITIAN INITIAL EVALUATION ADULT. - PROBLEM/PLAN-2
PROVIDER:[TOKEN:[[id number]]] DISPLAY PLAN FREE TEXT PROVIDER:[TOKEN:[[id number]]],PROVIDER:[TOKEN:[[id number]]]

## 2019-02-06 NOTE — PROGRESS NOTE ADULT - SUBJECTIVE AND OBJECTIVE BOX
Infectious Diseases progress note:    Subjective:  Pt at OR.  No acute o/n events reported.     ROS:  u/a    Allergies    No Known Allergies    Intolerances        ANTIBIOTICS/RELEVANT:  antimicrobials  cefepime   IVPB 1000 milliGRAM(s) IV Intermittent every 12 hours    immunologic:    OTHER:  acetaminophen   Tablet .. 650 milliGRAM(s) Oral every 6 hours PRN  amLODIPine   Tablet 10 milliGRAM(s) Oral daily  aspirin enteric coated 81 milliGRAM(s) Oral daily  atorvastatin 40 milliGRAM(s) Oral at bedtime  clopidogrel Tablet 75 milliGRAM(s) Oral daily  dextrose 40% Gel 15 Gram(s) Oral once PRN  dextrose 5%. 1000 milliLiter(s) IV Continuous <Continuous>  dextrose 50% Injectable 12.5 Gram(s) IV Push once  dextrose 50% Injectable 25 Gram(s) IV Push once  dextrose 50% Injectable 25 Gram(s) IV Push once  docusate sodium 100 milliGRAM(s) Oral two times a day  folic acid 1 milliGRAM(s) Oral daily  furosemide    Tablet 40 milliGRAM(s) Oral two times a day  glucagon  Injectable 1 milliGRAM(s) IntraMuscular once PRN  insulin lispro (HumaLOG) corrective regimen sliding scale   SubCutaneous three times a day before meals  insulin lispro (HumaLOG) corrective regimen sliding scale   SubCutaneous at bedtime  lactobacillus acidophilus 1 Tablet(s) Oral daily  metoprolol succinate ER 50 milliGRAM(s) Oral daily  oxyCODONE    5 mG/acetaminophen 325 mG 1 Tablet(s) Oral every 4 hours PRN  sodium chloride 0.9%. 1000 milliLiter(s) IV Continuous <Continuous>      Objective:  Vital Signs Last 24 Hrs  T(C): 36.3 (06 Feb 2019 14:39), Max: 37.4 (06 Feb 2019 05:38)  T(F): 97.4 (06 Feb 2019 14:39), Max: 99.3 (06 Feb 2019 05:38)  HR: 55 (06 Feb 2019 14:39) (55 - 77)  BP: 114/62 (06 Feb 2019 14:39) (103/55 - 139/73)  BP(mean): 66 (06 Feb 2019 13:30) (66 - 71)  RR: 18 (06 Feb 2019 14:39) (18 - 230)  SpO2: 98% (06 Feb 2019 14:39) (96% - 99%)    PHYSICAL EXAM:  u/a        LABS:                        8.6    8.11  )-----------( 192      ( 06 Feb 2019 06:00 )             27.1     02-06    138  |  105  |  57<H>  ----------------------------<  87  3.6   |  21<L>  |  3.60<H>    Ca    8.3<L>      06 Feb 2019 06:00    TPro  6.0  /  Alb  2.1<L>  /  TBili  0.2  /  DBili  x   /  AST  9   /  ALT  6   /  AlkPhos  64  02-06    PT/INR - ( 06 Feb 2019 06:00 )   PT: 13.4 SEC;   INR: 1.20          PTT - ( 06 Feb 2019 06:00 )  PTT:33.4 SEC        Vancomycin Level, Random:  ug/mL (02-05 @ 05:50)                  MICROBIOLOGY:    Culture - Tissue with Gram Stain (02.06.19 @ 13:48)    Gram Stain Wound:   NOS^No Organisms Seen  WBC^White Blood Cells  QNTY CELLS IN GRAM STAIN: NO CELLS SEEN    Specimen Source: FOOT    Culture - Surg Site Aerob/Anaer w/Gm St (02.06.19 @ 13:40)    Gram Stain Wound:   BLOODY SP.  NOS^No Organisms Seen  WBC^White Blood Cells  QNTY CELLS IN GRAM STAIN: RARE (1+)    Specimen Source: FOOT      Culture - Blood (02.05.19 @ 12:00)    Culture - Blood:   NO ORGANISMS ISOLATED  NO ORGANISMS ISOLATED AT 24 HOURS    Specimen Source: BLOOD          Culture - Abscess with Gram Stain (02.05.19 @ 10:32)    Specimen Source: OTHER    Gram Stain Result:   NOS^No Organisms Seen  WBC^White Blood Cells  QNTY CELLS IN GRAM STAIN: MANY (4+)    Culture Results:   STAU^Staphylococcus aureus  DENICE^Corynebacterium species          Culture - Blood (02.04.19 @ 15:51)    Culture - Blood:   ***Blood Panel PCR results on this specimen are available  approximately 3 hours after the Gram stain result***  Gram stain, PCR, and/or culture results may not always  correspond due to difference in methodologies  ------------------------------------------------------------  This PCR assay was performed using [employer] ([employer]).  The  following targets are tested for:  Enterococcus, vancomycin  resistant enterococci, Listeria monocytogenes,  coagulase  negative staphylococci, S. aureus, methicillin resistant S.  aureus, Streptococcus agalactiae (Group B), S. pneumoniae,  S. pyogenes (Group A), Acinetobacter baumannii, Enterobacter  cloacae, E. coli, Klebsiella oxytoca, K. pneumoniae, Proteus  sp., Serratia marcescens, Haemophilus influenzae, Neisseria  meningitidis, Pseudomonas aeruginosa, Candida albicans, C.  glabrata, C. krusei, C. parapsilosis, C. tropicalis and the  KPC resistance gene.  **NOTE: Due to technical problems, Proteus sp. will NOT be  reported as part of the BCID paneluntil further notice.    -  Staphylococcus aureus: + DETECT ULISSES Any isolate of Staphylococcus aureus from a blood culture is  NOT considered a contaminant.    Specimen Source: BLOOD VENOUS    Organism: BLOOD CULTURE PCR    Organism: Staphylococcus aureus    Gram Stain Blood:   ***** CRITICAL RESULT *****  PERSON CALLED / READ-BACK: JARETT ZAMAN RN/Y  DATE / TIME CALLED: 02/05/19 1050  CALLED BY: TARIK PAZ  GPCCL^Gram Pos Cocci In Clusters  AFTER: 18 HOURS INCUBATION  BOTTLE: AEROBIC BOTTLE    Organism Identification: BLOOD CULTURE PCR  Staphylococcus aureus    Method Type: PCR            RADIOLOGY & ADDITIONAL STUDIES:    < from: Xray Foot AP + Lateral + Oblique, Right (02.06.19 @ 13:24) >  IMPRESSION:  Status post osseous and soft tissue debridement changes along medial   midfoot region with an indwelling Penrose drain.    Redemonstrated Charcot related deformity of the Lisfranc joints and   adjacent tarsal bones.    Old offset mid 2nd metatarsal shaft fracture deformity.    Intact distal fibular fracture fixation plate with fixation screws and   separate interfragmentary screw with underlying anatomic alignment   maintained.    Correlate with intraoperative findings    < end of copied text >

## 2019-02-06 NOTE — PROGRESS NOTE ADULT - ASSESSMENT
NST:  < from: Nuclear Stress Test-Pharmacologic (01.18.19 @ 08:42) >  There is a small, mild defect in inferior wall that is  reversible consistent with ischemia.Review of raw data  shows: The study is of good technical quality.  There is a small, mild defect in inferior wall that is  reversible consistent with ischemia.    < end of copied text >    CAth < from: Cardiac Cath Lab - Adult (01.23.19 @ 10:02) >  The coronary circulation is right dominant.  LM:   --  LM: Normal.  LAD:   --  Mid LAD: There was a discrete 50 % stenosis.  CX:   --  Circumflex: Normal.  RCA:   --  Proximal RCA: There was a tubular 60 % stenosis.  --  RPDA: There was a 90 % stenosis.  COMPLICATIONS: There were no complications.  INTERVENTIONAL RECOMMENDATIONS:Distal RCA into RPDA 90% stenosis s/p PCI  BMS,  Prox RCA with dissuse 60% stenosis medical management    < end of copied text >      1) CAD: s/p LHC found to have 50% mid LAD, 50% prox RCA, 90% Distal RCA into PDA , s/p PCI with Bare metal stent, c/w asa and Plavix , DAPT not to be stopped prior to Feb 23rd if procedure possible while on DAPT pt is a Class IV risk RCRI Score III, 15% change of MACE with general anesthesia , Low risk of MACE with local anesthesia and sedation     2) Right foot wound/OM - podiatry  on case, s/p PCI needs to continue asa and Plavix x 1 month s/p BMS , Bacteremia, repeat cultures ID on board     3) HTN - cont hydralazine    4) CKD:  renal onboard, monitor renal function , avoid nephrotoxic meds

## 2019-02-06 NOTE — PROGRESS NOTE ADULT - SUBJECTIVE AND OBJECTIVE BOX
Podiatry Pager #:[phone]    Patient is a 63y old  Male who presents with a chief complaint of rt foot wound (05 Feb 2019 16:08)      INTERVAL HPI/OVERNIGHT EVENTS:   Pt is scheduled for right foot tarsometatarsal debridement with possible graft application. Patient is aware of procedure and is NPO since midnight.    MEDICATIONS  (STANDING):  amLODIPine   Tablet 10 milliGRAM(s) Oral daily  aspirin enteric coated 81 milliGRAM(s) Oral daily  atorvastatin 40 milliGRAM(s) Oral at bedtime  cefepime   IVPB 1000 milliGRAM(s) IV Intermittent every 12 hours  clopidogrel Tablet 75 milliGRAM(s) Oral daily  dextrose 5%. 1000 milliLiter(s) (50 mL/Hr) IV Continuous <Continuous>  dextrose 50% Injectable 12.5 Gram(s) IV Push once  dextrose 50% Injectable 25 Gram(s) IV Push once  dextrose 50% Injectable 25 Gram(s) IV Push once  docusate sodium 100 milliGRAM(s) Oral two times a day  folic acid 1 milliGRAM(s) Oral daily  furosemide    Tablet 40 milliGRAM(s) Oral two times a day  insulin lispro (HumaLOG) corrective regimen sliding scale   SubCutaneous three times a day before meals  insulin lispro (HumaLOG) corrective regimen sliding scale   SubCutaneous at bedtime  lactobacillus acidophilus 1 Tablet(s) Oral daily  metoprolol succinate ER 50 milliGRAM(s) Oral daily    MEDICATIONS  (PRN):  dextrose 40% Gel 15 Gram(s) Oral once PRN Blood Glucose LESS THAN 70 milliGRAM(s)/deciliter  glucagon  Injectable 1 milliGRAM(s) IntraMuscular once PRN Glucose LESS THAN 70 milligrams/deciliter      Allergies    No Known Allergies    Intolerances        Vital Signs Last 24 Hrs  T(C): 37.4 (06 Feb 2019 05:38), Max: 37.5 (05 Feb 2019 21:46)  T(F): 99.3 (06 Feb 2019 05:38), Max: 99.5 (05 Feb 2019 21:46)  HR: 70 (06 Feb 2019 05:38) (70 - 79)  BP: 138/74 (06 Feb 2019 05:38) (138/74 - 147/72)  BP(mean): --  RR: 18 (06 Feb 2019 05:38) (18 - 18)  SpO2: 99% (06 Feb 2019 05:38) (99% - 100%)    LABS:                        8.6    8.11  )-----------( 192      ( 06 Feb 2019 06:00 )             27.1     02-06    138  |  105  |  57<H>  ----------------------------<  87  3.6   |  21<L>  |  3.60<H>    Ca    8.3<L>      06 Feb 2019 06:00    TPro  6.0  /  Alb  2.1<L>  /  TBili  0.2  /  DBili  x   /  AST  9   /  ALT  6   /  AlkPhos  64  02-06    PT/INR - ( 06 Feb 2019 06:00 )   PT: 13.4 SEC;   INR: 1.20          PTT - ( 06 Feb 2019 06:00 )  PTT:33.4 SEC    CAPILLARY BLOOD GLUCOSE      POCT Blood Glucose.: 113 mg/dL (05 Feb 2019 21:44)  POCT Blood Glucose.: 80 mg/dL (05 Feb 2019 17:00)  POCT Blood Glucose.: 87 mg/dL (05 Feb 2019 11:53)  POCT Blood Glucose.: 74 mg/dL (05 Feb 2019 07:52)      RADIOLOGY & ADDITIONAL TESTS: Podiatry Pager #:[phone]    Patient is a 63y old  Male who presents with a chief complaint of rt foot wound (05 Feb 2019 16:08)      INTERVAL HPI/OVERNIGHT EVENTS:   Pt is scheduled for right foot tarsometatarsal debridement with possible graft application at 10 AM . Patient is aware of procedure and is NPO since midnight.    MEDICATIONS  (STANDING):  amLODIPine   Tablet 10 milliGRAM(s) Oral daily  aspirin enteric coated 81 milliGRAM(s) Oral daily  atorvastatin 40 milliGRAM(s) Oral at bedtime  cefepime   IVPB 1000 milliGRAM(s) IV Intermittent every 12 hours  clopidogrel Tablet 75 milliGRAM(s) Oral daily  dextrose 5%. 1000 milliLiter(s) (50 mL/Hr) IV Continuous <Continuous>  dextrose 50% Injectable 12.5 Gram(s) IV Push once  dextrose 50% Injectable 25 Gram(s) IV Push once  dextrose 50% Injectable 25 Gram(s) IV Push once  docusate sodium 100 milliGRAM(s) Oral two times a day  folic acid 1 milliGRAM(s) Oral daily  furosemide    Tablet 40 milliGRAM(s) Oral two times a day  insulin lispro (HumaLOG) corrective regimen sliding scale   SubCutaneous three times a day before meals  insulin lispro (HumaLOG) corrective regimen sliding scale   SubCutaneous at bedtime  lactobacillus acidophilus 1 Tablet(s) Oral daily  metoprolol succinate ER 50 milliGRAM(s) Oral daily    MEDICATIONS  (PRN):  dextrose 40% Gel 15 Gram(s) Oral once PRN Blood Glucose LESS THAN 70 milliGRAM(s)/deciliter  glucagon  Injectable 1 milliGRAM(s) IntraMuscular once PRN Glucose LESS THAN 70 milligrams/deciliter      Allergies    No Known Allergies    Intolerances        Vital Signs Last 24 Hrs  T(C): 37.4 (06 Feb 2019 05:38), Max: 37.5 (05 Feb 2019 21:46)  T(F): 99.3 (06 Feb 2019 05:38), Max: 99.5 (05 Feb 2019 21:46)  HR: 70 (06 Feb 2019 05:38) (70 - 79)  BP: 138/74 (06 Feb 2019 05:38) (138/74 - 147/72)  BP(mean): --  RR: 18 (06 Feb 2019 05:38) (18 - 18)  SpO2: 99% (06 Feb 2019 05:38) (99% - 100%)    LABS:                        8.6    8.11  )-----------( 192      ( 06 Feb 2019 06:00 )             27.1     02-06    138  |  105  |  57<H>  ----------------------------<  87  3.6   |  21<L>  |  3.60<H>    Ca    8.3<L>      06 Feb 2019 06:00    TPro  6.0  /  Alb  2.1<L>  /  TBili  0.2  /  DBili  x   /  AST  9   /  ALT  6   /  AlkPhos  64  02-06    PT/INR - ( 06 Feb 2019 06:00 )   PT: 13.4 SEC;   INR: 1.20          PTT - ( 06 Feb 2019 06:00 )  PTT:33.4 SEC    CAPILLARY BLOOD GLUCOSE      POCT Blood Glucose.: 113 mg/dL (05 Feb 2019 21:44)  POCT Blood Glucose.: 80 mg/dL (05 Feb 2019 17:00)  POCT Blood Glucose.: 87 mg/dL (05 Feb 2019 11:53)  POCT Blood Glucose.: 74 mg/dL (05 Feb 2019 07:52)      RADIOLOGY & ADDITIONAL TESTS:

## 2019-02-06 NOTE — PROGRESS NOTE ADULT - ASSESSMENT
63 year old male, with past history significant for Type-II DM, HTN, and R-Foot Charcot deformity, Osteomyelitis, Vitamin D deficiency, Anemia, CKD-IV, HLD and GERD, presented to the ED, as sent by podiatrist, secondary +increasing swelling, pain and bleeding. Patient was recently in hopsital planned for foot debridement by pod however had +NST s/p PCI so procedure was delayed , pt admitted today for further rt foot wound management (04 Feb 2019 23:16)    Pt recently admitted for infected plantar ulcer, no OM on nuclear scan.  Wound cultures grew staph aureus, pseudomonas, and GBS.  Pt completed IV abx and transitioned to PO cipro upon discharge.      ER vitals:  T 101.5, P P 84, /79.  Pt with elevated WBC and inflammatory markers.  Bcx growing staph aureus, PCR +.  Currently on cefepime.  ID consult called for further abx managment.       Recommend:    - Staph aureus bacteremia, likely secondary to infected rt foot plantar ulcer.  Now with purulent drainage.  Foot xray with soft tissue swelling and foci of air suspicious for OM.  Pt seen by podiatry, and  for OR for 1st TMT exostectomy.    - f/u intra-op cultures and bone pathology of clean margins.  Pt will need eventual PICC line for long term abx.  Antibiotic selection to be determined based on final culture results.      - Echocardiogram    - Check repeat blood cultures until cleared.     - Trend ESR/CRP.    - Cont cefepime for now (pt with prior h/o pseudomonas too), pending final culture results.   Initial wound cultures growing Staph aureus, Strep, and corynebacterium.  Will add vanco to cover coryne.        Will follow,    Salima Spivey    [phone]

## 2019-02-06 NOTE — BRIEF OPERATIVE NOTE - PROCEDURE
<<-----Click on this checkbox to enter Procedure Debridement of wound of foot  02/06/2019  Right foot wound and bone debridement  Active  YSARRAF

## 2019-02-06 NOTE — PROGRESS NOTE ADULT - SUBJECTIVE AND OBJECTIVE BOX
[facility] [place] [facility]   MD DOMO WALLACE MD ANGELA WONG, PA    TEL:  OFFICE: [phone]  DR LEE CELL: [phone]  DR. GRULLON CELL: [phone]  MELANIA CORONEL CELL: [phone]        Patient is a 63y old  Male who presents with a chief complaint of rt foot wound (06 Feb 2019 07:05)      Patient seen and examined at bedside. No chest pain/sob    VITALS:  T(F): 97.4 (02-06-19 @ 14:39), Max: 99.5 (02-05-19 @ 21:46)  HR: 55 (02-06-19 @ 14:39)  BP: 114/62 (02-06-19 @ 14:39)  RR: 18 (02-06-19 @ 14:39)  SpO2: 98% (02-06-19 @ 14:39)  Wt(kg): --    Height (cm): 180.34 (02-06 @ 10:00)  Weight (kg): 97.5 (02-06 @ 10:00)  BMI (kg/m2): 30 (02-06 @ 10:00)  BSA (m2): 2.17 (02-06 @ 10:00)    PHYSICAL EXAM:  Constitutional: NAD  Neck: No JVD  Respiratory: CTAB, no wheezes, rales or rhonchi  Cardiovascular: S1, S2, RRR  Gastrointestinal: BS+, soft, NT/ND  Extremities: No peripheral edema, right foot dressing d/c/i    Hospital Medications:   MEDICATIONS  (STANDING):  amLODIPine   Tablet 10 milliGRAM(s) Oral daily  aspirin enteric coated 81 milliGRAM(s) Oral daily  atorvastatin 40 milliGRAM(s) Oral at bedtime  cefepime   IVPB 1000 milliGRAM(s) IV Intermittent every 12 hours  clopidogrel Tablet 75 milliGRAM(s) Oral daily  dextrose 5%. 1000 milliLiter(s) (50 mL/Hr) IV Continuous <Continuous>  dextrose 50% Injectable 12.5 Gram(s) IV Push once  dextrose 50% Injectable 25 Gram(s) IV Push once  dextrose 50% Injectable 25 Gram(s) IV Push once  docusate sodium 100 milliGRAM(s) Oral two times a day  folic acid 1 milliGRAM(s) Oral daily  furosemide    Tablet 40 milliGRAM(s) Oral two times a day  insulin lispro (HumaLOG) corrective regimen sliding scale   SubCutaneous three times a day before meals  insulin lispro (HumaLOG) corrective regimen sliding scale   SubCutaneous at bedtime  lactobacillus acidophilus 1 Tablet(s) Oral daily  metoprolol succinate ER 50 milliGRAM(s) Oral daily  sodium chloride 0.9%. 1000 milliLiter(s) (25 mL/Hr) IV Continuous <Continuous>      LABS:  02-06    138  |  105  |  57<H>  ----------------------------<  87  3.6   |  21<L>  |  3.60<H>    Ca    8.3<L>      06 Feb 2019 06:00    TPro  6.0  /  Alb  2.1<L>  /  TBili  0.2  /  DBili      /  AST  9   /  ALT  6   /  AlkPhos  64  02-06    Creatinine Trend: 3.60 <--, 3.57 <--, 3.66 <--    Albumin, Serum: 2.1 g/dL (02-06 @ 06:00)                              8.6    8.11  )-----------( 192      ( 06 Feb 2019 06:00 )             27.1     Urine Studies:      Iron 25, TIBC 145, %sat --      [01-17-19 @ 05:38]  Ferritin 291.5      [01-17-19 @ 05:38]  PTH 42.84 (Ca --)      [09-11-18 @ 05:45]   --  Vitamin D (25OH) 11.8      [09-11-18 @ 05:45]  HbA1c 6.0      [01-15-19 @ 05:45]  TSH 1.74      [01-15-19 @ 05:45]    HCV 0.09, Nonreactive Hepatitis C AB  S/CO Ratio                        Interpretation  < 1.0                                     Non-Reactive  1.0 - 4.9                           Weakly-Reactive  > 5.0                                 Reactive  Non-Reactive: Aperson with a non-reactive HCV antibody  result is considered uninfected.  No further action is  needed unless recent infection is suspected.  In these  cases, consider repeat testing later to detect  seroconversion..  Weakly-Reactive: HCV antibody test is abnormal, HCV RNA  Qualitative test will follow.  Reactive: HCV antibody test is abnormal, HCV RNA  Qualitative test will follow.  Note: HCV antibody testing is performed on the [employer]   system.      [02-05-19 @ 00:01]      RADIOLOGY & ADDITIONAL STUDIES:

## 2019-02-06 NOTE — PROGRESS NOTE ADULT - ASSESSMENT
Plan:   To OR today  for  right foot tarsometatarsal debridement with possible graft application.  CXR on sunrise.  EKG on sunrise.  Medical/Cardiac clearance since 2/5 and documented in chart.  Consent signed and in chart.  Procedure was explained to patient in detail. All alternatives, risks and complications were discussed. All questions answered. Plan:   To OR today  for  right foot tarsometatarsal debridement with possible graft application at 10 am   CXR on sunrise.  EKG on sunrise.  Medical/Cardiac clearance since 2/5 and documented in chart.  Consent signed and in chart.  Procedure was explained to patient in detail. All alternatives, risks and complications were discussed. All questions answered.

## 2019-02-06 NOTE — PRE-OP CHECKLIST - 2.
hx of DM II, CKD St IV, CAD with stents, right ankle fx with screws, HTN, GERD, hyperlipidemia, Anemia

## 2019-02-06 NOTE — PROGRESS NOTE ADULT - SUBJECTIVE AND OBJECTIVE BOX
SUBJECTIVE / OVERNIGHT EVENTS: pt denies chest pain, shortness of breath, nausea,v     MEDICATIONS  (STANDING):  amLODIPine   Tablet 10 milliGRAM(s) Oral daily  aspirin enteric coated 81 milliGRAM(s) Oral daily  atorvastatin 40 milliGRAM(s) Oral at bedtime  cefepime   IVPB 1000 milliGRAM(s) IV Intermittent every 12 hours  clopidogrel Tablet 75 milliGRAM(s) Oral daily  dextrose 5%. 1000 milliLiter(s) (50 mL/Hr) IV Continuous <Continuous>  dextrose 50% Injectable 12.5 Gram(s) IV Push once  dextrose 50% Injectable 25 Gram(s) IV Push once  dextrose 50% Injectable 25 Gram(s) IV Push once  docusate sodium 100 milliGRAM(s) Oral two times a day  folic acid 1 milliGRAM(s) Oral daily  furosemide    Tablet 40 milliGRAM(s) Oral two times a day  insulin lispro (HumaLOG) corrective regimen sliding scale   SubCutaneous three times a day before meals  insulin lispro (HumaLOG) corrective regimen sliding scale   SubCutaneous at bedtime  lactobacillus acidophilus 1 Tablet(s) Oral daily  metoprolol succinate ER 50 milliGRAM(s) Oral daily  sodium chloride 0.9%. 1000 milliLiter(s) (25 mL/Hr) IV Continuous <Continuous>    MEDICATIONS  (PRN):  acetaminophen   Tablet .. 650 milliGRAM(s) Oral every 6 hours PRN Mild Pain (1 - 3)  dextrose 40% Gel 15 Gram(s) Oral once PRN Blood Glucose LESS THAN 70 milliGRAM(s)/deciliter  glucagon  Injectable 1 milliGRAM(s) IntraMuscular once PRN Glucose LESS THAN 70 milligrams/deciliter  oxyCODONE    5 mG/acetaminophen 325 mG 1 Tablet(s) Oral every 4 hours PRN Moderate Pain (4 - 6)    Vital Signs Last 24 Hrs  T(C): 36.3 (06 Feb 2019 14:39), Max: 37.5 (05 Feb 2019 21:46)  T(F): 97.4 (06 Feb 2019 14:39), Max: 99.5 (05 Feb 2019 21:46)  HR: 55 (06 Feb 2019 14:39) (55 - 77)  BP: 114/62 (06 Feb 2019 14:39) (103/55 - 143/65)  BP(mean): 66 (06 Feb 2019 13:30) (66 - 71)  RR: 18 (06 Feb 2019 14:39) (18 - 230)  SpO2: 98% (06 Feb 2019 14:39) (96% - 100%)    Constitutional: No fever, fatigue  Skin: No rash.  Eyes: No recent vision problems or eye pain.  ENT: No congestion, ear pain, or sore throat.  Cardiovascular: No chest pain or palpation.  Respiratory: No cough, shortness of breath, congestion, or wheezing.  Gastrointestinal: No abdominal pain, nausea, vomiting, or diarrhea.  Genitourinary: No dysuria.  Musculoskeletal: No joint swelling.  Neurologic: No headache.    PHYSICAL EXAM:  GENERAL: NAD  EYES: EOMI, PERRLA  NECK: Supple, No JVD  CHEST/LUNG: dec breath sounds rt base   HEART:  S1 , S2 +  ABDOMEN: soft, bs+  EXTREMITIES:  rt foot dressing +  NEUROLOGY:alert awake    LABS:  02-06    138  |  105  |  57<H>  ----------------------------<  87  3.6   |  21<L>  |  3.60<H>    Ca    8.3<L>      06 Feb 2019 06:00    TPro  6.0  /  Alb  2.1<L>  /  TBili  0.2  /  DBili      /  AST  9   /  ALT  6   /  AlkPhos  64  02-06    Creatinine Trend: 3.60 <--, 3.57 <--, 3.66 <--                        8.6    8.11  )-----------( 192      ( 06 Feb 2019 06:00 )             27.1     Urine Studies:            LIVER FUNCTIONS - ( 06 Feb 2019 06:00 )  Alb: 2.1 g/dL / Pro: 6.0 g/dL / ALK PHOS: 64 u/L / ALT: 6 u/L / AST: 9 u/L / GGT: x           PT/INR - ( 06 Feb 2019 06:00 )   PT: 13.4 SEC;   INR: 1.20          PTT - ( 06 Feb 2019 06:00 )  PTT:33.4 SEC

## 2019-02-06 NOTE — PROGRESS NOTE ADULT - SUBJECTIVE AND OBJECTIVE BOX
Keaton Duvall MD  Interventional Cardiology  [place] Office : [phone] [place] [address]  Tel:   [place] Office : [phone] [facility] [place] [address]  Tel: [phone]  Cell : [phone]    Subjective : Pt lying in bed comfortable, not in distress, denies any chest pain or SOB  	  MEDICATIONS:  amLODIPine   Tablet 10 milliGRAM(s) Oral daily  aspirin enteric coated 81 milliGRAM(s) Oral daily  clopidogrel Tablet 75 milliGRAM(s) Oral daily  furosemide    Tablet 40 milliGRAM(s) Oral two times a day  metoprolol succinate ER 50 milliGRAM(s) Oral daily    cefepime   IVPB 1000 milliGRAM(s) IV Intermittent every 12 hours  vancomycin  IVPB 750 milliGRAM(s) IV Intermittent every 24 hours      acetaminophen   Tablet .. 650 milliGRAM(s) Oral every 6 hours PRN  oxyCODONE    5 mG/acetaminophen 325 mG 1 Tablet(s) Oral every 4 hours PRN    docusate sodium 100 milliGRAM(s) Oral two times a day    atorvastatin 40 milliGRAM(s) Oral at bedtime  dextrose 40% Gel 15 Gram(s) Oral once PRN  dextrose 50% Injectable 12.5 Gram(s) IV Push once  dextrose 50% Injectable 25 Gram(s) IV Push once  dextrose 50% Injectable 25 Gram(s) IV Push once  glucagon  Injectable 1 milliGRAM(s) IntraMuscular once PRN  insulin lispro (HumaLOG) corrective regimen sliding scale   SubCutaneous three times a day before meals  insulin lispro (HumaLOG) corrective regimen sliding scale   SubCutaneous at bedtime    dextrose 5%. 1000 milliLiter(s) IV Continuous <Continuous>  folic acid 1 milliGRAM(s) Oral daily  sodium chloride 0.9%. 1000 milliLiter(s) IV Continuous <Continuous>      PHYSICAL EXAM:  T(C): 36.3 (02-06-19 @ 14:39), Max: 37.4 (02-06-19 @ 05:38)  HR: 55 (02-06-19 @ 14:39) (55 - 77)  BP: 114/62 (02-06-19 @ 14:39) (103/55 - 139/73)  RR: 18 (02-06-19 @ 14:39) (18 - 230)  SpO2: 98% (02-06-19 @ 14:39) (96% - 99%)  Wt(kg): --  I&O's Summary    06 Feb 2019 07:01  -  06 Feb 2019 23:21  --------------------------------------------------------  IN: 0 mL / OUT: 500 mL / NET: -500 mL      Height (cm): 180.34 (02-06 @ 10:00)  Weight (kg): 97.5 (02-06 @ 10:00)  BMI (kg/m2): 30 (02-06 @ 10:00)  BSA (m2): 2.17 (02-06 @ 10:00)    Appearance: Normal	  HEENT:   Normal oral mucosa, PERRL, EOMI	  Cardiovascular: Normal S1 S2, No JVD, No murmurs, No edema  Respiratory: Lungs clear to auscultation	  Gastrointestinal:  Soft, Non-tender, + BS	  Extremities: RLE dresssed ,                                    8.6    8.11  )-----------( 192      ( 06 Feb 2019 06:00 )             27.1     02-06    138  |  105  |  57<H>  ----------------------------<  87  3.6   |  21<L>  |  3.60<H>    Ca    8.3<L>      06 Feb 2019 06:00    TPro  6.0  /  Alb  2.1<L>  /  TBili  0.2  /  DBili  x   /  AST  9   /  ALT  6   /  AlkPhos  64  02-06    proBNP:   Lipid Profile:   HgA1c:   TSH:

## 2019-02-07 ENCOUNTER — TRANSCRIPTION ENCOUNTER (OUTPATIENT)
Age: 64
End: 2019-02-07

## 2019-02-07 LAB
-  CEFAZOLIN: SIGNIFICANT CHANGE UP
-  CEFAZOLIN: SIGNIFICANT CHANGE UP
-  CIPROFLOXACIN: SIGNIFICANT CHANGE UP
-  CIPROFLOXACIN: SIGNIFICANT CHANGE UP
-  CLINDAMYCIN: SIGNIFICANT CHANGE UP
-  CLINDAMYCIN: SIGNIFICANT CHANGE UP
-  ERYTHROMYCIN: SIGNIFICANT CHANGE UP
-  ERYTHROMYCIN: SIGNIFICANT CHANGE UP
-  GENTAMICIN: SIGNIFICANT CHANGE UP
-  GENTAMICIN: SIGNIFICANT CHANGE UP
-  LEVOFLOXACIN: SIGNIFICANT CHANGE UP
-  LEVOFLOXACIN: SIGNIFICANT CHANGE UP
-  MOXIFLOXACIN(AEROBIC): SIGNIFICANT CHANGE UP
-  MOXIFLOXACIN(AEROBIC): SIGNIFICANT CHANGE UP
-  OXACILLIN: SIGNIFICANT CHANGE UP
-  OXACILLIN: SIGNIFICANT CHANGE UP
-  PENICILLIN: SIGNIFICANT CHANGE UP
-  RIFAMPIN.: SIGNIFICANT CHANGE UP
-  RIFAMPIN.: SIGNIFICANT CHANGE UP
-  TETRACYCLINE: SIGNIFICANT CHANGE UP
-  TETRACYCLINE: SIGNIFICANT CHANGE UP
-  TRIMETHOPRIM/SULFAMETHOXAZOLE: SIGNIFICANT CHANGE UP
-  TRIMETHOPRIM/SULFAMETHOXAZOLE: SIGNIFICANT CHANGE UP
-  VANCOMYCIN: SIGNIFICANT CHANGE UP
-  VANCOMYCIN: SIGNIFICANT CHANGE UP
ANION GAP SERPL CALC-SCNC: 15 MMO/L — HIGH (ref 7–14)
BACTERIA BLD CULT: SIGNIFICANT CHANGE UP
BUN SERPL-MCNC: 57 MG/DL — HIGH (ref 7–23)
CALCIUM SERPL-MCNC: 8.3 MG/DL — LOW (ref 8.4–10.5)
CHLORIDE SERPL-SCNC: 102 MMOL/L — SIGNIFICANT CHANGE UP (ref 98–107)
CO2 SERPL-SCNC: 20 MMOL/L — LOW (ref 22–31)
CREAT SERPL-MCNC: 3.46 MG/DL — HIGH (ref 0.5–1.3)
CULTURE - ACID FAST SMEAR CONCENTRATED: SIGNIFICANT CHANGE UP
CULTURE RESULTS: SIGNIFICANT CHANGE UP
GLUCOSE BLDC GLUCOMTR-MCNC: 108 MG/DL — HIGH (ref 70–99)
GLUCOSE BLDC GLUCOMTR-MCNC: 109 MG/DL — HIGH (ref 70–99)
GLUCOSE BLDC GLUCOMTR-MCNC: 123 MG/DL — HIGH (ref 70–99)
GLUCOSE BLDC GLUCOMTR-MCNC: 136 MG/DL — HIGH (ref 70–99)
GLUCOSE SERPL-MCNC: 118 MG/DL — HIGH (ref 70–99)
GRAM STN SPEC: SIGNIFICANT CHANGE UP
HCT VFR BLD CALC: 27.8 % — LOW (ref 39–50)
HGB BLD-MCNC: 8.8 G/DL — LOW (ref 13–17)
MCHC RBC-ENTMCNC: 28.8 PG — SIGNIFICANT CHANGE UP (ref 27–34)
MCHC RBC-ENTMCNC: 31.7 % — LOW (ref 32–36)
MCV RBC AUTO: 90.8 FL — SIGNIFICANT CHANGE UP (ref 80–100)
METHOD TYPE: SIGNIFICANT CHANGE UP
METHOD TYPE: SIGNIFICANT CHANGE UP
MSSA DNA SPEC QL NAA+PROBE: SIGNIFICANT CHANGE UP
NRBC # FLD: 0 K/UL — LOW (ref 25–125)
ORGANISM # SPEC MICROSCOPIC CNT: SIGNIFICANT CHANGE UP
PLATELET # BLD AUTO: 224 K/UL — SIGNIFICANT CHANGE UP (ref 150–400)
PMV BLD: 10.8 FL — SIGNIFICANT CHANGE UP (ref 7–13)
POTASSIUM SERPL-MCNC: 3.9 MMOL/L — SIGNIFICANT CHANGE UP (ref 3.5–5.3)
POTASSIUM SERPL-SCNC: 3.9 MMOL/L — SIGNIFICANT CHANGE UP (ref 3.5–5.3)
RBC # BLD: 3.06 M/UL — LOW (ref 4.2–5.8)
RBC # FLD: 14 % — SIGNIFICANT CHANGE UP (ref 10.3–14.5)
SODIUM SERPL-SCNC: 137 MMOL/L — SIGNIFICANT CHANGE UP (ref 135–145)
SPECIMEN SOURCE: SIGNIFICANT CHANGE UP
WBC # BLD: 7.97 K/UL — SIGNIFICANT CHANGE UP (ref 3.8–10.5)
WBC # FLD AUTO: 7.97 K/UL — SIGNIFICANT CHANGE UP (ref 3.8–10.5)

## 2019-02-07 RX ORDER — OXYCODONE AND ACETAMINOPHEN 5; 325 MG/1; MG/1
1 TABLET ORAL EVERY 4 HOURS
Qty: 0 | Refills: 0 | Status: DISCONTINUED | OUTPATIENT
Start: 2019-02-07 | End: 2019-02-13

## 2019-02-07 RX ADMIN — Medication 1 MILLIGRAM(S): at 11:53

## 2019-02-07 RX ADMIN — OXYCODONE AND ACETAMINOPHEN 1 TABLET(S): 5; 325 TABLET ORAL at 16:05

## 2019-02-07 RX ADMIN — Medication 40 MILLIGRAM(S): at 06:21

## 2019-02-07 RX ADMIN — CEFEPIME 100 MILLIGRAM(S): 1 INJECTION, POWDER, FOR SOLUTION INTRAMUSCULAR; INTRAVENOUS at 06:18

## 2019-02-07 RX ADMIN — Medication 40 MILLIGRAM(S): at 17:53

## 2019-02-07 RX ADMIN — Medication 100 MILLIGRAM(S): at 06:21

## 2019-02-07 RX ADMIN — ATORVASTATIN CALCIUM 40 MILLIGRAM(S): 80 TABLET, FILM COATED ORAL at 21:54

## 2019-02-07 RX ADMIN — Medication 81 MILLIGRAM(S): at 11:52

## 2019-02-07 RX ADMIN — AMLODIPINE BESYLATE 10 MILLIGRAM(S): 2.5 TABLET ORAL at 06:22

## 2019-02-07 RX ADMIN — Medication 650 MILLIGRAM(S): at 11:54

## 2019-02-07 RX ADMIN — OXYCODONE AND ACETAMINOPHEN 1 TABLET(S): 5; 325 TABLET ORAL at 15:00

## 2019-02-07 RX ADMIN — Medication 250 MILLIGRAM(S): at 01:05

## 2019-02-07 RX ADMIN — Medication 1 TABLET(S): at 11:52

## 2019-02-07 RX ADMIN — Medication 50 MILLIGRAM(S): at 06:22

## 2019-02-07 RX ADMIN — CLOPIDOGREL BISULFATE 75 MILLIGRAM(S): 75 TABLET, FILM COATED ORAL at 11:52

## 2019-02-07 RX ADMIN — Medication 100 MILLIGRAM(S): at 17:54

## 2019-02-07 RX ADMIN — OXYCODONE AND ACETAMINOPHEN 1 TABLET(S): 5; 325 TABLET ORAL at 01:05

## 2019-02-07 RX ADMIN — Medication 650 MILLIGRAM(S): at 12:32

## 2019-02-07 RX ADMIN — OXYCODONE AND ACETAMINOPHEN 1 TABLET(S): 5; 325 TABLET ORAL at 02:10

## 2019-02-07 RX ADMIN — CEFEPIME 100 MILLIGRAM(S): 1 INJECTION, POWDER, FOR SOLUTION INTRAMUSCULAR; INTRAVENOUS at 17:55

## 2019-02-07 NOTE — DISCHARGE NOTE ADULT - HOSPITAL COURSE
62yo M w/ charcot deformity and right foot ulceration infection with positive probe to bone admitted for management 62yo M w/ charcot deformity and right foot ulceration infection with positive probe to bone admitted for management     Right foot wound  - Cefepime, vanco   - ID Dr Spivey following  - Podiatry following  - Wound cx-->Staphyl aureus, Corynebacterium  - 2/4-Bcx-MSSA bacteremia  - 2/5-Repeat Bcx-neg  - 2/6-s/p OR--Right foot wound and bone debridement. Vanco added for Corynebacterium.   - 2/8 s/p PICC line    CAD  - aspirin, plavix, statin  - patient with stent PTA  - cardio Dr Martell     DM  - HgbA1c 6  - Humalog SS, FS Q AC +HS    CKD  - baseline ~ 3.2-3.6  - renal cs 62yo M w/ charcot deformity and right foot ulceration infection with positive probe to bone admitted for management     Right foot wound  - IV abx per ID: Check weekly WBC, esr, crp, vanco trough through 3/19   - ID Dr Spivey following  - Podiatry following  - Wound cx-->Staphyl aureus, Corynebacterium  - 2/4-Bcx-MSSA bacteremia  - 2/5-Repeat Bcx-neg  - 2/6-s/p OR--Right foot wound and bone debridement. Vanco added for Corynebacterium.   - 2/8 s/p PICC line    CAD  - aspirin, plavix, statin  - patient with stent PTA  - cardio Dr Martell     DM  - HgbA1c 6  - Humalog SS, FS Q AC +HS    CKD  - baseline ~ 3.2-3.6  - renal consulted, recs appreciated     anemia  -Iron sat 17% 1/17. will give iv iron 100 x 5 doses. discharge on oral iron 64yo M w/ charcot deformity and right foot ulceration infection with positive probe to bone admitted for management     Right foot wound  - IV abx per ID: Check weekly WBC, esr, crp, vanco trough biweekly through 3/19   - ID Dr Spivey following  - Podiatry following  - Wound cx-->Staphyl aureus, Corynebacterium  - 2/4-Bcx-MSSA bacteremia  - 2/5-Repeat Bcx-neg  - 2/6-s/p OR--Right foot wound and bone debridement. Vanco added for Corynebacterium.   - 2/8 s/p PICC line    CAD  - aspirin, plavix, statin  - patient with stent PTA  - cardio Dr Martell     DM  - HgbA1c 6  - Humalog SS, FS Q AC +HS    CKD  - baseline ~ 3.2-3.6  - renal consulted, recs appreciated     anemia  -Iron sat 17% 1/17. will give iv iron 100 x 5 doses. discharge on oral iron per nephro    Dispo: 62yo M w/ charcot deformity and right foot ulceration infection with positive probe to bone admitted for management     Right foot wound  - IV abx per ID: Check weekly WBC, esr, crp, vanco trough biweekly through 3/19   - ID Dr Spivey following  - Podiatry following  - Wound cx-->Staphyl aureus, Corynebacterium  - 2/4-Bcx-MSSA bacteremia  - 2/5-Repeat Bcx-neg  - 2/6-s/p OR--Right foot wound and bone debridement. Vanco added for Corynebacterium.   - 2/8 s/p PICC line    CAD  - aspirin, plavix, statin  - patient with stent PTA  - cardio Dr Martell     DM  - HgbA1c 6  - Humalog SS, FS Q AC +HS    CKD  - baseline ~ 3.2-3.6  - renal consulted, recs appreciated     anemia  -Iron sat 17% 1/17. will give iv iron 100 x 5 doses. discharge on oral iron per nephro    Dispo: Patient medically stable for discharge home with home care services per Dr. Queen. Outpatient follow up with podiatry, nephro

## 2019-02-07 NOTE — DISCHARGE NOTE ADULT - PROVIDER TOKENS
PROVIDER:[TOKEN:[[id number]]] PROVIDER:[TOKEN:[[id number]]],PROVIDER:[TOKEN:[[id number]]] PROVIDER:[TOKEN:[[id number]]],PROVIDER:[TOKEN:[[id number]]],PROVIDER:[TOKEN:[[id number]]]

## 2019-02-07 NOTE — PROGRESS NOTE ADULT - ASSESSMENT
NST:  < from: Nuclear Stress Test-Pharmacologic (01.18.19 @ 08:42) >  There is a small, mild defect in inferior wall that is  reversible consistent with ischemia.Review of raw data  shows: The study is of good technical quality.  There is a small, mild defect in inferior wall that is  reversible consistent with ischemia.    < end of copied text >    CAth < from: Cardiac Cath Lab - Adult (01.23.19 @ 10:02) >  The coronary circulation is right dominant.  LM:   --  LM: Normal.  LAD:   --  Mid LAD: There was a discrete 50 % stenosis.  CX:   --  Circumflex: Normal.  RCA:   --  Proximal RCA: There was a tubular 60 % stenosis.  --  RPDA: There was a 90 % stenosis.  COMPLICATIONS: There were no complications.  INTERVENTIONAL RECOMMENDATIONS:Distal RCA into RPDA 90% stenosis s/p PCI  BMS,  Prox RCA with dissuse 60% stenosis medical management    < end of copied text >      1) CAD: s/p LHC found to have 50% mid LAD, 50% prox RCA, 90% Distal RCA into PDA , s/p PCI with Bare metal stent, c/w asa and Plavix     2) Right foot wound/OM/ Bacteremia - podiatry  on case, tolerated the procedure well,  will get echo given bacteremia     3) HTN - cont hydralazine    4) CKD:  renal onboard, monitor renal function , avoid nephrotoxic meds

## 2019-02-07 NOTE — PROGRESS NOTE ADULT - SUBJECTIVE AND OBJECTIVE BOX
Infectious Diseases progress note:    Subjective:  Resting comfortably, NAD, afebrile.      ROS:  CONSTITUTIONAL:  No fever, chills, rigors  CARDIOVASCULAR:  No chest pain or palpitations  RESPIRATORY:   No SOB, cough, dyspnea on exertion.  No wheezing  GASTROINTESTINAL:  No abd pain, N/V, diarrhea/constipation  EXTREMITIES:  No swelling or joint pain  GENITOURINARY:  No burning on urination, increased frequency or urgency.  No flank pain  NEUROLOGIC:  No HA, visual disturbances  SKIN: No rashes    Allergies    No Known Allergies    Intolerances        ANTIBIOTICS/RELEVANT:  antimicrobials  cefepime   IVPB 1000 milliGRAM(s) IV Intermittent every 12 hours  vancomycin  IVPB 750 milliGRAM(s) IV Intermittent every 24 hours    immunologic:    OTHER:  acetaminophen   Tablet .. 650 milliGRAM(s) Oral every 6 hours PRN  amLODIPine   Tablet 10 milliGRAM(s) Oral daily  aspirin enteric coated 81 milliGRAM(s) Oral daily  atorvastatin 40 milliGRAM(s) Oral at bedtime  clopidogrel Tablet 75 milliGRAM(s) Oral daily  dextrose 40% Gel 15 Gram(s) Oral once PRN  dextrose 5%. 1000 milliLiter(s) IV Continuous <Continuous>  dextrose 50% Injectable 12.5 Gram(s) IV Push once  dextrose 50% Injectable 25 Gram(s) IV Push once  dextrose 50% Injectable 25 Gram(s) IV Push once  docusate sodium 100 milliGRAM(s) Oral two times a day  folic acid 1 milliGRAM(s) Oral daily  furosemide    Tablet 40 milliGRAM(s) Oral two times a day  glucagon  Injectable 1 milliGRAM(s) IntraMuscular once PRN  insulin lispro (HumaLOG) corrective regimen sliding scale   SubCutaneous three times a day before meals  insulin lispro (HumaLOG) corrective regimen sliding scale   SubCutaneous at bedtime  lactobacillus acidophilus 1 Tablet(s) Oral daily  metoprolol succinate ER 50 milliGRAM(s) Oral daily  oxyCODONE    5 mG/acetaminophen 325 mG 1 Tablet(s) Oral every 4 hours PRN  sodium chloride 0.9%. 1000 milliLiter(s) IV Continuous <Continuous>      Objective:  Vital Signs Last 24 Hrs  T(C): 37.1 (07 Feb 2019 14:40), Max: 37.1 (07 Feb 2019 07:25)  T(F): 98.8 (07 Feb 2019 14:40), Max: 98.8 (07 Feb 2019 14:40)  HR: 66 (07 Feb 2019 21:56) (61 - 70)  BP: 131/72 (07 Feb 2019 21:56) (125/76 - 141/70)  BP(mean): --  RR: 18 (07 Feb 2019 21:56) (17 - 18)  SpO2: 97% (07 Feb 2019 21:56) (97% - 99%)    PHYSICAL EXAM:  Constitutional:NAD  Eyes:SPIKE, EOMI  Ear/Nose/Throat: no thrush, mucositis.  Moist mucous membranes	  Neck:no JVD, no lymphadenopathy, supple  Respiratory: CTA lobito  Cardiovascular: S1S2 RRR, no murmurs  Gastrointestinal:soft, nontender,  nondistended (+) BS  Extremities:no e/e/c  Skin:  no rashes, open wounds or ulcerations        LABS:                        8.8    7.97  )-----------( 224      ( 07 Feb 2019 05:30 )             27.8     02-07    137  |  102  |  57<H>  ----------------------------<  118<H>  3.9   |  20<L>  |  3.46<H>    Ca    8.3<L>      07 Feb 2019 05:30    TPro  6.0  /  Alb  2.1<L>  /  TBili  0.2  /  DBili  x   /  AST  9   /  ALT  6   /  AlkPhos  64  02-06    PT/INR - ( 06 Feb 2019 06:00 )   PT: 13.4 SEC;   INR: 1.20          PTT - ( 06 Feb 2019 06:00 )  PTT:33.4 SEC        Vancomycin Level, Random:  ug/mL (02-05 @ 05:50)                  MICROBIOLOGY:    Culture - Acid Fast Smear Concentrated (02.06.19 @ 15:04)    Specimen Source: OTHER    Culture - Acid Fast Smear Concentrated:   AFB SMEAR= NO ACID FAST BACILLI SEEN      Culture - Yeast and Fungus (02.06.19 @ 13:48)    Culture - Yeast and Fungus:   CULTURE NEGATIVE FOR YEASTS AND MOLDS AFTER 1 DAY    Specimen Source: FOOT    Culture - Tissue with Gram Stain (02.06.19 @ 13:48)    Gram Stain Wound:   NOS^No Organisms Seen  WBC^White Blood Cells  QNTY CELLS IN GRAM STAIN: NO CELLS SEEN    Culture - Tissue:   NO GROWTH - PRELIMINARY RESULTS    Specimen Source: FOOT    Culture - Blood (02.05.19 @ 12:00)    Culture - Blood:   NO ORGANISMS ISOLATED  NO ORGANISMS ISOLATED AT 48 HRS.    Specimen Source: BLOOD      Culture - Blood (02.04.19 @ 15:51)    -  Staphylococcus aureus: + DETECT ULISSES Any isolate of Staphylococcus aureus from a blood culture is  NOT considered a contaminant.    Culture - Blood:   ***Blood Panel PCR results on this specimen are available  approximately 3 hours after the Gram stain result***  Gram stain, PCR, and/or culture results may not always  correspond due to difference in methodologies  ------------------------------------------------------------  This PCR assay was performed using [employer].  The  following targets are tested for:  Enterococcus, vancomycin  resistant enterococci, Listeria monocytogenes,  coagulase  negative staphylococci, S. aureus, methicillin resistant S.  aureus, Streptococcus agalactiae (Group B), S. pneumoniae,  S. pyogenes (Group A), Acinetobacter baumannii, Enterobacter  cloacae, E. coli, Klebsiella oxytoca, K. pneumoniae, Proteus  sp., Serratia marcescens, Haemophilus influenzae, Neisseria  meningitidis, Pseudomonas aeruginosa, Candida albicans, C.  glabrata, C. krusei, C. parapsilosis, C. tropicalis and the  KPC resistance gene.  **NOTE: Due to technical problems, Proteus sp. will NOT be  reported as part of the BCID paneluntil further notice.    -  Vancomycin: S 1 ULISSES    -  Trimethoprim/Sulfamethoxazole: S <=0.5/9.5 ULISSES    -  Oxacillin: S <=0.25 ULISSES    -  Moxifloxacin(Aerobic): S <=0.5 ULISSES    -  Rifampin: S <=1 ULISSES    -  Tetra/Doxy: S <=1 ULISSES    -  Cefazolin: S <=4 ULISSES    -  Ciprofloxacin: S <=1 ULISSES    -  Clindamycin: S 0.5 ULISSES    -  Erythromycin: S    -  Gentamicin: S <=1 ULISSES    -  Levofloxacin: S <=0.5 ULISSES    Specimen Source: BLOOD VENOUS    Organism: BLOOD CULTURE PCR    Organism: Staphylococcus aureus    Gram Stain Blood:   ***** CRITICAL RESULT *****  PERSON CALLED / READ-BACK: JARETT ZAMAN RN/Y  DATE / TIME CALLED: 02/05/19 1050  CALLED BY: TARIK PAZ  GPCCL^Gram Pos Cocci In Clusters  AFTER: 18 HOURS INCUBATION  BOTTLE: AEROBIC BOTTLE    Organism Identification: BLOOD CULTURE PCR  Staphylococcus aureus    Method Type: PCR    Method Type: POSITIVE ULISSES 29          RADIOLOGY & ADDITIONAL STUDIES:    < from: Xray Foot AP + Lateral + Oblique, Right (02.06.19 @ 13:24) >  IMPRESSION:  Status post osseous and soft tissue debridement changes along medial   midfoot region with an indwelling Penrose drain.    Redemonstrated Charcot related deformity of the Lisfranc joints and   adjacent tarsal bones.    Old offset mid 2nd metatarsal shaft fracture deformity.    Intact distal fibular fracture fixation plate with fixation screws and   separate interfragmentary screw with underlying anatomic alignment   maintained.    Correlate with intraoperative findings.    < end of copied text >

## 2019-02-07 NOTE — CHART NOTE - NSCHARTNOTEFT_GEN_A_CORE
Patient Age: 62 y/o    Patient Gender:Male    Procedure (including site/side if known):PICC placement for antibiotics    Diagnosis/Indication: MSSA Bacteremia    Interventional Radiology Attending Physician: DARWIN    Ordering Attending Physician: Dr. Queen    Pertinent medical history: Type 2 DM, HTN, Right Charcot deformityw, Osteomyelitis, anemia, CKD, HLD, GERD    Pertinent Labs:                      8.8    7.97  )-----------( 224      ( 07 Feb 2019 05:30 )               27.8         Patient and Family aware? Yes      Attending/Resident/NP/PA: Glenn Connor NP-C    Contact:        [phone]                       Date:        2/7/19                            time: [id number]

## 2019-02-07 NOTE — PROGRESS NOTE ADULT - SUBJECTIVE AND OBJECTIVE BOX
[facility] [place] [facility]   MD DOMO WALLACE MD ANGELA WONG, PA    TEL:  OFFICE: [phone]  DR LEE CELL: [phone]  DR. GRULLON CELL: [phone]  MELANIA CORONEL CELL: [phone]        Patient is a 63y old  Male who presents with a chief complaint of rt foot wound (07 Feb 2019 09:44)      Patient seen and examined at bedside. No chest pain/sob    VITALS:  T(F): 98.8 (02-07-19 @ 14:40), Max: 98.8 (02-07-19 @ 14:40)  HR: 61 (02-07-19 @ 14:40)  BP: 125/76 (02-07-19 @ 14:40)  RR: 18 (02-07-19 @ 14:40)  SpO2: 99% (02-07-19 @ 14:40)  Wt(kg): --    02-06 @ 07:01  -  02-07 @ 07:00  --------------------------------------------------------  IN: 0 mL / OUT: 500 mL / NET: -500 mL          PHYSICAL EXAM:  Constitutional: NAD  Neck: No JVD  Respiratory: CTAB, no wheezes, rales or rhonchi  Cardiovascular: S1, S2, RRR  Gastrointestinal: BS+, soft, NT/ND  Extremities: No peripheral edema, right foot dressing d/c/i    Hospital Medications:   MEDICATIONS  (STANDING):  amLODIPine   Tablet 10 milliGRAM(s) Oral daily  aspirin enteric coated 81 milliGRAM(s) Oral daily  atorvastatin 40 milliGRAM(s) Oral at bedtime  cefepime   IVPB 1000 milliGRAM(s) IV Intermittent every 12 hours  clopidogrel Tablet 75 milliGRAM(s) Oral daily  dextrose 5%. 1000 milliLiter(s) (50 mL/Hr) IV Continuous <Continuous>  dextrose 50% Injectable 12.5 Gram(s) IV Push once  dextrose 50% Injectable 25 Gram(s) IV Push once  dextrose 50% Injectable 25 Gram(s) IV Push once  docusate sodium 100 milliGRAM(s) Oral two times a day  folic acid 1 milliGRAM(s) Oral daily  furosemide    Tablet 40 milliGRAM(s) Oral two times a day  insulin lispro (HumaLOG) corrective regimen sliding scale   SubCutaneous three times a day before meals  insulin lispro (HumaLOG) corrective regimen sliding scale   SubCutaneous at bedtime  lactobacillus acidophilus 1 Tablet(s) Oral daily  metoprolol succinate ER 50 milliGRAM(s) Oral daily  sodium chloride 0.9%. 1000 milliLiter(s) (25 mL/Hr) IV Continuous <Continuous>  vancomycin  IVPB 750 milliGRAM(s) IV Intermittent every 24 hours      LABS:  02-07    137  |  102  |  57<H>  ----------------------------<  118<H>  3.9   |  20<L>  |  3.46<H>    Ca    8.3<L>      07 Feb 2019 05:30    TPro  6.0  /  Alb  2.1<L>  /  TBili  0.2  /  DBili      /  AST  9   /  ALT  6   /  AlkPhos  64  02-06    Creatinine Trend: 3.46 <--, 3.60 <--, 3.57 <--, 3.66 <--                                8.8    7.97  )-----------( 224      ( 07 Feb 2019 05:30 )             27.8     Urine Studies:      Iron 25, TIBC 145, %sat --      [01-17-19 @ 05:38]  Ferritin 291.5      [01-17-19 @ 05:38]  PTH 42.84 (Ca --)      [09-11-18 @ 05:45]   --  Vitamin D (25OH) 11.8      [09-11-18 @ 05:45]  HbA1c 6.0      [01-15-19 @ 05:45]  TSH 1.74      [01-15-19 @ 05:45]    HCV 0.09, Nonreactive Hepatitis C AB  S/CO Ratio                        Interpretation  < 1.0                                     Non-Reactive  1.0 - 4.9                           Weakly-Reactive  > 5.0                                 Reactive  Non-Reactive: Aperson with a non-reactive HCV antibody  result is considered uninfected.  No further action is  needed unless recent infection is suspected.  In these  cases, consider repeat testing later to detect  seroconversion..  Weakly-Reactive: HCV antibody test is abnormal, HCV RNA  Qualitative test will follow.  Reactive: HCV antibody test is abnormal, HCV RNA  Qualitative test will follow.  Note: HCV antibody testing is performed on the [employer]   system.      [02-05-19 @ 00:01]      RADIOLOGY & ADDITIONAL STUDIES:

## 2019-02-07 NOTE — PROGRESS NOTE ADULT - SUBJECTIVE AND OBJECTIVE BOX
Patient is a 63y old  Male who presents with a chief complaint of rt foot wound (06 Feb 2019 15:50)       INTERVAL HPI/OVERNIGHT EVENTS:  Patient seen and evaluated at bedside.  Pt is resting comfortable in NAD. Denies N/V/F/C.  No pain at bedside.    Allergies    No Known Allergies    Intolerances        Vital Signs Last 24 Hrs  T(C): 37.1 (07 Feb 2019 07:25), Max: 37.1 (07 Feb 2019 07:25)  T(F): 98.7 (07 Feb 2019 07:25), Max: 98.7 (07 Feb 2019 07:25)  HR: 70 (07 Feb 2019 07:25) (55 - 70)  BP: 141/70 (07 Feb 2019 07:25) (103/55 - 141/70)  BP(mean): 66 (06 Feb 2019 13:30) (66 - 71)  RR: 17 (07 Feb 2019 07:25) (17 - 230)  SpO2: 99% (07 Feb 2019 07:25) (96% - 99%)    LABS:                        8.8    7.97  )-----------( 224      ( 07 Feb 2019 05:30 )             27.8     02-07    137  |  102  |  57<H>  ----------------------------<  118<H>  3.9   |  20<L>  |  3.46<H>    Ca    8.3<L>      07 Feb 2019 05:30    TPro  6.0  /  Alb  2.1<L>  /  TBili  0.2  /  DBili  x   /  AST  9   /  ALT  6   /  AlkPhos  64  02-06    PT/INR - ( 06 Feb 2019 06:00 )   PT: 13.4 SEC;   INR: 1.20          PTT - ( 06 Feb 2019 06:00 )  PTT:33.4 SEC    CAPILLARY BLOOD GLUCOSE      POCT Blood Glucose.: 136 mg/dL (07 Feb 2019 07:39)  POCT Blood Glucose.: 111 mg/dL (06 Feb 2019 21:58)  POCT Blood Glucose.: 127 mg/dL (06 Feb 2019 14:44)      Lower Extremity Physical Exam:  s/p R plantar foot charcot planing, closed. Sutures intact, drain pulled with minimal bleeding noted.    RADIOLOGY & ADDITIONAL TESTS:  < from: Xray Foot AP + Lateral + Oblique, Right (02.06.19 @ 13:24) >  EXAM:  RAD FOOT MIN 3 VIEWS RIGHT        PROCEDURE DATE:  Feb 6 2019         INTERPRETATION:  CLINICAL INDICATION: baseline postoperative evaluation   status post right foot surgery for medial midfoot osseous and soft tissue   debridement    EXAM:  Portable frontal oblique and lateral right foot from 2/6/2019 at 1324.   Compared to preoperative study from 2/4/2019.    IMPRESSION:  Status post osseous and soft tissue debridement changes along medial   midfoot region with an indwelling Penrose drain.    Redemonstrated Charcot related deformity of the Lisfranc joints and   adjacent tarsal bones.    Old offset mid 2nd metatarsal shaft fracture deformity.    Intact distal fibular fracture fixation plate with fixation screws and   separate interfragmentary screw with underlying anatomic alignment   maintained.    Correlate with intraoperative findings.                  PERLA MUÑOZ M.D., ATTENDING RADIOLOGIST  This document has been electronically signed. Feb 6 2019  4:06PM    < end of copied text >

## 2019-02-07 NOTE — DISCHARGE NOTE ADULT - CARE PLAN
Principal Discharge DX:	Diabetic foot infection  Goal:	resolution  Assessment and plan of treatment:	Non-weight bearing to Right foot, Utilize Knee Scooter.  Secondary Diagnosis:	MSSA bacteremia  Assessment and plan of treatment:	complete antibiotics as prescribed  Secondary Diagnosis:	DM (diabetes mellitus)  Goal:	Blood glucose 110-180  Secondary Diagnosis:	Anemia  Goal:	remain stable  Secondary Diagnosis:	HTN (hypertension)  Goal:	SBP<140  Secondary Diagnosis:	GERD (gastroesophageal reflux disease)  Secondary Diagnosis:	Dyslipidemia Principal Discharge DX:	Diabetic foot infection  Goal:	resolution  Assessment and plan of treatment:	Non-weight bearing to Right foot, Utilize Knee Scooter. Outpatient follow up with podiatry within 1 week of discharge from hospital.  Secondary Diagnosis:	MSSA bacteremia  Assessment and plan of treatment:	Complete antibiotics as prescribed. Check labs weekly: WBC, esr, crp, vancomycin trough, Infusion care services set up by case management.  Secondary Diagnosis:	DM (diabetes mellitus)  Goal:	Blood glucose 110-180  Assessment and plan of treatment:	Continue consistent carbohydrate diet.  Monitor blood glucose levels throughout the day before meals and at bedtime.  Record blood sugars and bring to outpatient providers appointment in order to be reviewed by your doctor for management modifications.  Be aware of diabetes management symptoms including feeling cool and clammy may be related to low glucose levels.  Feeling hot and dry may indicate high glucose levels.  If  you feel these symptoms, check your blood sugar.  Make regular podiatry appointments in order to have feet checked for wounds and toe nails cut by a doctor to prevent infections.  Secondary Diagnosis:	Anemia  Goal:	remain stable  Assessment and plan of treatment:	Continue iron supplementation. Follow-up with your outpatient provider if further treatment is warranted. Monitor for signs/symptoms indicating worsening of disease, such as, easy bleeding/bruising, pale skin, fatigue, dizziness, increased heart rate, or chest pain.  Secondary Diagnosis:	HTN (hypertension)  Goal:	SBP<140  Assessment and plan of treatment:	Continue current blood pressure medication regimen as directed. Monitor for any visual changes, headaches or dizziness.  Monitor blood pressure regularly.  Follow up with your PCP for further management for high blood pressure, please call to make appointment within 1 week of discharge  Secondary Diagnosis:	GERD (gastroesophageal reflux disease)  Assessment and plan of treatment:	stable  Secondary Diagnosis:	Dyslipidemia  Assessment and plan of treatment:	Continue cholesterol control medications. Continue DASH diet. Follow up with your PCP within 1 week of discharge for further management and monitoring of lipid and cholesterol panels. Please call to make an appointment Principal Discharge DX:	Diabetic foot infection  Goal:	resolution  Assessment and plan of treatment:	Non-weight bearing to Right foot, Utilize Knee Scooter. Outpatient follow up with podiatry within 1 week of discharge from hospital.  Secondary Diagnosis:	MSSA bacteremia  Assessment and plan of treatment:	Complete antibiotics as prescribed. Check labs weekly: WBC, esr, crp. Vancomycin trough to be checked biweekly, Infusion care services set up by case management.  Secondary Diagnosis:	DM (diabetes mellitus)  Goal:	Blood glucose 110-180  Assessment and plan of treatment:	Continue consistent carbohydrate diet.  Monitor blood glucose levels throughout the day before meals and at bedtime.  Record blood sugars and bring to outpatient providers appointment in order to be reviewed by your doctor for management modifications.  Be aware of diabetes management symptoms including feeling cool and clammy may be related to low glucose levels.  Feeling hot and dry may indicate high glucose levels.  If  you feel these symptoms, check your blood sugar.  Make regular podiatry appointments in order to have feet checked for wounds and toe nails cut by a doctor to prevent infections.  Secondary Diagnosis:	Anemia  Goal:	remain stable  Assessment and plan of treatment:	Continue iron supplementation. Follow-up with your outpatient provider if further treatment is warranted. Monitor for signs/symptoms indicating worsening of disease, such as, easy bleeding/bruising, pale skin, fatigue, dizziness, increased heart rate, or chest pain.  Secondary Diagnosis:	HTN (hypertension)  Goal:	SBP<140  Assessment and plan of treatment:	Continue current blood pressure medication regimen as directed. Monitor for any visual changes, headaches or dizziness.  Monitor blood pressure regularly.  Follow up with your PCP for further management for high blood pressure, please call to make appointment within 1 week of discharge  Secondary Diagnosis:	GERD (gastroesophageal reflux disease)  Assessment and plan of treatment:	stable  Secondary Diagnosis:	Dyslipidemia  Assessment and plan of treatment:	Continue cholesterol control medications. Continue DASH diet. Follow up with your PCP within 1 week of discharge for further management and monitoring of lipid and cholesterol panels. Please call to make an appointment

## 2019-02-07 NOTE — PROGRESS NOTE ADULT - SUBJECTIVE AND OBJECTIVE BOX
Keaton Duvall MD  Interventional Cardiology  [place] Office : [phone] [place] [address]  Tel:   [place] Office : [address] [facility] [place] [address]  Tel: [phone]  Cell : [phone]    Subjective : Pt lying in bed comfortable, not in distress, denies any chest pain or SOB  	  MEDICATIONS:  amLODIPine   Tablet 10 milliGRAM(s) Oral daily  aspirin enteric coated 81 milliGRAM(s) Oral daily  clopidogrel Tablet 75 milliGRAM(s) Oral daily  furosemide    Tablet 40 milliGRAM(s) Oral two times a day  metoprolol succinate ER 50 milliGRAM(s) Oral daily    cefepime   IVPB 1000 milliGRAM(s) IV Intermittent every 12 hours  vancomycin  IVPB 750 milliGRAM(s) IV Intermittent every 24 hours      acetaminophen   Tablet .. 650 milliGRAM(s) Oral every 6 hours PRN  oxyCODONE    5 mG/acetaminophen 325 mG 1 Tablet(s) Oral every 4 hours PRN    docusate sodium 100 milliGRAM(s) Oral two times a day    atorvastatin 40 milliGRAM(s) Oral at bedtime  dextrose 40% Gel 15 Gram(s) Oral once PRN  dextrose 50% Injectable 12.5 Gram(s) IV Push once  dextrose 50% Injectable 25 Gram(s) IV Push once  dextrose 50% Injectable 25 Gram(s) IV Push once  glucagon  Injectable 1 milliGRAM(s) IntraMuscular once PRN  insulin lispro (HumaLOG) corrective regimen sliding scale   SubCutaneous three times a day before meals  insulin lispro (HumaLOG) corrective regimen sliding scale   SubCutaneous at bedtime    dextrose 5%. 1000 milliLiter(s) IV Continuous <Continuous>  folic acid 1 milliGRAM(s) Oral daily  sodium chloride 0.9%. 1000 milliLiter(s) IV Continuous <Continuous>      PHYSICAL EXAM:  T(C): 37.1 (02-07-19 @ 14:40), Max: 37.1 (02-07-19 @ 07:25)  HR: 61 (02-07-19 @ 14:40) (61 - 70)  BP: 125/76 (02-07-19 @ 14:40) (125/76 - 141/70)  RR: 18 (02-07-19 @ 14:40) (17 - 18)  SpO2: 99% (02-07-19 @ 14:40) (99% - 99%)  Wt(kg): --  I&O's Summary    06 Feb 2019 07:01  -  07 Feb 2019 07:00  --------------------------------------------------------  IN: 0 mL / OUT: 500 mL / NET: -500 mL      Appearance: Normal	  HEENT:   Normal oral mucosa, PERRL, EOMI	  Cardiovascular: Normal S1 S2, No JVD, No murmurs, No edema  Respiratory: Lungs clear to auscultation	  Gastrointestinal:  Soft, Non-tender, + BS	  Extremities: RLE dresssed                                       8.8    7.97  )-----------( 224      ( 07 Feb 2019 05:30 )             27.8     02-07    137  |  102  |  57<H>  ----------------------------<  118<H>  3.9   |  20<L>  |  3.46<H>    Ca    8.3<L>      07 Feb 2019 05:30    TPro  6.0  /  Alb  2.1<L>  /  TBili  0.2  /  DBili  x   /  AST  9   /  ALT  6   /  AlkPhos  64  02-06    proBNP:   Lipid Profile:   HgA1c:   TSH:

## 2019-02-07 NOTE — PROGRESS NOTE ADULT - SUBJECTIVE AND OBJECTIVE BOX
SUBJECTIVE / OVERNIGHT EVENTS: pt denies chest pain, shortness of breath, nausea,v     MEDICATIONS  (STANDING):  amLODIPine   Tablet 10 milliGRAM(s) Oral daily  aspirin enteric coated 81 milliGRAM(s) Oral daily  atorvastatin 40 milliGRAM(s) Oral at bedtime  cefepime   IVPB 1000 milliGRAM(s) IV Intermittent every 12 hours  clopidogrel Tablet 75 milliGRAM(s) Oral daily  dextrose 5%. 1000 milliLiter(s) (50 mL/Hr) IV Continuous <Continuous>  dextrose 50% Injectable 12.5 Gram(s) IV Push once  dextrose 50% Injectable 25 Gram(s) IV Push once  dextrose 50% Injectable 25 Gram(s) IV Push once  docusate sodium 100 milliGRAM(s) Oral two times a day  folic acid 1 milliGRAM(s) Oral daily  furosemide    Tablet 40 milliGRAM(s) Oral two times a day  insulin lispro (HumaLOG) corrective regimen sliding scale   SubCutaneous three times a day before meals  insulin lispro (HumaLOG) corrective regimen sliding scale   SubCutaneous at bedtime  lactobacillus acidophilus 1 Tablet(s) Oral daily  metoprolol succinate ER 50 milliGRAM(s) Oral daily  sodium chloride 0.9%. 1000 milliLiter(s) (25 mL/Hr) IV Continuous <Continuous>  vancomycin  IVPB 750 milliGRAM(s) IV Intermittent every 24 hours    MEDICATIONS  (PRN):  acetaminophen   Tablet .. 650 milliGRAM(s) Oral every 6 hours PRN Mild Pain (1 - 3)  dextrose 40% Gel 15 Gram(s) Oral once PRN Blood Glucose LESS THAN 70 milliGRAM(s)/deciliter  glucagon  Injectable 1 milliGRAM(s) IntraMuscular once PRN Glucose LESS THAN 70 milligrams/deciliter  oxyCODONE    5 mG/acetaminophen 325 mG 1 Tablet(s) Oral every 4 hours PRN Moderate Pain (4 - 6)    Vital Signs Last 24 Hrs  T(C): 37.1 (07 Feb 2019 14:40), Max: 37.1 (07 Feb 2019 07:25)  T(F): 98.8 (07 Feb 2019 14:40), Max: 98.8 (07 Feb 2019 14:40)  HR: 61 (07 Feb 2019 14:40) (61 - 70)  BP: 125/76 (07 Feb 2019 14:40) (125/76 - 141/70)  BP(mean): --  RR: 18 (07 Feb 2019 14:40) (17 - 18)  SpO2: 99% (07 Feb 2019 14:40) (99% - 99%)    Constitutional: No fever, fatigue  Skin: No rash.  Eyes: No recent vision problems or eye pain.  ENT: No congestion, ear pain, or sore throat.  Cardiovascular: No chest pain or palpation.  Respiratory: No cough, shortness of breath, congestion, or wheezing.  Gastrointestinal: No abdominal pain, nausea, vomiting, or diarrhea.  Genitourinary: No dysuria.  Musculoskeletal: No joint swelling.  Neurologic: No headache.    PHYSICAL EXAM:  GENERAL: NAD  EYES: EOMI, PERRLA  NECK: Supple, No JVD  CHEST/LUNG: dec breath sounds rt base   HEART:  S1 , S2 +  ABDOMEN: soft, bs+  EXTREMITIES:  rt foot dressing +  NEUROLOGY:alert awake    LABS:  02-07    137  |  102  |  57<H>  ----------------------------<  118<H>  3.9   |  20<L>  |  3.46<H>    Ca    8.3<L>      07 Feb 2019 05:30    TPro  6.0  /  Alb  2.1<L>  /  TBili  0.2  /  DBili      /  AST  9   /  ALT  6   /  AlkPhos  64  02-06    Creatinine Trend: 3.46 <--, 3.60 <--, 3.57 <--, 3.66 <--                        8.8    7.97  )-----------( 224      ( 07 Feb 2019 05:30 )             27.8     Urine Studies:            LIVER FUNCTIONS - ( 06 Feb 2019 06:00 )  Alb: 2.1 g/dL / Pro: 6.0 g/dL / ALK PHOS: 64 u/L / ALT: 6 u/L / AST: 9 u/L / GGT: x           PT/INR - ( 06 Feb 2019 06:00 )   PT: 13.4 SEC;   INR: 1.20          PTT - ( 06 Feb 2019 06:00 )  PTT:33.4 SEC    PT/INR - ( 06 Feb 2019 06:00 )   PT: 13.4 SEC;   INR: 1.20          PTT - ( 06 Feb 2019 06:00 )  PTT:33.4 SEC

## 2019-02-07 NOTE — DISCHARGE NOTE ADULT - CARE PROVIDER_API CALL
Laci Gama)  Internal Medicine  [address] [place] [address], [address]  [place] [address]  Phone: [phone]  Fax: [phone]  Follow Up Time: Laci Gama)  Internal Medicine  [address] [place] [address], [place] [address]  Phone: [phone]  Fax: [phone]  Follow Up Time:     Salima Spivey)  Infectious Disease; Internal Medicine  [address] [place] [address]  Phone: [phone]  Fax: [phone]  Follow Up Time: Laci Gama)  Internal Medicine  [address] [place] [address], [place] [address]  Phone: [phone]  Fax: [phone]  Follow Up Time:     Salima Spivey)  Infectious Disease; Internal Medicine  [address] [place] [address]  Phone: [phone]  Fax: [phone]  Follow Up Time:     Nancy Clayton)  Podiatric Medicine and Surgery  [address] [place] [address]  Phone: [phone]  Fax: [phone]  Follow Up Time:

## 2019-02-07 NOTE — DISCHARGE NOTE ADULT - PATIENT PORTAL LINK FT
You can access the [facility] Patient Portal, offered by [facility], by registering with the following website: [email or web address]

## 2019-02-07 NOTE — DISCHARGE NOTE ADULT - ADDITIONAL INSTRUCTIONS
Podiatry Discharge Instructions:  Follow up: Please follow up with Dr. Plunkett at [facility] ([address], [address]) within 1 week of discharge from the hospital, please call [phone] for appointment and discuss that you recently were seen in the hospital.  Wound Care: Please leave your dressing clean dry intact until your follow up appointment.  Weight bearing: Please weight bear as tolerated in a surgical shoe.  Antibiotics: Please continue as instructed. Podiatry Discharge Instructions:  Follow up: Please follow up with Dr. Clayton within 1 week of discharge from the hospital, please call [phone] for appointment and discuss that you recently were seen in the hospital. ([address] [facility], [place])  Wound Care: Please leave your dressing clean dry intact until your follow up appointment.  Weight bearing: Please weight bear as tolerated in a surgical shoe.  Antibiotics: Please continue as instructed.

## 2019-02-07 NOTE — DISCHARGE NOTE ADULT - PLAN OF CARE
resolution Non-weight bearing to Right foot, Utilize Knee Scooter. complete antibiotics as prescribed Blood glucose 110-180 remain stable SBP<140 Non-weight bearing to Right foot, Utilize Knee Scooter. Outpatient follow up with podiatry within 1 week of discharge from hospital. Complete antibiotics as prescribed. Check labs weekly: WBC, esr, crp, vancomycin trough, Infusion care services set up by case management. Continue consistent carbohydrate diet.  Monitor blood glucose levels throughout the day before meals and at bedtime.  Record blood sugars and bring to outpatient providers appointment in order to be reviewed by your doctor for management modifications.  Be aware of diabetes management symptoms including feeling cool and clammy may be related to low glucose levels.  Feeling hot and dry may indicate high glucose levels.  If  you feel these symptoms, check your blood sugar.  Make regular podiatry appointments in order to have feet checked for wounds and toe nails cut by a doctor to prevent infections. Continue iron supplementation. Follow-up with your outpatient provider if further treatment is warranted. Monitor for signs/symptoms indicating worsening of disease, such as, easy bleeding/bruising, pale skin, fatigue, dizziness, increased heart rate, or chest pain. Continue current blood pressure medication regimen as directed. Monitor for any visual changes, headaches or dizziness.  Monitor blood pressure regularly.  Follow up with your PCP for further management for high blood pressure, please call to make appointment within 1 week of discharge stable Continue cholesterol control medications. Continue DASH diet. Follow up with your PCP within 1 week of discharge for further management and monitoring of lipid and cholesterol panels. Please call to make an appointment Complete antibiotics as prescribed. Check labs weekly: WBC, esr, crp. Vancomycin trough to be checked biweekly, Infusion care services set up by case management.

## 2019-02-07 NOTE — DISCHARGE NOTE ADULT - CARE PROVIDERS DIRECT ADDRESSES
,DirectAddress_Unknown ,DirectAddress_Unknown,DirectAddress_Unknown ,DirectAddress_Unknown,DirectAddress_Unknown,[email or web address]

## 2019-02-07 NOTE — PROGRESS NOTE ADULT - ASSESSMENT
63 M s/p R foot charcot planing, closed.  -drain pulled, dressing changed  -no active bleeding  -low concern for residual infection  -high concern for dehiscence based on location of incisions, strict non weightbearing  -seen w/ attending

## 2019-02-07 NOTE — PROGRESS NOTE ADULT - ASSESSMENT
63 year old male, with past history significant for Type-II DM, HTN, and R-Foot Charcot deformity, Osteomyelitis, Vitamin D deficiency, Anemia, CKD-IV, HLD and GERD, presented to the ED, as sent by podiatrist, secondary +increasing swelling, pain and bleeding. Patient was recently in hopsital planned for foot debridement by pod however had +NST s/p PCI so procedure was delayed , pt admitted today for further rt foot wound management (04 Feb 2019 23:16)    Pt recently admitted for infected plantar ulcer, no OM on nuclear scan.  Wound cultures grew staph aureus, pseudomonas, and GBS.  Pt completed IV abx and transitioned to PO cipro upon discharge.      ER vitals:  T 101.5, P P 84, /79.  Pt with elevated WBC and inflammatory markers.  Bcx growing staph aureus, PCR +.  Currently on cefepime.  ID consult called for further abx managment.       Recommend:    - Staph aureus bacteremia, likely secondary to infected rt foot plantar ulcer.  Now with purulent drainage.  Foot xray with soft tissue swelling and foci of air suspicious for OM.  Pt seen by podiatry, and  s/p OR for 1st TMT exostectomy on 2/7    - f/u intra-op cultures and bone pathology of clean margins.  Pt will need eventual PICC line for long term abx once bcx neg at 72 hrs.      - Antibiotic selection to be determined based on final culture results.     - Echocardiogram    - Check repeat blood cultures until cleared.     - Trend ESR/CRP.    - Cont cefepime for now (pt with prior h/o pseudomonas too), pending final culture results.   Initial wound cultures growing Staph aureus, Strep, and corynebacterium.  Will add vanco to cover coryne.        Will follow,    Salima Spivey    [phone]

## 2019-02-07 NOTE — DISCHARGE NOTE ADULT - HOME CARE AGENCY
[place] [id number] [phone]  The company will call you to deliver supplies.  The nurse will call you to see you in your home.  [facility]:  the nurse will call you the day after discharge to see you in your home. [place] [phone]  The company will call you to deliver supplies.  The nurse will call you to see you in your home.  [facility]:  [phone]  the nurse will call you the day after discharge to see you in your home.

## 2019-02-07 NOTE — DISCHARGE NOTE ADULT - MEDICATION SUMMARY - MEDICATIONS TO TAKE
I will START or STAY ON the medications listed below when I get home from the hospital:    Knee Scooter  -- Indication: For foot wound     Weekly Labs  --  Check weekly WBC, esr, crp,  and biweekly vanco trough.      -- Indication: For foot wound     acetaminophen 325 mg oral tablet  -- 2 tab(s) by mouth every 6 hours, As needed, Mild Pain (1 - 3)  -- Indication: For pain/fever     aspirin 81 mg oral delayed release tablet  -- 1 tab(s) by mouth once a day  -- Indication: For CAD (coronary artery disease)    glyBURIDE 1.25 mg oral tablet  -- 1 tab(s) by mouth once a day with breakfast   -- Indication: For Type 2 diabetes mellitus with other skin complication    atorvastatin 40 mg oral tablet  -- 1 tab(s) by mouth once a day (at bedtime)  -- Indication: For elevated cholesterol     clopidogrel 75 mg oral tablet  -- 1 tab(s) by mouth once a day  -- Indication: For CAD (coronary artery disease)    metoprolol succinate 50 mg oral tablet, extended release  -- 1 tab(s) by mouth once a day, hold for SBP <110 or HR <60   -- Indication: For HTN (hypertension)    amLODIPine 10 mg oral tablet  -- 1 tab(s) by mouth once a day, hold for SBP <110   -- Indication: For HTN (hypertension)    ceFAZolin 1 g intravenous injection  -- 1 gram(s) intravenous every 12 hours, through 3/19/19 MDD:2 g  -- Indication: For foot wound     furosemide 40 mg oral tablet  -- 1 tab(s) by mouth 2 times a day, hold for SBP <110   -- Indication: For Stage 4 chronic kidney disease    vancomycin 500 mg/100 mL-D5% intravenous solution  -- 500 milligram(s) intravenous once a day   -- Indication: For foot wound     ferrous sulfate 325 mg (65 mg elemental iron) oral tablet  -- 1 tab(s) by mouth 2 times a day  -- Indication: For Anemia     docusate sodium 100 mg oral capsule  -- 1 cap(s) by mouth 2 times a day  -- Indication: For Constipation     lactobacillus acidophilus oral capsule  -- 1 tab(s) by mouth once a day  -- Indication: For prophylactic measure     Vitamin D2 50,000 intl units (1.25 mg) oral capsule  -- 1 cap(s) by mouth once a week on Saturday  -- Indication: For Supplement     folic acid 1 mg oral tablet  -- 1 tab(s) by mouth once a day  -- Indication: For Supplement

## 2019-02-07 NOTE — DISCHARGE NOTE ADULT - SECONDARY DIAGNOSIS.
MSSA bacteremia DM (diabetes mellitus) Anemia HTN (hypertension) GERD (gastroesophageal reflux disease) Dyslipidemia

## 2019-02-08 LAB
ANION GAP SERPL CALC-SCNC: 14 MMO/L — SIGNIFICANT CHANGE UP (ref 7–14)
BACTERIA BLD CULT: SIGNIFICANT CHANGE UP
BUN SERPL-MCNC: 57 MG/DL — HIGH (ref 7–23)
CALCIUM SERPL-MCNC: 8.6 MG/DL — SIGNIFICANT CHANGE UP (ref 8.4–10.5)
CHLORIDE SERPL-SCNC: 101 MMOL/L — SIGNIFICANT CHANGE UP (ref 98–107)
CO2 SERPL-SCNC: 22 MMOL/L — SIGNIFICANT CHANGE UP (ref 22–31)
CREAT SERPL-MCNC: 3.61 MG/DL — HIGH (ref 0.5–1.3)
CULTURE - SURGICAL SITE: SIGNIFICANT CHANGE UP
GLUCOSE BLDC GLUCOMTR-MCNC: 103 MG/DL — HIGH (ref 70–99)
GLUCOSE BLDC GLUCOMTR-MCNC: 109 MG/DL — HIGH (ref 70–99)
GLUCOSE BLDC GLUCOMTR-MCNC: 130 MG/DL — HIGH (ref 70–99)
GLUCOSE SERPL-MCNC: 97 MG/DL — SIGNIFICANT CHANGE UP (ref 70–99)
HCT VFR BLD CALC: 28.2 % — LOW (ref 39–50)
HGB BLD-MCNC: 8.8 G/DL — LOW (ref 13–17)
MCHC RBC-ENTMCNC: 27.9 PG — SIGNIFICANT CHANGE UP (ref 27–34)
MCHC RBC-ENTMCNC: 31.2 % — LOW (ref 32–36)
MCV RBC AUTO: 89.5 FL — SIGNIFICANT CHANGE UP (ref 80–100)
NRBC # FLD: 0 K/UL — LOW (ref 25–125)
PLATELET # BLD AUTO: 253 K/UL — SIGNIFICANT CHANGE UP (ref 150–400)
PMV BLD: 11 FL — SIGNIFICANT CHANGE UP (ref 7–13)
POTASSIUM SERPL-MCNC: 3.9 MMOL/L — SIGNIFICANT CHANGE UP (ref 3.5–5.3)
POTASSIUM SERPL-SCNC: 3.9 MMOL/L — SIGNIFICANT CHANGE UP (ref 3.5–5.3)
RBC # BLD: 3.15 M/UL — LOW (ref 4.2–5.8)
RBC # FLD: 13.8 % — SIGNIFICANT CHANGE UP (ref 10.3–14.5)
SODIUM SERPL-SCNC: 137 MMOL/L — SIGNIFICANT CHANGE UP (ref 135–145)
WBC # BLD: 6.51 K/UL — SIGNIFICANT CHANGE UP (ref 3.8–10.5)
WBC # FLD AUTO: 6.51 K/UL — SIGNIFICANT CHANGE UP (ref 3.8–10.5)

## 2019-02-08 PROCEDURE — 36573 INSJ PICC RS&I 5 YR+: CPT

## 2019-02-08 RX ORDER — DOCUSATE SODIUM 100 MG
1 CAPSULE ORAL
Qty: 0 | Refills: 0 | DISCHARGE
Start: 2019-02-08

## 2019-02-08 RX ADMIN — Medication 650 MILLIGRAM(S): at 01:45

## 2019-02-08 RX ADMIN — Medication 40 MILLIGRAM(S): at 05:29

## 2019-02-08 RX ADMIN — AMLODIPINE BESYLATE 10 MILLIGRAM(S): 2.5 TABLET ORAL at 05:29

## 2019-02-08 RX ADMIN — Medication 1 TABLET(S): at 13:18

## 2019-02-08 RX ADMIN — ATORVASTATIN CALCIUM 40 MILLIGRAM(S): 80 TABLET, FILM COATED ORAL at 22:27

## 2019-02-08 RX ADMIN — CEFEPIME 100 MILLIGRAM(S): 1 INJECTION, POWDER, FOR SOLUTION INTRAMUSCULAR; INTRAVENOUS at 05:29

## 2019-02-08 RX ADMIN — Medication 650 MILLIGRAM(S): at 01:10

## 2019-02-08 RX ADMIN — CLOPIDOGREL BISULFATE 75 MILLIGRAM(S): 75 TABLET, FILM COATED ORAL at 13:19

## 2019-02-08 RX ADMIN — Medication 100 MILLIGRAM(S): at 18:20

## 2019-02-08 RX ADMIN — CEFEPIME 100 MILLIGRAM(S): 1 INJECTION, POWDER, FOR SOLUTION INTRAMUSCULAR; INTRAVENOUS at 18:21

## 2019-02-08 RX ADMIN — Medication 1 MILLIGRAM(S): at 13:19

## 2019-02-08 RX ADMIN — Medication 100 MILLIGRAM(S): at 05:29

## 2019-02-08 RX ADMIN — Medication 50 MILLIGRAM(S): at 05:29

## 2019-02-08 RX ADMIN — Medication 40 MILLIGRAM(S): at 18:20

## 2019-02-08 RX ADMIN — Medication 81 MILLIGRAM(S): at 13:19

## 2019-02-08 RX ADMIN — Medication 250 MILLIGRAM(S): at 01:11

## 2019-02-08 NOTE — PROGRESS NOTE ADULT - SUBJECTIVE AND OBJECTIVE BOX
[facility]   MD DOMO WALLACE MD ANGELA WONG, PA    TEL:  OFFICE: [phone]  DR LEE CELL: [phone]  DR. GRULLON CELL: [phone]  MELANIA CORONEL CELL: [phone]        Patient is a 63y old  Male who presents with a chief complaint of rt foot wound (08 Feb 2019 14:11)      Patient seen and examined at bedside. No chest pain/sob    VITALS:  T(F): 98.4 (02-08-19 @ 13:23), Max: 98.4 (02-07-19 @ 21:56)  HR: 68 (02-08-19 @ 13:23)  BP: 133/75 (02-08-19 @ 13:23)  RR: 16 (02-08-19 @ 13:23)  SpO2: 98% (02-08-19 @ 13:23)  Wt(kg): --    02-07 @ 07:01  -  02-08 @ 07:00  --------------------------------------------------------  IN: 780 mL / OUT: 2000 mL / NET: -1220 mL          PHYSICAL EXAM:  Constitutional: NAD  Neck: No JVD  Respiratory: CTAB, no wheezes, rales or rhonchi  Cardiovascular: S1, S2, RRR  Gastrointestinal: BS+, soft, NT/ND  Extremities: No peripheral edema, right foot dressing d/c/i    Hospital Medications:   MEDICATIONS  (STANDING):  amLODIPine   Tablet 10 milliGRAM(s) Oral daily  aspirin enteric coated 81 milliGRAM(s) Oral daily  atorvastatin 40 milliGRAM(s) Oral at bedtime  cefepime   IVPB 1000 milliGRAM(s) IV Intermittent every 12 hours  clopidogrel Tablet 75 milliGRAM(s) Oral daily  dextrose 5%. 1000 milliLiter(s) (50 mL/Hr) IV Continuous <Continuous>  dextrose 50% Injectable 12.5 Gram(s) IV Push once  dextrose 50% Injectable 25 Gram(s) IV Push once  dextrose 50% Injectable 25 Gram(s) IV Push once  docusate sodium 100 milliGRAM(s) Oral two times a day  folic acid 1 milliGRAM(s) Oral daily  furosemide    Tablet 40 milliGRAM(s) Oral two times a day  insulin lispro (HumaLOG) corrective regimen sliding scale   SubCutaneous three times a day before meals  insulin lispro (HumaLOG) corrective regimen sliding scale   SubCutaneous at bedtime  lactobacillus acidophilus 1 Tablet(s) Oral daily  metoprolol succinate ER 50 milliGRAM(s) Oral daily  sodium chloride 0.9%. 1000 milliLiter(s) (25 mL/Hr) IV Continuous <Continuous>  vancomycin  IVPB 750 milliGRAM(s) IV Intermittent every 24 hours      LABS:  02-08    137  |  101  |  57<H>  ----------------------------<  97  3.9   |  22  |  3.61<H>    Ca    8.6      08 Feb 2019 05:45      Creatinine Trend: 3.61 <--, 3.46 <--, 3.60 <--, 3.57 <--, 3.66 <--                                8.8    6.51  )-----------( 253      ( 08 Feb 2019 05:45 )             28.2     Urine Studies:      Iron 25, TIBC 145, %sat --      [01-17-19 @ 05:38]  Ferritin 291.5      [01-17-19 @ 05:38]  PTH 42.84 (Ca --)      [09-11-18 @ 05:45]   --  Vitamin D (25OH) 11.8      [09-11-18 @ 05:45]  HbA1c 6.0      [01-15-19 @ 05:45]  TSH 1.74      [01-15-19 @ 05:45]    HCV 0.09, Nonreactive Hepatitis C AB  S/CO Ratio                        Interpretation  < 1.0                                     Non-Reactive  1.0 - 4.9                           Weakly-Reactive  > 5.0                                 Reactive  Non-Reactive: Aperson with a non-reactive HCV antibody  result is considered uninfected.  No further action is  needed unless recent infection is suspected.  In these  cases, consider repeat testing later to detect  seroconversion..  Weakly-Reactive: HCV antibody test is abnormal, HCV RNA  Qualitative test will follow.  Reactive: HCV antibody test is abnormal, HCV RNA  Qualitative test will follow.  Note: HCV antibody testing is performed on the [employer] system.      [02-05-19 @ 00:01]      RADIOLOGY & ADDITIONAL STUDIES:

## 2019-02-08 NOTE — PROGRESS NOTE ADULT - ASSESSMENT
63 year old male, with past history significant for Type-II DM, HTN, and R-Foot Charcot deformity, Osteomyelitis, Vitamin D deficiency, Anemia, CKD-IV, HLD and GERD, presented to the ED, as sent by podiatrist, secondary +increasing swelling, pain and bleeding. Patient was recently in hopsital planned for foot debridement by pod however had +NST s/p PCI so procedure was delayed , pt admitted today for further rt foot wound management (04 Feb 2019 23:16)    Pt recently admitted for infected plantar ulcer, no OM on nuclear scan.  Wound cultures grew staph aureus, pseudomonas, and GBS.  Pt completed IV abx and transitioned to PO cipro upon discharge.      ER vitals:  T 101.5, P P 84, /79.  Pt with elevated WBC and inflammatory markers.  Bcx growing staph aureus, PCR +.  Currently on cefepime.  ID consult called for further abx managment.       Recommend:    - Staph aureus bacteremia, likely secondary to infected rt foot plantar ulcer.  Now with purulent drainage.  Foot xray with soft tissue swelling and foci of air suspicious for OM.  Pt seen by podiatry, and  s/p OR for 1st TMT exostectomy on 2/7    - f/u intra-op cultures and bone pathology of clean margins.  Pt will need eventual PICC line for long term abx once bcx neg at 72 hrs.      - Antibiotic selection to be determined based on final culture results.     - Echocardiogram    - Trend ESR/CRP.    - Cont cefepime for now (pt with prior h/o pseudomonas too), pending final culture results.   Initial wound cultures growing Staph aureus, Strep, and corynebacterium.  Will add vanco to cover coryne.  f/u vanco trough, can keep between 10-15      Will follow,    Salima Spivey    [phone]

## 2019-02-08 NOTE — PROGRESS NOTE ADULT - ASSESSMENT
63 M s/p R foot charcot planing, closed (DOS 2/6/19)  -Pt seen and evaluated, POD #2  -Sutures intact, no hematoma, no active bleeding, no signs of infection  -Prelim OR cultures negative, low concern for residual infection  -High concern for dehiscence based on location of incisions, strict non-weightbearing  -ID rec PICC once cultures neg at 72 hrs  -Podiatry stable for discharge once PICC'd  -Further instructions in discharge paperwork  -Seen w/ attending

## 2019-02-08 NOTE — PROGRESS NOTE ADULT - SUBJECTIVE AND OBJECTIVE BOX
Infectious Diseases progress note:    Subjective: Resting comfortably, NAD.  Afebrile, no new somatic complaints.      ROS:  CONSTITUTIONAL:  No fever, chills, rigors  CARDIOVASCULAR:  No chest pain or palpitations  RESPIRATORY:   No SOB, cough, dyspnea on exertion.  No wheezing  GASTROINTESTINAL:  No abd pain, N/V, diarrhea/constipation  EXTREMITIES:  No swelling or joint pain  GENITOURINARY:  No burning on urination, increased frequency or urgency.  No flank pain  NEUROLOGIC:  No HA, visual disturbances  SKIN: No rashes    Allergies    No Known Allergies    Intolerances        ANTIBIOTICS/RELEVANT:  antimicrobials  cefepime   IVPB 1000 milliGRAM(s) IV Intermittent every 12 hours  vancomycin  IVPB 750 milliGRAM(s) IV Intermittent every 24 hours    immunologic:    OTHER:  acetaminophen   Tablet .. 650 milliGRAM(s) Oral every 6 hours PRN  amLODIPine   Tablet 10 milliGRAM(s) Oral daily  aspirin enteric coated 81 milliGRAM(s) Oral daily  atorvastatin 40 milliGRAM(s) Oral at bedtime  clopidogrel Tablet 75 milliGRAM(s) Oral daily  dextrose 40% Gel 15 Gram(s) Oral once PRN  dextrose 5%. 1000 milliLiter(s) IV Continuous <Continuous>  dextrose 50% Injectable 12.5 Gram(s) IV Push once  dextrose 50% Injectable 25 Gram(s) IV Push once  dextrose 50% Injectable 25 Gram(s) IV Push once  docusate sodium 100 milliGRAM(s) Oral two times a day  folic acid 1 milliGRAM(s) Oral daily  furosemide    Tablet 40 milliGRAM(s) Oral two times a day  glucagon  Injectable 1 milliGRAM(s) IntraMuscular once PRN  insulin lispro (HumaLOG) corrective regimen sliding scale   SubCutaneous three times a day before meals  insulin lispro (HumaLOG) corrective regimen sliding scale   SubCutaneous at bedtime  lactobacillus acidophilus 1 Tablet(s) Oral daily  metoprolol succinate ER 50 milliGRAM(s) Oral daily  oxyCODONE    5 mG/acetaminophen 325 mG 1 Tablet(s) Oral every 4 hours PRN  sodium chloride 0.9%. 1000 milliLiter(s) IV Continuous <Continuous>      Objective:  Vital Signs Last 24 Hrs  T(C): 36.9 (08 Feb 2019 13:23), Max: 36.9 (07 Feb 2019 21:56)  T(F): 98.4 (08 Feb 2019 13:23), Max: 98.4 (07 Feb 2019 21:56)  HR: 68 (08 Feb 2019 13:23) (66 - 71)  BP: 133/75 (08 Feb 2019 13:23) (131/72 - 142/75)  BP(mean): --  RR: 16 (08 Feb 2019 13:23) (16 - 18)  SpO2: 98% (08 Feb 2019 13:23) (97% - 98%)    PHYSICAL EXAM:  Constitutional:NAD  Eyes:SPIKE, EOMI  Ear/Nose/Throat: no thrush, mucositis.  Moist mucous membranes	  Neck:no JVD, no lymphadenopathy, supple  Respiratory: CTA lobito  Cardiovascular: S1S2 RRR, no murmurs  Gastrointestinal:soft, nontender,  nondistended (+) BS  Extremities:no e/e/c  Skin:  rt ft dsg c/d/i        LABS:                        8.8    6.51  )-----------( 253      ( 08 Feb 2019 05:45 )             28.2     02-08    137  |  101  |  57<H>  ----------------------------<  97  3.9   |  22  |  3.61<H>    Ca    8.6      08 Feb 2019 05:45              Vancomycin Level, Random:  ug/mL (02-05 @ 05:50)                  MICROBIOLOGY:    Culture - Acid Fast Smear Concentrated (02.06.19 @ 15:04)    Culture - Acid Fast Smear Concentrated:   AFB SMEAR= NO ACID FAST BACILLI SEEN    Specimen Source: OTHER    Culture - Yeast and Fungus (02.06.19 @ 13:48)    Culture - Yeast and Fungus:   CULTURE NEGATIVE FOR YEASTS AND MOLDS AFTER 1 DAY    Specimen Source: FOOT      Culture - Tissue with Gram Stain (02.06.19 @ 13:48)    Gram Stain Wound:   NOS^No Organisms Seen  WBC^White Blood Cells  QNTY CELLS IN GRAM STAIN: NO CELLS SEEN    Culture - Tissue:   NO GROWTH - PRELIMINARY RESULTS  NO ORGANISMS ISOLATED AT 24 HOURS    Specimen Source: FOOT    Culture - Surg Site Aerob/Anaer w/Gm St (02.06.19 @ 13:40)    Culture - Surgical Site:   SEE PREVIOUS CULTURE: COLLECTED 2/5/2019 AT 0030  RECEIVED 2/5/2019 AT 1032  STAU^Staphylococcus aureus  DENICE^Corynebacterium species    Gram Stain Wound:   BLOODY SP.  NOS^No Organisms Seen  WBC^White Blood Cells  QNTY CELLS IN GRAM STAIN: RARE (1+)    Specimen Source: FOOT    Culture - Blood (02.04.19 @ 15:51)    Culture - Blood:   STAU^Staphylococcus aureus    Culture - Blood:    SEE PREVIOUS CULTURE: COLLECTED  02/04   RECEIVED 02/04  SEE  FOR ULISSES  STAU^Staphylococcus aureus    Specimen Source: BLOOD PERIPHERAL    Gram Stain Blood:   ***** CRITICAL RESULT *****  PERSON CALLED / READ-BACK: MONROE WHITE RN / Y  DATE / TIME CALLED: 02/06/19 1931  CALLED BY: STEPHANIE CERVANTES  GPCCL^Gram Pos Cocci In Clusters  AFTER: 50 HOURS INCUBATION  BOTTLE: ANAEROBIC BOTTLE        RADIOLOGY & ADDITIONAL STUDIES:

## 2019-02-08 NOTE — PROGRESS NOTE ADULT - SUBJECTIVE AND OBJECTIVE BOX
SUBJECTIVE / OVERNIGHT EVENTS: pt denies chest pain, shortness of breath, nausea,v     MEDICATIONS  (STANDING):  amLODIPine   Tablet 10 milliGRAM(s) Oral daily  aspirin enteric coated 81 milliGRAM(s) Oral daily  atorvastatin 40 milliGRAM(s) Oral at bedtime  cefepime   IVPB 1000 milliGRAM(s) IV Intermittent every 12 hours  clopidogrel Tablet 75 milliGRAM(s) Oral daily  dextrose 5%. 1000 milliLiter(s) (50 mL/Hr) IV Continuous <Continuous>  dextrose 50% Injectable 12.5 Gram(s) IV Push once  dextrose 50% Injectable 25 Gram(s) IV Push once  dextrose 50% Injectable 25 Gram(s) IV Push once  docusate sodium 100 milliGRAM(s) Oral two times a day  folic acid 1 milliGRAM(s) Oral daily  furosemide    Tablet 40 milliGRAM(s) Oral two times a day  insulin lispro (HumaLOG) corrective regimen sliding scale   SubCutaneous three times a day before meals  insulin lispro (HumaLOG) corrective regimen sliding scale   SubCutaneous at bedtime  lactobacillus acidophilus 1 Tablet(s) Oral daily  metoprolol succinate ER 50 milliGRAM(s) Oral daily  sodium chloride 0.9%. 1000 milliLiter(s) (25 mL/Hr) IV Continuous <Continuous>  vancomycin  IVPB 750 milliGRAM(s) IV Intermittent every 24 hours    MEDICATIONS  (PRN):  acetaminophen   Tablet .. 650 milliGRAM(s) Oral every 6 hours PRN Mild Pain (1 - 3)  dextrose 40% Gel 15 Gram(s) Oral once PRN Blood Glucose LESS THAN 70 milliGRAM(s)/deciliter  glucagon  Injectable 1 milliGRAM(s) IntraMuscular once PRN Glucose LESS THAN 70 milligrams/deciliter  oxyCODONE    5 mG/acetaminophen 325 mG 1 Tablet(s) Oral every 4 hours PRN Moderate Pain (4 - 6)    Vital Signs Last 24 Hrs  T(C): 36.9 (08 Feb 2019 13:23), Max: 36.9 (07 Feb 2019 21:56)  T(F): 98.4 (08 Feb 2019 13:23), Max: 98.4 (07 Feb 2019 21:56)  HR: 68 (08 Feb 2019 13:23) (66 - 71)  BP: 133/75 (08 Feb 2019 13:23) (131/72 - 142/75)  BP(mean): --  RR: 16 (08 Feb 2019 13:23) (16 - 18)  SpO2: 98% (08 Feb 2019 13:23) (97% - 98%)  Constitutional: No fever, fatigue  Skin: No rash.  Eyes: No recent vision problems or eye pain.  ENT: No congestion, ear pain, or sore throat.  Cardiovascular: No chest pain or palpation.  Respiratory: No cough, shortness of breath, congestion, or wheezing.  Gastrointestinal: No abdominal pain, nausea, vomiting, or diarrhea.  Genitourinary: No dysuria.  Musculoskeletal: No joint swelling.  Neurologic: No headache.    PHYSICAL EXAM:  GENERAL: NAD  EYES: EOMI, PERRLA  NECK: Supple, No JVD  CHEST/LUNG: dec breath sounds rt base   HEART:  S1 , S2 +  ABDOMEN: soft, bs+  EXTREMITIES:  rt foot dressing +  NEUROLOGY:alert awake    LABS:  02-08    137  |  101  |  57<H>  ----------------------------<  97  3.9   |  22  |  3.61<H>    Ca    8.6      08 Feb 2019 05:45      Creatinine Trend: 3.61 <--, 3.46 <--, 3.60 <--, 3.57 <--, 3.66 <--                        8.8    6.51  )-----------( 253      ( 08 Feb 2019 05:45 )             28.2     Urine Studies:

## 2019-02-08 NOTE — PROGRESS NOTE ADULT - SUBJECTIVE AND OBJECTIVE BOX
Keaton Duvall MD  Interventional Cardiology  [place] Office : [address] [place] [id number]  Tel:   [place] Office : [address] [facility] [place] [address]  Tel: [phone]  Cell : [phone]    Subjective : Pt lying in bed comfortable, not in distress, denies any chest pain or SOB  	  MEDICATIONS:  amLODIPine   Tablet 10 milliGRAM(s) Oral daily  aspirin enteric coated 81 milliGRAM(s) Oral daily  clopidogrel Tablet 75 milliGRAM(s) Oral daily  furosemide    Tablet 40 milliGRAM(s) Oral two times a day  metoprolol succinate ER 50 milliGRAM(s) Oral daily  cefepime   IVPB 1000 milliGRAM(s) IV Intermittent every 12 hours  vancomycin  IVPB 750 milliGRAM(s) IV Intermittent every 24 hours  acetaminophen   Tablet .. 650 milliGRAM(s) Oral every 6 hours PRN  oxyCODONE    5 mG/acetaminophen 325 mG 1 Tablet(s) Oral every 4 hours PRN  docusate sodium 100 milliGRAM(s) Oral two times a day  atorvastatin 40 milliGRAM(s) Oral at bedtime  dextrose 40% Gel 15 Gram(s) Oral once PRN  dextrose 50% Injectable 12.5 Gram(s) IV Push once  dextrose 50% Injectable 25 Gram(s) IV Push once  dextrose 50% Injectable 25 Gram(s) IV Push once  glucagon  Injectable 1 milliGRAM(s) IntraMuscular once PRN  insulin lispro (HumaLOG) corrective regimen sliding scale   SubCutaneous three times a day before meals  insulin lispro (HumaLOG) corrective regimen sliding scale   SubCutaneous at bedtime  dextrose 5%. 1000 milliLiter(s) IV Continuous <Continuous>  folic acid 1 milliGRAM(s) Oral daily  sodium chloride 0.9%. 1000 milliLiter(s) IV Continuous <Continuous>      PHYSICAL EXAM:  T(C): 36.9 (02-08-19 @ 13:23), Max: 37.1 (02-07-19 @ 14:40)  HR: 68 (02-08-19 @ 13:23) (61 - 71)  BP: 133/75 (02-08-19 @ 13:23) (125/76 - 142/75)  RR: 16 (02-08-19 @ 13:23) (16 - 18)  SpO2: 98% (02-08-19 @ 13:23) (97% - 99%)  Wt(kg): --  I&O's Summary    07 Feb 2019 07:01  -  08 Feb 2019 07:00  --------------------------------------------------------  IN: 780 mL / OUT: 2000 mL / NET: -1220 mL        Appearance: Normal	  HEENT:   Normal oral mucosa, PERRL, EOMI	  Cardiovascular: Normal S1 S2, No JVD, No murmurs, No edema  Respiratory: Lungs clear to auscultation	  Gastrointestinal:  Soft, Non-tender, + BS	  Extremities: RLE dresssed                                       8.8    6.51  )-----------( 253      ( 08 Feb 2019 05:45 )             28.2     02-08    137  |  101  |  57<H>  ----------------------------<  97  3.9   |  22  |  3.61<H>    Ca    8.6      08 Feb 2019 05:45      proBNP:   Lipid Profile:   HgA1c:   TSH:

## 2019-02-08 NOTE — PROGRESS NOTE ADULT - SUBJECTIVE AND OBJECTIVE BOX
Podiatry pager #: [phone] ([place])/ [phone] ([facility])    Patient is a 63y old  Male who presents with a chief complaint of rt foot wound (08 Feb 2019 12:30)       INTERVAL HPI/OVERNIGHT EVENTS:  Patient seen and evaluated at bedside.  Pt is resting comfortable in NAD. Denies N/V/F/C.     Allergies    No Known Allergies    Intolerances        Vital Signs Last 24 Hrs  T(C): 36.9 (08 Feb 2019 13:23), Max: 37.1 (07 Feb 2019 14:40)  T(F): 98.4 (08 Feb 2019 13:23), Max: 98.8 (07 Feb 2019 14:40)  HR: 68 (08 Feb 2019 13:23) (61 - 71)  BP: 133/75 (08 Feb 2019 13:23) (125/76 - 142/75)  BP(mean): --  RR: 16 (08 Feb 2019 13:23) (16 - 18)  SpO2: 98% (08 Feb 2019 13:23) (97% - 99%)    LABS:                        8.8    6.51  )-----------( 253      ( 08 Feb 2019 05:45 )             28.2     02-08    137  |  101  |  57<H>  ----------------------------<  97  3.9   |  22  |  3.61<H>    Ca    8.6      08 Feb 2019 05:45          CAPILLARY BLOOD GLUCOSE      POCT Blood Glucose.: 130 mg/dL (08 Feb 2019 11:52)  POCT Blood Glucose.: 109 mg/dL (08 Feb 2019 07:51)  POCT Blood Glucose.: 109 mg/dL (07 Feb 2019 21:51)  POCT Blood Glucose.: 108 mg/dL (07 Feb 2019 16:48)      Lower Extremity Physical Exam:  s/p R plantar foot charcot planing, closed. Sutures intact, drain pulled with minimal bleeding noted. No hematoma, no signs of infection.

## 2019-02-09 LAB
ANION GAP SERPL CALC-SCNC: 14 MMO/L — SIGNIFICANT CHANGE UP (ref 7–14)
BUN SERPL-MCNC: 58 MG/DL — HIGH (ref 7–23)
CALCIUM SERPL-MCNC: 9 MG/DL — SIGNIFICANT CHANGE UP (ref 8.4–10.5)
CHLORIDE SERPL-SCNC: 102 MMOL/L — SIGNIFICANT CHANGE UP (ref 98–107)
CO2 SERPL-SCNC: 21 MMOL/L — LOW (ref 22–31)
CREAT SERPL-MCNC: 3.49 MG/DL — HIGH (ref 0.5–1.3)
CRP SERPL-MCNC: 56.2 MG/L — HIGH
ERYTHROCYTE [SEDIMENTATION RATE] IN BLOOD: 116 MM/HR — HIGH (ref 1–15)
GLUCOSE BLDC GLUCOMTR-MCNC: 104 MG/DL — HIGH (ref 70–99)
GLUCOSE BLDC GLUCOMTR-MCNC: 113 MG/DL — HIGH (ref 70–99)
GLUCOSE BLDC GLUCOMTR-MCNC: 113 MG/DL — HIGH (ref 70–99)
GLUCOSE BLDC GLUCOMTR-MCNC: 117 MG/DL — HIGH (ref 70–99)
GLUCOSE SERPL-MCNC: 100 MG/DL — HIGH (ref 70–99)
HCT VFR BLD CALC: 28.6 % — LOW (ref 39–50)
HGB BLD-MCNC: 9 G/DL — LOW (ref 13–17)
MCHC RBC-ENTMCNC: 28 PG — SIGNIFICANT CHANGE UP (ref 27–34)
MCHC RBC-ENTMCNC: 31.5 % — LOW (ref 32–36)
MCV RBC AUTO: 89.1 FL — SIGNIFICANT CHANGE UP (ref 80–100)
NRBC # FLD: 0.03 K/UL — LOW (ref 25–125)
PLATELET # BLD AUTO: 281 K/UL — SIGNIFICANT CHANGE UP (ref 150–400)
PMV BLD: 10.7 FL — SIGNIFICANT CHANGE UP (ref 7–13)
POTASSIUM SERPL-MCNC: 3.9 MMOL/L — SIGNIFICANT CHANGE UP (ref 3.5–5.3)
POTASSIUM SERPL-SCNC: 3.9 MMOL/L — SIGNIFICANT CHANGE UP (ref 3.5–5.3)
RBC # BLD: 3.21 M/UL — LOW (ref 4.2–5.8)
RBC # FLD: 13.6 % — SIGNIFICANT CHANGE UP (ref 10.3–14.5)
SODIUM SERPL-SCNC: 137 MMOL/L — SIGNIFICANT CHANGE UP (ref 135–145)
VANCOMYCIN TROUGH SERPL-MCNC: 14.4 UG/ML — SIGNIFICANT CHANGE UP (ref 10–20)
WBC # BLD: 6.79 K/UL — SIGNIFICANT CHANGE UP (ref 3.8–10.5)
WBC # FLD AUTO: 6.79 K/UL — SIGNIFICANT CHANGE UP (ref 3.8–10.5)

## 2019-02-09 RX ADMIN — CLOPIDOGREL BISULFATE 75 MILLIGRAM(S): 75 TABLET, FILM COATED ORAL at 11:58

## 2019-02-09 RX ADMIN — Medication 100 MILLIGRAM(S): at 05:54

## 2019-02-09 RX ADMIN — CEFEPIME 100 MILLIGRAM(S): 1 INJECTION, POWDER, FOR SOLUTION INTRAMUSCULAR; INTRAVENOUS at 05:53

## 2019-02-09 RX ADMIN — CEFEPIME 100 MILLIGRAM(S): 1 INJECTION, POWDER, FOR SOLUTION INTRAMUSCULAR; INTRAVENOUS at 18:16

## 2019-02-09 RX ADMIN — Medication 81 MILLIGRAM(S): at 11:58

## 2019-02-09 RX ADMIN — Medication 40 MILLIGRAM(S): at 18:16

## 2019-02-09 RX ADMIN — Medication 100 MILLIGRAM(S): at 18:16

## 2019-02-09 RX ADMIN — Medication 50 MILLIGRAM(S): at 05:54

## 2019-02-09 RX ADMIN — Medication 250 MILLIGRAM(S): at 02:37

## 2019-02-09 RX ADMIN — Medication 40 MILLIGRAM(S): at 05:54

## 2019-02-09 RX ADMIN — Medication 1 TABLET(S): at 11:58

## 2019-02-09 RX ADMIN — AMLODIPINE BESYLATE 10 MILLIGRAM(S): 2.5 TABLET ORAL at 05:54

## 2019-02-09 RX ADMIN — Medication 1 MILLIGRAM(S): at 11:58

## 2019-02-09 RX ADMIN — ATORVASTATIN CALCIUM 40 MILLIGRAM(S): 80 TABLET, FILM COATED ORAL at 21:49

## 2019-02-09 NOTE — PROGRESS NOTE ADULT - SUBJECTIVE AND OBJECTIVE BOX
Keaton Duvall MD  Interventional Cardiology  [place] Office : [address] [place] [address]. [phone]  Tel:   [place] Office : [address] [facility] [place] [address]  Tel: [phone]  Cell : [phone]    Subjective : Pt lying in bed comfortable, not in distress, denies any chest pain or SOB  	  MEDICATIONS:  amLODIPine   Tablet 10 milliGRAM(s) Oral daily  aspirin enteric coated 81 milliGRAM(s) Oral daily  clopidogrel Tablet 75 milliGRAM(s) Oral daily  furosemide    Tablet 40 milliGRAM(s) Oral two times a day  metoprolol succinate ER 50 milliGRAM(s) Oral daily    cefepime   IVPB 1000 milliGRAM(s) IV Intermittent every 12 hours  vancomycin  IVPB 750 milliGRAM(s) IV Intermittent every 24 hours      acetaminophen   Tablet .. 650 milliGRAM(s) Oral every 6 hours PRN  oxyCODONE    5 mG/acetaminophen 325 mG 1 Tablet(s) Oral every 4 hours PRN    docusate sodium 100 milliGRAM(s) Oral two times a day    atorvastatin 40 milliGRAM(s) Oral at bedtime  dextrose 40% Gel 15 Gram(s) Oral once PRN  dextrose 50% Injectable 12.5 Gram(s) IV Push once  dextrose 50% Injectable 25 Gram(s) IV Push once  dextrose 50% Injectable 25 Gram(s) IV Push once  glucagon  Injectable 1 milliGRAM(s) IntraMuscular once PRN  insulin lispro (HumaLOG) corrective regimen sliding scale   SubCutaneous three times a day before meals  insulin lispro (HumaLOG) corrective regimen sliding scale   SubCutaneous at bedtime    dextrose 5%. 1000 milliLiter(s) IV Continuous <Continuous>  folic acid 1 milliGRAM(s) Oral daily  sodium chloride 0.9%. 1000 milliLiter(s) IV Continuous <Continuous>      PHYSICAL EXAM:  T(C): 36.7 (02-09-19 @ 18:14), Max: 37.1 (02-09-19 @ 05:52)  HR: 64 (02-09-19 @ 18:14) (64 - 83)  BP: 122/71 (02-09-19 @ 18:14) (122/71 - 140/60)  RR: 16 (02-09-19 @ 18:14) (16 - 17)  SpO2: 99% (02-09-19 @ 18:14) (99% - 100%)  Wt(kg): --  I&O's Summary    08 Feb 2019 07:01  -  09 Feb 2019 07:00  --------------------------------------------------------  IN: 300 mL / OUT: 0 mL / NET: 300 mL          Appearance: Normal	  HEENT:   Normal oral mucosa, PERRL, EOMI	  Cardiovascular: Normal S1 S2, No JVD, No murmurs, No edema  Respiratory: Lungs clear to auscultation	  Gastrointestinal:  Soft, Non-tender, + BS	  Extremities: RLE dresssed                                       9.0    6.79  )-----------( 281      ( 09 Feb 2019 05:35 )             28.6     02-09    137  |  102  |  58<H>  ----------------------------<  100<H>  3.9   |  21<L>  |  3.49<H>    Ca    9.0      09 Feb 2019 05:35      proBNP:   Lipid Profile:   HgA1c:   TSH:

## 2019-02-09 NOTE — PROGRESS NOTE ADULT - SUBJECTIVE AND OBJECTIVE BOX
[facility] [place] [facility]   MD DOMO WALLACE MD RUORU WONG, PA    TEL:  OFFICE: [phone]  DR LEE CELL: [phone]  PATRICIO CORONEL CELL: [phone]  DR. GRULLON CELL: [phone]    RENAL FOLLOW UP NOTE  --------------------------------------------------------------------------------  HPI:      Pt seen and examined at bedside.   Bethel CROW, chest pain     PAST HISTORY  --------------------------------------------------------------------------------  No significant changes to PMH, PSH, FHx, SHx, unless otherwise noted    ALLERGIES & MEDICATIONS  --------------------------------------------------------------------------------  Allergies    No Known Allergies    Intolerances      Standing Inpatient Medications  amLODIPine   Tablet 10 milliGRAM(s) Oral daily  aspirin enteric coated 81 milliGRAM(s) Oral daily  atorvastatin 40 milliGRAM(s) Oral at bedtime  cefepime   IVPB 1000 milliGRAM(s) IV Intermittent every 12 hours  clopidogrel Tablet 75 milliGRAM(s) Oral daily  dextrose 5%. 1000 milliLiter(s) IV Continuous <Continuous>  dextrose 50% Injectable 12.5 Gram(s) IV Push once  dextrose 50% Injectable 25 Gram(s) IV Push once  dextrose 50% Injectable 25 Gram(s) IV Push once  docusate sodium 100 milliGRAM(s) Oral two times a day  folic acid 1 milliGRAM(s) Oral daily  furosemide    Tablet 40 milliGRAM(s) Oral two times a day  insulin lispro (HumaLOG) corrective regimen sliding scale   SubCutaneous three times a day before meals  insulin lispro (HumaLOG) corrective regimen sliding scale   SubCutaneous at bedtime  lactobacillus acidophilus 1 Tablet(s) Oral daily  metoprolol succinate ER 50 milliGRAM(s) Oral daily  sodium chloride 0.9%. 1000 milliLiter(s) IV Continuous <Continuous>  vancomycin  IVPB 750 milliGRAM(s) IV Intermittent every 24 hours    PRN Inpatient Medications  acetaminophen   Tablet .. 650 milliGRAM(s) Oral every 6 hours PRN  dextrose 40% Gel 15 Gram(s) Oral once PRN  glucagon  Injectable 1 milliGRAM(s) IntraMuscular once PRN  oxyCODONE    5 mG/acetaminophen 325 mG 1 Tablet(s) Oral every 4 hours PRN      REVIEW OF SYSTEMS  --------------------------------------------------------------------------------  General: no fever  CVS: no chest pain  RESP: no sob, no cough      VITALS/PHYSICAL EXAM  --------------------------------------------------------------------------------  T(C): 36.4 (02-09-19 @ 10:10), Max: 37.1 (02-09-19 @ 05:52)  HR: 66 (02-09-19 @ 10:10) (66 - 83)  BP: 138/64 (02-09-19 @ 10:10) (126/68 - 138/64)  RR: 16 (02-09-19 @ 10:10) (16 - 16)  SpO2: 99% (02-09-19 @ 10:10) (98% - 100%)  Wt(kg): --        02-08-19 @ 07:01  -  02-09-19 @ 07:00  --------------------------------------------------------  IN: 300 mL / OUT: 0 mL / NET: 300 mL      Physical Exam:  	Gen: NAD  	HEENT: MMM  	Pulm: CTA B/L  	CV: S1S2  	Abd: Soft, +BS  	Ext LE bandaged                      Neuro: Awake   	Skin: Warm and Dry   	    LABS/STUDIES  --------------------------------------------------------------------------------              9.0    6.79  >-----------<  281      [02-09-19 @ 05:35]              28.6     137  |  102  |  58  ----------------------------<  100      [02-09-19 @ 05:35]  3.9   |  21  |  3.49        Ca     9.0     [02-09-19 @ 05:35]            Creatinine Trend:  SCr 3.49 [02-09 @ 05:35]  SCr 3.61 [02-08 @ 05:45]  SCr 3.46 [02-07 @ 05:30]  SCr 3.60 [02-06 @ 06:00]  SCr 3.57 [02-05 @ 05:50]        Iron 25, TIBC 145, %sat --      [01-17-19 @ 05:38]  Ferritin 291.5      [01-17-19 @ 05:38]  PTH 42.84 (Ca --)      [09-11-18 @ 05:45]   --  Vitamin D (25OH) 11.8      [09-11-18 @ 05:45]  HbA1c 6.0      [01-15-19 @ 05:45]  TSH 1.74      [01-15-19 @ 05:45]    HCV 0.09, Nonreactive Hepatitis C AB  S/CO Ratio                        Interpretation  < 1.0                                     Non-Reactive  1.0 - 4.9                           Weakly-Reactive  > 5.0                                 Reactive  Non-Reactive: Aperson with a non-reactive HCV antibody  result is considered uninfected.  No further action is  needed unless recent infection is suspected.  In these  cases, consider repeat testing later to detect  seroconversion..  Weakly-Reactive: HCV antibody test is abnormal, HCV RNA  Qualitative test will follow.  Reactive: HCV antibody test is abnormal, HCV RNA  Qualitative test will follow.  Note: HCV antibody testing is performed on the Abbott   system.      [02-05-19 @ 00:01]

## 2019-02-09 NOTE — PROGRESS NOTE ADULT - ASSESSMENT
NST:  < from: Nuclear Stress Test-Pharmacologic (01.18.19 @ 08:42) >  There is a small, mild defect in inferior wall that is  reversible consistent with ischemia.Review of raw data  shows: The study is of good technical quality.  There is a small, mild defect in inferior wall that is  reversible consistent with ischemia.    < end of copied text >    CAth < from: Cardiac Cath Lab - Adult (01.23.19 @ 10:02) >  The coronary circulation is right dominant.  LM:   --  LM: Normal.  LAD:   --  Mid LAD: There was a discrete 50 % stenosis.  CX:   --  Circumflex: Normal.  RCA:   --  Proximal RCA: There was a tubular 60 % stenosis.  --  RPDA: There was a 90 % stenosis.  COMPLICATIONS: There were no complications.  INTERVENTIONAL RECOMMENDATIONS:Distal RCA into RPDA 90% stenosis s/p PCI  BMS,  Prox RCA with dissuse 60% stenosis medical management    < end of copied text >      1) CAD: s/p LHC found to have 50% mid LAD, 50% prox RCA, 90% Distal RCA into PDA , s/p PCI with Bare metal stent, c/w asa and Plavix     2) Right foot wound/OM/ Bacteremia - podiatry  on case, tolerated the procedure well,  will get echo given bacteremia get repeat cultures     3) HTN - cont hydralazine    4) CKD:  renal onboard, monitor renal function , avoid nephrotoxic meds

## 2019-02-09 NOTE — PROGRESS NOTE ADULT - SUBJECTIVE AND OBJECTIVE BOX
SUBJECTIVE / OVERNIGHT EVENTS: pt denies chest pain, shortness of breath, nausea,v     MEDICATIONS  (STANDING):  amLODIPine   Tablet 10 milliGRAM(s) Oral daily  aspirin enteric coated 81 milliGRAM(s) Oral daily  atorvastatin 40 milliGRAM(s) Oral at bedtime  cefepime   IVPB 1000 milliGRAM(s) IV Intermittent every 12 hours  clopidogrel Tablet 75 milliGRAM(s) Oral daily  dextrose 5%. 1000 milliLiter(s) (50 mL/Hr) IV Continuous <Continuous>  dextrose 50% Injectable 12.5 Gram(s) IV Push once  dextrose 50% Injectable 25 Gram(s) IV Push once  dextrose 50% Injectable 25 Gram(s) IV Push once  docusate sodium 100 milliGRAM(s) Oral two times a day  folic acid 1 milliGRAM(s) Oral daily  furosemide    Tablet 40 milliGRAM(s) Oral two times a day  insulin lispro (HumaLOG) corrective regimen sliding scale   SubCutaneous three times a day before meals  insulin lispro (HumaLOG) corrective regimen sliding scale   SubCutaneous at bedtime  lactobacillus acidophilus 1 Tablet(s) Oral daily  metoprolol succinate ER 50 milliGRAM(s) Oral daily  sodium chloride 0.9%. 1000 milliLiter(s) (25 mL/Hr) IV Continuous <Continuous>  vancomycin  IVPB 750 milliGRAM(s) IV Intermittent every 24 hours    MEDICATIONS  (PRN):  acetaminophen   Tablet .. 650 milliGRAM(s) Oral every 6 hours PRN Mild Pain (1 - 3)  dextrose 40% Gel 15 Gram(s) Oral once PRN Blood Glucose LESS THAN 70 milliGRAM(s)/deciliter  glucagon  Injectable 1 milliGRAM(s) IntraMuscular once PRN Glucose LESS THAN 70 milligrams/deciliter  oxyCODONE    5 mG/acetaminophen 325 mG 1 Tablet(s) Oral every 4 hours PRN Moderate Pain (4 - 6)    Vital Signs Last 24 Hrs  T(C): 36.8 (09 Feb 2019 21:47), Max: 37.1 (09 Feb 2019 05:52)  T(F): 98.3 (09 Feb 2019 21:47), Max: 98.8 (09 Feb 2019 05:52)  HR: 71 (09 Feb 2019 21:47) (64 - 83)  BP: 125/68 (09 Feb 2019 21:47) (122/71 - 140/60)  BP(mean): --  RR: 18 (09 Feb 2019 21:47) (16 - 18)  SpO2: 99% (09 Feb 2019 21:47) (99% - 100%)    Constitutional: No fever, fatigue  Skin: No rash.  Eyes: No recent vision problems or eye pain.  ENT: No congestion, ear pain, or sore throat.  Cardiovascular: No chest pain or palpation.  Respiratory: No cough, shortness of breath, congestion, or wheezing.  Gastrointestinal: No abdominal pain, nausea, vomiting, or diarrhea.  Genitourinary: No dysuria.  Musculoskeletal: No joint swelling.  Neurologic: No headache.    PHYSICAL EXAM:  GENERAL: NAD  EYES: EOMI, PERRLA  NECK: Supple, No JVD  CHEST/LUNG: dec breath sounds rt base   HEART:  S1 , S2 +  ABDOMEN: soft, bs+  EXTREMITIES:  rt foot dressing +  NEUROLOGY:alert awake    LABS:  02-09    137  |  102  |  58<H>  ----------------------------<  100<H>  3.9   |  21<L>  |  3.49<H>    Ca    9.0      09 Feb 2019 05:35      Creatinine Trend: 3.49 <--, 3.61 <--, 3.46 <--, 3.60 <--, 3.57 <--, 3.66 <--                        9.0    6.79  )-----------( 281      ( 09 Feb 2019 05:35 )             28.6     Urine Studies:

## 2019-02-10 LAB
ANION GAP SERPL CALC-SCNC: 14 MMO/L — SIGNIFICANT CHANGE UP (ref 7–14)
BACTERIA BLD CULT: SIGNIFICANT CHANGE UP
BUN SERPL-MCNC: 62 MG/DL — HIGH (ref 7–23)
CALCIUM SERPL-MCNC: 8.9 MG/DL — SIGNIFICANT CHANGE UP (ref 8.4–10.5)
CHLORIDE SERPL-SCNC: 102 MMOL/L — SIGNIFICANT CHANGE UP (ref 98–107)
CO2 SERPL-SCNC: 22 MMOL/L — SIGNIFICANT CHANGE UP (ref 22–31)
CREAT SERPL-MCNC: 3.53 MG/DL — HIGH (ref 0.5–1.3)
CRP SERPL-MCNC: 35.1 MG/L — HIGH
ERYTHROCYTE [SEDIMENTATION RATE] IN BLOOD: 117 MM/HR — HIGH (ref 1–15)
GLUCOSE BLDC GLUCOMTR-MCNC: 107 MG/DL — HIGH (ref 70–99)
GLUCOSE BLDC GLUCOMTR-MCNC: 120 MG/DL — HIGH (ref 70–99)
GLUCOSE BLDC GLUCOMTR-MCNC: 126 MG/DL — HIGH (ref 70–99)
GLUCOSE BLDC GLUCOMTR-MCNC: 218 MG/DL — HIGH (ref 70–99)
GLUCOSE SERPL-MCNC: 108 MG/DL — HIGH (ref 70–99)
HCT VFR BLD CALC: 30 % — LOW (ref 39–50)
HGB BLD-MCNC: 9.4 G/DL — LOW (ref 13–17)
MAGNESIUM SERPL-MCNC: 2.1 MG/DL — SIGNIFICANT CHANGE UP (ref 1.6–2.6)
MCHC RBC-ENTMCNC: 27.7 PG — SIGNIFICANT CHANGE UP (ref 27–34)
MCHC RBC-ENTMCNC: 31.3 % — LOW (ref 32–36)
MCV RBC AUTO: 88.5 FL — SIGNIFICANT CHANGE UP (ref 80–100)
NRBC # FLD: 0 K/UL — LOW (ref 25–125)
PHOSPHATE SERPL-MCNC: 3.7 MG/DL — SIGNIFICANT CHANGE UP (ref 2.5–4.5)
PLATELET # BLD AUTO: 313 K/UL — SIGNIFICANT CHANGE UP (ref 150–400)
PMV BLD: 10.7 FL — SIGNIFICANT CHANGE UP (ref 7–13)
POTASSIUM SERPL-MCNC: 3.9 MMOL/L — SIGNIFICANT CHANGE UP (ref 3.5–5.3)
POTASSIUM SERPL-SCNC: 3.9 MMOL/L — SIGNIFICANT CHANGE UP (ref 3.5–5.3)
RBC # BLD: 3.39 M/UL — LOW (ref 4.2–5.8)
RBC # FLD: 13.4 % — SIGNIFICANT CHANGE UP (ref 10.3–14.5)
SODIUM SERPL-SCNC: 138 MMOL/L — SIGNIFICANT CHANGE UP (ref 135–145)
WBC # BLD: 6.92 K/UL — SIGNIFICANT CHANGE UP (ref 3.8–10.5)
WBC # FLD AUTO: 6.92 K/UL — SIGNIFICANT CHANGE UP (ref 3.8–10.5)

## 2019-02-10 PROCEDURE — 93306 TTE W/DOPPLER COMPLETE: CPT | Mod: 26

## 2019-02-10 RX ADMIN — Medication 1 MILLIGRAM(S): at 11:04

## 2019-02-10 RX ADMIN — Medication 1 TABLET(S): at 11:04

## 2019-02-10 RX ADMIN — Medication 100 MILLIGRAM(S): at 17:15

## 2019-02-10 RX ADMIN — Medication 50 MILLIGRAM(S): at 05:41

## 2019-02-10 RX ADMIN — AMLODIPINE BESYLATE 10 MILLIGRAM(S): 2.5 TABLET ORAL at 05:41

## 2019-02-10 RX ADMIN — Medication 650 MILLIGRAM(S): at 07:36

## 2019-02-10 RX ADMIN — ATORVASTATIN CALCIUM 40 MILLIGRAM(S): 80 TABLET, FILM COATED ORAL at 21:25

## 2019-02-10 RX ADMIN — Medication 100 MILLIGRAM(S): at 05:41

## 2019-02-10 RX ADMIN — CEFEPIME 100 MILLIGRAM(S): 1 INJECTION, POWDER, FOR SOLUTION INTRAMUSCULAR; INTRAVENOUS at 17:15

## 2019-02-10 RX ADMIN — CEFEPIME 100 MILLIGRAM(S): 1 INJECTION, POWDER, FOR SOLUTION INTRAMUSCULAR; INTRAVENOUS at 05:41

## 2019-02-10 RX ADMIN — Medication 40 MILLIGRAM(S): at 05:41

## 2019-02-10 RX ADMIN — Medication 650 MILLIGRAM(S): at 08:22

## 2019-02-10 RX ADMIN — Medication 81 MILLIGRAM(S): at 11:04

## 2019-02-10 RX ADMIN — CLOPIDOGREL BISULFATE 75 MILLIGRAM(S): 75 TABLET, FILM COATED ORAL at 11:04

## 2019-02-10 RX ADMIN — Medication 250 MILLIGRAM(S): at 00:36

## 2019-02-10 RX ADMIN — Medication 40 MILLIGRAM(S): at 17:15

## 2019-02-10 NOTE — PROGRESS NOTE ADULT - SUBJECTIVE AND OBJECTIVE BOX
[facility] [place] [place] [facility]ACT[facility]   MD DOMO WALLACE MD RUORU WONG, PA    TEL:  OFFICE: [phone]  DR LEE CELL: [phone]  PATRICIO CORONEL CELL: [phone]  DR. GRULLON CELL: [phone]    RENAL FOLLOW UP NOTE  --------------------------------------------------------------------------------  HPI:      Pt seen and examined at bedside.   Bethel CROW, chest pain     PAST HISTORY  --------------------------------------------------------------------------------  No significant changes to PMH, PSH, FHx, SHx, unless otherwise noted    ALLERGIES & MEDICATIONS  --------------------------------------------------------------------------------  Allergies    No Known Allergies    Intolerances      Standing Inpatient Medications  amLODIPine   Tablet 10 milliGRAM(s) Oral daily  aspirin enteric coated 81 milliGRAM(s) Oral daily  atorvastatin 40 milliGRAM(s) Oral at bedtime  cefepime   IVPB 1000 milliGRAM(s) IV Intermittent every 12 hours  clopidogrel Tablet 75 milliGRAM(s) Oral daily  dextrose 5%. 1000 milliLiter(s) IV Continuous <Continuous>  dextrose 50% Injectable 12.5 Gram(s) IV Push once  dextrose 50% Injectable 25 Gram(s) IV Push once  dextrose 50% Injectable 25 Gram(s) IV Push once  docusate sodium 100 milliGRAM(s) Oral two times a day  folic acid 1 milliGRAM(s) Oral daily  furosemide    Tablet 40 milliGRAM(s) Oral two times a day  insulin lispro (HumaLOG) corrective regimen sliding scale   SubCutaneous three times a day before meals  insulin lispro (HumaLOG) corrective regimen sliding scale   SubCutaneous at bedtime  lactobacillus acidophilus 1 Tablet(s) Oral daily  metoprolol succinate ER 50 milliGRAM(s) Oral daily  sodium chloride 0.9%. 1000 milliLiter(s) IV Continuous <Continuous>  vancomycin  IVPB 750 milliGRAM(s) IV Intermittent every 24 hours    PRN Inpatient Medications  acetaminophen   Tablet .. 650 milliGRAM(s) Oral every 6 hours PRN  dextrose 40% Gel 15 Gram(s) Oral once PRN  glucagon  Injectable 1 milliGRAM(s) IntraMuscular once PRN  oxyCODONE    5 mG/acetaminophen 325 mG 1 Tablet(s) Oral every 4 hours PRN      REVIEW OF SYSTEMS  --------------------------------------------------------------------------------  General: no fever  CVS: no chest pain      VITALS/PHYSICAL EXAM  --------------------------------------------------------------------------------  T(C): 36.8 (02-10-19 @ 13:51), Max: 36.9 (02-10-19 @ 05:40)  HR: 65 (02-10-19 @ 13:51) (64 - 74)  BP: 135/69 (02-10-19 @ 13:51) (117/69 - 142/65)  RR: 18 (02-10-19 @ 13:51) (16 - 18)  SpO2: 100% (02-10-19 @ 13:51) (98% - 100%)  Wt(kg): --        Physical Exam:  	Gen: NAD  	HEENT: MMM  	Pulm: CTA B/L  	CV: S1S2  	Abd: Soft, +BS  	Ext: + LE edema B/L                      Neuro: Awake   	Skin: Warm and Dry       LABS/STUDIES  --------------------------------------------------------------------------------              9.4    6.92  >-----------<  313      [02-10-19 @ 05:30]              30.0     138  |  102  |  62  ----------------------------<  108      [02-10-19 @ 05:30]  3.9   |  22  |  3.53        Ca     8.9     [02-10-19 @ 05:30]      Mg     2.1     [02-10-19 @ 05:30]      Phos  3.7     [02-10-19 @ 05:30]            Creatinine Trend:  SCr 3.53 [02-10 @ 05:30]  SCr 3.49 [02-09 @ 05:35]  SCr 3.61 [02-08 @ 05:45]  SCr 3.46 [02-07 @ 05:30]  SCr 3.60 [02-06 @ 06:00]        Iron 25, TIBC 145, %sat --      [01-17-19 @ 05:38]  Ferritin 291.5      [01-17-19 @ 05:38]  PTH 42.84 (Ca --)      [09-11-18 @ 05:45]   --  Vitamin D (25OH) 11.8      [09-11-18 @ 05:45]  HbA1c 6.0      [01-15-19 @ 05:45]  TSH 1.74      [01-15-19 @ 05:45]    HCV 0.09, Nonreactive Hepatitis C AB  S/CO Ratio                        Interpretation  < 1.0                                     Non-Reactive  1.0 - 4.9                           Weakly-Reactive  > 5.0                                 Reactive  Non-Reactive: Aperson with a non-reactive HCV antibody  result is considered uninfected.  No further action is  needed unless recent infection is suspected.  In these  cases, consider repeat testing later to detect  seroconversion..  Weakly-Reactive: HCV antibody test is abnormal, HCV RNA  Qualitative test will follow.  Reactive: HCV antibody test is abnormal, HCV RNA  Qualitative test will follow.  Note: HCV antibody testing is performed on the Abbott   system.      [02-05-19 @ 00:01]

## 2019-02-10 NOTE — PROGRESS NOTE ADULT - SUBJECTIVE AND OBJECTIVE BOX
Keaton Duvall MD  Interventional Cardiology  [place] Office : [phone] [place] [id number]  Tel:   [place] Office : [address] [facility] [place] [address]  Tel: [phone]  Cell : [phone]    Subjective : Pt lying in bed comfortable, not in distress, denies any chest pain or SOB  	  MEDICATIONS:  amLODIPine   Tablet 10 milliGRAM(s) Oral daily  aspirin enteric coated 81 milliGRAM(s) Oral daily  clopidogrel Tablet 75 milliGRAM(s) Oral daily  furosemide    Tablet 40 milliGRAM(s) Oral two times a day  metoprolol succinate ER 50 milliGRAM(s) Oral daily    cefepime   IVPB 1000 milliGRAM(s) IV Intermittent every 12 hours  vancomycin  IVPB 750 milliGRAM(s) IV Intermittent every 24 hours      acetaminophen   Tablet .. 650 milliGRAM(s) Oral every 6 hours PRN  oxyCODONE    5 mG/acetaminophen 325 mG 1 Tablet(s) Oral every 4 hours PRN    docusate sodium 100 milliGRAM(s) Oral two times a day    atorvastatin 40 milliGRAM(s) Oral at bedtime  dextrose 40% Gel 15 Gram(s) Oral once PRN  dextrose 50% Injectable 12.5 Gram(s) IV Push once  dextrose 50% Injectable 25 Gram(s) IV Push once  dextrose 50% Injectable 25 Gram(s) IV Push once  glucagon  Injectable 1 milliGRAM(s) IntraMuscular once PRN  insulin lispro (HumaLOG) corrective regimen sliding scale   SubCutaneous three times a day before meals  insulin lispro (HumaLOG) corrective regimen sliding scale   SubCutaneous at bedtime    dextrose 5%. 1000 milliLiter(s) IV Continuous <Continuous>  folic acid 1 milliGRAM(s) Oral daily  sodium chloride 0.9%. 1000 milliLiter(s) IV Continuous <Continuous>      PHYSICAL EXAM:  T(C): 36.9 (02-10-19 @ 21:09), Max: 36.9 (02-10-19 @ 05:40)  HR: 76 (02-10-19 @ 21:09) (64 - 76)  BP: 131/80 (02-10-19 @ 21:09) (117/69 - 142/65)  RR: 18 (02-10-19 @ 21:09) (17 - 18)  SpO2: 97% (02-10-19 @ 21:09) (97% - 100%)  Wt(kg): --  I&O's Summary      Appearance: Normal	  HEENT:   Normal oral mucosa, PERRL, EOMI	  Cardiovascular: Normal S1 S2, No JVD, No murmurs, No edema  Respiratory: Lungs clear to auscultation	  Gastrointestinal:  Soft, Non-tender, + BS	  Extremities: RLE dresssed                                     9.4    6.92  )-----------( 313      ( 10 Feb 2019 05:30 )             30.0     02-10    138  |  102  |  62<H>  ----------------------------<  108<H>  3.9   |  22  |  3.53<H>    Ca    8.9      10 Feb 2019 05:30  Phos  3.7     02-10  Mg     2.1     02-10      proBNP:   Lipid Profile:   HgA1c:   TSH:

## 2019-02-10 NOTE — PROGRESS NOTE ADULT - SUBJECTIVE AND OBJECTIVE BOX
Infectious Diseases progress note:    Subjective: No acute o/n events reported.  NAD.  No new somatic complaints.      ROS:  CONSTITUTIONAL:  No fever, chills, rigors  CARDIOVASCULAR:  No chest pain or palpitations  RESPIRATORY:   No SOB, cough, dyspnea on exertion.  No wheezing  GASTROINTESTINAL:  No abd pain, N/V, diarrhea/constipation  EXTREMITIES:  No swelling or joint pain  GENITOURINARY:  No burning on urination, increased frequency or urgency.  No flank pain  NEUROLOGIC:  No HA, visual disturbances  SKIN: No rashes    Allergies    No Known Allergies    Intolerances        ANTIBIOTICS/RELEVANT:  antimicrobials  cefepime   IVPB 1000 milliGRAM(s) IV Intermittent every 12 hours  vancomycin  IVPB 750 milliGRAM(s) IV Intermittent every 24 hours    immunologic:    OTHER:  acetaminophen   Tablet .. 650 milliGRAM(s) Oral every 6 hours PRN  amLODIPine   Tablet 10 milliGRAM(s) Oral daily  aspirin enteric coated 81 milliGRAM(s) Oral daily  atorvastatin 40 milliGRAM(s) Oral at bedtime  clopidogrel Tablet 75 milliGRAM(s) Oral daily  dextrose 40% Gel 15 Gram(s) Oral once PRN  dextrose 5%. 1000 milliLiter(s) IV Continuous <Continuous>  dextrose 50% Injectable 12.5 Gram(s) IV Push once  dextrose 50% Injectable 25 Gram(s) IV Push once  dextrose 50% Injectable 25 Gram(s) IV Push once  docusate sodium 100 milliGRAM(s) Oral two times a day  folic acid 1 milliGRAM(s) Oral daily  furosemide    Tablet 40 milliGRAM(s) Oral two times a day  glucagon  Injectable 1 milliGRAM(s) IntraMuscular once PRN  insulin lispro (HumaLOG) corrective regimen sliding scale   SubCutaneous three times a day before meals  insulin lispro (HumaLOG) corrective regimen sliding scale   SubCutaneous at bedtime  lactobacillus acidophilus 1 Tablet(s) Oral daily  metoprolol succinate ER 50 milliGRAM(s) Oral daily  oxyCODONE    5 mG/acetaminophen 325 mG 1 Tablet(s) Oral every 4 hours PRN  sodium chloride 0.9%. 1000 milliLiter(s) IV Continuous <Continuous>      Objective:  Vital Signs Last 24 Hrs  T(C): 36.8 (10 Feb 2019 13:51), Max: 36.9 (10 Feb 2019 05:40)  T(F): 98.3 (10 Feb 2019 13:51), Max: 98.5 (10 Feb 2019 05:40)  HR: 65 (10 Feb 2019 13:51) (64 - 74)  BP: 135/69 (10 Feb 2019 13:51) (117/69 - 142/65)  BP(mean): --  RR: 18 (10 Feb 2019 13:51) (16 - 18)  SpO2: 100% (10 Feb 2019 13:51) (98% - 100%)    PHYSICAL EXAM:  Constitutional:NAD  Eyes:SPIKE, EOMI  Ear/Nose/Throat: no thrush, mucositis.  Moist mucous membranes	  Neck:no JVD, no lymphadenopathy, supple  Respiratory: CTA lobito  Cardiovascular: S1S2 RRR, no murmurs  Gastrointestinal:soft, nontender,  nondistended (+) BS  Extremities:no e/e/c  Skin:  RLE ACE dsg in place        LABS:                        9.4    6.92  )-----------( 313      ( 10 Feb 2019 05:30 )             30.0     02-10    138  |  102  |  62<H>  ----------------------------<  108<H>  3.9   |  22  |  3.53<H>    Ca    8.9      10 Feb 2019 05:30  Phos  3.7     02-10  Mg     2.1     02-10                  Vancomycin Level, Trough: 14.4 ug/mL (02-09 @ 00:00)              MICROBIOLOGY:    Culture - Acid Fast Smear Concentrated (02.06.19 @ 15:04)    Culture - Acid Fast Smear Concentrated:   AFB SMEAR= NO ACID FAST BACILLI SEEN    Specimen Source: OTHER    Culture - Yeast and Fungus (02.06.19 @ 13:48)    Culture - Yeast and Fungus:   CULTURE NEGATIVE FOR YEASTS AND MOLDS AFTER 1 DAY    Specimen Source: FOOT    Culture - Surg Site Aerob/Anaer w/Gm St (02.06.19 @ 13:40)    Culture - Surgical Site:   SEE PREVIOUS CULTURE: COLLECTED 2/5/2019 AT 0030  RECEIVED 2/5/2019 AT 1032  STAU^Staphylococcus aureus  DENICE^Corynebacterium species    Gram Stain Wound:   BLOODY SP.  NOS^No Organisms Seen  WBC^White Blood Cells  QNTY CELLS IN GRAM STAIN: RARE (1+)    Specimen Source: FOOT    Culture - Abscess with Gram Stain (02.05.19 @ 10:32)    -  Trimethoprim/Sulfamethoxazole: S <=0.5/9.5 ULISSES    -  Tetra/Doxy: S <=1 ULISSES    -  Rifampin: S <=1 ULISSES    -  Oxacillin: S <=0.25 ULISSES    -  Penicillin: R    -  Moxifloxacin(Aerobic): S <=0.5 ULISSES    -  Levofloxacin: S <=0.5 ULISSES    -  Gentamicin: S 2 ULISSES    -  Erythromycin: S 0.5 ULISSES    -  Clindamycin: S 0.5 ULISSES    -  Ciprofloxacin: S <=1 ULISSES    -  Cefazolin: S <=4 ULISSES    -  Vancomycin: S 2 ULISSES    Specimen Source: OTHER    Gram Stain Result:   NOS No Organisms Seen  WBC^White Blood Cells  QNTY CELLS IN GRAM STAIN: MANY (4+)    Culture Results:   STAU^Staphylococcus aureus  DENICE^Corynebacterium species    Organism Identification: Staphylococcus aureus  Corynebacterium species    Organism: Staphylococcus aureus    Organism: Corynebacterium species    Method Type: POSITIVE ULISSES 29          RADIOLOGY & ADDITIONAL STUDIES:

## 2019-02-10 NOTE — PROGRESS NOTE ADULT - ASSESSMENT
NST:  < from: Nuclear Stress Test-Pharmacologic (01.18.19 @ 08:42) >  There is a small, mild defect in inferior wall that is  reversible consistent with ischemia.Review of raw data  shows: The study is of good technical quality.  There is a small, mild defect in inferior wall that is  reversible consistent with ischemia.    < end of copied text >    CAth < from: Cardiac Cath Lab - Adult (01.23.19 @ 10:02) >  The coronary circulation is right dominant.  LM:   --  LM: Normal.  LAD:   --  Mid LAD: There was a discrete 50 % stenosis.  CX:   --  Circumflex: Normal.  RCA:   --  Proximal RCA: There was a tubular 60 % stenosis.  --  RPDA: There was a 90 % stenosis.  COMPLICATIONS: There were no complications.  INTERVENTIONAL RECOMMENDATIONS:Distal RCA into RPDA 90% stenosis s/p PCI  BMS,  Prox RCA with dissuse 60% stenosis medical management    < end of copied text >      1) CAD: s/p LHC found to have 50% mid LAD, 50% prox RCA, 90% Distal RCA into PDA , s/p PCI with Bare metal stent, c/w asa and Plavix     2) Right foot wound/OM/ Bacteremia - podiatry  on case, tolerated the procedure well,  echo negative for endocarditis     3) HTN - cont hydralazine    4) CKD:  renal onboard, monitor renal function , avoid nephrotoxic meds

## 2019-02-10 NOTE — PROGRESS NOTE ADULT - ASSESSMENT
63 year old male, with past history significant for Type-II DM, HTN, and R-Foot Charcot deformity, Osteomyelitis, Vitamin D deficiency, Anemia, CKD-IV, HLD and GERD, presented to the ED, as sent by podiatrist, secondary +increasing swelling, pain and bleeding. Patient was recently in hopsital planned for foot debridement by pod however had +NST s/p PCI so procedure was delayed , pt admitted today for further rt foot wound management (04 Feb 2019 23:16)    Pt recently admitted for infected plantar ulcer, no OM on nuclear scan.  Wound cultures grew staph aureus, pseudomonas, and GBS.  Pt completed IV abx and transitioned to PO cipro upon discharge.      ER vitals:  T 101.5, P P 84, /79.  Pt with elevated WBC and inflammatory markers.  Bcx growing staph aureus, PCR +.  Currently on cefepime.  ID consult called for further abx managment.       Recommend:    - Staph aureus bacteremia, likely secondary to infected rt foot plantar ulcer and OM.  Now with purulent drainage.  Foot xray with soft tissue swelling and foci of air suspicious for OM.  Pt seen by podiatry, and  s/p OR for 1st TMT exostectomy on 2/7    - f/u intra-op cultures and bone pathology of clean margins.  Plan for  PICC line and long term abx once bcx neg at 72 hrs.  Long term abx recommended due to purulent drainage and pt with h/o of diabetes.  Pt with high risk of recurrent infection and OM.      - Echocardiogram prior to discharge.      - Trend ESR/CRP.    - Cont cefepime for now (pt with prior h/o pseudomonas too), pending final culture results.   Initial wound cultures growing Staph aureus, Strep, and corynebacterium.  Will add vanco to cover coryne.  f/u vanco trough, can keep between 10-15      Will follow,    Salima Spivey    [phone]

## 2019-02-11 LAB
-  CEFAZOLIN: SIGNIFICANT CHANGE UP
-  CIPROFLOXACIN: SIGNIFICANT CHANGE UP
-  CLINDAMYCIN: SIGNIFICANT CHANGE UP
-  ERYTHROMYCIN: SIGNIFICANT CHANGE UP
-  GENTAMICIN: SIGNIFICANT CHANGE UP
-  LEVOFLOXACIN: SIGNIFICANT CHANGE UP
-  MOXIFLOXACIN(AEROBIC): SIGNIFICANT CHANGE UP
-  OXACILLIN: SIGNIFICANT CHANGE UP
-  RIFAMPIN.: SIGNIFICANT CHANGE UP
-  TETRACYCLINE: SIGNIFICANT CHANGE UP
-  TRIMETHOPRIM/SULFAMETHOXAZOLE: SIGNIFICANT CHANGE UP
-  VANCOMYCIN: SIGNIFICANT CHANGE UP
ANION GAP SERPL CALC-SCNC: 12 MMO/L — SIGNIFICANT CHANGE UP (ref 7–14)
BACTERIA SKIN AEROBE CULT: SIGNIFICANT CHANGE UP
BUN SERPL-MCNC: 63 MG/DL — HIGH (ref 7–23)
CALCIUM SERPL-MCNC: 8.7 MG/DL — SIGNIFICANT CHANGE UP (ref 8.4–10.5)
CHLORIDE SERPL-SCNC: 103 MMOL/L — SIGNIFICANT CHANGE UP (ref 98–107)
CO2 SERPL-SCNC: 21 MMOL/L — LOW (ref 22–31)
CREAT SERPL-MCNC: 3.51 MG/DL — HIGH (ref 0.5–1.3)
CRP SERPL-MCNC: 20.9 MG/L — HIGH
ERYTHROCYTE [SEDIMENTATION RATE] IN BLOOD: 121 MM/HR — HIGH (ref 1–15)
GLUCOSE BLDC GLUCOMTR-MCNC: 120 MG/DL — HIGH (ref 70–99)
GLUCOSE BLDC GLUCOMTR-MCNC: 137 MG/DL — HIGH (ref 70–99)
GLUCOSE BLDC GLUCOMTR-MCNC: 145 MG/DL — HIGH (ref 70–99)
GLUCOSE SERPL-MCNC: 154 MG/DL — HIGH (ref 70–99)
GRAM STN WND: SIGNIFICANT CHANGE UP
HCT VFR BLD CALC: 27.4 % — LOW (ref 39–50)
HGB BLD-MCNC: 8.5 G/DL — LOW (ref 13–17)
MAGNESIUM SERPL-MCNC: 1.9 MG/DL — SIGNIFICANT CHANGE UP (ref 1.6–2.6)
MCHC RBC-ENTMCNC: 27.8 PG — SIGNIFICANT CHANGE UP (ref 27–34)
MCHC RBC-ENTMCNC: 31 % — LOW (ref 32–36)
MCV RBC AUTO: 89.5 FL — SIGNIFICANT CHANGE UP (ref 80–100)
METHOD TYPE: SIGNIFICANT CHANGE UP
NRBC # FLD: 0 K/UL — LOW (ref 25–125)
ORGANISM # SPEC MICROSCOPIC CNT: SIGNIFICANT CHANGE UP
ORGANISM # SPEC MICROSCOPIC CNT: SIGNIFICANT CHANGE UP
PHOSPHATE SERPL-MCNC: 3.8 MG/DL — SIGNIFICANT CHANGE UP (ref 2.5–4.5)
PLATELET # BLD AUTO: 327 K/UL — SIGNIFICANT CHANGE UP (ref 150–400)
PMV BLD: 10.6 FL — SIGNIFICANT CHANGE UP (ref 7–13)
POTASSIUM SERPL-MCNC: 4.1 MMOL/L — SIGNIFICANT CHANGE UP (ref 3.5–5.3)
POTASSIUM SERPL-SCNC: 4.1 MMOL/L — SIGNIFICANT CHANGE UP (ref 3.5–5.3)
RBC # BLD: 3.06 M/UL — LOW (ref 4.2–5.8)
RBC # FLD: 13.5 % — SIGNIFICANT CHANGE UP (ref 10.3–14.5)
SODIUM SERPL-SCNC: 136 MMOL/L — SIGNIFICANT CHANGE UP (ref 135–145)
WBC # BLD: 7.87 K/UL — SIGNIFICANT CHANGE UP (ref 3.8–10.5)
WBC # FLD AUTO: 7.87 K/UL — SIGNIFICANT CHANGE UP (ref 3.8–10.5)

## 2019-02-11 RX ORDER — CEFAZOLIN SODIUM 1 G
1000 VIAL (EA) INJECTION EVERY 12 HOURS
Refills: 0 | Status: DISCONTINUED | OUTPATIENT
Start: 2019-02-11 | End: 2019-02-13

## 2019-02-11 RX ORDER — IRON SUCROSE 20 MG/ML
100 INJECTION, SOLUTION INTRAVENOUS EVERY 24 HOURS
Qty: 0 | Refills: 0 | Status: DISCONTINUED | OUTPATIENT
Start: 2019-02-11 | End: 2019-02-13

## 2019-02-11 RX ORDER — PANTOPRAZOLE SODIUM 20 MG/1
40 TABLET, DELAYED RELEASE ORAL
Refills: 0 | Status: DISCONTINUED | OUTPATIENT
Start: 2019-02-11 | End: 2019-02-11

## 2019-02-11 RX ORDER — HEPARIN SODIUM 5000 [USP'U]/ML
5000 INJECTION INTRAVENOUS; SUBCUTANEOUS EVERY 8 HOURS
Refills: 0 | Status: DISCONTINUED | OUTPATIENT
Start: 2019-02-11 | End: 2019-02-13

## 2019-02-11 RX ADMIN — AMLODIPINE BESYLATE 10 MILLIGRAM(S): 2.5 TABLET ORAL at 05:25

## 2019-02-11 RX ADMIN — Medication 1 TABLET(S): at 11:20

## 2019-02-11 RX ADMIN — Medication 650 MILLIGRAM(S): at 23:00

## 2019-02-11 RX ADMIN — ATORVASTATIN CALCIUM 40 MILLIGRAM(S): 80 TABLET, FILM COATED ORAL at 22:27

## 2019-02-11 RX ADMIN — IRON SUCROSE 210 MILLIGRAM(S): 20 INJECTION, SOLUTION INTRAVENOUS at 17:43

## 2019-02-11 RX ADMIN — Medication 650 MILLIGRAM(S): at 22:29

## 2019-02-11 RX ADMIN — Medication 40 MILLIGRAM(S): at 05:26

## 2019-02-11 RX ADMIN — Medication 100 MILLIGRAM(S): at 05:25

## 2019-02-11 RX ADMIN — Medication 1 MILLIGRAM(S): at 11:20

## 2019-02-11 RX ADMIN — Medication 50 MILLIGRAM(S): at 05:26

## 2019-02-11 RX ADMIN — Medication 650 MILLIGRAM(S): at 11:21

## 2019-02-11 RX ADMIN — CEFEPIME 100 MILLIGRAM(S): 1 INJECTION, POWDER, FOR SOLUTION INTRAMUSCULAR; INTRAVENOUS at 05:25

## 2019-02-11 RX ADMIN — Medication 100 MILLIGRAM(S): at 19:54

## 2019-02-11 RX ADMIN — Medication 81 MILLIGRAM(S): at 11:20

## 2019-02-11 RX ADMIN — Medication 650 MILLIGRAM(S): at 12:15

## 2019-02-11 RX ADMIN — HEPARIN SODIUM 5000 UNIT(S): 5000 INJECTION INTRAVENOUS; SUBCUTANEOUS at 22:26

## 2019-02-11 RX ADMIN — CLOPIDOGREL BISULFATE 75 MILLIGRAM(S): 75 TABLET, FILM COATED ORAL at 11:20

## 2019-02-11 RX ADMIN — Medication 250 MILLIGRAM(S): at 01:43

## 2019-02-11 NOTE — PROGRESS NOTE ADULT - SUBJECTIVE AND OBJECTIVE BOX
Infectious Diseases progress note:    Subjective: NAD, afebrile, no new somatic complaints.      ROS:  CONSTITUTIONAL:  No fever, chills, rigors  CARDIOVASCULAR:  No chest pain or palpitations  RESPIRATORY:   No SOB, cough, dyspnea on exertion.  No wheezing  GASTROINTESTINAL:  No abd pain, N/V, diarrhea/constipation  EXTREMITIES:  No swelling or joint pain  GENITOURINARY:  No burning on urination, increased frequency or urgency.  No flank pain  NEUROLOGIC:  No HA, visual disturbances  SKIN: No rashes    Allergies    No Known Allergies    Intolerances        ANTIBIOTICS/RELEVANT:  antimicrobials  ceFAZolin   IVPB 1000 milliGRAM(s) IV Intermittent every 12 hours  vancomycin  IVPB 750 milliGRAM(s) IV Intermittent every 24 hours    immunologic:    OTHER:  acetaminophen   Tablet .. 650 milliGRAM(s) Oral every 6 hours PRN  amLODIPine   Tablet 10 milliGRAM(s) Oral daily  aspirin enteric coated 81 milliGRAM(s) Oral daily  atorvastatin 40 milliGRAM(s) Oral at bedtime  clopidogrel Tablet 75 milliGRAM(s) Oral daily  dextrose 40% Gel 15 Gram(s) Oral once PRN  dextrose 5%. 1000 milliLiter(s) IV Continuous <Continuous>  dextrose 50% Injectable 12.5 Gram(s) IV Push once  dextrose 50% Injectable 25 Gram(s) IV Push once  dextrose 50% Injectable 25 Gram(s) IV Push once  docusate sodium 100 milliGRAM(s) Oral two times a day  folic acid 1 milliGRAM(s) Oral daily  furosemide    Tablet 40 milliGRAM(s) Oral two times a day  glucagon  Injectable 1 milliGRAM(s) IntraMuscular once PRN  insulin lispro (HumaLOG) corrective regimen sliding scale   SubCutaneous three times a day before meals  insulin lispro (HumaLOG) corrective regimen sliding scale   SubCutaneous at bedtime  iron sucrose IVPB 100 milliGRAM(s) IV Intermittent every 24 hours  lactobacillus acidophilus 1 Tablet(s) Oral daily  metoprolol succinate ER 50 milliGRAM(s) Oral daily  oxyCODONE    5 mG/acetaminophen 325 mG 1 Tablet(s) Oral every 4 hours PRN      Objective:  Vital Signs Last 24 Hrs  T(C): 36.7 (11 Feb 2019 13:03), Max: 36.9 (10 Feb 2019 21:09)  T(F): 98 (11 Feb 2019 13:03), Max: 98.5 (10 Feb 2019 21:09)  HR: 61 (11 Feb 2019 13:03) (60 - 76)  BP: 132/70 (11 Feb 2019 13:03) (131/80 - 143/69)  BP(mean): --  RR: 17 (11 Feb 2019 13:03) (17 - 18)  SpO2: 99% (11 Feb 2019 13:03) (97% - 99%)    PHYSICAL EXAM:  Constitutional:NAD  Eyes:SPIKE, EOMI  Ear/Nose/Throat: no thrush, mucositis.  Moist mucous membranes	  Neck:no JVD, no lymphadenopathy, supple  Respiratory: CTA lobito  Cardiovascular: S1S2 RRR, no murmurs  Gastrointestinal:soft, nontender,  nondistended (+) BS  Extremities:no e/e/c  Skin:  rt ft ace dsg c/d/i        LABS:                        8.5    7.87  )-----------( 327      ( 11 Feb 2019 06:04 )             27.4     02-11    136  |  103  |  63<H>  ----------------------------<  154<H>  4.1   |  21<L>  |  3.51<H>    Ca    8.7      11 Feb 2019 05:20  Phos  3.8     02-11  Mg     1.9     02-11                  Vancomycin Level, Trough: 14.4 ug/mL (02-09 @ 00:00)              MICROBIOLOGY:    Culture - Acid Fast Smear Concentrated (02.06.19 @ 15:04)    Culture - Acid Fast Smear Concentrated:   AFB SMEAR= NO ACID FAST BACILLI SEEN    Specimen Source: OTHER    Culture - Yeast and Fungus (02.06.19 @ 13:48)    Culture - Yeast and Fungus:   CULTURE NEGATIVE FOR YEASTS AND MOLDS AFTER 1 DAY    Specimen Source: FOOT    Culture - Tissue with Gram Stain (02.06.19 @ 13:48)    Gram Stain Wound:   NOS No Organisms Seen  WBC^White Blood Cells  QNTY CELLS IN GRAM STAIN: NO CELLS SEEN    -  Cefazolin: S <=4 ULISSES    -  Ciprofloxacin: S <=1 ULISSES    -  Erythromycin: S 0.5 ULISSES    -  Clindamycin: S 0.5 ULISSES    -  Gentamicin: S <=1 ULISSES    -  Levofloxacin: S <=0.5 ULISSES    -  Moxifloxacin(Aerobic): S <=0.5 ULISSES    -  Oxacillin: S <=0.25 ULISSES    -  Rifampin: S <=1 ULISSES    -  Tetra/Doxy: S <=1 ULISSES    -  Trimethoprim/Sulfamethoxazole: S <=0.5/9.5 ULISSES    -  Vancomycin: S 2 ULISSES    Culture - Tissue:   ISOLATED FROM LIQUID MEDIA.    Specimen Source: FOOT    Organism Identification: Staphylococcus aureus    Organism: Staphylococcus aureus    Method Type: POSITIVE ULISSES 29    Culture - Tissue with Gram Stain (02.06.19 @ 13:48)    Gram Stain Wound:   NOS No Organisms Seen  WBC^White Blood Cells  QNTY CELLS IN GRAM STAIN: NO CELLS SEEN    -  Cefazolin: S <=4 ULISSES    -  Ciprofloxacin: S <=1 ULISSES    -  Erythromycin: S 0.5 ULISSES    -  Clindamycin: S 0.5 ULISSES    -  Gentamicin: S <=1 ULISSES    -  Levofloxacin: S <=0.5 ULISSES    -  Moxifloxacin(Aerobic): S <=0.5 ULISSES    -  Oxacillin: S <=0.25 ULISSES    -  Rifampin: S <=1 ULISSES    -  Tetra/Doxy: S <=1 ULISSES    -  Trimethoprim/Sulfamethoxazole: S <=0.5/9.5 ULISSES    -  Vancomycin: S 2 ULISSES    Culture - Tissue:   ISOLATED FROM LIQUID MEDIA.    Specimen Source: FOOT    Organism Identification: Staphylococcus aureus    Organism: Staphylococcus aureus    Method Type: POSITIVE ULISSES 29    Culture - Yeast and Fungus:   CULTURE NEGATIVE FOR YEASTS AND MOLDS AFTER 1 DAY (02.06.19 @ 13:48)          RADIOLOGY & ADDITIONAL STUDIES: Infectious Diseases progress note:    Subjective: NAD, afebrile, no new somatic complaints.      ROS:  CONSTITUTIONAL:  No fever, chills, rigors  CARDIOVASCULAR:  No chest pain or palpitations  RESPIRATORY:   No SOB, cough, dyspnea on exertion.  No wheezing  GASTROINTESTINAL:  No abd pain, N/V, diarrhea/constipation  EXTREMITIES:  No swelling or joint pain  GENITOURINARY:  No burning on urination, increased frequency or urgency.  No flank pain  NEUROLOGIC:  No HA, visual disturbances  SKIN: No rashes    Allergies    No Known Allergies    Intolerances        ANTIBIOTICS/RELEVANT:  antimicrobials  ceFAZolin   IVPB 1000 milliGRAM(s) IV Intermittent every 12 hours  vancomycin  IVPB 750 milliGRAM(s) IV Intermittent every 24 hours    immunologic:    OTHER:  acetaminophen   Tablet .. 650 milliGRAM(s) Oral every 6 hours PRN  amLODIPine   Tablet 10 milliGRAM(s) Oral daily  aspirin enteric coated 81 milliGRAM(s) Oral daily  atorvastatin 40 milliGRAM(s) Oral at bedtime  clopidogrel Tablet 75 milliGRAM(s) Oral daily  dextrose 40% Gel 15 Gram(s) Oral once PRN  dextrose 5%. 1000 milliLiter(s) IV Continuous <Continuous>  dextrose 50% Injectable 12.5 Gram(s) IV Push once  dextrose 50% Injectable 25 Gram(s) IV Push once  dextrose 50% Injectable 25 Gram(s) IV Push once  docusate sodium 100 milliGRAM(s) Oral two times a day  folic acid 1 milliGRAM(s) Oral daily  furosemide    Tablet 40 milliGRAM(s) Oral two times a day  glucagon  Injectable 1 milliGRAM(s) IntraMuscular once PRN  insulin lispro (HumaLOG) corrective regimen sliding scale   SubCutaneous three times a day before meals  insulin lispro (HumaLOG) corrective regimen sliding scale   SubCutaneous at bedtime  iron sucrose IVPB 100 milliGRAM(s) IV Intermittent every 24 hours  lactobacillus acidophilus 1 Tablet(s) Oral daily  metoprolol succinate ER 50 milliGRAM(s) Oral daily  oxyCODONE    5 mG/acetaminophen 325 mG 1 Tablet(s) Oral every 4 hours PRN      Objective:  Vital Signs Last 24 Hrs  T(C): 36.7 (11 Feb 2019 13:03), Max: 36.9 (10 Feb 2019 21:09)  T(F): 98 (11 Feb 2019 13:03), Max: 98.5 (10 Feb 2019 21:09)  HR: 61 (11 Feb 2019 13:03) (60 - 76)  BP: 132/70 (11 Feb 2019 13:03) (131/80 - 143/69)  BP(mean): --  RR: 17 (11 Feb 2019 13:03) (17 - 18)  SpO2: 99% (11 Feb 2019 13:03) (97% - 99%)    PHYSICAL EXAM:  Constitutional:NAD  Eyes:SPIKE, EOMI  Ear/Nose/Throat: no thrush, mucositis.  Moist mucous membranes	  Neck:no JVD, no lymphadenopathy, supple  Respiratory: CTA lobito  Cardiovascular: S1S2 RRR, no murmurs  Gastrointestinal:soft, nontender,  nondistended (+) BS  Extremities:no e/e/c  Skin:  rt ft ace dsg c/d/i        LABS:                        8.5    7.87  )-----------( 327      ( 11 Feb 2019 06:04 )             27.4     02-11    136  |  103  |  63<H>  ----------------------------<  154<H>  4.1   |  21<L>  |  3.51<H>    Ca    8.7      11 Feb 2019 05:20  Phos  3.8     02-11  Mg     1.9     02-11                  Vancomycin Level, Trough: 14.4 ug/mL (02-09 @ 00:00)              MICROBIOLOGY:    Culture - Acid Fast Smear Concentrated (02.06.19 @ 15:04)    Culture - Acid Fast Smear Concentrated:   AFB SMEAR= NO ACID FAST BACILLI SEEN    Specimen Source: OTHER    Culture - Yeast and Fungus (02.06.19 @ 13:48)    Culture - Yeast and Fungus:   CULTURE NEGATIVE FOR YEASTS AND MOLDS AFTER 1 DAY    Specimen Source: FOOT    Culture - Tissue with Gram Stain (02.06.19 @ 13:48)    Gram Stain Wound:   NOS No Organisms Seen  WBC^White Blood Cells  QNTY CELLS IN GRAM STAIN: NO CELLS SEEN    -  Cefazolin: S <=4 ULISSES    -  Ciprofloxacin: S <=1 ULISSES    -  Erythromycin: S 0.5 ULISSES    -  Clindamycin: S 0.5 ULISSES    -  Gentamicin: S <=1 ULISSES    -  Levofloxacin: S <=0.5 ULISSES    -  Moxifloxacin(Aerobic): S <=0.5 ULISSES    -  Oxacillin: S <=0.25 ULISSES    -  Rifampin: S <=1 ULISSES    -  Tetra/Doxy: S <=1 ULISSES    -  Trimethoprim/Sulfamethoxazole: S <=0.5/9.5 ULISSES    -  Vancomycin: S 2 ULISSES    Culture - Tissue:   ISOLATED FROM LIQUID MEDIA.    Specimen Source: FOOT    Organism Identification: Staphylococcus aureus    Organism: Staphylococcus aureus    Method Type: POSITIVE ULISSES 29    Culture - Tissue with Gram Stain (02.06.19 @ 13:48)    Gram Stain Wound:   NOS No Organisms Seen  WBC^White Blood Cells  QNTY CELLS IN GRAM STAIN: NO CELLS SEEN    -  Cefazolin: S <=4 ULISSES    -  Ciprofloxacin: S <=1 ULISSES    -  Erythromycin: S 0.5 ULISSES    -  Clindamycin: S 0.5 ULISSES    -  Gentamicin: S <=1 ULISSES    -  Levofloxacin: S <=0.5 ULISSES    -  Moxifloxacin(Aerobic): S <=0.5 ULISSES    -  Oxacillin: S <=0.25 ULISSES    -  Rifampin: S <=1 ULISSES    -  Tetra/Doxy: S <=1 ULISSES    -  Trimethoprim/Sulfamethoxazole: S <=0.5/9.5 ULISSES    -  Vancomycin: S 2 ULISSES    Culture - Tissue:   ISOLATED FROM LIQUID MEDIA.    Specimen Source: FOOT    Organism Identification: Staphylococcus aureus    Organism: Staphylococcus aureus    Method Type: POSITIVE ULISSES 29    Culture - Yeast and Fungus:   CULTURE NEGATIVE FOR YEASTS AND MOLDS AFTER 1 DAY (02.06.19 @ 13:48)          RADIOLOGY & ADDITIONAL STUDIES:    < from: Transthoracic Echocardiogram (02.10.19 @ 10:23) >  CONCLUSIONS:  1. Mitral annular calcification and calcified mitral  leaflets with normal diastolic opening. No obvious  vegetation noted. Minimal mitral regurgitation.  2. Calcified trileaflet aortic valve with normal opening.  No obvious vegetation noted.  3. Moderately dilated left atrium.  LA volume index = 48  cc/m2.  4. Normal left ventricular internal dimensions and wall  thicknesses.    < end of copied text >

## 2019-02-11 NOTE — PROGRESS NOTE ADULT - ASSESSMENT
63 year old male, with past history significant for Type-II DM, HTN, and R-Foot Charcot deformity, Osteomyelitis, Vitamin D deficiency, Anemia, CKD-IV, HLD and GERD, presented to the ED, as sent by podiatrist, secondary +increasing swelling, pain and bleeding. Patient was recently in hopsital planned for foot debridement by pod however had +NST s/p PCI so procedure was delayed , pt admitted today for further rt foot wound management (04 Feb 2019 23:16)    Pt recently admitted for infected plantar ulcer, no OM on nuclear scan.  Wound cultures grew staph aureus, pseudomonas, and GBS.  Pt completed IV abx and transitioned to PO cipro upon discharge.      ER vitals:  T 101.5, P P 84, /79.  Pt with elevated WBC and inflammatory markers.  Bcx growing staph aureus, PCR +.  Currently on cefepime.  ID consult called for further abx managment.       Recommend:    - Staph aureus bacteremia, likely secondary to infected rt foot plantar ulcer and OM.  Now with purulent drainage.  Foot xray with soft tissue swelling and foci of air suspicious for OM.  Pt seen by podiatry, and  s/p OR for 1st TMT exostectomy on 2/7    - f/u intra-op cultures and bone pathology of clean margins. Long term abx recommended due to purulent drainage and pt with h/o of diabetes.  Pt with high risk of recurrent infection and OM.      - Echocardiogram prior to discharge.      - Trend ESR/CRP.    - Cultures reviewed - growing MSSA and coryne.  Cont vanco, and start cefazolin 1 gm iv q12.  d/c cefepime.  Plan for 6 wk course (2/6 through 3/19).  Check weekly WBC, esr, crp, vanco trough.        Will follow,    Salima Spivey    [phone] 63 year old male, with past history significant for Type-II DM, HTN, and R-Foot Charcot deformity, Osteomyelitis, Vitamin D deficiency, Anemia, CKD-IV, HLD and GERD, presented to the ED, as sent by podiatrist, secondary +increasing swelling, pain and bleeding. Patient was recently in hopsital planned for foot debridement by pod however had +NST s/p PCI so procedure was delayed , pt admitted today for further rt foot wound management (04 Feb 2019 23:16)    Pt recently admitted for infected plantar ulcer, no OM on nuclear scan.  Wound cultures grew staph aureus, pseudomonas, and GBS.  Pt completed IV abx and transitioned to PO cipro upon discharge.      ER vitals:  T 101.5, P P 84, /79.  Pt with elevated WBC and inflammatory markers.  Bcx growing staph aureus, PCR +.  Currently on cefepime.  ID consult called for further abx managment.       Recommend:    - Staph aureus bacteremia, likely secondary to infected rt foot plantar ulcer and OM.  Now with purulent drainage.  Foot xray with soft tissue swelling and foci of air suspicious for OM.  Pt seen by podiatry, and  s/p OR for 1st TMT exostectomy on 2/7    - f/u intra-op cultures and bone pathology of clean margins. Long term abx recommended due to purulent drainage and pt with h/o of diabetes.  Pt with high risk of recurrent infection and OM.      - Echocardiogram (TTE) reviewed - no suggestion of vegetations.  Do not suspect endocarditis as repeat bcx cleared.  Source is likely infected Rt ft, source has now been removed post surgery.      - Trend ESR/CRP.    - Cultures reviewed - growing MSSA and coryne.  Cont vanco, and start cefazolin 1 gm iv q12.  d/c cefepime.  Plan for 6 wk course (2/6 through 3/19).  Check weekly WBC, esr, crp, vanco trough.      d/c planning.        Will follow,    Salima Spivey    [phone]

## 2019-02-11 NOTE — PROGRESS NOTE ADULT - SUBJECTIVE AND OBJECTIVE BOX
Patient is a 63y old  Male who presents with a chief complaint of rt foot wound (10 Feb 2019 14:40)       INTERVAL HPI/OVERNIGHT EVENTS:  Patient seen and evaluated at bedside.  Pt is resting comfortable in NAD. Denies N/V/F/C.  Pain rated at X/10    Allergies    No Known Allergies    Intolerances        Vital Signs Last 24 Hrs  T(C): 36.8 (11 Feb 2019 04:18), Max: 36.9 (10 Feb 2019 10:00)  T(F): 98.3 (11 Feb 2019 04:18), Max: 98.5 (10 Feb 2019 21:09)  HR: 60 (11 Feb 2019 04:18) (60 - 76)  BP: 143/69 (11 Feb 2019 04:18) (131/80 - 143/69)  BP(mean): --  RR: 18 (11 Feb 2019 04:18) (17 - 18)  SpO2: 97% (11 Feb 2019 04:18) (97% - 100%)    LABS:                        8.5    7.87  )-----------( 327      ( 11 Feb 2019 06:04 )             27.4     02-11    136  |  103  |  63<H>  ----------------------------<  154<H>  4.1   |  21<L>  |  3.51<H>    Ca    8.7      11 Feb 2019 05:20  Phos  3.8     02-11  Mg     1.9     02-11          CAPILLARY BLOOD GLUCOSE      POCT Blood Glucose.: 120 mg/dL (11 Feb 2019 07:42)  POCT Blood Glucose.: 218 mg/dL (10 Feb 2019 21:37)  POCT Blood Glucose.: 120 mg/dL (10 Feb 2019 16:43)  POCT Blood Glucose.: 126 mg/dL (10 Feb 2019 11:43)      Lower Extremity Physical Exam:  s/p R plantar foot charcot planing, closed. Sutures intact, drain pulled with minimal bleeding noted. No hematoma, no signs of infection.        RADIOLOGY & ADDITIONAL TESTS:

## 2019-02-11 NOTE — PROGRESS NOTE ADULT - SUBJECTIVE AND OBJECTIVE BOX
SUBJECTIVE / OVERNIGHT EVENTS: pt denies chest pain, shortness of breath, nausea,v     MEDICATIONS  (STANDING):  amLODIPine   Tablet 10 milliGRAM(s) Oral daily  aspirin enteric coated 81 milliGRAM(s) Oral daily  atorvastatin 40 milliGRAM(s) Oral at bedtime  ceFAZolin   IVPB 1000 milliGRAM(s) IV Intermittent every 12 hours  clopidogrel Tablet 75 milliGRAM(s) Oral daily  dextrose 5%. 1000 milliLiter(s) (50 mL/Hr) IV Continuous <Continuous>  dextrose 50% Injectable 12.5 Gram(s) IV Push once  dextrose 50% Injectable 25 Gram(s) IV Push once  dextrose 50% Injectable 25 Gram(s) IV Push once  docusate sodium 100 milliGRAM(s) Oral two times a day  folic acid 1 milliGRAM(s) Oral daily  furosemide    Tablet 40 milliGRAM(s) Oral two times a day  heparin  Injectable 5000 Unit(s) SubCutaneous every 8 hours  insulin lispro (HumaLOG) corrective regimen sliding scale   SubCutaneous three times a day before meals  insulin lispro (HumaLOG) corrective regimen sliding scale   SubCutaneous at bedtime  iron sucrose IVPB 100 milliGRAM(s) IV Intermittent every 24 hours  lactobacillus acidophilus 1 Tablet(s) Oral daily  metoprolol succinate ER 50 milliGRAM(s) Oral daily  vancomycin  IVPB 750 milliGRAM(s) IV Intermittent every 24 hours    MEDICATIONS  (PRN):  acetaminophen   Tablet .. 650 milliGRAM(s) Oral every 6 hours PRN Mild Pain (1 - 3)  dextrose 40% Gel 15 Gram(s) Oral once PRN Blood Glucose LESS THAN 70 milliGRAM(s)/deciliter  glucagon  Injectable 1 milliGRAM(s) IntraMuscular once PRN Glucose LESS THAN 70 milligrams/deciliter  oxyCODONE    5 mG/acetaminophen 325 mG 1 Tablet(s) Oral every 4 hours PRN Moderate Pain (4 - 6)    Vital Signs Last 24 Hrs  T(C): 36.7 (11 Feb 2019 13:03), Max: 36.8 (11 Feb 2019 04:18)  T(F): 98 (11 Feb 2019 13:03), Max: 98.3 (11 Feb 2019 04:18)  HR: 70 (11 Feb 2019 17:45) (60 - 70)  BP: 135/73 (11 Feb 2019 17:45) (132/70 - 143/69)  BP(mean): --  RR: 17 (11 Feb 2019 13:03) (17 - 18)  SpO2: 99% (11 Feb 2019 13:03) (97% - 99%)    Constitutional: No fever, fatigue  Skin: No rash.  Eyes: No recent vision problems or eye pain.  ENT: No congestion, ear pain, or sore throat.  Cardiovascular: No chest pain or palpation.  Respiratory: No cough, shortness of breath, congestion, or wheezing.  Gastrointestinal: No abdominal pain, nausea, vomiting, or diarrhea.  Genitourinary: No dysuria.  Musculoskeletal: No joint swelling.  Neurologic: No headache.    PHYSICAL EXAM:  GENERAL: NAD  EYES: EOMI, PERRLA  NECK: Supple, No JVD  CHEST/LUNG: dec breath sounds rt base   HEART:  S1 , S2 +  ABDOMEN: soft, bs+  EXTREMITIES:  rt foot dressing +  NEUROLOGY:alert awake    LABS:  02-11    136  |  103  |  63<H>  ----------------------------<  154<H>  4.1   |  21<L>  |  3.51<H>    Ca    8.7      11 Feb 2019 05:20  Phos  3.8     02-11  Mg     1.9     02-11      Creatinine Trend: 3.51 <--, 3.53 <--, 3.49 <--, 3.61 <--, 3.46 <--, 3.60 <--, 3.57 <--                        8.5    7.87  )-----------( 327      ( 11 Feb 2019 06:04 )             27.4     Urine Studies:

## 2019-02-11 NOTE — PROGRESS NOTE ADULT - SUBJECTIVE AND OBJECTIVE BOX
[facility] [place] [facility]   MD DOMO WALLACE MD ANGELA WONG, PA    TEL:  OFFICE: [phone]  DR LEE CELL: [phone]  DR. GRULLON CELL: [phone]  MELANIA CORONEL CELL: [phone]        Patient is a 63y old  Male who presents with a chief complaint of rt foot wound (11 Feb 2019 08:52)      Patient seen and examined at bedside. No chest pain/sob    VITALS:  T(F): 98.3 (02-11-19 @ 04:18), Max: 98.5 (02-10-19 @ 21:09)  HR: 60 (02-11-19 @ 04:18)  BP: 143/69 (02-11-19 @ 04:18)  RR: 18 (02-11-19 @ 04:18)  SpO2: 97% (02-11-19 @ 04:18)  Wt(kg): --    02-10 @ 07:01  -  02-11 @ 07:00  --------------------------------------------------------  IN: 150 mL / OUT: 500 mL / NET: -350 mL          PHYSICAL EXAM:  Constitutional: NAD  Neck: No JVD  Respiratory: CTAB, no wheezes, rales or rhonchi  Cardiovascular: S1, S2, RRR  Gastrointestinal: BS+, soft, NT/ND  Extremities: No peripheral edema, right foot dressing d/c/i    Hospital Medications:   MEDICATIONS  (STANDING):  amLODIPine   Tablet 10 milliGRAM(s) Oral daily  aspirin enteric coated 81 milliGRAM(s) Oral daily  atorvastatin 40 milliGRAM(s) Oral at bedtime  cefepime   IVPB 1000 milliGRAM(s) IV Intermittent every 12 hours  clopidogrel Tablet 75 milliGRAM(s) Oral daily  dextrose 5%. 1000 milliLiter(s) (50 mL/Hr) IV Continuous <Continuous>  dextrose 50% Injectable 12.5 Gram(s) IV Push once  dextrose 50% Injectable 25 Gram(s) IV Push once  dextrose 50% Injectable 25 Gram(s) IV Push once  docusate sodium 100 milliGRAM(s) Oral two times a day  folic acid 1 milliGRAM(s) Oral daily  furosemide    Tablet 40 milliGRAM(s) Oral two times a day  insulin lispro (HumaLOG) corrective regimen sliding scale   SubCutaneous three times a day before meals  insulin lispro (HumaLOG) corrective regimen sliding scale   SubCutaneous at bedtime  lactobacillus acidophilus 1 Tablet(s) Oral daily  metoprolol succinate ER 50 milliGRAM(s) Oral daily  vancomycin  IVPB 750 milliGRAM(s) IV Intermittent every 24 hours      LABS:  02-11    136  |  103  |  63<H>  ----------------------------<  154<H>  4.1   |  21<L>  |  3.51<H>    Ca    8.7      11 Feb 2019 05:20  Phos  3.8     02-11  Mg     1.9     02-11      Creatinine Trend: 3.51 <--, 3.53 <--, 3.49 <--, 3.61 <--, 3.46 <--, 3.60 <--, 3.57 <--, 3.66 <--    Phosphorus Level, Serum: 3.8 mg/dL (02-11 @ 05:20)                              8.5    7.87  )-----------( 327      ( 11 Feb 2019 06:04 )             27.4     Urine Studies:      Iron 25, TIBC 145, %sat --      [01-17-19 @ 05:38]  Ferritin 291.5      [01-17-19 @ 05:38]  PTH 42.84 (Ca --)      [09-11-18 @ 05:45]   --  Vitamin D (25OH) 11.8      [09-11-18 @ 05:45]  HbA1c 6.0      [01-15-19 @ 05:45]  TSH 1.74      [01-15-19 @ 05:45]    HCV 0.09, Nonreactive Hepatitis C AB  S/CO Ratio                        Interpretation  < 1.0                                     Non-Reactive  1.0 - 4.9                           Weakly-Reactive  > 5.0                                 Reactive  Non-Reactive: [place] with a non-reactive HCV antibody  result is considered uninfected.  No further action is  needed unless recent infection is suspected.  In these  cases, consider repeat testing later to detect  seroconversion..  Weakly-Reactive: HCV antibody test is abnormal, HCV RNA  Qualitative test will follow.  Reactive: HCV antibody test is abnormal, HCV RNA  Qualitative test will follow.  Note: HCV antibody testing is performed on the [employer]   system.      [02-05-19 @ 00:01]      RADIOLOGY & ADDITIONAL STUDIES:

## 2019-02-11 NOTE — PROGRESS NOTE ADULT - SUBJECTIVE AND OBJECTIVE BOX
Keaton Duvall MD  Interventional Cardiology / Advance Heart Failure and Cardiac Transplant Specialist  [place] Office : [address] [place] [address]  Tel:   [place] Office : [address] [facility] [place] [address]  Tel: [phone]  Cell : [phone]    Subjective : Pt lying in bed comfortable, not in distress, denies any chest pain or SOB  	  MEDICATIONS:  amLODIPine   Tablet 10 milliGRAM(s) Oral daily  aspirin enteric coated 81 milliGRAM(s) Oral daily  clopidogrel Tablet 75 milliGRAM(s) Oral daily  furosemide    Tablet 40 milliGRAM(s) Oral two times a day  metoprolol succinate ER 50 milliGRAM(s) Oral daily    cefepime   IVPB 1000 milliGRAM(s) IV Intermittent every 12 hours  vancomycin  IVPB 750 milliGRAM(s) IV Intermittent every 24 hours      acetaminophen   Tablet .. 650 milliGRAM(s) Oral every 6 hours PRN  oxyCODONE    5 mG/acetaminophen 325 mG 1 Tablet(s) Oral every 4 hours PRN    docusate sodium 100 milliGRAM(s) Oral two times a day    atorvastatin 40 milliGRAM(s) Oral at bedtime  dextrose 40% Gel 15 Gram(s) Oral once PRN  dextrose 50% Injectable 12.5 Gram(s) IV Push once  dextrose 50% Injectable 25 Gram(s) IV Push once  dextrose 50% Injectable 25 Gram(s) IV Push once  glucagon  Injectable 1 milliGRAM(s) IntraMuscular once PRN  insulin lispro (HumaLOG) corrective regimen sliding scale   SubCutaneous three times a day before meals  insulin lispro (HumaLOG) corrective regimen sliding scale   SubCutaneous at bedtime    dextrose 5%. 1000 milliLiter(s) IV Continuous <Continuous>  folic acid 1 milliGRAM(s) Oral daily  iron sucrose IVPB 100 milliGRAM(s) IV Intermittent every 24 hours      PHYSICAL EXAM:  T(C): 36.7 (02-11-19 @ 13:03), Max: 36.9 (02-10-19 @ 21:09)  HR: 61 (02-11-19 @ 13:03) (60 - 76)  BP: 132/70 (02-11-19 @ 13:03) (131/80 - 143/69)  RR: 17 (02-11-19 @ 13:03) (17 - 18)  SpO2: 99% (02-11-19 @ 13:03) (97% - 99%)  Wt(kg): --  I&O's Summary    10 Feb 2019 07:01  -  11 Feb 2019 07:00  --------------------------------------------------------  IN: 150 mL / OUT: 500 mL / NET: -350 mL            HEENT:   Normal oral mucosa, PERRL, EOMI	  Cardiovascular: Normal S1 S2, No JVD, No murmurs, No edema  Respiratory: Lungs clear to auscultation	  Gastrointestinal:  Soft, Non-tender, + BS	  Extremities: s/p toe amputation                                    8.5    7.87  )-----------( 327      ( 11 Feb 2019 06:04 )             27.4     02-11    136  |  103  |  63<H>  ----------------------------<  154<H>  4.1   |  21<L>  |  3.51<H>    Ca    8.7      11 Feb 2019 05:20  Phos  3.8     02-11  Mg     1.9     02-11      proBNP:   Lipid Profile:   HgA1c:   TSH:

## 2019-02-11 NOTE — PROGRESS NOTE ADULT - ASSESSMENT
63 M s/p R foot charcot planing, closed (DOS 2/6/19)  -Pt seen and evaluated, POD #5  -Sutures intact, no hematoma, no active bleeding, no signs of infection  -Prelim OR cultures negative, low concern for residual infection  -High concern for dehiscence based on location of incisions, strict non-weightbearing  -ID rec PICC once cultures, + stph awaiting sensitivities.  -Podiatry stable for discharge PICC is in place  -Choice of IV abx TBD  D/c planning home.

## 2019-02-12 LAB
ANION GAP SERPL CALC-SCNC: 15 MMO/L — HIGH (ref 7–14)
BASOPHILS # BLD AUTO: 0.05 K/UL — SIGNIFICANT CHANGE UP (ref 0–0.2)
BASOPHILS NFR BLD AUTO: 0.8 % — SIGNIFICANT CHANGE UP (ref 0–2)
BUN SERPL-MCNC: 65 MG/DL — HIGH (ref 7–23)
CALCIUM SERPL-MCNC: 8.9 MG/DL — SIGNIFICANT CHANGE UP (ref 8.4–10.5)
CHLORIDE SERPL-SCNC: 102 MMOL/L — SIGNIFICANT CHANGE UP (ref 98–107)
CO2 SERPL-SCNC: 22 MMOL/L — SIGNIFICANT CHANGE UP (ref 22–31)
CREAT SERPL-MCNC: 3.59 MG/DL — HIGH (ref 0.5–1.3)
CRP SERPL-MCNC: 18.3 MG/L — HIGH
EOSINOPHIL # BLD AUTO: 0.45 K/UL — SIGNIFICANT CHANGE UP (ref 0–0.5)
EOSINOPHIL NFR BLD AUTO: 6.8 % — HIGH (ref 0–6)
ERYTHROCYTE [SEDIMENTATION RATE] IN BLOOD: 96 MM/HR — HIGH (ref 1–15)
GLUCOSE BLDC GLUCOMTR-MCNC: 117 MG/DL — HIGH (ref 70–99)
GLUCOSE BLDC GLUCOMTR-MCNC: 149 MG/DL — HIGH (ref 70–99)
GLUCOSE BLDC GLUCOMTR-MCNC: 170 MG/DL — HIGH (ref 70–99)
GLUCOSE BLDC GLUCOMTR-MCNC: 184 MG/DL — HIGH (ref 70–99)
GLUCOSE SERPL-MCNC: 111 MG/DL — HIGH (ref 70–99)
HCT VFR BLD CALC: 27.8 % — LOW (ref 39–50)
HGB BLD-MCNC: 8.7 G/DL — LOW (ref 13–17)
IMM GRANULOCYTES NFR BLD AUTO: 0.3 % — SIGNIFICANT CHANGE UP (ref 0–1.5)
LYMPHOCYTES # BLD AUTO: 2.2 K/UL — SIGNIFICANT CHANGE UP (ref 1–3.3)
LYMPHOCYTES # BLD AUTO: 33.3 % — SIGNIFICANT CHANGE UP (ref 13–44)
MAGNESIUM SERPL-MCNC: 2 MG/DL — SIGNIFICANT CHANGE UP (ref 1.6–2.6)
MCHC RBC-ENTMCNC: 27.9 PG — SIGNIFICANT CHANGE UP (ref 27–34)
MCHC RBC-ENTMCNC: 31.3 % — LOW (ref 32–36)
MCV RBC AUTO: 89.1 FL — SIGNIFICANT CHANGE UP (ref 80–100)
MONOCYTES # BLD AUTO: 0.6 K/UL — SIGNIFICANT CHANGE UP (ref 0–0.9)
MONOCYTES NFR BLD AUTO: 9.1 % — SIGNIFICANT CHANGE UP (ref 2–14)
NEUTROPHILS # BLD AUTO: 3.28 K/UL — SIGNIFICANT CHANGE UP (ref 1.8–7.4)
NEUTROPHILS NFR BLD AUTO: 49.7 % — SIGNIFICANT CHANGE UP (ref 43–77)
NRBC # FLD: 0 K/UL — LOW (ref 25–125)
PHOSPHATE SERPL-MCNC: 4.4 MG/DL — SIGNIFICANT CHANGE UP (ref 2.5–4.5)
PLATELET # BLD AUTO: 311 K/UL — SIGNIFICANT CHANGE UP (ref 150–400)
PMV BLD: 10.1 FL — SIGNIFICANT CHANGE UP (ref 7–13)
POTASSIUM SERPL-MCNC: 4.2 MMOL/L — SIGNIFICANT CHANGE UP (ref 3.5–5.3)
POTASSIUM SERPL-SCNC: 4.2 MMOL/L — SIGNIFICANT CHANGE UP (ref 3.5–5.3)
RBC # BLD: 3.12 M/UL — LOW (ref 4.2–5.8)
RBC # FLD: 13.4 % — SIGNIFICANT CHANGE UP (ref 10.3–14.5)
SODIUM SERPL-SCNC: 139 MMOL/L — SIGNIFICANT CHANGE UP (ref 135–145)
VANCOMYCIN TROUGH SERPL-MCNC: 22.6 UG/ML — HIGH (ref 10–20)
WBC # BLD: 6.6 K/UL — SIGNIFICANT CHANGE UP (ref 3.8–10.5)
WBC # FLD AUTO: 6.6 K/UL — SIGNIFICANT CHANGE UP (ref 3.8–10.5)

## 2019-02-12 RX ORDER — VANCOMYCIN HCL 1 G
500 VIAL (EA) INTRAVENOUS
Qty: 500 | Refills: 0
Start: 2019-02-12 | End: 2019-03-18

## 2019-02-12 RX ORDER — VANCOMYCIN HCL 1 G
750 VIAL (EA) INTRAVENOUS
Qty: 750 | Refills: 0 | OUTPATIENT
Start: 2019-02-12 | End: 2019-03-18

## 2019-02-12 RX ORDER — CEFAZOLIN SODIUM 1 G
1 VIAL (EA) INJECTION
Qty: 1 | Refills: 0
Start: 2019-02-12 | End: 2019-03-18

## 2019-02-12 RX ADMIN — Medication 100 MILLIGRAM(S): at 17:26

## 2019-02-12 RX ADMIN — Medication 1 MILLIGRAM(S): at 11:02

## 2019-02-12 RX ADMIN — Medication 100 MILLIGRAM(S): at 05:47

## 2019-02-12 RX ADMIN — Medication 1 TABLET(S): at 11:02

## 2019-02-12 RX ADMIN — Medication 40 MILLIGRAM(S): at 17:26

## 2019-02-12 RX ADMIN — Medication 40 MILLIGRAM(S): at 05:47

## 2019-02-12 RX ADMIN — HEPARIN SODIUM 5000 UNIT(S): 5000 INJECTION INTRAVENOUS; SUBCUTANEOUS at 13:07

## 2019-02-12 RX ADMIN — ATORVASTATIN CALCIUM 40 MILLIGRAM(S): 80 TABLET, FILM COATED ORAL at 21:17

## 2019-02-12 RX ADMIN — Medication 650 MILLIGRAM(S): at 21:17

## 2019-02-12 RX ADMIN — HEPARIN SODIUM 5000 UNIT(S): 5000 INJECTION INTRAVENOUS; SUBCUTANEOUS at 21:46

## 2019-02-12 RX ADMIN — Medication 81 MILLIGRAM(S): at 11:02

## 2019-02-12 RX ADMIN — CLOPIDOGREL BISULFATE 75 MILLIGRAM(S): 75 TABLET, FILM COATED ORAL at 11:02

## 2019-02-12 RX ADMIN — AMLODIPINE BESYLATE 10 MILLIGRAM(S): 2.5 TABLET ORAL at 05:46

## 2019-02-12 RX ADMIN — HEPARIN SODIUM 5000 UNIT(S): 5000 INJECTION INTRAVENOUS; SUBCUTANEOUS at 05:47

## 2019-02-12 RX ADMIN — IRON SUCROSE 210 MILLIGRAM(S): 20 INJECTION, SOLUTION INTRAVENOUS at 17:26

## 2019-02-12 RX ADMIN — Medication 100 MILLIGRAM(S): at 05:48

## 2019-02-12 RX ADMIN — Medication 50 MILLIGRAM(S): at 11:02

## 2019-02-12 RX ADMIN — Medication 650 MILLIGRAM(S): at 22:09

## 2019-02-12 RX ADMIN — Medication 1: at 16:49

## 2019-02-12 NOTE — PROGRESS NOTE ADULT - SUBJECTIVE AND OBJECTIVE BOX
Infectious Diseases progress note:    Subjective: Pt unavailable at time of bedside visit.  Has been using scooter to help ambulate.  Pt is non wt bearing on operated foot.      ROS:  CONSTITUTIONAL:  No fever, chills, rigors  CARDIOVASCULAR:  No chest pain or palpitations  RESPIRATORY:   No SOB, cough, dyspnea on exertion.  No wheezing  GASTROINTESTINAL:  No abd pain, N/V, diarrhea/constipation  EXTREMITIES:  No swelling or joint pain  GENITOURINARY:  No burning on urination, increased frequency or urgency.  No flank pain  NEUROLOGIC:  No HA, visual disturbances  SKIN: No rashes    Allergies    No Known Allergies    Intolerances        ANTIBIOTICS/RELEVANT:  antimicrobials  ceFAZolin   IVPB 1000 milliGRAM(s) IV Intermittent every 12 hours  vancomycin  IVPB 750 milliGRAM(s) IV Intermittent every 24 hours    immunologic:    OTHER:  acetaminophen   Tablet .. 650 milliGRAM(s) Oral every 6 hours PRN  amLODIPine   Tablet 10 milliGRAM(s) Oral daily  aspirin enteric coated 81 milliGRAM(s) Oral daily  atorvastatin 40 milliGRAM(s) Oral at bedtime  clopidogrel Tablet 75 milliGRAM(s) Oral daily  dextrose 40% Gel 15 Gram(s) Oral once PRN  dextrose 5%. 1000 milliLiter(s) IV Continuous <Continuous>  dextrose 50% Injectable 12.5 Gram(s) IV Push once  dextrose 50% Injectable 25 Gram(s) IV Push once  dextrose 50% Injectable 25 Gram(s) IV Push once  docusate sodium 100 milliGRAM(s) Oral two times a day  folic acid 1 milliGRAM(s) Oral daily  furosemide    Tablet 40 milliGRAM(s) Oral two times a day  glucagon  Injectable 1 milliGRAM(s) IntraMuscular once PRN  heparin  Injectable 5000 Unit(s) SubCutaneous every 8 hours  insulin lispro (HumaLOG) corrective regimen sliding scale   SubCutaneous three times a day before meals  insulin lispro (HumaLOG) corrective regimen sliding scale   SubCutaneous at bedtime  iron sucrose IVPB 100 milliGRAM(s) IV Intermittent every 24 hours  lactobacillus acidophilus 1 Tablet(s) Oral daily  metoprolol succinate ER 50 milliGRAM(s) Oral daily  oxyCODONE    5 mG/acetaminophen 325 mG 1 Tablet(s) Oral every 4 hours PRN      Objective:  Vital Signs Last 24 Hrs  T(C): 36.3 (12 Feb 2019 13:54), Max: 36.7 (11 Feb 2019 22:21)  T(F): 97.3 (12 Feb 2019 13:54), Max: 98.1 (11 Feb 2019 22:21)  HR: 63 (12 Feb 2019 13:54) (57 - 72)  BP: 119/60 (12 Feb 2019 13:54) (119/60 - 142/71)  BP(mean): --  RR: 18 (12 Feb 2019 13:54) (18 - 18)  SpO2: 99% (12 Feb 2019 13:54) (99% - 100%)    PHYSICAL EXAM:  Constitutional:NAD  Eyes:SPIKE, EOMI  Ear/Nose/Throat: no thrush, mucositis.  Moist mucous membranes	  Neck:no JVD, no lymphadenopathy, supple  Respiratory: CTA lobito  Cardiovascular: S1S2 RRR, no murmurs  Gastrointestinal:soft, nontender,  nondistended (+) BS  Extremities:no e/e/c  Skin:  no rashes, open wounds or ulcerations        LABS:                        8.7    6.60  )-----------( 311      ( 12 Feb 2019 05:20 )             27.8     02-12    139  |  102  |  65<H>  ----------------------------<  111<H>  4.2   |  22  |  3.59<H>    Ca    8.9      12 Feb 2019 05:20  Phos  4.4     02-12  Mg     2.0     02-12                  Vancomycin Level, Trough: 22.6 ug/mL (02-12 @ 00:15)  Vancomycin Level, Trough: 14.4 ug/mL (02-09 @ 00:00)              MICROBIOLOGY:    Culture - Acid Fast Smear Concentrated (02.06.19 @ 15:04)    Culture - Acid Fast Smear Concentrated:   AFB SMEAR= NO ACID FAST BACILLI SEEN    Specimen Source: OTHER    Culture - Yeast and Fungus (02.06.19 @ 13:48)    Culture - Yeast and Fungus:   CULTURE NEGATIVE FOR YEASTS AND MOLDS AFTER 1 DAY  CULTURE NEGATIVE FOR YEASTS AND MOLDS   AFTER 6 DAYS    Specimen Source: FOOT          RADIOLOGY & ADDITIONAL STUDIES:

## 2019-02-12 NOTE — PROGRESS NOTE ADULT - SUBJECTIVE AND OBJECTIVE BOX
[facility]   MD DOMO WALLACE MD ANGELA WONG, PA    TEL:  OFFICE: [phone]  DR LEE CELL: [phone]  DR. GRULLON CELL: [phone]  MELANIA CORONEL CELL: [phone]        Patient is a 63y old  Male who presents with a chief complaint of rt foot wound (11 Feb 2019 16:18)      Patient seen and examined at bedside. No chest pain/sob    VITALS:  T(F): 97.3 (02-12-19 @ 13:54), Max: 98.1 (02-11-19 @ 22:21)  HR: 63 (02-12-19 @ 13:54)  BP: 119/60 (02-12-19 @ 13:54)  RR: 18 (02-12-19 @ 13:54)  SpO2: 99% (02-12-19 @ 13:54)  Wt(kg): --        PHYSICAL EXAM:  Constitutional: NAD  Neck: No JVD  Respiratory: CTAB, no wheezes, rales or rhonchi  Cardiovascular: S1, S2, RRR  Gastrointestinal: BS+, soft, NT/ND  Extremities: No peripheral edema, right foot dressing d/c/i. left arm picc in place     Hospital Medications:   MEDICATIONS  (STANDING):  amLODIPine   Tablet 10 milliGRAM(s) Oral daily  aspirin enteric coated 81 milliGRAM(s) Oral daily  atorvastatin 40 milliGRAM(s) Oral at bedtime  ceFAZolin   IVPB 1000 milliGRAM(s) IV Intermittent every 12 hours  clopidogrel Tablet 75 milliGRAM(s) Oral daily  dextrose 5%. 1000 milliLiter(s) (50 mL/Hr) IV Continuous <Continuous>  dextrose 50% Injectable 12.5 Gram(s) IV Push once  dextrose 50% Injectable 25 Gram(s) IV Push once  dextrose 50% Injectable 25 Gram(s) IV Push once  docusate sodium 100 milliGRAM(s) Oral two times a day  folic acid 1 milliGRAM(s) Oral daily  furosemide    Tablet 40 milliGRAM(s) Oral two times a day  heparin  Injectable 5000 Unit(s) SubCutaneous every 8 hours  insulin lispro (HumaLOG) corrective regimen sliding scale   SubCutaneous three times a day before meals  insulin lispro (HumaLOG) corrective regimen sliding scale   SubCutaneous at bedtime  iron sucrose IVPB 100 milliGRAM(s) IV Intermittent every 24 hours  lactobacillus acidophilus 1 Tablet(s) Oral daily  metoprolol succinate ER 50 milliGRAM(s) Oral daily  vancomycin  IVPB 750 milliGRAM(s) IV Intermittent every 24 hours      LABS:  02-12    139  |  102  |  65<H>  ----------------------------<  111<H>  4.2   |  22  |  3.59<H>    Ca    8.9      12 Feb 2019 05:20  Phos  4.4     02-12  Mg     2.0     02-12      Creatinine Trend: 3.59 <--, 3.51 <--, 3.53 <--, 3.49 <--, 3.61 <--, 3.46 <--, 3.60 <--    Phosphorus Level, Serum: 4.4 mg/dL (02-12 @ 05:20)                              8.7    6.60  )-----------( 311      ( 12 Feb 2019 05:20 )             27.8     Urine Studies:      Iron 25, TIBC 145, %sat --      [01-17-19 @ 05:38]  Ferritin 291.5      [01-17-19 @ 05:38]  PTH 42.84 (Ca --)      [09-11-18 @ 05:45]   --  Vitamin D (25OH) 11.8      [09-11-18 @ 05:45]  HbA1c 6.0      [01-15-19 @ 05:45]  TSH 1.74      [01-15-19 @ 05:45]    HCV 0.09, Nonreactive Hepatitis C AB  S/CO Ratio                        Interpretation  < 1.0                                     Non-Reactive  1.0 - 4.9                           Weakly-Reactive  > 5.0                                 Reactive  Non-Reactive: Aperson with a non-reactive HCV antibody  result is considered uninfected.  No further action is  needed unless recent infection is suspected.  In these  cases, consider repeat testing later to detect  seroconversion..  Weakly-Reactive: HCV antibody test is abnormal, HCV RNA  Qualitative test will follow.  Reactive: HCV antibody test is abnormal, HCV RNA  Qualitative test will follow.  Note: HCV antibody testing is performed on the [employer] system.      [02-05-19 @ 00:01]      RADIOLOGY & ADDITIONAL STUDIES:

## 2019-02-12 NOTE — PROGRESS NOTE ADULT - ASSESSMENT
63 year old male, with past history significant for Type-II DM, HTN, and R-Foot Charcot deformity, Osteomyelitis, Vitamin D deficiency, Anemia, CKD-IV, HLD and GERD, presented to the ED, as sent by podiatrist, secondary +increasing swelling, pain and bleeding. Patient was recently in hopsital planned for foot debridement by pod however had +NST s/p PCI so procedure was delayed , pt admitted today for further rt foot wound management (04 Feb 2019 23:16)    Pt recently admitted for infected plantar ulcer, no OM on nuclear scan.  Wound cultures grew staph aureus, pseudomonas, and GBS.  Pt completed IV abx and transitioned to PO cipro upon discharge.      ER vitals:  T 101.5, P P 84, /79.  Pt with elevated WBC and inflammatory markers.  Bcx growing staph aureus, PCR +.  Currently on cefepime.  ID consult called for further abx managment.       Recommend:    - Staph aureus bacteremia, likely secondary to infected rt foot plantar ulcer and OM. s/p OR for 1st TMT exostectomy on 2/7    - f/u intra-op cultures and bone pathology of clean margins. Long term abx recommended due to purulent drainage and pt with h/o of diabetes.  Pt with high risk of recurrent infection and OM.      - Echocardiogram (TTE) reviewed - no suggestion of vegetations.  Do not suspect endocarditis as repeat bcx cleared.  Source is likely infected Rt ft, source has now been removed post surgery.      - Cultures reviewed - growing MSSA and coryne.  Cont vanco, and start cefazolin 1 gm iv q12.  d/c cefepime.  Plan for 6 wk course (2/6 through 3/19).  Check weekly WBC, esr, crp,  and biweekly vanco trough.      - Can hold today's vanco dose.  Check repeat random level on 2/13, if < 20, start vanco at 500mg IV q24.    d/c planning.        Will follow,    Salima Spivey    [phone]

## 2019-02-12 NOTE — PROGRESS NOTE ADULT - SUBJECTIVE AND OBJECTIVE BOX
Keaton Duvall MD  Interventional Cardiology / Advance Heart Failure and Cardiac Transplant Specialist  [place] Office : [address] [place] [address]  Tel:   [place] Office : [address] [facility] [place] [address]  Tel: [phone]  Cell : [phone]    Subjective : Pt lying in bed comfortable, not in distress, denies any chest pain or SOB  	  MEDICATIONS:  amLODIPine   Tablet 10 milliGRAM(s) Oral daily  aspirin enteric coated 81 milliGRAM(s) Oral daily  clopidogrel Tablet 75 milliGRAM(s) Oral daily  furosemide    Tablet 40 milliGRAM(s) Oral two times a day  heparin  Injectable 5000 Unit(s) SubCutaneous every 8 hours  metoprolol succinate ER 50 milliGRAM(s) Oral daily    ceFAZolin   IVPB 1000 milliGRAM(s) IV Intermittent every 12 hours  vancomycin  IVPB 750 milliGRAM(s) IV Intermittent every 24 hours      acetaminophen   Tablet .. 650 milliGRAM(s) Oral every 6 hours PRN  oxyCODONE    5 mG/acetaminophen 325 mG 1 Tablet(s) Oral every 4 hours PRN    docusate sodium 100 milliGRAM(s) Oral two times a day    atorvastatin 40 milliGRAM(s) Oral at bedtime  dextrose 40% Gel 15 Gram(s) Oral once PRN  dextrose 50% Injectable 12.5 Gram(s) IV Push once  dextrose 50% Injectable 25 Gram(s) IV Push once  dextrose 50% Injectable 25 Gram(s) IV Push once  glucagon  Injectable 1 milliGRAM(s) IntraMuscular once PRN  insulin lispro (HumaLOG) corrective regimen sliding scale   SubCutaneous three times a day before meals  insulin lispro (HumaLOG) corrective regimen sliding scale   SubCutaneous at bedtime    dextrose 5%. 1000 milliLiter(s) IV Continuous <Continuous>  folic acid 1 milliGRAM(s) Oral daily  iron sucrose IVPB 100 milliGRAM(s) IV Intermittent every 24 hours      PHYSICAL EXAM:  T(C): 36.3 (02-12-19 @ 13:54), Max: 36.7 (02-11-19 @ 22:21)  HR: 63 (02-12-19 @ 13:54) (57 - 72)  BP: 119/60 (02-12-19 @ 13:54) (119/60 - 142/71)  RR: 18 (02-12-19 @ 13:54) (18 - 18)  SpO2: 99% (02-12-19 @ 13:54) (99% - 100%)  Wt(kg): --  I&O's Summary        Appearance: Normal	  HEENT:   Normal oral mucosa, PERRL, EOMI	  Cardiovascular: Normal S1 S2, No JVD, No murmurs, No edema  Respiratory: Lungs clear to auscultation	  Gastrointestinal:  Soft, Non-tender, + BS	  Extremities: Normal range of motion, No clubbing, cyanosis or edema                                    8.7    6.60  )-----------( 311      ( 12 Feb 2019 05:20 )             27.8     02-12    139  |  102  |  65<H>  ----------------------------<  111<H>  4.2   |  22  |  3.59<H>    Ca    8.9      12 Feb 2019 05:20  Phos  4.4     02-12  Mg     2.0     02-12      proBNP:   Lipid Profile:   HgA1c:   TSH:

## 2019-02-12 NOTE — PROGRESS NOTE ADULT - SUBJECTIVE AND OBJECTIVE BOX
SUBJECTIVE / OVERNIGHT EVENTS: pt denies chest pain, shortness of breath, nausea,v     MEDICATIONS  (STANDING):  amLODIPine   Tablet 10 milliGRAM(s) Oral daily  aspirin enteric coated 81 milliGRAM(s) Oral daily  atorvastatin 40 milliGRAM(s) Oral at bedtime  ceFAZolin   IVPB 1000 milliGRAM(s) IV Intermittent every 12 hours  clopidogrel Tablet 75 milliGRAM(s) Oral daily  dextrose 5%. 1000 milliLiter(s) (50 mL/Hr) IV Continuous <Continuous>  dextrose 50% Injectable 12.5 Gram(s) IV Push once  dextrose 50% Injectable 25 Gram(s) IV Push once  dextrose 50% Injectable 25 Gram(s) IV Push once  docusate sodium 100 milliGRAM(s) Oral two times a day  folic acid 1 milliGRAM(s) Oral daily  furosemide    Tablet 40 milliGRAM(s) Oral two times a day  heparin  Injectable 5000 Unit(s) SubCutaneous every 8 hours  insulin lispro (HumaLOG) corrective regimen sliding scale   SubCutaneous three times a day before meals  insulin lispro (HumaLOG) corrective regimen sliding scale   SubCutaneous at bedtime  iron sucrose IVPB 100 milliGRAM(s) IV Intermittent every 24 hours  lactobacillus acidophilus 1 Tablet(s) Oral daily  metoprolol succinate ER 50 milliGRAM(s) Oral daily  vancomycin  IVPB 750 milliGRAM(s) IV Intermittent every 24 hours    MEDICATIONS  (PRN):  acetaminophen   Tablet .. 650 milliGRAM(s) Oral every 6 hours PRN Mild Pain (1 - 3)  dextrose 40% Gel 15 Gram(s) Oral once PRN Blood Glucose LESS THAN 70 milliGRAM(s)/deciliter  glucagon  Injectable 1 milliGRAM(s) IntraMuscular once PRN Glucose LESS THAN 70 milligrams/deciliter  oxyCODONE    5 mG/acetaminophen 325 mG 1 Tablet(s) Oral every 4 hours PRN Moderate Pain (4 - 6)    Vital Signs Last 24 Hrs  T(C): 36.3 (12 Feb 2019 13:54), Max: 36.7 (11 Feb 2019 22:21)  T(F): 97.3 (12 Feb 2019 13:54), Max: 98.1 (11 Feb 2019 22:21)  HR: 63 (12 Feb 2019 13:54) (57 - 72)  BP: 119/60 (12 Feb 2019 13:54) (119/60 - 142/71)  BP(mean): --  RR: 18 (12 Feb 2019 13:54) (18 - 18)  SpO2: 99% (12 Feb 2019 13:54) (99% - 100%)    Constitutional: No fever, fatigue  Skin: No rash.  Eyes: No recent vision problems or eye pain.  ENT: No congestion, ear pain, or sore throat.  Cardiovascular: No chest pain or palpation.  Respiratory: No cough, shortness of breath, congestion, or wheezing.  Gastrointestinal: No abdominal pain, nausea, vomiting, or diarrhea.  Genitourinary: No dysuria.  Musculoskeletal: No joint swelling.  Neurologic: No headache.    PHYSICAL EXAM:  GENERAL: NAD  EYES: EOMI, PERRLA  NECK: Supple, No JVD  CHEST/LUNG: dec breath sounds rt base   HEART:  S1 , S2 +  ABDOMEN: soft, bs+  EXTREMITIES:  rt foot dressing +  NEUROLOGY:alert awake    LABS:  02-12    139  |  102  |  65<H>  ----------------------------<  111<H>  4.2   |  22  |  3.59<H>    Ca    8.9      12 Feb 2019 05:20  Phos  4.4     02-12  Mg     2.0     02-12      Creatinine Trend: 3.59 <--, 3.51 <--, 3.53 <--, 3.49 <--, 3.61 <--, 3.46 <--, 3.60 <--                        8.7    6.60  )-----------( 311      ( 12 Feb 2019 05:20 )             27.8     Urine Studies:

## 2019-02-13 VITALS
DIASTOLIC BLOOD PRESSURE: 70 MMHG | SYSTOLIC BLOOD PRESSURE: 132 MMHG | HEART RATE: 71 BPM | RESPIRATION RATE: 18 BRPM | TEMPERATURE: 98 F | OXYGEN SATURATION: 96 %

## 2019-02-13 LAB
GLUCOSE BLDC GLUCOMTR-MCNC: 123 MG/DL — HIGH (ref 70–99)
GLUCOSE BLDC GLUCOMTR-MCNC: 143 MG/DL — HIGH (ref 70–99)
SPECIMEN SOURCE: SIGNIFICANT CHANGE UP
VANCOMYCIN FLD-MCNC: 17.3 UG/ML — SIGNIFICANT CHANGE UP

## 2019-02-13 RX ORDER — AMLODIPINE BESYLATE 2.5 MG/1
1 TABLET ORAL
Qty: 0 | Refills: 0 | DISCHARGE
Start: 2019-02-13

## 2019-02-13 RX ORDER — ACETAMINOPHEN 500 MG
2 TABLET ORAL
Qty: 0 | Refills: 0 | DISCHARGE
Start: 2019-02-13

## 2019-02-13 RX ORDER — METOPROLOL TARTRATE 50 MG
1 TABLET ORAL
Qty: 0 | Refills: 0 | DISCHARGE
Start: 2019-02-13

## 2019-02-13 RX ORDER — ASPIRIN/CALCIUM CARB/MAGNESIUM 324 MG
1 TABLET ORAL
Qty: 0 | Refills: 0 | DISCHARGE
Start: 2019-02-13

## 2019-02-13 RX ORDER — VANCOMYCIN HCL 1 G
500 VIAL (EA) INTRAVENOUS EVERY 24 HOURS
Qty: 0 | Refills: 0 | Status: DISCONTINUED | OUTPATIENT
Start: 2019-02-13 | End: 2019-02-13

## 2019-02-13 RX ORDER — FUROSEMIDE 40 MG
1 TABLET ORAL
Qty: 0 | Refills: 0 | DISCHARGE
Start: 2019-02-13

## 2019-02-13 RX ORDER — FUROSEMIDE 40 MG
1 TABLET ORAL
Qty: 0 | Refills: 0 | COMMUNITY

## 2019-02-13 RX ORDER — ATORVASTATIN CALCIUM 80 MG/1
1 TABLET, FILM COATED ORAL
Qty: 0 | Refills: 0 | DISCHARGE
Start: 2019-02-13

## 2019-02-13 RX ORDER — CLOPIDOGREL BISULFATE 75 MG/1
1 TABLET, FILM COATED ORAL
Qty: 0 | Refills: 0 | DISCHARGE
Start: 2019-02-13

## 2019-02-13 RX ADMIN — Medication 40 MILLIGRAM(S): at 06:16

## 2019-02-13 RX ADMIN — Medication 50 MILLIGRAM(S): at 06:16

## 2019-02-13 RX ADMIN — Medication 1 TABLET(S): at 12:05

## 2019-02-13 RX ADMIN — Medication 81 MILLIGRAM(S): at 12:05

## 2019-02-13 RX ADMIN — Medication 100 MILLIGRAM(S): at 12:20

## 2019-02-13 RX ADMIN — Medication 1 MILLIGRAM(S): at 12:05

## 2019-02-13 RX ADMIN — CLOPIDOGREL BISULFATE 75 MILLIGRAM(S): 75 TABLET, FILM COATED ORAL at 12:05

## 2019-02-13 RX ADMIN — HEPARIN SODIUM 5000 UNIT(S): 5000 INJECTION INTRAVENOUS; SUBCUTANEOUS at 07:33

## 2019-02-13 RX ADMIN — AMLODIPINE BESYLATE 10 MILLIGRAM(S): 2.5 TABLET ORAL at 06:16

## 2019-02-13 RX ADMIN — Medication 100 MILLIGRAM(S): at 06:16

## 2019-02-13 NOTE — PROGRESS NOTE ADULT - PROBLEM SELECTOR PROBLEM 3
HTN (hypertension)
Anemia
HTN (hypertension)
Anemia

## 2019-02-13 NOTE — PROGRESS NOTE ADULT - PROVIDER SPECIALTY LIST ADULT
Cardiology
Infectious Disease
Internal Medicine
Nephrology
Podiatry
Cardiology
Nephrology
Internal Medicine

## 2019-02-13 NOTE — PROGRESS NOTE ADULT - SUBJECTIVE AND OBJECTIVE BOX
Keaton Duvall MD  Interventional Cardiology / Advance Heart Failure and Cardiac Transplant Specialist  [place] Office : [address] [place] [address] [facility] [address]  Tel:   [place] Office : [address] [facility] [place] [address]  Tel: [phone]  Cell : [phone]    Subjective : Pt lying in bed comfortable, not in distress, denies any chest pain or SOB  	  MEDICATIONS:  amLODIPine   Tablet 10 milliGRAM(s) Oral daily  aspirin enteric coated 81 milliGRAM(s) Oral daily  clopidogrel Tablet 75 milliGRAM(s) Oral daily  furosemide    Tablet 40 milliGRAM(s) Oral two times a day  heparin  Injectable 5000 Unit(s) SubCutaneous every 8 hours  metoprolol succinate ER 50 milliGRAM(s) Oral daily  ceFAZolin   IVPB 1000 milliGRAM(s) IV Intermittent every 12 hours  vancomycin  IVPB 500 milliGRAM(s) IV Intermittent every 24 hours  acetaminophen   Tablet .. 650 milliGRAM(s) Oral every 6 hours PRN  oxyCODONE    5 mG/acetaminophen 325 mG 1 Tablet(s) Oral every 4 hours PRN  docusate sodium 100 milliGRAM(s) Oral two times a day  atorvastatin 40 milliGRAM(s) Oral at bedtime  dextrose 40% Gel 15 Gram(s) Oral once PRN  dextrose 50% Injectable 12.5 Gram(s) IV Push once  dextrose 50% Injectable 25 Gram(s) IV Push once  dextrose 50% Injectable 25 Gram(s) IV Push once  glucagon  Injectable 1 milliGRAM(s) IntraMuscular once PRN  insulin lispro (HumaLOG) corrective regimen sliding scale   SubCutaneous three times a day before meals  insulin lispro (HumaLOG) corrective regimen sliding scale   SubCutaneous at bedtime    dextrose 5%. 1000 milliLiter(s) IV Continuous <Continuous>  folic acid 1 milliGRAM(s) Oral daily  iron sucrose IVPB 100 milliGRAM(s) IV Intermittent every 24 hours      PHYSICAL EXAM:  T(C): 36.8 (02-13-19 @ 14:00), Max: 36.8 (02-13-19 @ 14:00)  HR: 71 (02-13-19 @ 14:00) (58 - 72)  BP: 132/70 (02-13-19 @ 14:00) (129/62 - 135/79)  RR: 18 (02-13-19 @ 14:00) (18 - 18)  SpO2: 96% (02-13-19 @ 14:00) (96% - 99%)  Wt(kg): --  I&O's Summary        	  HEENT:   Normal oral mucosa, PERRL, EOMI	  Cardiovascular: Normal S1 S2, No JVD, No murmurs, No edema  Respiratory: Lungs clear to auscultation	  Gastrointestinal:  Soft, Non-tender, + BS	  Extremities: foot wound covered                                    8.7    6.60  )-----------( 311      ( 12 Feb 2019 05:20 )             27.8     02-12    139  |  102  |  65<H>  ----------------------------<  111<H>  4.2   |  22  |  3.59<H>    Ca    8.9      12 Feb 2019 05:20  Phos  4.4     02-12  Mg     2.0     02-12      proBNP:   Lipid Profile:   HgA1c:   TSH:

## 2019-02-13 NOTE — PROGRESS NOTE ADULT - PROBLEM SELECTOR PROBLEM 4
Diabetic foot infection
HTN (hypertension)
Diabetic foot infection
HTN (hypertension)

## 2019-02-13 NOTE — PROGRESS NOTE ADULT - SUBJECTIVE AND OBJECTIVE BOX
Subjective: Patient seen and examined. No new events except as noted.     REVIEW OF SYSTEMS:    CONSTITUTIONAL: No weakness, fevers or chills  EYES/ENT: No visual changes;  No vertigo or throat pain   NECK: No pain or stiffness  RESPIRATORY: No cough, wheezing, hemoptysis; No shortness of breath  CARDIOVASCULAR: No chest pain or palpitations  GASTROINTESTINAL: No abdominal or epigastric pain. No nausea, vomiting, or hematemesis; No diarrhea or constipation. No melena or hematochezia.  GENITOURINARY: No dysuria, frequency or hematuria  NEUROLOGICAL: No numbness or weakness  SKIN: No itching, burning, rashes, or lesions   All other review of systems is negative unless indicated above.    MEDICATIONS:  MEDICATIONS  (STANDING):  amLODIPine   Tablet 10 milliGRAM(s) Oral daily  aspirin enteric coated 81 milliGRAM(s) Oral daily  atorvastatin 40 milliGRAM(s) Oral at bedtime  ceFAZolin   IVPB 1000 milliGRAM(s) IV Intermittent every 12 hours  clopidogrel Tablet 75 milliGRAM(s) Oral daily  dextrose 5%. 1000 milliLiter(s) (50 mL/Hr) IV Continuous <Continuous>  dextrose 50% Injectable 12.5 Gram(s) IV Push once  dextrose 50% Injectable 25 Gram(s) IV Push once  dextrose 50% Injectable 25 Gram(s) IV Push once  docusate sodium 100 milliGRAM(s) Oral two times a day  folic acid 1 milliGRAM(s) Oral daily  furosemide    Tablet 40 milliGRAM(s) Oral two times a day  heparin  Injectable 5000 Unit(s) SubCutaneous every 8 hours  insulin lispro (HumaLOG) corrective regimen sliding scale   SubCutaneous three times a day before meals  insulin lispro (HumaLOG) corrective regimen sliding scale   SubCutaneous at bedtime  iron sucrose IVPB 100 milliGRAM(s) IV Intermittent every 24 hours  lactobacillus acidophilus 1 Tablet(s) Oral daily  metoprolol succinate ER 50 milliGRAM(s) Oral daily  vancomycin  IVPB 500 milliGRAM(s) IV Intermittent every 24 hours      PHYSICAL EXAM:  T(C): 36.8 (02-13-19 @ 14:00), Max: 36.8 (02-13-19 @ 14:00)  HR: 71 (02-13-19 @ 14:00) (58 - 72)  BP: 132/70 (02-13-19 @ 14:00) (104/75 - 135/79)  RR: 18 (02-13-19 @ 14:00) (18 - 18)  SpO2: 96% (02-13-19 @ 14:00) (96% - 99%)  Wt(kg): --  I&O's Summary        Appearance: Normal	  HEENT:   Normal oral mucosa, PERRL, EOMI	  Lymphatic: No lymphadenopathy , no edema  Cardiovascular: Normal S1 S2, No JVD, No murmurs , Peripheral pulses palpable 2+ bilaterally  Respiratory: Lungs clear to auscultation, normal effort 	  Gastrointestinal:  Soft, Non-tender, + BS	  Skin: No rashes, No ecchymoses, No cyanosis, warm to touch  Musculoskeletal: Normal range of motion, normal strength  Psychiatry:  Mood & affect appropriate  Ext: No edema      All labs, Imaging and EKGs personally reviewed                           8.7    6.60  )-----------( 311      ( 12 Feb 2019 05:20 )             27.8               02-12    139  |  102  |  65<H>  ----------------------------<  111<H>  4.2   |  22  |  3.59<H>    Ca    8.9      12 Feb 2019 05:20  Phos  4.4     02-12  Mg     2.0     02-12

## 2019-02-13 NOTE — PROGRESS NOTE ADULT - PROBLEM SELECTOR PLAN 1
cont present abx   as per podiatry , poss 1st TMT exostectomy with attempt at primary closure.  medically clear for OR
CKD Stage 4  baseline ~ 3.2-3.6  stable renal function at present   monitor BMP  Avoid further nephrotoxics, NSAIDS RCA  continue lasix 40mg bid  avoid nephrotoxic agents  monitor bmp.
CKD Stage 4  baseline ~ 3.2-3.6  stable renal function at present   monitor BMP  Avoid further nephrotoxics, NSAIDS RCA  continue lasix 80mg daily  avoid nephrotoxic agents  monitor bmp.
cont present abx   s/p right foot tarsometatarsal debridement with possible graft application
cont present abx , S/P PICC line  s/p right foot tarsometatarsal debridement with possible graft application
cont present abx , S/P PICC line  s/p right foot tarsometatarsal debridement with possible graft application  ID cleared pt for dc with iv abx / wound care home  vanco level elevated - will recheck today and dc tomorrow if vanco level
cont present abx , S/P PICC line  s/p right foot tarsometatarsal debridement with possible graft application  ID cleared pt for dc with iv abx / wound care home  vanco level elevated - will recheck today and dc tomorrow if vanco level
cont present abx   s/p right foot tarsometatarsal debridement with possible graft application
CKD Stage 4  baseline ~ 3.2-3.6  stable renal function at present   monitor BMP  Avoid further nephrotoxics, NSAIDS RCA  continue lasix 40mg bid  avoid nephrotoxic agents  monitor bmp.

## 2019-02-13 NOTE — PROGRESS NOTE ADULT - REASON FOR ADMISSION
rt foot wound

## 2019-02-13 NOTE — PROGRESS NOTE ADULT - PROBLEM SELECTOR PLAN 4
f/u pod.  s/p PICC  f/u ID
cont current cardiac meds
f/u pod.
f/u pod.  s/p PICC  f/u ID
cont current cardiac meds

## 2019-02-13 NOTE — PROGRESS NOTE ADULT - PROBLEM SELECTOR PROBLEM 2
CAD (coronary artery disease)
Anemia
CAD (coronary artery disease)
Anemia

## 2019-02-13 NOTE — PROGRESS NOTE ADULT - ASSESSMENT
63 M s/p R foot charcot planing, closed (DOS 2/6/19)  -Pt seen and evaluated, POD #7  -Sutures intact, no hematoma, no active bleeding, no signs of infection, mild maceration along central incision site, betadine and dsd applied   -High concern for dehiscence based on location of incisions, strict non-weightbearing  - Intra op Cultures growing MSSA and coryne. ID rec Continuing vanco, and starting cefazolin 1 gm iv q12 for 6 wk course (2/6 through 3/19).    -Podiatry stable for discharge PICC is in place  -D/c planning home.

## 2019-02-13 NOTE — PROGRESS NOTE ADULT - PROBLEM SELECTOR PROBLEM 1
Diabetic foot infection
Stage 4 chronic kidney disease
Diabetic foot infection
Stage 4 chronic kidney disease

## 2019-02-13 NOTE — PROGRESS NOTE ADULT - PROBLEM SELECTOR PLAN 3
acceptable  monitor BP  low salt diet.
no signs of active bleeding at present
acceptable  monitor BP  low salt diet.
no signs of active bleeding at present

## 2019-02-13 NOTE — PROGRESS NOTE ADULT - SUBJECTIVE AND OBJECTIVE BOX
[facility] [place] [facility]   MD DOMO WALLACE MD ANGELA WONG, PA    TEL:  OFFICE: [phone]  DR LEE CELL: [phone]  DR. GRULLON CELL: [phone]  MELANIA CORONEL CELL: [phone]        Patient is a 63y old  Male who presents with a chief complaint of rt foot wound (13 Feb 2019 12:09)      Patient seen and examined at bedside. No chest pain/sob    VITALS:  T(F): 98.3 (02-13-19 @ 14:00), Max: 98.3 (02-13-19 @ 14:00)  HR: 71 (02-13-19 @ 14:00)  BP: 132/70 (02-13-19 @ 14:00)  RR: 18 (02-13-19 @ 14:00)  SpO2: 96% (02-13-19 @ 14:00)  Wt(kg): --        PHYSICAL EXAM:  Constitutional: NAD  Neck: No JVD  Respiratory: CTAB, no wheezes, rales or rhonchi  Cardiovascular: S1, S2, RRR  Gastrointestinal: BS+, soft, NT/ND  Extremities: No peripheral edema, right foot dressing d/c/i    Hospital Medications:   MEDICATIONS  (STANDING):  amLODIPine   Tablet 10 milliGRAM(s) Oral daily  aspirin enteric coated 81 milliGRAM(s) Oral daily  atorvastatin 40 milliGRAM(s) Oral at bedtime  ceFAZolin   IVPB 1000 milliGRAM(s) IV Intermittent every 12 hours  clopidogrel Tablet 75 milliGRAM(s) Oral daily  dextrose 5%. 1000 milliLiter(s) (50 mL/Hr) IV Continuous <Continuous>  dextrose 50% Injectable 12.5 Gram(s) IV Push once  dextrose 50% Injectable 25 Gram(s) IV Push once  dextrose 50% Injectable 25 Gram(s) IV Push once  docusate sodium 100 milliGRAM(s) Oral two times a day  folic acid 1 milliGRAM(s) Oral daily  furosemide    Tablet 40 milliGRAM(s) Oral two times a day  heparin  Injectable 5000 Unit(s) SubCutaneous every 8 hours  insulin lispro (HumaLOG) corrective regimen sliding scale   SubCutaneous three times a day before meals  insulin lispro (HumaLOG) corrective regimen sliding scale   SubCutaneous at bedtime  iron sucrose IVPB 100 milliGRAM(s) IV Intermittent every 24 hours  lactobacillus acidophilus 1 Tablet(s) Oral daily  metoprolol succinate ER 50 milliGRAM(s) Oral daily  vancomycin  IVPB 500 milliGRAM(s) IV Intermittent every 24 hours      LABS:  02-12    139  |  102  |  65<H>  ----------------------------<  111<H>  4.2   |  22  |  3.59<H>    Ca    8.9      12 Feb 2019 05:20  Phos  4.4     02-12  Mg     2.0     02-12      Creatinine Trend: 3.59 <--, 3.51 <--, 3.53 <--, 3.49 <--, 3.61 <--, 3.46 <--                                8.7    6.60  )-----------( 311      ( 12 Feb 2019 05:20 )             27.8     Urine Studies:      Iron 25, TIBC 145, %sat --      [01-17-19 @ 05:38]  Ferritin 291.5      [01-17-19 @ 05:38]  PTH 42.84 (Ca --)      [09-11-18 @ 05:45]   --  Vitamin D (25OH) 11.8      [09-11-18 @ 05:45]  HbA1c 6.0      [01-15-19 @ 05:45]  TSH 1.74      [01-15-19 @ 05:45]    HCV 0.09, Nonreactive Hepatitis C AB  S/CO Ratio                        Interpretation  < 1.0                                     Non-Reactive  1.0 - 4.9                           Weakly-Reactive  > 5.0                                 Reactive  Non-Reactive: Aperson with a non-reactive HCV antibody  result is considered uninfected.  No further action is  needed unless recent infection is suspected.  In these  cases, consider repeat testing later to detect  seroconversion..  Weakly-Reactive: HCV antibody test is abnormal, HCV RNA  Qualitative test will follow.  Reactive: HCV antibody test is abnormal, HCV RNA  Qualitative test will follow.  Note: HCV antibody testing is performed on the [employer]   system.      [02-05-19 @ 00:01]      RADIOLOGY & ADDITIONAL STUDIES:

## 2019-02-13 NOTE — PROGRESS NOTE ADULT - PROBLEM SELECTOR PLAN 2
monitor Hb  Iron sat 17% 1/17. will give iv iron 100 x 5 doses. discharge on oral iron   transfuse if needed to keep Hb>8.
s/p PCI with Bare metal stent, c/w asa and Plavix   cards cleared pt for OR
monitor Hb  Iron sat 17% 1/17. will give iv iron 100 x 5 doses. discharge on oral iron   transfuse if needed to keep Hb>8.
monitor Hb  Iron sat 17% 1/17. will give iv iron 100 x 5 doses. discharge on oral iron   transfuse if needed to keep Hb>8.
monitor Hb  transfuse if needed to keep Hb>8.
s/p PCI with Bare metal stent, c/w asa and Plavix

## 2019-02-13 NOTE — PROGRESS NOTE ADULT - SUBJECTIVE AND OBJECTIVE BOX
Podiatry pager #: [phone]    Patient is a 63y old  Male who presents with a chief complaint of rt foot wound (12 Feb 2019 14:53)       INTERVAL HPI/OVERNIGHT EVENTS:  Patient seen and evaluated at bedside.  Pt is resting comfortable in NAD. Denies N/V/F/C.    Allergies    No Known Allergies    Intolerances        Vital Signs Last 24 Hrs  T(C): 36.7 (13 Feb 2019 05:03), Max: 36.7 (12 Feb 2019 21:14)  T(F): 98 (13 Feb 2019 05:03), Max: 98 (12 Feb 2019 21:14)  HR: 58 (13 Feb 2019 05:03) (58 - 72)  BP: 129/62 (13 Feb 2019 05:03) (104/75 - 135/79)  BP(mean): --  RR: 18 (13 Feb 2019 05:03) (18 - 18)  SpO2: 96% (13 Feb 2019 05:03) (96% - 99%)    LABS:                        8.7    6.60  )-----------( 311      ( 12 Feb 2019 05:20 )             27.8     02-12    139  |  102  |  65<H>  ----------------------------<  111<H>  4.2   |  22  |  3.59<H>    Ca    8.9      12 Feb 2019 05:20  Phos  4.4     02-12  Mg     2.0     02-12          CAPILLARY BLOOD GLUCOSE      POCT Blood Glucose.: 143 mg/dL (13 Feb 2019 11:33)  POCT Blood Glucose.: 123 mg/dL (13 Feb 2019 07:28)  POCT Blood Glucose.: 184 mg/dL (12 Feb 2019 21:34)  POCT Blood Glucose.: 170 mg/dL (12 Feb 2019 16:44)      Lower Extremity Physical Exam:  s/p R plantar foot charcot planing, closed. Sutures intact, No hematoma, no signs of infection, mild maceration central incision site.

## 2019-03-08 LAB
FUNGUS SPEC QL CULT: SIGNIFICANT CHANGE UP
FUNGUS SPEC QL CULT: SIGNIFICANT CHANGE UP

## 2019-03-11 ENCOUNTER — APPOINTMENT (OUTPATIENT)
Dept: WOUND CARE | Facility: CLINIC | Age: 64
End: 2019-03-11
Payer: COMMERCIAL

## 2019-03-11 PROCEDURE — 99213 OFFICE O/P EST LOW 20 MIN: CPT | Mod: 25

## 2019-03-11 PROCEDURE — 29445 APPL RIGID TOT CNTC LEG CAST: CPT | Mod: RT,58

## 2019-03-11 NOTE — HISTORY OF PRESENT ILLNESS
[FreeTextEntry1] : 63 year old male s/p R foot charcot planing, closed (DOS 2/6/19). Wound culture grew MSSA, and Coryne bacterium; path clean margin Bony tissue with chronic osteomyelitis. Pt was seen outpt by Dr. Mcpherson who referred patient to wound care center. Pt is currently PICC"d on vanco, and start cefazolin. Six week course to end on 3/19/19. Patient was discharged on Febuary 13th. Since then has been doing daily dressing changes of Iodosorb and DSD. Pt denies any N/V/F/D/SOB.

## 2019-03-11 NOTE — ASSESSMENT
[FreeTextEntry1] : 64 yo M right plantar midfoot ulcer\par - Pt seen and evaluated\par - C/w IV abx; pt to see ID doctor this week. \par - Dilute betadine soaked 4x4 sterile gauze applied to wound followed by 4x4 sterile gauze and Caden. TCC applied to RIGHT lower extremity. \par - Patient instructed to limit weight bearing to RLE. \par - RTC in 1 week \par

## 2019-03-11 NOTE — PHYSICAL EXAM
[0] : left 0 [de-identified] : No gross deformities [de-identified] : Absent epicritic sensation to b/l feet [de-identified] : Sutures intact with apical necrosis noted to the central incision site. No acute signs of infection.  [FreeTextEntry1] : planter midfoot [FreeTextEntry2] : 0 [FreeTextEntry3] : 0 [FreeTextEntry4] : 0 [de-identified] : Quinn proctor  [de-identified] : apical necrosis noted centrally.  [de-identified] : TTC

## 2019-03-18 ENCOUNTER — APPOINTMENT (OUTPATIENT)
Dept: WOUND CARE | Facility: CLINIC | Age: 64
End: 2019-03-18
Payer: COMMERCIAL

## 2019-03-18 PROCEDURE — 29445 APPL RIGID TOT CNTC LEG CAST: CPT | Mod: RT,58

## 2019-03-18 PROCEDURE — 99213 OFFICE O/P EST LOW 20 MIN: CPT | Mod: 25

## 2019-03-18 NOTE — ASSESSMENT
[FreeTextEntry1] : 62 yo M right plantar midfoot ulcer\par - Pt seen and evaluated\par - C/w IV abx\par - Sutures removed and debrided wound to level of subQ with 15 blade\par - dressed w/ caroline, TTC reapplied\par - GV applied to 2nd toe wound and interdigital spaces\par - Patient instructed to limit weight bearing to RLE. \par - RTC in 1 week \par

## 2019-03-18 NOTE — PHYSICAL EXAM
[0] : left 0 [de-identified] : No gross deformities [de-identified] : Absent epicritic sensation to b/l feet [de-identified] : healed medially, open laterally to level of subQ,  [FreeTextEntry1] : planter midfoot [FreeTextEntry2] : 3.5 [FreeTextEntry3] : 0.3 [de-identified] : Quinn proctor  [de-identified] : apical necrosis noted centrally.  [FreeTextEntry4] : 0.1 [de-identified] : TTC

## 2019-03-20 LAB — ACID FAST STN SPEC: SIGNIFICANT CHANGE UP

## 2019-03-25 ENCOUNTER — APPOINTMENT (OUTPATIENT)
Dept: WOUND CARE | Facility: CLINIC | Age: 64
End: 2019-03-25
Payer: COMMERCIAL

## 2019-03-25 VITALS — DIASTOLIC BLOOD PRESSURE: 79 MMHG | TEMPERATURE: 98.2 F | HEART RATE: 53 BPM | SYSTOLIC BLOOD PRESSURE: 163 MMHG

## 2019-03-25 PROCEDURE — 99213 OFFICE O/P EST LOW 20 MIN: CPT | Mod: 25

## 2019-03-25 PROCEDURE — 29445 APPL RIGID TOT CNTC LEG CAST: CPT | Mod: RT

## 2019-03-25 NOTE — PHYSICAL EXAM
[0] : left 0 [de-identified] : No gross deformities [de-identified] : Absent epicritic sensation to b/l feet [de-identified] : healed medially, open laterally to level of subQ,  [FreeTextEntry1] : planter midfoot [FreeTextEntry2] : 3.0 [FreeTextEntry3] : 0.3 [FreeTextEntry4] : 0.1 [de-identified] : Quinn proctor  [de-identified] : 100% [de-identified] : TTC + Mary

## 2019-03-25 NOTE — HISTORY OF PRESENT ILLNESS
[FreeTextEntry1] : 63 year old male s/p R foot charcot planing, closed (DOS 2/6/19). Wound culture grew MSSA, and Coryne bacterium; path clean margin Bony tissue with chronic osteomyelitis. Pt was seen outpt by Dr. Mcpherson who referred patient to wound care center. Pt is recently finished PICC d on vanco, and start cefazolin.  Patient was discharged on Febuary 13th. Pt has had TCC. Pt denies any N/V/F/D/SOB.

## 2019-03-25 NOTE — ASSESSMENT
[FreeTextEntry1] : 62 yo M right plantar midfoot ulcer\par - Pt seen and evaluated\par - granular wound improving in size , no clinical signs of infection \par - completed course of IV abx. \par - dressed w/ caroline, TTC reapplied\par - Patient instructed to limit weight bearing to RLE. \par - RTC in 1 week \par

## 2019-04-01 ENCOUNTER — APPOINTMENT (OUTPATIENT)
Dept: WOUND CARE | Facility: CLINIC | Age: 64
End: 2019-04-01
Payer: COMMERCIAL

## 2019-04-01 DIAGNOSIS — L97.401 NON-PRESSURE CHRONIC ULCER OF UNSPECIFIED HEEL AND MIDFOOT LIMITED TO BREAKDOWN OF SKIN: ICD-10-CM

## 2019-04-01 PROCEDURE — 29445 APPL RIGID TOT CNTC LEG CAST: CPT | Mod: 58,RT

## 2019-04-01 PROCEDURE — 99213 OFFICE O/P EST LOW 20 MIN: CPT | Mod: 25

## 2019-04-01 NOTE — ASSESSMENT
[FreeTextEntry1] : 62 yo M right plantar midfoot ulcer\par - Pt seen and evaluated\par - granular wound improving in size , no clinical signs of infection \par - completed course of IV abx.\par - hypergranulation debrided w/ 10 blade and silver nitrate \par - dressed w/ caroline, TTC reapplied\par - Patient instructed to limit weight bearing to RLE. \par - RTC in 1 week \par

## 2019-04-01 NOTE — PHYSICAL EXAM
[0] : left 0 [de-identified] : No gross deformities [de-identified] : Absent epicritic sensation to b/l feet [de-identified] : healed medially, open laterally to level of subQ, hypergranular [FreeTextEntry1] : planter midfoot [FreeTextEntry2] : 3.0 [FreeTextEntry3] : 0.3 [FreeTextEntry4] : 0.1 [de-identified] : Quinn planing  [de-identified] : 100% [de-identified] : TTC + Mary

## 2019-04-08 ENCOUNTER — APPOINTMENT (OUTPATIENT)
Dept: WOUND CARE | Facility: CLINIC | Age: 64
End: 2019-04-08
Payer: COMMERCIAL

## 2019-04-08 DIAGNOSIS — E11.42 TYPE 2 DIABETES MELLITUS WITH DIABETIC POLYNEUROPATHY: ICD-10-CM

## 2019-04-08 PROCEDURE — 29445 APPL RIGID TOT CNTC LEG CAST: CPT | Mod: 58,RT

## 2019-04-08 PROCEDURE — 99213 OFFICE O/P EST LOW 20 MIN: CPT | Mod: 25

## 2019-04-08 NOTE — PHYSICAL EXAM
[0] : left 0 [de-identified] : No gross deformities [de-identified] : Absent epicritic sensation to b/l feet [de-identified] : healed medially, open laterally to level of subQ, hypergranular [FreeTextEntry1] : planter midfoot [FreeTextEntry2] : 0.7 [FreeTextEntry3] : 0.5 [FreeTextEntry4] : 0.1 [de-identified] : Quinn planing  [de-identified] : 100% hypergranular [de-identified] : TTC + aquacel

## 2019-04-08 NOTE — ASSESSMENT
[FreeTextEntry1] : 64 yo M right plantar midfoot ulcer\par - Pt seen and evaluated\par - hypergranular wound improving in size , no clinical signs of infection \par - wound mechanically debrided with saline gauze\par - wound cauterized w/ silver nitrate\par - dressed w/ aquacel, TTC reapplied\par - Patient instructed to limit weight bearing to RLE. \par - RTC in 1 week \par

## 2019-04-15 ENCOUNTER — APPOINTMENT (OUTPATIENT)
Dept: WOUND CARE | Facility: CLINIC | Age: 64
End: 2019-04-15
Payer: COMMERCIAL

## 2019-04-15 PROCEDURE — 99213 OFFICE O/P EST LOW 20 MIN: CPT

## 2019-04-16 NOTE — PHYSICAL EXAM
[0] : left 0 [de-identified] : No gross deformities [de-identified] : Absent epicritic sensation to b/l feet [FreeTextEntry1] : planter midfoot- healed

## 2019-04-16 NOTE — ASSESSMENT
[FreeTextEntry1] : 64 yo M right plantar midfoot ulcer\par - Pt seen and evaluated\par - right foot wound healed\par - no more TCC needed. Pt will go to see [facility] for shoe fitting\par - Patient instructed to limit weight bearing to RLE\par - RTC in 1 week \par

## 2019-04-16 NOTE — HISTORY OF PRESENT ILLNESS
[FreeTextEntry1] : 63 year old male s/p R foot charcot planing, closed (DOS 2/6/19). Pt has been ambulating with TCC to the right foot.\par \par Wound culture grew MSSA, and Coryne bacterium; path clean margin Bony tissue with chronic osteomyelitis. Pt was seen outpt by Dr. Mcpherson who referred patient to wound care center. Pt is recently finished PICC d on vanco, and start cefazolin.  Patient was discharged on Febuary 13th. Pt has had TCC. Pt denies any N/V/F/D/SOB.

## 2019-04-22 ENCOUNTER — APPOINTMENT (OUTPATIENT)
Dept: WOUND CARE | Facility: CLINIC | Age: 64
End: 2019-04-22
Payer: COMMERCIAL

## 2019-04-22 DIAGNOSIS — E11.40 TYPE 2 DIABETES MELLITUS WITH DIABETIC NEUROPATHY, UNSPECIFIED: ICD-10-CM

## 2019-04-22 DIAGNOSIS — E11.65 TYPE 2 DIABETES MELLITUS WITH DIABETIC NEUROPATHY, UNSPECIFIED: ICD-10-CM

## 2019-04-22 DIAGNOSIS — E11.610 TYPE 2 DIABETES MELLITUS WITH DIABETIC NEUROPATHIC ARTHROPATHY: ICD-10-CM

## 2019-04-22 PROCEDURE — 99213 OFFICE O/P EST LOW 20 MIN: CPT

## 2019-04-22 NOTE — PHYSICAL EXAM
[0] : left 0 [de-identified] : No gross deformities [de-identified] : Absent epicritic sensation to b/l feet [FreeTextEntry1] : planter midfoot- healed

## 2019-04-22 NOTE — ASSESSMENT
[FreeTextEntry1] : 64 yo M right plantar midfoot ulcer\par - Pt seen and evaluated\par - Right foot wound healed\par - No more TCC needed\par - Pt saw [facility] for shoe fitting\par - Patient instructed to limit weight bearing to RLE\par - RTC after getting shoe from [facility] - pt will call to make an appointment\par

## 2019-04-22 NOTE — HISTORY OF PRESENT ILLNESS
[FreeTextEntry1] : 63 year old male s/p R foot charcot planing, closed (DOS 2/6/19). Wound culture grew MSSA, and Coryne bacterium; path clean margin Bony tissue with chronic osteomyelitis. Pt recently finished PICC d on vanco, and start cefazolin.  Patient was discharged on Febuary 13th. Pt was wearing TCC, however the wound is now healed and does not need TCC anymore. Pt denies any N/V/F/D/SOB.

## 2019-07-01 NOTE — PROGRESS NOTE ADULT - PROBLEM SELECTOR PROBLEM 1
Osteomyelitis
Chronic kidney disease, stage III (moderate)
no

## 2019-07-06 NOTE — PROGRESS NOTE ADULT - SUBJECTIVE AND OBJECTIVE BOX
Infectious Diseases progress note: Covering Dr Spivey    Subjective:  HPI:  Patient is a 62 y/o M PMH HTN, HLD, CKD3, DM2 w/ R foot ulcer >3 years p/w worsening pain to R planter  foot ulcer. Patient last hospitalized about >1 year ago for R foot ulcer. Reports that he has not had any follow up with podiatry since that time. Initially had wound services coming to his house, however, has not had anything done in the past year. Reports washing his foot with saline or water then covering with a gauze. Is not able to see his foot well because it's difficult for him to bend down. Came in today because pain became too severe. Reports pain is worse when he walks and has also noticed some foul smelling drainage from the wound.Patient seen in the ED by podiatry. Reported that wound was malodorous, probed to bone w/ a high suspicion for OM and deep soft tissue infectiojn clinically. S/p debridement by podiatry in the ED with the needle biopsy .Cultures grew polymicrobial cristy but not MRSA ,bone pathology though negative as with elevated inflammatory markers and the indium scan along with the high clinical suspicion of osteo and not very sensitive way of picking up the bone poathology thru the needle biopsy, decision for being treated with 4 to 6 weeks of iv anbx , currently on zosyn , toleraating well with no untoward side effects, s/p picc line placemenr SERGE.      ROS:  CONSTITUTIONAL:  No fever, chills, rigors  CARDIOVASCULAR:  No chest pain or palpitations  RESPIRATORY:   No SOB, cough, dyspnea on exertion.  No wheezing  GASTROINTESTINAL:  No abd pain, N/V, diarrhea/constipation  EXTREMITIES:  No swelling or joint pain  GENITOURINARY:  No burning on urination, increased frequency or urgency.  No flank pain  NEUROLOGIC:  No HA, visual disturbances  SKIN: No rashes    Allergies    No Known Allergies    Intolerances        ANTIBIOTICS/RELEVANT:  antimicrobials  piperacillin/tazobactam IVPB. 3.375 Gram(s) IV Intermittent every 8 hours  vancomycin  IVPB 1000 milliGRAM(s) IV Intermittent every 24 hours    immunologic:  influenza   Vaccine 0.5 milliLiter(s) IntraMuscular once    OTHER:  acetaminophen   Tablet .. 650 milliGRAM(s) Oral every 6 hours PRN  amLODIPine   Tablet 10 milliGRAM(s) Oral daily  aspirin enteric coated 81 milliGRAM(s) Oral daily  atorvastatin 40 milliGRAM(s) Oral at bedtime  collagenase Ointment 1 Application(s) Topical daily  Dakins Solution - 1/4 Strength 1 Application(s) Topical daily  Dakins Solution - 1/4 Strength 1 Application(s) Topical Once  ergocalciferol 23489 Unit(s) Oral every week  folic acid 1 milliGRAM(s) Oral daily  heparin  Injectable 5000 Unit(s) SubCutaneous every 8 hours  hydrALAZINE 100 milliGRAM(s) Oral three times a day  insulin lispro (HumaLOG) corrective regimen sliding scale   SubCutaneous three times a day before meals  insulin lispro (HumaLOG) corrective regimen sliding scale   SubCutaneous at bedtime  lidocaine 1% Injectable 30 milliLiter(s) Local Injection once  metoprolol succinate  milliGRAM(s) Oral daily  sodium bicarbonate 650 milliGRAM(s) Oral three times a day      Objective:  Vital Signs Last 24 Hrs  T(C): 37.1 (15 Sep 2018 13:00), Max: 37.1 (15 Sep 2018 13:00)  T(F): 98.7 (15 Sep 2018 13:00), Max: 98.7 (15 Sep 2018 13:00)  HR: 71 (15 Sep 2018 13:00) (71 - 77)  BP: 146/62 (15 Sep 2018 13:00) (135/59 - 148/81)  BP(mean): --  RR: 18 (15 Sep 2018 05:36) (18 - 18)  SpO2: 99% (15 Sep 2018 05:36) (96% - 99%)    PHYSICAL EXAM:  Constitutional:NAD  Eyes:SPIKE, EOMI  Ear/Nose/Throat: no thrush, mucositis.  Moist mucous membranes	  Neck:no JVD, no lymphadenopathy, supple  Respiratory: CTA lobito  Cardiovascular: S1S2 RRR, no murmurs  Gastrointestinal:soft, nontender,  nondistended (+) BS  Extremities:no e/e/c ,charcoat right foot planar medial wound   Size: 3x2.8cm, fibrogranular, with min serosanguinous , hyperkaratotic  surrounding skin per pod follow up    LABS:                        8.6    11.52 )-----------( 443      ( 14 Sep 2018 07:00 )             27.1     09-15    141  |  102  |  34<H>  ----------------------------<  156<H>  3.8   |  21<L>  |  2.95<H>    Ca    8.6      15 Sep 2018 10:08  Phos  3.5     09-15  Mg     1.7     09-15              Vancomycin Level, Random:  ug/mL (09-14 @ 07:00)  Vancomycin Level, Random:  ug/mL (09-13 @ 06:52)  Vancomycin Level, Random:  ug/mL (09-12 @ 06:00)  Vancomycin Level, Random:  ug/mL (09-11 @ 05:45)                  MICROBIOLOGY:        Culture - Abscess with Gram Stain (09.11.18 @ 13:53)    Specimen Source: OTHER    Gram Stain Result:   NOS^No Organisms Seen  WBC^White Blood Cells  QNTY CELLS IN GRAM STAIN: NO CELLS SEEN    Culture Results:   NO GROWTH - PRELIMINARY RESULTS  NO ORGANISMS ISOLATED AT 24 HOURS  NO ORGANISMS ISOLATED AT 48 HRS.  NO ORGANISMS ISOLATED AT 72 HRS.    Culture - Abscess with Gram Stain (09.07.18 @ 19:53)    Specimen Source: OTHER    Gram Stain Result:   GPCPR^Gram Pos Cocci in Pairs  QUANTITY OF BACTERIA SEEN: RARE (1+)  GNR^Gram Neg Rods  QUANTITY OF BACTERIA SEEN: RARE (1+)  WBC^White Blood Cells  QNTY CELLS IN GRAM STAIN: NO CELLS SEEN    Culture Results:   MANY  Routine susceptibility testing not performed as  Streptococcus Grp A/B is susceptible to drug(s) of choice -  Penicillin and Ampicillin  STRAG^Streptococcus agalactiae Gp B  DENICE^Corynebacterium species  ACMSP^Actinomyces species  ARTSP^Arthrobacter species      RADIOLOGY & ADDITIONAL STUDIES: Infectious Diseases progress note: Covering Dr Spivey    Subjective:  HPI:  Patient is a 64 y/o M PMH HTN, HLD, CKD3, DM2 w/ R foot ulcer >3 years p/w worsening pain to R planter  foot ulcer. Patient last hospitalized about >1 year ago for R foot ulcer. Reports that he has not had any follow up with podiatry since that time. Initially had wound services coming to his house, however, has not had anything done in the past year. Reports washing his foot with saline or water then covering with a gauze. Is not able to see his foot well because it's difficult for him to bend down. Came in today because pain became too severe. Reports pain is worse when he walks and has also noticed some foul smelling drainage from the wound.Patient seen in the ED by podiatry. Reported that wound was malodorous, probed to bone w/ a high suspicion for OM and deep soft tissue infectiojn clinically. S/p debridement by podiatry in the ED .  Initial Cultures  on adm send after debridement  grew polymicrobial cristy but not MRSA , deep cultures with biopsy done thereafter when bone cultures and  pathology with no growth though pt was already on anbx and   as with elevated inflammatory markers and the indium scan along with the high clinical suspicion of osteo and not very sensitive way of picking up the bone poathology thru the needle biopsy, decision for being treated with 4 to 6 weeks of iv anbx , currently on zosyn , toleraating well with no untoward side effects, s/p picc line placement LUE.      ROS:  CONSTITUTIONAL:  No fever, chills, rigors  CARDIOVASCULAR:  No chest pain or palpitations  RESPIRATORY:   No SOB, cough, dyspnea on exertion.  No wheezing  GASTROINTESTINAL:  No abd pain, N/V, diarrhea/constipation  EXTREMITIES:  No swelling or joint pain  GENITOURINARY:  No burning on urination, increased frequency or urgency.  No flank pain  NEUROLOGIC:  No HA, visual disturbances  SKIN: No rashes    Allergies    No Known Allergies    Intolerances        ANTIBIOTICS/RELEVANT:  antimicrobials  piperacillin/tazobactam IVPB. 3.375 Gram(s) IV Intermittent every 8 hours  vancomycin  IVPB 1000 milliGRAM(s) IV Intermittent every 24 hours    immunologic:  influenza   Vaccine 0.5 milliLiter(s) IntraMuscular once    OTHER:  acetaminophen   Tablet .. 650 milliGRAM(s) Oral every 6 hours PRN  amLODIPine   Tablet 10 milliGRAM(s) Oral daily  aspirin enteric coated 81 milliGRAM(s) Oral daily  atorvastatin 40 milliGRAM(s) Oral at bedtime  collagenase Ointment 1 Application(s) Topical daily  Dakins Solution - 1/4 Strength 1 Application(s) Topical daily  Dakins Solution - 1/4 Strength 1 Application(s) Topical Once  ergocalciferol 93340 Unit(s) Oral every week  folic acid 1 milliGRAM(s) Oral daily  heparin  Injectable 5000 Unit(s) SubCutaneous every 8 hours  hydrALAZINE 100 milliGRAM(s) Oral three times a day  insulin lispro (HumaLOG) corrective regimen sliding scale   SubCutaneous three times a day before meals  insulin lispro (HumaLOG) corrective regimen sliding scale   SubCutaneous at bedtime  lidocaine 1% Injectable 30 milliLiter(s) Local Injection once  metoprolol succinate  milliGRAM(s) Oral daily  sodium bicarbonate 650 milliGRAM(s) Oral three times a day      Objective:  Vital Signs Last 24 Hrs  T(C): 37.1 (15 Sep 2018 13:00), Max: 37.1 (15 Sep 2018 13:00)  T(F): 98.7 (15 Sep 2018 13:00), Max: 98.7 (15 Sep 2018 13:00)  HR: 71 (15 Sep 2018 13:00) (71 - 77)  BP: 146/62 (15 Sep 2018 13:00) (135/59 - 148/81)  BP(mean): --  RR: 18 (15 Sep 2018 05:36) (18 - 18)  SpO2: 99% (15 Sep 2018 05:36) (96% - 99%)    PHYSICAL EXAM:  Constitutional:NAD  Eyes:SPIKE, EOMI  Ear/Nose/Throat: no thrush, mucositis.  Moist mucous membranes	  Neck:no JVD, no lymphadenopathy, supple  Respiratory: CTA lobito  Cardiovascular: S1S2 RRR, no murmurs  Gastrointestinal:soft, nontender,  nondistended (+) BS  Extremities:no e/e/c ,charcoat right foot planar medial wound   Size: 3x2.8cm, fibrogranular, with min serosanguinous , hyperkaratotic  surrounding skin per pod follow up    LABS:                        8.6    11.52 )-----------( 443      ( 14 Sep 2018 07:00 )             27.1     09-15    141  |  102  |  34<H>  ----------------------------<  156<H>  3.8   |  21<L>  |  2.95<H>    Ca    8.6      15 Sep 2018 10:08  Phos  3.5     09-15  Mg     1.7     09-15              Vancomycin Level, Random:  ug/mL (09-14 @ 07:00)  Vancomycin Level, Random:  ug/mL (09-13 @ 06:52)  Vancomycin Level, Random:  ug/mL (09-12 @ 06:00)  Vancomycin Level, Random:  ug/mL (09-11 @ 05:45)                  MICROBIOLOGY:        Culture - Abscess with Gram Stain (09.11.18 @ 13:53)    Specimen Source: OTHER    Gram Stain Result:   NOS^No Organisms Seen  WBC^White Blood Cells  QNTY CELLS IN GRAM STAIN: NO CELLS SEEN    Culture Results:   NO GROWTH - PRELIMINARY RESULTS  NO ORGANISMS ISOLATED AT 24 HOURS  NO ORGANISMS ISOLATED AT 48 HRS.  NO ORGANISMS ISOLATED AT 72 HRS.    Culture - Abscess with Gram Stain (09.07.18 @ 19:53)    Specimen Source: OTHER    Gram Stain Result:   GPCPR^Gram Pos Cocci in Pairs  QUANTITY OF BACTERIA SEEN: RARE (1+)  GNR^Gram Neg Rods  QUANTITY OF BACTERIA SEEN: RARE (1+)  WBC^White Blood Cells  QNTY CELLS IN GRAM STAIN: NO CELLS SEEN    Culture Results:   MANY  Routine susceptibility testing not performed as  Streptococcus Grp A/B is susceptible to drug(s) of choice -  Penicillin and Ampicillin  STRAG^Streptococcus agalactiae Gp B  DENICE^Corynebacterium species  ACMSP^Actinomyces species  ARTSP^Arthrobacter species      RADIOLOGY & ADDITIONAL STUDIES:    < from: [facility] (09.10.18 @ 10:09) >    EXAM:  [facility]OW [facility]        PROCEDURE DATE:  Sep 10 2018       INTERPRETATION:  CLINICAL INFORMATION: 63-year-old, diabetic foot ulcer   with findings suggestive of Charcot joint on Tc labeled leukocyte scan   9/8/2018, presents for marrow scan for confirmation.    RADIOPHARMACEUTICAL: 11 mCi Tc99m sulfur colloid, i.v.     TECHNIQUE:  Static images of both feet were obtained in the plantar,   dorsal, medial and lateral projections approximately 20 hours following   administration of radiopharmaceutical.     COMPARISON: Tc-labeled leukocyte scan from 9/8/2018 and 5/19/2017.    FINDINGS:  There is diffusely increased Tc-99m sulfur colloid in the   right midfoot, which is congruent in distribution of labeled leukocytes.    IMPRESSION:  Tc-sulfur colloid marrow scan confirms finding on labeled   leukocyte scan 9/8/2018, right foot Charcot joint,    which is new compared to Tc-99m labeled leukocyte scan from 5/19/2017.      Surgical Pathology Report (09.11.18 @ 14:03)    Surgical Pathology Report:   ACCESSION No:  [id number]    DARIEL MI               1        Surgical Final Report          Final Diagnosis    Bone, right foot, biopsy  - Unremarkable lamellar bone and fatty marrow (clinically  Charcot)    Verified by: CHELY MORILLO MD Pathologist  (Electronic Signature)  Reported on: 09/14/18 12:16 EDT, [address] [place] [address]  _________________________________________________________________    Clinical History  nuclear medicine scan-charcot right foot    Specimen(s) Submitted  1- Right foot bone biopsy    Gross Description  The specimen is received in formalin and the specimen container  is labeled: Right foot bone biopsy.  It consists of a 0.5 cm in  length x 0.2 cm diameter pink-white bone core biopsy.  Entirely  submitted following fixation and decalcification.  One cassette.    In addition to other data that may appear on the specimen  container, the label has been inspected to confirm the presence  of the patient's name and date of birth.  JCT 09/11/18 16:47        < end of copied text > Infectious Diseases progress note: Covering Dr Spivey    Subjective:  HPI:  Patient is a 64 y/o M PMH HTN, HLD, CKD3, DM2 w/ R foot ulcer >3 years p/w worsening pain to R planter  foot ulcer. Patient last hospitalized about >1 year ago for R foot ulcer. Reports that he has not had any follow up with podiatry since that time. Initially had wound services coming to his house, however, has not had anything done in the past year. Reports washing his foot with saline or water then covering with a gauze. Is not able to see his foot well because it's difficult for him to bend down. Came in today because pain became too severe. Reports pain is worse when he walks and has also noticed some foul smelling drainage from the wound.Patient seen in the ED by podiatry. Reported that wound was malodorous, probed to bone w/ a high suspicion for OM and deep soft tissue infectiojn clinically. S/p debridement by podiatry in the ED .  Initial Cultures  on adm send after debridement  grew polymicrobial cristy but not MRSA , deep cultures with biopsy done thereafter when bone cultures and  pathology with no growth though pt was already on anbx and   as with elevated inflammatory markers and the indium scan along with the high clinical suspicion of osteo and not very sensitive way of picking up the bone poathology thru the needle biopsy, decision for being treated with 4 to 6 weeks of iv anbx , currently on zosyn , toleraating well with no untoward side effects, s/p picc line placement LUE.      ROS:  CONSTITUTIONAL:  No fever, chills, rigors  CARDIOVASCULAR:  No chest pain or palpitations  RESPIRATORY:   No SOB, cough, dyspnea on exertion.  No wheezing  GASTROINTESTINAL:  No abd pain, N/V, diarrhea/constipation  EXTREMITIES:  No swelling or joint pain  GENITOURINARY:  No burning on urination, increased frequency or urgency.  No flank pain  NEUROLOGIC:  No HA, visual disturbances  SKIN: No rashes    Allergies    No Known Allergies    Intolerances        ANTIBIOTICS/RELEVANT:  antimicrobials  piperacillin/tazobactam IVPB. 3.375 Gram(s) IV Intermittent every 8 hours  vancomycin  IVPB 1000 milliGRAM(s) IV Intermittent every 24 hours    immunologic:  influenza   Vaccine 0.5 milliLiter(s) IntraMuscular once    OTHER:  acetaminophen   Tablet .. 650 milliGRAM(s) Oral every 6 hours PRN  amLODIPine   Tablet 10 milliGRAM(s) Oral daily  aspirin enteric coated 81 milliGRAM(s) Oral daily  atorvastatin 40 milliGRAM(s) Oral at bedtime  collagenase Ointment 1 Application(s) Topical daily  Dakins Solution - 1/4 Strength 1 Application(s) Topical daily  Dakins Solution - 1/4 Strength 1 Application(s) Topical Once  ergocalciferol 12873 Unit(s) Oral every week  folic acid 1 milliGRAM(s) Oral daily  heparin  Injectable 5000 Unit(s) SubCutaneous every 8 hours  hydrALAZINE 100 milliGRAM(s) Oral three times a day  insulin lispro (HumaLOG) corrective regimen sliding scale   SubCutaneous three times a day before meals  insulin lispro (HumaLOG) corrective regimen sliding scale   SubCutaneous at bedtime  lidocaine 1% Injectable 30 milliLiter(s) Local Injection once  metoprolol succinate  milliGRAM(s) Oral daily  sodium bicarbonate 650 milliGRAM(s) Oral three times a day      Objective:  Vital Signs Last 24 Hrs  T(C): 37.1 (15 Sep 2018 13:00), Max: 37.1 (15 Sep 2018 13:00)  T(F): 98.7 (15 Sep 2018 13:00), Max: 98.7 (15 Sep 2018 13:00)  HR: 71 (15 Sep 2018 13:00) (71 - 77)  BP: 146/62 (15 Sep 2018 13:00) (135/59 - 148/81)  BP(mean): --  RR: 18 (15 Sep 2018 05:36) (18 - 18)  SpO2: 99% (15 Sep 2018 05:36) (96% - 99%)    PHYSICAL EXAM:  Constitutional:NAD  Eyes:SPIKE, EOMI  Ear/Nose/Throat: no thrush, mucositis.  Moist mucous membranes	  Neck:no JVD, no lymphadenopathy, supple  Respiratory: CTA lobito  Cardiovascular: S1S2 RRR, no murmurs  Gastrointestinal:soft, nontender,  nondistended (+) BS  Extremities:no e/e/c ,charcoat right foot planar medial wound   Size: 3x2.8cm, fibrogranular, with min serosanguinous , hyperkaratotic  surrounding skin per pod follow up    LABS:                        8.6    11.52 )-----------( 443      ( 14 Sep 2018 07:00 )             27.1     09-15    141  |  102  |  34<H>  ----------------------------<  156<H>  3.8   |  21<L>  |  2.95<H>    Ca    8.6      15 Sep 2018 10:08  Phos  3.5     09-15  Mg     1.7     09-15              Vancomycin Level, Random:  ug/mL (09-14 @ 07:00)  Vancomycin Level, Random:  ug/mL (09-13 @ 06:52)  Vancomycin Level, Random:  ug/mL (09-12 @ 06:00)  Vancomycin Level, Random:  ug/mL (09-11 @ 05:45)                  MICROBIOLOGY:        Culture - Abscess with Gram Stain (09.11.18 @ 13:53)    Specimen Source: OTHER    Gram Stain Result:   NOS^No Organisms Seen  WBC^White Blood Cells  QNTY CELLS IN GRAM STAIN: NO CELLS SEEN    Culture Results:   NO GROWTH - PRELIMINARY RESULTS  NO ORGANISMS ISOLATED AT 24 HOURS  NO ORGANISMS ISOLATED AT 48 HRS.  NO ORGANISMS ISOLATED AT 72 HRS.    Culture - Abscess with Gram Stain (09.07.18 @ 19:53)    Specimen Source: OTHER    Gram Stain Result:   GPCPR^Gram Pos Cocci in Pairs  QUANTITY OF BACTERIA SEEN: RARE (1+)  GNR^Gram Neg Rods  QUANTITY OF BACTERIA SEEN: RARE (1+)  WBC^White Blood Cells  QNTY CELLS IN GRAM STAIN: NO CELLS SEEN    Culture Results:   MANY  Routine susceptibility testing not performed as  Streptococcus Grp A/B is susceptible to drug(s) of choice -  Penicillin and Ampicillin  STRAG^Streptococcus agalactiae Gp B  DENICE^Corynebacterium species  ACMSP^Actinomyces species  ARTSP^Arthrobacter species      RADIOLOGY & ADDITIONAL STUDIES:    < from: [facility] (09.10.18 @ 10:09) >    EXAM:  [facility] BONE MARROW [facility]G LTD AREA        PROCEDURE DATE:  Sep 10 2018       INTERPRETATION:  CLINICAL INFORMATION: 63-year-old, diabetic foot ulcer   with findings suggestive of Charcot joint on Tc labeled leukocyte scan   9/8/2018, presents for marrow scan for confirmation.    RADIOPHARMACEUTICAL: 11 mCi Tc99m sulfur colloid, i.v.     TECHNIQUE:  Static images of both feet were obtained in the plantar,   dorsal, medial and lateral projections approximately 20 hours following   administration of radiopharmaceutical.     COMPARISON: Tc-labeled leukocyte scan from 9/8/2018 and 5/19/2017.    FINDINGS:  There is diffusely increased Tc-99m sulfur colloid in the   right midfoot, which is congruent in distribution of labeled leukocytes.    IMPRESSION:  Tc-sulfur colloid marrow scan confirms finding on labeled   leukocyte scan 9/8/2018, right foot Charcot joint,    which is new compared to Tc-99m labeled leukocyte scan from 5/19/2017.      Surgical Pathology Report (09.11.18 @ 14:03)    Surgical Pathology Report:   ACCESSION No:  [id number]    DARIEL MI               1        Surgical Final Report          Final Diagnosis    Bone, right foot, biopsy  - Unremarkable lamellar bone and fatty marrow (clinically  Charcot)    Verified by: CHELY MORILLO MD Pathologist  (Electronic Signature)  Reported on: 09/14/18 12:16 EDT, [address] [place] [address]  _________________________________________________________________    Clinical History  nuclear medicine scan-charcot right foot    Specimen(s) Submitted  1- Right foot bone biopsy    Gross Description  The specimen is received in formalin and the specimen container  is labeled: Right foot bone biopsy.  It consists of a 0.5 cm in  length x 0.2 cm diameter pink-white bone core biopsy.  Entirely  submitted following fixation and decalcification.  One cassette.    In addition to other data that may appear on the specimen  container, the label has been inspected to confirm the presence  of the patient's name and date of birth.  JCT 09/11/18 16:47        < end of copied text > NICU

## 2019-08-02 NOTE — H&P ADULT - NS NEC GEN PE MLT EXAM PC
Breathing spontaneous and unlabored. Breath sounds clear and equal bilaterally with regular rhythm.
detailed exam

## 2020-05-31 NOTE — DISCHARGE NOTE ADULT - NSFTFCONTACT3DT_GEN_ALL_CORE
-- DO NOT REPLY / DO NOT REPLY ALL --  -- Message is from the [facility]--        Provider paged via [employer] Documentation - The below message was copied and pasted from a [employer] page:      [phone] PATIENT NUMBER -------------------------------- ACC NURSE LINE (IF QUESTIONS ONLY - [phone]) URGENT FROM: JONE (MOTHER) DR. JANE PT. IS OUT OF FORMULA AND ALL STORES ARE CLOSED DUE TO RIOTING. CAN YOU PLEASE CALL MOM JONE AND SEE IF THERE IS SOME KIND OF ALTERNATIVE TO FORMULA UNTIL SHE CAN GET SOME? YOU CAN CALL HER -[phone]. SHE IS WAITING FOR YOUR CALL. THANK YOU. (PT. CARROLL ALISSONBenja, CPD=[id number]). ZANA ACC RNCL    
clear
24-Jan-2019

## 2020-06-29 NOTE — H&P ADULT - NECK DETAILS
[Fully active, able to carry on all pre-disease performance without restriction] : Status 0 - Fully active, able to carry on all pre-disease performance without restriction [Normal] : grossly intact [de-identified] : (+) abdominal telangiectasias no JVD/supple

## 2020-08-22 NOTE — DISCHARGE NOTE ADULT - HOME CARE AGENCY
Patient/Caregiver provided printed discharge information.
[facility] at [facility] , Wound care

## 2020-12-06 NOTE — CONSULT NOTE ADULT - CONSULT REQUESTED DATE/TIME
Subjective.   Patient seen and examined. Layign in bed. States pain is not better. Intermittent nausea.     OBJECTIVE:  Vital Signs (last 24 hrs)_____ Last Charted___________Minimum____________ Maximum____________  Temp    98.4  (AUG 26 11:31) L 97.7 (AUG 26 00:57) 98.4  (AUG 26 11:31)  Heart Rate   71  (AUG 26 11:31) 71  (AUG 26 11:31) 87  (AUG 25 15:04)  Resp Rate       16  (AUG 26 11:31) 16  (AUG 26 07:36) H 24 (AUG 25 19:28)  SBP    102  (AUG 26 11:31) 102  (AUG 26 11:31) 127  (AUG 26 07:36)  DBP    69  (AUG 26 11:31) 69  (AUG 25 19:28) 82  (AUG 26 07:36)    PHYSICAL EXAMINATION:  GENERAL:  The patient is morbidly obese.   HEENT:  Head atraumatic, normocephalic.  He has a birthmark on the right side of the forehead.  ABD: Obese soft, tender diffusely.  EXTREMITIES:  Bilateral lower extremities no calf tenderness, dry skin.  PSYCH:  The patient is cooperative.   NEUROLOGIC:  The patient is awake, alert, and oriented x3.   Chowdary with clear urine.    Labs (Last four charted values)  WBC                  6.3 (AUG 26) 7.2 (AUG 25) 7.1 (AUG 24) 7.2 (AUG 23)  Hgb                  L 11.0 (AUG 26) L 11.2 (AUG 25) L 11.0 (AUG 24) L 10.6 (AUG 23)  Hct                  37 (AUG 26) 37 (AUG 25) 36 (AUG 24) L 35 (AUG 23)  Plt                  L 125 (AUG 26) 166 (AUG 25) L 137 (AUG 24) L 147 (AUG 23)  Na                   136 (AUG 26) 137 (AUG 25) 138 (AUG 24) 139 (AUG 23)  K                    3.7 (AUG 26) 3.6 (AUG 25) 4.3 (AUG 24) 3.6 (AUG 23)  CO2                  22 (AUG 26) 23 (AUG 25) L 17 (AUG 24) 22 (AUG 23)  Cl                   106 (AUG 26) 105 (AUG 25) H 108 (AUG 24) 106 (AUG 23)  Cr                   H 1.39 (AUG 26) H 1.75 (AUG 25) H 2.29 (AUG 24) H 2.43 (AUG 23)  BUN                  12 (AUG 26) 15 (AUG 25) 18 (AUG 24) 19 (AUG 23)  Glucose              94 (AUG 26) 88 (AUG 25) 87 (AUG 24) 99 (AUG 23)  Mg                   2.0 (AUG 22) 1.6 (AUG 21)   Phos                 2.5 (AUG 25) 3.3 (AUG 24) 3.2 (AUG 23) 3.3  (AUG 23)  Ca                   L 7.9 (AUG 26) L 8.0 (AUG 25) L 8.2 (AUG 24) L 8.2 (AUG 23)  PT                   10.8 (AUG 20)   INR                  1.0 (AUG 20)   PTT                  28 (AUG 20)   Total CK             L 24 (AUG 22)     Medications (17) Active  Scheduled: (11)  dicyclomine 10 mg Cap  20 mg 2 cap, PO, TID  Eucerin Cream  1 jamison, Topical, BID  gabapentin 300 mg Cap  300 mg 1 cap, PO, qDay  heparin 5000 units/mL Vial  7,500 unit 1.5 mL, SubQ, q8hr  ketoconazole 2% Cream  1 jamison, TOP, qDay  levETIRAcetam 500 mg Tab  500 mg 1 tab, PO, q12hr  metoclopramide 5 mg Tab  5 mg 1 tab, PO, TIDAC  nystatin 585146 units/g Powder  1 jamison, TOP, TID  pantoprazole 40 mg IV Vial  40 mg 1 vial, IV Push, BID  polyethylene glycol Powder 17 gram UD  17 g 1 dose, PO, qDay  tamsulosin 0.4 mg Cap  0.4 mg 1 cap, PO, qHS  Continuous: (1)  Dextrose 5%-NaCl 0.45% 1,000 mL  1,000 mL, IV, 100 mL/hr  PRN: (5)  acetaminophen 500 mg Tab  1,000 mg 2 tab, PO, q6hr  acetaminophen 650 mg Supp  650 mg 1 supp, [place], q6hr  albuterol (2.5 mg/3 mL) 0.083% neb Soln  1.25 mg 1.5 mL, Nebulized, Q4RT  ondansetron 4 mg/2 mL Vial  4 mg 2 mL, IV Push, q4hr  traMADol 50 mg Tab  50 mg 1 tab, PO, q6hr    RADIOLOGY:  CT abdomen and pelvis done with contrast, see the results for the details.  No CT evidence of bowel obstruction, acute appendicitis or acute diverticulitis, no hydronephrosis.  Calcified stone in the upper pole right renal ashley, no CT evidence of pyelonephritis, mildly enlarged and heterogeneous prostate gland, cholelithiasis, moderate density in the dependent portion of the gallbladder, likely due to sludge and small gallstone,  fatty liver, mild hepatomegaly, evidence of prior granulomatous disease, mild splenomegaly, multiple small calcified granuloma in the spleen.     Ultrasound of gallbladder showed fatty liver, gallbladder, prominent descending/pancreas not diagnostic.     EKG showed sinus rhythm, low-voltage QRS in the precordial  15-Tadeo-2019 11:33 leads, possible right ventricular conduction delay.    ASSESSMENT AND PLAN:    1) Acute abdominal pain. Exact etiology unclear, GI suspects funtional disorder vs. IBS, delayed gastric emptying.  2) Acute kidney injury. Multifacotial likely related to ischemic injury, DANICA, NSAIDS.  3) Essential Hypertension  4) Morbid obesity  5) Chronic back pain, spinal stenosis.   6) TANI  7) Urinary retention.     PLAN:  1. Patient still refusing to eat due to concern of pain. However he states he will try apple sauce tonight. I will continue bentyl, reglan, on PPI. On GI soft diet. Limit narcotics. On tramadol prn for now. Has a fentanyl patch, but this is being removed. I discussed the case with Dr. Snell, consider esophagram? Small bowel follow through? EGD?, will follow up with mitesh recommendations.  2.  Renal function overall improving. Discussed with Dr. Alarcon. Does not need RRT at this time. Continue on IV fluids.  3) Continue other chronic home meds as noted on medication list. Holding antihypertensive meds for now.  4)  In terms of retention, phillip catheter in place. On flomax. Urology recommending to keep phillip in place for now. Will consider TOV prior to discharge.  5)  In terms of chronic back pain, continue current pain regimen. Tramadol prn added. PT recommending daily skilled vs 24 hour care at home.  6) Patient will need outpatient follow up for bariatic surgery.   7) DVT: Heparin subQ.          Electronically Signed On 08.26.2020 17:22  ___________________________________________________   Tomasa Yusuf DO

## 2021-01-01 ENCOUNTER — APPOINTMENT (OUTPATIENT)
Dept: TRANSPLANT | Facility: CLINIC | Age: 66
End: 2021-01-01
Payer: COMMERCIAL

## 2021-01-01 ENCOUNTER — NON-APPOINTMENT (OUTPATIENT)
Age: 66
End: 2021-01-01

## 2021-01-01 ENCOUNTER — OUTPATIENT (OUTPATIENT)
Dept: OUTPATIENT SERVICES | Facility: HOSPITAL | Age: 66
LOS: 1 days | End: 2021-01-01

## 2021-01-01 ENCOUNTER — INPATIENT (INPATIENT)
Facility: HOSPITAL | Age: 66
LOS: 9 days | Discharge: HOME | End: 2021-03-19
Attending: INTERNAL MEDICINE | Admitting: INTERNAL MEDICINE
Payer: COMMERCIAL

## 2021-01-01 ENCOUNTER — RESULT REVIEW (OUTPATIENT)
Age: 66
End: 2021-01-01

## 2021-01-01 ENCOUNTER — APPOINTMENT (OUTPATIENT)
Dept: NEPHROLOGY | Facility: CLINIC | Age: 66
End: 2021-01-01
Payer: COMMERCIAL

## 2021-01-01 ENCOUNTER — APPOINTMENT (OUTPATIENT)
Dept: CARDIOLOGY | Facility: CLINIC | Age: 66
End: 2021-01-01
Payer: COMMERCIAL

## 2021-01-01 ENCOUNTER — TRANSCRIPTION ENCOUNTER (OUTPATIENT)
Age: 66
End: 2021-01-01

## 2021-01-01 ENCOUNTER — APPOINTMENT (OUTPATIENT)
Dept: CT IMAGING | Facility: IMAGING CENTER | Age: 66
End: 2021-01-01
Payer: COMMERCIAL

## 2021-01-01 ENCOUNTER — OUTPATIENT (OUTPATIENT)
Dept: OUTPATIENT SERVICES | Facility: HOSPITAL | Age: 66
LOS: 1 days | End: 2021-01-01
Payer: COMMERCIAL

## 2021-01-01 ENCOUNTER — APPOINTMENT (OUTPATIENT)
Dept: CV DIAGNOSTICS | Facility: HOSPITAL | Age: 66
End: 2021-01-01
Payer: COMMERCIAL

## 2021-01-01 ENCOUNTER — OUTPATIENT (OUTPATIENT)
Dept: INPATIENT UNIT | Facility: HOSPITAL | Age: 66
LOS: 1 days | End: 2021-01-01
Payer: COMMERCIAL

## 2021-01-01 ENCOUNTER — APPOINTMENT (OUTPATIENT)
Dept: CV DIAGNOSITCS | Facility: HOSPITAL | Age: 66
End: 2021-01-01
Payer: COMMERCIAL

## 2021-01-01 VITALS
SYSTOLIC BLOOD PRESSURE: 116 MMHG | DIASTOLIC BLOOD PRESSURE: 54 MMHG | HEART RATE: 76 BPM | RESPIRATION RATE: 16 BRPM | OXYGEN SATURATION: 96 %

## 2021-01-01 VITALS
HEIGHT: 67 IN | TEMPERATURE: 97.9 F | HEART RATE: 84 BPM | OXYGEN SATURATION: 99 % | DIASTOLIC BLOOD PRESSURE: 60 MMHG | SYSTOLIC BLOOD PRESSURE: 111 MMHG | BODY MASS INDEX: 30.95 KG/M2 | WEIGHT: 197.2 LBS

## 2021-01-01 VITALS
TEMPERATURE: 99 F | HEART RATE: 67 BPM | DIASTOLIC BLOOD PRESSURE: 62 MMHG | OXYGEN SATURATION: 98 % | SYSTOLIC BLOOD PRESSURE: 141 MMHG | RESPIRATION RATE: 16 BRPM

## 2021-01-01 VITALS — BODY MASS INDEX: 30.38 KG/M2 | WEIGHT: 194 LBS

## 2021-01-01 VITALS
HEIGHT: 71 IN | HEART RATE: 64 BPM | SYSTOLIC BLOOD PRESSURE: 150 MMHG | TEMPERATURE: 97 F | OXYGEN SATURATION: 95 % | DIASTOLIC BLOOD PRESSURE: 33 MMHG | RESPIRATION RATE: 20 BRPM

## 2021-01-01 VITALS — DIASTOLIC BLOOD PRESSURE: 74 MMHG | HEART RATE: 73 BPM | SYSTOLIC BLOOD PRESSURE: 172 MMHG | TEMPERATURE: 97.4 F

## 2021-01-01 VITALS
BODY MASS INDEX: 28.49 KG/M2 | TEMPERATURE: 97.5 F | HEART RATE: 68 BPM | HEIGHT: 70 IN | SYSTOLIC BLOOD PRESSURE: 184 MMHG | WEIGHT: 199 LBS | DIASTOLIC BLOOD PRESSURE: 63 MMHG | RESPIRATION RATE: 16 BRPM | OXYGEN SATURATION: 99 %

## 2021-01-01 VITALS
DIASTOLIC BLOOD PRESSURE: 70 MMHG | RESPIRATION RATE: 16 BRPM | OXYGEN SATURATION: 100 % | TEMPERATURE: 98 F | SYSTOLIC BLOOD PRESSURE: 140 MMHG | HEART RATE: 77 BPM

## 2021-01-01 VITALS — HEIGHT: 67 IN | BODY MASS INDEX: 30.38 KG/M2

## 2021-01-01 DIAGNOSIS — S82.899A OTHER FRACTURE OF UNSPECIFIED LOWER LEG, INITIAL ENCOUNTER FOR CLOSED FRACTURE: Chronic | ICD-10-CM

## 2021-01-01 DIAGNOSIS — Z78.9 OTHER SPECIFIED HEALTH STATUS: ICD-10-CM

## 2021-01-01 DIAGNOSIS — Z87.39 PERSONAL HISTORY OF OTHER DISEASES OF THE MUSCULOSKELETAL SYSTEM AND CONNECTIVE TISSUE: ICD-10-CM

## 2021-01-01 DIAGNOSIS — N18.5 CHRONIC KIDNEY DISEASE, STAGE 5: ICD-10-CM

## 2021-01-01 DIAGNOSIS — E11.621 TYPE 2 DIABETES MELLITUS WITH FOOT ULCER: ICD-10-CM

## 2021-01-01 DIAGNOSIS — N18.9 CHRONIC KIDNEY DISEASE, UNSPECIFIED: ICD-10-CM

## 2021-01-01 DIAGNOSIS — Z01.818 ENCOUNTER FOR OTHER PREPROCEDURAL EXAMINATION: ICD-10-CM

## 2021-01-01 DIAGNOSIS — I25.10 ATHEROSCLEROTIC HEART DISEASE OF NATIVE CORONARY ARTERY W/OUT ANGINA PECTORIS: ICD-10-CM

## 2021-01-01 DIAGNOSIS — I50.33 ACUTE ON CHRONIC DIASTOLIC (CONGESTIVE) HEART FAILURE: ICD-10-CM

## 2021-01-01 DIAGNOSIS — N18.4 CHRONIC KIDNEY DISEASE, STAGE 4 (SEVERE): ICD-10-CM

## 2021-01-01 DIAGNOSIS — Z11.52 ENCOUNTER FOR SCREENING FOR COVID-19: ICD-10-CM

## 2021-01-01 DIAGNOSIS — R06.00 DYSPNEA, UNSPECIFIED: ICD-10-CM

## 2021-01-01 DIAGNOSIS — Z95.828 PRESENCE OF OTHER VASCULAR IMPLANTS AND GRAFTS: Chronic | ICD-10-CM

## 2021-01-01 DIAGNOSIS — N17.9 ACUTE KIDNEY FAILURE, UNSPECIFIED: ICD-10-CM

## 2021-01-01 DIAGNOSIS — Z29.9 ENCOUNTER FOR PROPHYLACTIC MEASURES, UNSPECIFIED: ICD-10-CM

## 2021-01-01 DIAGNOSIS — Z71.89 OTHER SPECIFIED COUNSELING: ICD-10-CM

## 2021-01-01 DIAGNOSIS — I12.0 HYPERTENSIVE CHRONIC KIDNEY DISEASE WITH STAGE 5 CHRONIC KIDNEY DISEASE OR END STAGE RENAL DISEASE: ICD-10-CM

## 2021-01-01 DIAGNOSIS — N18.6 END STAGE RENAL DISEASE: ICD-10-CM

## 2021-01-01 DIAGNOSIS — N25.0 RENAL OSTEODYSTROPHY: ICD-10-CM

## 2021-01-01 DIAGNOSIS — I35.0 NONRHEUMATIC AORTIC (VALVE) STENOSIS: ICD-10-CM

## 2021-01-01 DIAGNOSIS — L02.611 CUTANEOUS ABSCESS OF RIGHT FOOT: ICD-10-CM

## 2021-01-01 DIAGNOSIS — Z87.898 PERSONAL HISTORY OF OTHER SPECIFIED CONDITIONS: ICD-10-CM

## 2021-01-01 DIAGNOSIS — Z00.8 ENCOUNTER FOR OTHER GENERAL EXAMINATION: ICD-10-CM

## 2021-01-01 DIAGNOSIS — I25.10 ATHEROSCLEROTIC HEART DISEASE OF NATIVE CORONARY ARTERY WITHOUT ANGINA PECTORIS: ICD-10-CM

## 2021-01-01 DIAGNOSIS — I10 ESSENTIAL (PRIMARY) HYPERTENSION: ICD-10-CM

## 2021-01-01 DIAGNOSIS — D64.9 ANEMIA, UNSPECIFIED: ICD-10-CM

## 2021-01-01 DIAGNOSIS — E11.9 TYPE 2 DIABETES MELLITUS WITHOUT COMPLICATIONS: ICD-10-CM

## 2021-01-01 DIAGNOSIS — L97.514 NON-PRESSURE CHRONIC ULCER OF OTHER PART OF RIGHT FOOT WITH NECROSIS OF BONE: ICD-10-CM

## 2021-01-01 DIAGNOSIS — E78.5 HYPERLIPIDEMIA, UNSPECIFIED: ICD-10-CM

## 2021-01-01 LAB
A1C WITH ESTIMATED AVERAGE GLUCOSE RESULT: 7 % — HIGH (ref 4–5.6)
ABO + RH PNL BLD: NORMAL
ABO + RH PNL BLD: NORMAL
ALBUMIN SERPL ELPH-MCNC: 2.5 G/DL — LOW (ref 3.3–5)
ALBUMIN SERPL ELPH-MCNC: 2.8 G/DL — LOW (ref 3.3–5)
ALBUMIN SERPL ELPH-MCNC: 3.7 G/DL
ALP BLD-CCNC: 93 U/L
ALP SERPL-CCNC: 78 U/L — SIGNIFICANT CHANGE UP (ref 40–120)
ALP SERPL-CCNC: 80 U/L — SIGNIFICANT CHANGE UP (ref 40–120)
ALT FLD-CCNC: 13 U/L — SIGNIFICANT CHANGE UP (ref 4–41)
ALT FLD-CCNC: 15 U/L — SIGNIFICANT CHANGE UP (ref 4–41)
ALT SERPL-CCNC: 6 U/L
ANION GAP SERPL CALC-SCNC: 10 MMOL/L — SIGNIFICANT CHANGE UP (ref 7–14)
ANION GAP SERPL CALC-SCNC: 10 MMOL/L — SIGNIFICANT CHANGE UP (ref 7–14)
ANION GAP SERPL CALC-SCNC: 11 MMOL/L — SIGNIFICANT CHANGE UP (ref 7–14)
ANION GAP SERPL CALC-SCNC: 12 MMOL/L — SIGNIFICANT CHANGE UP (ref 7–14)
ANION GAP SERPL CALC-SCNC: 13 MMOL/L — SIGNIFICANT CHANGE UP (ref 7–14)
ANION GAP SERPL CALC-SCNC: 14 MMOL/L
ANION GAP SERPL CALC-SCNC: 16 MMOL/L — HIGH (ref 7–14)
ANION GAP SERPL CALC-SCNC: 16 MMOL/L — HIGH (ref 7–14)
ANION GAP SERPL CALC-SCNC: 17 MMOL/L — HIGH (ref 7–14)
ANION GAP SERPL CALC-SCNC: 18 MMOL/L — HIGH (ref 7–14)
APPEARANCE: CLEAR
APTT BLD: 34.2 SEC — SIGNIFICANT CHANGE UP (ref 27–36.3)
APTT BLD: 34.5 SEC — SIGNIFICANT CHANGE UP (ref 27–36.3)
APTT BLD: 35.2 SEC — SIGNIFICANT CHANGE UP (ref 27–36.3)
AST SERPL-CCNC: 13 U/L
AST SERPL-CCNC: 13 U/L — SIGNIFICANT CHANGE UP (ref 4–40)
AST SERPL-CCNC: 15 U/L — SIGNIFICANT CHANGE UP (ref 4–40)
BACTERIA: NEGATIVE
BASE EXCESS BLDV CALC-SCNC: 2.7 MMOL/L — HIGH (ref -2–2)
BASOPHILS # BLD AUTO: 0.03 K/UL — SIGNIFICANT CHANGE UP (ref 0–0.2)
BASOPHILS # BLD AUTO: 0.04 K/UL — SIGNIFICANT CHANGE UP (ref 0–0.2)
BASOPHILS # BLD AUTO: 0.05 K/UL
BASOPHILS NFR BLD AUTO: 0.4 % — SIGNIFICANT CHANGE UP (ref 0–2)
BASOPHILS NFR BLD AUTO: 0.5 % — SIGNIFICANT CHANGE UP (ref 0–2)
BASOPHILS NFR BLD AUTO: 0.6 %
BILIRUB SERPL-MCNC: 0.2 MG/DL — SIGNIFICANT CHANGE UP (ref 0.2–1.2)
BILIRUB SERPL-MCNC: 0.3 MG/DL
BILIRUB SERPL-MCNC: 0.3 MG/DL — SIGNIFICANT CHANGE UP (ref 0.2–1.2)
BILIRUBIN URINE: NEGATIVE
BLD GP AB SCN SERPL QL: NEGATIVE — SIGNIFICANT CHANGE UP
BLOOD GAS VENOUS - CREATININE: SIGNIFICANT CHANGE UP MG/DL (ref 0.5–1.3)
BLOOD URINE: NEGATIVE
BUN SERPL-MCNC: 104 MG/DL — HIGH (ref 7–23)
BUN SERPL-MCNC: 107 MG/DL — HIGH (ref 7–23)
BUN SERPL-MCNC: 18 MG/DL — SIGNIFICANT CHANGE UP (ref 7–23)
BUN SERPL-MCNC: 19 MG/DL
BUN SERPL-MCNC: 28 MG/DL — HIGH (ref 7–23)
BUN SERPL-MCNC: 30 MG/DL — HIGH (ref 7–23)
BUN SERPL-MCNC: 34 MG/DL — HIGH (ref 7–23)
BUN SERPL-MCNC: 34 MG/DL — HIGH (ref 7–23)
BUN SERPL-MCNC: 46 MG/DL — HIGH (ref 7–23)
BUN SERPL-MCNC: 46 MG/DL — HIGH (ref 7–23)
BUN SERPL-MCNC: 50 MG/DL — HIGH (ref 7–23)
BUN SERPL-MCNC: 84 MG/DL — HIGH (ref 7–23)
CA-I SERPL-SCNC: 1.16 MMOL/L — SIGNIFICANT CHANGE UP (ref 1.12–1.3)
CALCIUM SERPL-MCNC: 10.4 MG/DL — SIGNIFICANT CHANGE UP (ref 8.4–10.5)
CALCIUM SERPL-MCNC: 10.5 MG/DL — SIGNIFICANT CHANGE UP (ref 8.4–10.5)
CALCIUM SERPL-MCNC: 8.7 MG/DL — SIGNIFICANT CHANGE UP (ref 8.4–10.5)
CALCIUM SERPL-MCNC: 8.7 MG/DL — SIGNIFICANT CHANGE UP (ref 8.4–10.5)
CALCIUM SERPL-MCNC: 8.8 MG/DL — SIGNIFICANT CHANGE UP (ref 8.4–10.5)
CALCIUM SERPL-MCNC: 8.9 MG/DL — SIGNIFICANT CHANGE UP (ref 8.4–10.5)
CALCIUM SERPL-MCNC: 9 MG/DL — SIGNIFICANT CHANGE UP (ref 8.4–10.5)
CALCIUM SERPL-MCNC: 9 MG/DL — SIGNIFICANT CHANGE UP (ref 8.4–10.5)
CALCIUM SERPL-MCNC: 9.5 MG/DL
CALCIUM SERPL-MCNC: 9.5 MG/DL — SIGNIFICANT CHANGE UP (ref 8.4–10.5)
CALCIUM SERPL-MCNC: 9.6 MG/DL — SIGNIFICANT CHANGE UP (ref 8.4–10.5)
CALCIUM SERPL-MCNC: 9.7 MG/DL — SIGNIFICANT CHANGE UP (ref 8.4–10.5)
CHLORIDE BLDV-SCNC: SIGNIFICANT CHANGE UP MMOL/L (ref 96–108)
CHLORIDE SERPL-SCNC: 100 MMOL/L — SIGNIFICANT CHANGE UP (ref 98–107)
CHLORIDE SERPL-SCNC: 101 MMOL/L — SIGNIFICANT CHANGE UP (ref 98–107)
CHLORIDE SERPL-SCNC: 101 MMOL/L — SIGNIFICANT CHANGE UP (ref 98–107)
CHLORIDE SERPL-SCNC: 102 MMOL/L — SIGNIFICANT CHANGE UP (ref 98–107)
CHLORIDE SERPL-SCNC: 102 MMOL/L — SIGNIFICANT CHANGE UP (ref 98–107)
CHLORIDE SERPL-SCNC: 103 MMOL/L — SIGNIFICANT CHANGE UP (ref 98–107)
CHLORIDE SERPL-SCNC: 104 MMOL/L — SIGNIFICANT CHANGE UP (ref 98–107)
CHLORIDE SERPL-SCNC: 105 MMOL/L — SIGNIFICANT CHANGE UP (ref 98–107)
CHLORIDE SERPL-SCNC: 87 MMOL/L — LOW (ref 98–107)
CHLORIDE SERPL-SCNC: 91 MMOL/L — LOW (ref 98–107)
CHLORIDE SERPL-SCNC: 95 MMOL/L — LOW (ref 98–107)
CHLORIDE SERPL-SCNC: 99 MMOL/L
CHOLEST SERPL-MCNC: 155 MG/DL
CK SERPL-CCNC: 160 U/L — SIGNIFICANT CHANGE UP (ref 30–200)
CMV IGG SERPL QL: <0.2 U/ML
CMV IGG SERPL-IMP: NEGATIVE
CO2 SERPL-SCNC: 22 MMOL/L — SIGNIFICANT CHANGE UP (ref 22–31)
CO2 SERPL-SCNC: 22 MMOL/L — SIGNIFICANT CHANGE UP (ref 22–31)
CO2 SERPL-SCNC: 23 MMOL/L — SIGNIFICANT CHANGE UP (ref 22–31)
CO2 SERPL-SCNC: 23 MMOL/L — SIGNIFICANT CHANGE UP (ref 22–31)
CO2 SERPL-SCNC: 24 MMOL/L — SIGNIFICANT CHANGE UP (ref 22–31)
CO2 SERPL-SCNC: 25 MMOL/L — SIGNIFICANT CHANGE UP (ref 22–31)
CO2 SERPL-SCNC: 26 MMOL/L — SIGNIFICANT CHANGE UP (ref 22–31)
CO2 SERPL-SCNC: 28 MMOL/L
CO2 SERPL-SCNC: 28 MMOL/L — SIGNIFICANT CHANGE UP (ref 22–31)
COLOR: NORMAL
COVID-19 NUCLEOCAPSID  GAM ANTIBODY INTERPRETATION: POSITIVE
COVID-19 SPIKE DOMAIN ANTIBODY INTERPRETATION: POSITIVE
CREAT SERPL-MCNC: 10.09 MG/DL — HIGH (ref 0.5–1.3)
CREAT SERPL-MCNC: 10.41 MG/DL — HIGH (ref 0.5–1.3)
CREAT SERPL-MCNC: 3.37 MG/DL
CREAT SERPL-MCNC: 3.77 MG/DL — HIGH (ref 0.5–1.3)
CREAT SERPL-MCNC: 4.14 MG/DL — HIGH (ref 0.5–1.3)
CREAT SERPL-MCNC: 4.8 MG/DL — HIGH (ref 0.5–1.3)
CREAT SERPL-MCNC: 5.24 MG/DL — HIGH (ref 0.5–1.3)
CREAT SERPL-MCNC: 5.42 MG/DL — HIGH (ref 0.5–1.3)
CREAT SERPL-MCNC: 5.5 MG/DL — HIGH (ref 0.5–1.3)
CREAT SERPL-MCNC: 5.9 MG/DL — HIGH (ref 0.5–1.3)
CREAT SERPL-MCNC: 6.38 MG/DL — HIGH (ref 0.5–1.3)
CREAT SERPL-MCNC: 8.19 MG/DL — HIGH (ref 0.5–1.3)
CREAT SPEC-SCNC: 77 MG/DL
CREAT/PROT UR: 11.4 RATIO
D DIMER BLD IA.RAPID-MCNC: 580 NG/ML DDU — HIGH
DIALYSIS INSTRUMENT RESULT - HEPATITIS B SURFACE ANTIGEN: NEGATIVE — SIGNIFICANT CHANGE UP
EBV DNA SERPL NAA+PROBE-ACNC: NOT DETECTED IU/ML
EBV EA AB SER IA-ACNC: <5 U/ML
EBV EA AB TITR SER IF: POSITIVE
EBV EA IGG SER QL IA: 122 U/ML
EBV EA IGG SER-ACNC: NEGATIVE
EBV EA IGM SER IA-ACNC: NEGATIVE
EBV PATRN SPEC IB-IMP: NORMAL
EBV VCA IGG SER IA-ACNC: 268 U/ML
EBV VCA IGM SER QL IA: <10 U/ML
EOSINOPHIL # BLD AUTO: 0.07 K/UL
EOSINOPHIL # BLD AUTO: 0.07 K/UL — SIGNIFICANT CHANGE UP (ref 0–0.5)
EOSINOPHIL # BLD AUTO: 0.11 K/UL — SIGNIFICANT CHANGE UP (ref 0–0.5)
EOSINOPHIL NFR BLD AUTO: 0.8 %
EOSINOPHIL NFR BLD AUTO: 0.9 % — SIGNIFICANT CHANGE UP (ref 0–6)
EOSINOPHIL NFR BLD AUTO: 1.3 % — SIGNIFICANT CHANGE UP (ref 0–6)
EPO SERPL-MCNC: 56.7 MIU/ML — HIGH (ref 2.6–18.5)
EPSTEIN-BARR VIRUS CAPSID ANTIGEN IGG: POSITIVE
ESTIMATED AVERAGE GLUCOSE: 137 MG/DL
ESTIMATED AVERAGE GLUCOSE: 154 MG/DL — HIGH (ref 68–114)
FERRITIN SERPL-MCNC: 118 NG/ML — SIGNIFICANT CHANGE UP (ref 30–400)
GAMMA INTERFERON BACKGROUND BLD IA-ACNC: 0.01 IU/ML — SIGNIFICANT CHANGE UP
GAMMA INTERFERON BACKGROUND BLD IA-ACNC: 0.03 IU/ML — SIGNIFICANT CHANGE UP
GAS PNL BLDV: 141 MMOL/L — SIGNIFICANT CHANGE UP (ref 135–145)
GAS PNL BLDV: SIGNIFICANT CHANGE UP
GAS PNL BLDV: SIGNIFICANT CHANGE UP
GLUCOSE BLDC GLUCOMTR-MCNC: 102 MG/DL — HIGH (ref 70–99)
GLUCOSE BLDC GLUCOMTR-MCNC: 105 MG/DL — HIGH (ref 70–99)
GLUCOSE BLDC GLUCOMTR-MCNC: 107 MG/DL — HIGH (ref 70–99)
GLUCOSE BLDC GLUCOMTR-MCNC: 110 MG/DL — HIGH (ref 70–99)
GLUCOSE BLDC GLUCOMTR-MCNC: 111 MG/DL — HIGH (ref 70–99)
GLUCOSE BLDC GLUCOMTR-MCNC: 111 MG/DL — HIGH (ref 70–99)
GLUCOSE BLDC GLUCOMTR-MCNC: 113 MG/DL — HIGH (ref 70–99)
GLUCOSE BLDC GLUCOMTR-MCNC: 113 MG/DL — HIGH (ref 70–99)
GLUCOSE BLDC GLUCOMTR-MCNC: 116 MG/DL — HIGH (ref 70–99)
GLUCOSE BLDC GLUCOMTR-MCNC: 116 MG/DL — HIGH (ref 70–99)
GLUCOSE BLDC GLUCOMTR-MCNC: 118 MG/DL — HIGH (ref 70–99)
GLUCOSE BLDC GLUCOMTR-MCNC: 118 MG/DL — HIGH (ref 70–99)
GLUCOSE BLDC GLUCOMTR-MCNC: 120 MG/DL — HIGH (ref 70–99)
GLUCOSE BLDC GLUCOMTR-MCNC: 122 MG/DL — HIGH (ref 70–99)
GLUCOSE BLDC GLUCOMTR-MCNC: 124 MG/DL — HIGH (ref 70–99)
GLUCOSE BLDC GLUCOMTR-MCNC: 125 MG/DL — HIGH (ref 70–99)
GLUCOSE BLDC GLUCOMTR-MCNC: 125 MG/DL — HIGH (ref 70–99)
GLUCOSE BLDC GLUCOMTR-MCNC: 127 MG/DL — HIGH (ref 70–99)
GLUCOSE BLDC GLUCOMTR-MCNC: 127 MG/DL — HIGH (ref 70–99)
GLUCOSE BLDC GLUCOMTR-MCNC: 129 MG/DL — HIGH (ref 70–99)
GLUCOSE BLDC GLUCOMTR-MCNC: 131 MG/DL — HIGH (ref 70–99)
GLUCOSE BLDC GLUCOMTR-MCNC: 133 MG/DL — HIGH (ref 70–99)
GLUCOSE BLDC GLUCOMTR-MCNC: 135 MG/DL — HIGH (ref 70–99)
GLUCOSE BLDC GLUCOMTR-MCNC: 138 MG/DL — HIGH (ref 70–99)
GLUCOSE BLDC GLUCOMTR-MCNC: 142 MG/DL — HIGH (ref 70–99)
GLUCOSE BLDC GLUCOMTR-MCNC: 144 MG/DL — HIGH (ref 70–99)
GLUCOSE BLDC GLUCOMTR-MCNC: 147 MG/DL — HIGH (ref 70–99)
GLUCOSE BLDC GLUCOMTR-MCNC: 149 MG/DL — HIGH (ref 70–99)
GLUCOSE BLDC GLUCOMTR-MCNC: 154 MG/DL — HIGH (ref 70–99)
GLUCOSE BLDC GLUCOMTR-MCNC: 156 MG/DL — HIGH (ref 70–99)
GLUCOSE BLDC GLUCOMTR-MCNC: 158 MG/DL — HIGH (ref 70–99)
GLUCOSE BLDC GLUCOMTR-MCNC: 161 MG/DL — HIGH (ref 70–99)
GLUCOSE BLDC GLUCOMTR-MCNC: 161 MG/DL — HIGH (ref 70–99)
GLUCOSE BLDC GLUCOMTR-MCNC: 166 MG/DL — HIGH (ref 70–99)
GLUCOSE BLDC GLUCOMTR-MCNC: 169 MG/DL — HIGH (ref 70–99)
GLUCOSE BLDC GLUCOMTR-MCNC: 171 MG/DL — HIGH (ref 70–99)
GLUCOSE BLDC GLUCOMTR-MCNC: 179 MG/DL — HIGH (ref 70–99)
GLUCOSE BLDC GLUCOMTR-MCNC: 180 MG/DL — HIGH (ref 70–99)
GLUCOSE BLDC GLUCOMTR-MCNC: 183 MG/DL — HIGH (ref 70–99)
GLUCOSE BLDC GLUCOMTR-MCNC: 190 MG/DL — HIGH (ref 70–99)
GLUCOSE BLDC GLUCOMTR-MCNC: 203 MG/DL — HIGH (ref 70–99)
GLUCOSE BLDC GLUCOMTR-MCNC: 221 MG/DL — HIGH (ref 70–99)
GLUCOSE BLDC GLUCOMTR-MCNC: 97 MG/DL — SIGNIFICANT CHANGE UP (ref 70–99)
GLUCOSE BLDV-MCNC: 124 MG/DL — HIGH (ref 70–99)
GLUCOSE QUALITATIVE U: ABNORMAL
GLUCOSE SERPL-MCNC: 114 MG/DL — HIGH (ref 70–99)
GLUCOSE SERPL-MCNC: 117 MG/DL — HIGH (ref 70–99)
GLUCOSE SERPL-MCNC: 118 MG/DL — HIGH (ref 70–99)
GLUCOSE SERPL-MCNC: 120 MG/DL — HIGH (ref 70–99)
GLUCOSE SERPL-MCNC: 123 MG/DL — HIGH (ref 70–99)
GLUCOSE SERPL-MCNC: 123 MG/DL — HIGH (ref 70–99)
GLUCOSE SERPL-MCNC: 134 MG/DL — HIGH (ref 70–99)
GLUCOSE SERPL-MCNC: 134 MG/DL — HIGH (ref 70–99)
GLUCOSE SERPL-MCNC: 148 MG/DL
GLUCOSE SERPL-MCNC: 180 MG/DL — HIGH (ref 70–99)
GLUCOSE SERPL-MCNC: 211 MG/DL — HIGH (ref 70–99)
GLUCOSE SERPL-MCNC: 83 MG/DL — SIGNIFICANT CHANGE UP (ref 70–99)
HAV IGM SER QL: NONREACTIVE
HBA1C MFR BLD HPLC: 6.4 %
HBV CORE AB SER-ACNC: SIGNIFICANT CHANGE UP
HBV CORE IGG+IGM SER QL: NONREACTIVE
HBV SURFACE AB SER QL: NONREACTIVE
HBV SURFACE AB SER-ACNC: <3 MIU/ML — LOW
HBV SURFACE AB SERPL IA-ACNC: <3 MIU/ML
HBV SURFACE AG SER QL: NONREACTIVE
HBV SURFACE AG SER-ACNC: SIGNIFICANT CHANGE UP
HCO3 BLDV-SCNC: 26 MMOL/L — SIGNIFICANT CHANGE UP (ref 21–29)
HCT VFR BLD CALC: 21.8 % — LOW (ref 39–50)
HCT VFR BLD CALC: 22.7 % — LOW (ref 39–50)
HCT VFR BLD CALC: 23 % — LOW (ref 39–50)
HCT VFR BLD CALC: 23 % — LOW (ref 39–50)
HCT VFR BLD CALC: 23.4 % — LOW (ref 39–50)
HCT VFR BLD CALC: 24.2 % — LOW (ref 39–50)
HCT VFR BLD CALC: 25.3 % — LOW (ref 39–50)
HCT VFR BLD CALC: 25.5 % — LOW (ref 39–50)
HCT VFR BLD CALC: 26.2 % — LOW (ref 39–50)
HCT VFR BLD CALC: 27.2 % — LOW (ref 39–50)
HCT VFR BLD CALC: 27.4 % — LOW (ref 39–50)
HCT VFR BLD CALC: 29.6 % — LOW (ref 39–50)
HCT VFR BLD CALC: 31.3 % — LOW (ref 39–50)
HCT VFR BLD CALC: 32.6 %
HCT VFR BLDA CALC: 25 % — LOW (ref 39–50)
HCV AB S/CO SERPL IA: 0.06 S/CO — SIGNIFICANT CHANGE UP (ref 0–0.99)
HCV AB SER QL: NONREACTIVE
HCV AB SERPL-IMP: SIGNIFICANT CHANGE UP
HCV S/CO RATIO: 0.07 S/CO
HDLC SERPL-MCNC: 42 MG/DL
HEPATITIS A IGG ANTIBODY: NONREACTIVE
HGB BLD CALC-MCNC: 8 G/DL — LOW (ref 13–17)
HGB BLD-MCNC: 10.2 G/DL — LOW (ref 13–17)
HGB BLD-MCNC: 6.6 G/DL — CRITICAL LOW (ref 13–17)
HGB BLD-MCNC: 6.9 G/DL — CRITICAL LOW (ref 13–17)
HGB BLD-MCNC: 7.1 G/DL — LOW (ref 13–17)
HGB BLD-MCNC: 7.3 G/DL — LOW (ref 13–17)
HGB BLD-MCNC: 7.7 G/DL — LOW (ref 13–17)
HGB BLD-MCNC: 7.8 G/DL — LOW (ref 13–17)
HGB BLD-MCNC: 8.1 G/DL — LOW (ref 13–17)
HGB BLD-MCNC: 8.5 G/DL — LOW (ref 13–17)
HGB BLD-MCNC: 8.5 G/DL — LOW (ref 13–17)
HGB BLD-MCNC: 9.2 G/DL — LOW (ref 13–17)
HGB BLD-MCNC: 9.5 G/DL
HIV1+2 AB SPEC QL IA.RAPID: NONREACTIVE
HOROWITZ INDEX BLDV+IHG-RTO: 0 — SIGNIFICANT CHANGE UP
HSV 1+2 IGG SER IA-IMP: NEGATIVE
HSV 1+2 IGG SER IA-IMP: POSITIVE
HSV1 IGG SER QL: 34.9 INDEX
HSV2 IGG SER QL: 0.09 INDEX
HYALINE CASTS: 0 /LPF
IANC: 6.05 K/UL — SIGNIFICANT CHANGE UP (ref 1.5–8.5)
IANC: 6.32 K/UL — SIGNIFICANT CHANGE UP (ref 1.5–8.5)
IMM GRANULOCYTES NFR BLD AUTO: 0.2 %
IMM GRANULOCYTES NFR BLD AUTO: 0.4 % — SIGNIFICANT CHANGE UP (ref 0–1.5)
IMM GRANULOCYTES NFR BLD AUTO: 0.4 % — SIGNIFICANT CHANGE UP (ref 0–1.5)
INR BLD: 1.12 RATIO — SIGNIFICANT CHANGE UP (ref 0.88–1.16)
INR BLD: 1.13 RATIO — SIGNIFICANT CHANGE UP (ref 0.88–1.16)
INR BLD: 1.18 RATIO — HIGH (ref 0.88–1.16)
IRON SATN MFR SERPL: 15 % — SIGNIFICANT CHANGE UP (ref 14–50)
IRON SATN MFR SERPL: 32 UG/DL — LOW (ref 45–165)
KETONES URINE: NEGATIVE
LACTATE BLDV-MCNC: 0.7 MMOL/L — SIGNIFICANT CHANGE UP (ref 0.7–2)
LDLC SERPL CALC-MCNC: 87 MG/DL
LEUKOCYTE ESTERASE URINE: NEGATIVE
LYMPHOCYTES # BLD AUTO: 1.19 K/UL — SIGNIFICANT CHANGE UP (ref 1–3.3)
LYMPHOCYTES # BLD AUTO: 1.21 K/UL — SIGNIFICANT CHANGE UP (ref 1–3.3)
LYMPHOCYTES # BLD AUTO: 1.76 K/UL
LYMPHOCYTES # BLD AUTO: 14.3 % — SIGNIFICANT CHANGE UP (ref 13–44)
LYMPHOCYTES # BLD AUTO: 14.7 % — SIGNIFICANT CHANGE UP (ref 13–44)
LYMPHOCYTES NFR BLD AUTO: 20.6 %
M TB IFN-G BLD-IMP: ABNORMAL
M TB IFN-G BLD-IMP: NEGATIVE
M TB IFN-G BLD-IMP: NEGATIVE — SIGNIFICANT CHANGE UP
M TB IFN-G CD4+ BCKGRND COR BLD-ACNC: -0.01 IU/ML — SIGNIFICANT CHANGE UP
M TB IFN-G CD4+ BCKGRND COR BLD-ACNC: 0 IU/ML — SIGNIFICANT CHANGE UP
M TB IFN-G CD4+CD8+ BCKGRND COR BLD-ACNC: -0.01 IU/ML — SIGNIFICANT CHANGE UP
M TB IFN-G CD4+CD8+ BCKGRND COR BLD-ACNC: 0 IU/ML — SIGNIFICANT CHANGE UP
MAGNESIUM SERPL-MCNC: 1.8 MG/DL — SIGNIFICANT CHANGE UP (ref 1.6–2.6)
MAGNESIUM SERPL-MCNC: 1.9 MG/DL — SIGNIFICANT CHANGE UP (ref 1.6–2.6)
MAGNESIUM SERPL-MCNC: 2 MG/DL
MAGNESIUM SERPL-MCNC: 2 MG/DL — SIGNIFICANT CHANGE UP (ref 1.6–2.6)
MAGNESIUM SERPL-MCNC: 2.1 MG/DL — SIGNIFICANT CHANGE UP (ref 1.6–2.6)
MAN DIFF?: NORMAL
MCHC RBC-ENTMCNC: 27.7 PG — SIGNIFICANT CHANGE UP (ref 27–34)
MCHC RBC-ENTMCNC: 27.7 PG — SIGNIFICANT CHANGE UP (ref 27–34)
MCHC RBC-ENTMCNC: 28 PG — SIGNIFICANT CHANGE UP (ref 27–34)
MCHC RBC-ENTMCNC: 28.3 PG — SIGNIFICANT CHANGE UP (ref 27–34)
MCHC RBC-ENTMCNC: 28.4 PG — SIGNIFICANT CHANGE UP (ref 27–34)
MCHC RBC-ENTMCNC: 28.5 PG — SIGNIFICANT CHANGE UP (ref 27–34)
MCHC RBC-ENTMCNC: 28.6 PG — SIGNIFICANT CHANGE UP (ref 27–34)
MCHC RBC-ENTMCNC: 28.7 PG
MCHC RBC-ENTMCNC: 28.7 PG — SIGNIFICANT CHANGE UP (ref 27–34)
MCHC RBC-ENTMCNC: 29.1 GM/DL
MCHC RBC-ENTMCNC: 29.7 PG — SIGNIFICANT CHANGE UP (ref 27–34)
MCHC RBC-ENTMCNC: 30 GM/DL — LOW (ref 32–36)
MCHC RBC-ENTMCNC: 30.2 GM/DL — LOW (ref 32–36)
MCHC RBC-ENTMCNC: 30.3 GM/DL — LOW (ref 32–36)
MCHC RBC-ENTMCNC: 30.3 GM/DL — LOW (ref 32–36)
MCHC RBC-ENTMCNC: 30.4 GM/DL — LOW (ref 32–36)
MCHC RBC-ENTMCNC: 30.6 GM/DL — LOW (ref 32–36)
MCHC RBC-ENTMCNC: 30.9 GM/DL — LOW (ref 32–36)
MCHC RBC-ENTMCNC: 30.9 GM/DL — LOW (ref 32–36)
MCHC RBC-ENTMCNC: 31 GM/DL — LOW (ref 32–36)
MCHC RBC-ENTMCNC: 31.1 GM/DL — LOW (ref 32–36)
MCHC RBC-ENTMCNC: 31.3 GM/DL — LOW (ref 32–36)
MCHC RBC-ENTMCNC: 31.3 GM/DL — LOW (ref 32–36)
MCHC RBC-ENTMCNC: 32.6 GM/DL — SIGNIFICANT CHANGE UP (ref 32–36)
MCV RBC AUTO: 89.5 FL — SIGNIFICANT CHANGE UP (ref 80–100)
MCV RBC AUTO: 89.7 FL — SIGNIFICANT CHANGE UP (ref 80–100)
MCV RBC AUTO: 91.1 FL — SIGNIFICANT CHANGE UP (ref 80–100)
MCV RBC AUTO: 91.3 FL — SIGNIFICANT CHANGE UP (ref 80–100)
MCV RBC AUTO: 91.3 FL — SIGNIFICANT CHANGE UP (ref 80–100)
MCV RBC AUTO: 91.6 FL — SIGNIFICANT CHANGE UP (ref 80–100)
MCV RBC AUTO: 91.7 FL — SIGNIFICANT CHANGE UP (ref 80–100)
MCV RBC AUTO: 91.9 FL — SIGNIFICANT CHANGE UP (ref 80–100)
MCV RBC AUTO: 92 FL — SIGNIFICANT CHANGE UP (ref 80–100)
MCV RBC AUTO: 92.4 FL — SIGNIFICANT CHANGE UP (ref 80–100)
MCV RBC AUTO: 93.4 FL — SIGNIFICANT CHANGE UP (ref 80–100)
MCV RBC AUTO: 94 FL — SIGNIFICANT CHANGE UP (ref 80–100)
MCV RBC AUTO: 94.7 FL — SIGNIFICANT CHANGE UP (ref 80–100)
MCV RBC AUTO: 98.5 FL
MICROSCOPIC-UA: NORMAL
MONOCYTES # BLD AUTO: 0.68 K/UL
MONOCYTES # BLD AUTO: 0.72 K/UL — SIGNIFICANT CHANGE UP (ref 0–0.9)
MONOCYTES # BLD AUTO: 0.79 K/UL — SIGNIFICANT CHANGE UP (ref 0–0.9)
MONOCYTES NFR BLD AUTO: 8 %
MONOCYTES NFR BLD AUTO: 8.9 % — SIGNIFICANT CHANGE UP (ref 2–14)
MONOCYTES NFR BLD AUTO: 9.3 % — SIGNIFICANT CHANGE UP (ref 2–14)
MRSA PCR RESULT.: SIGNIFICANT CHANGE UP
NEUTROPHILS # BLD AUTO: 5.96 K/UL
NEUTROPHILS # BLD AUTO: 6.05 K/UL — SIGNIFICANT CHANGE UP (ref 1.8–7.4)
NEUTROPHILS # BLD AUTO: 6.32 K/UL — SIGNIFICANT CHANGE UP (ref 1.8–7.4)
NEUTROPHILS NFR BLD AUTO: 69.8 %
NEUTROPHILS NFR BLD AUTO: 74.3 % — SIGNIFICANT CHANGE UP (ref 43–77)
NEUTROPHILS NFR BLD AUTO: 74.6 % — SIGNIFICANT CHANGE UP (ref 43–77)
NITRITE URINE: NEGATIVE
NONHDLC SERPL-MCNC: 112 MG/DL
NRBC # BLD: 0 /100 WBCS — SIGNIFICANT CHANGE UP
NRBC # FLD: 0 K/UL — SIGNIFICANT CHANGE UP
NT-PROBNP SERPL-SCNC: HIGH PG/ML
OB PNL STL: NEGATIVE — SIGNIFICANT CHANGE UP
PCO2 BLDV: 40 MMHG — LOW (ref 42–55)
PH BLDV: 7.44 — SIGNIFICANT CHANGE UP (ref 7.35–7.45)
PH URINE: 7.5
PHOSPHATE SERPL-MCNC: 2.8 MG/DL — SIGNIFICANT CHANGE UP (ref 2.5–4.5)
PHOSPHATE SERPL-MCNC: 3.3 MG/DL — SIGNIFICANT CHANGE UP (ref 2.5–4.5)
PHOSPHATE SERPL-MCNC: 3.4 MG/DL
PHOSPHATE SERPL-MCNC: 3.5 MG/DL — SIGNIFICANT CHANGE UP (ref 2.5–4.5)
PHOSPHATE SERPL-MCNC: 3.7 MG/DL — SIGNIFICANT CHANGE UP (ref 2.5–4.5)
PHOSPHATE SERPL-MCNC: 4.3 MG/DL — SIGNIFICANT CHANGE UP (ref 2.5–4.5)
PHOSPHATE SERPL-MCNC: 5.2 MG/DL — HIGH (ref 2.5–4.5)
PHOSPHATE SERPL-MCNC: 6.2 MG/DL — HIGH (ref 2.5–4.5)
PHOSPHATE SERPL-MCNC: 6.6 MG/DL — HIGH (ref 2.5–4.5)
PLATELET # BLD AUTO: 272 K/UL — SIGNIFICANT CHANGE UP (ref 150–400)
PLATELET # BLD AUTO: 273 K/UL — SIGNIFICANT CHANGE UP (ref 150–400)
PLATELET # BLD AUTO: 278 K/UL — SIGNIFICANT CHANGE UP (ref 150–400)
PLATELET # BLD AUTO: 279 K/UL — SIGNIFICANT CHANGE UP (ref 150–400)
PLATELET # BLD AUTO: 281 K/UL — SIGNIFICANT CHANGE UP (ref 150–400)
PLATELET # BLD AUTO: 289 K/UL — SIGNIFICANT CHANGE UP (ref 150–400)
PLATELET # BLD AUTO: 293 K/UL — SIGNIFICANT CHANGE UP (ref 150–400)
PLATELET # BLD AUTO: 307 K/UL — SIGNIFICANT CHANGE UP (ref 150–400)
PLATELET # BLD AUTO: 313 K/UL — SIGNIFICANT CHANGE UP (ref 150–400)
PLATELET # BLD AUTO: 323 K/UL — SIGNIFICANT CHANGE UP (ref 150–400)
PLATELET # BLD AUTO: 325 K/UL — SIGNIFICANT CHANGE UP (ref 150–400)
PLATELET # BLD AUTO: 331 K/UL — SIGNIFICANT CHANGE UP (ref 150–400)
PLATELET # BLD AUTO: 348 K/UL
PLATELET # BLD AUTO: 348 K/UL — SIGNIFICANT CHANGE UP (ref 150–400)
PO2 BLDV: 67 MMHG — HIGH (ref 25–45)
POTASSIUM BLDV-SCNC: 4.1 MMOL/L — SIGNIFICANT CHANGE UP (ref 3.5–5)
POTASSIUM SERPL-MCNC: 3.1 MMOL/L — LOW (ref 3.5–5.3)
POTASSIUM SERPL-MCNC: 3.1 MMOL/L — LOW (ref 3.5–5.3)
POTASSIUM SERPL-MCNC: 3.4 MMOL/L — LOW (ref 3.5–5.3)
POTASSIUM SERPL-MCNC: 3.4 MMOL/L — LOW (ref 3.5–5.3)
POTASSIUM SERPL-MCNC: 3.5 MMOL/L — SIGNIFICANT CHANGE UP (ref 3.5–5.3)
POTASSIUM SERPL-MCNC: 3.5 MMOL/L — SIGNIFICANT CHANGE UP (ref 3.5–5.3)
POTASSIUM SERPL-MCNC: 3.6 MMOL/L — SIGNIFICANT CHANGE UP (ref 3.5–5.3)
POTASSIUM SERPL-MCNC: 3.7 MMOL/L — SIGNIFICANT CHANGE UP (ref 3.5–5.3)
POTASSIUM SERPL-MCNC: 3.8 MMOL/L — SIGNIFICANT CHANGE UP (ref 3.5–5.3)
POTASSIUM SERPL-SCNC: 3.1 MMOL/L — LOW (ref 3.5–5.3)
POTASSIUM SERPL-SCNC: 3.1 MMOL/L — LOW (ref 3.5–5.3)
POTASSIUM SERPL-SCNC: 3.4 MMOL/L — LOW (ref 3.5–5.3)
POTASSIUM SERPL-SCNC: 3.4 MMOL/L — LOW (ref 3.5–5.3)
POTASSIUM SERPL-SCNC: 3.5 MMOL/L — SIGNIFICANT CHANGE UP (ref 3.5–5.3)
POTASSIUM SERPL-SCNC: 3.5 MMOL/L — SIGNIFICANT CHANGE UP (ref 3.5–5.3)
POTASSIUM SERPL-SCNC: 3.6 MMOL/L — SIGNIFICANT CHANGE UP (ref 3.5–5.3)
POTASSIUM SERPL-SCNC: 3.7 MMOL/L — SIGNIFICANT CHANGE UP (ref 3.5–5.3)
POTASSIUM SERPL-SCNC: 3.8 MMOL/L — SIGNIFICANT CHANGE UP (ref 3.5–5.3)
POTASSIUM SERPL-SCNC: 4.8 MMOL/L
PROT SERPL-MCNC: 5.9 G/DL — LOW (ref 6–8.3)
PROT SERPL-MCNC: 6 G/DL — SIGNIFICANT CHANGE UP (ref 6–8.3)
PROT SERPL-MCNC: 6.8 G/DL
PROT UR-MCNC: 884 MG/DL
PROTEIN URINE: ABNORMAL
PROTHROM AB SERPL-ACNC: 12.8 SEC — SIGNIFICANT CHANGE UP (ref 10.6–13.6)
PROTHROM AB SERPL-ACNC: 12.9 SEC — SIGNIFICANT CHANGE UP (ref 10.6–13.6)
PROTHROM AB SERPL-ACNC: 13.3 SEC — SIGNIFICANT CHANGE UP (ref 10.6–13.6)
PSA SERPL-MCNC: 0.93 NG/ML
PTH-INTACT FLD-MCNC: 137 PG/ML — HIGH (ref 15–65)
PTH-INTACT FLD-MCNC: 140 PG/ML — HIGH (ref 15–65)
QUANT TB PLUS MITOGEN MINUS NIL: 0.3 IU/ML — SIGNIFICANT CHANGE UP
QUANT TB PLUS MITOGEN MINUS NIL: 1.28 IU/ML — SIGNIFICANT CHANGE UP
QUANTIFERON TB PLUS MITOGEN MINUS NIL: 1.95 IU/ML
QUANTIFERON TB PLUS NIL: 0.01 IU/ML
QUANTIFERON TB PLUS TB1 MINUS NIL: 0 IU/ML
QUANTIFERON TB PLUS TB2 MINUS NIL: 0 IU/ML
RBC # BLD: 2.36 M/UL — LOW (ref 4.2–5.8)
RBC # BLD: 2.43 M/UL — LOW (ref 4.2–5.8)
RBC # BLD: 2.47 M/UL — LOW (ref 4.2–5.8)
RBC # BLD: 2.49 M/UL — LOW (ref 4.2–5.8)
RBC # BLD: 2.51 M/UL — LOW (ref 4.2–5.8)
RBC # BLD: 2.64 M/UL — LOW (ref 4.2–5.8)
RBC # BLD: 2.73 M/UL — LOW (ref 4.2–5.8)
RBC # BLD: 2.75 M/UL — LOW (ref 4.2–5.8)
RBC # BLD: 2.92 M/UL — LOW (ref 4.2–5.8)
RBC # BLD: 3 M/UL — LOW (ref 4.2–5.8)
RBC # BLD: 3.04 M/UL — LOW (ref 4.2–5.8)
RBC # BLD: 3.25 M/UL — LOW (ref 4.2–5.8)
RBC # BLD: 3.31 M/UL
RBC # BLD: 3.43 M/UL — LOW (ref 4.2–5.8)
RBC # FLD: 14.1 % — SIGNIFICANT CHANGE UP (ref 10.3–14.5)
RBC # FLD: 14.4 % — SIGNIFICANT CHANGE UP (ref 10.3–14.5)
RBC # FLD: 14.5 % — SIGNIFICANT CHANGE UP (ref 10.3–14.5)
RBC # FLD: 14.6 % — HIGH (ref 10.3–14.5)
RBC # FLD: 14.6 % — HIGH (ref 10.3–14.5)
RBC # FLD: 14.7 % — HIGH (ref 10.3–14.5)
RBC # FLD: 14.7 % — HIGH (ref 10.3–14.5)
RBC # FLD: 14.8 % — HIGH (ref 10.3–14.5)
RBC # FLD: 15.4 % — HIGH (ref 10.3–14.5)
RBC # FLD: 15.5 % — HIGH (ref 10.3–14.5)
RBC # FLD: 16 %
RBC # FLD: 16.2 % — HIGH (ref 10.3–14.5)
RBC # FLD: 16.2 % — HIGH (ref 10.3–14.5)
RBC # FLD: 16.7 % — HIGH (ref 10.3–14.5)
RED BLOOD CELLS URINE: 0 /HPF
RH IG SCN BLD-IMP: NEGATIVE — SIGNIFICANT CHANGE UP
RUBV IGG FLD-ACNC: 16.9 INDEX
RUBV IGG SER-IMP: POSITIVE
S AUREUS DNA NOSE QL NAA+PROBE: SIGNIFICANT CHANGE UP
SAO2 % BLDV: 94 % — HIGH (ref 67–88)
SARS-COV-2 AB SERPL IA-ACNC: >250 U/ML
SARS-COV-2 AB SERPL QL IA: 56.8 INDEX
SARS-COV-2 N GENE NPH QL NAA+PROBE: NOT DETECTED
SARS-COV-2 RNA SPEC QL NAA+PROBE: SIGNIFICANT CHANGE UP
SARS-COV-2 RNA SPEC QL NAA+PROBE: SIGNIFICANT CHANGE UP
SODIUM SERPL-SCNC: 132 MMOL/L — LOW (ref 135–145)
SODIUM SERPL-SCNC: 134 MMOL/L — LOW (ref 135–145)
SODIUM SERPL-SCNC: 136 MMOL/L — SIGNIFICANT CHANGE UP (ref 135–145)
SODIUM SERPL-SCNC: 137 MMOL/L — SIGNIFICANT CHANGE UP (ref 135–145)
SODIUM SERPL-SCNC: 138 MMOL/L — SIGNIFICANT CHANGE UP (ref 135–145)
SODIUM SERPL-SCNC: 139 MMOL/L — SIGNIFICANT CHANGE UP (ref 135–145)
SODIUM SERPL-SCNC: 140 MMOL/L — SIGNIFICANT CHANGE UP (ref 135–145)
SODIUM SERPL-SCNC: 142 MMOL/L
SODIUM SERPL-SCNC: 142 MMOL/L — SIGNIFICANT CHANGE UP (ref 135–145)
SPECIFIC GRAVITY URINE: 1.02
SQUAMOUS EPITHELIAL CELLS: 2 /HPF
SURGICAL PATHOLOGY STUDY: SIGNIFICANT CHANGE UP
T GONDII AB SER-IMP: NEGATIVE
T GONDII IGG SER QL: <3 IU/ML
T PALLIDUM AB SER QL IA: NEGATIVE
TIBC SERPL-MCNC: 217 UG/DL — LOW (ref 220–430)
TRIGL SERPL-MCNC: 126 MG/DL
TROPONIN T, HIGH SENSITIVITY RESULT: 142 NG/L — CRITICAL HIGH
UIBC SERPL-MCNC: 185 UG/DL — SIGNIFICANT CHANGE UP (ref 110–370)
UROBILINOGEN URINE: NORMAL
VIT D25+D1,25 OH+D1,25 PNL SERPL-MCNC: 19.4 PG/ML — LOW (ref 19.9–79.3)
VZV AB TITR SER: POSITIVE
VZV IGG SER IF-ACNC: 678.6 INDEX
WBC # BLD: 6.53 K/UL — SIGNIFICANT CHANGE UP (ref 3.8–10.5)
WBC # BLD: 6.75 K/UL — SIGNIFICANT CHANGE UP (ref 3.8–10.5)
WBC # BLD: 6.8 K/UL — SIGNIFICANT CHANGE UP (ref 3.8–10.5)
WBC # BLD: 7.01 K/UL — SIGNIFICANT CHANGE UP (ref 3.8–10.5)
WBC # BLD: 7.04 K/UL — SIGNIFICANT CHANGE UP (ref 3.8–10.5)
WBC # BLD: 7.37 K/UL — SIGNIFICANT CHANGE UP (ref 3.8–10.5)
WBC # BLD: 7.39 K/UL — SIGNIFICANT CHANGE UP (ref 3.8–10.5)
WBC # BLD: 7.41 K/UL — SIGNIFICANT CHANGE UP (ref 3.8–10.5)
WBC # BLD: 7.55 K/UL — SIGNIFICANT CHANGE UP (ref 3.8–10.5)
WBC # BLD: 7.63 K/UL — SIGNIFICANT CHANGE UP (ref 3.8–10.5)
WBC # BLD: 8.1 K/UL — SIGNIFICANT CHANGE UP (ref 3.8–10.5)
WBC # BLD: 8.22 K/UL — SIGNIFICANT CHANGE UP (ref 3.8–10.5)
WBC # BLD: 8.49 K/UL — SIGNIFICANT CHANGE UP (ref 3.8–10.5)
WBC # FLD AUTO: 6.53 K/UL — SIGNIFICANT CHANGE UP (ref 3.8–10.5)
WBC # FLD AUTO: 6.75 K/UL — SIGNIFICANT CHANGE UP (ref 3.8–10.5)
WBC # FLD AUTO: 6.8 K/UL — SIGNIFICANT CHANGE UP (ref 3.8–10.5)
WBC # FLD AUTO: 7.01 K/UL — SIGNIFICANT CHANGE UP (ref 3.8–10.5)
WBC # FLD AUTO: 7.04 K/UL — SIGNIFICANT CHANGE UP (ref 3.8–10.5)
WBC # FLD AUTO: 7.37 K/UL — SIGNIFICANT CHANGE UP (ref 3.8–10.5)
WBC # FLD AUTO: 7.39 K/UL — SIGNIFICANT CHANGE UP (ref 3.8–10.5)
WBC # FLD AUTO: 7.41 K/UL — SIGNIFICANT CHANGE UP (ref 3.8–10.5)
WBC # FLD AUTO: 7.55 K/UL — SIGNIFICANT CHANGE UP (ref 3.8–10.5)
WBC # FLD AUTO: 7.63 K/UL — SIGNIFICANT CHANGE UP (ref 3.8–10.5)
WBC # FLD AUTO: 8.1 K/UL — SIGNIFICANT CHANGE UP (ref 3.8–10.5)
WBC # FLD AUTO: 8.22 K/UL — SIGNIFICANT CHANGE UP (ref 3.8–10.5)
WBC # FLD AUTO: 8.49 K/UL — SIGNIFICANT CHANGE UP (ref 3.8–10.5)
WBC # FLD AUTO: 8.54 K/UL
WHITE BLOOD CELLS URINE: 2 /HPF

## 2021-01-01 PROCEDURE — 82947 ASSAY GLUCOSE BLOOD QUANT: CPT

## 2021-01-01 PROCEDURE — 85018 HEMOGLOBIN: CPT

## 2021-01-01 PROCEDURE — 93306 TTE W/DOPPLER COMPLETE: CPT | Mod: 26

## 2021-01-01 PROCEDURE — 82565 ASSAY OF CREATININE: CPT

## 2021-01-01 PROCEDURE — 85014 HEMATOCRIT: CPT

## 2021-01-01 PROCEDURE — 93016 CV STRESS TEST SUPVJ ONLY: CPT | Mod: GC

## 2021-01-01 PROCEDURE — 75635 CT ANGIO ABDOMINAL ARTERIES: CPT | Mod: 26

## 2021-01-01 PROCEDURE — 99072 ADDL SUPL MATRL&STAF TM PHE: CPT

## 2021-01-01 PROCEDURE — 36558 INSERT TUNNELED CV CATH: CPT

## 2021-01-01 PROCEDURE — 82803 BLOOD GASES ANY COMBINATION: CPT

## 2021-01-01 PROCEDURE — 82435 ASSAY OF BLOOD CHLORIDE: CPT

## 2021-01-01 PROCEDURE — 83605 ASSAY OF LACTIC ACID: CPT

## 2021-01-01 PROCEDURE — 82330 ASSAY OF CALCIUM: CPT

## 2021-01-01 PROCEDURE — 36556 INSERT NON-TUNNEL CV CATH: CPT

## 2021-01-01 PROCEDURE — 99204 OFFICE O/P NEW MOD 45 MIN: CPT

## 2021-01-01 PROCEDURE — C1889: CPT

## 2021-01-01 PROCEDURE — 93970 EXTREMITY STUDY: CPT | Mod: 26

## 2021-01-01 PROCEDURE — 82962 GLUCOSE BLOOD TEST: CPT

## 2021-01-01 PROCEDURE — 77001 FLUOROGUIDE FOR VEIN DEVICE: CPT | Mod: 26,GC

## 2021-01-01 PROCEDURE — 84132 ASSAY OF SERUM POTASSIUM: CPT

## 2021-01-01 PROCEDURE — 81382T: CUSTOM

## 2021-01-01 PROCEDURE — 71045 X-RAY EXAM CHEST 1 VIEW: CPT | Mod: 26

## 2021-01-01 PROCEDURE — U0005: CPT

## 2021-01-01 PROCEDURE — 93000 ELECTROCARDIOGRAM COMPLETE: CPT

## 2021-01-01 PROCEDURE — 93018 CV STRESS TEST I&R ONLY: CPT | Mod: GC

## 2021-01-01 PROCEDURE — 99214 OFFICE O/P EST MOD 30 MIN: CPT

## 2021-01-01 PROCEDURE — 99223 1ST HOSP IP/OBS HIGH 75: CPT

## 2021-01-01 PROCEDURE — 84295 ASSAY OF SERUM SODIUM: CPT

## 2021-01-01 PROCEDURE — 76937 US GUIDE VASCULAR ACCESS: CPT | Mod: 26

## 2021-01-01 PROCEDURE — 99001T: CUSTOM

## 2021-01-01 PROCEDURE — 78452 HT MUSCLE IMAGE SPECT MULT: CPT | Mod: 26

## 2021-01-01 PROCEDURE — 71260 CT THORAX DX C+: CPT

## 2021-01-01 PROCEDURE — 71260 CT THORAX DX C+: CPT | Mod: 26

## 2021-01-01 PROCEDURE — 75635 CT ANGIO ABDOMINAL ARTERIES: CPT

## 2021-01-01 PROCEDURE — 99285 EMERGENCY DEPT VISIT HI MDM: CPT

## 2021-01-01 PROCEDURE — 99205 OFFICE O/P NEW HI 60 MIN: CPT

## 2021-01-01 PROCEDURE — C9803: CPT

## 2021-01-01 PROCEDURE — 88305 TISSUE EXAM BY PATHOLOGIST: CPT | Mod: 26

## 2021-01-01 PROCEDURE — U0003: CPT

## 2021-01-01 PROCEDURE — 99446 NTRPROF PH1/NTRNET/EHR 5-10: CPT | Mod: GC

## 2021-01-01 PROCEDURE — 36819 AV FUSE UPPR ARM BASILIC: CPT

## 2021-01-01 RX ORDER — CLOPIDOGREL BISULFATE 75 MG/1
75 TABLET, FILM COATED ORAL DAILY
Refills: 0 | Status: ACTIVE | COMMUNITY

## 2021-01-01 RX ORDER — CLOPIDOGREL BISULFATE 75 MG/1
75 TABLET, FILM COATED ORAL DAILY
Refills: 0 | Status: DISCONTINUED | OUTPATIENT
Start: 2021-01-01 | End: 2021-01-01

## 2021-01-01 RX ORDER — ASPIRIN/CALCIUM CARB/MAGNESIUM 324 MG
81 TABLET ORAL DAILY
Refills: 0 | Status: DISCONTINUED | OUTPATIENT
Start: 2021-01-01 | End: 2021-01-01

## 2021-01-01 RX ORDER — INSULIN LISPRO 100/ML
VIAL (ML) SUBCUTANEOUS
Refills: 0 | Status: DISCONTINUED | OUTPATIENT
Start: 2021-01-01 | End: 2021-01-01

## 2021-01-01 RX ORDER — ACETAMINOPHEN 500 MG
650 TABLET ORAL EVERY 6 HOURS
Refills: 0 | Status: DISCONTINUED | OUTPATIENT
Start: 2021-01-01 | End: 2021-01-01

## 2021-01-01 RX ORDER — SODIUM CHLORIDE 9 MG/ML
500 INJECTION INTRAMUSCULAR; INTRAVENOUS; SUBCUTANEOUS
Refills: 0 | Status: COMPLETED | OUTPATIENT
Start: 2021-01-01 | End: 2021-01-01

## 2021-01-01 RX ORDER — MUPIROCIN 20 MG/G
1 OINTMENT TOPICAL
Refills: 0 | Status: DISCONTINUED | OUTPATIENT
Start: 2021-01-01 | End: 2021-01-01

## 2021-01-01 RX ORDER — FERROUS SULFATE 325(65) MG
325 TABLET ORAL
Refills: 0 | Status: DISCONTINUED | OUTPATIENT
Start: 2021-01-01 | End: 2021-01-01

## 2021-01-01 RX ORDER — LIDOCAINE 4 G/100G
1 CREAM TOPICAL DAILY
Refills: 0 | Status: DISCONTINUED | OUTPATIENT
Start: 2021-01-01 | End: 2021-01-01

## 2021-01-01 RX ORDER — ERYTHROPOIETIN 10000 [IU]/ML
6000 INJECTION, SOLUTION INTRAVENOUS; SUBCUTANEOUS ONCE
Refills: 0 | Status: COMPLETED | OUTPATIENT
Start: 2021-01-01 | End: 2021-01-01

## 2021-01-01 RX ORDER — FAMOTIDINE 10 MG/ML
20 INJECTION INTRAVENOUS ONCE
Refills: 0 | Status: COMPLETED | OUTPATIENT
Start: 2021-01-01 | End: 2021-01-01

## 2021-01-01 RX ORDER — IRON SUCROSE 20 MG/ML
100 INJECTION, SOLUTION INTRAVENOUS ONCE
Refills: 0 | Status: COMPLETED | OUTPATIENT
Start: 2021-01-01 | End: 2021-01-01

## 2021-01-01 RX ORDER — DEXTROSE 50 % IN WATER 50 %
12.5 SYRINGE (ML) INTRAVENOUS ONCE
Refills: 0 | Status: DISCONTINUED | OUTPATIENT
Start: 2021-01-01 | End: 2021-01-01

## 2021-01-01 RX ORDER — MUPIROCIN 20 MG/G
2 OINTMENT TOPICAL
Refills: 0 | Status: ACTIVE | COMMUNITY

## 2021-01-01 RX ORDER — POTASSIUM CHLORIDE 20 MEQ
40 PACKET (EA) ORAL ONCE
Refills: 0 | Status: COMPLETED | OUTPATIENT
Start: 2021-01-01 | End: 2021-01-01

## 2021-01-01 RX ORDER — FUROSEMIDE 40 MG
80 TABLET ORAL ONCE
Refills: 0 | Status: COMPLETED | OUTPATIENT
Start: 2021-01-01 | End: 2021-01-01

## 2021-01-01 RX ORDER — CALCIUM ACETATE 667 MG
1 TABLET ORAL
Qty: 0 | Refills: 0 | DISCHARGE

## 2021-01-01 RX ORDER — SODIUM CHLORIDE 9 MG/ML
1000 INJECTION, SOLUTION INTRAVENOUS
Refills: 0 | Status: DISCONTINUED | OUTPATIENT
Start: 2021-01-01 | End: 2021-01-01

## 2021-01-01 RX ORDER — HEPARIN SODIUM 5000 [USP'U]/ML
5000 INJECTION INTRAVENOUS; SUBCUTANEOUS EVERY 8 HOURS
Refills: 0 | Status: DISCONTINUED | OUTPATIENT
Start: 2021-01-01 | End: 2021-01-01

## 2021-01-01 RX ORDER — FOLIC ACID 0.8 MG
1 TABLET ORAL DAILY
Refills: 0 | Status: DISCONTINUED | OUTPATIENT
Start: 2021-01-01 | End: 2021-01-01

## 2021-01-01 RX ORDER — ATORVASTATIN CALCIUM 40 MG/1
40 TABLET, FILM COATED ORAL DAILY
Refills: 0 | Status: ACTIVE | COMMUNITY

## 2021-01-01 RX ORDER — ERYTHROPOIETIN 10000 [IU]/ML
10000 INJECTION, SOLUTION INTRAVENOUS; SUBCUTANEOUS ONCE
Refills: 0 | Status: COMPLETED | OUTPATIENT
Start: 2021-01-01 | End: 2021-01-01

## 2021-01-01 RX ORDER — PANTOPRAZOLE SODIUM 20 MG/1
8 TABLET, DELAYED RELEASE ORAL
Qty: 80 | Refills: 0 | Status: DISCONTINUED | OUTPATIENT
Start: 2021-01-01 | End: 2021-01-01

## 2021-01-01 RX ORDER — ERYTHROPOIETIN 10000 [IU]/ML
5000 INJECTION, SOLUTION INTRAVENOUS; SUBCUTANEOUS ONCE
Refills: 0 | Status: COMPLETED | OUTPATIENT
Start: 2021-01-01 | End: 2021-01-01

## 2021-01-01 RX ORDER — GLUCAGON INJECTION, SOLUTION 0.5 MG/.1ML
1 INJECTION, SOLUTION SUBCUTANEOUS ONCE
Refills: 0 | Status: DISCONTINUED | OUTPATIENT
Start: 2021-01-01 | End: 2021-01-01

## 2021-01-01 RX ORDER — DARBEPOETIN ALFA IN POLYSORBAT 200MCG/0.4
0 PEN INJECTOR (ML) SUBCUTANEOUS
Qty: 0 | Refills: 0 | DISCHARGE

## 2021-01-01 RX ORDER — CHLORHEXIDINE GLUCONATE 4 %
325 (65 FE) LIQUID (ML) TOPICAL TWICE DAILY
Refills: 0 | Status: ACTIVE | COMMUNITY

## 2021-01-01 RX ORDER — CALCIUM ACETATE 667 MG/1
667 TABLET ORAL 3 TIMES DAILY
Refills: 0 | Status: ACTIVE | COMMUNITY

## 2021-01-01 RX ORDER — INSULIN LISPRO 100/ML
VIAL (ML) SUBCUTANEOUS AT BEDTIME
Refills: 0 | Status: DISCONTINUED | OUTPATIENT
Start: 2021-01-01 | End: 2021-01-01

## 2021-01-01 RX ORDER — PANTOPRAZOLE SODIUM 20 MG/1
1 TABLET, DELAYED RELEASE ORAL
Qty: 60 | Refills: 0
Start: 2021-01-01 | End: 2021-01-01

## 2021-01-01 RX ORDER — HYDRALAZINE HCL 50 MG
1 TABLET ORAL
Qty: 0 | Refills: 0 | DISCHARGE

## 2021-01-01 RX ORDER — HYDROMORPHONE HYDROCHLORIDE 2 MG/ML
0.5 INJECTION INTRAMUSCULAR; INTRAVENOUS; SUBCUTANEOUS
Refills: 0 | Status: DISCONTINUED | OUTPATIENT
Start: 2021-01-01 | End: 2021-01-01

## 2021-01-01 RX ORDER — SOD SULF/SODIUM/NAHCO3/KCL/PEG
1 SOLUTION, RECONSTITUTED, ORAL ORAL EVERY 4 HOURS
Refills: 0 | Status: COMPLETED | OUTPATIENT
Start: 2021-01-01 | End: 2021-01-01

## 2021-01-01 RX ORDER — DEXTROSE 50 % IN WATER 50 %
25 SYRINGE (ML) INTRAVENOUS ONCE
Refills: 0 | Status: DISCONTINUED | OUTPATIENT
Start: 2021-01-01 | End: 2021-01-01

## 2021-01-01 RX ORDER — CHLORHEXIDINE GLUCONATE 213 G/1000ML
1 SOLUTION TOPICAL DAILY
Refills: 0 | Status: DISCONTINUED | OUTPATIENT
Start: 2021-01-01 | End: 2021-01-01

## 2021-01-01 RX ORDER — FENTANYL CITRATE 50 UG/ML
25 INJECTION INTRAVENOUS
Refills: 0 | Status: DISCONTINUED | OUTPATIENT
Start: 2021-01-01 | End: 2021-01-01

## 2021-01-01 RX ORDER — SITAGLIPTIN 50 MG/1
1 TABLET, FILM COATED ORAL
Qty: 0 | Refills: 0 | DISCHARGE

## 2021-01-01 RX ORDER — CALCITRIOL 0.5 UG/1
0.5 CAPSULE ORAL DAILY
Refills: 0 | Status: DISCONTINUED | OUTPATIENT
Start: 2021-01-01 | End: 2021-01-01

## 2021-01-01 RX ORDER — CALCIUM ACETATE 667 MG
667 TABLET ORAL
Refills: 0 | Status: DISCONTINUED | OUTPATIENT
Start: 2021-01-01 | End: 2021-01-01

## 2021-01-01 RX ORDER — FUROSEMIDE 40 MG
80 TABLET ORAL EVERY 12 HOURS
Refills: 0 | Status: DISCONTINUED | OUTPATIENT
Start: 2021-01-01 | End: 2021-01-01

## 2021-01-01 RX ORDER — CALCITRIOL 0.5 UG/1
1 CAPSULE ORAL
Qty: 0 | Refills: 0 | DISCHARGE

## 2021-01-01 RX ORDER — ASPIRIN ENTERIC COATED TABLETS 81 MG 81 MG/1
81 TABLET, DELAYED RELEASE ORAL DAILY
Qty: 90 | Refills: 3 | Status: ACTIVE | COMMUNITY

## 2021-01-01 RX ORDER — FERROUS SULFATE 325(65) MG
1 TABLET ORAL
Qty: 0 | Refills: 0 | DISCHARGE

## 2021-01-01 RX ORDER — PANTOPRAZOLE 40 MG/1
40 TABLET, DELAYED RELEASE ORAL TWICE DAILY
Refills: 0 | Status: ACTIVE | COMMUNITY

## 2021-01-01 RX ORDER — MUPIROCIN 20 MG/G
1 OINTMENT TOPICAL
Qty: 1 | Refills: 0
Start: 2021-01-01 | End: 2021-01-01

## 2021-01-01 RX ORDER — JNJ-78436735 50000000000 [PFU]/.5ML
0.5 SUSPENSION INTRAMUSCULAR ONCE
Refills: 0 | Status: COMPLETED | OUTPATIENT
Start: 2021-01-01 | End: 2021-01-01

## 2021-01-01 RX ORDER — ERGOCALCIFEROL 1.25 MG/1
1 CAPSULE ORAL
Qty: 0 | Refills: 0 | DISCHARGE

## 2021-01-01 RX ORDER — METOPROLOL SUCCINATE 100 MG/1
100 TABLET, EXTENDED RELEASE ORAL DAILY
Refills: 0 | Status: ACTIVE | COMMUNITY

## 2021-01-01 RX ORDER — ATORVASTATIN CALCIUM 80 MG/1
40 TABLET, FILM COATED ORAL AT BEDTIME
Refills: 0 | Status: DISCONTINUED | OUTPATIENT
Start: 2021-01-01 | End: 2021-01-01

## 2021-01-01 RX ORDER — AMLODIPINE BESYLATE 2.5 MG/1
10 TABLET ORAL DAILY
Refills: 0 | Status: DISCONTINUED | OUTPATIENT
Start: 2021-01-01 | End: 2021-01-01

## 2021-01-01 RX ORDER — TAMSULOSIN HYDROCHLORIDE 0.4 MG/1
0.4 CAPSULE ORAL
Refills: 0 | Status: ACTIVE | COMMUNITY

## 2021-01-01 RX ORDER — TAMSULOSIN HYDROCHLORIDE 0.4 MG/1
0.4 CAPSULE ORAL AT BEDTIME
Refills: 0 | Status: DISCONTINUED | OUTPATIENT
Start: 2021-01-01 | End: 2021-01-01

## 2021-01-01 RX ORDER — DEXTROSE 50 % IN WATER 50 %
15 SYRINGE (ML) INTRAVENOUS ONCE
Refills: 0 | Status: DISCONTINUED | OUTPATIENT
Start: 2021-01-01 | End: 2021-01-01

## 2021-01-01 RX ORDER — METOPROLOL TARTRATE 50 MG
100 TABLET ORAL DAILY
Refills: 0 | Status: DISCONTINUED | OUTPATIENT
Start: 2021-01-01 | End: 2021-01-01

## 2021-01-01 RX ORDER — ONDANSETRON 8 MG/1
4 TABLET, FILM COATED ORAL ONCE
Refills: 0 | Status: DISCONTINUED | OUTPATIENT
Start: 2021-01-01 | End: 2021-01-01

## 2021-01-01 RX ORDER — FOLIC ACID 1 MG/1
1 TABLET ORAL DAILY
Refills: 0 | Status: ACTIVE | COMMUNITY

## 2021-01-01 RX ORDER — PANTOPRAZOLE SODIUM 20 MG/1
40 TABLET, DELAYED RELEASE ORAL
Refills: 0 | Status: DISCONTINUED | OUTPATIENT
Start: 2021-01-01 | End: 2021-01-01

## 2021-01-01 RX ORDER — SODIUM BICARBONATE 1 MEQ/ML
1300 SYRINGE (ML) INTRAVENOUS THREE TIMES A DAY
Refills: 0 | Status: DISCONTINUED | OUTPATIENT
Start: 2021-01-01 | End: 2021-01-01

## 2021-01-01 RX ORDER — SODIUM BICARBONATE 1 MEQ/ML
2 SYRINGE (ML) INTRAVENOUS
Qty: 0 | Refills: 0 | DISCHARGE

## 2021-01-01 RX ORDER — FOLIC ACID 0.8 MG
1 TABLET ORAL
Qty: 0 | Refills: 0 | DISCHARGE

## 2021-01-01 RX ORDER — TAMSULOSIN HYDROCHLORIDE 0.4 MG/1
1 CAPSULE ORAL
Qty: 0 | Refills: 0 | DISCHARGE

## 2021-01-01 RX ORDER — OXYCODONE HYDROCHLORIDE 5 MG/1
1 TABLET ORAL
Qty: 20 | Refills: 0
Start: 2021-01-01

## 2021-01-01 RX ORDER — GLYBURIDE 5 MG
1 TABLET ORAL
Qty: 0 | Refills: 0 | DISCHARGE

## 2021-01-01 RX ADMIN — MUPIROCIN 1 APPLICATION(S): 20 OINTMENT TOPICAL at 06:34

## 2021-01-01 RX ADMIN — HEPARIN SODIUM 5000 UNIT(S): 5000 INJECTION INTRAVENOUS; SUBCUTANEOUS at 05:39

## 2021-01-01 RX ADMIN — ATORVASTATIN CALCIUM 40 MILLIGRAM(S): 80 TABLET, FILM COATED ORAL at 21:28

## 2021-01-01 RX ADMIN — MUPIROCIN 1 APPLICATION(S): 20 OINTMENT TOPICAL at 17:46

## 2021-01-01 RX ADMIN — Medication 667 MILLIGRAM(S): at 17:21

## 2021-01-01 RX ADMIN — MUPIROCIN 1 APPLICATION(S): 20 OINTMENT TOPICAL at 05:22

## 2021-01-01 RX ADMIN — IRON SUCROSE 210 MILLIGRAM(S): 20 INJECTION, SOLUTION INTRAVENOUS at 21:04

## 2021-01-01 RX ADMIN — PANTOPRAZOLE SODIUM 40 MILLIGRAM(S): 20 TABLET, DELAYED RELEASE ORAL at 17:46

## 2021-01-01 RX ADMIN — Medication 4: at 22:22

## 2021-01-01 RX ADMIN — Medication 100 MILLIGRAM(S): at 06:29

## 2021-01-01 RX ADMIN — Medication 325 MILLIGRAM(S): at 17:19

## 2021-01-01 RX ADMIN — Medication 2: at 22:35

## 2021-01-01 RX ADMIN — HEPARIN SODIUM 5000 UNIT(S): 5000 INJECTION INTRAVENOUS; SUBCUTANEOUS at 11:14

## 2021-01-01 RX ADMIN — Medication 667 MILLIGRAM(S): at 17:18

## 2021-01-01 RX ADMIN — Medication 667 MILLIGRAM(S): at 11:17

## 2021-01-01 RX ADMIN — HEPARIN SODIUM 5000 UNIT(S): 5000 INJECTION INTRAVENOUS; SUBCUTANEOUS at 22:36

## 2021-01-01 RX ADMIN — Medication 650 MILLIGRAM(S): at 21:53

## 2021-01-01 RX ADMIN — HEPARIN SODIUM 5000 UNIT(S): 5000 INJECTION INTRAVENOUS; SUBCUTANEOUS at 06:14

## 2021-01-01 RX ADMIN — AMLODIPINE BESYLATE 10 MILLIGRAM(S): 2.5 TABLET ORAL at 06:14

## 2021-01-01 RX ADMIN — Medication 81 MILLIGRAM(S): at 09:22

## 2021-01-01 RX ADMIN — Medication 325 MILLIGRAM(S): at 17:21

## 2021-01-01 RX ADMIN — ATORVASTATIN CALCIUM 40 MILLIGRAM(S): 80 TABLET, FILM COATED ORAL at 21:46

## 2021-01-01 RX ADMIN — IRON SUCROSE 100 MILLIGRAM(S): 20 INJECTION, SOLUTION INTRAVENOUS at 14:08

## 2021-01-01 RX ADMIN — MUPIROCIN 1 APPLICATION(S): 20 OINTMENT TOPICAL at 17:15

## 2021-01-01 RX ADMIN — Medication 650 MILLIGRAM(S): at 20:55

## 2021-01-01 RX ADMIN — ATORVASTATIN CALCIUM 40 MILLIGRAM(S): 80 TABLET, FILM COATED ORAL at 23:05

## 2021-01-01 RX ADMIN — Medication 325 MILLIGRAM(S): at 04:53

## 2021-01-01 RX ADMIN — TAMSULOSIN HYDROCHLORIDE 0.4 MILLIGRAM(S): 0.4 CAPSULE ORAL at 21:31

## 2021-01-01 RX ADMIN — ERYTHROPOIETIN 10000 UNIT(S): 10000 INJECTION, SOLUTION INTRAVENOUS; SUBCUTANEOUS at 15:14

## 2021-01-01 RX ADMIN — Medication 667 MILLIGRAM(S): at 12:43

## 2021-01-01 RX ADMIN — TAMSULOSIN HYDROCHLORIDE 0.4 MILLIGRAM(S): 0.4 CAPSULE ORAL at 22:36

## 2021-01-01 RX ADMIN — CHLORHEXIDINE GLUCONATE 1 APPLICATION(S): 213 SOLUTION TOPICAL at 11:25

## 2021-01-01 RX ADMIN — IRON SUCROSE 210 MILLIGRAM(S): 20 INJECTION, SOLUTION INTRAVENOUS at 18:22

## 2021-01-01 RX ADMIN — Medication 325 MILLIGRAM(S): at 17:14

## 2021-01-01 RX ADMIN — Medication 1 MILLIGRAM(S): at 09:21

## 2021-01-01 RX ADMIN — Medication 2: at 12:26

## 2021-01-01 RX ADMIN — Medication 667 MILLIGRAM(S): at 09:22

## 2021-01-01 RX ADMIN — HEPARIN SODIUM 5000 UNIT(S): 5000 INJECTION INTRAVENOUS; SUBCUTANEOUS at 12:23

## 2021-01-01 RX ADMIN — CLOPIDOGREL BISULFATE 75 MILLIGRAM(S): 75 TABLET, FILM COATED ORAL at 17:14

## 2021-01-01 RX ADMIN — TAMSULOSIN HYDROCHLORIDE 0.4 MILLIGRAM(S): 0.4 CAPSULE ORAL at 22:32

## 2021-01-01 RX ADMIN — PANTOPRAZOLE SODIUM 40 MILLIGRAM(S): 20 TABLET, DELAYED RELEASE ORAL at 05:00

## 2021-01-01 RX ADMIN — Medication 2: at 18:26

## 2021-01-01 RX ADMIN — Medication 81 MILLIGRAM(S): at 11:51

## 2021-01-01 RX ADMIN — Medication 80 MILLIGRAM(S): at 06:25

## 2021-01-01 RX ADMIN — Medication 325 MILLIGRAM(S): at 17:37

## 2021-01-01 RX ADMIN — CHLORHEXIDINE GLUCONATE 1 APPLICATION(S): 213 SOLUTION TOPICAL at 12:24

## 2021-01-01 RX ADMIN — CHLORHEXIDINE GLUCONATE 1 APPLICATION(S): 213 SOLUTION TOPICAL at 11:17

## 2021-01-01 RX ADMIN — Medication 650 MILLIGRAM(S): at 19:54

## 2021-01-01 RX ADMIN — TAMSULOSIN HYDROCHLORIDE 0.4 MILLIGRAM(S): 0.4 CAPSULE ORAL at 21:46

## 2021-01-01 RX ADMIN — PANTOPRAZOLE SODIUM 40 MILLIGRAM(S): 20 TABLET, DELAYED RELEASE ORAL at 17:36

## 2021-01-01 RX ADMIN — Medication 325 MILLIGRAM(S): at 05:14

## 2021-01-01 RX ADMIN — Medication 325 MILLIGRAM(S): at 17:23

## 2021-01-01 RX ADMIN — PANTOPRAZOLE SODIUM 10 MG/HR: 20 TABLET, DELAYED RELEASE ORAL at 05:13

## 2021-01-01 RX ADMIN — Medication 667 MILLIGRAM(S): at 08:39

## 2021-01-01 RX ADMIN — MUPIROCIN 1 APPLICATION(S): 20 OINTMENT TOPICAL at 05:14

## 2021-01-01 RX ADMIN — Medication 325 MILLIGRAM(S): at 05:00

## 2021-01-01 RX ADMIN — Medication 10 MILLIGRAM(S): at 22:14

## 2021-01-01 RX ADMIN — Medication 1 MILLIGRAM(S): at 12:44

## 2021-01-01 RX ADMIN — Medication 2: at 22:31

## 2021-01-01 RX ADMIN — Medication 2: at 12:21

## 2021-01-01 RX ADMIN — Medication 325 MILLIGRAM(S): at 06:29

## 2021-01-01 RX ADMIN — MUPIROCIN 1 APPLICATION(S): 20 OINTMENT TOPICAL at 17:39

## 2021-01-01 RX ADMIN — ERYTHROPOIETIN 5000 UNIT(S): 10000 INJECTION, SOLUTION INTRAVENOUS; SUBCUTANEOUS at 20:53

## 2021-01-01 RX ADMIN — ATORVASTATIN CALCIUM 40 MILLIGRAM(S): 80 TABLET, FILM COATED ORAL at 21:50

## 2021-01-01 RX ADMIN — Medication 100 MILLIGRAM(S): at 05:04

## 2021-01-01 RX ADMIN — Medication 81 MILLIGRAM(S): at 12:43

## 2021-01-01 RX ADMIN — Medication 667 MILLIGRAM(S): at 18:49

## 2021-01-01 RX ADMIN — HEPARIN SODIUM 5000 UNIT(S): 5000 INJECTION INTRAVENOUS; SUBCUTANEOUS at 22:14

## 2021-01-01 RX ADMIN — Medication 325 MILLIGRAM(S): at 05:22

## 2021-01-01 RX ADMIN — Medication 650 MILLIGRAM(S): at 22:20

## 2021-01-01 RX ADMIN — ATORVASTATIN CALCIUM 40 MILLIGRAM(S): 80 TABLET, FILM COATED ORAL at 22:31

## 2021-01-01 RX ADMIN — Medication 650 MILLIGRAM(S): at 03:53

## 2021-01-01 RX ADMIN — HEPARIN SODIUM 5000 UNIT(S): 5000 INJECTION INTRAVENOUS; SUBCUTANEOUS at 06:29

## 2021-01-01 RX ADMIN — Medication 2: at 22:05

## 2021-01-01 RX ADMIN — ERYTHROPOIETIN 6000 UNIT(S): 10000 INJECTION, SOLUTION INTRAVENOUS; SUBCUTANEOUS at 16:48

## 2021-01-01 RX ADMIN — MUPIROCIN 1 APPLICATION(S): 20 OINTMENT TOPICAL at 17:36

## 2021-01-01 RX ADMIN — Medication 667 MILLIGRAM(S): at 17:39

## 2021-01-01 RX ADMIN — Medication 100 MILLIGRAM(S): at 06:34

## 2021-01-01 RX ADMIN — CHLORHEXIDINE GLUCONATE 1 APPLICATION(S): 213 SOLUTION TOPICAL at 18:13

## 2021-01-01 RX ADMIN — Medication 325 MILLIGRAM(S): at 17:46

## 2021-01-01 RX ADMIN — Medication 100 MILLIGRAM(S): at 05:14

## 2021-01-01 RX ADMIN — MUPIROCIN 1 APPLICATION(S): 20 OINTMENT TOPICAL at 18:13

## 2021-01-01 RX ADMIN — Medication 325 MILLIGRAM(S): at 06:14

## 2021-01-01 RX ADMIN — Medication 650 MILLIGRAM(S): at 22:05

## 2021-01-01 RX ADMIN — Medication 325 MILLIGRAM(S): at 05:04

## 2021-01-01 RX ADMIN — IRON SUCROSE 210 MILLIGRAM(S): 20 INJECTION, SOLUTION INTRAVENOUS at 10:15

## 2021-01-01 RX ADMIN — MUPIROCIN 1 APPLICATION(S): 20 OINTMENT TOPICAL at 05:02

## 2021-01-01 RX ADMIN — Medication 325 MILLIGRAM(S): at 17:36

## 2021-01-01 RX ADMIN — Medication 1 LITER(S): at 21:32

## 2021-01-01 RX ADMIN — Medication 667 MILLIGRAM(S): at 11:16

## 2021-01-01 RX ADMIN — ATORVASTATIN CALCIUM 40 MILLIGRAM(S): 80 TABLET, FILM COATED ORAL at 21:31

## 2021-01-01 RX ADMIN — Medication 2: at 12:43

## 2021-01-01 RX ADMIN — Medication 325 MILLIGRAM(S): at 17:39

## 2021-01-01 RX ADMIN — ATORVASTATIN CALCIUM 40 MILLIGRAM(S): 80 TABLET, FILM COATED ORAL at 22:36

## 2021-01-01 RX ADMIN — Medication 667 MILLIGRAM(S): at 08:47

## 2021-01-01 RX ADMIN — PANTOPRAZOLE SODIUM 40 MILLIGRAM(S): 20 TABLET, DELAYED RELEASE ORAL at 05:04

## 2021-01-01 RX ADMIN — CHLORHEXIDINE GLUCONATE 1 APPLICATION(S): 213 SOLUTION TOPICAL at 09:22

## 2021-01-01 RX ADMIN — Medication 667 MILLIGRAM(S): at 12:23

## 2021-01-01 RX ADMIN — MUPIROCIN 1 APPLICATION(S): 20 OINTMENT TOPICAL at 04:53

## 2021-01-01 RX ADMIN — CHLORHEXIDINE GLUCONATE 1 APPLICATION(S): 213 SOLUTION TOPICAL at 11:15

## 2021-01-01 RX ADMIN — Medication 10 MILLIGRAM(S): at 01:49

## 2021-01-01 RX ADMIN — PANTOPRAZOLE SODIUM 40 MILLIGRAM(S): 20 TABLET, DELAYED RELEASE ORAL at 05:22

## 2021-01-01 RX ADMIN — Medication 1 MILLIGRAM(S): at 17:14

## 2021-01-01 RX ADMIN — Medication 1 LITER(S): at 01:49

## 2021-01-01 RX ADMIN — Medication 2: at 12:24

## 2021-01-01 RX ADMIN — CHLORHEXIDINE GLUCONATE 1 APPLICATION(S): 213 SOLUTION TOPICAL at 12:42

## 2021-01-01 RX ADMIN — MUPIROCIN 1 APPLICATION(S): 20 OINTMENT TOPICAL at 05:39

## 2021-01-01 RX ADMIN — Medication 650 MILLIGRAM(S): at 04:45

## 2021-01-01 RX ADMIN — HEPARIN SODIUM 5000 UNIT(S): 5000 INJECTION INTRAVENOUS; SUBCUTANEOUS at 22:05

## 2021-01-01 RX ADMIN — Medication 650 MILLIGRAM(S): at 23:05

## 2021-01-01 RX ADMIN — Medication 667 MILLIGRAM(S): at 18:13

## 2021-01-01 RX ADMIN — Medication 40 MILLIEQUIVALENT(S): at 17:21

## 2021-01-01 RX ADMIN — Medication 1 MILLIGRAM(S): at 12:23

## 2021-01-01 RX ADMIN — Medication 1 LITER(S): at 17:40

## 2021-01-01 RX ADMIN — MUPIROCIN 1 APPLICATION(S): 20 OINTMENT TOPICAL at 06:29

## 2021-01-01 RX ADMIN — Medication 325 MILLIGRAM(S): at 05:39

## 2021-01-01 RX ADMIN — Medication 100 MILLIGRAM(S): at 06:14

## 2021-01-01 RX ADMIN — HEPARIN SODIUM 5000 UNIT(S): 5000 INJECTION INTRAVENOUS; SUBCUTANEOUS at 06:34

## 2021-01-01 RX ADMIN — AMLODIPINE BESYLATE 10 MILLIGRAM(S): 2.5 TABLET ORAL at 06:25

## 2021-01-01 RX ADMIN — Medication 667 MILLIGRAM(S): at 17:23

## 2021-01-01 RX ADMIN — Medication 650 MILLIGRAM(S): at 12:45

## 2021-01-01 RX ADMIN — CLOPIDOGREL BISULFATE 75 MILLIGRAM(S): 75 TABLET, FILM COATED ORAL at 09:21

## 2021-01-01 RX ADMIN — Medication 325 MILLIGRAM(S): at 18:49

## 2021-01-01 RX ADMIN — CHLORHEXIDINE GLUCONATE 1 APPLICATION(S): 213 SOLUTION TOPICAL at 17:14

## 2021-01-01 RX ADMIN — IRON SUCROSE 210 MILLIGRAM(S): 20 INJECTION, SOLUTION INTRAVENOUS at 15:14

## 2021-01-01 RX ADMIN — ATORVASTATIN CALCIUM 40 MILLIGRAM(S): 80 TABLET, FILM COATED ORAL at 22:05

## 2021-01-01 RX ADMIN — CLOPIDOGREL BISULFATE 75 MILLIGRAM(S): 75 TABLET, FILM COATED ORAL at 11:30

## 2021-01-01 RX ADMIN — JNJ-78436735 0.5 MILLILITER(S): 50000000000 SUSPENSION INTRAMUSCULAR at 13:08

## 2021-01-01 RX ADMIN — Medication 667 MILLIGRAM(S): at 11:51

## 2021-01-01 RX ADMIN — Medication 2: at 17:46

## 2021-01-01 RX ADMIN — TAMSULOSIN HYDROCHLORIDE 0.4 MILLIGRAM(S): 0.4 CAPSULE ORAL at 21:28

## 2021-01-01 RX ADMIN — Medication 81 MILLIGRAM(S): at 11:15

## 2021-01-01 RX ADMIN — IRON SUCROSE 210 MILLIGRAM(S): 20 INJECTION, SOLUTION INTRAVENOUS at 21:05

## 2021-01-01 RX ADMIN — PANTOPRAZOLE SODIUM 40 MILLIGRAM(S): 20 TABLET, DELAYED RELEASE ORAL at 18:13

## 2021-01-01 RX ADMIN — HEPARIN SODIUM 5000 UNIT(S): 5000 INJECTION INTRAVENOUS; SUBCUTANEOUS at 12:43

## 2021-01-01 RX ADMIN — Medication 81 MILLIGRAM(S): at 17:17

## 2021-01-01 RX ADMIN — MUPIROCIN 1 APPLICATION(S): 20 OINTMENT TOPICAL at 17:23

## 2021-01-01 RX ADMIN — Medication 100 MILLIGRAM(S): at 05:39

## 2021-01-01 RX ADMIN — MUPIROCIN 1 APPLICATION(S): 20 OINTMENT TOPICAL at 15:30

## 2021-01-01 RX ADMIN — Medication 667 MILLIGRAM(S): at 09:25

## 2021-01-01 RX ADMIN — PANTOPRAZOLE SODIUM 40 MILLIGRAM(S): 20 TABLET, DELAYED RELEASE ORAL at 17:39

## 2021-01-01 RX ADMIN — Medication 100 MILLIGRAM(S): at 05:22

## 2021-01-01 RX ADMIN — Medication 1 MILLIGRAM(S): at 11:51

## 2021-01-01 RX ADMIN — HEPARIN SODIUM 5000 UNIT(S): 5000 INJECTION INTRAVENOUS; SUBCUTANEOUS at 21:28

## 2021-01-01 RX ADMIN — Medication 1 LITER(S): at 22:15

## 2021-01-01 RX ADMIN — AMLODIPINE BESYLATE 10 MILLIGRAM(S): 2.5 TABLET ORAL at 06:29

## 2021-01-01 RX ADMIN — ERYTHROPOIETIN 10000 UNIT(S): 10000 INJECTION, SOLUTION INTRAVENOUS; SUBCUTANEOUS at 14:08

## 2021-01-01 RX ADMIN — Medication 1 MILLIGRAM(S): at 11:17

## 2021-01-01 RX ADMIN — Medication 325 MILLIGRAM(S): at 18:13

## 2021-01-01 RX ADMIN — MUPIROCIN 1 APPLICATION(S): 20 OINTMENT TOPICAL at 05:05

## 2021-01-01 RX ADMIN — Medication 10 MILLIGRAM(S): at 17:39

## 2021-01-01 RX ADMIN — Medication 80 MILLIGRAM(S): at 17:50

## 2021-01-01 RX ADMIN — Medication 667 MILLIGRAM(S): at 17:36

## 2021-01-01 RX ADMIN — Medication 667 MILLIGRAM(S): at 17:14

## 2021-01-01 RX ADMIN — Medication 325 MILLIGRAM(S): at 06:34

## 2021-01-01 RX ADMIN — PANTOPRAZOLE SODIUM 10 MG/HR: 20 TABLET, DELAYED RELEASE ORAL at 19:40

## 2021-01-01 RX ADMIN — TAMSULOSIN HYDROCHLORIDE 0.4 MILLIGRAM(S): 0.4 CAPSULE ORAL at 23:05

## 2021-01-01 RX ADMIN — Medication 667 MILLIGRAM(S): at 08:41

## 2021-01-01 RX ADMIN — Medication 325 MILLIGRAM(S): at 06:25

## 2021-01-01 RX ADMIN — TAMSULOSIN HYDROCHLORIDE 0.4 MILLIGRAM(S): 0.4 CAPSULE ORAL at 22:15

## 2021-01-01 RX ADMIN — Medication 1 MILLIGRAM(S): at 11:30

## 2021-01-01 RX ADMIN — Medication 667 MILLIGRAM(S): at 11:30

## 2021-01-01 RX ADMIN — ATORVASTATIN CALCIUM 40 MILLIGRAM(S): 80 TABLET, FILM COATED ORAL at 22:20

## 2021-01-01 RX ADMIN — Medication 40 MILLIEQUIVALENT(S): at 09:25

## 2021-01-01 RX ADMIN — ERYTHROPOIETIN 10000 UNIT(S): 10000 INJECTION, SOLUTION INTRAVENOUS; SUBCUTANEOUS at 21:02

## 2021-01-01 RX ADMIN — Medication 81 MILLIGRAM(S): at 11:17

## 2021-01-01 RX ADMIN — TAMSULOSIN HYDROCHLORIDE 0.4 MILLIGRAM(S): 0.4 CAPSULE ORAL at 21:53

## 2021-01-01 RX ADMIN — ERYTHROPOIETIN 5000 UNIT(S): 10000 INJECTION, SOLUTION INTRAVENOUS; SUBCUTANEOUS at 18:22

## 2021-01-01 RX ADMIN — Medication 81 MILLIGRAM(S): at 11:30

## 2021-01-01 RX ADMIN — TAMSULOSIN HYDROCHLORIDE 0.4 MILLIGRAM(S): 0.4 CAPSULE ORAL at 22:05

## 2021-01-01 RX ADMIN — Medication 10 MILLIGRAM(S): at 21:31

## 2021-01-01 RX ADMIN — Medication 1 MILLIGRAM(S): at 11:15

## 2021-01-01 RX ADMIN — Medication 650 MILLIGRAM(S): at 11:52

## 2021-01-01 RX ADMIN — CHLORHEXIDINE GLUCONATE 1 APPLICATION(S): 213 SOLUTION TOPICAL at 11:52

## 2021-01-01 RX ADMIN — Medication 100 MILLIGRAM(S): at 05:00

## 2021-01-01 RX ADMIN — PANTOPRAZOLE SODIUM 40 MILLIGRAM(S): 20 TABLET, DELAYED RELEASE ORAL at 17:15

## 2021-01-01 RX ADMIN — ATORVASTATIN CALCIUM 40 MILLIGRAM(S): 80 TABLET, FILM COATED ORAL at 22:28

## 2021-01-01 RX ADMIN — CLOPIDOGREL BISULFATE 75 MILLIGRAM(S): 75 TABLET, FILM COATED ORAL at 11:17

## 2021-01-01 RX ADMIN — PANTOPRAZOLE SODIUM 40 MILLIGRAM(S): 20 TABLET, DELAYED RELEASE ORAL at 04:52

## 2021-01-01 RX ADMIN — MUPIROCIN 1 APPLICATION(S): 20 OINTMENT TOPICAL at 17:19

## 2021-01-01 RX ADMIN — Medication 667 MILLIGRAM(S): at 17:45

## 2021-01-01 RX ADMIN — TAMSULOSIN HYDROCHLORIDE 0.4 MILLIGRAM(S): 0.4 CAPSULE ORAL at 22:28

## 2021-01-01 RX ADMIN — AMLODIPINE BESYLATE 10 MILLIGRAM(S): 2.5 TABLET ORAL at 06:34

## 2021-03-09 NOTE — ED PROVIDER NOTE - OBJECTIVE STATEMENT
64yo male DM, HTN, CHF, osteomyelitis, anemia, CKD-IV, HLD and GERD, CAD s/p 1 stent p/w 2 weeks of worsening intermittent non-exertional chest pain and dyspnea now with fatigue. Chronic b/l leg swelling at baseline. Finding it difficulty to do his daily tasks 2/2 dyspnea. Denies recent change in meds, h/o PE/DVT, cough, fever.

## 2021-03-09 NOTE — H&P ADULT - PROBLEM SELECTOR PLAN 1
2/2 volume overload from acute on chronic kidney failure, does have underlying CAD w/stent in 2019.   CXR w/pulm edema  -s/p IV Lasix 80 in ED   -Monitor I&Os  -Check TTE   -Consider cards consult 2/2 volume overload from acute on chronic kidney failure, does have underlying CAD w/stent in 2019.   CXR w/pulm edema, BNP >94635   -s/p IV Lasix 80 in ED  -C/w IV Lasix 80mg BID for now   -Strict I&Os  -Check TTE   -Consider cards consult

## 2021-03-09 NOTE — H&P ADULT - NSICDXFAMILYHX_GEN_ALL_CORE_FT
FAMILY HISTORY:  Family history of lung disease, father (asbestosis; worked in [employer])  Family history of stomach cancer, father

## 2021-03-09 NOTE — H&P ADULT - PROBLEM SELECTOR PLAN 3
likely 2/2 chronic kidney disease, last hb ~8 in 2019  No e/o acute blood loss   -Hb 6.6, consented for 1U pRBC in ED  -Trend CBC, maintain active T&S  -Check iron studies and epo level

## 2021-03-09 NOTE — CONSULT NOTE ADULT - PROBLEM SELECTOR RECOMMENDATION 6
Acute on chronic diastolic CHF with volume overload  Hold ACEI/ARB  Echocardiogram  IV Lasix 80 mg q 12 hrly  Initiation of RRT/HD  Serial CK, troponin, EKG  Telemetry monitoring  Cardiology consult with Dr. Martell in AM

## 2021-03-09 NOTE — ED ADULT NURSE REASSESSMENT NOTE - NS ED NURSE REASSESS COMMENT FT1
Report given to ESSU2 RN . Patient is alert times four. Breathing is even and unlabored. Patient denies discomfort at this time. IV patent. Pending transport via stretcher. Safety maintained.

## 2021-03-09 NOTE — ED PROVIDER NOTE - PHYSICAL EXAMINATION
Physical Exam:  Gen: NAD, AOx3, non-toxic appearing, able to ambulate without assistance  Head: NCAT  HEENT: EOMI, PEERLA, normal conjunctiva, tongue midline, oral mucosa moist  Lung: CTAB, slightly tachypneic, no wheezes/rhonchi/rales B/L, speaking in full sentences  CV: RRR, no murmurs, rubs or gallops, distal pulses 2+ b/l  Abd: soft, NT, ND, no guarding, no rigidity, no rebound tenderness, no CVA tenderness   Skin: Warm, well perfused, no rash, b/l LE pitting edema  Psych: normal affect, calm

## 2021-03-09 NOTE — H&P ADULT - NSICDXPASTSURGICALHX_GEN_ALL_CORE_FT
PAST SURGICAL HISTORY:  Ankle fracture ORIF    S/P PICC central line placement ~ L-Basilic vein (09/13/2018)

## 2021-03-09 NOTE — H&P ADULT - NSHPPHYSICALEXAM_GEN_ALL_CORE
.  VITAL SIGNS:  T(C): 36.4 (03-09-21 @ 17:46), Max: 36.4 (03-09-21 @ 17:46)  T(F): 97.6 (03-09-21 @ 17:46), Max: 97.6 (03-09-21 @ 17:46)  HR: 76 (03-09-21 @ 17:46) (59 - 76)  BP: 133/86 (03-09-21 @ 17:46) (108/51 - 150/33)  BP(mean): --  RR: 18 (03-09-21 @ 17:46) (18 - 20)  SpO2: 100% (03-09-21 @ 17:46) (95% - 100%)  Wt(kg): --    PHYSICAL EXAM:    Constitutional: WDWN resting comfortably in bed; NAD  Head: NC/AT  Eyes: PERRL, EOMI, clear conjunctiva  ENT: no nasal discharge; uvula midline, no oropharyngeal erythema or exudates; MMM  Neck: supple; no JVD or thyromegaly  Respiratory: CTA B/L; no W/R/R, no retractions  Cardiac: +S1/S2; RRR; no M/R/G; PMI non-displaced  Gastrointestinal: soft, NT/ND; no rebound or guarding; +BSx4  Genitourinary: normal external genitalia  Back: spine midline, no bony tenderness or step-offs; no CVAT B/L  Extremities: WWP, no clubbing or cyanosis; no peripheral edema  Musculoskeletal: NROM x4; no joint swelling, tenderness or erythema  Vascular: 2+ radial, femoral, DP/PT pulses B/L  Dermatologic: skin warm, dry and intact; no rashes, wounds, or scars  Lymphatic: no submandibular or cervical LAD  Neurologic: AAOx3; CNII-XII grossly intact; no focal deficits  Psychiatric: affect and characteristics of appearance, verbalizations, behaviors are appropriate .  VITAL SIGNS:  T(C): 36.4 (03-09-21 @ 17:46), Max: 36.4 (03-09-21 @ 17:46)  T(F): 97.6 (03-09-21 @ 17:46), Max: 97.6 (03-09-21 @ 17:46)  HR: 76 (03-09-21 @ 17:46) (59 - 76)  BP: 133/86 (03-09-21 @ 17:46) (108/51 - 150/33)  BP(mean): --  RR: 18 (03-09-21 @ 17:46) (18 - 20)  SpO2: 100% (03-09-21 @ 17:46) (95% - 100%)  Wt(kg): --    PHYSICAL EXAM:    Constitutional: WDWN resting comfortably in stretcher; NAD  Head: NC/AT  Eyes: EOMI, clear conjunctiva  ENT: no nasal discharge; MMM  Neck: supple  Respiratory: decreased breath sounds, no rales/rhonchi, no retractions  Cardiac: +S1/S2; RRR; no M/R/G  Gastrointestinal: soft, NT/ND; no rebound or guarding; +BSx4  Back: spine midline, no bony tenderness or step-offs; no CVAT B/L  Extremities: WWP, no clubbing or cyanosis; +3-4 pitting edema LE b/l  Musculoskeletal: NROM x4; no joint swelling, tenderness or erythema  Dermatologic: skin warm, skin discoloration of LE   Neurologic: AAOx3; CNII-XII grossly intact; no focal deficits  Psychiatric: affect and characteristics of appearance, verbalizations, behaviors are appropriate

## 2021-03-09 NOTE — CONSULT NOTE ADULT - PROBLEM SELECTOR RECOMMENDATION 5
Patient with chronic diabetic neuropathy/nephropathy/retinopathy with bilateral foot wound  Wound culture  Podiatry consult with Dr. Candelaria in AM  IV Lasix  No antibiotics at present  Optimal DM control  Hold all oral antidiabetic agents  Insulin therapy

## 2021-03-09 NOTE — ED PROVIDER NOTE - ATTENDING CONTRIBUTION TO CARE
65M with hx of CHF, HTN, CKD stage 5, DM p/w sob and subjective fevers/chills for the past few days. patient states he feels so sob he cant even put his socks and shoes on. no known sick contacts, n/v/d.    ***GEN - NAD; well appearing; A+O x3 ***HEAD - NC/AT ***EYES/NOSE - PERRL, EOMI, mucous membranes moist, no discharge ***THROAT: Oral cavity and pharynx normal. No inflammation, swelling, exudate, or lesions.  ***NECK: Neck supple, non-tender without lymphadenopathy, no masses, no thyromegaly.   ***PULMONARY - poor inspiratory effort, CTA b/l, symmetric breath sounds. ***CARDIAC -s1s2, RRR, no M,G,R ***ABDOMEN - +BS, ND, NT, soft, no guarding, no rebound, no masses   ***BACK - no CVA tenderness, Normal  spine ***EXTREMITIES - symmetric pulses, 2+ dp, capillary refill < 2 seconds, no clubbing, no cyanosis, 3+ le edema b/l ***SKIN - no rash or bruising   ***NEUROLOGIC - alert, CN 2-12 intact    MDM: 65M with fluid overload, r/o infectious process. labs, cxr, ekg, probnp, covid-19 swab, admission. spoke to his PMD, Dr. Gama- requesting to admit to his service.

## 2021-03-09 NOTE — H&P ADULT - PROBLEM SELECTOR PLAN 4
[facility] (1/2019) --> 50% mid LAD, 50% prox RCA, 90% Distal RCA into PDA, s/p PCI with Bare metal stent [facility] (1/2019) --> 50% mid LAD, 50% prox RCA, 90% Distal RCA into PDA, s/p PCI with Bare metal stent  -C/w ASA/Plavix, bb, statin   -Follows with Dr. Keaton Martell

## 2021-03-09 NOTE — CONSULT NOTE ADULT - PROBLEM SELECTOR RECOMMENDATION 4
Patient with secondary hyperparathyroidism with elevated PTH due to renal failure  Monitor intact PTH, Vitamin D 1,25 level, phos level  Low k/Low phos renal diet  Monitor calcium and phos level  Continue Phoslo  Hold calcitriol and sodium bicarbonate for now

## 2021-03-09 NOTE — ED ADULT NURSE NOTE - OBJECTIVE STATEMENT
Pt alert times four chief complaint exertional dyspnea 2-3 weeks. Pt bilateral swollen legs +3 pitting edema. history of CHF ad CKD. + night sweats. denies chest pain fever chills, SOB, leg pain. Breath sounds clear at rest. fall precautions in place. Safety education reinforced with call bell within reach. Patient verbalizes understanding of use. Will continue to monitor.

## 2021-03-09 NOTE — H&P ADULT - NSICDXPASTMEDICALHX_GEN_ALL_CORE_FT
PAST MEDICAL HISTORY:  Anemia     CKD (chronic kidney disease), stage IV     Diabetic foot ulcer     DM (diabetes mellitus)     Dyslipidemia     GERD (gastroesophageal reflux disease)     HTN (hypertension)     Renal cyst     Vitamin D deficiency

## 2021-03-09 NOTE — ED PROVIDER NOTE - CLINICAL SUMMARY MEDICAL DECISION MAKING FREE TEXT BOX
64yo male CHF p/w 2 weeks worsening dyspnea and chest pains. Clear lungs on exam, significant b/l pitting edema in LE's. CHF vs covid vs ACS. EKG, CXR, labs, trop and likely admit.

## 2021-03-09 NOTE — H&P ADULT - NSHPLABSRESULTS_GEN_ALL_CORE
.  LABS:                         6.6    8.10  )-----------( 307      ( 09 Mar 2021 16:44 )             21.8     03-09    132<L>  |  87<L>  |  104<H>  ----------------------------<  211<H>  3.4<L>   |  28  |  10.09<H>    Ca    10.5      09 Mar 2021 16:44    TPro  6.0  /  Alb  2.8<L>  /  TBili  0.2  /  DBili  x   /  AST  13  /  ALT  13  /  AlkPhos  80  03-09    PT/INR - ( 09 Mar 2021 16:44 )   PT: 13.3 sec;   INR: 1.18 ratio         PTT - ( 09 Mar 2021 16:44 )  PTT:34.2 sec        Serum Pro-Brain Natriuretic Peptide: 24345 pg/mL (03-09 @ 16:44)        RADIOLOGY, EKG & ADDITIONAL TESTS: Reviewed.   < from: Xray Chest 1 View- PORTABLE-Urgent (Xray Chest 1 View- PORTABLE-Urgent .) (03.09.21 @ 16:55) >    PROCEDURE DATE:  Mar  9 2021     ******PRELIMINARY REPORT******    ******PRELIMINARY REPORT******            INTERPRETATION:  Bilateral symmetric basilar hazy opacities, favoring pulmonary edema.    AK          ******PRELIMINARY REPORT******    ******PRELIMINARY REPORT******          ISABEL MORALES MD; Resident Radiology    < from: US Duplex Venous Lower Ext Complete, Bilateral (03.09.21 @ 16:23) >  IMPRESSION:  No evidence of deep venous thrombosis in either lower extremity.        JUAN ARROYO MD; Attending Radiologist  This document hasbeen electronically signed. Mar  9 2021  4:48PM

## 2021-03-09 NOTE — ED PROVIDER NOTE - PROGRESS NOTE DETAILS
Darcy PGY2: patient making urine, contacted by nephrologist Lanette for admission. Will give lasix, consent for blood and give 1 unit. Darcy PGY2: patient making urine, contacted by nephrologist Lanette for admission. Will give lasix, consent for blood and give 1 unit. Rectal exam w/o blood.

## 2021-03-09 NOTE — H&P ADULT - PROBLEM SELECTOR PLAN 2
As above Stage 4/5, seens renal outpt  -Takes Torsemide 40mg qd, metolazone 2.5mg qd and recently prescribed Aranesp  -C/w sodium bicarb, calcium acetate, and calcitriol   -f/u pTH in AM   -Plan as above

## 2021-03-09 NOTE — H&P ADULT - HISTORY OF PRESENT ILLNESS
65M w/CAD s/p PCI w/bare metal stent, T2DM, HTN, CHF, hx of osteomyelitis (2019), anemia, CKDIV, HLD and GERD, CAD s/p 1 stent p/w 2 weeks of worsening intermittent non-exertional chest pain and dyspnea now with fatigue 65M w/CAD s/p PCI w/bare metal stent, T2DM, HTN, CHF, hx of osteomyelitis (2019), anemia, CKDIV, HLD and GERD, CAD s/p 1 stent p/w 2 weeks of worsening intermittent non-exertional chest pain and dyspnea now with fatigue.  Pt vague historian, reports seeing PMD about 2 weeks ago, reports since then he has been complaint to his medications and denies any changes to diet.   Reports chronic b/l leg swelling at baseline which now has somewhat worsened, reports abdominal bloating and finds it difficult to do his daily tasks 2/2 dyspnea. Denies recent change in meds, h/o PE/DVT, cough, fever.     In ED, pt given IV Lasix 80mg and ordered for 1U pRBC

## 2021-03-10 NOTE — PROGRESS NOTE ADULT - SUBJECTIVE AND OBJECTIVE BOX
Laci Gama MD (Internal Medicine/Nephrology progress note)  [address], [facility],  [address],  [id number]  TEl: [phone]  Cell: [phone]    Patient is a 65y Male seen and evaluated at bedside. Vital signs, laboratory data, imaging studies, consult notes reviewed done within past 24 hours. Overnight events noted. Patient lying in bed in no distress complains of dyspnea and leg edema with fatigue and tiredness with sleepiness. Interval worsening renal function with S cr 10.4 with decrease urine output. K level 3.1 today morning on IV Lasix.     Allergies    No Known Allergies    Intolerances        ROS:  CONSTITUTIONAL: Fatigue, tiredness but denies fever or chills.  HEENT: No headache or visual disturbances.  RESPIRATORY: No shortness of breath, cough, hemoptysis or wheezing  CARDIOVASCULAR: Dyspnea and bilateral pitting pedal edema++nt  GASTROINTESTINAL: Poor appetite and nausea but denies vomiting, diarrhea, hematemesis or blood per rectum.  GENITOURINARY: Decreased urine output  NEUROLOGICAL: No headache, focal limb weakness, tingling or numbness or seizure like activity  SKIN: No skin rash or lesion  MUSCULOSKELETAL: Bilateral pitting pedal edema+++nt  VITALS:    T(C): 36.3 (03-10-21 @ 10:43), Max: 36.7 (03-09-21 @ 22:58)  HR: 65 (03-10-21 @ 10:43) (57 - 86)  BP: 124/51 (03-10-21 @ 10:43) (108/51 - 156/74)  RR: 18 (03-10-21 @ 10:43) (16 - 20)  SpO2: 92% (03-10-21 @ 07:33) (92% - 100%)  CAPILLARY BLOOD GLUCOSE      POCT Blood Glucose.: 147 mg/dL (10 Mar 2021 12:16)  POCT Blood Glucose.: 124 mg/dL (10 Mar 2021 09:18)  POCT Blood Glucose.: 169 mg/dL (09 Mar 2021 22:28)      MEDICATIONS  (STANDING):  amLODIPine   Tablet 10 milliGRAM(s) Oral daily  aspirin enteric coated 81 milliGRAM(s) Oral daily  atorvastatin 40 milliGRAM(s) Oral at bedtime  calcium acetate 667 milliGRAM(s) Oral three times a day with meals  chlorhexidine 4% Liquid 1 Application(s) Topical daily  dextrose 40% Gel 15 Gram(s) Oral once  dextrose 5%. 1000 milliLiter(s) (50 mL/Hr) IV Continuous <Continuous>  dextrose 5%. 1000 milliLiter(s) (100 mL/Hr) IV Continuous <Continuous>  dextrose 50% Injectable 25 Gram(s) IV Push once  dextrose 50% Injectable 12.5 Gram(s) IV Push once  dextrose 50% Injectable 25 Gram(s) IV Push once  epoetin rito-epbx (RETACRIT) Injectable 5000 Unit(s) IV Push once  ferrous    sulfate 325 milliGRAM(s) Oral two times a day  folic acid 1 milliGRAM(s) Oral daily  furosemide   Injectable 80 milliGRAM(s) IV Push every 12 hours  glucagon  Injectable 1 milliGRAM(s) IntraMuscular once  insulin lispro (ADMELOG) corrective regimen sliding scale   SubCutaneous Before meals and at bedtime  iron sucrose IVPB 100 milliGRAM(s) IV Intermittent once  metoprolol succinate  milliGRAM(s) Oral daily  potassium chloride    Tablet ER 40 milliEquivalent(s) Oral once  tamsulosin 0.4 milliGRAM(s) Oral at bedtime    MEDICATIONS  (PRN):      PHYSICAL EXAM:  GENERAL: Alert, awake and oriented x3 in mild respiratory distress  HEENT: SPIKE, EOMI, neck supple, no JVP, no carotid bruit, oral mucosa moist and pink.  CHEST/LUNG: Bilateral decreased breath sounds, bibasilar rales and rhonchi, no wheezing  HEART: Regular rate and rhythm, KENNA II/VI at LPSB, no gallops, no rub   ABDOMEN: Soft, nontender, non distended, bowel sounds present, no palpable organomegaly  : No flank or supra pubic tenderness.  EXTREMITIES: Bilateral pitting pedal edema++nt, Bilateral foot wound noted.  Neurology: AAOx3, no focal neurological deficit  SKIN: No rash or skin lesion  Musculoskeletal: Low back pain      Vascular ACCESS: Right IJ Shiley catheter present.    LABS:                        7.1    8.49  )-----------( 278      ( 10 Mar 2021 06:31 )             23.0     03-10    134<L>  |  91<L>  |  107<H>  ----------------------------<  118<H>  3.1<L>   |  25  |  10.41<H>    Ca    10.4      10 Mar 2021 06:31  Phos  6.6     03-10  Mg     2.0     03-10    TPro  5.9<L>  /  Alb  2.5<L>  /  TBili  0.3  /  DBili  x   /  AST  15  /  ALT  15  /  AlkPhos  78  03-10      PT/INR - ( 09 Mar 2021 16:44 )   PT: 13.3 sec;   INR: 1.18 ratio         PTT - ( 09 Mar 2021 16:44 )  PTT:34.2 sec          RADIOLOGY & ADDITIONAL STUDIES:  radConsult Note:   Provider Specialty:  Intervent Radiology.     Consult Type: Non Face-to-Face.     Time-Base Billing for Non-Face-to-Face consult (Minutes) 11-20.    Telehealth:   Telehealth:  · Telehealth?	No    Referral/Consultation:   Initial Consult:  · Requested by Name:	Dr. Gama  · Date/Time:	10-Mar-2021 08:33  · Reason for Referral/Consultation:	Non tunnelled dialysis catheter  · Reason for Admission	sob      · Subjective and Objective:     ----------------------------------------------------------  Interventional Radiology Brief Consult Note  -----------------------------------------------------------    Reason for Referral: Non tunnelled dialysis catheter placement.    Clinical Summary: 65M w/CAD s/p PCI w/bare metal stent, T2DM, HTN, CHF, hx of osteomyelitis (2019), anemia, CKDIV, HLD and GERD, CAD s/p 1 stent p/w 2 weeks of worsening intermittent non-exertional chest pain and dyspnea now with fatigue.  Pt vague historian, reports seeing PMD about 2 weeks ago, reports since then he has been complaint to his medications and denies any changes to diet.   Reports chronic b/l leg swelling at baseline which now has somewhat worsened, reports abdominal bloating and finds it difficult to do his daily tasks 2/2 dyspnea. Denies recent change in meds, h/o PE/DVT, cough, fever.     In ED, pt given IV Lasix 80mg and ordered for 1U pRBC     Vitals:  T(C): 36.7 (03-10-21 @ 07:33), Max: 36.7 (03-09-21 @ 22:58)  HR: 66 (03-10-21 @ 07:33) (57 - 86)  BP: 134/72 (03-10-21 @ 07:33) (108/51 - 156/74)  RR: 18 (03-10-21 @ 07:33) (16 - 20)  SpO2: 92% (03-10-21 @ 07:33) (92% - 100%)    Labs:           7.1  8.49)-----(278     (03-10-21 @ 06:31)         23.0     134 | 91 | 107  --------------------< 118     (03-10-21 @ 06:31)  3.1 | 25 | 10.41       PT: 13.3 03-09-21 @ 16:44  aPTT: 34.2 03-09-21 @ 16:44   INR: 1.18<H> 03-09-21 @ 16:44      Assessment: 65y Male with worsening renal function needing dialysis.    Recommendations:  - Plan for 3/10/2021  - Primary team to place order under Dr. Gil  - Primary team to place preprocedure note  - Case discussed with Dr. Gil    Electronic Signatures:  Emmett Albrecht)   (Signed 10-Mar-2021 08:35)  	Authored: Consult Note, Telehealth, Referral/Consultation, Subjective and Objective  Darrin Gil)   (Signed 10-Mar-2021 09:47)  	Co-Signer: Consult Note, Telehealth, Referral/Consultation, Subjective and Objective    Last Updated: 10-Mar-2021 09:47 by Darrin Gil)  Imaging Personally Reviewed:  [x] YES  [ ] NO    Consultant(s) Notes Reviewed:  [x] YES  [ ] NO    Care Discussed with Primary team/ Other Providers [x] YES  [ ] NO

## 2021-03-10 NOTE — CHART NOTE - NSCHARTNOTEFT_GEN_A_CORE
Vascular consulted for AVF evaluation. Consult appreciated.    Michael Mane PA-C  Medicine ACP, pgr [phone]

## 2021-03-10 NOTE — CHART NOTE - NSCHARTNOTEFT_GEN_A_CORE
Called by RN because pt had stated with RN his "back was hurting him so bad, that he should have just stayed home and killed himself".  Evaluated pt and he explained how hard the stretcher was and now that he was on a hospital bed, he felt better. I asked him what he meant about "killing himself", he stated that it was just a joke and that he had no intentions of doing any such thing.  He said he was just very upset over how badly his back hurt. Pt has no plan.  Pt in no distress.

## 2021-03-10 NOTE — CHART NOTE - NSCHARTNOTEFT_GEN_A_CORE
Spoke with Dr. Duvall (Cardiology), stent was placed 2019 and at this time it is okay to HOLD Plavix starting 3/11 x5D in preparation for permcath and possible AVF later this admission.    Michael Mane PA-C  Medicine ACP, pgr [phone]

## 2021-03-10 NOTE — CONSULT NOTE ADULT - PROBLEM SELECTOR RECOMMENDATION 2
Patient with hypertensive advanced CKD stage V with volume overload  Hold ACEi/ARB  COntinue Metoprolol ER, Nitrate, amlodipine  Hold torsemide  IV Lasix 80 mg q 12hrly  Monitor vital signs  UF with HD starting tomorrow
-avoid nephrotoxins   -new HD   -epo/venofer w/HD  -renal recs appreciated

## 2021-03-10 NOTE — PROGRESS NOTE ADULT - PROBLEM SELECTOR PLAN 1
Patient with advanced CKD stave V not on dialysis now presented with fluid overload and signs/symptoms of uremia  - Strict I/o  - Will initiate RRT/HD with shiley catheter  - Vascular surgery eval for possible AVF/Vein mapping   - Work up for out patient renal panel suggestive of hypertensive/diabetic nephropathy with negative proteinuria/hematuria work up  - Monitor respiratory status.  - Could not place tunnel HD catheter due to Aspirin/plavix today  - Hold Plavix for now  - Possible Catheter placement after cardiac clearances.

## 2021-03-10 NOTE — CONSULT NOTE ADULT - PROBLEM SELECTOR PROBLEM 2
CKD (chronic kidney disease), stage IV
Hypertensive nephropathy, stage 5 chronic kidney disease or end stage renal disease

## 2021-03-10 NOTE — PROVIDER CONTACT NOTE (OTHER) - RECOMMENDATIONS
Reasses patient; Notified to send Pt back to primary floor.
Provider to come see patient. PCA at bedside until provider comes to assess patient.

## 2021-03-10 NOTE — CHART NOTE - NSCHARTNOTEFT_GEN_A_CORE
PRE-INTERVENTIONAL RADIOLOGY PROCEDURE NOTE    Patient Age: 65y  Patient Gender: Male    Procedure: Non-tunneled cath    Diagnosis / Indication: CKD with progression to ESRD    Interventional Radiology Attending Physician: Dr Gil  Ordering Attending Physician: Laci Rodriguez    Pertinent medical history: CKD with progression    Pertinent labs:                       7.1    8.49  )-----------( 278      ( 10 Mar 2021 06:31 )             23.0       03-10    134<L>  |  91<L>  |  107<H>  ----------------------------<  118<H>  3.1<L>   |  25  |  10.41<H>    Ca    10.4      10 Mar 2021 06:31  Phos  6.6     03-10  Mg     2.0     03-10    TPro  5.9<L>  /  Alb  2.5<L>  /  TBili  0.3  /  DBili  x   /  AST  15  /  ALT  15  /  AlkPhos  78  03-10      PT/INR - ( 09 Mar 2021 16:44 )   PT: 13.3 sec;   INR: 1.18 ratio         PTT - ( 09 Mar 2021 16:44 )  PTT:34.2 sec    Patient and Family aware? Yes      Michael Mane PA-C   Contact #: pgr [phone]

## 2021-03-10 NOTE — PROVIDER CONTACT NOTE (OTHER) - SITUATION
Pt states that sitting in a stretcher for so long has caused extreme back pain. He said that the pain is so bad he should've just killed himself instead of coming to the hospital.
Elevated BP; PT is asymptomatic alert and oriented x 4

## 2021-03-10 NOTE — CONSULT NOTE ADULT - ATTENDING COMMENTS
Full consult note as above; discussed  with surgery resident and vascular fellow.   He has ESRD. He is RT handed. He has normal pulses in both upper extremities. He has no pacemaker or defibrillator. No visible subcutaneous veins were noted in either arm. He will get a vein mapping. He is just starting dialysis and is not ready for surgery. He also does not have a perm cath. He will be set up for an AV fistula.

## 2021-03-10 NOTE — PROVIDER CONTACT NOTE (OTHER) - REASON
Pt states he should've just killed himself instead of coming to the hospital.
Elevated BP intra and post HD TX; PT is asymptomatic alert and oriented x 4

## 2021-03-10 NOTE — CONSULT NOTE ADULT - PROBLEM SELECTOR RECOMMENDATION 9
-cbc daily and transfuse PRN to keep Hgb > 7.5 (avoid fluid overload)  -favor 2/2 AOCKD and STAN, however, given possible fhx of colon ca reasonable to assess endoscopically   -cont iron supplements   -discussed w/cardiology; plan for EGD/Colonoscopy on Friday
Patient with advanced CKD stave V not on dialysis now presented with fluid overload  - Strict I/o  - IV lasix 80 mg q 12hlry  - Will initiate RRT/HD tomorrow after permacath placement in AM  - Vascular surgery eval for possible AVF/Vein mapping   - Work up for out patient renal panel suggestive of hypertensive/diabetic nephropathy with negative proteinuria/hematuria work up  - Monitor respiratory status

## 2021-03-11 NOTE — DISCHARGE NOTE PROVIDER - NSDCCPCAREPLAN_GEN_ALL_CORE_FT
PRINCIPAL DISCHARGE DIAGNOSIS  Diagnosis: Acute renal failure  Assessment and Plan of Treatment: When you came to the hospital you were found to have worsening renal failure. You were started on dialysis during this admission. You have a Permacath in place for dialysis, you should follow up with Dr. Rivera in the next 2 weeks to schedule creation of an AV Fistula. Please conitnue follow up with Dr. Gama for cotinued dialysis.      SECONDARY DISCHARGE DIAGNOSES  Diagnosis: Acute on chronic diastolic congestive heart failure  Assessment and Plan of Treatment: Due to worsening renal failure you were retaining extra fluid. Dialysis will help to prevent further accumulation. Continue your medications as prescribed. Follow a low sodium diet.    Diagnosis: Anemia secondary to renal failure  Assessment and Plan of Treatment: Please follow up with your primary doctor to monitor your blood counts. You may receive medication at dialysis to help improve your blood counts. Information for Dr. Butt provided below for follow up as he performed an endoscopy on you.    Diagnosis: HTN (hypertension)  Assessment and Plan of Treatment: Low sodium and fat diet, continue anti-hypertensive medications, and follow up with primary care physician.     PRINCIPAL DISCHARGE DIAGNOSIS  Diagnosis: Acute renal failure  Assessment and Plan of Treatment: When you came to the hospital you were found to have worsening renal failure. You were started on dialysis during this admission. You have a Permacath in place for dialysis, you should follow up with Dr. Rivera in the next 2 weeks to schedule creation of an AV Fistula. Please conitnue follow up with Dr. Gama [place] [address] Phone [phone]. Fax [phone]. Dialysis scheduled for Monday,  Wednesday and Friday at 6:30am. First Out patient start is Monday 3/22/2021 at  6:30am.      SECONDARY DISCHARGE DIAGNOSES  Diagnosis: HTN (hypertension)  Assessment and Plan of Treatment: Low sodium and fat diet, continue anti-hypertensive medications, and follow up with primary care physician.    Diagnosis: Anemia secondary to renal failure  Assessment and Plan of Treatment: Please follow up with your primary doctor to monitor your blood counts. You may receive medication at dialysis to help improve your blood counts. Information for Dr. Butt provided below for follow up as he performed an endoscopy on you.    Diagnosis: Acute on chronic diastolic congestive heart failure  Assessment and Plan of Treatment: Due to worsening renal failure you were retaining extra fluid. Dialysis will help to prevent further accumulation. Continue your medications as prescribed. Follow a low sodium diet.    Diagnosis: DM (diabetes mellitus)  Assessment and Plan of Treatment: Continue consistent carbohydrate diet.  Monitor blood glucose levels throughout the day before meals and at bedtime.  Record blood sugars and bring to outpatient providers appointment in order to be reviewed by your doctor for management modifications.  Be aware of diabetes management symptoms including feeling cool and clammy may be related to low glucose levels.  Feeling hot and dry may indicate high glucose levels.  If  you feel these symptoms, check your blood sugar.  Make regular podiatry appointments in order to have feet checked for wounds and toe nails cut by a doctor to prevent infections.      Diagnosis: Diabetic foot ulcer  Assessment and Plan of Treatment: Continue wound care

## 2021-03-11 NOTE — DISCHARGE NOTE PROVIDER - NSDCMRMEDTOKEN_GEN_ALL_CORE_FT
amLODIPine 10 mg oral tablet: 1 tab(s) orally once a day, hold for SBP &lt;110   Aranesp: recent rx, unclear if pt picked up   aspirin 81 mg oral delayed release tablet: 1 tab(s) orally once a day  atorvastatin 40 mg oral tablet: 1 tab(s) orally once a day (at bedtime)  calcitriol 0.5 mcg oral capsule: 1 cap(s) orally once a day  calcium acetate 667 mg oral tablet: 1 tab(s) orally 3 times a day  clopidogrel 75 mg oral tablet: 1 tab(s) orally once a day  docusate sodium 100 mg oral capsule: 1 cap(s) orally 2 times a day  ferrous sulfate 325 mg (65 mg elemental iron) oral tablet: 1 tab(s) orally 2 times a day  Flomax 0.4 mg oral capsule: 1 cap(s) orally once a day  folic acid 1 mg oral tablet: 1 tab(s) orally once a day  hydrALAZINE 25 mg oral tablet: 1 tab(s) orally 3 times a day  Januvia 100 mg oral tablet: 1 tab(s) orally once a day  metOLazone 2.5 mg oral tablet: 1 tab(s) orally once a day  Metoprolol Succinate  mg oral tablet, extended release: 1 tab(s) orally once a day  sodium bicarbonate 650 mg oral tablet: 2 tab(s) orally 3 times a day  torsemide 20 mg oral tablet: 2 tab(s) orally once a day   aspirin 81 mg oral delayed release tablet: 1 tab(s) orally once a day  atorvastatin 40 mg oral tablet: 1 tab(s) orally once a day (at bedtime)  calcium acetate 667 mg oral tablet: 1 tab(s) orally 3 times a day  clopidogrel 75 mg oral tablet: 1 tab(s) orally once a day  ferrous sulfate 325 mg (65 mg elemental iron) oral tablet: 1 tab(s) orally 2 times a day  Flomax 0.4 mg oral capsule: 1 cap(s) orally once a day  folic acid 1 mg oral tablet: 1 tab(s) orally once a day  Metoprolol Succinate  mg oral tablet, extended release: 1 tab(s) orally once a day   aspirin 81 mg oral delayed release tablet: 1 tab(s) orally once a day  atorvastatin 40 mg oral tablet: 1 tab(s) orally once a day (at bedtime)  calcium acetate 667 mg oral tablet: 1 tab(s) orally 3 times a day  clopidogrel 75 mg oral tablet: 1 tab(s) orally once a day  ferrous sulfate 325 mg (65 mg elemental iron) oral tablet: 1 tab(s) orally 2 times a day  Flomax 0.4 mg oral capsule: 1 cap(s) orally once a day  folic acid 1 mg oral tablet: 1 tab(s) orally once a day  Metoprolol Succinate  mg oral tablet, extended release: 1 tab(s) orally once a day  mupirocin 2% topical ointment: 1 application topically 2 times a day  pantoprazole 40 mg oral delayed release tablet: 1 tab(s) orally 2 times a day

## 2021-03-11 NOTE — PROGRESS NOTE ADULT - SUBJECTIVE AND OBJECTIVE BOX
INTERVAL HPI/OVERNIGHT EVENTS:    doing well   agrees to EGD/Colon tomorrow   tolerating clears    MEDICATIONS  (STANDING):  amLODIPine   Tablet 10 milliGRAM(s) Oral daily  aspirin enteric coated 81 milliGRAM(s) Oral daily  atorvastatin 40 milliGRAM(s) Oral at bedtime  bisacodyl 10 milliGRAM(s) Oral every 4 hours  calcium acetate 667 milliGRAM(s) Oral three times a day with meals  chlorhexidine 4% Liquid 1 Application(s) Topical daily  dextrose 40% Gel 15 Gram(s) Oral once  dextrose 5%. 1000 milliLiter(s) (50 mL/Hr) IV Continuous <Continuous>  dextrose 5%. 1000 milliLiter(s) (100 mL/Hr) IV Continuous <Continuous>  dextrose 50% Injectable 25 Gram(s) IV Push once  dextrose 50% Injectable 12.5 Gram(s) IV Push once  dextrose 50% Injectable 25 Gram(s) IV Push once  epoetin rito-epbx (RETACRIT) Injectable 5000 Unit(s) IV Push once  ferrous    sulfate 325 milliGRAM(s) Oral two times a day  folic acid 1 milliGRAM(s) Oral daily  glucagon  Injectable 1 milliGRAM(s) IntraMuscular once  heparin   Injectable 5000 Unit(s) SubCutaneous every 8 hours  insulin lispro (ADMELOG) corrective regimen sliding scale   SubCutaneous Before meals and at bedtime  iron sucrose IVPB 100 milliGRAM(s) IV Intermittent once  metoprolol succinate  milliGRAM(s) Oral daily  mupirocin 2% Ointment 1 Application(s) Topical two times a day  polyethylene glycol/electrolyte Solution 1 Liter(s) Oral every 4 hours  tamsulosin 0.4 milliGRAM(s) Oral at bedtime    MEDICATIONS  (PRN):  acetaminophen   Tablet .. 650 milliGRAM(s) Oral every 6 hours PRN Mild Pain (1 - 3), Moderate Pain (4 - 6)  lidocaine   Patch 1 Patch Transdermal daily PRN Neck pain      Allergies    No Known Allergies    Intolerances        Review of Systems:    General:  No wt loss, fevers, chills, night sweats, fatigue   Eyes:  Good vision, no reported pain  ENT:  No sore throat, pain, runny nose, dysphagia  CV:  No pain, palpitations, hypo/hypertension  Resp:  No dyspnea, cough, tachypnea, wheezing  GI:  No pain, No nausea, No vomiting, No diarrhea, No constipation, No weight loss, No fever, No pruritis, No rectal bleeding, No melena, No dysphagia  :  No pain, bleeding, incontinence, nocturia  Muscle:  No pain, weakness  Neuro:  No weakness, tingling, memory problems  Psych:  No fatigue, insomnia, mood problems, depression  Endocrine:  No polyuria, polydypsia, cold/heat intolerance  Heme:  No petechiae, ecchymosis, easy bruisability  Skin:  No rash, tattoos, scars, edema      Vital Signs Last 24 Hrs  T(C): 36.5 (11 Mar 2021 12:51), Max: 36.7 (11 Mar 2021 06:13)  T(F): 97.7 (11 Mar 2021 12:51), Max: 98.1 (11 Mar 2021 06:13)  HR: 68 (11 Mar 2021 12:51) (61 - 68)  BP: 147/54 (11 Mar 2021 12:51) (124/42 - 158/64)  BP(mean): --  RR: 18 (11 Mar 2021 12:51) (18 - 18)  SpO2: 97% (11 Mar 2021 12:51) (97% - 98%)    PHYSICAL EXAM:    Constitutional: NAD  HEENT: EOMI, throat clear  Neck: No LAD, supple  Respiratory: CTA and P  Cardiovascular: S1 and S2, RRR, no M  Gastrointestinal: BS+, soft, NT/ND, neg HSM,  Extremities: No peripheral edema, neg clubbing, cyanosis  Vascular: 2+ peripheral pulses  Neurological: A/O x 3, no focal deficits  Psychiatric: Normal mood, normal affect  Skin: No rashes      LABS:                        8.1    7.63  )-----------( 289      ( 11 Mar 2021 07:25 )             26.2     03-11    136  |  95<L>  |  84<H>  ----------------------------<  114<H>  3.1<L>   |  25  |  8.19<H>    Ca    9.7      11 Mar 2021 07:25  Phos  5.2     03-11  Mg     1.9     03-11    TPro  5.9<L>  /  Alb  2.5<L>  /  TBili  0.3  /  DBili  x   /  AST  15  /  ALT  15  /  AlkPhos  78  03-10    PT/INR - ( 09 Mar 2021 16:44 )   PT: 13.3 sec;   INR: 1.18 ratio         PTT - ( 09 Mar 2021 16:44 )  PTT:34.2 sec      RADIOLOGY & ADDITIONAL TESTS:

## 2021-03-11 NOTE — DISCHARGE NOTE PROVIDER - PROVIDER TOKENS
PROVIDER:[TOKEN:[[id number]]],PROVIDER:[TOKEN:[[id number]]],PROVIDER:[TOKEN:[[id number]]],PROVIDER:[TOKEN:[[id number]]]

## 2021-03-11 NOTE — DISCHARGE NOTE PROVIDER - CARE PROVIDER_API CALL
Laci Gama)  Internal Medicine  [address] [facility] [place], [address]  [place] [address]  Phone: [phone]  Fax: [phone]  Follow Up Time:     Geoff Rivera)  Surgery; Vascular Surgery  [address] [facility], [address]  [place] [address]  Phone: [phone]  Fax: [phone]  Follow Up Time:     Trever Jonas)  Gastroenterology; Internal Medicine  [address] [place] [address]  Phone: [phone]  Fax: [phone]  Follow Up Time:     Keaton Duvall)  Cardiovascular Disease; Internal Medicine  [address] [place] [address]  Phone: [phone]  Fax: [phone]  Follow Up Time:

## 2021-03-11 NOTE — DISCHARGE NOTE PROVIDER - NSDCFUSCHEDAPPT_GEN_ALL_CORE_FT
DARIEL MI ; 03/25/2021 ; [facility] DARIEL Robertson ; 03/25/2021 ; [facility] [address] DARIEL Robertson ; 03/25/2021 ; [facility] [address] [facility] DARIEL Robertson ; 03/25/2021 ; [facility]  DARIEL MI ; 03/22/2021 ; [facility]  DARIEL MI ; 03/25/2021 ; [facility] [address] DARIEL Robertson ; 03/25/2021 ; [facility] [address] DARIEL Robertson ; 03/25/2021 ; [facility] [address] [facility] DARIEL Robertson ; 03/25/2021 ; [facility] [address] [facility]  DARIEL MI ; 03/22/2021 ; [facility]  DARIEL MI ; 03/22/2021 ; [facility]  DARIEL MI ; 03/24/2021 ; [facility]  DARIEL MI ; 03/25/2021 ; [facility] [address] [facility] DARIEL Robertson ; 03/25/2021 ; [facility] [address] DARIEL Robertson ; 03/25/2021 ; [facility] DARIEL Robertson ; 03/25/2021 ; [facility] [address] [facility]

## 2021-03-11 NOTE — PROGRESS NOTE ADULT - PROBLEM SELECTOR PLAN 1
-cbc daily and transfuse PRN to keep Hgb > 7.5 (avoid fluid overload)  -favor 2/2 AOCKD and STAN, however, given possible fhx of colon ca reasonable to assess endoscopically   -cont iron supplements  -bowel prep orderd  -clear diet today; NPO p MN for EGD/Colonoscopy tomorrow

## 2021-03-11 NOTE — PROGRESS NOTE ADULT - SUBJECTIVE AND OBJECTIVE BOX
Keaton Duvall MD  Interventional Cardiology / Advance Heart Failure and Cardiac Transplant Specialist  [place] Office : [address] [place] [address] [place] [address]  Tel:   [place] Office : [address] [facility] [place] [address]  Tel: [phone]  Cell : [phone]    Pt is lying in bed comfortable not in distress, no chest pains no SOB no palpitations  	  MEDICATIONS:  amLODIPine   Tablet 10 milliGRAM(s) Oral daily  aspirin enteric coated 81 milliGRAM(s) Oral daily  heparin   Injectable 5000 Unit(s) SubCutaneous every 8 hours  metoprolol succinate  milliGRAM(s) Oral daily  tamsulosin 0.4 milliGRAM(s) Oral at bedtime        acetaminophen   Tablet .. 650 milliGRAM(s) Oral every 6 hours PRN    bisacodyl 10 milliGRAM(s) Oral every 4 hours  polyethylene glycol/electrolyte Solution 1 Liter(s) Oral every 4 hours    atorvastatin 40 milliGRAM(s) Oral at bedtime  dextrose 40% Gel 15 Gram(s) Oral once  dextrose 50% Injectable 25 Gram(s) IV Push once  dextrose 50% Injectable 12.5 Gram(s) IV Push once  dextrose 50% Injectable 25 Gram(s) IV Push once  glucagon  Injectable 1 milliGRAM(s) IntraMuscular once  insulin lispro (ADMELOG) corrective regimen sliding scale   SubCutaneous Before meals and at bedtime    calcium acetate 667 milliGRAM(s) Oral three times a day with meals  chlorhexidine 4% Liquid 1 Application(s) Topical daily  dextrose 5%. 1000 milliLiter(s) IV Continuous <Continuous>  dextrose 5%. 1000 milliLiter(s) IV Continuous <Continuous>  ferrous    sulfate 325 milliGRAM(s) Oral two times a day  folic acid 1 milliGRAM(s) Oral daily  lidocaine   Patch 1 Patch Transdermal daily PRN  mupirocin 2% Ointment 1 Application(s) Topical two times a day      PAST MEDICAL/SURGICAL HISTORY  PAST MEDICAL & SURGICAL HISTORY:  CKD (chronic kidney disease), stage IV    Vitamin D deficiency    GERD (gastroesophageal reflux disease)    Dyslipidemia    Diabetic foot ulcer    Renal cyst    Anemia    HTN (hypertension)    DM (diabetes mellitus)    S/P PICC central line placement  ~ L-Basilic vein (09/13/2018)    Ankle fracture  ORIF        SOCIAL HISTORY: Substance Use (street drugs): ( x ) never used  (  ) other:    FAMILY HISTORY:  Family history of lung disease  father (asbestosis; worked in [employer])    Family history of stomach cancer  father        REVIEW OF SYSTEMS:  CONSTITUTIONAL: No fever, weight loss, or fatigue  EYES: No eye pain, visual disturbances, or discharge  ENMT:  No difficulty hearing, tinnitus, vertigo; No sinus or throat pain  BREASTS: No pain, masses, or nipple discharge  GASTROINTESTINAL: No abdominal or epigastric pain. No nausea, vomiting, or hematemesis; No diarrhea or constipation. No melena or hematochezia.  GENITOURINARY: No dysuria, frequency, hematuria, or incontinence  NEUROLOGICAL: No headaches, memory loss, loss of strength, numbness, or tremors  ENDOCRINE: No heat or cold intolerance; No hair loss  MUSCULOSKELETAL: No joint pain or swelling; No muscle, back, or extremity pain  PSYCHIATRIC: No depression, anxiety, mood swings, or difficulty sleeping  HEME/LYMPH: No easy bruising, or bleeding gums  All others negative    PHYSICAL EXAM:  T(C): 36.5 (03-11-21 @ 17:50), Max: 36.7 (03-11-21 @ 06:13)  HR: 66 (03-11-21 @ 17:50) (61 - 68)  BP: 160/57 (03-11-21 @ 17:50) (124/42 - 160/57)  RR: 18 (03-11-21 @ 17:50) (18 - 18)  SpO2: 97% (03-11-21 @ 12:51) (97% - 98%)  Wt(kg): --  I&O's Summary    10 Mar 2021 07:01  -  11 Mar 2021 07:00  --------------------------------------------------------  IN: 800 mL / OUT: 2400 mL / NET: -1600 mL    11 Mar 2021 07:01  -  11 Mar 2021 20:35  --------------------------------------------------------  IN: 320 mL / OUT: 250 mL / NET: 70 mL          GENERAL: NAD  EYES: EOMI, PERRLA, conjunctiva and sclera clear  ENMT: No tonsillar erythema, exudates, or enlargement; Moist mucous membranes, Good dentition, No lesions  Cardiovascular: Normal S1 S2, No JVD, 2/6 ejection systolic murmurs, No edema  Respiratory: Lungs clear to auscultation	  Gastrointestinal:  Soft, Non-tender, + BS	  Extremities: 3+ edema                                    8.1    7.63  )-----------( 289      ( 11 Mar 2021 07:25 )             26.2     03-11    136  |  95<L>  |  84<H>  ----------------------------<  114<H>  3.1<L>   |  25  |  8.19<H>    Ca    9.7      11 Mar 2021 07:25  Phos  5.2     03-11  Mg     1.9     03-11    TPro  5.9<L>  /  Alb  2.5<L>  /  TBili  0.3  /  DBili  x   /  AST  15  /  ALT  15  /  AlkPhos  78  03-10    proBNP:   Lipid Profile:   HgA1c:   TSH:     Consultant(s) Notes Reviewed:  [x ] YES  [ ] NO    Care Discussed with Consultants/Other Providers [ x] YES  [ ] NO    Imaging Personally Reviewed independently:  [x] YES  [ ] NO    All labs, radiologic studies, vitals, orders and medications list reviewed. Patient is seen and examined at bedside. Case discussed with medical team.

## 2021-03-11 NOTE — DISCHARGE NOTE PROVIDER - CARE PROVIDERS DIRECT ADDRESSES
,DirectAddress_Unknown,[email or web address],DirectAddress_Unknown

## 2021-03-11 NOTE — DISCHARGE NOTE PROVIDER - NSDCFUADDINST_GEN_ALL_CORE_FT
Podiatry Discharge Instructions:  Follow up: Please follow up with Dr. Gallardo within 1 week of discharge from the hospital, please call [phone] for appointment and discuss that you recently were seen in the hospital.  Wound Care: Please apply mupirocin to posterior left calf wound followed by bandaid daily.  Weight bearing: Please weight bear as tolerated.  Antibiotics: Please continue as instructed.

## 2021-03-11 NOTE — PROGRESS NOTE ADULT - PROBLEM SELECTOR PLAN 1
Patient with Advanced CKD stage V now with fluid overload started on RRT/HD  - Strict I/o  - S/p 1st HD treatment yesterday with 1.3 L fluid removal. Tolerated procedure well.  - Vascular surgery eval for possible AVF/Vein mapping reviewed.  - Hold Plavix for now  - Possible Catheter placement and AVF creation after medical/cardiac clearances.

## 2021-03-11 NOTE — DISCHARGE NOTE PROVIDER - HOSPITAL COURSE
65M w/CAD s/p PCI w/bare metal stent, T2DM, HTN, CHF, hx of osteomyelitis (2019), anemia, CKDIV, HLD and GERD, CAD s/p 1 stent p/w 2 weeks of worsening intermittent non-exertional chest pain and dyspnea now with fatigue.      Chronic kidney disease stage V  - IR guided Shiley placement 3/10  - First HD 3/10  - 3/15 permacath placment by ir   - Possible AVF creation as out patient     Hypertensive nephropathy,   Patient with hypertensive/diabetic advanced CKD stage V with clinical volume overload with moderate AS and mild MS  Hold ACEI/ARB  Continue Metoprolol ER      Anemia secondary to renal failure.  Plan: Anemia of chronic renal disease with iron deficiency with hgb 7.1 with intermittent melanotic stool  EPO and Venofer with HD  Monitor hgb/hct  S/p EGD/Colonoscopy with polypectomy now with bleeding  GI follow up today for possible Colonocospy repeat  Anti platelet agents on hold until further GI clearance  PPI IV.     Renal osteodystrophy.     Patient with secondary hyperparathyroidism with elevated PTH due to renal failure  Low k/Low phos renal diet  Continue Phoslo     Diabetic foot ulcer.     Patient with chronic diabetic neuropathy/nephropathy/retinopathy with bilateral foot wound  Podiatry following   No antibiotics at present  DM control   Wound care.     Acute on chronic diastolic congestive heart failure.   Acute on chronic diastolic CHF with volume overload with elevated troponin with prior history of CAD s/p PCI in 2018 with moderate AS/Mild MS  Hold ACEI/ARB  Echocardiogram results reviewed with moderate AS with mild to moderate MR  and mild MS with LVEF 55%  UF with HD  Cardiology - dr roberts following  Jasbir.    anemia   - Anemia secondary to renal failure.  -on iron supplements  -s/p EGD with nodule biopsied; s/p Colonoscopy  w/polypectomy x 3 with clip placed  -melanotic stool -   -s/p repeat EGD without overt bleeding; repeat flex sig w/melena but no active bleeding  -aspirin and plavix restarted     covid vaccine   -pt would like to receive vaccine prior to discharge, attending agrees with plan     Discussed with attending ready for discharge ??????????????       65M w/CAD s/p PCI w/bare metal stent, T2DM, HTN, CHF, hx of osteomyelitis (2019), anemia, CKDIV, HLD and GERD, CAD s/p 1 stent p/w 2 weeks of worsening intermittent non-exertional chest pain and dyspnea now with fatigue.      Acute on Chronic kidney disease stage V  - IR guided Shiley placement 3/10 - removed on 3/15  - First HD 3/10  - 3/15 permacath placment by ir   - Outpatient follow up with Dr. Rivera for AVF creation.      Hypertensive nephropathy,   Patient with hypertensive/diabetic advanced CKD stage V with clinical volume overload with moderate AS and mild MS  Hold ACEI/ARB  Continue Metoprolol ER      Anemia secondary to renal failure.  Plan: Anemia of chronic renal disease with iron deficiency with hgb 7.1 with intermittent melanotic stool  EPO and Venofer with HD  Monitor hgb/hct  S/p EGD/Colonoscopy with polypectomy now with bleeding  GI follow up today for possible Colonocospy repeat  Anti platelet agents on hold until further GI clearance  PPI IV.     Renal osteodystrophy.     Patient with secondary hyperparathyroidism with elevated PTH due to renal failure  Low k/Low phos renal diet  Continue Phoslo     Diabetic foot ulcer.     Patient with chronic diabetic neuropathy/nephropathy/retinopathy with bilateral foot wound  Podiatry following - had a debridement of wound on 3/18/21 in OR.  No antibiotics at present  DM control   Wound care evaluated patient during admission     Acute on chronic diastolic congestive heart failure.   Acute on chronic diastolic CHF with volume overload with elevated troponin with prior history of CAD s/p PCI in 2018 with moderate AS/Mild MS  Hold ACEI/ARB  Echocardiogram results reviewed with moderate AS with mild to moderate MR  and mild MS with LVEF 55%  UF with HD  Cardiology - followed patient during admission and advised that hypervolemia due to renal failure.     covid vaccine   -pt would like to receive vaccine prior to discharge, attending agrees with plan     On ... case was discussed with Dr. Gama who cleared patient for discharge.     Reviewed discharge medications with patient; All new medications requiring new prescription sent to pharmacy of patients choice. Reviewed need for prescription for previous home medications and new prescriptions sent if requested. Patient in agreement and understands.

## 2021-03-11 NOTE — CONSULT NOTE ADULT - ASSESSMENT
EKG - NSR non specific STT changes     Echo 2/19 - Normal LV mod dilated LA    A/P     1) Acute on chronic renal failure -pt is volume overloaded likely sec to worsening renal function, will get 2d echo to access LV function, pt s/p shiley for dialysis later, can hold plavix for permacath and AVF     2) CAD s/p PCI - s/p BMS stent to RPDA in 2019 but hasnt followed up since then cont asa     3) Anemia - pt s/p PRBC for EGD colonoscopy on friday 
Pt 64yo who presents w/ left posterior heel eschar  -pt was seen and examined  -Afebrile, no leukocytosis  -B/L lower extremity edema to the level of the knee, w/ pitting edema, Left posterior calf wound to the level of the dermis, w/ no periwound erythema, no active drainage, no local signs of infection.  -Pod plan for local wound care w/ mupirocin, and bilateral leg compression  -Will follow.   -Seen w/ attending  
65M w/CAD s/p PCI w/bare metal stent, T2DM, HTN, CHF, hx of osteomyelitis (2019), anemia, CKDIV, HLD and GERD, CAD s/p 1 stent p/w 2 weeks of worsening intermittent non-exertional chest pain and dyspnea now with fatigue found to be fluid overloaded in need of HD. GI consulted for anemia
65M w/CAD s/p PCI w/bare metal stent, T2DM, HTN, CHF, hx of osteomyelitis (2019), anemia, CKDIV, HLD and GERD, CAD s/p 1 stent p/w 2 weeks of worsening intermittent non-exertional chest pain and dyspnea, found to have fluid overload in the setting of ABEL on CKD now with HD requirements. Vascular surgery was consulted for AVF planning.     - please obtain b/l upper extremity vein mapping  - please place patient on LUE arm precautions - no venipuncture / nibp / blood draws.   - please document medicine and cardiology optimization for OR for possible AVF placement this admission.     Plan discussed with attending vascular surgeon LIYAH Rivera MD.     Please contact Vascular Surgery (P: [phone]) with any questions.    SAAD Salvador MD, PGY-III  Surgery, Vascular Team  Pager: [phone]  [facility] 
65M w/CAD s/p PCI w/bare metal stent, T2DM, HTN, CHF, hx of osteomyelitis (2019), anemia, CKDIV, HLD and GERD, CAD s/p 1 stent p/w 2 weeks of worsening intermittent non-exertional chest pain and dyspnea now with fatigue.  Nephrology consult for Advanced CKD

## 2021-03-11 NOTE — PROGRESS NOTE ADULT - SUBJECTIVE AND OBJECTIVE BOX
Laci Gama MD (Internal Medicine/Nephrology progress note)  [address], [facility],  [id number],  [id number]  TEl: [phone]  Cell: [phone]    Patient is a 65y Male seen and evaluated at bedside. Vital signs, laboratory data, imaging studies, consult notes reviewed done within past 24 hours. Overnight events noted. Patent lying in bed still complains of poor appetite and dyspnea overnight. Denies any chest pain, palpitations. Interval HD treatment yesterday with 1.3 L fluid removal. Tolerated procedure well.     Allergies    No Known Allergies    Intolerances        ROS:  CONSTITUTIONAL: No fever or chills.  HEENT: No headache or visual disturbances.  RESPIRATORY: No shortness of breath, cough, hemoptysis or wheezing  CARDIOVASCULAR: No Chest pain, shortness of breath, palpitations, dizziness, syncope, orthopnea, PND or leg swelling.  GASTROINTESTINAL: Poor appetite and mild nausea but denies vomiting, diarrhea, hematemesis or blood per rectum.  GENITOURINARY: Decreased urine output, Denies difficulty passing urine.  NEUROLOGICAL: No headache, focal limb weakness, tingling or numbness or seizure like activity  SKIN: No skin rash or lesion  MUSCULOSKELETAL: Bilateral leg edema, Right foot ulcer.    VITALS:    T(C): 36.7 (03-11-21 @ 06:13), Max: 36.7 (03-11-21 @ 06:13)  HR: 66 (03-11-21 @ 06:13) (61 - 66)  BP: 136/50 (03-11-21 @ 06:13) (124/51 - 158/64)  RR: 18 (03-11-21 @ 06:13) (18 - 18)  SpO2: 98% (03-11-21 @ 06:13) (98% - 98%)  CAPILLARY BLOOD GLUCOSE      POCT Blood Glucose.: 129 mg/dL (11 Mar 2021 08:34)  POCT Blood Glucose.: 161 mg/dL (10 Mar 2021 21:57)  POCT Blood Glucose.: 180 mg/dL (10 Mar 2021 17:37)  POCT Blood Glucose.: 147 mg/dL (10 Mar 2021 12:16)  POCT Blood Glucose.: 124 mg/dL (10 Mar 2021 09:18)      03-10-21 @ 07:01  -  03-11-21 @ 07:00  --------------------------------------------------------  IN: 800 mL / OUT: 2400 mL / NET: -1600 mL    03-11-21 @ 07:01  -  03-11-21 @ 09:01  --------------------------------------------------------  IN: 0 mL / OUT: 250 mL / NET: -250 mL      MEDICATIONS  (STANDING):  amLODIPine   Tablet 10 milliGRAM(s) Oral daily  aspirin enteric coated 81 milliGRAM(s) Oral daily  atorvastatin 40 milliGRAM(s) Oral at bedtime  calcium acetate 667 milliGRAM(s) Oral three times a day with meals  chlorhexidine 4% Liquid 1 Application(s) Topical daily  dextrose 40% Gel 15 Gram(s) Oral once  dextrose 5%. 1000 milliLiter(s) (50 mL/Hr) IV Continuous <Continuous>  dextrose 5%. 1000 milliLiter(s) (100 mL/Hr) IV Continuous <Continuous>  dextrose 50% Injectable 25 Gram(s) IV Push once  dextrose 50% Injectable 12.5 Gram(s) IV Push once  dextrose 50% Injectable 25 Gram(s) IV Push once  ferrous    sulfate 325 milliGRAM(s) Oral two times a day  folic acid 1 milliGRAM(s) Oral daily  glucagon  Injectable 1 milliGRAM(s) IntraMuscular once  heparin   Injectable 5000 Unit(s) SubCutaneous every 8 hours  insulin lispro (ADMELOG) corrective regimen sliding scale   SubCutaneous Before meals and at bedtime  metoprolol succinate  milliGRAM(s) Oral daily  potassium chloride    Tablet ER 40 milliEquivalent(s) Oral once  tamsulosin 0.4 milliGRAM(s) Oral at bedtime    MEDICATIONS  (PRN):  acetaminophen   Tablet .. 650 milliGRAM(s) Oral every 6 hours PRN Mild Pain (1 - 3), Moderate Pain (4 - 6)  lidocaine   Patch 1 Patch Transdermal daily PRN Neck pain      PHYSICAL EXAM:  GENERAL: Alert, awake and oriented x3 in no distress  HEENT: SPIKE, EOMI, neck supple, no JVP, no carotid bruit, oral mucosa moist and pale  CHEST/LUNG: Bilateral decreased breath sounds, bibasilar rales and rhonchi,   HEART: Regular rate and rhythm, KENNA II/VI at LPSB, no gallops, no rub   ABDOMEN: Soft, nontender, non distended, bowel sounds present, no palpable organomegaly  : No flank or supra pubic tenderness.  EXTREMITIES: Bilateral peripheral edema++nt, Chronic foot wound with ulcer   Neurology: AAOx3, no focal neurological deficit  SKIN: No rash or skin lesion  Musculoskeletal: No joint deformity or spinal tenderness.      Vascular ACCESS:  Right IJ HD catheter    LABS:                        8.1    7.63  )-----------( 289      ( 11 Mar 2021 07:25 )             26.2     03-11    136  |  95<L>  |  84<H>  ----------------------------<  114<H>  3.1<L>   |  25  |  8.19<H>    Ca    9.7      11 Mar 2021 07:25  Phos  5.2     03-11  Mg     1.9     03-11    TPro  5.9<L>  /  Alb  2.5<L>  /  TBili  0.3  /  DBili  x   /  AST  15  /  ALT  15  /  AlkPhos  78  03-10    Hepatitis C Virus S/CO Ratio: 0.06 S/CO [0.00 - 0.99] (03-10 @ 15:05)  Hepatitis C Virus Interpretation: Nonreact (03-10 @ 15:05)    PT/INR - ( 09 Mar 2021 16:44 )   PT: 13.3 sec;   INR: 1.18 ratio         PTT - ( 09 Mar 2021 16:44 )  PTT:34.2 sec          RADIOLOGY & ADDITIONAL STUDIES:  Reviewed.  Imaging Personally Reviewed:  [x] YES  [ ] NO    Consultant(s) Notes Reviewed:  [x] YES  [ ] NO    Care Discussed with Primary team/ Other Providers [x] YES  [ ] NO

## 2021-03-12 NOTE — CONSULT NOTE ADULT - PROVIDER SPECIALTY LIST ADULT
Gastroenterology
Vascular Surgery
Intervent Radiology
Intervent Radiology
Podiatry
Cardiology
Nephrology

## 2021-03-12 NOTE — CHART NOTE - NSCHARTNOTEFT_GEN_A_CORE
Discussed with Primary Team.    Patient AVF planning for outpatient fistula.   Suggested IR consult for permacath placement prior to discharge.    Vascular Surgery to follow  Discussed with Dr. Rivera. Discussed with Primary Team.    Patient AVF planning for outpatient fistula.   Suggested IR consult for permacath placement prior to discharge.    Vascular Surgery to follow  Discussed with Dr. Rivera.    Full chart note as above; discussed with surgery resident and vascular fellow.   He has renal failure. He is RT handed. He has no visible veins in either arms. A vein mapping is still pending. He also has a non-tunneled catheter and cannot go home with that.  He has just started dialysis. He can potentially go home an get the fistula created as an outpatient. What fistula is possible cannot be determined until he gets the vein mapping.

## 2021-03-12 NOTE — CONSULT NOTE ADULT - CONSULT REQUESTED DATE/TIME
09-Mar-2021 19:47
11-Mar-2021 10:06
12-Mar-2021 15:39
10-Mar-2021 11:13
10-Mar-2021 13:31
10-Mar-2021 17:12
10-Mar-2021 08:33

## 2021-03-12 NOTE — CONSULT NOTE ADULT - SUBJECTIVE AND OBJECTIVE BOX
----------------------------------------------------------  Interventional Radiology Brief Consult Note  -----------------------------------------------------------    Reason for Referral: Non tunnelled dialysis catheter placement.    Clinical Summary: 65M w/CAD s/p PCI w/bare metal stent, T2DM, HTN, CHF, hx of osteomyelitis (2019), anemia, CKDIV, HLD and GERD, CAD s/p 1 stent p/w 2 weeks of worsening intermittent non-exertional chest pain and dyspnea now with fatigue.  Pt vague historian, reports seeing PMD about 2 weeks ago, reports since then he has been complaint to his medications and denies any changes to diet.   Reports chronic b/l leg swelling at baseline which now has somewhat worsened, reports abdominal bloating and finds it difficult to do his daily tasks 2/2 dyspnea. Denies recent change in meds, h/o PE/DVT, cough, fever.     In ED, pt given IV Lasix 80mg and ordered for 1U pRBC     Vitals:  T(C): 36.7 (03-10-21 @ 07:33), Max: 36.7 (03-09-21 @ 22:58)  HR: 66 (03-10-21 @ 07:33) (57 - 86)  BP: 134/72 (03-10-21 @ 07:33) (108/51 - 156/74)  RR: 18 (03-10-21 @ 07:33) (16 - 20)  SpO2: 92% (03-10-21 @ 07:33) (92% - 100%)    Labs:           7.1  8.49)-----(278     (03-10-21 @ 06:31)         23.0     134 | 91 | 107  --------------------< 118     (03-10-21 @ 06:31)  3.1 | 25 | 10.41       PT: 13.3 03-09-21 @ 16:44  aPTT: 34.2 03-09-21 @ 16:44   INR: 1.18<H> 03-09-21 @ 16:44      Assessment: 65y Male with worsening renal function needing dialysis.    Recommendations:  - Plan for 3/10/2021  - Primary team to place order under Dr. Gil  - Primary team to place preprocedure note  - Case discussed with Dr. Gil        
  ----------------------------------------------------------  Interventional Radiology Brief Consult Note  -----------------------------------------------------------    Reason for Referral:  Tunnelled dialysis access    Clinical Summary: 65M w/CAD s/p PCI w/bare metal stent, T2DM, HTN, CHF, hx of osteomyelitis (2019), anemia, CKDIV, HLD and GERD, CAD s/p 1 stent p/w 2 weeks of worsening intermittent non-exertional chest pain and dyspnea now with fatigue.  Pt vague historian, reports seeing PMD about 2 weeks ago, reports since then he has been complaint to his medications and denies any changes to diet.   Reports chronic b/l leg swelling at baseline which now has somewhat worsened, reports abdominal bloating and finds it difficult to do his daily tasks 2/2 dyspnea. Denies recent change in meds, h/o PE/DVT, cough, fever.     In ED, pt given IV Lasix 80mg and ordered for 1U pRBC     Vitals:  T(C): 36.9 (03-12-21 @ 15:35), Max: 37.4 (03-12-21 @ 12:50)  HR: 71 (03-12-21 @ 15:35) (65 - 87)  BP: 95/34 (03-12-21 @ 15:35) (95/34 - 161/61)  RR: 27 (03-12-21 @ 15:35) (17 - 27)  SpO2: 93% (03-12-21 @ 15:35) (93% - 100%)    Labs:           8.5  7.39)-----(331     (03-12-21 @ 08:52)         27.2     140 | 101 | 46  --------------------< 83     (03-12-21 @ 08:52)  3.8 | 23 | 5.90       PT: 13.3 03-09-21 @ 16:44  aPTT: 34.2 03-09-21 @ 16:44   INR: 1.18<H> 03-09-21 @ 16:44      Assessment: 65y Male with right IJ non-tunnelled dialysis catheter for conversion to tunnelled dialysis catheter.    Recommendations:  - Plan for 3/15/2021  - NPO midnight 3/14/2021  - Hold AC  - Labs and Coags in am 3/15/2021  - Primary team to write preprocedure note  - Primary team to place order under Dr. Hill  - Case discussed with Dr. Hill  
VASCULAR SURGERY CONSULT NOTE  DARIEL MI  |  [id number]  |  03-10-21 @ 17:12    Patient was seen and examined at: 14:50.    CC: Patient is a 65y old  Male who presents with a chief complaint of sob (10 Mar 2021 14:41)    HPI:  65M w/CAD s/p PCI w/bare metal stent, T2DM, HTN, CHF, hx of osteomyelitis (2019), anemia, CKDIV, HLD and GERD, CAD s/p 1 stent p/w 2 weeks of worsening intermittent non-exertional chest pain and dyspnea now with fatigue.  Pt vague historian, reports seeing PMD about 2 weeks ago, reports since then he has been complaint to his medications and denies any changes to diet.   Reports chronic b/l leg swelling at baseline which now has somewhat worsened, reports abdominal bloating and finds it difficult to do his daily tasks 2/2 dyspnea. Denies recent change in meds, h/o PE/DVT, cough, fever.     In ED, pt given IV Lasix 80mg and ordered for 1U pRBC  (09 Mar 2021 18:11)    INTERVAL EVENTS:  Patient now with new HD requirements, nephrology and medicine teams requesting AVF placement / planning.  Patient is right handed, has no PPM.     PAST MEDICAL & SURGICAL HISTORY:  CKD (chronic kidney disease), stage IV  Vitamin D deficiency  GERD (gastroesophageal reflux disease)  Dyslipidemia  Diabetic foot ulcer  Renal cyst  Anemia  HTN (hypertension)  DM (diabetes mellitus)  S/P PICC central line placement ~ L-Basilic vein (09/13/2018)  Ankle fracture ORIF    MEDICATIONS  (STANDING):  amLODIPine   Tablet 10 milliGRAM(s) Oral daily  aspirin enteric coated 81 milliGRAM(s) Oral daily  atorvastatin 40 milliGRAM(s) Oral at bedtime  calcium acetate 667 milliGRAM(s) Oral three times a day with meals  chlorhexidine 4% Liquid 1 Application(s) Topical daily  dextrose 40% Gel 15 Gram(s) Oral once  dextrose 5%. 1000 milliLiter(s) (50 mL/Hr) IV Continuous <Continuous>  dextrose 5%. 1000 milliLiter(s) (100 mL/Hr) IV Continuous <Continuous>  dextrose 50% Injectable 25 Gram(s) IV Push once  dextrose 50% Injectable 12.5 Gram(s) IV Push once  dextrose 50% Injectable 25 Gram(s) IV Push once  epoetin rito-epbx (RETACRIT) Injectable 5000 Unit(s) IV Push once  ferrous    sulfate 325 milliGRAM(s) Oral two times a day  folic acid 1 milliGRAM(s) Oral daily  glucagon  Injectable 1 milliGRAM(s) IntraMuscular once  heparin   Injectable 5000 Unit(s) SubCutaneous every 8 hours  insulin lispro (ADMELOG) corrective regimen sliding scale   SubCutaneous Before meals and at bedtime  iron sucrose IVPB 100 milliGRAM(s) IV Intermittent once  metoprolol succinate  milliGRAM(s) Oral daily  potassium chloride    Tablet ER 40 milliEquivalent(s) Oral once  tamsulosin 0.4 milliGRAM(s) Oral at bedtime    ALLERGIES:  No Known Allergies or Intolerances    SOCIAL HISTORY:  Never smoker, denies etoh or drug use (09 Mar 2021 18:11)    FAMILY HISTORY:  Family history of lung disease  father (asbestosis; worked in [employer])  Family history of stomach cancer father    Objective:   Vital Signs Last 24 Hrs  T(C): 36.3 (10 Mar 2021 10:43), Max: 36.7 (09 Mar 2021 22:58)  T(F): 97.3 (10 Mar 2021 10:43), Max: 98.1 (10 Mar 2021 07:33)  HR: 65 (10 Mar 2021 10:43) (57 - 86)  BP: 124/51 (10 Mar 2021 10:43) (124/51 - 156/74)  RR: 18 (10 Mar 2021 10:43) (16 - 20)  SpO2: 92% (10 Mar 2021 07:33) (92% - 100%)    CAPILLARY BLOOD GLUCOSE  POCT Blood Glucose.: 147 mg/dL (10 Mar 2021 12:16)    Physical Exam:  General: Well developed, well nourished, alert and cooperative, and appears to be in no acute distress.  HEENT: normocephalic, vision is grossly intact  Neck: R chest wall permacath in place.   Chest: respirations grossly unlabored, upright, on ra.   Extremities:   RUE: Right handed. +radial pulse. No edema. Full motor and sensation in tact.   LLE: +radial pulse. No edema. Full motor and sensation in tact.     LABS:                        7.1    8.49  )-----------( 278      ( 10 Mar 2021 06:31 )             23.0     03-10    134<L>  |  91<L>  |  107<H>  ----------------------------<  118<H>  3.1<L>   |  25  |  10.41<H>    Ca    10.4      10 Mar 2021 06:31  Phos  6.6     03-10  Mg     2.0     03-10    TPro  5.9<L>  /  Alb  2.5<L>  /  TBili  0.3  /  DBili  x   /  AST  15  /  ALT  15  /  AlkPhos  78  03-10    PT/INR - ( 09 Mar 2021 16:44 )   PT: 13.3 sec;   INR: 1.18 ratio    PTT - ( 09 Mar 2021 16:44 )  PTT:34.2 sec    LIVER FUNCTIONS - ( 10 Mar 2021 06:31 )  Alb: 2.5 g/dL / Pro: 5.9 g/dL / ALK PHOS: 78 U/L / ALT: 15 U/L / AST: 15 U/L / GGT: x           RADIOLOGY & ADDITIONAL STUDIES:    < from: Xray Chest 1 View- PORTABLE-Urgent (Xray Chest 1 View- PORTABLE-Urgent .) (03.09.21 @ 16:55) >  FINDINGS:  Heart size cannot be accurately assessed in this projection.  Bilateral symmetric basilar patchy infiltrates  There is no pneumothorax or pleural effusion.  Degenerative changes of the spine.    IMPRESSION:  Patchy infiltrates suspicious for viral infection. Clinical correlation is suggested.    < end of copied text >    
Podiatry pager #: [phone]/ [phone]    Patient is a 65y old  Male who presents with a chief complaint of sob (11 Mar 2021 09:00)      HPI:  65M w/CAD s/p PCI w/bare metal stent, T2DM, HTN, CHF, hx of osteomyelitis (2019), anemia, CKDIV, HLD and GERD, CAD s/p 1 stent p/w 2 weeks of worsening intermittent non-exertional chest pain and dyspnea now with fatigue.  Pt vague historian, reports seeing PMD about 2 weeks ago, reports since then he has been complaint to his medications and denies any changes to diet.   Reports chronic b/l leg swelling at baseline which now has somewhat worsened, reports abdominal bloating and finds it difficult to do his daily tasks 2/2 dyspnea. Denies recent change in meds, h/o PE/DVT, cough, fever.     In ED, pt given IV Lasix 80mg and ordered for 1U pRBC  (09 Mar 2021 18:11)      PAST MEDICAL & SURGICAL HISTORY:  CKD (chronic kidney disease), stage IV    Vitamin D deficiency    GERD (gastroesophageal reflux disease)    Dyslipidemia    Diabetic foot ulcer    Renal cyst    Anemia    HTN (hypertension)    DM (diabetes mellitus)    S/P PICC central line placement  ~ L-Basilic vein (09/13/2018)    Ankle fracture  ORIF        MEDICATIONS  (STANDING):  amLODIPine   Tablet 10 milliGRAM(s) Oral daily  aspirin enteric coated 81 milliGRAM(s) Oral daily  atorvastatin 40 milliGRAM(s) Oral at bedtime  calcium acetate 667 milliGRAM(s) Oral three times a day with meals  chlorhexidine 4% Liquid 1 Application(s) Topical daily  dextrose 40% Gel 15 Gram(s) Oral once  dextrose 5%. 1000 milliLiter(s) (50 mL/Hr) IV Continuous <Continuous>  dextrose 5%. 1000 milliLiter(s) (100 mL/Hr) IV Continuous <Continuous>  dextrose 50% Injectable 25 Gram(s) IV Push once  dextrose 50% Injectable 12.5 Gram(s) IV Push once  dextrose 50% Injectable 25 Gram(s) IV Push once  ferrous    sulfate 325 milliGRAM(s) Oral two times a day  folic acid 1 milliGRAM(s) Oral daily  glucagon  Injectable 1 milliGRAM(s) IntraMuscular once  heparin   Injectable 5000 Unit(s) SubCutaneous every 8 hours  insulin lispro (ADMELOG) corrective regimen sliding scale   SubCutaneous Before meals and at bedtime  iron sucrose IVPB 100 milliGRAM(s) IV Intermittent once  metoprolol succinate  milliGRAM(s) Oral daily  tamsulosin 0.4 milliGRAM(s) Oral at bedtime    MEDICATIONS  (PRN):  acetaminophen   Tablet .. 650 milliGRAM(s) Oral every 6 hours PRN Mild Pain (1 - 3), Moderate Pain (4 - 6)  lidocaine   Patch 1 Patch Transdermal daily PRN Neck pain      Allergies    No Known Allergies    Intolerances        VITALS:    Vital Signs Last 24 Hrs  T(C): 36.7 (11 Mar 2021 06:13), Max: 36.7 (11 Mar 2021 06:13)  T(F): 98.1 (11 Mar 2021 06:13), Max: 98.1 (11 Mar 2021 06:13)  HR: 66 (11 Mar 2021 06:13) (61 - 66)  BP: 124/42 (11 Mar 2021 09:55) (124/42 - 158/64)  BP(mean): --  RR: 18 (11 Mar 2021 06:13) (18 - 18)  SpO2: 98% (11 Mar 2021 06:13) (98% - 98%)    LABS:                          8.1    7.63  )-----------( 289      ( 11 Mar 2021 07:25 )             26.2       03-11    136  |  95<L>  |  84<H>  ----------------------------<  114<H>  3.1<L>   |  25  |  8.19<H>    Ca    9.7      11 Mar 2021 07:25  Phos  5.2     03-11  Mg     1.9     03-11    TPro  5.9<L>  /  Alb  2.5<L>  /  TBili  0.3  /  DBili  x   /  AST  15  /  ALT  15  /  AlkPhos  78  03-10      CAPILLARY BLOOD GLUCOSE      POCT Blood Glucose.: 129 mg/dL (11 Mar 2021 08:34)  POCT Blood Glucose.: 161 mg/dL (10 Mar 2021 21:57)  POCT Blood Glucose.: 180 mg/dL (10 Mar 2021 17:37)  POCT Blood Glucose.: 147 mg/dL (10 Mar 2021 12:16)      PT/INR - ( 09 Mar 2021 16:44 )   PT: 13.3 sec;   INR: 1.18 ratio         PTT - ( 09 Mar 2021 16:44 )  PTT:34.2 sec    LOWER EXTREMITY PHYSICAL EXAM:    Vasular: DP/PT 2/4, B/L, CFT <3 seconds B/L, Temperature gradient WNL, B/L.   Neuro: Epicritic sensation intact to the level of digits B/L.  Musculoskeletal/Ortho: unremarkable    B/L lower extremity edema to the level of the knee, w/ pitting edema, Left posterior calf wound to the level of the dermis, w/ no periwound erythema, no active drainage, no local signs of infection.      RADIOLOGY & ADDITIONAL STUDIES:    
Keaton Duvall MD  Interventional Cardiology / Advance Heart Failure and Cardiac Transplant Specialist  [place] Office : [address] [place] [address] [place] [address]  Tel:   [place] Office : [address] [facility] [place] [address]  Tel: [phone]  Cell : [phone]      HISTORY OF PRESENTING ILLNESS:  HPI:  65M w/CAD s/p PCI w/bare metal stent, T2DM, HTN, CHF, hx of osteomyelitis (2019), anemia, CKDIV, HLD and GERD, CAD s/p 1 stent p/w 2 weeks of worsening intermittent non-exertional chest pain and dyspnea now with fatigue.  Pt vague historian, reports seeing PMD about 2 weeks ago, reports since then he has been complaint to his medications and denies any changes to diet.   Reports chronic b/l leg swelling at baseline which now has somewhat worsened, reports abdominal bloating and finds it difficult to do his daily tasks 2/2 dyspnea. Denies recent change in meds, h/o PE/DVT, cough, fever.   Cardiology called for SOB , pt s/p BMS to RPDA in 2019 , hasnt followed up since then, says he hasnt had any issues       In ED, pt given IV Lasix 80mg and ordered for 1U pRBC     PAST MEDICAL & SURGICAL HISTORY:  CKD (chronic kidney disease), stage IV    Vitamin D deficiency    GERD (gastroesophageal reflux disease)    Dyslipidemia    Diabetic foot ulcer    Renal cyst    Anemia    HTN (hypertension)    DM (diabetes mellitus)    S/P PICC central line placement  ~ L-Basilic vein (09/13/2018)    Ankle fracture  ORIF        SOCIAL HISTORY: Substance Use (street drugs): ( x ) never used  (  ) other:    FAMILY HISTORY:  Family history of lung disease  father (asbestosis; worked in [employer])    Family history of stomach cancer  father             MEDICATIONS:  amLODIPine   Tablet 10 milliGRAM(s) Oral daily  aspirin enteric coated 81 milliGRAM(s) Oral daily  heparin   Injectable 5000 Unit(s) SubCutaneous every 8 hours  metoprolol succinate  milliGRAM(s) Oral daily  tamsulosin 0.4 milliGRAM(s) Oral at bedtime            atorvastatin 40 milliGRAM(s) Oral at bedtime  dextrose 40% Gel 15 Gram(s) Oral once  dextrose 50% Injectable 25 Gram(s) IV Push once  dextrose 50% Injectable 12.5 Gram(s) IV Push once  dextrose 50% Injectable 25 Gram(s) IV Push once  glucagon  Injectable 1 milliGRAM(s) IntraMuscular once  insulin lispro (ADMELOG) corrective regimen sliding scale   SubCutaneous Before meals and at bedtime    calcium acetate 667 milliGRAM(s) Oral three times a day with meals  chlorhexidine 4% Liquid 1 Application(s) Topical daily  dextrose 5%. 1000 milliLiter(s) IV Continuous <Continuous>  dextrose 5%. 1000 milliLiter(s) IV Continuous <Continuous>  epoetin rito-epbx (RETACRIT) Injectable 5000 Unit(s) IV Push once  ferrous    sulfate 325 milliGRAM(s) Oral two times a day  folic acid 1 milliGRAM(s) Oral daily  iron sucrose IVPB 100 milliGRAM(s) IV Intermittent once  potassium chloride    Tablet ER 40 milliEquivalent(s) Oral once      FAMILY HISTORY:  Family history of lung disease  father (asbestosis; worked in [employer])    Family history of stomach cancer  father          Allergies    No Known Allergies    Intolerances    	      PHYSICAL EXAM:  T(C): 36.3 (03-10-21 @ 10:43), Max: 36.7 (03-09-21 @ 22:58)  HR: 65 (03-10-21 @ 10:43) (57 - 86)  BP: 124/51 (03-10-21 @ 10:43) (108/51 - 156/74)  RR: 18 (03-10-21 @ 10:43) (16 - 20)  SpO2: 92% (03-10-21 @ 07:33) (92% - 100%)  Wt(kg): --  I&O's Summary       EYES:  PERRLA   ENMT: No tonsillar erythema, exudates, or enlargement; Moist mucous membranes, Good dentition, No lesions  Cardiovascular: Normal S1 S2, No JVD, No murmurs, No edema  Respiratory: b/l rhonchi  Gastrointestinal:  Soft, Non-tender, + BS	  Extremities: 3+ edema      LABS:	 	    CARDIAC MARKERS:                                  7.1    8.49  )-----------( 278      ( 10 Mar 2021 06:31 )             23.0     03-10    134<L>  |  91<L>  |  107<H>  ----------------------------<  118<H>  3.1<L>   |  25  |  10.41<H>    Ca    10.4      10 Mar 2021 06:31  Phos  6.6     03-10  Mg     2.0     03-10    TPro  5.9<L>  /  Alb  2.5<L>  /  TBili  0.3  /  DBili  x   /  AST  15  /  ALT  15  /  AlkPhos  78  03-10    proBNP: Serum Pro-Brain Natriuretic Peptide: 97344 pg/mL (03-09 @ 16:44)    Lipid Profile:   HgA1c:   TSH:     Consultant(s) Notes Reviewed:  [x ] YES  [ ] NO    Care Discussed with Consultants/Other Providers [ x] YES  [ ] NO    Imaging Personally Reviewed independently:  [x] YES  [ ] NO    All labs, radiologic studies, vitals, orders and medications list reviewed. Patient is seen and examined at bedside. Case discussed with medical team.    ASSESSMENT/PLAN: 	    
  Chief Complaint:  Patient is a 65y old  Male who presents with a chief complaint of sob (10 Mar 2021 08:33)    CKD (chronic kidney disease), stage IV    Vitamin D deficiency    GERD (gastroesophageal reflux disease)    Dyslipidemia    Diabetic foot ulcer    Chronic kidney disease, stage III (moderate)    Renal cyst    Anemia    HTN (hypertension)    DM (diabetes mellitus)    No pertinent past medical history    S/P PICC central line placement    Arm fracture    Ankle fracture    No significant past surgical history       HPI:  65M w/CAD s/p PCI w/bare metal stent, T2DM, HTN, CHF, hx of osteomyelitis (2019), anemia, CKDIV, HLD and GERD, CAD s/p 1 stent p/w 2 weeks of worsening intermittent non-exertional chest pain and dyspnea now with fatigue. Pt vague historian, reports seeing PMD about 2 weeks ago, reports since then he has been complaint to his medications and denies any changes to diet. Reports chronic b/l leg swelling at baseline which now has somewhat worsened, reports abdominal bloating and finds it difficult to do his daily tasks 2/2 dyspnea. Denies recent change in meds, h/o PE/DVT, cough, fever. GI consulted for anemia. The patient reports that he has not been with any overt rectal bleeding such as melena or hematochezia as outpatient. He states that he is taking Ferrous Sulfate and his stool has now been black. He has no abd pain or n/v. He reports he had a egd/colon "many years ago' does not recall results. He is unclear but thinks his mother may have had colon cancer. He is with negative occult here and worsening renal failure awaiting permacath. He is agreeable to endoscopic eval to assess for any gi causes of anemia given his unclear FHx of colon ca and new iron deficiency.     In ED, pt given IV Lasix 80mg and ordered for 1U pRBC  (09 Mar 2021 18:11)      No Known Allergies      amLODIPine   Tablet 10 milliGRAM(s) Oral daily  aspirin enteric coated 81 milliGRAM(s) Oral daily  atorvastatin 40 milliGRAM(s) Oral at bedtime  calcium acetate 667 milliGRAM(s) Oral three times a day with meals  chlorhexidine 4% Liquid 1 Application(s) Topical daily  clopidogrel Tablet 75 milliGRAM(s) Oral daily  dextrose 40% Gel 15 Gram(s) Oral once  dextrose 5%. 1000 milliLiter(s) IV Continuous <Continuous>  dextrose 5%. 1000 milliLiter(s) IV Continuous <Continuous>  dextrose 50% Injectable 25 Gram(s) IV Push once  dextrose 50% Injectable 12.5 Gram(s) IV Push once  dextrose 50% Injectable 25 Gram(s) IV Push once  ferrous    sulfate 325 milliGRAM(s) Oral two times a day  folic acid 1 milliGRAM(s) Oral daily  furosemide   Injectable 80 milliGRAM(s) IV Push every 12 hours  glucagon  Injectable 1 milliGRAM(s) IntraMuscular once  insulin lispro (ADMELOG) corrective regimen sliding scale   SubCutaneous Before meals and at bedtime  metoprolol succinate  milliGRAM(s) Oral daily  potassium chloride    Tablet ER 40 milliEquivalent(s) Oral once  tamsulosin 0.4 milliGRAM(s) Oral at bedtime        FAMILY HISTORY:  Family history of lung disease  father (asbestosis; worked in [employer])    Family history of stomach cancer  father          Review of Systems:    General:  No wt loss, fevers, chills, night sweats, fatigue  Eyes:  Good vision, no reported pain  ENT:  No sore throat, pain, runny nose, dysphagia  CV:  No pain, palpitations, no lightheadedness  Resp:  No dyspnea, cough, tachypnea, wheezing  GI: dark stools; denies n/v/d/c, abdominal pain, melena or brbpr   :  No pain, bleeding, incontinence, nocturia  Muscle:  No pain, weakness  Neuro:  No weakness, tingling, memory problems  Psych:  No fatigue, insomnia, mood problems, depression  Endocrine:  No polyuria, polydypsia, cold/heat intolerance  Heme:  No petechiae, ecchymosis, easy bruisability  Skin:  No rash, tattoos, scars, edema    Relevant Family History:   n/c    Relevant Social History: n/c      Physical Exam:    Vital Signs:  Vital Signs Last 24 Hrs  T(C): 36.3 (10 Mar 2021 10:43), Max: 36.7 (09 Mar 2021 22:58)  T(F): 97.3 (10 Mar 2021 10:43), Max: 98.1 (10 Mar 2021 07:33)  HR: 65 (10 Mar 2021 10:43) (57 - 86)  BP: 124/51 (10 Mar 2021 10:43) (108/51 - 156/74)  BP(mean): --  RR: 18 (10 Mar 2021 10:43) (16 - 20)  SpO2: 92% (10 Mar 2021 07:33) (92% - 100%)  Daily Height in cm: 180.34 (09 Mar 2021 14:17)    Daily     General:  Appears stated age, well-groomed, nad  HEENT:  NC/AT,  conjunctivae clear and pink, no thyromegaly, nodules, adenopathy, no JVD  Chest:  Full & symmetric excursion, no increased effort, breath sounds clear  Cardiovascular:  Regular rhythm, S1, S2, no murmur/rub/S3/S4, no abdominal bruit, no edema  Abdomen:  Soft, non-tender, non-distended, normoactive bowel sounds,  no masses ,no hepatosplenomeagaly, no signs of chronic liver disease  Extremities:  no cyanosis,clubbing or edema  Skin:  No rash/erythema/ecchymoses/petechiae/wounds/abscess/warm/dry  Neuro/Psych:  A&Ox3  , no asterixis, no tremor, no encephalopathy    Laboratory:                            7.1    8.49  )-----------( 278      ( 10 Mar 2021 06:31 )             23.0     03-10    134<L>  |  91<L>  |  107<H>  ----------------------------<  118<H>  3.1<L>   |  25  |  10.41<H>    Ca    10.4      10 Mar 2021 06:31  Phos  6.6     03-10  Mg     2.0     03-10    TPro  5.9<L>  /  Alb  2.5<L>  /  TBili  0.3  /  DBili  x   /  AST  15  /  ALT  15  /  AlkPhos  78  03-10    LIVER FUNCTIONS - ( 10 Mar 2021 06:31 )  Alb: 2.5 g/dL / Pro: 5.9 g/dL / ALK PHOS: 78 U/L / ALT: 15 U/L / AST: 15 U/L / GGT: x           PT/INR - ( 09 Mar 2021 16:44 )   PT: 13.3 sec;   INR: 1.18 ratio         PTT - ( 09 Mar 2021 16:44 )  PTT:34.2 sec      Imaging:    < from: Xray Chest 1 View- PORTABLE-Urgent (Xray Chest 1 View- PORTABLE-Urgent .) (03.09.21 @ 16:55) >    EXAM:  XR CHEST PORTABLE URGENT 1V        PROCEDURE DATE:  Mar  9 2021         INTERPRETATION:  EXAMINATION: XR CHEST URGENT    CLINICAL INDICATION: Redness of breath    TECHNIQUE: Single frontal, portable view of the chest was obtained.    COMPARISON: Chest radiograph 9/7/2018.    FINDINGS:  Heart size cannot be accurately assessed in this projection.  Bilateral symmetric basilar patchy infiltrates  There is no pneumothorax or pleural effusion.  Degenerative changes of the spine.    IMPRESSION:  Patchy infiltrates suspicious for viral infection. Clinical correlation is suggested.            JULIO CÉSAR KATHLEEN MD; Resident Radiology  This document has been electronically signed.  TOBY RAMOS MD; Attending Interventional Radiologist  This document has been electronically signed. Mar 10 2021 12:20PM    < end of copied text >        
Laci Gama MD (Nephrology)  [address] [facility], [place], operated [facility],  [address],  [facility]  TEl: [phone]  Cell: [phone]    Patient is a 65y old  Male who presents with a chief complaint of sob (09 Mar 2021 18:11)      HPI:  65M w/CAD s/p PCI w/bare metal stent, T2DM, HTN, CHF, hx of osteomyelitis (2019), anemia, CKDIV, HLD and GERD, CAD s/p 1 stent p/w 2 weeks of worsening intermittent non-exertional chest pain and dyspnea now with fatigue.  Pt vague historian, reports seeing PMD about 2 weeks ago, reports since then he has been complaint to his medications and denies any changes to diet.   Reports chronic b/l leg swelling at baseline which now has somewhat worsened, reports abdominal bloating and finds it difficult to do his daily tasks 2/2 dyspnea. Denies recent change in meds, h/o PE/DVT, cough, fever.     In ED, pt given IV Lasix 80mg and ordered for 1U pRBC  (09 Mar 2021 18:11)      PAST MEDICAL & SURGICAL HISTORY:  CKD (chronic kidney disease), stage IV    Vitamin D deficiency    GERD (gastroesophageal reflux disease)    Dyslipidemia    Diabetic foot ulcer    Renal cyst    Anemia    HTN (hypertension)    DM (diabetes mellitus)    S/P PICC central line placement  ~ L-Basilic vein (09/13/2018)    Ankle fracture  ORIF          Allergies:  No Known Allergies      Home Medications:   amLODIPine   Tablet 10 milliGRAM(s) Oral daily  aspirin enteric coated 81 milliGRAM(s) Oral daily  atorvastatin 40 milliGRAM(s) Oral at bedtime  calcitriol   Capsule 0.5 MICROGram(s) Oral daily  calcium acetate 667 milliGRAM(s) Oral three times a day with meals  clopidogrel Tablet 75 milliGRAM(s) Oral daily  dextrose 40% Gel 15 Gram(s) Oral once  dextrose 5%. 1000 milliLiter(s) IV Continuous <Continuous>  dextrose 5%. 1000 milliLiter(s) IV Continuous <Continuous>  dextrose 50% Injectable 25 Gram(s) IV Push once  dextrose 50% Injectable 12.5 Gram(s) IV Push once  dextrose 50% Injectable 25 Gram(s) IV Push once  ferrous    sulfate 325 milliGRAM(s) Oral two times a day  folic acid 1 milliGRAM(s) Oral daily  furosemide   Injectable 80 milliGRAM(s) IV Push every 12 hours  glucagon  Injectable 1 milliGRAM(s) IntraMuscular once  insulin lispro (ADMELOG) corrective regimen sliding scale   SubCutaneous Before meals and at bedtime  metoprolol succinate  milliGRAM(s) Oral daily  sodium bicarbonate 1300 milliGRAM(s) Oral three times a day  tamsulosin 0.4 milliGRAM(s) Oral at bedtime      Hospital Medications:   MEDICATIONS  (STANDING):  amLODIPine   Tablet 10 milliGRAM(s) Oral daily  aspirin enteric coated 81 milliGRAM(s) Oral daily  atorvastatin 40 milliGRAM(s) Oral at bedtime  calcitriol   Capsule 0.5 MICROGram(s) Oral daily  calcium acetate 667 milliGRAM(s) Oral three times a day with meals  clopidogrel Tablet 75 milliGRAM(s) Oral daily  dextrose 40% Gel 15 Gram(s) Oral once  dextrose 5%. 1000 milliLiter(s) (50 mL/Hr) IV Continuous <Continuous>  dextrose 5%. 1000 milliLiter(s) (100 mL/Hr) IV Continuous <Continuous>  dextrose 50% Injectable 25 Gram(s) IV Push once  dextrose 50% Injectable 12.5 Gram(s) IV Push once  dextrose 50% Injectable 25 Gram(s) IV Push once  ferrous    sulfate 325 milliGRAM(s) Oral two times a day  folic acid 1 milliGRAM(s) Oral daily  furosemide   Injectable 80 milliGRAM(s) IV Push every 12 hours  glucagon  Injectable 1 milliGRAM(s) IntraMuscular once  insulin lispro (ADMELOG) corrective regimen sliding scale   SubCutaneous Before meals and at bedtime  metoprolol succinate  milliGRAM(s) Oral daily  sodium bicarbonate 1300 milliGRAM(s) Oral three times a day  tamsulosin 0.4 milliGRAM(s) Oral at bedtime      SOCIAL HISTORY:  Denies ETOh, Smoking,     Family History:  FAMILY HISTORY:  Family history of lung disease  father (asbestosis; worked in shipyard)    Family history of stomach cancer  father        ROS:  CONSTITUTIONAL: No fever or chills.  HEENT: No headche, visual disturbances, hearing impairment.  RESPIRATORY: Dyspnea but denies cough, wheezing or hemoptysis  CARDIOVASCULAR: Bilateral leg swelling, dyspnea, fatigue, tiredness  GASTROINTESTINAL: No abdominal pain, nausea, vomiting, diarrhea, hematemesis or blood per rectum.  GENITOURINARY: Decreased urine output,  foamy urine, no flank or supra pubic pain, no prior history of kidney stones.  NEUROLOGICAL: No headache,  focal limb weakness, tingling or numbness sensation or seizure like activity  SKIN: No rash or skin lesion  MUSCULOSKELETAL: Bilateral leg edema++nt  Psych: Denies suicidal or homicidal ideation    VITALS:  T(F): 97.6 (03-09-21 @ 17:46), Max: 97.6 (03-09-21 @ 17:46)  HR: 76 (03-09-21 @ 17:46)  BP: 133/86 (03-09-21 @ 17:46)  RR: 18 (03-09-21 @ 17:46)  SpO2: 100% (03-09-21 @ 17:46)  Wt(kg): --    Height (cm): 180.3 (03-09 @ 14:17)  CAPILLARY BLOOD GLUCOSE            PHYSICAL EXAM:  GENERAL: Alert, awake, oriented x3   HEENT: SPIKE, EOMI, neck supple, no JVP, no carotid bruits, no palpable thyromegaly or lymphadenopathy, no carotid bruits  CHEST/LUNG: Bilateral decreased breath sounds, bibasilar rales and crepitations,  HEART: Regular rate and rhythm, KENNA II/VI at LPSB, no gallops, no rub   ABDOMEN: Soft, nontender, non distended, bowel sounds present, no palpable organomegaly, no guarding, no rigidity, no rebound tenderness.  : No flank or supra pubic tenderness.  EXTREMITIES: Bilateral leg edema++++nt, pitting, foot wound with dressing  Musculoskeletal: No joint or spinal tenderness  Neurology: AAOx3, no focal neurological deficit, Motor and sensory systems are intact.  SKIN: No rash or skin lesion    LABS:  03-09    132<L>  |  87<L>  |  104<H>  ----------------------------<  211<H>  3.4<L>   |  28  |  10.09<H>    Ca    10.5      09 Mar 2021 16:44    TPro  6.0  /  Alb  2.8<L>  /  TBili  0.2  /  DBili      /  AST  13  /  ALT  13  /  AlkPhos  80  03-09    Creatinine Trend: 10.09 <--                        6.6    8.10  )-----------( 307      ( 09 Mar 2021 16:44 )             21.8     Urine Studies:        RADIOLOGY & ADDITIONAL STUDIES:    < from: Xray Chest 1 View- PORTABLE-Urgent (Xray Chest 1 View- PORTABLE-Urgent .) (03.09.21 @ 16:55) >    ******PRELIMINARY REPORT******    ******PRELIMINARY REPORT******            EXAM:  XR CHEST PORTABLE URGENT 1V        PROCEDURE DATE:  Mar  9 2021     ******PRELIMINARY REPORT******    ******PRELIMINARY REPORT******            INTERPRETATION:  Bilateral symmetric basilar hazy opacities, favoring pulmonary edema.    AK          ******PRELIMINARY REPORT******    ******PRELIMINARY REPORT******          ISABEL MORALES MD; Resident Radiology    < end of copied text >  EKG findings reviewed.    Imaging Personally Reviewed:  [x] YES  [ ] NO    Consultant(s) and primary physician Notes Reviewed:  [x] YES  [ ] NO    Care Discussed with Primary team/ Consultants/Other Providers [x] YES  [ ] NO

## 2021-03-12 NOTE — PROGRESS NOTE ADULT - PROBLEM SELECTOR PLAN 1
Patient with advanced CKD stage V now with fluid overload started on RRT/HD  - Strict I/o  - S/p HD treatment yesterday with 2L fluid removal. Tolerated procedure well.  - Echocardiogram results reviewed   - Hold Plavix for now  - Possible Catheter placement and AVF creation after medical/cardiac clearances. Patient with advanced CKD stage V now with fluid overload started on RRT/HD  - Strict I/o  - S/p HD treatment yesterday with 2L fluid removal. Tolerated procedure well.  - Echocardiogram results reviewed   - Hold Plavix for now  - Possible Catheter placement after cardiac clearances  - Defer HD today  - Next anticipated IHD on 3/13/2021.

## 2021-03-12 NOTE — PROGRESS NOTE ADULT - SUBJECTIVE AND OBJECTIVE BOX
Keaton Duvall MD  Interventional Cardiology / Advance Heart Failure and Cardiac Transplant Specialist  [place] Office : [address] [place] [address] [place] [address]  Tel:   [place] Office : [address] [facility] [place] [address]  Tel: [phone]  Cell : [phone]    Pt is lying in bed comfortable not in distress, no chest pains no SOB no palpitations  	  MEDICATIONS:  amLODIPine   Tablet 10 milliGRAM(s) Oral daily  aspirin enteric coated 81 milliGRAM(s) Oral daily  heparin   Injectable 5000 Unit(s) SubCutaneous every 8 hours  metoprolol succinate  milliGRAM(s) Oral daily  tamsulosin 0.4 milliGRAM(s) Oral at bedtime        acetaminophen   Tablet .. 650 milliGRAM(s) Oral every 6 hours PRN      atorvastatin 40 milliGRAM(s) Oral at bedtime  dextrose 40% Gel 15 Gram(s) Oral once  dextrose 50% Injectable 25 Gram(s) IV Push once  dextrose 50% Injectable 12.5 Gram(s) IV Push once  dextrose 50% Injectable 25 Gram(s) IV Push once  glucagon  Injectable 1 milliGRAM(s) IntraMuscular once  insulin lispro (ADMELOG) corrective regimen sliding scale   SubCutaneous Before meals and at bedtime    calcium acetate 667 milliGRAM(s) Oral three times a day with meals  chlorhexidine 4% Liquid 1 Application(s) Topical daily  dextrose 5%. 1000 milliLiter(s) IV Continuous <Continuous>  dextrose 5%. 1000 milliLiter(s) IV Continuous <Continuous>  ferrous    sulfate 325 milliGRAM(s) Oral two times a day  folic acid 1 milliGRAM(s) Oral daily  lidocaine   Patch 1 Patch Transdermal daily PRN  mupirocin 2% Ointment 1 Application(s) Topical two times a day      PAST MEDICAL/SURGICAL HISTORY  PAST MEDICAL & SURGICAL HISTORY:  CKD (chronic kidney disease), stage IV    Vitamin D deficiency    GERD (gastroesophageal reflux disease)    Dyslipidemia    Diabetic foot ulcer    Renal cyst    Anemia    HTN (hypertension)    DM (diabetes mellitus)    S/P PICC central line placement  ~ L-Basilic vein (09/13/2018)    Ankle fracture  ORIF        SOCIAL HISTORY: Substance Use (street drugs): ( x ) never used  (  ) other:    FAMILY HISTORY:  Family history of lung disease  father (asbestosis; worked in [employer])    Family history of stomach cancer  father       PHYSICAL EXAM:  T(C): 37.2 (03-12-21 @ 10:52), Max: 37.2 (03-12-21 @ 10:52)  HR: 87 (03-12-21 @ 10:52) (65 - 87)  BP: 122/88 (03-12-21 @ 10:52) (122/57 - 161/61)  RR: 17 (03-12-21 @ 10:52) (17 - 18)  SpO2: 96% (03-12-21 @ 10:52) (96% - 97%)  Wt(kg): --  I&O's Summary    11 Mar 2021 07:01  -  12 Mar 2021 07:00  --------------------------------------------------------  IN: 1720 mL / OUT: 3450 mL / NET: -1730 mL             EYES: EOMI, PERRLA, conjunctiva and sclera clear  ENMT: No tonsillar erythema, exudates, or enlargement; Moist mucous membranes, Good dentition, No lesions  Cardiovascular: Normal S1 S2, No JVD, 2/6 systolic murmur , No edema  Respiratory: Lungs clear to auscultation	  Gastrointestinal:  Soft, Non-tender, + BS	  Extremities: no edema                                    8.5    7.39  )-----------( 331      ( 12 Mar 2021 08:52 )             27.2     03-12    140  |  101  |  46<H>  ----------------------------<  83  3.8   |  23  |  5.90<H>    Ca    9.5      12 Mar 2021 08:52  Phos  3.7     03-12  Mg     1.9     03-12      proBNP:   Lipid Profile:   HgA1c:   TSH:     Consultant(s) Notes Reviewed:  [x ] YES  [ ] NO    Care Discussed with Consultants/Other Providers [ x] YES  [ ] NO    Imaging Personally Reviewed independently:  [x] YES  [ ] NO    All labs, radiologic studies, vitals, orders and medications list reviewed. Patient is seen and examined at bedside. Case discussed with medical team.

## 2021-03-12 NOTE — PROGRESS NOTE ADULT - SUBJECTIVE AND OBJECTIVE BOX
Laci Gama MD (Internal Medicine/Nephrology progress note)  [address] [facility],  [address],  [facility]  TEl: [phone]  Cell: [phone]    Patient is a 65y Male seen and evaluated at bedside. Vital signs, laboratory data, imaging studies, consult notes reviewed done within past 24 hours. Overnight events noted. Patient lying in bed in no distress offers no complains of chest pain, SOB. palpitations. Interval HD treatment yesterday with 2L UF. Tolerated procedure well. Hgb with improvement to 8.5. Echocardiogram results reviewed.    Allergies    No Known Allergies    Intolerances        ROS:  CONSTITUTIONAL: No fever or chills.  HEENT: No headache or visual disturbances.  RESPIRATORY: No shortness of breath, cough, hemoptysis or wheezing  CARDIOVASCULAR: No Chest pain, shortness of breath, palpitations, dizziness, syncope, orthopnea, PND or leg swelling.  GASTROINTESTINAL: No abdominal pain, nausea, vomiting, diarrhea, hematemesis or blood per rectum.  GENITOURINARY: No urinary frequency, urgency, gross hematuria, dysuria, flank or supra pubic pain, difficulty passing urine.  NEUROLOGICAL: No headache, focal limb weakness, tingling or numbness or seizure like activity  SKIN: No skin rash or lesion  MUSCULOSKELETAL: Bilateral leg edema+nt    VITALS:    T(C): 36.7 (03-12-21 @ 06:28), Max: 36.7 (03-12-21 @ 06:28)  HR: 72 (03-12-21 @ 06:28) (65 - 72)  BP: 122/57 (03-12-21 @ 06:28) (122/57 - 161/61)  RR: 18 (03-12-21 @ 06:28) (18 - 18)  SpO2: 97% (03-12-21 @ 06:28) (97% - 97%)  CAPILLARY BLOOD GLUCOSE      POCT Blood Glucose.: 105 mg/dL (12 Mar 2021 09:02)  POCT Blood Glucose.: 142 mg/dL (11 Mar 2021 22:49)  POCT Blood Glucose.: 113 mg/dL (11 Mar 2021 17:34)  POCT Blood Glucose.: 122 mg/dL (11 Mar 2021 12:21)      03-11-21 @ 07:01  -  03-12-21 @ 07:00  --------------------------------------------------------  IN: 1720 mL / OUT: 3450 mL / NET: -1730 mL      MEDICATIONS  (STANDING):  amLODIPine   Tablet 10 milliGRAM(s) Oral daily  aspirin enteric coated 81 milliGRAM(s) Oral daily  atorvastatin 40 milliGRAM(s) Oral at bedtime  calcium acetate 667 milliGRAM(s) Oral three times a day with meals  chlorhexidine 4% Liquid 1 Application(s) Topical daily  dextrose 40% Gel 15 Gram(s) Oral once  dextrose 5%. 1000 milliLiter(s) (50 mL/Hr) IV Continuous <Continuous>  dextrose 5%. 1000 milliLiter(s) (100 mL/Hr) IV Continuous <Continuous>  dextrose 50% Injectable 25 Gram(s) IV Push once  dextrose 50% Injectable 12.5 Gram(s) IV Push once  dextrose 50% Injectable 25 Gram(s) IV Push once  ferrous    sulfate 325 milliGRAM(s) Oral two times a day  folic acid 1 milliGRAM(s) Oral daily  glucagon  Injectable 1 milliGRAM(s) IntraMuscular once  heparin   Injectable 5000 Unit(s) SubCutaneous every 8 hours  insulin lispro (ADMELOG) corrective regimen sliding scale   SubCutaneous Before meals and at bedtime  metoprolol succinate  milliGRAM(s) Oral daily  mupirocin 2% Ointment 1 Application(s) Topical two times a day  tamsulosin 0.4 milliGRAM(s) Oral at bedtime    MEDICATIONS  (PRN):  acetaminophen   Tablet .. 650 milliGRAM(s) Oral every 6 hours PRN Mild Pain (1 - 3), Moderate Pain (4 - 6)  lidocaine   Patch 1 Patch Transdermal daily PRN Neck pain      PHYSICAL EXAM:  GENERAL: Alert, awake and oriented x3 in no distress  HEENT: SPIKE, EOMI, neck supple, no JVP, no carotid bruit, oral mucosa moist and pale  CHEST/LUNG: Bilateral clear breath sounds, no rales, no crepitations, no rhonchi, no wheezing  HEART: Regular rate and rhythm, KENNA II/VI at LPSB, no gallops, no rub   ABDOMEN: Soft, nontender, non distended, bowel sounds present, no palpable organomegaly  : No flank or supra pubic tenderness.  EXTREMITIES: Bilateral pitting pedal edema++nt  Neurology: AAOx3, no focal neurological deficit  SKIN: No rash or skin lesion  Musculoskeletal: No joint deformity or spinal tenderness.      Vascular ACCESS: Right IJ shiley catheter    LABS:                        8.5    7.39  )-----------( 331      ( 12 Mar 2021 08:52 )             27.2     03-12    140  |  101  |  46<H>  ----------------------------<  83  3.8   |  23  |  5.90<H>    Ca    9.5      12 Mar 2021 08:52  Phos  3.7     03-12  Mg     1.9     03-12                  RADIOLOGY & ADDITIONAL STUDIES:  radConsult Note:   Provider Specialty:  Intervent Radiology.     Consult Type: Non Face-to-Face.     Time-Base Billing for Non-Face-to-Face consult (Minutes) 11-20.    Telehealth:   Telehealth:  · Telehealth?	No    Referral/Consultation:   Initial Consult:  · Requested by Name:	Dr. Gama  · Date/Time:	10-Mar-2021 08:33  · Reason for Referral/Consultation:	Non tunnelled dialysis catheter  · Reason for Admission	sob      · Subjective and Objective:     ----------------------------------------------------------  Interventional Radiology Brief Consult Note  -----------------------------------------------------------    Reason for Referral: Non tunnelled dialysis catheter placement.    Clinical Summary: 65M w/CAD s/p PCI w/bare metal stent, T2DM, HTN, CHF, hx of osteomyelitis (2019), anemia, CKDIV, HLD and GERD, CAD s/p 1 stent p/w 2 weeks of worsening intermittent non-exertional chest pain and dyspnea now with fatigue.  Pt vague historian, reports seeing PMD about 2 weeks ago, reports since then he has been complaint to his medications and denies any changes to diet.   Reports chronic b/l leg swelling at baseline which now has somewhat worsened, reports abdominal bloating and finds it difficult to do his daily tasks 2/2 dyspnea. Denies recent change in meds, h/o PE/DVT, cough, fever.     In ED, pt given IV Lasix 80mg and ordered for 1U pRBC     Vitals:  T(C): 36.7 (03-10-21 @ 07:33), Max: 36.7 (03-09-21 @ 22:58)  HR: 66 (03-10-21 @ 07:33) (57 - 86)  BP: 134/72 (03-10-21 @ 07:33) (108/51 - 156/74)  RR: 18 (03-10-21 @ 07:33) (16 - 20)  SpO2: 92% (03-10-21 @ 07:33) (92% - 100%)    Labs:           7.1  8.49)-----(278     (03-10-21 @ 06:31)         23.0     134 | 91 | 107  --------------------< 118     (03-10-21 @ 06:31)  3.1 | 25 | 10.41       PT: 13.3 03-09-21 @ 16:44  aPTT: 34.2 03-09-21 @ 16:44   INR: 1.18<H> 03-09-21 @ 16:44      Assessment: 65y Male with worsening renal function needing dialysis.    Recommendations:  - Plan for 3/10/2021  - Primary team to place order under Dr. Gil  - Primary team to place preprocedure note  - Case discussed with Dr. Gil              Electronic Signatures:  Emmett Albrecht)   (Signed 10-Mar-2021 08:35)  	Authored: Consult Note, Telehealth, Referral/Consultation, Subjective and Objective  Darrin Gil)   (Signed 10-Mar-2021 09:47)  	Co-Signer: Consult Note, Telehealth, Referral/Consultation, Subjective and Objective      e< from: Transthoracic Echocardiogram (03.11.21 @ 16:11) >    Patient name: DARIEL MI  YOB: 1955   Age: 65 (M)   MR#: [id number]  Study Date: 3/11/2021  [facility] Nallely [facility] RDCS  Study quality: Technically Fair  Referring Physician: Laci Gama MD  Blood Pressure: 147/54 mmHg  Height: 180 cm  Weight: 93 kg  BSA: 2.1 m2  ------------------------------------------------------------------------  PROCEDURE: Transthoracic echocardiogram with 2-D, M-Mode  and complete spectral and color flow Doppler.  INDICATION: Dyspnea, unspecified (R06.00)  ------------------------------------------------------------------------  DIMENSIONS:  Dimensions:     Normal Values:  LA:     5.0 cm    2.0 - 4.0 cm  Ao:     3.7 cm    2.0 - 3.8 cm  SEPTUM: 1.0 cm    0.6 - 1.2 cm  PWT: 1.2 cm    0.6 - 1.1 cm  LVIDd:  5.5 cm    3.0 - 5.6 cm  LVIDs:  3.9 cm    1.8 - 4.0 cm  Derived Variables:  LVMI: 113 g/m2  RWT: 0.43  Fractional short: 29 %  Ejection Fraction (Teicholtz): 55 %  Peak Velocity (m/sec): AoV=2.9  ------------------------------------------------------------------------  OBSERVATIONS:  Mitral Valve: Mitral annular calcification and calcified  mitral leaflets with decreased diastolic opening.  Mild-moderate mitral regurgitation. Mean transmitral valve  gradient equals 4 mm Hg, consistent with mild mitral  stenosis.  Aortic Root: Normal aortic root.  Aortic Valve: Calcified trileaflet aortic valve with  decreased opening. The valve is at least moderately  stenotic. Peak transaortic valve gradient equals 33 mm Hg,  mean transaortic valve gradient equals 21 mm Hg. Peak left  ventricular outflow tract gradient equals 5.8 mm Hg.  Left Atrium: Severely dilated left atrium.  LA volume index  = 62 cc/m2.  Left Ventricle: Normal left ventricular systolic function.  Nosegmental wall motion abnormalities. Increased relative  wall thickness with normal left ventricular mass index,  consistent with concentric left ventricular remodeling.  (DT:246 ms).  Right Heart: Normal right atrium. Normal right ventricular  size and function. Normal tricuspid valve. Mild tricuspid  regurgitation. Normal pulmonic valve.  Pericardium/PleuraNormal pericardium with no pericardial  effusion.  Hemodynamic: Estimated right ventricular systolic pressure  equals 48 mm Hg, assuming right atrial pressure equals 10  mm Hg, consistent with mild pulmonary hypertension.  ------------------------------------------------------------------------  CONCLUSIONS:  1. Mitral annular calcification and calcified mitral  leaflets with decreased diastolic opening. Mild-moderate  mitral regurgitation. Mean transmitral valve gradient  equals 4 mm Hg, consistent with mild mitral stenosis.  2. Calcified trileaflet aortic valve with decreased  opening. The valve is at least moderately stenotic. Peak  transaortic valve gradient equals 33 mm Hg, mean  transaortic valve gradient equals 21 mm Hg.  3. Increased relative wall thickness with normal left  ventricular mass index, consistent with concentric left  ventricular remodeling.  4. Normal left ventricular systolic function. No segmental  wall motion abnormalities.  5. Normal right ventricular size and function.  6. Estimated pulmonary artery systolic pressure equals 48  mm Hg, assuming right atrial pressure equals 10  mm Hg,  consistent with mild pulmonary hypertension.  ------------------------------------------------------------------------  Confirmed on  3/11/2021 - 19:38:22 by Edy Lorenzo M.D. RPVI  ------------------------------------------------------------------------    < end of copied text >    Last Updated: 10-Mar-2021 09:47 by Darrin Gil)  Imaging Personally Reviewed:  [x] YES  [ ] NO    Consultant(s) Notes Reviewed:  [x] YES  [ ] NO    Care Discussed with Primary team/ Other Providers [x] YES  [ ] NO

## 2021-03-13 NOTE — PROGRESS NOTE ADULT - SUBJECTIVE AND OBJECTIVE BOX
Keaton Duvall MD  Interventional Cardiology / Advance Heart Failure and Cardiac Transplant Specialist  [place] Office : [address] [place] [address] [place] [address]  Tel:   [place] Office : [address] [facility] [place] [address]  Tel: [phone]  Cell : [phone]    Pt is lying in bed comfortable not in distress, no chest pains no SOB no palpitations  	  MEDICATIONS:  aspirin enteric coated 81 milliGRAM(s) Oral daily  heparin   Injectable 5000 Unit(s) SubCutaneous every 8 hours  metoprolol succinate  milliGRAM(s) Oral daily  tamsulosin 0.4 milliGRAM(s) Oral at bedtime        acetaminophen   Tablet .. 650 milliGRAM(s) Oral every 6 hours PRN      atorvastatin 40 milliGRAM(s) Oral at bedtime  dextrose 40% Gel 15 Gram(s) Oral once  dextrose 50% Injectable 25 Gram(s) IV Push once  dextrose 50% Injectable 12.5 Gram(s) IV Push once  dextrose 50% Injectable 25 Gram(s) IV Push once  glucagon  Injectable 1 milliGRAM(s) IntraMuscular once  insulin lispro (ADMELOG) corrective regimen sliding scale   SubCutaneous Before meals and at bedtime    calcium acetate 667 milliGRAM(s) Oral three times a day with meals  chlorhexidine 4% Liquid 1 Application(s) Topical daily  dextrose 5%. 1000 milliLiter(s) IV Continuous <Continuous>  dextrose 5%. 1000 milliLiter(s) IV Continuous <Continuous>  epoetin rito-epbx (RETACRIT) Injectable 6000 Unit(s) IV Push once  ferrous    sulfate 325 milliGRAM(s) Oral two times a day  folic acid 1 milliGRAM(s) Oral daily  lidocaine   Patch 1 Patch Transdermal daily PRN  mupirocin 2% Ointment 1 Application(s) Topical two times a day      PAST MEDICAL/SURGICAL HISTORY  PAST MEDICAL & SURGICAL HISTORY:  CKD (chronic kidney disease), stage IV    Vitamin D deficiency    GERD (gastroesophageal reflux disease)    Dyslipidemia    Diabetic foot ulcer    Renal cyst    Anemia    HTN (hypertension)    DM (diabetes mellitus)    S/P PICC central line placement  ~ L-Basilic vein (09/13/2018)    Ankle fracture  ORIF        SOCIAL HISTORY: Substance Use (street drugs): ( x ) never used  (  ) other:    FAMILY HISTORY:  Family history of lung disease  father (asbestosis; worked in ship[employer])    Family history of stomach cancer  father           PHYSICAL EXAM:  T(C): 36.8 (03-13-21 @ 06:31), Max: 37.4 (03-12-21 @ 12:50)  HR: 76 (03-13-21 @ 06:31) (66 - 80)  BP: 128/52 (03-13-21 @ 06:31) (95/34 - 155/60)  RR: 18 (03-13-21 @ 06:31) (17 - 27)  SpO2: 95% (03-13-21 @ 06:31) (93% - 98%)  Wt(kg): --  I&O's Summary    12 Mar 2021 07:01  -  13 Mar 2021 07:00  --------------------------------------------------------  IN: 1120 mL / OUT: 1550 mL / NET: -430 mL    13 Mar 2021 06:01  -  13 Mar 2021 11:59  --------------------------------------------------------  IN: 480 mL / OUT: 400 mL / NET: 80 mL             EYES: EOMI, PERRLA, conjunctiva and sclera clear  ENMT: No tonsillar erythema, exudates, or enlargement; Moist mucous membranes, Good dentition, No lesions  Cardiovascular: Normal S1 S2, No JVD, No murmurs, No edema  Respiratory: Lungs clear to auscultation	  Gastrointestinal:  Soft, Non-tender, + BS	  Extremities: 2+ edema                                    9.2    8.22  )-----------( 323      ( 13 Mar 2021 08:04 )             29.6     03-13    136  |  101  |  50<H>  ----------------------------<  120<H>  3.8   |  22  |  6.38<H>    Ca    9.6      13 Mar 2021 08:04  Phos  4.3     03-13  Mg     2.1     03-13      proBNP:   Lipid Profile:   HgA1c:   TSH:     Consultant(s) Notes Reviewed:  [x ] YES  [ ] NO    Care Discussed with Consultants/Other Providers [ x] YES  [ ] NO    Imaging Personally Reviewed independently:  [x] YES  [ ] NO    All labs, radiologic studies, vitals, orders and medications list reviewed. Patient is seen and examined at bedside. Case discussed with medical team.

## 2021-03-13 NOTE — PROGRESS NOTE ADULT - SUBJECTIVE AND OBJECTIVE BOX
Laci Gama MD (Internal Medicine/Nephrology progress note)  [address], [facility],  [address],  [facility]  TEl: [phone]  Cell: [phone]    Patient is a 65y Male seen and evaluated at bedside. Vital signs, laboratory data, imaging studies, consult notes reviewed done within past 24 hours. Overnight events noted. Patient lying in bed in no distress complains of mild SOB and bilateral leg edema and nasal congestion. No new labs available    Allergies    No Known Allergies    Intolerances        ROS:  CONSTITUTIONAL: No fever or chills.  HEENT: No headache or visual disturbances.  RESPIRATORY: Mild SOB but alayna, hemoptysis or wheezing  CARDIOVASCULAR: Bilateral leg edema and dyspnea but denies chest pain  GASTROINTESTINAL: No abdominal pain, nausea, vomiting, diarrhea, hematemesis or blood per rectum.  GENITOURINARY: Decrease urine output  NEUROLOGICAL: No headache, focal limb weakness, tingling or numbness or seizure like activity  SKIN: No skin rash or lesion  MUSCULOSKELETAL: Bilateral pitting pedal edema++nt    VITALS:    T(C): 36.8 (03-13-21 @ 06:31), Max: 37.4 (03-12-21 @ 12:50)  HR: 76 (03-13-21 @ 06:31) (66 - 87)  BP: 128/52 (03-13-21 @ 06:31) (95/34 - 155/60)  RR: 18 (03-13-21 @ 06:31) (17 - 27)  SpO2: 95% (03-13-21 @ 06:31) (93% - 100%)  CAPILLARY BLOOD GLUCOSE      POCT Blood Glucose.: 190 mg/dL (12 Mar 2021 22:14)  POCT Blood Glucose.: 125 mg/dL (12 Mar 2021 17:54)  POCT Blood Glucose.: 97 mg/dL (12 Mar 2021 16:23)  POCT Blood Glucose.: 102 mg/dL (12 Mar 2021 13:05)  POCT Blood Glucose.: 105 mg/dL (12 Mar 2021 09:02)      03-12-21 @ 07:01  -  03-13-21 @ 07:00  --------------------------------------------------------  IN: 1120 mL / OUT: 1550 mL / NET: -430 mL      MEDICATIONS  (STANDING):  aspirin enteric coated 81 milliGRAM(s) Oral daily  atorvastatin 40 milliGRAM(s) Oral at bedtime  calcium acetate 667 milliGRAM(s) Oral three times a day with meals  chlorhexidine 4% Liquid 1 Application(s) Topical daily  dextrose 40% Gel 15 Gram(s) Oral once  dextrose 5%. 1000 milliLiter(s) (50 mL/Hr) IV Continuous <Continuous>  dextrose 5%. 1000 milliLiter(s) (100 mL/Hr) IV Continuous <Continuous>  dextrose 50% Injectable 25 Gram(s) IV Push once  dextrose 50% Injectable 12.5 Gram(s) IV Push once  dextrose 50% Injectable 25 Gram(s) IV Push once  epoetin rito-epbx (RETACRIT) Injectable 6000 Unit(s) IV Push once  ferrous    sulfate 325 milliGRAM(s) Oral two times a day  folic acid 1 milliGRAM(s) Oral daily  glucagon  Injectable 1 milliGRAM(s) IntraMuscular once  heparin   Injectable 5000 Unit(s) SubCutaneous every 8 hours  insulin lispro (ADMELOG) corrective regimen sliding scale   SubCutaneous Before meals and at bedtime  iron sucrose IVPB 100 milliGRAM(s) IV Intermittent once  metoprolol succinate  milliGRAM(s) Oral daily  mupirocin 2% Ointment 1 Application(s) Topical two times a day  tamsulosin 0.4 milliGRAM(s) Oral at bedtime    MEDICATIONS  (PRN):  acetaminophen   Tablet .. 650 milliGRAM(s) Oral every 6 hours PRN Mild Pain (1 - 3), Moderate Pain (4 - 6)  lidocaine   Patch 1 Patch Transdermal daily PRN Neck pain      PHYSICAL EXAM:  GENERAL: Alert, awake and oriented x3 in no distress  HEENT: SPIKE, EOMI, neck supple, no JVP, no carotid bruit, oral mucosa moist and pink.  CHEST/LUNG: Bilateral decreased breath sounds, bibasilar rales and rhonchi, no wheezing  HEART: Regular rate and rhythm, KENNA II/VI at LPSB, no gallops, no rub   ABDOMEN: Soft, nontender, non distended, bowel sounds present, no palpable organomegaly  : No flank or supra pubic tenderness.  EXTREMITIES: Bilateral pitting pedal edema++nt  Neurology: AAOx3, no focal neurological deficit  SKIN: No rash or skin lesion  Musculoskeletal: No joint deformity      Vascular ACCESS: Right IJ Shiley catheter present.    LABS:                        8.5    7.39  )-----------( 331      ( 12 Mar 2021 08:52 )             27.2     03-12    140  |  101  |  46<H>  ----------------------------<  83  3.8   |  23  |  5.90<H>    Ca    9.5      12 Mar 2021 08:52  Phos  3.7     03-12  Mg     1.9     03-12                  RADIOLOGY & ADDITIONAL STUDIES:  None  Imaging Personally Reviewed:  [x] YES  [ ] NO    Consultant(s) Notes Reviewed:  [x] YES  [ ] NO    Care Discussed with Primary team/ Other Providers [x] YES  [ ] NO

## 2021-03-13 NOTE — PROGRESS NOTE ADULT - PROBLEM SELECTOR PLAN 1
Patient with advanced CKD stage V now with fluid overload started on RRT/HD  - Strict I/o  - S/p HD treatment x2. Tolerated procedure well.  - Echocardiogram results reviewed   - Possible tunnel HD Catheter placement on 3/15  - Continue to hold Plavix  - Will schedule for HD today as per ordered for UF/Clearances

## 2021-03-14 NOTE — PROGRESS NOTE ADULT - SUBJECTIVE AND OBJECTIVE BOX
Laci Gama MD (Internal Medicine/Nephrology progress note)  [address], [facility],  [address],  [facility]  TEl: [phone]  Cell: [phone]    Patient is a 65y Male seen and evaluated at bedside. Vital signs, laboratory data, imaging studies, consult notes reviewed done within past 24 hours. Overnight events noted. Patient lying in bed in no distress offers no complains feels better. Still mild dyspnea and persistent leg edema.    Allergies    No Known Allergies    Intolerances        ROS:  CONSTITUTIONAL: No fever or chills.  HEENT: No headache or visual disturbances.  RESPIRATORY: Mild SOB but denies cough, hemoptysis or wheezing  CARDIOVASCULAR: Bilateral leg edema but denies any chest pain, SOB, palpitations, dizziness  GASTROINTESTINAL: No abdominal pain, nausea, vomiting, diarrhea, hematemesis or blood per rectum.  GENITOURINARY: Decreased urine output  NEUROLOGICAL: No headache, focal limb weakness, tingling or numbness  SKIN: No skin rash or lesion  MUSCULOSKELETAL: Bilateral leg edema  VITALS:    T(C): 36.8 (03-14-21 @ 05:34), Max: 36.8 (03-14-21 @ 05:34)  HR: 75 (03-14-21 @ 05:34) (66 - 80)  BP: 138/55 (03-14-21 @ 05:34) (112/51 - 138/55)  RR: 18 (03-14-21 @ 05:34) (18 - 18)  SpO2: 100% (03-14-21 @ 05:34) (96% - 100%)  CAPILLARY BLOOD GLUCOSE      POCT Blood Glucose.: 171 mg/dL (14 Mar 2021 12:07)  POCT Blood Glucose.: 131 mg/dL (14 Mar 2021 08:41)  POCT Blood Glucose.: 221 mg/dL (13 Mar 2021 21:46)  POCT Blood Glucose.: 133 mg/dL (13 Mar 2021 18:04)  POCT Blood Glucose.: 183 mg/dL (13 Mar 2021 12:40)      03-13-21 @ 06:01  -  03-14-21 @ 07:00  --------------------------------------------------------  IN: 1120 mL / OUT: 3300 mL / NET: -2180 mL      MEDICATIONS  (STANDING):  aspirin enteric coated 81 milliGRAM(s) Oral daily  atorvastatin 40 milliGRAM(s) Oral at bedtime  calcium acetate 667 milliGRAM(s) Oral three times a day with meals  chlorhexidine 4% Liquid 1 Application(s) Topical daily  dextrose 40% Gel 15 Gram(s) Oral once  dextrose 5%. 1000 milliLiter(s) (50 mL/Hr) IV Continuous <Continuous>  dextrose 5%. 1000 milliLiter(s) (100 mL/Hr) IV Continuous <Continuous>  dextrose 50% Injectable 25 Gram(s) IV Push once  dextrose 50% Injectable 12.5 Gram(s) IV Push once  dextrose 50% Injectable 25 Gram(s) IV Push once  ferrous    sulfate 325 milliGRAM(s) Oral two times a day  folic acid 1 milliGRAM(s) Oral daily  glucagon  Injectable 1 milliGRAM(s) IntraMuscular once  heparin   Injectable 5000 Unit(s) SubCutaneous every 8 hours  insulin lispro (ADMELOG) corrective regimen sliding scale   SubCutaneous Before meals and at bedtime  metoprolol succinate  milliGRAM(s) Oral daily  mupirocin 2% Ointment 1 Application(s) Topical two times a day  tamsulosin 0.4 milliGRAM(s) Oral at bedtime    MEDICATIONS  (PRN):  acetaminophen   Tablet .. 650 milliGRAM(s) Oral every 6 hours PRN Mild Pain (1 - 3), Moderate Pain (4 - 6)  lidocaine   Patch 1 Patch Transdermal daily PRN Neck pain      PHYSICAL EXAM:  GENERAL: Alert, awake and oriented x3 in no distress  HEENT: SPIKE, EOMI, neck supple, no JVP, no carotid bruit, oral mucosa moist and pink.  CHEST/LUNG: Bilateral decreased breath sounds, bibasilar minimal rales and rhonchi  HEART: Regular rate and rhythm, KENNA II/VI at LPSB, no gallops, no rub   ABDOMEN: Soft, nontender, non distended, bowel sounds present, no palpable organomegaly  : No flank or supra pubic tenderness.  EXTREMITIES: Peripheral pulses are palpable, Leg edema++nt  Neurology: AAOx3, no focal neurological deficit  SKIN: No rash or skin lesion  Musculoskeletal: Foot ulcer and bilateral leg edema    Vascular ACCESS:     LABS:                        7.7    7.37  )-----------( 325      ( 14 Mar 2021 07:44 )             25.3     03-14    137  |  100  |  34<H>  ----------------------------<  134<H>  3.5   |  25  |  4.80<H>    Ca    8.9      14 Mar 2021 07:44  Phos  3.3     03-14  Mg     1.9     03-14                  RADIOLOGY & ADDITIONAL STUDIES:    Imaging Personally Reviewed:  [x] YES  [ ] NO    Consultant(s) Notes Reviewed:  [x] YES  [ ] NO    Care Discussed with Primary team/ Other Providers [x] YES  [ ] NO

## 2021-03-14 NOTE — CHART NOTE - NSCHARTNOTEFT_GEN_A_CORE
Called by RN patient with melanotic stool with visible blood. As per patient 3rd episode today. Patient s/p EGD/ Colonoscopy on 3/12. EGD w/ gastritis s/p bx and Colonoscopy s/p polyps removal with clip. D/w Dr. Silva GI Attg. Will place patient on PPI gtt, clear liquid diet, NPO after MN. Will check stat cbc and T&S now. Patient currently hemodynamically stable. D/w Attg Dr. Gama if rpt cbc <7 plan to transfuse. Will continue to monitor closely.

## 2021-03-14 NOTE — PROGRESS NOTE ADULT - SUBJECTIVE AND OBJECTIVE BOX
INTERVAL HPI/OVERNIGHT EVENTS:  No new overnight event.  No N/V/D.  Tolerating diet.   MEDICATIONS  (STANDING):  atorvastatin 40 milliGRAM(s) Oral at bedtime  calcium acetate 667 milliGRAM(s) Oral three times a day with meals  chlorhexidine 4% Liquid 1 Application(s) Topical daily  dextrose 40% Gel 15 Gram(s) Oral once  dextrose 5%. 1000 milliLiter(s) (50 mL/Hr) IV Continuous <Continuous>  dextrose 5%. 1000 milliLiter(s) (100 mL/Hr) IV Continuous <Continuous>  dextrose 50% Injectable 25 Gram(s) IV Push once  dextrose 50% Injectable 12.5 Gram(s) IV Push once  dextrose 50% Injectable 25 Gram(s) IV Push once  ferrous    sulfate 325 milliGRAM(s) Oral two times a day  folic acid 1 milliGRAM(s) Oral daily  glucagon  Injectable 1 milliGRAM(s) IntraMuscular once  insulin lispro (ADMELOG) corrective regimen sliding scale   SubCutaneous Before meals and at bedtime  metoprolol succinate  milliGRAM(s) Oral daily  mupirocin 2% Ointment 1 Application(s) Topical two times a day  pantoprazole Infusion 8 mG/Hr (10 mL/Hr) IV Continuous <Continuous>  tamsulosin 0.4 milliGRAM(s) Oral at bedtime    MEDICATIONS  (PRN):  acetaminophen   Tablet .. 650 milliGRAM(s) Oral every 6 hours PRN Mild Pain (1 - 3), Moderate Pain (4 - 6)  lidocaine   Patch 1 Patch Transdermal daily PRN Neck pain      Allergies    No Known Allergies    Intolerances        Review of Systems:    General:  No wt loss, fevers, chills, night sweats,fatigue,   Eyes:  Good vision, no reported pain  ENT:  No sore throat, pain, runny nose, dysphagia  CV:  No pain, palpitatioins, hypo/hypertension  Resp:  No dyspnea, cough, tachypnea, wheezing  GI:  No pain, No nausea, No vomiting, No diarrhea, No constipatiion, No weight loss, No fever, No pruritis, No rectal bleeding, No tarry stools, No dysphagia,  :  No pain, bleeding, incontinence, nocturia  Muscle:  No pain, weakness  Neuro:  No weakness, tingling, memory problems  Psych:  No fatigue, insomnia, mood problems, depression  Endocrine:  No polyuria, polydypsia, cold/heat intolerance  Heme:  No petechiae, ecchymosis, easy bruisability  Skin:  No rash, tattoos, scars, edema      Vital Signs Last 24 Hrs  T(C): 37.2 (14 Mar 2021 17:24), Max: 37.2 (14 Mar 2021 17:24)  T(F): 99 (14 Mar 2021 17:24), Max: 99 (14 Mar 2021 17:24)  HR: 74 (14 Mar 2021 17:24) (74 - 75)  BP: 117/31 (14 Mar 2021 17:24) (112/51 - 138/55)  BP(mean): --  RR: 18 (14 Mar 2021 17:24) (18 - 18)  SpO2: 100% (14 Mar 2021 17:24) (99% - 100%)    PHYSICAL EXAM:    Constitutional: NAD, well-developed  HEENT: EOMI, throat clear  Neck: No LAD, supple  Respiratory: CTA and P  Cardiovascular: S1 and S2, RRR, no M  Gastrointestinal: BS+, soft, NT/ND, neg HSM,  Extremities: No peripheral edema, neg clubing, cyanosis  Vascular: 2+ peripheral pulses  Neurological: A/O x 3, no focal deficits  Psychiatric: Normal mood, normal affect  Skin: No rashes      LABS:                        7.3    7.04  )-----------( 313      ( 14 Mar 2021 17:44 )             24.2     03-14    137  |  100  |  34<H>  ----------------------------<  134<H>  3.5   |  25  |  4.80<H>    Ca    8.9      14 Mar 2021 07:44  Phos  3.3     03-14  Mg     1.9     03-14            RADIOLOGY & ADDITIONAL TESTS:

## 2021-03-14 NOTE — PROGRESS NOTE ADULT - PROBLEM SELECTOR PLAN 1
Patient with advanced CKD stage V ith fluid overload started on RRT/HD  - Strict I/o  - S/p HD treatment on 3/13 with 2.5 L fluid removal  - Echocardiogram results reviewed   - Possible tunnel HD Catheter placement on 3/15  - Continue to hold Plavix  - Defer HD  - Next anticipated IHD on 3/15 after tunnel HD catheter placement

## 2021-03-14 NOTE — PROGRESS NOTE ADULT - SUBJECTIVE AND OBJECTIVE BOX
Keaton Duvall MD  Interventional Cardiology / Advance Heart Failure and Cardiac Transplant Specialist  [place] Office : [address] [place] [address] [place] [address]  Tel:   [place] Office : [address] [facility] [place] [address]  Tel: [phone]  Cell : [phone]    Pt is lying in bed comfortable not in distress, no chest pains no SOB no palpitations  	  MEDICATIONS:  aspirin enteric coated 81 milliGRAM(s) Oral daily  heparin   Injectable 5000 Unit(s) SubCutaneous every 8 hours  metoprolol succinate  milliGRAM(s) Oral daily  tamsulosin 0.4 milliGRAM(s) Oral at bedtime        acetaminophen   Tablet .. 650 milliGRAM(s) Oral every 6 hours PRN      atorvastatin 40 milliGRAM(s) Oral at bedtime  dextrose 40% Gel 15 Gram(s) Oral once  dextrose 50% Injectable 25 Gram(s) IV Push once  dextrose 50% Injectable 12.5 Gram(s) IV Push once  dextrose 50% Injectable 25 Gram(s) IV Push once  glucagon  Injectable 1 milliGRAM(s) IntraMuscular once  insulin lispro (ADMELOG) corrective regimen sliding scale   SubCutaneous Before meals and at bedtime    calcium acetate 667 milliGRAM(s) Oral three times a day with meals  chlorhexidine 4% Liquid 1 Application(s) Topical daily  dextrose 5%. 1000 milliLiter(s) IV Continuous <Continuous>  dextrose 5%. 1000 milliLiter(s) IV Continuous <Continuous>  ferrous    sulfate 325 milliGRAM(s) Oral two times a day  folic acid 1 milliGRAM(s) Oral daily  lidocaine   Patch 1 Patch Transdermal daily PRN  mupirocin 2% Ointment 1 Application(s) Topical two times a day      PAST MEDICAL/SURGICAL HISTORY  PAST MEDICAL & SURGICAL HISTORY:  CKD (chronic kidney disease), stage IV    Vitamin D deficiency    GERD (gastroesophageal reflux disease)    Dyslipidemia    Diabetic foot ulcer    Renal cyst    Anemia    HTN (hypertension)    DM (diabetes mellitus)    S/P PICC central line placement  ~ L-Basilic vein (09/13/2018)    Ankle fracture  ORIF        SOCIAL HISTORY: Substance Use (street drugs): ( x ) never used  (  ) other:    FAMILY HISTORY:  Family history of lung disease  father (asbestosis; worked in [employer])    Family history of stomach cancer  father     PHYSICAL EXAM:  T(C): 36.8 (03-14-21 @ 05:34), Max: 36.8 (03-14-21 @ 05:34)  HR: 75 (03-14-21 @ 05:34) (66 - 80)  BP: 138/55 (03-14-21 @ 05:34) (112/51 - 138/55)  RR: 18 (03-14-21 @ 05:34) (18 - 18)  SpO2: 100% (03-14-21 @ 05:34) (96% - 100%)  Wt(kg): --  I&O's Summary    13 Mar 2021 06:01  -  14 Mar 2021 07:00  --------------------------------------------------------  IN: 1120 mL / OUT: 3300 mL / NET: -2180 mL             EYES: EOMI, PERRLA, conjunctiva and sclera clear  ENMT: No tonsillar erythema, exudates, or enlargement; Moist mucous membranes, Good dentition, No lesions  Cardiovascular: Normal S1 S2, No JVD, No murmurs, No edema  Respiratory: Lungs clear to auscultation	  Gastrointestinal:  Soft, Non-tender, + BS	  Extremities: 2+ edema                                    7.7    7.37  )-----------( 325      ( 14 Mar 2021 07:44 )             25.3     03-14    137  |  100  |  34<H>  ----------------------------<  134<H>  3.5   |  25  |  4.80<H>    Ca    8.9      14 Mar 2021 07:44  Phos  3.3     03-14  Mg     1.9     03-14      proBNP:   Lipid Profile:   HgA1c:   TSH:     Consultant(s) Notes Reviewed:  [x ] YES  [ ] NO    Care Discussed with Consultants/Other Providers [ x] YES  [ ] NO    Imaging Personally Reviewed independently:  [x] YES  [ ] NO    All labs, radiologic studies, vitals, orders and medications list reviewed. Patient is seen and examined at bedside. Case discussed with medical team.

## 2021-03-15 NOTE — PROGRESS NOTE ADULT - ATTENDING COMMENTS
Patient is at an intermediate risk for cardio-pulmonary complication for schedule low risk vascular surgery/ IR procedure for Tunneled catheter
Patient is medically optimized for possible plan procedure for AVF and Catheter placement pending cardiac testing and cardiac clearances.
Patient is at an intermediate risk for cardio-pulmonary complication for schedule low risk vascular surgery/ IR procedure for Tunneled catheter
Patient is at an intermediate risk for cardio-pulmonary complication for schedule low risk vascular surgery/ IR procedure for Tunneled catheter
Patient is at an intermediate risk for cardio-pulmonary complication for schedule low risk vascular surgery and GI procedure for AVF creation as well as EGD/Colonoscopy.  Await cardiology recs prior to procedure. You can proceed with surgery. Risks and benefits discussed with patient.

## 2021-03-15 NOTE — PROGRESS NOTE ADULT - PROBLEM SELECTOR PLAN 1
Patient with advanced CKD stage V with fluid overload now on RRT/HD  - Strict I/o  - S/p HD treatment on 3/13 with 2.5 L fluid removal  - Echocardiogram results reviewed with moderate AS  - Possible tunnel HD Catheter placement today  - Continue to hold Plavix  - Plan for HD after tunnel HD catheter placement  - Possible AVF creation as out patient.

## 2021-03-15 NOTE — PROGRESS NOTE ADULT - SUBJECTIVE AND OBJECTIVE BOX
Keaton Duvall MD  Interventional Cardiology / Endovascular Specialist  [place] Office : [address] [place] [address] [place] [address]  Tel:   [place] Office : [address] [facility] [place] [address]  Tel: [phone]  Cell : [phone]    Pt is lying in bed comfortable not in distress, no chest pains no SOB no palpitations    MEDICATIONS:  metoprolol succinate  milliGRAM(s) Oral daily  tamsulosin 0.4 milliGRAM(s) Oral at bedtime  acetaminophen   Tablet .. 650 milliGRAM(s) Oral every 6 hours PRN  pantoprazole    Tablet 40 milliGRAM(s) Oral two times a day  atorvastatin 40 milliGRAM(s) Oral at bedtime  dextrose 40% Gel 15 Gram(s) Oral once  dextrose 50% Injectable 25 Gram(s) IV Push once  dextrose 50% Injectable 12.5 Gram(s) IV Push once  dextrose 50% Injectable 25 Gram(s) IV Push once  glucagon  Injectable 1 milliGRAM(s) IntraMuscular once  insulin lispro (ADMELOG) corrective regimen sliding scale   SubCutaneous Before meals and at bedtime  calcium acetate 667 milliGRAM(s) Oral three times a day with meals  chlorhexidine 4% Liquid 1 Application(s) Topical daily  dextrose 5%. 1000 milliLiter(s) IV Continuous <Continuous>  dextrose 5%. 1000 milliLiter(s) IV Continuous <Continuous>  ferrous    sulfate 325 milliGRAM(s) Oral two times a day  folic acid 1 milliGRAM(s) Oral daily  lidocaine   Patch 1 Patch Transdermal daily PRN  mupirocin 2% Ointment 1 Application(s) Topical two times a day      PAST MEDICAL/SURGICAL HISTORY  PAST MEDICAL & SURGICAL HISTORY:  CKD (chronic kidney disease), stage IV    Vitamin D deficiency    GERD (gastroesophageal reflux disease)    Dyslipidemia    Diabetic foot ulcer    Renal cyst    Anemia    HTN (hypertension)    DM (diabetes mellitus)    S/P PICC central line placement  ~ L-Basilic vein (09/13/2018)    Ankle fracture  ORIF        SOCIAL HISTORY: Substance Use (street drugs): ( x ) never used  (  ) other:    FAMILY HISTORY:  Family history of lung disease  father (asbestosis; worked in [employer])    Family history of stomach cancer  father        REVIEW OF SYSTEMS:  CONSTITUTIONAL: No fever, weight loss, or fatigue  EYES: No eye pain, visual disturbances, or discharge  ENMT:  No difficulty hearing, tinnitus, vertigo; No sinus or throat pain  BREASTS: No pain, masses, or nipple discharge  GASTROINTESTINAL: No abdominal or epigastric pain. No nausea, vomiting, or hematemesis; No diarrhea or constipation. No melena or hematochezia.  GENITOURINARY: No dysuria, frequency, hematuria, or incontinence  NEUROLOGICAL: No headaches, memory loss, loss of strength, numbness, or tremors  ENDOCRINE: No heat or cold intolerance; No hair loss  MUSCULOSKELETAL: No joint pain or swelling; No muscle, back, or extremity pain  PSYCHIATRIC: No depression, anxiety, mood swings, or difficulty sleeping  HEME/LYMPH: No easy bruising, or bleeding gums  All others negative    PHYSICAL EXAM:  T(C): 36 (03-15-21 @ 14:20), Max: 37.2 (03-14-21 @ 17:24)  HR: 70 (03-15-21 @ 14:50) (70 - 78)  BP: 141/35 (03-15-21 @ 14:50) (103/42 - 141/35)  RR: 22 (03-15-21 @ 14:50) (17 - 24)  SpO2: 93% (03-15-21 @ 14:50) (93% - 100%)  Wt(kg): --  I&O's Summary    14 Mar 2021 07:01  -  15 Mar 2021 07:00  --------------------------------------------------------  IN: 100 mL / OUT: 600 mL / NET: -500 mL    15 Mar 2021 07:01  -  15 Mar 2021 15:47  --------------------------------------------------------  IN: 0 mL / OUT: 300 mL / NET: -300 mL      Height (cm): 180 (03-15 @ 12:48)  Weight (kg): 100.1 (03-15 @ 12:48)  BMI (kg/m2): 30.9 (03-15 @ 12:48)  BSA (m2): 2.2 (03-15 @ 12:48)           EYES: EOMI, PERRLA, conjunctiva and sclera clear  ENMT: No tonsillar erythema, exudates, or enlargement; Moist mucous membranes, Good dentition, No lesions  Cardiovascular: Normal S1 S2, No JVD, No murmurs, No edema  Respiratory: Lungs clear to auscultation	  Gastrointestinal:  Soft, Non-tender, + BS	  Extremities: 2+ edema                            7.1    7.41  )-----------( 272      ( 15 Mar 2021 06:37 )             22.7     03-15    136  |  102  |  46<H>  ----------------------------<  123<H>  3.6   |  22  |  5.50<H>    Ca    9.0      15 Mar 2021 06:37  Phos  3.7     03-15  Mg     1.8     03-15      proBNP:   Lipid Profile:   HgA1c:   TSH:     Consultant(s) Notes Reviewed:  [x ] YES  [ ] NO    Care Discussed with Consultants/Other Providers [ x] YES  [ ] NO    Imaging Personally Reviewed independently:  [x] YES  [ ] NO    All labs, radiologic studies, vitals, orders and medications list reviewed. Patient is seen and examined at bedside. Case discussed with medical team.

## 2021-03-15 NOTE — CHART NOTE - NSCHARTNOTEFT_GEN_A_CORE
Patient Age: 65  Patient Gender: male  Procedure (including site / side if known): permacath   Diagnosis / Indication: for HD   Interventional Radiology Attending Physician: Dr Hill   Ordering Attending Physician: Dr Gama   Pertinent medical history: cad, pci, dm, htn, chf, ckd, hld,   Pertinent labs: yes  Patient  aware Yes

## 2021-03-15 NOTE — CHART NOTE - NSCHARTNOTEFT_GEN_A_CORE
65M w/CAD s/p PCI w/bare metal stent, T2DM, HTN, CHF, hx of osteomyelitis (2019), anemia, CKDIV, HLD and GERD, CAD s/p 1 stent p/w 2 weeks of worsening intermittent non-exertional chest pain and dyspnea, found to have fluid overload in the setting of ABEL on CKD now with HD requirements. Vascular surgery was consulted for AVF planning. Patient is pending permacath placement.    Plan   - please place patient on LUE arm precautions - no venipuncture / nibp / blood draws.   - please document medicine and cardiology optimization for OR for possible AVF placement this week, final timing to be determined    C team [id number]

## 2021-03-15 NOTE — PROGRESS NOTE ADULT - SUBJECTIVE AND OBJECTIVE BOX
Laci Gama MD (Internal medicine/Nephrology progress note)  [address] [facility],  [address],  [facility]  TEl: [phone]  Cell: [phone]    Patient is a 65y Male seen and evaluated at bedside. Vital signs, laboratory data, imaging studies, consult notes reviewed done within past 24 hours. Overnight events noted. Patient lying in bed in no distress offers no complains. Mild SOb and melanotic stool yesterday with decreased hgb to 7.3 overnight.     Allergies    No Known Allergies    Intolerances        ROS:  CONSTITUTIONAL: No fever or chills.  HEENT: No headache or visual disturbances.  RESPIRATORY: Mild SOB but denies cough, hemoptysis or wheezing  CARDIOVASCULAR: DYspnea and bilateral leg edema  GASTROINTESTINAL:  Dark melanotic stool overnight yesterday, No abdominal pain  GENITOURINARY: Decreased urine output  NEUROLOGICAL: No headache, focal limb weakness, tingling or numbness   SKIN: No skin rash or lesion  MUSCULOSKELETAL: Bilateral leg edema    VITALS:    T(C): 36.3 (03-15-21 @ 04:59), Max: 37.2 (03-14-21 @ 17:24)  HR: 78 (03-15-21 @ 04:59) (70 - 78)  BP: 138/43 (03-15-21 @ 04:59) (117/31 - 138/43)  RR: 18 (03-15-21 @ 04:59) (18 - 18)  SpO2: 98% (03-15-21 @ 04:59) (98% - 100%)  CAPILLARY BLOOD GLUCOSE      POCT Blood Glucose.: 135 mg/dL (15 Mar 2021 08:25)  POCT Blood Glucose.: 118 mg/dL (15 Mar 2021 05:29)  POCT Blood Glucose.: 127 mg/dL (14 Mar 2021 21:04)  POCT Blood Glucose.: 149 mg/dL (14 Mar 2021 17:09)  POCT Blood Glucose.: 171 mg/dL (14 Mar 2021 12:07)  POCT Blood Glucose.: 131 mg/dL (14 Mar 2021 08:41)      03-14-21 @ 07:01  -  03-15-21 @ 07:00  --------------------------------------------------------  IN: 100 mL / OUT: 600 mL / NET: -500 mL      MEDICATIONS  (STANDING):  atorvastatin 40 milliGRAM(s) Oral at bedtime  calcium acetate 667 milliGRAM(s) Oral three times a day with meals  chlorhexidine 4% Liquid 1 Application(s) Topical daily  dextrose 40% Gel 15 Gram(s) Oral once  dextrose 5%. 1000 milliLiter(s) (50 mL/Hr) IV Continuous <Continuous>  dextrose 5%. 1000 milliLiter(s) (100 mL/Hr) IV Continuous <Continuous>  dextrose 50% Injectable 25 Gram(s) IV Push once  dextrose 50% Injectable 12.5 Gram(s) IV Push once  dextrose 50% Injectable 25 Gram(s) IV Push once  ferrous    sulfate 325 milliGRAM(s) Oral two times a day  folic acid 1 milliGRAM(s) Oral daily  glucagon  Injectable 1 milliGRAM(s) IntraMuscular once  insulin lispro (ADMELOG) corrective regimen sliding scale   SubCutaneous Before meals and at bedtime  metoprolol succinate  milliGRAM(s) Oral daily  mupirocin 2% Ointment 1 Application(s) Topical two times a day  pantoprazole Infusion 8 mG/Hr (10 mL/Hr) IV Continuous <Continuous>  tamsulosin 0.4 milliGRAM(s) Oral at bedtime    MEDICATIONS  (PRN):  acetaminophen   Tablet .. 650 milliGRAM(s) Oral every 6 hours PRN Mild Pain (1 - 3), Moderate Pain (4 - 6)  lidocaine   Patch 1 Patch Transdermal daily PRN Neck pain      PHYSICAL EXAM:  GENERAL: Alert, awake and oriented x3 in no distress  HEENT: SPIKE, EOMI, neck supple, no JVP, no carotid bruit, oral mucosa moist and pink.  CHEST/LUNG: Bilateral decreased breath sounds, bibasilar minimal rales  HEART: Regular rate and rhythm, KENNA II/VI at LPSB, no gallops, no rub   ABDOMEN: Soft, nontender, non distended, bowel sounds present  : No flank or supra pubic tenderness.  EXTREMITIES: Peripheral pulses are palpable, Bilateral pitting pedal edema  Neurology: AAOx3, no focal neurological deficit  SKIN: No rash or skin lesion  Musculoskeletal: Bilateral leg edema      Vascular ACCESS:  RIght IJ Shiley catheter    LABS:                        7.3    7.04  )-----------( 313      ( 14 Mar 2021 17:44 )             24.2     03-15    136  |  102  |  46<H>  ----------------------------<  123<H>  3.6   |  22  |  5.50<H>    Ca    9.0      15 Mar 2021 06:37  Phos  3.7     03-15  Mg     1.8     03-15        PT/INR - ( 15 Mar 2021 06:37 )   PT: 12.9 sec;   INR: 1.13 ratio         PTT - ( 15 Mar 2021 06:37 )  PTT:34.5 sec          RADIOLOGY & ADDITIONAL STUDIES:  None  Imaging Personally Reviewed:  [x] YES  [ ] NO    Consultant(s) Notes Reviewed:  [x] YES  [ ] NO    Care Discussed with Primary team/ Other Providers [x] YES  [ ] NO

## 2021-03-16 NOTE — DIETITIAN INITIAL EVALUATION ADULT. - PERTINENT MEDS FT
MEDICATIONS  (STANDING):  aspirin enteric coated 81 milliGRAM(s) Oral daily  atorvastatin 40 milliGRAM(s) Oral at bedtime  calcium acetate 667 milliGRAM(s) Oral three times a day with meals  chlorhexidine 4% Liquid 1 Application(s) Topical daily  clopidogrel Tablet 75 milliGRAM(s) Oral daily  dextrose 40% Gel 15 Gram(s) Oral once  dextrose 5%. 1000 milliLiter(s) (50 mL/Hr) IV Continuous <Continuous>  dextrose 5%. 1000 milliLiter(s) (100 mL/Hr) IV Continuous <Continuous>  dextrose 50% Injectable 25 Gram(s) IV Push once  dextrose 50% Injectable 12.5 Gram(s) IV Push once  dextrose 50% Injectable 25 Gram(s) IV Push once  ferrous    sulfate 325 milliGRAM(s) Oral two times a day  folic acid 1 milliGRAM(s) Oral daily  glucagon  Injectable 1 milliGRAM(s) IntraMuscular once  insulin lispro (ADMELOG) corrective regimen sliding scale   SubCutaneous Before meals and at bedtime  metoprolol succinate  milliGRAM(s) Oral daily  mupirocin 2% Ointment 1 Application(s) Topical two times a day  pantoprazole    Tablet 40 milliGRAM(s) Oral two times a day  tamsulosin 0.4 milliGRAM(s) Oral at bedtime

## 2021-03-16 NOTE — CHART NOTE - NSCHARTNOTEFT_GEN_A_CORE
discussed with gi and dr balderas will restart aspirin and plavix and monitor for bleeding.  possible d/c after hd karl discussed with gi and dr balderas will restart aspirin and plavix and monitor for bleeding.  possible d/c after hd karl    pt would like covid vaccine - cleared by dr balderas - order placed and paperwork in front of chart

## 2021-03-16 NOTE — DIETITIAN INITIAL EVALUATION ADULT. - OTHER INFO
PO >75% of meals. Denies any GI issues (nausea/vomiting/diarrhea/constipation.) Denies any chewing or swallowing difficulties at this time. NKFA. -230 pounds. Pt with weight loss on HD due to fluids loss.    Diet education provided on HD: potassium and phosphorous content of foods, increased needs for protein. Sample meals discussed. Pt alert and engaged in education.

## 2021-03-16 NOTE — CHART NOTE - NSCHARTNOTEFT_GEN_A_CORE
Spoke with Dr. Rivera and no OR time available for fistula tomorrow. Patient can follow up outpatient for AVF creation 1-2 weeks after discharge with Dr. Rivera in his office.    [id number]

## 2021-03-16 NOTE — PROGRESS NOTE ADULT - SUBJECTIVE AND OBJECTIVE BOX
Keaton Duvall MD  Interventional Cardiology / Endovascular Specialist  [place] Office : [address] [place] [address] [place] [address]  Tel:   [place] Office : [address] [facility] [place] [address]  Tel: [phone]  Cell : [phone]    Subjective/Overnight events: Patient sitting up at edge of bed. No acute distress. currently denies chest pain, SOB or palpitations  	  MEDICATIONS:  metoprolol succinate  milliGRAM(s) Oral daily  tamsulosin 0.4 milliGRAM(s) Oral at bedtime        acetaminophen   Tablet .. 650 milliGRAM(s) Oral every 6 hours PRN    pantoprazole    Tablet 40 milliGRAM(s) Oral two times a day    atorvastatin 40 milliGRAM(s) Oral at bedtime  dextrose 40% Gel 15 Gram(s) Oral once  dextrose 50% Injectable 25 Gram(s) IV Push once  dextrose 50% Injectable 12.5 Gram(s) IV Push once  dextrose 50% Injectable 25 Gram(s) IV Push once  glucagon  Injectable 1 milliGRAM(s) IntraMuscular once  insulin lispro (ADMELOG) corrective regimen sliding scale   SubCutaneous Before meals and at bedtime    calcium acetate 667 milliGRAM(s) Oral three times a day with meals  chlorhexidine 4% Liquid 1 Application(s) Topical daily  dextrose 5%. 1000 milliLiter(s) IV Continuous <Continuous>  dextrose 5%. 1000 milliLiter(s) IV Continuous <Continuous>  ferrous    sulfate 325 milliGRAM(s) Oral two times a day  folic acid 1 milliGRAM(s) Oral daily  lidocaine   Patch 1 Patch Transdermal daily PRN  mupirocin 2% Ointment 1 Application(s) Topical two times a day      PAST MEDICAL/SURGICAL HISTORY  PAST MEDICAL & SURGICAL HISTORY:  CKD (chronic kidney disease), stage IV    Vitamin D deficiency    GERD (gastroesophageal reflux disease)    Dyslipidemia    Diabetic foot ulcer    Renal cyst    Anemia    HTN (hypertension)    DM (diabetes mellitus)    S/P PICC central line placement  ~ L-Basilic vein (09/13/2018)    Ankle fracture  ORIF        SOCIAL HISTORY: Substance Use (street drugs): ( x ) never used  (  ) other:    FAMILY HISTORY:  Family history of lung disease  father (asbestosis; worked in [employer])    Family history of stomach cancer  father        REVIEW OF SYSTEMS:  CONSTITUTIONAL: No fever, weight loss, or fatigue  EYES: No eye pain, visual disturbances, or discharge  ENMT:  No difficulty hearing, tinnitus, vertigo; No sinus or throat pain  BREASTS: No pain, masses, or nipple discharge  GASTROINTESTINAL: No abdominal or epigastric pain. No nausea, vomiting, or hematemesis; No diarrhea or constipation. No melena or hematochezia.  GENITOURINARY: No dysuria, frequency, hematuria, or incontinence  NEUROLOGICAL: No headaches, memory loss, loss of strength, numbness, or tremors  ENDOCRINE: No heat or cold intolerance; No hair loss  MUSCULOSKELETAL: No joint pain or swelling; No muscle, back, or extremity pain  PSYCHIATRIC: No depression, anxiety, mood swings, or difficulty sleeping  HEME/LYMPH: No easy bruising, or bleeding gums  All others negative    PHYSICAL EXAM:  T(C): 36.9 (03-16-21 @ 10:59), Max: 36.9 (03-16-21 @ 10:59)  HR: 71 (03-16-21 @ 10:59) (69 - 78)  BP: 135/51 (03-16-21 @ 10:59) (103/42 - 142/45)  RR: 18 (03-16-21 @ 10:59) (17 - 24)  SpO2: 98% (03-16-21 @ 10:59) (93% - 100%)  Wt(kg): --  I&O's Summary    15 Mar 2021 07:01  -  16 Mar 2021 07:00  --------------------------------------------------------  IN: 716 mL / OUT: 3790 mL / NET: -3074 mL    16 Mar 2021 07:01  -  16 Mar 2021 12:26  --------------------------------------------------------  IN: 320 mL / OUT: 0 mL / NET: 320 mL      Height (cm): 180 (03-15 @ 12:48)  Weight (kg): 100.1 (03-15 @ 12:48)  BMI (kg/m2): 30.9 (03-15 @ 12:48)  BSA (m2): 2.2 (03-15 @ 12:48)       EYES: EOMI, PERRLA, conjunctiva and sclera clear  ENMT: No tonsillar erythema, exudates, or enlargement; Moist mucous membranes, Good dentition, No lesions  Cardiovascular: Normal S1 S2, No JVD, No murmurs, No edema  Respiratory: Lungs clear to auscultation	  Gastrointestinal:  Soft, Non-tender, + BS	  Extremities: 2+ edema                                  7.1    7.55  )-----------( 293      ( 16 Mar 2021 06:59 )             23.4     03-16    139  |  103  |  28<H>  ----------------------------<  134<H>  3.8   |  25  |  3.77<H>    Ca    8.7      16 Mar 2021 06:59  Phos  2.8     03-16  Mg     1.8     03-16      proBNP:   Lipid Profile:   HgA1c:   TSH:     Consultant(s) Notes Reviewed:  [x ] YES  [ ] NO    Care Discussed with Consultants/Other Providers [ x] YES  [ ] NO    Imaging Personally Reviewed independently:  [x] YES  [ ] NO    All labs, radiologic studies, vitals, orders and medications list reviewed. Patient is seen and examined at bedside. Case discussed with medical team.

## 2021-03-16 NOTE — DIETITIAN INITIAL EVALUATION ADULT. - PROBLEM SELECTOR PLAN 4
[facility] (1/2019) --> 50% mid LAD, 50% prox RCA, 90% Distal RCA into PDA, s/p PCI with Bare metal stent  -C/w ASA/Plavix, bb, statin   -Follows with Dr. Keaton Martell

## 2021-03-16 NOTE — DIETITIAN INITIAL EVALUATION ADULT. - PROBLEM SELECTOR PLAN 2
Stage 4/5, seens renal outpt  -Takes Torsemide 40mg qd, metolazone 2.5mg qd and recently prescribed Aranesp  -C/w sodium bicarb, calcium acetate, and calcitriol   -f/u pTH in AM   -Plan as above

## 2021-03-16 NOTE — PROGRESS NOTE ADULT - SUBJECTIVE AND OBJECTIVE BOX
Laci Gama MD (Nephrology progress note)  [address], [facility],  [address],  [place]  TEl: [phone]  Cell: [phone]    Patient is a 65y Male seen and evaluated at bedside. Vital signs, laboratory data, imaging studies, consult notes reviewed done within past 24 hours. Overnight events noted. Patient lying in bed in no distress offers no complains. Interval HD treatment on 3/15 with 2.5 L UF. Hgb 7.1 downtrending with intermittent melanotic stool with s/p EGD yesterday.    Allergies    No Known Allergies    Intolerances        ROS:  CONSTITUTIONAL: No fever or chills.  HEENT: No headache or visual disturbances.  RESPIRATORY: No shortness of breath, cough, hemoptysis or wheezing  CARDIOVASCULAR: No Chest pain, shortness of breath, palpitations, dizziness, syncope, orthopnea, PND or leg swelling.  GASTROINTESTINAL: Dark intermittent stool  GENITOURINARY: No urinary frequency, urgency, gross hematuria, dysuria, flank or supra pubic pain, difficulty passing urine.  NEUROLOGICAL: No headache, focal limb weakness, tingling or numbness  SKIN: No skin rash or lesion  MUSCULOSKELETAL: Bilateral leg edema    VITALS:    T(C): 36.7 (03-16-21 @ 05:19), Max: 37.1 (03-15-21 @ 11:34)  HR: 76 (03-16-21 @ 05:19) (69 - 78)  BP: 122/65 (03-16-21 @ 05:19) (103/42 - 142/45)  RR: 17 (03-16-21 @ 05:19) (17 - 24)  SpO2: 100% (03-16-21 @ 05:19) (93% - 100%)  CAPILLARY BLOOD GLUCOSE      POCT Blood Glucose.: 110 mg/dL (16 Mar 2021 08:30)  POCT Blood Glucose.: 147 mg/dL (15 Mar 2021 21:48)  POCT Blood Glucose.: 118 mg/dL (15 Mar 2021 18:01)  POCT Blood Glucose.: 120 mg/dL (15 Mar 2021 15:04)  POCT Blood Glucose.: 127 mg/dL (15 Mar 2021 12:45)      03-15-21 @ 07:01  -  03-16-21 @ 07:00  --------------------------------------------------------  IN: 716 mL / OUT: 3790 mL / NET: -3074 mL      MEDICATIONS  (STANDING):  atorvastatin 40 milliGRAM(s) Oral at bedtime  calcium acetate 667 milliGRAM(s) Oral three times a day with meals  chlorhexidine 4% Liquid 1 Application(s) Topical daily  dextrose 40% Gel 15 Gram(s) Oral once  dextrose 5%. 1000 milliLiter(s) (50 mL/Hr) IV Continuous <Continuous>  dextrose 5%. 1000 milliLiter(s) (100 mL/Hr) IV Continuous <Continuous>  dextrose 50% Injectable 25 Gram(s) IV Push once  dextrose 50% Injectable 12.5 Gram(s) IV Push once  dextrose 50% Injectable 25 Gram(s) IV Push once  ferrous    sulfate 325 milliGRAM(s) Oral two times a day  folic acid 1 milliGRAM(s) Oral daily  glucagon  Injectable 1 milliGRAM(s) IntraMuscular once  insulin lispro (ADMELOG) corrective regimen sliding scale   SubCutaneous Before meals and at bedtime  metoprolol succinate  milliGRAM(s) Oral daily  mupirocin 2% Ointment 1 Application(s) Topical two times a day  pantoprazole    Tablet 40 milliGRAM(s) Oral two times a day  tamsulosin 0.4 milliGRAM(s) Oral at bedtime    MEDICATIONS  (PRN):  acetaminophen   Tablet .. 650 milliGRAM(s) Oral every 6 hours PRN Mild Pain (1 - 3), Moderate Pain (4 - 6)  lidocaine   Patch 1 Patch Transdermal daily PRN Neck pain      PHYSICAL EXAM:  GENERAL: Alert, awake and oriented x3   HEENT: SPIKE, EOMI, neck supple, no JVP, no carotid bruit, oral mucosa moist and pink.  CHEST/LUNG: Bilateral clear breath sounds, no rales, no crepitations, no rhonchi, no wheezing  HEART: Regular rate and rhythm, KENNA II/VI at LPSB, no gallops, no rub   ABDOMEN: Soft, nontender, non distended, bowel sounds present, no palpable organomegaly  : No flank or supra pubic tenderness.  EXTREMITIES: Peripheral pulses are palpable, Bilateral pitting pedal edema++nt  Neurology: AAOx3, no focal neurological deficit  SKIN: No rash or skin lesion  Musculoskeletal: No joint deformity or spinal tenderness.      Vascular ACCESS:  Right IJ HD Tunnel catheter    LABS:                        7.1    7.55  )-----------( 293      ( 16 Mar 2021 06:59 )             23.4     03-16    139  |  103  |  28<H>  ----------------------------<  134<H>  3.8   |  25  |  3.77<H>    Ca    8.7      16 Mar 2021 06:59  Phos  2.8     03-16  Mg     1.8     03-16        PT/INR - ( 16 Mar 2021 06:59 )   PT: 12.8 sec;   INR: 1.12 ratio         PTT - ( 16 Mar 2021 06:59 )  PTT:35.2 sec          RADIOLOGY & ADDITIONAL STUDIES:  ra< from: IR Procedure (03.15.21 @ 15:45) >    EXAM:  IR PROCEDURE        PROCEDURE DATE:  Mar 15 2021         INTERPRETATION:  Procedures performed:  1. fluoroscopic guidance.  2.  Tunneled hemodialysis catheter placement via the right internal jugular  vein (19 cm tip to cuff).    Clinical indication: End-stage renal disease.    Attending: Srini Grady M.D.    Medications/sedation: Lidocaine 1% subcutaneous. Sedation provided by anesthesiology.    Contrast: None.    Description of procedure/findings:    Informed consent was obtained from the patient, after the risks, benefits and alternative to the procedure were explained.    Temporary right internal jugular vein hemodialysis catheter and right neck and upper chest were prepped in usual sterile fashion. Catheter was removed overa stiff wire.    New tunneled hemodialysis catheter was tunneled from mid axillary infraclavicular position towards the neck venotomy. The peel-away sheath was placed over the guidewire, and catheter inserted through the peel-away sheath, which was then removed.  Catheter tip was positioned in the high right atrium.    Catheter was secured to the patient with 2-0 Prolene. Neck venotomy was closed with Steri-Strips. Both lumens aspirated appropriately and were primed with heparin. Sterile dressingwas applied.    Impression:    Tunnel hemodialysis catheter placement. Ready for use.                SRINI GRADY MD; Attending Interventional Radiologist  This document has been electronically signed. Mar 16 2021  8:51AM    < end of copied text >    Imaging Personally Reviewed:  [x] YES  [ ] NO    Consultant(s) Notes Reviewed:  [x] YES  [ ] NO    Care Discussed with Primary team/ Other Providers [x] YES  [ ] NO       Laci Gama MD (Internal MEdicine/Nephrology progress note)  [address], [facility],  [address],  [place]  TEl: [phone]  Cell: [phone]    Patient is a 65y Male seen and evaluated at bedside. Vital signs, laboratory data, imaging studies, consult notes reviewed done within past 24 hours. Overnight events noted. Patient lying in bed in no distress offers no complains. Interval HD treatment on 3/15 with 2.5 L UF. Hgb 7.1 downtrending with intermittent melanotic stool with s/p EGD yesterday.    Allergies    No Known Allergies    Intolerances        ROS:  CONSTITUTIONAL: No fever or chills.  HEENT: No headache or visual disturbances.  RESPIRATORY: No shortness of breath, cough, hemoptysis or wheezing  CARDIOVASCULAR: No Chest pain, shortness of breath, palpitations, dizziness, syncope, orthopnea, PND or leg swelling.  GASTROINTESTINAL: Dark intermittent stool  GENITOURINARY: No urinary frequency, urgency, gross hematuria, dysuria, flank or supra pubic pain, difficulty passing urine.  NEUROLOGICAL: No headache, focal limb weakness, tingling or numbness  SKIN: No skin rash or lesion  MUSCULOSKELETAL: Bilateral leg edema    VITALS:    T(C): 36.7 (03-16-21 @ 05:19), Max: 37.1 (03-15-21 @ 11:34)  HR: 76 (03-16-21 @ 05:19) (69 - 78)  BP: 122/65 (03-16-21 @ 05:19) (103/42 - 142/45)  RR: 17 (03-16-21 @ 05:19) (17 - 24)  SpO2: 100% (03-16-21 @ 05:19) (93% - 100%)  CAPILLARY BLOOD GLUCOSE      POCT Blood Glucose.: 110 mg/dL (16 Mar 2021 08:30)  POCT Blood Glucose.: 147 mg/dL (15 Mar 2021 21:48)  POCT Blood Glucose.: 118 mg/dL (15 Mar 2021 18:01)  POCT Blood Glucose.: 120 mg/dL (15 Mar 2021 15:04)  POCT Blood Glucose.: 127 mg/dL (15 Mar 2021 12:45)      03-15-21 @ 07:01  -  03-16-21 @ 07:00  --------------------------------------------------------  IN: 716 mL / OUT: 3790 mL / NET: -3074 mL      MEDICATIONS  (STANDING):  atorvastatin 40 milliGRAM(s) Oral at bedtime  calcium acetate 667 milliGRAM(s) Oral three times a day with meals  chlorhexidine 4% Liquid 1 Application(s) Topical daily  dextrose 40% Gel 15 Gram(s) Oral once  dextrose 5%. 1000 milliLiter(s) (50 mL/Hr) IV Continuous <Continuous>  dextrose 5%. 1000 milliLiter(s) (100 mL/Hr) IV Continuous <Continuous>  dextrose 50% Injectable 25 Gram(s) IV Push once  dextrose 50% Injectable 12.5 Gram(s) IV Push once  dextrose 50% Injectable 25 Gram(s) IV Push once  ferrous    sulfate 325 milliGRAM(s) Oral two times a day  folic acid 1 milliGRAM(s) Oral daily  glucagon  Injectable 1 milliGRAM(s) IntraMuscular once  insulin lispro (ADMELOG) corrective regimen sliding scale   SubCutaneous Before meals and at bedtime  metoprolol succinate  milliGRAM(s) Oral daily  mupirocin 2% Ointment 1 Application(s) Topical two times a day  pantoprazole    Tablet 40 milliGRAM(s) Oral two times a day  tamsulosin 0.4 milliGRAM(s) Oral at bedtime    MEDICATIONS  (PRN):  acetaminophen   Tablet .. 650 milliGRAM(s) Oral every 6 hours PRN Mild Pain (1 - 3), Moderate Pain (4 - 6)  lidocaine   Patch 1 Patch Transdermal daily PRN Neck pain      PHYSICAL EXAM:  GENERAL: Alert, awake and oriented x3   HEENT: SPIKE, EOMI, neck supple, no JVP, no carotid bruit, oral mucosa moist and pink.  CHEST/LUNG: Bilateral clear breath sounds, no rales, no crepitations, no rhonchi, no wheezing  HEART: Regular rate and rhythm, EKNNA II/VI at LPSB, no gallops, no rub   ABDOMEN: Soft, nontender, non distended, bowel sounds present, no palpable organomegaly  : No flank or supra pubic tenderness.  EXTREMITIES: Peripheral pulses are palpable, Bilateral pitting pedal edema++nt  Neurology: AAOx3, no focal neurological deficit  SKIN: No rash or skin lesion  Musculoskeletal: No joint deformity or spinal tenderness.      Vascular ACCESS:  Right IJ HD Tunnel catheter    LABS:                        7.1    7.55  )-----------( 293      ( 16 Mar 2021 06:59 )             23.4     03-16    139  |  103  |  28<H>  ----------------------------<  134<H>  3.8   |  25  |  3.77<H>    Ca    8.7      16 Mar 2021 06:59  Phos  2.8     03-16  Mg     1.8     03-16        PT/INR - ( 16 Mar 2021 06:59 )   PT: 12.8 sec;   INR: 1.12 ratio         PTT - ( 16 Mar 2021 06:59 )  PTT:35.2 sec          RADIOLOGY & ADDITIONAL STUDIES:  ra< from: IR Procedure (03.15.21 @ 15:45) >    EXAM:  IR PROCEDURE        PROCEDURE DATE:  Mar 15 2021         INTERPRETATION:  Procedures performed:  1. fluoroscopic guidance.  2.  Tunneled hemodialysis catheter placement via the right internal jugular  vein (19 cm tip to cuff).    Clinical indication: End-stage renal disease.    Attending: Srini Grady M.D.    Medications/sedation: Lidocaine 1% subcutaneous. Sedation provided by anesthesiology.    Contrast: None.    Description of procedure/findings:    Informed consent was obtained from the patient, after the risks, benefits and alternative to the procedure were explained.    Temporary right internal jugular vein hemodialysis catheter and right neck and upper chest were prepped in usual sterile fashion. Catheter was removed overa stiff wire.    New tunneled hemodialysis catheter was tunneled from mid axillary infraclavicular position towards the neck venotomy. The peel-away sheath was placed over the guidewire, and catheter inserted through the peel-away sheath, which was then removed.  Catheter tip was positioned in the high right atrium.    Catheter was secured to the patient with 2-0 Prolene. Neck venotomy was closed with Steri-Strips. Both lumens aspirated appropriately and were primed with heparin. Sterile dressingwas applied.    Impression:    Tunnel hemodialysis catheter placement. Ready for use.                SRINI GRADY MD; Attending Interventional Radiologist  This document has been electronically signed. Mar 16 2021  8:51AM    < end of copied text >    Imaging Personally Reviewed:  [x] YES  [ ] NO    Consultant(s) Notes Reviewed:  [x] YES  [ ] NO    Care Discussed with Primary team/ Other Providers [x] YES  [ ] NO

## 2021-03-16 NOTE — DIETITIAN INITIAL EVALUATION ADULT. - PERTINENT LABORATORY DATA
03-16 Na 139 mmol/L Glu 134 mg/dL<H> K+ 3.8 mmol/L Cr 3.77 mg/dL<H> BUN 28 mg/dL<H> Phos 2.8 mg/dL  03-16 @ 12:05 POCT 156 mg/dL  03-16 @ 08:30 POCT 110 mg/dL  03-15 @ 21:48 POCT 147 mg/dL  03-15 @ 18:01 POCT 118 mg/dL  03-15 @ 15:04 POCT 120 mg/dL

## 2021-03-16 NOTE — DISCHARGE NOTE NURSING/CASE MANAGEMENT/SOCIAL WORK - NSDCVIVACCINE_GEN_ALL_CORE_FT
No Vaccines Administered. Severe acute respiratory syndrome coronavirus 2 (SARS-CoV-2) (Coronavirus disease [COVID-19]) vaccine , 2021/3/19 13:08 , Aminah Carr (RN)

## 2021-03-16 NOTE — DIETITIAN INITIAL EVALUATION ADULT. - PROBLEM SELECTOR PLAN 1
2/2 volume overload from acute on chronic kidney failure, does have underlying CAD w/stent in 2019.   CXR w/pulm edema, BNP >58677   -s/p IV Lasix 80 in ED  -C/w IV Lasix 80mg BID for now   -Strict I&Os  -Check TTE   -Consider cards consult

## 2021-03-16 NOTE — PROGRESS NOTE ADULT - PROBLEM SELECTOR PLAN 1
-cbc daily and transfuse PRN to keep Hgb > 7.5 (avoid fluid overload)  -on iron supplements (this will turn stool Black)  -s/p EGD with nodule biopsied; s/p Colon w/polypectomy x 3 with clip placed  -s/p repeat EGD without overt bleeding; repeat flex sig w/melena but no active bleeding -cbc daily and transfuse PRN to keep Hgb > 7.5 (avoid fluid overload)  -on iron supplements (this will turn stool Black)  -s/p EGD with nodule biopsied; s/p Colon w/polypectomy x 3 with clip placed  -s/p repeat EGD without overt bleeding; repeat flex sig w/melena but no active bleeding  -can cautiously restart asa/plavix; we will monitor closely

## 2021-03-16 NOTE — DISCHARGE NOTE NURSING/CASE MANAGEMENT/SOCIAL WORK - NSDCCRNAME_GEN_ALL_CORE_FT
[place] [address] Phone [phone]. Fax [phone]. Dialysis scheduled for Monday, Wednesday and Friday at 6:30am. First Out patient start is Friday 3/19/2021 at 6:30am   [place] [address] Phone [phone]. Fax [phone]. Dialysis scheduled for Monday, Wednesday and Friday at 6:30am. First Out patient start is Monday 3/22/2021 at 6:30am

## 2021-03-16 NOTE — PROGRESS NOTE ADULT - PROBLEM SELECTOR PLAN 1
Patient with advanced CKD stage V with fluid overload now on RRT/HD  - Strict I/o  - S/p HD treatment on 3/15 with 2.5 L fluid removal  - Echocardiogram results reviewed with moderate AS  - S/p Tunnel HD catheter  - Continue to hold Plavix  - Possible AVF creation as out patient vs inpatient per Vascular team

## 2021-03-16 NOTE — DISCHARGE NOTE NURSING/CASE MANAGEMENT/SOCIAL WORK - PATIENT PORTAL LINK FT
You can access the FollowMyHealth Patient Portal offered by [facility] by registering at the following website: [email or web address]. By joining [facility]’s FollowMyHealth portal, you will also be able to view your health information using other applications (apps) compatible with our system.

## 2021-03-17 NOTE — PROGRESS NOTE ADULT - PROBLEM SELECTOR PLAN 1
-cbc daily and transfuse PRN to keep Hgb > 7.5 (avoid fluid overload)  -on iron supplements (this will turn stool Black)  -s/p EGD with nodule biopsied; s/p Colon w/polypectomy x 3 with clip placed  -s/p repeat EGD without overt bleeding  -clear diet today; bowel prep ordered; NPO p MN for Colonoscopy tomorrow

## 2021-03-17 NOTE — PROVIDER CONTACT NOTE (CRITICAL VALUE NOTIFICATION) - ACTION/TREATMENT ORDERED:
will cont to monitor until further treatment is ordered.
Provider notified. Patient has ESRD. Continue to monitor.

## 2021-03-17 NOTE — PROGRESS NOTE ADULT - SUBJECTIVE AND OBJECTIVE BOX
INTERVAL HPI/OVERNIGHT EVENTS:    pt is without abd pain or n/v  reports red stool yesterday  without bm when seen this am  hgb 6.9     MEDICATIONS  (STANDING):  aspirin enteric coated 81 milliGRAM(s) Oral daily  atorvastatin 40 milliGRAM(s) Oral at bedtime  bisacodyl 10 milliGRAM(s) Oral every 4 hours  calcium acetate 667 milliGRAM(s) Oral three times a day with meals  chlorhexidine 4% Liquid 1 Application(s) Topical daily  clopidogrel Tablet 75 milliGRAM(s) Oral daily  coronavirus (EUA) Vaccine ([place]) 0.5 milliLiter(s) IntraMuscular once  dextrose 40% Gel 15 Gram(s) Oral once  dextrose 5%. 1000 milliLiter(s) (50 mL/Hr) IV Continuous <Continuous>  dextrose 5%. 1000 milliLiter(s) (100 mL/Hr) IV Continuous <Continuous>  dextrose 50% Injectable 25 Gram(s) IV Push once  dextrose 50% Injectable 12.5 Gram(s) IV Push once  dextrose 50% Injectable 25 Gram(s) IV Push once  epoetin rito-epbx (RETACRIT) Injectable 43702 Unit(s) IV Push once  ferrous    sulfate 325 milliGRAM(s) Oral two times a day  folic acid 1 milliGRAM(s) Oral daily  glucagon  Injectable 1 milliGRAM(s) IntraMuscular once  insulin lispro (ADMELOG) corrective regimen sliding scale   SubCutaneous three times a day before meals  insulin lispro (ADMELOG) corrective regimen sliding scale   SubCutaneous at bedtime  iron sucrose IVPB 100 milliGRAM(s) IV Intermittent once  metoprolol succinate  milliGRAM(s) Oral daily  mupirocin 2% Ointment 1 Application(s) Topical two times a day  pantoprazole    Tablet 40 milliGRAM(s) Oral two times a day  polyethylene glycol/electrolyte Solution 1 Liter(s) Oral every 4 hours  tamsulosin 0.4 milliGRAM(s) Oral at bedtime    MEDICATIONS  (PRN):  acetaminophen   Tablet .. 650 milliGRAM(s) Oral every 6 hours PRN Mild Pain (1 - 3), Moderate Pain (4 - 6)  lidocaine   Patch 1 Patch Transdermal daily PRN Neck pain      Allergies    No Known Allergies    Intolerances        Review of Systems:    General:  No wt loss, fevers, chills, night sweats, fatigue   Eyes:  Good vision, no reported pain  ENT:  No sore throat, pain, runny nose, dysphagia  CV:  No pain, palpitations, hypo/hypertension  Resp:  No dyspnea, cough, tachypnea, wheezing  GI:  No pain, No nausea, No vomiting, No diarrhea, No constipation, No weight loss, No fever, No pruritis, No rectal bleeding, No melena, No dysphagia  :  No pain, bleeding, incontinence, nocturia  Muscle:  No pain, weakness  Neuro:  No weakness, tingling, memory problems  Psych:  No fatigue, insomnia, mood problems, depression  Endocrine:  No polyuria, polydypsia, cold/heat intolerance  Heme:  No petechiae, ecchymosis, easy bruisability  Skin:  No rash, tattoos, scars, edema      Vital Signs Last 24 Hrs  T(C): 37 (17 Mar 2021 10:26), Max: 37 (17 Mar 2021 10:26)  T(F): 98.6 (17 Mar 2021 10:26), Max: 98.6 (17 Mar 2021 10:26)  HR: 76 (17 Mar 2021 10:26) (76 - 81)  BP: 118/54 (17 Mar 2021 10:26) (118/54 - 142/51)  BP(mean): --  RR: 20 (17 Mar 2021 10:26) (17 - 20)  SpO2: 100% (17 Mar 2021 10:26) (98% - 100%)    PHYSICAL EXAM:    Constitutional: NAD  HEENT: EOMI, throat clear  Neck: No LAD, supple  Respiratory: CTA and P  Cardiovascular: S1 and S2, RRR, no M  Gastrointestinal: BS+, soft, NT/ND, neg HSM,  Extremities: No peripheral edema, neg clubbing, cyanosis  Vascular: 2+ peripheral pulses  Neurological: A/O x 3, no focal deficits  Psychiatric: Normal mood, normal affect  Skin: No rashes      LABS:                        6.9    6.75  )-----------( 273      ( 17 Mar 2021 07:45 )             23.0     03-17    136  |  102  |  34<H>  ----------------------------<  123<H>  3.7   |  24  |  5.24<H>    Ca    8.8      17 Mar 2021 07:45  Phos  3.5     03-17  Mg     1.8     03-17      PT/INR - ( 16 Mar 2021 06:59 )   PT: 12.8 sec;   INR: 1.12 ratio         PTT - ( 16 Mar 2021 06:59 )  PTT:35.2 sec      RADIOLOGY & ADDITIONAL TESTS:

## 2021-03-17 NOTE — PROGRESS NOTE ADULT - SUBJECTIVE AND OBJECTIVE BOX
Keaton Duvall MD  Interventional Cardiology / Endovascular Specialist  [place] Office : [phone] [place] [address] [place] [address]  Tel:   [place] Office : [phone] [facility] [place] [address]  Tel: [phone]  Cell : [phone]    Subjective/Overnight events: Patient lying in bed comfortably in dialysis. No acute distress.   	  MEDICATIONS:  aspirin enteric coated 81 milliGRAM(s) Oral daily  clopidogrel Tablet 75 milliGRAM(s) Oral daily  metoprolol succinate  milliGRAM(s) Oral daily  tamsulosin 0.4 milliGRAM(s) Oral at bedtime        acetaminophen   Tablet .. 650 milliGRAM(s) Oral every 6 hours PRN    bisacodyl 10 milliGRAM(s) Oral every 4 hours  pantoprazole    Tablet 40 milliGRAM(s) Oral two times a day  polyethylene glycol/electrolyte Solution 1 Liter(s) Oral every 4 hours    atorvastatin 40 milliGRAM(s) Oral at bedtime  dextrose 40% Gel 15 Gram(s) Oral once  dextrose 50% Injectable 25 Gram(s) IV Push once  dextrose 50% Injectable 12.5 Gram(s) IV Push once  dextrose 50% Injectable 25 Gram(s) IV Push once  glucagon  Injectable 1 milliGRAM(s) IntraMuscular once  insulin lispro (ADMELOG) corrective regimen sliding scale   SubCutaneous three times a day before meals  insulin lispro (ADMELOG) corrective regimen sliding scale   SubCutaneous at bedtime    calcium acetate 667 milliGRAM(s) Oral three times a day with meals  chlorhexidine 4% Liquid 1 Application(s) Topical daily  coronavirus (EUA) Vaccine (hdtMEDIA) 0.5 milliLiter(s) IntraMuscular once  dextrose 5%. 1000 milliLiter(s) IV Continuous <Continuous>  dextrose 5%. 1000 milliLiter(s) IV Continuous <Continuous>  ferrous    sulfate 325 milliGRAM(s) Oral two times a day  folic acid 1 milliGRAM(s) Oral daily  lidocaine   Patch 1 Patch Transdermal daily PRN  mupirocin 2% Ointment 1 Application(s) Topical two times a day      PAST MEDICAL/SURGICAL HISTORY  PAST MEDICAL & SURGICAL HISTORY:  CKD (chronic kidney disease), stage IV    Vitamin D deficiency    GERD (gastroesophageal reflux disease)    Dyslipidemia    Diabetic foot ulcer    Renal cyst    Anemia    HTN (hypertension)    DM (diabetes mellitus)    S/P PICC central line placement  ~ L-Basilic vein (09/13/2018)    Ankle fracture  ORIF        SOCIAL HISTORY: Substance Use (street drugs): ( x ) never used  (  ) other:    FAMILY HISTORY:  Family history of lung disease  father (asbestosis; worked in [employer])    Family history of stomach cancer  father        REVIEW OF SYSTEMS:  CONSTITUTIONAL: No fever, weight loss, or fatigue  EYES: No eye pain, visual disturbances, or discharge  ENMT:  No difficulty hearing, tinnitus, vertigo; No sinus or throat pain  BREASTS: No pain, masses, or nipple discharge  GASTROINTESTINAL: No abdominal or epigastric pain. No nausea, vomiting, or hematemesis; No diarrhea or constipation. No melena or hematochezia.  GENITOURINARY: No dysuria, frequency, hematuria, or incontinence  NEUROLOGICAL: No headaches, memory loss, loss of strength, numbness, or tremors  ENDOCRINE: No heat or cold intolerance; No hair loss  MUSCULOSKELETAL: No joint pain or swelling; No muscle, back, or extremity pain  PSYCHIATRIC: No depression, anxiety, mood swings, or difficulty sleeping  HEME/LYMPH: No easy bruising, or bleeding gums  All others negative    PHYSICAL EXAM:  T(C): 36.6 (03-17-21 @ 12:45), Max: 37 (03-17-21 @ 10:26)  HR: 59 (03-17-21 @ 12:45) (59 - 81)  BP: 142/81 (03-17-21 @ 12:45) (118/54 - 142/81)  RR: 18 (03-17-21 @ 12:45) (17 - 20)  SpO2: 100% (03-17-21 @ 10:26) (98% - 100%)  Wt(kg): --  I&O's Summary    16 Mar 2021 07:01  -  17 Mar 2021 07:00  --------------------------------------------------------  IN: 770 mL / OUT: 700 mL / NET: 70 mL             EYES: EOMI, PERRLA, conjunctiva and sclera clear  ENMT: No tonsillar erythema, exudates, or enlargement; Moist mucous membranes, Good dentition, No lesions  Cardiovascular: Normal S1 S2, No JVD, No murmurs, No edema  Respiratory: Lungs clear to auscultation	  Gastrointestinal:  Soft, Non-tender, + BS	  Extremities: 2+ edema                                    6.9    6.75  )-----------( 273      ( 17 Mar 2021 07:45 )             23.0     03-17    136  |  102  |  34<H>  ----------------------------<  123<H>  3.7   |  24  |  5.24<H>    Ca    8.8      17 Mar 2021 07:45  Phos  3.5     03-17  Mg     1.8     03-17      proBNP:   Lipid Profile:   HgA1c:   TSH:     Consultant(s) Notes Reviewed:  [x ] YES  [ ] NO    Care Discussed with Consultants/Other Providers [ x] YES  [ ] NO    Imaging Personally Reviewed independently:  [x] YES  [ ] NO    All labs, radiologic studies, vitals, orders and medications list reviewed. Patient is seen and examined at bedside. Case discussed with medical team.

## 2021-03-17 NOTE — PROVIDER CONTACT NOTE (CRITICAL VALUE NOTIFICATION) - ASSESSMENT
pts h/h is 6.9/23.0. Pt has been transfused during this admission.
Patient a&ox4, vitals in flowsheet

## 2021-03-17 NOTE — PROGRESS NOTE ADULT - PROBLEM SELECTOR PLAN 1
Patient with advanced CKD stage V with fluid overload now on RRT/HD  - Strict I/o  - S/p HD treatment on 3/15 with 2.5 L fluid removal  - Echocardiogram results reviewed with moderate AS  - S/p Tunnel HD catheter  - ANtiplatelet agents resume with aspirin and plavix now with decreasing hgb  - Possible AVF creation as out patient vs inpatient per Vascular team  - Will schedule IHD today with PRBC

## 2021-03-17 NOTE — PROGRESS NOTE ADULT - SUBJECTIVE AND OBJECTIVE BOX
Laci Gama MD (Internal Medicine/Nephrology progress note)  [address], [facility],  [address],  [facility]  TEl: [phone]  Cell: [phone]    Patient is a 65y Male seen and evaluated at bedside. Vital signs, laboratory data, imaging studies, consult notes reviewed done within past 24 hours. Overnight events noted. Patient lying in bed in no distress still complains of dark stool intermittently. Hgb downtrending to 6.9 today morning.     Allergies    No Known Allergies    Intolerances        ROS:  CONSTITUTIONAL: No fever or chills.  HEENT: No headache or visual disturbances.  RESPIRATORY: Mild SOB, cough, hemoptysis or wheezing  CARDIOVASCULAR: Dyspnea and bilateral leg edema  GASTROINTESTINAL: Dark stool  GENITOURINARY: No urinary frequency, urgency, gross hematuria, dysuria, flank or supra pubic pain, difficulty passing urine.  NEUROLOGICAL: No headache, focal limb weakness, tingling or numbness or seizure like activity  SKIN: No skin rash or lesion  MUSCULOSKELETAL: No leg pain, calf pain or swelling    VITALS:    T(C): 36.8 (03-17-21 @ 04:59), Max: 36.9 (03-16-21 @ 10:59)  HR: 81 (03-17-21 @ 04:59) (71 - 81)  BP: 142/51 (03-17-21 @ 04:59) (135/51 - 142/51)  RR: 17 (03-17-21 @ 04:59) (17 - 18)  SpO2: 99% (03-17-21 @ 04:59) (98% - 99%)  CAPILLARY BLOOD GLUCOSE      POCT Blood Glucose.: 116 mg/dL (17 Mar 2021 08:25)  POCT Blood Glucose.: 179 mg/dL (16 Mar 2021 21:52)  POCT Blood Glucose.: 107 mg/dL (16 Mar 2021 17:26)  POCT Blood Glucose.: 156 mg/dL (16 Mar 2021 12:05)      03-16-21 @ 07:01  -  03-17-21 @ 07:00  --------------------------------------------------------  IN: 770 mL / OUT: 700 mL / NET: 70 mL      MEDICATIONS  (STANDING):  aspirin enteric coated 81 milliGRAM(s) Oral daily  atorvastatin 40 milliGRAM(s) Oral at bedtime  calcium acetate 667 milliGRAM(s) Oral three times a day with meals  chlorhexidine 4% Liquid 1 Application(s) Topical daily  clopidogrel Tablet 75 milliGRAM(s) Oral daily  coronavirus (EUA) Vaccine ([employer]) 0.5 milliLiter(s) IntraMuscular once  dextrose 40% Gel 15 Gram(s) Oral once  dextrose 5%. 1000 milliLiter(s) (50 mL/Hr) IV Continuous <Continuous>  dextrose 5%. 1000 milliLiter(s) (100 mL/Hr) IV Continuous <Continuous>  dextrose 50% Injectable 25 Gram(s) IV Push once  dextrose 50% Injectable 12.5 Gram(s) IV Push once  dextrose 50% Injectable 25 Gram(s) IV Push once  epoetin rito-epbx (RETACRIT) Injectable 99920 Unit(s) IV Push once  ferrous    sulfate 325 milliGRAM(s) Oral two times a day  folic acid 1 milliGRAM(s) Oral daily  glucagon  Injectable 1 milliGRAM(s) IntraMuscular once  insulin lispro (ADMELOG) corrective regimen sliding scale   SubCutaneous three times a day before meals  insulin lispro (ADMELOG) corrective regimen sliding scale   SubCutaneous at bedtime  iron sucrose IVPB 100 milliGRAM(s) IV Intermittent once  metoprolol succinate  milliGRAM(s) Oral daily  mupirocin 2% Ointment 1 Application(s) Topical two times a day  pantoprazole    Tablet 40 milliGRAM(s) Oral two times a day  tamsulosin 0.4 milliGRAM(s) Oral at bedtime    MEDICATIONS  (PRN):  acetaminophen   Tablet .. 650 milliGRAM(s) Oral every 6 hours PRN Mild Pain (1 - 3), Moderate Pain (4 - 6)  lidocaine   Patch 1 Patch Transdermal daily PRN Neck pain      PHYSICAL EXAM:  GENERAL: Alert, awake and oriented x3 in no distress  HEENT: SPIKE, EOMI, neck supple, no JVP, no carotid bruit, oral mucosa moist and pale  CHEST/LUNG: Bilateral decreased breath sounds at bases, bibasilar minimal crepitations, no wheezing  HEART: Regular rate and rhythm, KENNA II/VI at LPSB, no gallops, no rub   ABDOMEN: Soft, nontender, non distended, bowel sounds present, no palpable organomegaly  : No flank or supra pubic tenderness.  EXTREMITIES: Bilateral leg edema 2+  Neurology: AAOx3, no focal neurological deficit  SKIN: No rash or skin lesion  Musculoskeletal: No joint deformity or spinal tenderness.      Vascular ACCESS:  Right IJ HD catheter    LABS:                        6.9    6.75  )-----------( 273      ( 17 Mar 2021 07:45 )             23.0     03-17    136  |  102  |  34<H>  ----------------------------<  123<H>  3.7   |  24  |  5.24<H>    Ca    8.8      17 Mar 2021 07:45  Phos  3.5     03-17  Mg     1.8     03-17        PT/INR - ( 16 Mar 2021 06:59 )   PT: 12.8 sec;   INR: 1.12 ratio         PTT - ( 16 Mar 2021 06:59 )  PTT:35.2 sec          RADIOLOGY & ADDITIONAL STUDIES:  r  Imaging Personally Reviewed:  [x] YES  [ ] NO    Consultant(s) Notes Reviewed:  [x] YES  [ ] NO    Care Discussed with Primary team/ Other Providers [x] YES  [ ] NO

## 2021-03-18 NOTE — PROGRESS NOTE ADULT - SUBJECTIVE AND OBJECTIVE BOX
Keaton Duvall MD  Interventional Cardiology / Endovascular Specialist  [place] Office : [phone] [place] [address] [place] [address]  Tel:   [place] Office : [address] [facility] [place] [address]  Tel: [phone]  Cell : [phone]    Subjective/Overnight events: Patient lying in bed comfortably. No acute distress. Denies chest pain, SOB or palpitations  	  MEDICATIONS:  aspirin enteric coated 81 milliGRAM(s) Oral daily  clopidogrel Tablet 75 milliGRAM(s) Oral daily  metoprolol succinate  milliGRAM(s) Oral daily  tamsulosin 0.4 milliGRAM(s) Oral at bedtime        acetaminophen   Tablet .. 650 milliGRAM(s) Oral every 6 hours PRN    pantoprazole    Tablet 40 milliGRAM(s) Oral two times a day    atorvastatin 40 milliGRAM(s) Oral at bedtime  dextrose 40% Gel 15 Gram(s) Oral once  dextrose 50% Injectable 25 Gram(s) IV Push once  dextrose 50% Injectable 12.5 Gram(s) IV Push once  dextrose 50% Injectable 25 Gram(s) IV Push once  glucagon  Injectable 1 milliGRAM(s) IntraMuscular once  insulin lispro (ADMELOG) corrective regimen sliding scale   SubCutaneous three times a day before meals  insulin lispro (ADMELOG) corrective regimen sliding scale   SubCutaneous at bedtime    calcium acetate 667 milliGRAM(s) Oral three times a day with meals  chlorhexidine 4% Liquid 1 Application(s) Topical daily  coronavirus (EUA) Vaccine ([employer]) 0.5 milliLiter(s) IntraMuscular once  dextrose 5%. 1000 milliLiter(s) IV Continuous <Continuous>  dextrose 5%. 1000 milliLiter(s) IV Continuous <Continuous>  ferrous    sulfate 325 milliGRAM(s) Oral two times a day  folic acid 1 milliGRAM(s) Oral daily  lidocaine   Patch 1 Patch Transdermal daily PRN  mupirocin 2% Ointment 1 Application(s) Topical two times a day      PAST MEDICAL/SURGICAL HISTORY  PAST MEDICAL & SURGICAL HISTORY:  CKD (chronic kidney disease), stage IV    Vitamin D deficiency    GERD (gastroesophageal reflux disease)    Dyslipidemia    Diabetic foot ulcer    Renal cyst    Anemia    HTN (hypertension)    DM (diabetes mellitus)    S/P PICC central line placement  ~ L-Basilic vein (09/13/2018)    Ankle fracture  ORIF        SOCIAL HISTORY: Substance Use (street drugs): ( x ) never used  (  ) other:    FAMILY HISTORY:  Family history of lung disease  father (asbestosis; worked in shipyard)    Family history of stomach cancer  father        REVIEW OF SYSTEMS:  CONSTITUTIONAL: No fever, weight loss, or fatigue  EYES: No eye pain, visual disturbances, or discharge  ENMT:  No difficulty hearing, tinnitus, vertigo; No sinus or throat pain  BREASTS: No pain, masses, or nipple discharge  GASTROINTESTINAL: No abdominal or epigastric pain. No nausea, vomiting, or hematemesis; No diarrhea or constipation. No melena or hematochezia.  GENITOURINARY: No dysuria, frequency, hematuria, or incontinence  NEUROLOGICAL: No headaches, memory loss, loss of strength, numbness, or tremors  ENDOCRINE: No heat or cold intolerance; No hair loss  MUSCULOSKELETAL: No joint pain or swelling; No muscle, back, or extremity pain  PSYCHIATRIC: No depression, anxiety, mood swings, or difficulty sleeping  HEME/LYMPH: No easy bruising, or bleeding gums  All others negative    PHYSICAL EXAM:  T(C): 36.7 (03-18-21 @ 05:03), Max: 36.8 (03-17-21 @ 17:36)  HR: 89 (03-18-21 @ 05:03) (59 - 89)  BP: 134/60 (03-18-21 @ 05:03) (131/50 - 145/55)  RR: 17 (03-18-21 @ 05:03) (16 - 18)  SpO2: 98% (03-18-21 @ 05:03) (97% - 100%)  Wt(kg): --  I&O's Summary    17 Mar 2021 07:01  -  18 Mar 2021 07:00  --------------------------------------------------------  IN: 1550 mL / OUT: 750 mL / NET: 800 mL        EYES: EOMI, PERRLA, conjunctiva and sclera clear  ENMT: No tonsillar erythema, exudates, or enlargement; Moist mucous membranes, Good dentition, No lesions  Cardiovascular: Normal S1 S2, No JVD, No murmurs, No edema  Respiratory: Lungs clear to auscultation	  Gastrointestinal:  Soft, Non-tender, + BS	  Extremities: 2+ edema                                      8.5    7.01  )-----------( 281      ( 18 Mar 2021 11:42 )             27.4     03-17    136  |  102  |  34<H>  ----------------------------<  123<H>  3.7   |  24  |  5.24<H>    Ca    8.8      17 Mar 2021 07:45  Phos  3.5     03-17  Mg     1.8     03-17      proBNP:   Lipid Profile:   HgA1c:   TSH:     Consultant(s) Notes Reviewed:  [x ] YES  [ ] NO    Care Discussed with Consultants/Other Providers [ x] YES  [ ] NO    Imaging Personally Reviewed independently:  [x] YES  [ ] NO    All labs, radiologic studies, vitals, orders and medications list reviewed. Patient is seen and examined at bedside. Case discussed with medical team.

## 2021-03-18 NOTE — PROGRESS NOTE ADULT - PROBLEM SELECTOR PLAN 1
Patient with advanced CKD stage V with fluid overload now on RRT/HD  - Strict I/o  - S/p HD treatment on 3/17 with 2.5L fluid removal  - Echocardiogram results reviewed with moderate AS  - S/p Tunnel HD catheter  - Antiplatelet agents resume with aspirin and plavix   - Possible AVF creation as out patient vs inpatient per Vascular team  - Defer HD today  - Next anticipated IHD on 3/19/2021

## 2021-03-18 NOTE — PROGRESS NOTE ADULT - SUBJECTIVE AND OBJECTIVE BOX
Laci Gama MD (Internal Medicine/Nephrology progress note)  [address], [facility],  [address],  [facility]  TEl: [phone]  Cell: [phone]    Patient is a 65y Male seen and evaluated at bedside. Vital signs, laboratory data, imaging studies, consult notes reviewed done within past 24 hours. Overnight events noted. Patient lying in bed in no distress offers no complains. Interval HD treatment yesterday with 2.5 L fluid removal. Received 1 unit PRBC. Patient planned for repeat endoscopy later today    Allergies    No Known Allergies    Intolerances        ROS:  CONSTITUTIONAL: No fever or chills.  HEENT: No headaches or visual disturbances.  RESPIRATORY: No shortness of breath, cough, hemoptysis or wheezing  CARDIOVASCULAR: No Chest pain, shortness of breath, palpitations, dizziness, syncope, orthopnea, PND or leg swelling.  GASTROINTESTINAL: No abdominal pain, nausea, vomiting, diarrhea, hematemesis or blood per rectum.  GENITOURINARY: No urinary frequency, urgency, gross hematuria, dysuria, flank or supra pubic pain, difficulty passing urine.  NEUROLOGICAL: No headache, focal limb weakness, tingling or numbness or seizure like activity  SKIN: No skin rash or lesion  MUSCULOSKELETAL: Bilateral leg edema with improvement    VITALS:    T(C): 36.7 (03-18-21 @ 05:03), Max: 37 (03-17-21 @ 10:26)  HR: 89 (03-18-21 @ 05:03) (59 - 89)  BP: 134/60 (03-18-21 @ 05:03) (118/54 - 145/55)  RR: 17 (03-18-21 @ 05:03) (16 - 20)  SpO2: 98% (03-18-21 @ 05:03) (97% - 100%)  CAPILLARY BLOOD GLUCOSE      POCT Blood Glucose.: 111 mg/dL (18 Mar 2021 08:16)  POCT Blood Glucose.: 161 mg/dL (17 Mar 2021 21:58)  POCT Blood Glucose.: 113 mg/dL (17 Mar 2021 17:31)  POCT Blood Glucose.: 154 mg/dL (17 Mar 2021 13:35)  POCT Blood Glucose.: 166 mg/dL (17 Mar 2021 12:03)      03-17-21 @ 07:01  -  03-18-21 @ 07:00  --------------------------------------------------------  IN: 1550 mL / OUT: 750 mL / NET: 800 mL      MEDICATIONS  (STANDING):  aspirin enteric coated 81 milliGRAM(s) Oral daily  atorvastatin 40 milliGRAM(s) Oral at bedtime  calcium acetate 667 milliGRAM(s) Oral three times a day with meals  chlorhexidine 4% Liquid 1 Application(s) Topical daily  clopidogrel Tablet 75 milliGRAM(s) Oral daily  coronavirus (EUA) Vaccine ([employer]) 0.5 milliLiter(s) IntraMuscular once  dextrose 40% Gel 15 Gram(s) Oral once  dextrose 5%. 1000 milliLiter(s) (50 mL/Hr) IV Continuous <Continuous>  dextrose 5%. 1000 milliLiter(s) (100 mL/Hr) IV Continuous <Continuous>  dextrose 50% Injectable 25 Gram(s) IV Push once  dextrose 50% Injectable 12.5 Gram(s) IV Push once  dextrose 50% Injectable 25 Gram(s) IV Push once  ferrous    sulfate 325 milliGRAM(s) Oral two times a day  folic acid 1 milliGRAM(s) Oral daily  glucagon  Injectable 1 milliGRAM(s) IntraMuscular once  insulin lispro (ADMELOG) corrective regimen sliding scale   SubCutaneous three times a day before meals  insulin lispro (ADMELOG) corrective regimen sliding scale   SubCutaneous at bedtime  metoprolol succinate  milliGRAM(s) Oral daily  mupirocin 2% Ointment 1 Application(s) Topical two times a day  pantoprazole    Tablet 40 milliGRAM(s) Oral two times a day  tamsulosin 0.4 milliGRAM(s) Oral at bedtime    MEDICATIONS  (PRN):  acetaminophen   Tablet .. 650 milliGRAM(s) Oral every 6 hours PRN Mild Pain (1 - 3), Moderate Pain (4 - 6)  lidocaine   Patch 1 Patch Transdermal daily PRN Neck pain      PHYSICAL EXAM:  GENERAL: Alert, awake and oriented x3 in no distress  HEENT: SPIKE, EOMI, neck supple, no JVP, no carotid bruit, oral mucosa moist and pink.  CHEST/LUNG: Bilateral clear breath sounds, no rales, no crepitations, no rhonchi, no wheezing  HEART: Regular rate and rhythm, KENNA II/VI at LPSB, no gallops, no rub   ABDOMEN: Soft, nontender, non distended, bowel sounds present, no palpable organomegaly  : No flank or supra pubic tenderness.  EXTREMITIES: Peripheral pulses are palpable, Bilateral leg edema with improvement  Neurology: AAOx3, no focal neurological deficit  SKIN: No rash or skin lesion  Musculoskeletal: No joint deformity or spinal tenderness.      Vascular ACCESS: Right IJ Tunnel HD catheter noted.    LABS:                        6.9    6.75  )-----------( 273      ( 17 Mar 2021 07:45 )             23.0     03-17    136  |  102  |  34<H>  ----------------------------<  123<H>  3.7   |  24  |  5.24<H>    Ca    8.8      17 Mar 2021 07:45  Phos  3.5     03-17  Mg     1.8     03-17                  RADIOLOGY & ADDITIONAL STUDIES:  None  Imaging Personally Reviewed:  [x] YES  [ ] NO    Consultant(s) Notes Reviewed:  [x] YES  [ ] NO    Care Discussed with Primary team/ Other Providers [x] YES  [ ] NO

## 2021-03-19 PROBLEM — N18.4 CHRONIC KIDNEY DISEASE, STAGE 4 (SEVERE): Chronic | Status: ACTIVE | Noted: 2021-01-01

## 2021-03-19 NOTE — PROGRESS NOTE ADULT - PROBLEM SELECTOR PLAN 1
Patient with advanced CKD stage V with fluid overload now on RRT/HD  - Strict I/o  - S/p HD treatment on 3/17 with 2.5L fluid removal  - Echocardiogram results reviewed with moderate AS  - S/p Tunnel HD catheter  - Antiplatelet agents resume with aspirin and plavix   - Possible AVF creation as out patient vs inpatient per Vascular team  -HD as per ordered

## 2021-03-19 NOTE — PROGRESS NOTE ADULT - NSHPATTENDINGPLANDISCUSS_GEN_ALL_CORE
Patient/Family/Primary team

## 2021-03-19 NOTE — PROGRESS NOTE ADULT - NUTRITIONAL ASSESSMENT
Able to tolerate po intake

## 2021-03-19 NOTE — PROGRESS NOTE ADULT - ASSESSMENT
Assessment and Plan    65M w/CAD s/p PCI w/bare metal stent, T2DM, HTN, CHF, hx of osteomyelitis (2019), anemia, CKDIV, HLD and GERD, CAD s/p 1 stent p/w 2 weeks of worsening intermittent non-exertional chest pain and dyspnea now with fatigue.      EKG - NSR non specific STT changes       1) Acute on chronic renal failure   -pt is volume overloaded likely sec to worsening renal function  -Echo shows Normal LV mod AS mild MS  - can hold plavix for permacath and AVF , moderate risk for procedure. plan for AVF outpatient  -s/p permacath     2) CAD s/p PCI   - s/p BMS stent to RPDA in 2019 but hasnt followed up since then cont asa     3) Anemia   - pt s/p PRBC   -s/p EGD & flex sig with poor bowel prep  -transfuse PRN 
65M w/CAD s/p PCI w/bare metal stent, T2DM, HTN, CHF, hx of osteomyelitis (2019), anemia, CKDIV, HLD and GERD, CAD s/p 1 stent p/w 2 weeks of worsening intermittent non-exertional chest pain and dyspnea now with fatigue found to be fluid overloaded in need of HD. GI consulted for anemia
65M w/CAD s/p PCI w/bare metal stent, T2DM, HTN, CHF, hx of osteomyelitis (2019), anemia, CKDIV, HLD and GERD, CAD s/p 1 stent p/w 2 weeks of worsening intermittent non-exertional chest pain and dyspnea now with fatigue found to be fluid overloaded in need of HD. GI consulted for anemia
65M w/CAD s/p PCI w/bare metal stent, T2DM, HTN, CHF, hx of osteomyelitis (2019), anemia, CKDIV, HLD and GERD, CAD s/p 1 stent p/w 2 weeks of worsening intermittent non-exertional chest pain and dyspnea now with fatigue.  Nephrology consult for Advanced CKD
Assessment and Plan    65M w/CAD s/p PCI w/bare metal stent, T2DM, HTN, CHF, hx of osteomyelitis (2019), anemia, CKDIV, HLD and GERD, CAD s/p 1 stent p/w 2 weeks of worsening intermittent non-exertional chest pain and dyspnea now with fatigue.      EKG - NSR non specific STT changes       1) Acute on chronic renal failure   -pt is volume overloaded likely sec to worsening renal function  -Echo shows Normal LV mod AS mild MS  - can hold plavix for permacath and AVF , moderate risk for procedure. plan for AVF outpatient  -s/p permacath     2) CAD s/p PCI   - s/p BMS stent to RPDA in 2019 but hasnt followed up since then cont asa     3) Anemia   - pt s/p PRBC   -s/p EGD & flex sig with poor bowel prep  -continue to monitor H/H
EKG - NSR non specific STT changes     Echo 3/21 - Normal LV mod AS mild MS    A/P     1) Acute on chronic renal failure -pt is volume overloaded likely sec to worsening renal function, Echo shows Normal LV mod AS mild MS   can hold plavix for permacath and AVF , moderate risk for procedure    2) CAD s/p PCI - s/p BMS stent to RPDA in 2019 but hasnt followed up since then cont asa     3) Anemia - pt s/p PRBC for EGD colonoscopy today
EKG - NSR non specific STT changes     Echo 3/21 - Normal LV mod AS mild MS    A/P     1) Acute on chronic renal failure -pt is volume overloaded likely sec to worsening renal function, Echo shows Normal LV mod AS mild MS   can hold plavix for permacath and AVF , moderate risk for procedure    2) CAD s/p PCI - s/p BMS stent to RPDA in 2019 but hasnt followed up since then cont asa     3) Anemia - pt s/p PRBC s/p EGD colonoscopy s/p polypectomy
65M w/CAD s/p PCI w/bare metal stent, T2DM, HTN, CHF, hx of osteomyelitis (2019), anemia, CKDIV, HLD and GERD, CAD s/p 1 stent p/w 2 weeks of worsening intermittent non-exertional chest pain and dyspnea now with fatigue found to be fluid overloaded in need of HD. GI consulted for anemia
EKG - NSR non specific STT changes     Echo 3/21 - Normal LV mod AS mild MS    A/P     1) Acute on chronic renal failure -pt is volume overloaded likely sec to worsening renal function, Echo shows Normal LV mod AS mild MS   can hold plavix for permacath and AVF , moderate risk for procedure    2) CAD s/p PCI - s/p BMS stent to RPDA in 2019 but hasnt followed up since then cont asa     3) Anemia - pt s/p PRBC for EGD colonoscopy tomorrow
EKG - NSR non specific STT changes     Echo 3/21 - Normal LV mod AS mild MS    A/P     1) Acute on chronic renal failure -pt is volume overloaded likely sec to worsening renal function, Echo shows Normal LV mod AS mild MS   can hold plavix for permacath and AVF , moderate risk for procedure    2) CAD s/p PCI - s/p BMS stent to RPDA in 2019 but hasnt followed up since then cont asa     3) Anemia - pt s/p PRBC s/p EGD colonoscopy s/p polypectomy, planned for EGD 
65M w/CAD s/p PCI w/bare metal stent, T2DM, HTN, CHF, hx of osteomyelitis (2019), anemia, CKDIV, HLD and GERD, CAD s/p 1 stent p/w 2 weeks of worsening intermittent non-exertional chest pain and dyspnea now with fatigue.  Nephrology consult for Advanced CKD
Assessment and Plan    65M w/CAD s/p PCI w/bare metal stent, T2DM, HTN, CHF, hx of osteomyelitis (2019), anemia, CKDIV, HLD and GERD, CAD s/p 1 stent p/w 2 weeks of worsening intermittent non-exertional chest pain and dyspnea now with fatigue.      EKG - NSR non specific STT changes       1) Acute on chronic renal failure   -pt is volume overloaded likely sec to worsening renal function  -Echo shows Normal LV mod AS mild MS  - can hold plavix for AVF , moderate risk for procedure. plan for AVF outpatient  -s/p permacath     2) CAD s/p PCI   - s/p BMS stent to RPDA in 2019 but hasnt followed up since then cont asa     3) Anemia   - pt s/p PRBC with improvement in hemoglobin  -s/p EGD & flex sig with poor bowel prep  -plan for colonoscopy today  -continue to monitor H/H  
EKG - NSR non specific STT changes     Echo 3/21 - Normal LV mod AS mild MS    A/P     1) Acute on chronic renal failure -pt is volume overloaded likely sec to worsening renal function, Echo shows Normal LV mod AS mild MS   can hold plavix for permacath and AVF , moderate risk for procedure    2) CAD s/p PCI - s/p BMS stent to RPDA in 2019 but hasnt followed up since then cont asa     3) Anemia - pt s/p PRBC s/p EGD colonoscopy s/p polypectomy
65M w/CAD s/p PCI w/bare metal stent, T2DM, HTN, CHF, hx of osteomyelitis (2019), anemia, CKDIV, HLD and GERD, CAD s/p 1 stent p/w 2 weeks of worsening intermittent non-exertional chest pain and dyspnea now with fatigue found to be fluid overloaded in need of HD. GI consulted for anemia
65M w/CAD s/p PCI w/bare metal stent, T2DM, HTN, CHF, hx of osteomyelitis (2019), anemia, CKDIV, HLD and GERD, CAD s/p 1 stent p/w 2 weeks of worsening intermittent non-exertional chest pain and dyspnea now with fatigue.  Nephrology consult for Advanced CKD
65M w/CAD s/p PCI w/bare metal stent, T2DM, HTN, CHF, hx of osteomyelitis (2019), anemia, CKDIV, HLD and GERD, CAD s/p 1 stent p/w 2 weeks of worsening intermittent non-exertional chest pain and dyspnea now with fatigue found to be fluid overloaded in need of HD. GI consulted for anemia
65M w/CAD s/p PCI w/bare metal stent, T2DM, HTN, CHF, hx of osteomyelitis (2019), anemia, CKDIV, HLD and GERD, CAD s/p 1 stent p/w 2 weeks of worsening intermittent non-exertional chest pain and dyspnea now with fatigue.  Nephrology consult for Advanced CKD

## 2021-03-19 NOTE — PROGRESS NOTE ADULT - PROBLEM SELECTOR PLAN 5
Patient with chronic diabetic neuropathy/nephropathy/retinopathy with bilateral foot wound  Podiatry follow up  No antibiotics at present  Optimal DM control with insulin treatment  Hold all oral antidiabetic agents  Wound care
Patient with chronic diabetic neuropathy/nephropathy/retinopathy with bilateral foot wound  Wound culture  Podiatry consult with Dr. Candelaria  No antibiotics at present  Optimal DM control with insulin treatment  Hold all oral antidiabetic agents
Patient with chronic diabetic neuropathy/nephropathy/retinopathy with bilateral foot wound  Podiatry consult reviewed  No antibiotics at present  Optimal DM control with insulin treatment  Hold all oral antidiabetic agents  Wound care
Patient with chronic diabetic neuropathy/nephropathy/retinopathy with bilateral foot wound  Wound culture  Podiatry consult with Dr. Candelaria  No antibiotics at present  Optimal DM control with insulin treatment  Hold all oral antidiabetic agents
Patient with chronic diabetic neuropathy/nephropathy/retinopathy with bilateral foot wound  Wound culture  Podiatry consult reviewed  No antibiotics at present  Optimal DM control with insulin treatment  Hold all oral antidiabetic agents
Patient with chronic diabetic neuropathy/nephropathy/retinopathy with bilateral foot wound  Wound culture  Podiatry consult reviewed  No antibiotics at present  Optimal DM control with insulin treatment  Hold all oral antidiabetic agents  Wound care
Patient with chronic diabetic neuropathy/nephropathy/retinopathy with bilateral foot wound  Podiatry consult reviewed  No antibiotics at present  Optimal DM control with insulin treatment  Hold all oral antidiabetic agents  Wound care
Patient with chronic diabetic neuropathy/nephropathy/retinopathy with bilateral foot wound  Wound culture  Podiatry consult reviewed  No antibiotics at present  Optimal DM control with insulin treatment  Hold all oral antidiabetic agents  Wound care
Patient with chronic diabetic neuropathy/nephropathy/retinopathy with bilateral foot wound  Wound culture  Podiatry consult reviewed  No antibiotics at present  Optimal DM control with insulin treatment  Hold all oral antidiabetic agents  Wound care
Patient with chronic diabetic neuropathy/nephropathy/retinopathy with bilateral foot wound  Podiatry follow up  No antibiotics at present  Optimal DM control with insulin treatment  Hold all oral antidiabetic agents  Wound care

## 2021-03-19 NOTE — PROGRESS NOTE ADULT - PROBLEM SELECTOR PROBLEM 3
Anemia secondary to renal failure
Anemia secondary to renal failure
Acute on chronic diastolic congestive heart failure
Acute on chronic diastolic congestive heart failure
Anemia secondary to renal failure
Anemia secondary to renal failure
Acute on chronic diastolic congestive heart failure
Anemia secondary to renal failure
Acute on chronic diastolic congestive heart failure
Acute on chronic diastolic congestive heart failure
Anemia secondary to renal failure

## 2021-03-19 NOTE — PROGRESS NOTE ADULT - PROBLEM SELECTOR PLAN 3
Anemia of chronic renal disease with iron deficiency with hgb 6.9 with intermittent melanotic stool  EPO and Venofer with HD  Monitor hgb/hct  Received PRBC during HD yesterday  S/p EGD/Colonoscopy with polypectomy now with bleeding  PPI  GI follow up reviewed with possible planned endoscopy later today to assess bleeding
-monitor i&o's  -diurese per cardiology recs  -care per cardiology appreciated
-monitor i&o's  -diurese per cardiology recs  -care per cardiology appreciated
Anemia of chronic renal disease with iron deficiency with hgb 6.9 with intermittent melanotic stool  EPO and Venofer with HD  Monitor hgb/hct  PRBC during HD  S/p EGD/Colonoscopy with polypectomy now with bleeding  Advised repeat GI follow up today  Anti platelet agents per gi clearanes  PPI IV
Anemia of chronic renal disease with iron deficiency with hgb 7.7  Received PRBC  EPO and Venofer with HD  Monitor hgb/hct  Stool guaiac negative  S/p EGD/Colonoscopy with polypectomy
Anemia of chronic renal disease with iron deficiency with hgb 8.5  REceived PRBC  EPO and Venofer with HD  Monitor hgb/hct  Stool guaiac negative  S/p GI procedure yesterday
-monitor i&o's  -diurese per cardiology recs  -care per cardiology appreciated
Anemia of chronic renal disease with iron deficiency with hgb 7.3 with melanotic stool  EPO and Venofer with HD  Monitor hgb/hct  S/p EGD/Colonoscopy with polypectomy with gi bleeding now  GI follow up today  Anti platelet agents on hold until further GI clearance  PPI IV with ? plan for repeat endoscopy
-monitor i&o's  -diurese per cardiology recs  -care per cardiology appreciated
-monitor i&o's  -diurese per cardiology recs  -care per cardiology appreciated
Anemia of chronic renal disease with iron deficiency with hgb 8.1  Received 1 unit PRBC overnight  EPO and Venofer with HD  Monitor hgb/hct  Stool guaiac negative  GI eval with Dr. Adams reviewed and plan for EGD/Colonoscopy
Anemia of chronic renal disease with iron deficiency with hgb 8.1  Received 1 unit PRBC overnight  EPO and Venofer with HD  Monitor hgb/hct  Stool guaiac negative  GI eval with Dr. Adams reviewd and plan for EGD/Colonoscopy
Anemia of chronic renal disease with iron deficiency with hgb 7.1  Received 1 unit PRBC overnight  PRBC during HD later today  EPO and Venofer with HD  Monitor hgb/hct  Stool guiac negative  GI eval with Dr. Adams for possible EGD/Colonoscopy
Anemia of chronic renal disease with iron deficiency with hgb 7.8 with intermittent Bloody stool  Hgb 8.5-->7.8  EPO and Venofer with HD  Monitor hgb/hct  S/p EGD/Colonoscopy with polypectomy now with intermittent bleeding  PPI  S/p Colonoscopy yesterday
Anemia of chronic renal disease with iron deficiency with hgb 7.1 with intermittent melanotic stool  EPO and Venofer with HD  Monitor hgb/hct  S/p EGD/Colonoscopy with polypectomy now with bleeding  GI follow up today for possible Colonocospy repeat  Anti platelet agents on hold until further GI clearance  PPI IV

## 2021-03-19 NOTE — PROGRESS NOTE ADULT - PROBLEM SELECTOR PROBLEM 4
ACP (advance care planning)
Renal osteodystrophy
Renal osteodystrophy
ACP (advance care planning)
Renal osteodystrophy

## 2021-03-19 NOTE — PROGRESS NOTE ADULT - SUBJECTIVE AND OBJECTIVE BOX
INTERVAL HPI/OVERNIGHT EVENTS:    feeling well overall; tolerated colonoscopy well yesterday  denied a bm this morning; without rectal bleeding overnight  cbc pending     MEDICATIONS  (STANDING):  aspirin enteric coated 81 milliGRAM(s) Oral daily  atorvastatin 40 milliGRAM(s) Oral at bedtime  calcium acetate 667 milliGRAM(s) Oral three times a day with meals  chlorhexidine 4% Liquid 1 Application(s) Topical daily  clopidogrel Tablet 75 milliGRAM(s) Oral daily  coronavirus (EUA) Vaccine ([employer]) 0.5 milliLiter(s) IntraMuscular once  dextrose 40% Gel 15 Gram(s) Oral once  dextrose 5%. 1000 milliLiter(s) (50 mL/Hr) IV Continuous <Continuous>  dextrose 5%. 1000 milliLiter(s) (100 mL/Hr) IV Continuous <Continuous>  dextrose 50% Injectable 25 Gram(s) IV Push once  dextrose 50% Injectable 12.5 Gram(s) IV Push once  dextrose 50% Injectable 25 Gram(s) IV Push once  epoetin rito-epbx (RETACRIT) Injectable 64939 Unit(s) IV Push once  ferrous    sulfate 325 milliGRAM(s) Oral two times a day  folic acid 1 milliGRAM(s) Oral daily  glucagon  Injectable 1 milliGRAM(s) IntraMuscular once  insulin lispro (ADMELOG) corrective regimen sliding scale   SubCutaneous three times a day before meals  insulin lispro (ADMELOG) corrective regimen sliding scale   SubCutaneous at bedtime  iron sucrose Injectable 100 milliGRAM(s) IV Push once  metoprolol succinate  milliGRAM(s) Oral daily  mupirocin 2% Ointment 1 Application(s) Topical two times a day  pantoprazole    Tablet 40 milliGRAM(s) Oral two times a day  tamsulosin 0.4 milliGRAM(s) Oral at bedtime    MEDICATIONS  (PRN):  acetaminophen   Tablet .. 650 milliGRAM(s) Oral every 6 hours PRN Mild Pain (1 - 3), Moderate Pain (4 - 6)  lidocaine   Patch 1 Patch Transdermal daily PRN Neck pain      Allergies    No Known Allergies    Intolerances        Review of Systems:    General:  No wt loss, fevers, chills, night sweats, fatigue   Eyes:  Good vision, no reported pain  ENT:  No sore throat, pain, runny nose, dysphagia  CV:  No pain, palpitations, hypo/hypertension  Resp:  No dyspnea, cough, tachypnea, wheezing  GI:  No pain, No nausea, No vomiting, No diarrhea, No constipation, No weight loss, No fever, No pruritis, No rectal bleeding, No melena, No dysphagia  :  No pain, bleeding, incontinence, nocturia  Muscle:  No pain, weakness  Neuro:  No weakness, tingling, memory problems  Psych:  No fatigue, insomnia, mood problems, depression  Endocrine:  No polyuria, polydypsia, cold/heat intolerance  Heme:  No petechiae, ecchymosis, easy bruisability  Skin:  No rash, tattoos, scars, edema      Vital Signs Last 24 Hrs  T(C): 36.8 (19 Mar 2021 04:51), Max: 37.1 (18 Mar 2021 12:04)  T(F): 98.2 (19 Mar 2021 04:51), Max: 98.7 (18 Mar 2021 12:04)  HR: 84 (19 Mar 2021 04:51) (65 - 84)  BP: 140/52 (19 Mar 2021 04:51) (106/30 - 142/50)  BP(mean): --  RR: 16 (19 Mar 2021 04:51) (16 - 20)  SpO2: 99% (19 Mar 2021 04:51) (94% - 100%)    PHYSICAL EXAM:    Constitutional: NAD  HEENT: EOMI, throat clear  Neck: No LAD, supple  Respiratory: CTA and P  Cardiovascular: S1 and S2, RRR, no M  Gastrointestinal: BS+, soft, NT/ND, neg HSM,  Extremities: No peripheral edema, neg clubbing, cyanosis  Vascular: 2+ peripheral pulses  Neurological: A/O x 3, no focal deficits  Psychiatric: Normal mood, normal affect  Skin: No rashes      LABS:                        8.5    7.01  )-----------( 281      ( 18 Mar 2021 11:42 )             27.4     03-18    142  |  104  |  18  ----------------------------<  117<H>  3.5   |  26  |  4.14<H>    Ca    9.0      18 Mar 2021 11:42            RADIOLOGY & ADDITIONAL TESTS:    < from: Colonoscopy (03.18.21 @ 13:39) >    [facility]  _______________________________________________________________________________  Patient Name: Serafin Daniels       Procedure Date: 3/18/2021 1:39 PM  MRN: [id number]                     Account Number: [id number]  YOB: 1955              Admit Type: Inpatient  Room: [place] [address]                         Gender: Male  Attending MD: Trever Jonas MD      _______________________________________________________________________________     Procedure:           Colonoscopy  Indications:         Hematochezia  Providers:           Trever Jonas MD  Medicines:           Monitored Anesthesia Care  Complications:       No immediate complications.  Procedure:           After I obtained informed consent, the scope was passed                        under direct vision. Throughout the procedure, the                        patient's blood pressure, pulse, and oxygen saturations                        were monitored continuously. The Colonoscope was                        introduced through the anus and advanced to the cecum,                        identified by appendiceal orifice and ileocecal valve.                        The colonoscopy was performed without difficulty. The       patient tolerated the procedure well. The quality of the                        bowel preparation was good.                                                                                   Findings:       A single (solitary) one mm ulcer was found in the ascending colon.        Oozing was present. For hemostasis, two hemostatic clips were        successfully placed (MR conditional). There was no bleeding at the end        of the procedure.       A single (solitary) one mm ulcer was found in the transverse colon. No        bleeding was present. For hemostasis, one hemostatic clip was        successfully placed (MR conditional). There was no bleeding at the end        of the procedure.                             Impression:          - A single (solitary) ulcer in the ascending colon.                        Clips (MR conditional) were placed.                       - A single (solitary) ulcer in the transverse colon.         Clip (MR conditional) was placed.                       - No specimens collected.  Recommendation:      - [facility] to [facility].                       - Advance diet as tolerated.                       - Ok to restartAC tomorrow                                                                                   Attending Participation:       I personally performed the entire procedure.                                                                                     Trever Jonas  ___________________  Trever Jonas MD  3/18/2021 2:13:36 PM  This report has been signed electronically.  Number of Addenda: 0    Note Initiated On: 3/18/2021 1:39 PM    < end of copied text >      Surgical Pathology Report (03.12.21 @ 15:45)    Surgical Pathology Report:   ACCESSION No:  [id number]    SERAFIN DANIELS               3        Surgical Final Report          Final Diagnosis  1. Colon, ascending, polyp, biopsy  - Tubular adenoma.    2. Colon, transverse, polyp, biopsy  - Inflammatory polyp.    3.Colon, descending, polyp, biopsy  - Tubular adenoma.    4. Colon, sigmoid, polyp, biopsy  - Tubular adenoma.    5. Small bowel, biopsy  - Normal small intestinal mucosa.    6. Stomach, nodule, biopsy  - Superficial fragment of normal gastric mucosa.  - No morphologic evidence of Helicobacter microorganisms.    7. Stomach, antrum, biopsy  - Normal oxyntic mucosa.  - No morphologic evidence of Helicobacter microorganisms.    Verified by: Jared Mcdaniel  (Electronic Signature)  Reported on: 03/17/21 17:48 EDT, [address] [place] [address]  Phone: [phone]   Fax: [phone]  _________________________________________________________________    Clinical History  Anemia, EGD and colonoscopy    Specimen(s) Submitted  1-Ascending colon polyp  2-Transverse colon polyp  3-Descending colon polyp  4-Sigmoid polyp  5-Small bowel  6-Gastric nodule  7-Antrum    Gross Description  1.   The specimen is received in formalin and the specimen  container is labeled: Ascending colon polyp.  It consists of a  0.8 x 0.8 x 0.5 cm brown              SERAFIN DANIELS               3        Surgical Final Report          polypoid portion of soft tissue.  The resection base is  identified which is inked black.  The specimen is trisected and  entirely submitted.  One cassette.    2.   The specimen is received in formalin and the specimen  container is labeled: Transverse colon polyp.  It consists of a  1.3 x 1.2 x 0.5 cm tan-brown polypoid soft tissue.  A possible  resection base is identified which is inked black.  The specimen  is trisected and entirely submitted.  One cassette.    3.   The specimen is received in formalin and the specimen  container is labeled: Descending colon polyp.  It consists of a  1.3 x 0.8 x 0.5 cm brown polypoid soft tissue.  A resection base  is identified and inked black.  The specimen is trisected and  entirely submitted.  One cassette.    4.   The specimen is received in formalin and the specimen  container is labeled: Sigmoid polyp. It consists of a 1 x 0.7 x  0.4 cm tan-brown polypoid soft tissue.  A resection base is  identified and inked black.  The specimen is bisected and  entirely submitted.  One cassette.    5.   The specimen is received in formalin and the specimen  container is labeled: Small bowel.  It consists of two fragments  of tan soft tissue measuring 0.2 cm and 0.3 cm in greatest  dimension.  Entirely submitted.  One cassette.    6.   The specimen is received in formalin and the specimen  container is labeled: Gastric nodule.  It consists of a 0.2 cm in  greatest dimension fragment of tan soft tissue.  Entirely  submitted.  One cassette.    7.   The specimen is received in formalin and the specimen  container is labeled: Antrum.  It consists of a 0.5 cmin  greatest dimension fragment of tan soft tissue.  Entirely  submitted.  One cassette.    In addition to other data that may appear on the specimen  containers, all labels have been inspected to confirm the  presence of the patient's name and date of birth.    Abigail Ferrari 03/13/2021 08:11    Disclaimer  Histological Processing Performed at [facility], Department of Pathology, [address] [place] [address].              SERAFIN DANIELS               3        Surgical Consult Report          Disclaimer  Histological Processing Performed at [facility], Department of Pathology, [address] [place] [address].

## 2021-03-19 NOTE — PROGRESS NOTE ADULT - PROVIDER SPECIALTY LIST ADULT
Cardiology
Nephrology
Cardiology
Cardiology
Gastroenterology
Nephrology
Gastroenterology
Gastroenterology
Nephrology

## 2021-03-19 NOTE — PROGRESS NOTE ADULT - PROBLEM SELECTOR PROBLEM 6
Acute on chronic diastolic congestive heart failure

## 2021-03-19 NOTE — PROGRESS NOTE ADULT - PROBLEM SELECTOR PROBLEM 1
Chronic kidney disease (CKD), stage V
Anemia secondary to renal failure
Chronic kidney disease (CKD), stage V
Anemia secondary to renal failure
Chronic kidney disease (CKD), stage V

## 2021-03-19 NOTE — PROGRESS NOTE ADULT - PROBLEM SELECTOR PLAN 1
-monitor cbc  -s/p EGD with benign nodule and s/p Colonoscopy w/polypectomy x 3   -s/p repeat Colonoscopy with clips placed on polypectomy sites 2/2 oozing   -pt will need repeat Colonoscopy in 2-3 years for mult. tubular adenomas on path  -no gi objection to resuming a/c or ASA 81mg

## 2021-03-19 NOTE — PROGRESS NOTE ADULT - PROBLEM SELECTOR PLAN 6
Acute on chronic diastolic CHF with volume overload with elevated troponin with prior history of CAD s/p PCI in 2018 with moderate AS/Mild MS  Hold ACEI/ARB  Echocardiogram results reviewed with moderate AS with mild to moderate MR  and mild MS with LVEF 55%  UF with HD  Serial CK, troponin, EKG reviewed.  Telemetry monitoring  Cardiology follow up with dr. Martell
Acute on chronic diastolic CHF with volume overload with elevated troponin with prior history of CAD s/p PCI in 2018 with moderate AS/Mild MS  Hold ACEI/ARB  Echocardiogram results reviewed with moderate AS with mild to moderate MR  and mild MS with LVEF 55%  UF with HD  Serial CK, troponin, EKG reviewed.  Telemetry monitoring  Cardiology follow up with dr. Martell reviewed.
Acute on chronic diastolic CHF with volume overload  Hold ACEI/ARB  Echocardiogram  Hold Lasix   UF with HD  Initiation of RRT/HD  Serial CK, troponin, EKG  Telemetry monitoring  Cardiology consult with Dr. Martell
Acute on chronic diastolic CHF with volume overload with elevated troponin with prior history of CAD s/p PCI in 2018 with moderate AS/Mild MS  Hold ACEI/ARB  Echocardiogram results reviewed with moderate AS with mild to moderate MR  and mild MS with LVEF 55%  UF with HD  Serial CK, troponin, EKG reviewed.  Telemetry monitoring  Cardiology follow up with dr. Martell
Acute on chronic diastolic CHF with volume overload with elevated troponin with prior history of CAD s/p PCI in 2018.  Hold ACEI/ARB  Echocardiogram  UF with HD  Initiation of RRT/HD  Serial CK, troponin, EKG  Telemetry monitoring  Cardiology consult with Dr. Martell reviewed.
Acute on chronic diastolic CHF with volume overload with elevated troponin with prior history of CAD s/p PCI in 2018 with moderate AS/Mild MS  Hold ACEI/ARB  Echocardiogram results reviewed with moderate AS with mild to moderate MR  and mild MS with LVEF 55%  UF with HD  Serial CK, troponin, EKG reviewed.  Telemetry monitoring  Cardiology follow up with dr. Martell
Acute on chronic diastolic CHF with volume overload with elevated troponin with prior history of CAD s/p PCI in 2018 with moderate AS/Mild MS  Hold ACEI/ARB  Echocardiogram results reviewed with moderate AS with mild to moderate MR  and mild MS with LVEF 55%  UF with HD  Cardiology follow up with Dr. Martell
Acute on chronic diastolic CHF with volume overload with elevated troponin with prior history of CAD s/p PCI in 2018.  Hold ACEI/ARB  Echocardiogram results reviewed with mild to moderate AS with mild to moderate MR with LVEF 55%  UF with HD  Serial CK, troponin, EKG  Telemetry monitoring  Cardiology consult with Dr. Martell reviewed.
Acute on chronic diastolic CHF with volume overload with elevated troponin with prior history of CAD s/p PCI in 2018 with moderate AS/Mild MS  Hold ACEI/ARB  Echocardiogram results reviewed with moderate AS with mild to moderate MR  and mild MS with LVEF 55%  UF with HD  Cardiology follow up with Dr. Martell
Acute on chronic diastolic CHF with volume overload with elevated troponin with prior history of CAD s/p PCI in 2018 with moderate AS/Mild MS  Hold ACEI/ARB  Echocardiogram results reviewed with moderate AS with mild to moderate MR  and mild MS with LVEF 55%  UF with HD  Serial CK, troponin, EKG reviewed.  Telemetry monitoring  Cardiology follow up with dr. Martell

## 2021-03-19 NOTE — PROGRESS NOTE ADULT - PROBLEM SELECTOR PLAN 2
-avoid nephrotoxins   -new HD   -epo/venofer w/HD  -renal recs appreciated
Patient with hypertensive/diabetic advanced CKD stage V with clinical volume overload now with improvement  Hold ACEI/ARB  Continue Metoprolol ER, amlodipine  Monitor vital signs  UF with HD
-avoid nephrotoxins   -new HD   -epo/venofer w/HD   -renal recs appreciated
Patient with hypertensive/diabetic advanced CKD stage V with clinical volume overload with moderate AS and mild MS  Hold ACEI/ARB  Continue Metoprolol ER with holding parameters  Monitor vital signs  UF with HD
Patient with hypertensive/diabetic advanced CKD stage V with clinical volume overload with moderate AS and mild MS  Hold ACEI/ARB  Continue Metoprolol ER  Monitor vital signs  UF with HD
Patient with hypertensive/diabetic advanced CKD stage V with clinical volume overload  Hold ACEi/ARB  Continue Metoprolol ER, amlodipine  Monitor vital signs  UF with HD
Patient with hypertensive/diabetic advanced CKD stage V with clinical volume overload with moderate AS and mild MS  Hold ACEI/ARB  Hold Calcium blocker for now.  Continue Metoprolol ER  Monitor vital signs  UF with HD
-avoid nephrotoxins   -new HD   -epo/venofer w/HD  -renal recs appreciated
Patient with hypertensive/diabetic advanced CKD stage V with clinical volume overload with moderate AS and mild MS  Hold ACEI/ARB  Continue Metoprolol ER  Monitor vital signs  UF with HD
Patient with hypertensive advanced CKD stage V with volume overload  Hold ACEi/ARB  Continue Metoprolol ER, amlodipine  Hold IV diuretic therapy  Monitor vital signs  UF with HD
Patient with hypertensive/diabetic advanced CKD stage V with clinical volume overload with moderate AS and mild MS  Hold ACEI/ARB  Continue Metoprolol ER with holding parameters  Monitor vital signs  UF with HD
Patient with hypertensive/diabetic advanced CKD stage V with clinical volume overload with moderate AS and mild MS  Hold ACEI/ARB  Continue Metoprolol ER  Monitor vital signs  UF with HD
Patient with hypertensive/diabetic advanced CKD stage V with clinical volume overload with moderate AS and mild MS  Hold ACEI/ARB  Continue Metoprolol ER  Monitor vital signs  UF with HD

## 2021-03-19 NOTE — PROGRESS NOTE ADULT - PROBLEM SELECTOR PLAN 4
Patient with secondary hyperparathyroidism with elevated PTH due to renal failure  Monitor intact PTH, Vitamin D 1,25 level, phos level  Low k/Low phos renal diet  Monitor calcium and phos level  Continue Phoslo with monitoring phos level  Will consider Hectorol during HD
Patient with secondary hyperparathyroidism with elevated PTH due to renal failure  Monitor intact PTH, Vitamin D 1,25 level, phos level  Low k/Low phos renal diet  Monitor calcium and phos level  Continue Phoslo  Hold calcitriol and sodium bicarbonate for now  Initiation of RRT/HD
Advanced care planning was discussed with patient and family.  Advanced care planning forms were reviewed and discussed.  Risks, benefits and alternatives of gastroenterologic procedures were discussed in detail and all questions were answered.  30 minutes spent.
Patient with secondary hyperparathyroidism with elevated PTH due to renal failure  Monitor intact PTH, Vitamin D 1,25 level, phos level  Low k/Low phos renal diet  Monitor calcium and phos level  Continue Phoslo  Hold calcitriol and sodium bicarbonate for now  Initiation of RRT/HD
Patient with secondary hyperparathyroidism with elevated PTH due to renal failure  Monitor intact PTH, Vitamin D 1,25 level, phos level  Low k/Low phos renal diet  Monitor calcium and phos level  Continue Phoslo with monitoring phos level  Will consider Hectorol during HD
Advanced care planning was discussed with patient and family.  Advanced care planning forms were reviewed and discussed.  Risks, benefits and alternatives of gastroenterologic procedures were discussed in detail and all questions were answered.  30 minutes spent.
Patient with secondary hyperparathyroidism with elevated PTH due to renal failure  Monitor intact PTH, Vitamin D 1,25 level, phos level  Low k/Low phos renal diet  Monitor calcium and phos level  Continue Phoslo  Continue HD
Patient with secondary hyperparathyroidism with elevated PTH due to renal failure  Monitor intact PTH, Vitamin D 1,25 level, phos level  Low k/Low phos renal diet  Monitor calcium and phos level  Continue Phoslo with monitoring phos level  Will consider Hectorol during HD
Advanced care planning was discussed with patient and family.  Advanced care planning forms were reviewed and discussed.  Risks, benefits and alternatives of gastroenterologic procedures were discussed in detail and all questions were answered.  30 minutes spent.
Patient with secondary hyperparathyroidism with elevated PTH due to renal failure  Monitor intact PTH, Vitamin D 1,25 level, phos level  Low k/Low phos renal diet  Monitor calcium and phos level  Continue Phoslo with monitoring phos level  Will consider Hectorol during HD
Patient with secondary hyperparathyroidism with elevated PTH due to renal failure  Monitor intact PTH, Vitamin D 1,25 level, phos level  Low k/Low phos renal diet  Monitor calcium and phos level  Continue Phoslo with monitoring phos level  Will consider Hectorol during HD out patient.
Patient with secondary hyperparathyroidism with elevated PTH due to renal failure  Monitor intact PTH, Vitamin D 1,25 level, phos level  Low k/Low phos renal diet  Monitor calcium and phos level  Continue Phoslo with monitoring phos level  Will consider Hectorol during HD
Patient with secondary hyperparathyroidism with elevated PTH due to renal failure  Monitor intact PTH, Vitamin D 1,25 level, phos level  Low k/Low phos renal diet  Monitor calcium and phos level  Continue Phoslo  Initiation of RRT/HD

## 2021-03-19 NOTE — PROGRESS NOTE ADULT - PROBLEM SELECTOR PROBLEM 2
CKD (chronic kidney disease), stage IV
Hypertensive nephropathy, stage 5 chronic kidney disease or end stage renal disease
CKD (chronic kidney disease), stage IV
Hypertensive nephropathy, stage 5 chronic kidney disease or end stage renal disease
CKD (chronic kidney disease), stage IV
Hypertensive nephropathy, stage 5 chronic kidney disease or end stage renal disease
CKD (chronic kidney disease), stage IV
Hypertensive nephropathy, stage 5 chronic kidney disease or end stage renal disease
Hypertensive nephropathy, stage 5 chronic kidney disease or end stage renal disease
CKD (chronic kidney disease), stage IV
Hypertensive nephropathy, stage 5 chronic kidney disease or end stage renal disease

## 2021-03-24 NOTE — ASU DISCHARGE PLAN (ADULT/PEDIATRIC) - CARE PROVIDER_API CALL
Geoff Rivera)  Surgery; Vascular Surgery  [address], [address]  [place] [address]  Phone: [phone]  Fax: [phone]  Follow Up Time:

## 2021-03-24 NOTE — H&P ADULT - ASSESSMENT
65 year old gentleman with ESRD on HD via a catheter. He is rt handed. He has a good cephalic vein and nl brachial and radial pulses. He presents for a left arm AV fistula. The procedure, risks and alternatives were explained and informed consent was obtained.

## 2021-03-24 NOTE — PRE-ANESTHESIA EVALUATION ADULT - NSANTHOSAYNRD_GEN_A_CORE
No. LEONID screening performed.  STOP BANG Legend: 0-2 = LOW Risk; 3-4 = INTERMEDIATE Risk; 5-8 = HIGH Risk

## 2021-03-24 NOTE — H&P ADULT - PROBLEM SELECTOR PLAN 1
2/2 volume overload from acute on chronic kidney failure, does have underlying CAD w/stent in 2019.   CXR w/pulm edema, BNP >36055   -s/p IV Lasix 80 in ED  -C/w IV Lasix 80mg BID for now   -Strict I&Os  -Check TTE   -Consider cards consult

## 2021-03-24 NOTE — H&P ADULT - HISTORY OF PRESENT ILLNESS
65M w/CAD s/p PCI w/bare metal stent, T2DM, HTN, CHF, hx of osteomyelitis (2019), anemia, CKDIV, HLD and GERD, CAD s/p 1 stent p/w 2 weeks of worsening intermittent non-exertional chest pain and dyspnea now with fatigue.  Pt vague historian, reports seeing PMD about 2 weeks ago, reports since then he has been complaint to his medications and denies any changes to diet.   Reports chronic b/l leg swelling at baseline which now has somewhat worsened, reports abdominal bloating and finds it difficult to do his daily tasks 2/2 dyspnea. Denies recent change in meds, h/o PE/DVT, cough, fever.     In ED, pt given IV Lasix 80mg and ordered for 1U pRBC

## 2021-03-24 NOTE — H&P ADULT - NSICDXFAMILYHX_GEN_ALL_CORE_FT
FAMILY HISTORY:  Family history of lung disease, father (asbestosis; worked in [employer])  Family history of stomach cancer, father

## 2021-03-24 NOTE — ASU PATIENT PROFILE, ADULT - PMH
Anemia    CKD (chronic kidney disease), stage IV    Diabetic foot ulcer    DM (diabetes mellitus)    Dyslipidemia    GERD (gastroesophageal reflux disease)    HTN (hypertension)    Renal cyst    Vitamin D deficiency

## 2021-03-24 NOTE — H&P ADULT - NSHPLABSRESULTS_GEN_ALL_CORE
.  LABS:                         6.6    8.10  )-----------( 307      ( 09 Mar 2021 16:44 )             21.8     03-09    132<L>  |  87<L>  |  104<H>  ----------------------------<  211<H>  3.4<L>   |  28  |  10.09<H>    Ca    10.5      09 Mar 2021 16:44    TPro  6.0  /  Alb  2.8<L>  /  TBili  0.2  /  DBili  x   /  AST  13  /  ALT  13  /  AlkPhos  80  03-09    PT/INR - ( 09 Mar 2021 16:44 )   PT: 13.3 sec;   INR: 1.18 ratio         PTT - ( 09 Mar 2021 16:44 )  PTT:34.2 sec        Serum Pro-Brain Natriuretic Peptide: 55824 pg/mL (03-09 @ 16:44)        RADIOLOGY, EKG & ADDITIONAL TESTS: Reviewed.   < from: Xray Chest 1 View- PORTABLE-Urgent (Xray Chest 1 View- PORTABLE-Urgent .) (03.09.21 @ 16:55) >    PROCEDURE DATE:  Mar  9 2021     ******PRELIMINARY REPORT******    ******PRELIMINARY REPORT******            INTERPRETATION:  Bilateral symmetric basilar hazy opacities, favoring pulmonary edema.    AK          ******PRELIMINARY REPORT******    ******PRELIMINARY REPORT******          ISABEL MORALES MD; Resident Radiology    < from: US Duplex Venous Lower Ext Complete, Bilateral (03.09.21 @ 16:23) >  IMPRESSION:  No evidence of deep venous thrombosis in either lower extremity.        JUAN ARROYO MD; Attending Radiologist  This document hasbeen electronically signed. Mar  9 2021  4:48PM

## 2021-03-24 NOTE — H&P ADULT - PROBLEM SELECTOR PLAN 4
[facility] (1/2019) --> 50% mid LAD, 50% prox RCA, 90% Distal RCA into PDA, s/p PCI with Bare metal stent  -C/w ASA/Plavix, bb, statin   -Follows with Dr. Keaton Martell

## 2021-03-24 NOTE — PRE-ANESTHESIA EVALUATION ADULT - NSANTHPMHFT_GEN_ALL_CORE
65M with PMH as stated above, here for AV fistula creation. Patient previously had CKD stage IV, which has progressed to ESRD. He last received dialysis on Monday 03/22, no issues. Able to walk several blocks, denies CP, SOB. Pt had no issues with anesthesia in the past.

## 2021-03-24 NOTE — H&P ADULT - NSHPPHYSICALEXAM_GEN_ALL_CORE
.  VITAL SIGNS:  T(C): 36.4 (03-09-21 @ 17:46), Max: 36.4 (03-09-21 @ 17:46)  T(F): 97.6 (03-09-21 @ 17:46), Max: 97.6 (03-09-21 @ 17:46)  HR: 76 (03-09-21 @ 17:46) (59 - 76)  BP: 133/86 (03-09-21 @ 17:46) (108/51 - 150/33)  BP(mean): --  RR: 18 (03-09-21 @ 17:46) (18 - 20)  SpO2: 100% (03-09-21 @ 17:46) (95% - 100%)  Wt(kg): --    PHYSICAL EXAM:    Constitutional: WDWN resting comfortably in stretcher; NAD  Head: NC/AT  Eyes: EOMI, clear conjunctiva  ENT: no nasal discharge; MMM  Neck: supple  Respiratory: decreased breath sounds, no rales/rhonchi, no retractions  Cardiac: +S1/S2; RRR; no M/R/G  Gastrointestinal: soft, NT/ND; no rebound or guarding; +BSx4  Back: spine midline, no bony tenderness or step-offs; no CVAT B/L  Extremities: WWP, no clubbing or cyanosis; +3-4 pitting edema LE b/l  Musculoskeletal: NROM x4; no joint swelling, tenderness or erythema  Dermatologic: skin warm, skin discoloration of LE   Neurologic: AAOx3; CNII-XII grossly intact; no focal deficits  Psychiatric: affect and characteristics of appearance, verbalizations, behaviors are appropriate

## 2021-03-25 NOTE — HISTORY OF PRESENT ILLNESS
[Diabetes Mellitus] : Diabetes Mellitus [Hypertension] : Hypertension [Other: ___] : [unfilled] [Previous Kidney Transplant] : no previous kidney transplant [Cardiac History] : cardiac history [Blood Transfusion] : prior blood transfusion [Claudication/Angina] : no claudication/angina [Hx of DVT/Thrombosis/Miscarriage] : no history of dvt/thrombosis/miscarriage [Diabetes] : no diabetes [Retinopathy] : no retinopathy [Neuropathy] : no neuropathy [Annual Foot Exams] : no annual foot exams [Lower Extremity Ulcers] : lower extremity ulcers [Insulin] : no insulin treatment [TextBox_42] : On Januvia [TextBox_16] : 03/21 [TextBox_20] : 01/10 [TextBox_24] : 01/10 [TextBox_28] : 01/10 [TextBox_30] : 2-3 blocks, 2 flights of stairs [de-identified] : DARIEL MI is a 65 year old male who presents for kidney transplant evaluation. \par Cause of ESRD : DM, HTN , h/o kidney stones. has had known CKD for 10 years \par Nephrologist: Dr Gama , on [facility]HD since March 2021. Dialysis unit: [facility], [place] \par \par Minimal urine output\par  \par Other PMH :\par Cardiac -HTN X 10 years , CHF ( most recent echo EF 55%) CAD s/p PCI ( 2 stents ) in 2017 \par Pulmonary- no h/o COPD, Asthma\par Infections- no h/o TB, HIV, Hepatitis or covid( vaccinated) h/o OM from right foot wound in 2019\par Heme- no h/o malignancy or bleeding disorders.No DVT/PE\par Endo- diabetes X 10 years, on Januvia , no Thyroid disease\par Neuro- no h/o CVA or TIA \par Psych- no suicidal ideation, depression or anxiety. \par Sensitization events- had blood transfusions in most recent hospitalization ( [facility]) no previous transplant\par Was hospitalzied in March 2021 for SOB and worsening kidney function and was stared on dialysis. \par \par Surgical h/o : right foot surgery\par \par Functional status: can walk 2-3 blocks and climb 2 flights of stairs without difficulty \par \par Medications - amlodipine, metoprolol, plavix, aspirin, januvia, atorvastatin\par \par Social history: lives with his cousin.  no partner, no kids\par quit smoking 20 years ago, ~20 years, couple packs on weekends. 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 no illicit drug use. \par \par Family history: Father had stomach cancer Mother had liver cirrhosis. Brother has Heart disease. No family h/o of kidney disease\par \par Living donors:  none identified curently

## 2021-03-25 NOTE — PHYSICAL EXAM
[General Appearance - Alert] : alert [General Appearance - In No Acute Distress] : in no acute distress [Sclera] : the sclera and conjunctiva were normal [PERRL With Normal Accommodation] : pupils were equal in size, round, and reactive to light [Extraocular Movements] : extraocular movements were intact [Outer Ear] : the ears and nose were normal in appearance [Oropharynx] : the oropharynx was normal [Neck Appearance] : the appearance of the neck was normal [Neck Cervical Mass (___cm)] : no neck mass was observed [Jugular Venous Distention Increased] : there was no jugular-venous distention [Thyroid Diffuse Enlargement] : the thyroid was not enlarged [Thyroid Nodule] : there were no palpable thyroid nodules [Auscultation Breath Sounds / Voice Sounds] : lungs were clear to auscultation bilaterally [Heart Rate And Rhythm] : heart rate was normal and rhythm regular [Heart Sounds] : normal S1 and S2 [Heart Sounds Gallop] : no gallops [Murmurs] : no murmurs [Heart Sounds Pericardial Friction Rub] : no pericardial rub [FreeTextEntry1] : 2-3+ edema in b/l LE  [Bowel Sounds] : normal bowel sounds [Abdomen Soft] : soft [Abdomen Tenderness] : non-tender [Abdomen Mass (___ Cm)] : no abdominal mass palpated [No CVA Tenderness] : no ~M costovertebral angle tenderness [No Spinal Tenderness] : no spinal tenderness [Abnormal Walk] : normal gait [Nail Clubbing] : no clubbing  or cyanosis of the fingernails [Musculoskeletal - Swelling] : no joint swelling seen [Motor Tone] : muscle strength and tone were normal [Skin Color & Pigmentation] : normal skin color and pigmentation [Skin Turgor] : normal skin turgor [Normal] : normal [] : right dorsalis pedis non-palpable [Deep Tendon Reflexes (DTR)] : deep tendon reflexes were 2+ and symmetric [Sensation] : the sensory exam was normal to light touch and pinprick [No Focal Deficits] : no focal deficits [Oriented To Time, Place, And Person] : oriented to person, place, and time [Impaired Insight] : insight and judgment were intact [Affect] : the affect was normal

## 2021-03-25 NOTE — HISTORY OF PRESENT ILLNESS
[TextBox_42] : DARIEL MI is a 65 year old male who presents for kidney transplant evaluation. \par Cause of ESRD : DM, HTN , h/o kidney stones . has had known CKD for 10 years \par Nephrologist: Dr Gama ,  on IHD since March 2021.  Dialysis unit: [facility],  [place]   \par  \par Other PMH :\par Cardiac -HTN X 10 years , CHF ( most recent echo EF 55%)  CAD s/p PCI ( 2 stents ) in 2017 \par Pulmonary-  no h/o COPD, Asthma\par Infections- no h/o TB, HIV, Hepatitis or covid( vaccinated) h/o OM from right foot wound in 2019\par Heme- no h/o malignancy or bleeding disorders.No DVT/PE\par Endo-  diabetes X 10 years, on Januvia , no Thyroid disease\par Neuro- no h/o CVA or TIA \par Psych- no suicidal ideation, depression or anxiety. \par Sensitization events- had  blood transfusions in most recent hospitalization ( [facility] 2021) no  previous transplant\par Was hospitalzied in March 2021 for SOB and worsening kidney function and was stared on dialysis. \par \par Surgical h/o : right foot surgery\par \par Functional status: can walk 2-3 blocks and climb 2 flights of stairs without difficulty \par \par Social history: lives with his cousin. has 3 kids ( 21 and 19) quit smoking 20 years ago. used to drink alcohol on weekends , now stopped. no illicit drug use. \par Family history:  Father had stomach cancer Mother had liver cirrhosis. Brother has Heart disease . No family h/o of kidney disease  \par \par \par \par

## 2021-03-25 NOTE — REASON FOR VISIT
[Initial] : an initial visit for [End-Stage Renal Disease] : end-stage renal disease [FreeTextEntry2] : Lanette

## 2021-03-25 NOTE — PLAN
[FreeTextEntry1] : 1. ESRD : Mr. DARIEL MI  Is a  fair candidate for kidney transplantation and would benefit from transplantation . no potential living donors. \par 2. CAD s/p PCI - needs stress test and cardiac eval . echo done in this month showed an EF 55%. \par 3. Cancer screening:  colonoscopy results reviewed. check PSA \par 4. ID: Serology for acute and chronic viral infections. Screening for latent TB\par 5. Imaging: Renal/abdominal /chest /Iliac imaging\par 6.Diabetes Mellitus: check HbA1c \par 7.Hypertension- follows with nephrology. \par \par I have personally discussed the risks and benefits of transplantation and patient attended transplant education class where the following was disclosed:\par  \par Reviewed factors affecting survival and morbidity while on dialysis, the transplant wait list and reviewed lisa-operative and long-term risk factors affecting outcome in kidney transplantation. \par  \par One year SRTR outcomes for national and [facility] were discussed in regards to patient survival and graft survival after transplantation. \par  \par Details of transplant surgery, including complications were discussed.\par Immunosuppression and complications including infection including life threatening sepsis and opportunistic infections, malignancy and new onset diabetes were discussed. \par  \par Benefits of live donor transplantation as well as variability in wait times across regions and multiple listing were discussed. \par KDPI >85% and PHS high risk criteria donors were discussed. \par HCV kidney transplantation was discussed.\par  \par Will proceed with completing/ updating work up and listing for transplant/ live donor transplant once work up is reviewed and found to be acceptable by multidisciplinary listing committee.\par

## 2021-03-25 NOTE — ASSESSMENT
[Fair candidate] : a fair candidate. We should proceed with our protocol for evaluation for kidney transplantation. [FreeTextEntry1] : cardiac clearance\par CXR\par CT abdo pelvis with run-offs\par

## 2021-03-25 NOTE — PHYSICAL EXAM
[] : right dorsalis pedis non-palpable [Normal] : normal [TextBox_34] : ++ lower leg edema/lymphedema, no open sores, previously healed plantar ulcer right foot, slightly purple toes

## 2021-04-27 NOTE — PROVIDER CONTACT NOTE (OTHER) - ASSESSMENT
Pt is A&Ox4, states that he didn't really mean he wanted to kill himself but that he is in a lot of pain. Pt. states he doesn't have thoughts of hurting himself
BP - 180 to 190 sbp; 80-90 DBP
97.6

## 2021-04-30 PROBLEM — R06.00 DYSPNEA: Status: ACTIVE | Noted: 2021-01-01

## 2021-04-30 PROBLEM — D64.9 ANEMIA: Status: ACTIVE | Noted: 2021-01-01

## 2021-05-10 PROBLEM — L97.514 SKIN ULCER OF RIGHT FOOT WITH NECROSIS OF BONE: Status: RESOLVED | Noted: 2019-01-14 | Resolved: 2021-01-01

## 2021-05-10 PROBLEM — L02.611 FOOT ABSCESS, RIGHT: Status: RESOLVED | Noted: 2019-02-04 | Resolved: 2021-01-01

## 2021-05-10 NOTE — DISCUSSION/SUMMARY
[Moderate Aortic Stenosis] : moderate aortic stenosis [Coronary Artery Disease] : coronary artery disease [Stable] : stable [Hypertension] : hypertension [FreeTextEntry1] : \par Currently stable from a cardiovascular standpoint. Hypertensive today. Appears mildly volume overloaded. Stable CAD. No ischemic or CHF symptoms. Patient with history of moderate aortic stenosis and mild-mod MR/mild MS. Continue current medications. ECG completed today and reviewed. Prior cardiac records reviewed. Given patient’s CAD history and risk factors, will schedule a pharmacologic nuclear stress test to rule out any significant CAD/ischemia. In addition, will schedule an echocardiogram to assess his cardiac structures and function. Pending the test results, I will make further recommendations.

## 2021-05-10 NOTE — PHYSICAL EXAM
[Normal S1, S2] : normal S1, S2 [No Rub] : no rub [No Gallop] : no gallop [No Cyanosis] : no cyanosis [No Acute Distress] : no acute distress [Normal Conjunctiva] : normal conjunctiva [Normal Venous Pressure] : normal venous pressure [No Carotid Bruit] : no carotid bruit [Murmur] : murmur [Normal] : clear lung fields, good air entry, no respiratory distress [Soft] : abdomen soft [Non Tender] : non-tender [de-identified] : 2/6 KENNA [de-identified] : Charcot foot, bilateral LE edema L>R [de-identified] : right chest dialysis catheter noted

## 2021-05-10 NOTE — CARDIOLOGY SUMMARY
[de-identified] : \par 05/06/21 - normal sinus rhythm, LVH, nonspecific ST abnormality\par  [de-identified] : \par 03/11/21 - MAC, mild-mod MR, mean MV gradient 4 mmHg, AV gradient (peak 33 mmHg, mean 21 mmHg), concentric LV remodeling, normal RV size and function, RVSP 48 mmHg, LVEF 55%\par  [de-identified] : \par 01/23/19 (PCI) - INTEGRITY stent to RPDA 90%\par 01/23/19 (Diag Cath) - mLAD 50%, pRCA 60%, RPDA 90%

## 2021-06-02 PROBLEM — I10 HYPERTENSION: Status: ACTIVE | Noted: 2021-01-01

## 2021-06-02 PROBLEM — Z87.898 HISTORY OF CHEST PAIN: Status: RESOLVED | Noted: 2021-01-01 | Resolved: 2021-01-01

## 2021-06-02 PROBLEM — I25.10 CAD (CORONARY ARTERY DISEASE): Status: ACTIVE | Noted: 2021-01-01

## 2021-06-02 PROBLEM — Z78.9 NEVER SMOKED TOBACCO: Status: ACTIVE | Noted: 2021-01-01

## 2021-06-02 PROBLEM — E78.5 HYPERLIPIDEMIA: Status: ACTIVE | Noted: 2021-01-01

## 2021-06-02 PROBLEM — I35.0 AORTIC STENOSIS: Status: ACTIVE | Noted: 2021-01-01

## 2021-06-04 PROBLEM — Z87.39 HISTORY OF OSTEOMYELITIS: Status: RESOLVED | Noted: 2021-01-01 | Resolved: 2021-01-01

## 2021-06-04 NOTE — CARDIOLOGY SUMMARY
[de-identified] : \par 06/02/21 - sinus bradycardia, nonspecific ST abnormality\par  [de-identified] : \par 05/28/21 (regadenoson tetrofosmin) - large, severe defect in the inferior wall that is predominantly fixed, suggestive of infarction, no clear evidence of ischemia, LVEF 43%\par  [de-identified] : \par 05/28/21 - MAC, mild-mod MR, AV gradient (peak 44 mmHg, mean 25 mmHg), mild LAE, normal LV and RV size and function, LVEF 62%\par 03/11/21 - MAC, mild-mod MR, mean MV gradient 4 mmHg, AV gradient (peak 33 mmHg, mean 21 mmHg), concentric LV remodeling, normal RV size and function, RVSP 48 mmHg, LVEF 55%\par  [de-identified] : \par 01/23/19 (PCI) - INTEGRITY stent to RPDA 90%\par 01/23/19 (Diag Cath) - mLAD 50%, pRCA 60%, RPDA 90%

## 2021-06-04 NOTE — PHYSICAL EXAM
[Well Developed] : well developed [Well Nourished] : well nourished [No Acute Distress] : no acute distress [Normal Conjunctiva] : normal conjunctiva [Normal Venous Pressure] : normal venous pressure [No Carotid Bruit] : no carotid bruit [Normal S1, S2] : normal S1, S2 [No Rub] : no rub [No Gallop] : no gallop [Murmur] : murmur [Clear Lung Fields] : clear lung fields [Good Air Entry] : good air entry [No Respiratory Distress] : no respiratory distress  [Soft] : abdomen soft [Non Tender] : non-tender [Normal Gait] : normal gait [No Cyanosis] : no cyanosis [No Rash] : no rash [No Skin Lesions] : no skin lesions [Moves all extremities] : moves all extremities [No Focal Deficits] : no focal deficits [Normal Speech] : normal speech [Alert and Oriented] : alert and oriented [de-identified] : 2/6 KENNA [de-identified] : bilateral trace to 1+ pitting edema

## 2021-06-04 NOTE — DISCUSSION/SUMMARY
[Moderate Aortic Stenosis] : moderate aortic stenosis [Coronary Artery Disease] : coronary artery disease [Stable] : stable [Hypertension] : hypertension [FreeTextEntry1] : \par Currently stable from a cardiovascular standpoint. Hypertensive today. History of ischemic cardiomyopathy. Currently euvolemic. Stable CAD (s/p RPDA stent, mid LAD 50%). Asymptomatic. Patient with moderate aortic stenosis. Continue current medications. ECG completed today and reviewed (findings as noted above). Recent cardiac testing reviewed. At this time, patient is considered an acceptable risk from a cardiac standpoint for renal transplant. Recommend follow up echo in 6 months to reassess aortic stenosis. Regular follow up with primary cardiologist is advised. Follow up annually pre-transplant.

## 2021-06-14 NOTE — PROGRESS NOTE ADULT - NSHPATTENDINGPLANDISCUSS_GEN_ALL_CORE
RN cannot approve Refill Request    RN can NOT refill this medication allergy/contraindication. Last office visit: 11/12/2020 Misael Csota MD Last Physical: 5/11/2018 Last MTM visit: Visit date not found Last visit same specialty: 11/12/2020 Misael Costa MD.  Next visit within 3 mo: Visit date not found  Next physical within 3 mo: Visit date not found      Minoo Diallo, [facility] Triage/Med Refill 12/27/2020    Requested Prescriptions   Pending Prescriptions Disp Refills     pravastatin (PRAVACHOL) 10 MG tablet [Pharmacy Med Name: PRAVASTATIN 10MG TABLETS] 45 tablet 2     Sig: TAKE 1 TABLET BY MOUTH EVERY OTHER DAY AT BEDTIME       Statins Refill Protocol (Hmg CoA Reductase Inhibitors) Passed - 12/25/2020  9:16 AM        Passed - PCP or prescribing provider visit in past 12 months      Last office visit with prescriber/PCP: 11/12/2020 Misael Costa MD OR same dept: 11/12/2020 Misael Costa MD OR same specialty: 11/12/2020 Misael Costa MD  Last physical: 5/11/2018 Last MTM visit: Visit date not found   Next visit within 3 mo: Visit date not found  Next physical within 3 mo: Visit date not found  Prescriber OR PCP: Misael Costa MD  Last diagnosis associated with med order: 1. Hyperlipidemia  - pravastatin (PRAVACHOL) 10 MG tablet [Pharmacy Med Name: PRAVASTATIN 10MG TABLETS]; TAKE 1 TABLET BY MOUTH EVERY OTHER DAY AT BEDTIME  Dispense: 45 tablet; Refill: 2    If protocol passes may refill for 12 months if within 3 months of last provider visit (or a total of 15 months).                   
NP
Team

## 2021-06-17 PROBLEM — Z01.818 PRE-TRANSPLANT EVALUATION FOR ESRD (END STAGE RENAL DISEASE): Status: ACTIVE | Noted: 2021-01-01

## 2022-01-28 NOTE — PATIENT PROFILE ADULT. - TEACHING/LEARNING CULTURAL CONSIDERATIONS
LMB SPLINT/S HOME INSTRUCTIONS      An LMB (dynamic, spring-loaded PIP extension splint) has been issued for your left middle finger    The purpose of your splint is to:  _X_ Provide gentle stretch to the middle (PIP) joints for efforts to INCREASE Extension RANGE OF MOTION and lengthen/grow the soft tissue.          Please wear the splint/s as follows:       _X_ Begin wearing the splint for _2 minutes. If no pain or pressure problems occur, increase the wear time to _4_  minutes. REDUCE THE WEAR TIME IF THE NORMAL ACHE THAT MAY OCCUR AFTER REMOVING IT DOES NOT GO AWAY WITHIN 3 MINUTES.    _X_ TARGET duration: _10_ minutes _3_ times per day.     **DISCONTINUE WEARING THE LMB SPLINT COMPLETELY IF PAIN OCCURS**    Performing strengthening exercises immediately following the splint wear will increase the effectiveness of this treatment AS FOLLOWS:    BRING SPLINT/S TO EACH THERAPY SESSION SO THAT THEY CAN BE MONITORED or REPLACED AS NEEDED TO CONTINUE IMPROVING THE MOVEMENT.     Please watch for and be aware of the following:         _X_  Monitor your skin for red marks that may occur from the splint rubbing or causing pressure.     Store your splint in a container so that it will not be lost or damaged by pressure.     Please contact your Occupational Therapist/Hand Therapist, Radha Pierce at [phone] if you have any concerns or questions.      Realice solo 1 vez cada dos días. Solo haz 5 repeticiones. SIEMPRE sin dolor  Perform 1 time every other day. Perform 5 repetitions. ALWAYS pain free.                           You can do these using the log also:      _             none

## 2022-03-24 NOTE — ED PROVIDER NOTE - HEME/LYMPH NEGATIVE STATEMENT, MLM
"Chief Complaint   Patient presents with     Consult     Consultation Appenditis with abdominal pain       Vitals:    03/24/22 1448   BP: 128/88   BP Location: Left arm   Patient Position: Chair   Cuff Size: Adult Large   Pulse: 82   SpO2: 99%   Weight: 82.7 kg (182 lb 4.8 oz)   Height: 1.696 m (5' 6.78\")       Body mass index is 28.74 kg/m .                         " no anemia, no easy bruising, no jaundice, no swollen lymph nodes.

## 2022-03-30 NOTE — ED PROVIDER NOTE - CHPI ED SYMPTOMS POS
Procedure(s):  ENDOSCOPIC RETROGRADE CHOLANGIOPANCREATOGRAPHY (ERCP) Rm 604  ENDOSCOPIC SPHINCTEROTOMY  ENDOSCOPIC STONE EXTRACTION/BALLOON SWEEP. general    Anesthesia Post Evaluation      Multimodal analgesia: multimodal analgesia used between 6 hours prior to anesthesia start to PACU discharge  Patient location during evaluation: PACU  Patient participation: complete - patient participated  Level of consciousness: awake  Pain management: adequate  Airway patency: patent  Anesthetic complications: no  Cardiovascular status: acceptable  Respiratory status: acceptable, spontaneous ventilation and nonlabored ventilation  Hydration status: acceptable  Post anesthesia nausea and vomiting:  none      INITIAL Post-op Vital signs:   Vitals Value Taken Time   /62 03/30/22 1636   Temp 37.2 °C (98.9 °F) 03/30/22 1607   Pulse 75 03/30/22 1639   Resp 16 03/30/22 1626   SpO2 99 % 03/30/22 1639   Vitals shown include unvalidated device data. diabetic foot ulcer, nausea, vomiting

## 2022-05-13 NOTE — CONSULT NOTE ADULT - PROBLEM SELECTOR RECOMMENDATION 3
-monitor i&o's  -diurese per cardiology recs  -care per cardiology appreciated
Anemia of chronic renal disease with iron deficiency  Hgb 6.6  Transfuse PRBC 1 units with monitoring hgb/hct  EPO and Venofer with HD  Monitor hgb/hct  Stool guiac
Yes

## 2022-06-08 NOTE — ASU DISCHARGE PLAN (ADULT/PEDIATRIC) - ACTIVITY LEVEL
No excercise/No heavy lifting W Plasty Text: The lesion was extirpated to the level of the fat with a #15 scalpel blade.  Given the location of the defect, shape of the defect and the proximity to free margins a W-plasty was deemed most appropriate for repair.  Using a sterile surgical marker, the appropriate transposition arms of the W-plasty were drawn incorporating the defect and placing the expected incisions within the relaxed skin tension lines where possible.    The area thus outlined was incised deep to adipose tissue with a #15 scalpel blade.  The skin margins were undermined to an appropriate distance in all directions utilizing iris scissors.  The opposing transposition arms were then transposed into place in opposite direction and anchored with interrupted buried subcutaneous sutures.

## 2022-06-17 NOTE — PROGRESS NOTE ADULT - SUBJECTIVE AND OBJECTIVE BOX
Patient is a 63y old  Male who presents with a chief complaint of Osteomyelitis (19 Jan 2019 21:46)       INTERVAL HPI/OVERNIGHT EVENTS:  Patient seen and evaluated at bedside.  Pt is resting comfortable in NAD. Denies N/V/F/C.      Allergies    No Known Allergies    Intolerances        Vital Signs Last 24 Hrs  T(C): 36.6 (20 Jan 2019 12:18), Max: 36.7 (19 Jan 2019 20:57)  T(F): 97.8 (20 Jan 2019 12:18), Max: 98 (19 Jan 2019 20:57)  HR: 78 (20 Jan 2019 12:18) (71 - 78)  BP: 154/68 (20 Jan 2019 12:18) (149/56 - 155/60)  BP(mean): --  RR: 18 (20 Jan 2019 12:18) (18 - 18)  SpO2: 100% (20 Jan 2019 12:18) (96% - 100%)    LABS:                        9.0    6.39  )-----------( 383      ( 20 Jan 2019 06:20 )             28.6     01-20    141  |  106  |  40<H>  ----------------------------<  96  3.9   |  22  |  3.13<H>    Ca    8.8      20 Jan 2019 06:20      PT/INR - ( 20 Jan 2019 06:20 )   PT: 11.8 SEC;   INR: 1.06          PTT - ( 20 Jan 2019 06:20 )  PTT:33.8 SEC    CAPILLARY BLOOD GLUCOSE          Lower Extremity Physical Exam:  Right foot ulcer stable with no pus noted and no necrosis of the skin edges.  Cellulitis resolving and edema decreased.  Still probes. SSKI Counseling:  I discussed with the patient the risks of SSKI including but not limited to thyroid abnormalities, metallic taste, GI upset, fever, headache, acne, arthralgias, paraesthesias, lymphadenopathy, easy bleeding, arrhythmias, and allergic reaction.

## 2022-08-25 NOTE — CONSULT NOTE ADULT - CONSULT REASON
left posterior calf
SOB
avf planning
Tunnelled Dialysis Access
anemia
Non tunnelled dialysis catheter
Yenny/ESRD not on HD
fall risk, confusion

## 2022-09-04 NOTE — PATIENT PROFILE ADULT - NSPROPOAURINARYCATHETER_GEN_A_NUR
Intubation    Date/Time: 9/4/2022 9:21 AM  Performed by: Tomas Randall CRNA  Authorized by: Edgar Gonzales MD     Intubation:     Induction:  Intravenous    Intubated:  Postinduction    Mask Ventilation:  Easy mask    Attempts:  1    Attempted By:  CRNA    Method of Intubation:  Video laryngoscopy    Blade:  Miller 3    Laryngeal View Grade: Grade I - full view of cords      Difficult Airway Encountered?: No      Complications:  None    Airway Device:  Oral endotracheal tube    Airway Device Size:  7.5    Style/Cuff Inflation:  Cuffed (inflated to minimal occlusive pressure)    Tube secured:  21    Secured at:  The lips    Placement Verified By:  Capnometry and Revisualization with laryngoscopy    Complicating Factors:  None    Findings Post-Intubation:  BS equal bilateral     no

## 2022-11-04 NOTE — H&P ADULT - PROBLEM SELECTOR PROBLEM 5
Patients UC intermediate to prescribed abx. If symptoms resolved, no change in plan of care. If she continues to have sx, please switch to bactrim DS (order pended).    DM (diabetes mellitus)

## 2023-04-12 NOTE — H&P ADULT - RS GEN PE MLT RESP DETAILS PC
Attending Attestation (For Attendings USE Only)... respirations non-labored/no wheezes/breath sounds equal/no rhonchi

## 2023-09-25 NOTE — CONSULT NOTE ADULT - PROBLEM/RECOMMENDATION-4
pt [place] and [place] and when she woke she drank more and then felt very dizzy and is worried it is from the water.   pt also c/o continued chronic left leg pain for which she did not get her rx filled from her last visit.
DISPLAY PLAN FREE TEXT

## 2023-10-16 NOTE — DIETITIAN INITIAL EVALUATION ADULT. - PROBLEM SELECTOR PROBLEM 3
Covering for patient's PCP  Patient called for refill of his Vyvanse 30 mg daily  Urine drug screen and contract up-to-date  Refill sent  
Type 2 diabetes mellitus with other circulatory complication, without long-term current use of insulin

## 2023-11-28 NOTE — ED ADULT NURSE NOTE - CAS DISCH ACCOMP BY
11/29/23 very pleased with care given.  States all staff were excellent.  States they have read and understand all discharge instructions.  Seen in MD office today as scheduled.   EDT

## 2024-03-12 NOTE — PROGRESS NOTE ADULT - PROBLEM SELECTOR PLAN 5
I would utilize compression either with Ace wrap, compression sleeve, compression stocking when performing activities.  Encourage rest after and use ice for discomfort.  You can use Tylenol and ibuprofen for general pains.  Your ears do not look infected and it is likely seasonal congestion you can take an over-the-counter allergy medication to help with this.  Please avoid sticking things like Q-tips in your ear as it can make it worse.    I have included the name of a primary care provider for you to see.  I would give their office a call for the next soonest appointment.  This is who will be able to perform your yearly blood draws and urinalysis.  In the emergency department we do blood work for diagnostic purposes and not for management purposes.    Please return to the emergency department should you develop fevers that cannot be controlled with medication, vomiting that persists, chest pain, shortness of breath, passing out, seizures, confusion, abdominal pain that does not resolve in a reasonable amount of time.   acceptable.  Monitor BP  Continue current anti-hypertensives

## 2024-05-08 NOTE — ED PROVIDER NOTE - CHIEF COMPLAINT
Post op day #2 excision of left subcutaneous face mass with primary closure      Anish is doing well, he's  not experiencing any pain, I went over the post op instructions with him below:    Post op instructions as follows: Follow up in week for suture removal, to Apply neosporin ointment to the incision twice daily for 1 week, To use Tylenol for pain, Call office with any problems, to take Prescription = azithromycin as directed.         He requested the [place] office location for his post op appt, he's scheduled for 5/13/24 at 0910.  
The patient is a 61y Male complaining of

## 2024-07-22 NOTE — CHART NOTE - NSCHARTNOTESELECT_GEN_ALL_CORE
PATIENT INFORMATION    Anticipatory guidance discussed  Drugs, ETOH, and tobacco  Importance of regular dental care  Importance of regular exercise  Importance of varied diet  Minimize junk food    Follow-Up  - Return in 1 year for your yearly well visit.    13-17 years old Health and Safety Tips - The following hyperlinks are available to access via [employer]    Parent Education from [employer]    Educación para padres sobre niños sanos    Additional Educational Resources:  For additional resources regarding your symptoms, diagnosis, or further health information, please visit the Discover a Healthier You section on [email or web address] or the Online Health Resources section in [employer].    
ACP/Event Note
Event Note
Event Note
Vascular Surgery
Vascular Surgery
Event Note
Event Note
IR pre procedure note/Event Note
Pre-IR/Event Note
VASCULAR/Event Note

## 2025-02-04 NOTE — PROGRESS NOTE ADULT - SUBJECTIVE AND OBJECTIVE BOX
02/04/25                            Arsalan Darby  [address]   [facility] [address]    To Whom It May Concern:    This is to certify Arsalan Darby was evaluated with Petey Mariscal Jr., MD on 02/04/25     RESTRICTIONS: Please excuse from gym/sport that utilizes the right hand for 3 days.            Electronically signed by:  Petey Mariscal Jr., MD  [facility]  [address] [address]  Dept Phone: [phone]        [facility]   MD DOMO WALLACE MD RUORU WONG, PA    TEL:  OFFICE: [phone]  DR LEE CELL: [phone]  PATRICIO CORONEL CELL: [phone]  DR. GRULLON CELL: [phone]    RENAL FOLLOW UP NOTE  --------------------------------------------------------------------------------  HPI:   62 y/o M PMH HTN, HLD, CKD3, DM2 admitted for right foot non-healing ulcer to bone 2/2 Charcot being followed by podiatry  Nephrology following for ABEL    Pt seen and examined at bedside.     PAST HISTORY  --------------------------------------------------------------------------------  No significant changes to PMH, PSH, FHx, SHx, unless otherwise noted    ALLERGIES & MEDICATIONS  --------------------------------------------------------------------------------  Allergies    No Known Allergies    Intolerances      Standing Inpatient Medications  amLODIPine   Tablet 10 milliGRAM(s) Oral daily  aspirin enteric coated 81 milliGRAM(s) Oral daily  atorvastatin 40 milliGRAM(s) Oral at bedtime  Dakins Solution - 1/4 Strength 1 Application(s) Topical daily  Dakins Solution - 1/4 Strength 1 Application(s) Topical Once  ergocalciferol 38296 Unit(s) Oral every week  folic acid 1 milliGRAM(s) Oral daily  heparin  Injectable 5000 Unit(s) SubCutaneous every 8 hours  hydrALAZINE 100 milliGRAM(s) Oral three times a day  influenza   Vaccine 0.5 milliLiter(s) IntraMuscular once  insulin lispro (HumaLOG) corrective regimen sliding scale   SubCutaneous three times a day before meals  insulin lispro (HumaLOG) corrective regimen sliding scale   SubCutaneous at bedtime  lidocaine 1% Injectable 30 milliLiter(s) Local Injection once  metoprolol succinate  milliGRAM(s) Oral daily  piperacillin/tazobactam IVPB. 3.375 Gram(s) IV Intermittent every 8 hours  sodium bicarbonate 650 milliGRAM(s) Oral three times a day  vancomycin  IVPB 1000 milliGRAM(s) IV Intermittent every 24 hours    PRN Inpatient Medications  acetaminophen   Tablet .. 650 milliGRAM(s) Oral every 6 hours PRN      REVIEW OF SYSTEMS  --------------------------------------------------------------------------------  General: no fever  CVS: no chest pain  RESP: no sob, no cough  ABD: no abdominal pain  : no dysuria,  MSK: + edema     VITALS/PHYSICAL EXAM  --------------------------------------------------------------------------------  T(C): 36.9 (09-13-18 @ 13:00), Max: 37.2 (09-12-18 @ 20:54)  HR: 75 (09-13-18 @ 13:00) (75 - 81)  BP: 142/60 (09-13-18 @ 13:00) (142/60 - 153/75)  RR: 18 (09-13-18 @ 13:00) (18 - 18)  SpO2: 97% (09-13-18 @ 13:00) (93% - 97%)  Wt(kg): --        Physical Exam:  	Gen: NAD  	HEENT: MMM  	Pulm: CTA B/L  	CV: S1S2  	Abd: Soft, +BS  	Ext: + LE edema B/L                      Neuro: Awake   	Skin: Warm and Dry   	Gu no daniel    LABS/STUDIES  --------------------------------------------------------------------------------              8.5    10.88 >-----------<  417      [09-13-18 @ 06:52]              27.1     139  |  104  |  39  ----------------------------<  127      [09-13-18 @ 06:52]  3.7   |  23  |  2.66        Ca     9.0     [09-13-18 @ 06:52]    TPro  6.5  /  Alb  2.5  /  TBili  < 0.2  /  DBili  x   /  AST  10  /  ALT  11  /  AlkPhos  64  [09-12-18 @ 06:00]          Creatinine Trend:  SCr 2.66 [09-13 @ 06:52]  SCr 2.73 [09-12 @ 06:00]  SCr 2.95 [09-11 @ 05:45]  SCr 3.04 [09-10 @ 05:14]  SCr 2.90 [09-09 @ 05:45]    Urinalysis - [09-11-18 @ 18:40]      Color LIGHT YELLOW / Appearance CLEAR / SG 1.011 / pH 6.0      Gluc 100 / Ketone NEGATIVE  / Bili NEGATIVE / Urobili NORMAL       Blood NEGATIVE / Protein 200 / Leuk Est NEGATIVE / Nitrite NEGATIVE      RBC 3-5 / WBC 0-2 / Hyaline NEGATIVE / Gran  / Sq Epi OCC / Non Sq Epi  / Bacteria NEGATIVE    Urine Creatinine 73.80      [09-11-18 @ 18:40]  Urine Protein 229.7      [09-11-18 @ 18:40]  Urine Sodium 51      [09-11-18 @ 18:40]    Iron 34, TIBC 155, %sat --      [09-11-18 @ 05:45]  Ferritin 355.3      [09-11-18 @ 05:45]  PTH 42.84 (Ca --)      [09-11-18 @ 05:45]   --  Vitamin D (25OH) 11.8      [09-11-18 @ 05:45]  HbA1c 6.5      [09-08-18 @ 05:20]

## 2025-04-01 NOTE — PRE-OP CHECKLIST - NS PREOP CHK HIBICLENS NA
Mother informed ok per pt to schedule at 12, mother will call back if virtual or face to face    N/A

## 2025-07-23 NOTE — H&P ADULT - REASON FOR ADMISSION
called pt on 07/23/2025 @10:36 am [facility] to inform of the cancellation of the appointment with Dr Hess. P:[phone] MM           Thank you   sob